# Patient Record
Sex: MALE | Race: WHITE | NOT HISPANIC OR LATINO | Employment: OTHER | URBAN - METROPOLITAN AREA
[De-identification: names, ages, dates, MRNs, and addresses within clinical notes are randomized per-mention and may not be internally consistent; named-entity substitution may affect disease eponyms.]

---

## 2017-02-13 ENCOUNTER — HOSPITAL ENCOUNTER (INPATIENT)
Facility: HOSPITAL | Age: 67
LOS: 3 days | Discharge: NURSING, REHAB OR LONG-TERM CARE | End: 2017-02-17
Attending: EMERGENCY MEDICINE | Admitting: HOSPITALIST

## 2017-02-13 DIAGNOSIS — T84.53XA INFECTION OF PROSTHETIC RIGHT KNEE JOINT (HCC): ICD-10-CM

## 2017-02-13 DIAGNOSIS — L03.116 CELLULITIS OF LEFT FOOT: Primary | ICD-10-CM

## 2017-02-13 DIAGNOSIS — M86.9 TOE OSTEOMYELITIS, LEFT (HCC): ICD-10-CM

## 2017-02-13 DIAGNOSIS — M25.461 KNEE EFFUSION, RIGHT: ICD-10-CM

## 2017-02-13 DIAGNOSIS — R26.2 DIFFICULTY WALKING: ICD-10-CM

## 2017-02-13 LAB
ALBUMIN SERPL-MCNC: 4.2 G/DL (ref 3.5–5.2)
ALBUMIN/GLOB SERPL: 1.1 G/DL
ALP SERPL-CCNC: 71 U/L (ref 39–117)
ALT SERPL W P-5'-P-CCNC: 19 U/L (ref 1–41)
ANION GAP SERPL CALCULATED.3IONS-SCNC: 16.6 MMOL/L
AST SERPL-CCNC: 18 U/L (ref 1–40)
BASOPHILS # BLD AUTO: 0.01 10*3/MM3 (ref 0–0.2)
BASOPHILS NFR BLD AUTO: 0.1 % (ref 0–1.5)
BILIRUB SERPL-MCNC: 0.6 MG/DL (ref 0.1–1.2)
BUN BLD-MCNC: 14 MG/DL (ref 8–23)
BUN/CREAT SERPL: 14.1 (ref 7–25)
CALCIUM SPEC-SCNC: 9.3 MG/DL (ref 8.6–10.5)
CHLORIDE SERPL-SCNC: 93 MMOL/L (ref 98–107)
CO2 SERPL-SCNC: 23.4 MMOL/L (ref 22–29)
CREAT BLD-MCNC: 0.99 MG/DL (ref 0.76–1.27)
D-LACTATE SERPL-SCNC: 1.7 MMOL/L (ref 0.5–2)
DEPRECATED RDW RBC AUTO: 44.2 FL (ref 37–54)
EOSINOPHIL # BLD AUTO: 0.05 10*3/MM3 (ref 0–0.7)
EOSINOPHIL NFR BLD AUTO: 0.5 % (ref 0.3–6.2)
ERYTHROCYTE [DISTWIDTH] IN BLOOD BY AUTOMATED COUNT: 13.3 % (ref 11.5–14.5)
FLUAV AG NPH QL: NEGATIVE
FLUBV AG NPH QL IA: NEGATIVE
GFR SERPL CREATININE-BSD FRML MDRD: 76 ML/MIN/1.73
GLOBULIN UR ELPH-MCNC: 3.9 GM/DL
GLUCOSE BLD-MCNC: 263 MG/DL (ref 65–99)
HCT VFR BLD AUTO: 35.6 % (ref 40.4–52.2)
HGB BLD-MCNC: 11.9 G/DL (ref 13.7–17.6)
IMM GRANULOCYTES # BLD: 0.02 10*3/MM3 (ref 0–0.03)
IMM GRANULOCYTES NFR BLD: 0.2 % (ref 0–0.5)
LYMPHOCYTES # BLD AUTO: 0.83 10*3/MM3 (ref 0.9–4.8)
LYMPHOCYTES NFR BLD AUTO: 7.5 % (ref 19.6–45.3)
MCH RBC QN AUTO: 30.7 PG (ref 27–32.7)
MCHC RBC AUTO-ENTMCNC: 33.4 G/DL (ref 32.6–36.4)
MCV RBC AUTO: 91.8 FL (ref 79.8–96.2)
MONOCYTES # BLD AUTO: 0.78 10*3/MM3 (ref 0.2–1.2)
MONOCYTES NFR BLD AUTO: 7.1 % (ref 5–12)
NEUTROPHILS # BLD AUTO: 9.31 10*3/MM3 (ref 1.9–8.1)
NEUTROPHILS NFR BLD AUTO: 84.6 % (ref 42.7–76)
PLATELET # BLD AUTO: 179 10*3/MM3 (ref 140–500)
PMV BLD AUTO: 10.8 FL (ref 6–12)
POTASSIUM BLD-SCNC: 4.3 MMOL/L (ref 3.5–5.2)
PROT SERPL-MCNC: 8.1 G/DL (ref 6–8.5)
RBC # BLD AUTO: 3.88 10*6/MM3 (ref 4.6–6)
SODIUM BLD-SCNC: 133 MMOL/L (ref 136–145)
WBC NRBC COR # BLD: 11 10*3/MM3 (ref 4.5–10.7)

## 2017-02-13 PROCEDURE — 80053 COMPREHEN METABOLIC PANEL: CPT | Performed by: EMERGENCY MEDICINE

## 2017-02-13 PROCEDURE — 87804 INFLUENZA ASSAY W/OPTIC: CPT | Performed by: EMERGENCY MEDICINE

## 2017-02-13 PROCEDURE — 83605 ASSAY OF LACTIC ACID: CPT | Performed by: EMERGENCY MEDICINE

## 2017-02-13 PROCEDURE — 87147 CULTURE TYPE IMMUNOLOGIC: CPT | Performed by: EMERGENCY MEDICINE

## 2017-02-13 PROCEDURE — 87040 BLOOD CULTURE FOR BACTERIA: CPT | Performed by: EMERGENCY MEDICINE

## 2017-02-13 PROCEDURE — 36415 COLL VENOUS BLD VENIPUNCTURE: CPT | Performed by: EMERGENCY MEDICINE

## 2017-02-13 PROCEDURE — 87150 DNA/RNA AMPLIFIED PROBE: CPT | Performed by: EMERGENCY MEDICINE

## 2017-02-13 PROCEDURE — 85025 COMPLETE CBC W/AUTO DIFF WBC: CPT | Performed by: EMERGENCY MEDICINE

## 2017-02-13 PROCEDURE — 87186 SC STD MICRODIL/AGAR DIL: CPT | Performed by: EMERGENCY MEDICINE

## 2017-02-13 PROCEDURE — 99284 EMERGENCY DEPT VISIT MOD MDM: CPT

## 2017-02-13 RX ORDER — GLYBURIDE 5 MG/1
5 TABLET ORAL 2 TIMES DAILY
COMMUNITY
End: 2017-02-17 | Stop reason: HOSPADM

## 2017-02-13 RX ORDER — LISINOPRIL 20 MG/1
20 TABLET ORAL EVERY EVENING
COMMUNITY
End: 2017-08-30 | Stop reason: SDUPTHER

## 2017-02-13 RX ORDER — HYDROCODONE BITARTRATE AND ACETAMINOPHEN 10; 300 MG/1; MG/1
1 TABLET ORAL AS NEEDED
COMMUNITY
End: 2017-02-17 | Stop reason: HOSPADM

## 2017-02-13 RX ORDER — MELOXICAM 15 MG/1
15 TABLET ORAL DAILY
COMMUNITY
End: 2017-02-17 | Stop reason: HOSPADM

## 2017-02-13 RX ORDER — PRAVASTATIN SODIUM 20 MG
20 TABLET ORAL NIGHTLY
COMMUNITY
End: 2017-06-16 | Stop reason: HOSPADM

## 2017-02-13 RX ORDER — OMEPRAZOLE 40 MG/1
40 CAPSULE, DELAYED RELEASE ORAL 2 TIMES DAILY
COMMUNITY

## 2017-02-13 RX ORDER — SERTRALINE HYDROCHLORIDE 100 MG/1
100 TABLET, FILM COATED ORAL DAILY
COMMUNITY

## 2017-02-13 RX ORDER — ACETAMINOPHEN 500 MG
1000 TABLET ORAL ONCE
Status: COMPLETED | OUTPATIENT
Start: 2017-02-13 | End: 2017-02-13

## 2017-02-13 RX ORDER — SODIUM CHLORIDE 0.9 % (FLUSH) 0.9 %
10 SYRINGE (ML) INJECTION AS NEEDED
Status: DISCONTINUED | OUTPATIENT
Start: 2017-02-13 | End: 2017-02-17 | Stop reason: HOSPADM

## 2017-02-13 RX ADMIN — ACETAMINOPHEN 1000 MG: 500 TABLET ORAL at 20:04

## 2017-02-14 ENCOUNTER — ANESTHESIA (OUTPATIENT)
Dept: PERIOP | Facility: HOSPITAL | Age: 67
End: 2017-02-14

## 2017-02-14 ENCOUNTER — ANESTHESIA EVENT (OUTPATIENT)
Dept: PERIOP | Facility: HOSPITAL | Age: 67
End: 2017-02-14

## 2017-02-14 ENCOUNTER — APPOINTMENT (OUTPATIENT)
Dept: GENERAL RADIOLOGY | Facility: HOSPITAL | Age: 67
End: 2017-02-14

## 2017-02-14 PROBLEM — E11.43 DIABETIC AUTONOMIC NEUROPATHY ASSOCIATED WITH TYPE 2 DIABETES MELLITUS (HCC): Chronic | Status: ACTIVE | Noted: 2017-02-14

## 2017-02-14 PROBLEM — L03.116 CELLULITIS OF LEFT FOOT: Status: ACTIVE | Noted: 2017-02-14

## 2017-02-14 PROBLEM — E11.9 DIABETES MELLITUS: Status: ACTIVE | Noted: 2017-02-14

## 2017-02-14 PROBLEM — M79.676 GREAT TOE PAIN: Status: ACTIVE | Noted: 2017-02-14

## 2017-02-14 PROBLEM — M25.569 KNEE PAIN: Status: ACTIVE | Noted: 2017-02-14

## 2017-02-14 PROBLEM — G89.29 CHRONIC PAIN: Status: ACTIVE | Noted: 2017-02-14

## 2017-02-14 PROBLEM — L97.524 DIABETIC ULCER OF TOE OF LEFT FOOT ASSOCIATED WITH TYPE 2 DIABETES MELLITUS, WITH NECROSIS OF BONE: Status: ACTIVE | Noted: 2017-02-14

## 2017-02-14 PROBLEM — E11.621 DIABETIC ULCER OF TOE OF LEFT FOOT ASSOCIATED WITH TYPE 2 DIABETES MELLITUS, WITH NECROSIS OF BONE: Status: ACTIVE | Noted: 2017-02-14

## 2017-02-14 PROBLEM — E11.51 DIABETES TYPE 2 WITH ATHEROSCLEROSIS OF ARTERIES OF EXTREMITIES: Chronic | Status: ACTIVE | Noted: 2017-02-14

## 2017-02-14 PROBLEM — I10 HYPERTENSION: Status: ACTIVE | Noted: 2017-02-14

## 2017-02-14 PROBLEM — E78.5 HYPERLIPIDEMIA: Status: ACTIVE | Noted: 2017-02-14

## 2017-02-14 PROBLEM — I70.209 DIABETES TYPE 2 WITH ATHEROSCLEROSIS OF ARTERIES OF EXTREMITIES: Chronic | Status: ACTIVE | Noted: 2017-02-14

## 2017-02-14 LAB
APPEARANCE FLD: ABNORMAL
COLOR FLD: ABNORMAL
CRP SERPL-MCNC: 23.7 MG/DL (ref 0–0.5)
CRYSTALS FLD MICRO: NORMAL
ERYTHROCYTE [SEDIMENTATION RATE] IN BLOOD: 98 MM/HR (ref 0–20)
GLUCOSE BLDC GLUCOMTR-MCNC: 242 MG/DL (ref 70–130)
GLUCOSE BLDC GLUCOMTR-MCNC: 254 MG/DL (ref 70–130)
GLUCOSE BLDC GLUCOMTR-MCNC: 285 MG/DL (ref 70–130)
GLUCOSE BLDC GLUCOMTR-MCNC: 306 MG/DL (ref 70–130)
HBA1C MFR BLD: 9.25 % (ref 4.8–5.6)
HOLD SPECIMEN: NORMAL
LYMPHOCYTES NFR FLD MANUAL: 20 %
METHOD: ABNORMAL
MONOCYTES NFR FLD: 1 %
MONOS+MACROS NFR FLD: 1 %
NEUTROPHILS NFR FLD MANUAL: 78 %
NUC CELL # FLD: ABNORMAL /MM3
RBC # FLD AUTO: 5500 /MM3
URATE SERPL-MCNC: 7.3 MG/DL (ref 3.4–7)
WHOLE BLOOD HOLD SPECIMEN: NORMAL

## 2017-02-14 PROCEDURE — 93005 ELECTROCARDIOGRAM TRACING: CPT | Performed by: HOSPITALIST

## 2017-02-14 PROCEDURE — 88305 TISSUE EXAM BY PATHOLOGIST: CPT | Performed by: SURGERY

## 2017-02-14 PROCEDURE — 63710000001 INSULIN ISOPHANE HUMAN PER 5 UNITS: Performed by: HOSPITALIST

## 2017-02-14 PROCEDURE — 25010000002 ONDANSETRON PER 1 MG: Performed by: EMERGENCY MEDICINE

## 2017-02-14 PROCEDURE — 25010000002 MIDAZOLAM PER 1 MG: Performed by: ANESTHESIOLOGY

## 2017-02-14 PROCEDURE — 25010000002 MEPERIDINE 25 MG/0.5ML SOLUTION: Performed by: NURSE ANESTHETIST, CERTIFIED REGISTERED

## 2017-02-14 PROCEDURE — 89051 BODY FLUID CELL COUNT: CPT | Performed by: ORTHOPAEDIC SURGERY

## 2017-02-14 PROCEDURE — 25010000002 SUCCINYLCHOLINE PER 20 MG: Performed by: NURSE ANESTHETIST, CERTIFIED REGISTERED

## 2017-02-14 PROCEDURE — 63710000001 INSULIN ASPART PER 5 UNITS: Performed by: HOSPITALIST

## 2017-02-14 PROCEDURE — 87205 SMEAR GRAM STAIN: CPT | Performed by: SURGERY

## 2017-02-14 PROCEDURE — 87205 SMEAR GRAM STAIN: CPT | Performed by: ORTHOPAEDIC SURGERY

## 2017-02-14 PROCEDURE — 82945 GLUCOSE OTHER FLUID: CPT | Performed by: INTERNAL MEDICINE

## 2017-02-14 PROCEDURE — 25010000003 CEFAZOLIN PER 500 MG: Performed by: SURGERY

## 2017-02-14 PROCEDURE — 87176 TISSUE HOMOGENIZATION CULTR: CPT | Performed by: SURGERY

## 2017-02-14 PROCEDURE — 25010000002 VANCOMYCIN: Performed by: HOSPITALIST

## 2017-02-14 PROCEDURE — 84550 ASSAY OF BLOOD/URIC ACID: CPT | Performed by: ORTHOPAEDIC SURGERY

## 2017-02-14 PROCEDURE — 94799 UNLISTED PULMONARY SVC/PX: CPT

## 2017-02-14 PROCEDURE — 25010000002 VANCOMYCIN PER 500 MG: Performed by: EMERGENCY MEDICINE

## 2017-02-14 PROCEDURE — 87070 CULTURE OTHR SPECIMN AEROBIC: CPT | Performed by: SURGERY

## 2017-02-14 PROCEDURE — 25010000002 FENTANYL CITRATE (PF) 100 MCG/2ML SOLUTION: Performed by: ANESTHESIOLOGY

## 2017-02-14 PROCEDURE — 87070 CULTURE OTHR SPECIMN AEROBIC: CPT | Performed by: ORTHOPAEDIC SURGERY

## 2017-02-14 PROCEDURE — 82962 GLUCOSE BLOOD TEST: CPT

## 2017-02-14 PROCEDURE — 89060 EXAM SYNOVIAL FLUID CRYSTALS: CPT | Performed by: ORTHOPAEDIC SURGERY

## 2017-02-14 PROCEDURE — 25010000002 HYDROMORPHONE PER 4 MG: Performed by: HOSPITALIST

## 2017-02-14 PROCEDURE — 25010000002 HYDROMORPHONE PER 4 MG: Performed by: NURSE ANESTHETIST, CERTIFIED REGISTERED

## 2017-02-14 PROCEDURE — 0Y6Q0Z1 DETACHMENT AT LEFT 1ST TOE, HIGH, OPEN APPROACH: ICD-10-PCS | Performed by: SURGERY

## 2017-02-14 PROCEDURE — 0SUV09Z SUPPLEMENT RIGHT KNEE JOINT, TIBIAL SURFACE WITH LINER, OPEN APPROACH: ICD-10-PCS | Performed by: ORTHOPAEDIC SURGERY

## 2017-02-14 PROCEDURE — 25010000002 HYDROMORPHONE PER 4 MG: Performed by: EMERGENCY MEDICINE

## 2017-02-14 PROCEDURE — 87075 CULTR BACTERIA EXCEPT BLOOD: CPT | Performed by: SURGERY

## 2017-02-14 PROCEDURE — 87147 CULTURE TYPE IMMUNOLOGIC: CPT | Performed by: ORTHOPAEDIC SURGERY

## 2017-02-14 PROCEDURE — 25010000002 FENTANYL CITRATE (PF) 100 MCG/2ML SOLUTION: Performed by: NURSE ANESTHETIST, CERTIFIED REGISTERED

## 2017-02-14 PROCEDURE — 25010000002 HYDROMORPHONE PER 4 MG: Performed by: ANESTHESIOLOGY

## 2017-02-14 PROCEDURE — C1776 JOINT DEVICE (IMPLANTABLE): HCPCS | Performed by: SURGERY

## 2017-02-14 PROCEDURE — 0S9C3ZX DRAINAGE OF RIGHT KNEE JOINT, PERCUTANEOUS APPROACH, DIAGNOSTIC: ICD-10-PCS | Performed by: ORTHOPAEDIC SURGERY

## 2017-02-14 PROCEDURE — 73560 X-RAY EXAM OF KNEE 1 OR 2: CPT

## 2017-02-14 PROCEDURE — 87015 SPECIMEN INFECT AGNT CONCNTJ: CPT | Performed by: ORTHOPAEDIC SURGERY

## 2017-02-14 PROCEDURE — 25010000002 HYDRALAZINE PER 20 MG: Performed by: NURSE ANESTHETIST, CERTIFIED REGISTERED

## 2017-02-14 PROCEDURE — 87186 SC STD MICRODIL/AGAR DIL: CPT | Performed by: ORTHOPAEDIC SURGERY

## 2017-02-14 PROCEDURE — 73630 X-RAY EXAM OF FOOT: CPT

## 2017-02-14 PROCEDURE — 84157 ASSAY OF PROTEIN OTHER: CPT

## 2017-02-14 PROCEDURE — 0SPC09Z REMOVAL OF LINER FROM RIGHT KNEE JOINT, OPEN APPROACH: ICD-10-PCS | Performed by: ORTHOPAEDIC SURGERY

## 2017-02-14 PROCEDURE — 93010 ELECTROCARDIOGRAM REPORT: CPT | Performed by: INTERNAL MEDICINE

## 2017-02-14 PROCEDURE — 0SBC0ZZ EXCISION OF RIGHT KNEE JOINT, OPEN APPROACH: ICD-10-PCS | Performed by: ORTHOPAEDIC SURGERY

## 2017-02-14 PROCEDURE — 25010000002 PIPERACILLIN SOD-TAZOBACTAM PER 1 G: Performed by: HOSPITALIST

## 2017-02-14 PROCEDURE — 83036 HEMOGLOBIN GLYCOSYLATED A1C: CPT | Performed by: HOSPITALIST

## 2017-02-14 PROCEDURE — 71010 HC CHEST PA OR AP: CPT

## 2017-02-14 PROCEDURE — L1830 KO IMMOB CANVAS LONG PRE OTS: HCPCS | Performed by: SURGERY

## 2017-02-14 PROCEDURE — 86140 C-REACTIVE PROTEIN: CPT | Performed by: ORTHOPAEDIC SURGERY

## 2017-02-14 PROCEDURE — 85652 RBC SED RATE AUTOMATED: CPT | Performed by: ORTHOPAEDIC SURGERY

## 2017-02-14 PROCEDURE — 25010000002 MIDAZOLAM PER 1 MG: Performed by: NURSE ANESTHETIST, CERTIFIED REGISTERED

## 2017-02-14 PROCEDURE — 88311 DECALCIFY TISSUE: CPT | Performed by: SURGERY

## 2017-02-14 PROCEDURE — 25010000002 PROPOFOL 10 MG/ML EMULSION: Performed by: NURSE ANESTHETIST, CERTIFIED REGISTERED

## 2017-02-14 PROCEDURE — 87147 CULTURE TYPE IMMUNOLOGIC: CPT | Performed by: SURGERY

## 2017-02-14 DEVICE — SIGMA TIBIAL INSERT ROTATING PLATFORM TC3 SIZE 4 22.5MM GVF
Type: IMPLANTABLE DEVICE | Site: KNEE | Status: FUNCTIONAL
Brand: SIGMA

## 2017-02-14 RX ORDER — HYDROMORPHONE HYDROCHLORIDE 1 MG/ML
0.5 INJECTION, SOLUTION INTRAMUSCULAR; INTRAVENOUS; SUBCUTANEOUS
Status: DISCONTINUED | OUTPATIENT
Start: 2017-02-14 | End: 2017-02-14 | Stop reason: HOSPADM

## 2017-02-14 RX ORDER — ACETAMINOPHEN 10 MG/ML
1000 INJECTION, SOLUTION INTRAVENOUS ONCE
Status: COMPLETED | OUTPATIENT
Start: 2017-02-14 | End: 2017-02-14

## 2017-02-14 RX ORDER — PROPOFOL 10 MG/ML
VIAL (ML) INTRAVENOUS AS NEEDED
Status: DISCONTINUED | OUTPATIENT
Start: 2017-02-14 | End: 2017-02-14 | Stop reason: SURG

## 2017-02-14 RX ORDER — MIDAZOLAM HYDROCHLORIDE 1 MG/ML
1 INJECTION INTRAMUSCULAR; INTRAVENOUS
Status: DISCONTINUED | OUTPATIENT
Start: 2017-02-14 | End: 2017-02-14 | Stop reason: HOSPADM

## 2017-02-14 RX ORDER — NICOTINE POLACRILEX 4 MG
15 LOZENGE BUCCAL
Status: DISCONTINUED | OUTPATIENT
Start: 2017-02-14 | End: 2017-02-17 | Stop reason: HOSPADM

## 2017-02-14 RX ORDER — SODIUM CHLORIDE 9 MG/ML
150 INJECTION, SOLUTION INTRAVENOUS CONTINUOUS
Status: DISCONTINUED | OUTPATIENT
Start: 2017-02-15 | End: 2017-02-17 | Stop reason: HOSPADM

## 2017-02-14 RX ORDER — FENTANYL CITRATE 50 UG/ML
50 INJECTION, SOLUTION INTRAMUSCULAR; INTRAVENOUS
Status: DISCONTINUED | OUTPATIENT
Start: 2017-02-14 | End: 2017-02-14 | Stop reason: HOSPADM

## 2017-02-14 RX ORDER — SODIUM CHLORIDE 9 MG/ML
150 INJECTION, SOLUTION INTRAVENOUS CONTINUOUS
Status: DISCONTINUED | OUTPATIENT
Start: 2017-02-15 | End: 2017-02-14

## 2017-02-14 RX ORDER — DOCUSATE SODIUM 100 MG/1
100 CAPSULE, LIQUID FILLED ORAL 2 TIMES DAILY
Status: COMPLETED | OUTPATIENT
Start: 2017-02-14 | End: 2017-02-17

## 2017-02-14 RX ORDER — LABETALOL HYDROCHLORIDE 5 MG/ML
5 INJECTION, SOLUTION INTRAVENOUS
Status: DISCONTINUED | OUTPATIENT
Start: 2017-02-14 | End: 2017-02-14 | Stop reason: HOSPADM

## 2017-02-14 RX ORDER — ACETAMINOPHEN 325 MG/1
650 TABLET ORAL EVERY 6 HOURS PRN
Status: DISCONTINUED | OUTPATIENT
Start: 2017-02-14 | End: 2017-02-17 | Stop reason: HOSPADM

## 2017-02-14 RX ORDER — VANCOMYCIN HYDROCHLORIDE 1 G/200ML
1000 INJECTION, SOLUTION INTRAVENOUS ONCE
Status: COMPLETED | OUTPATIENT
Start: 2017-02-14 | End: 2017-02-14

## 2017-02-14 RX ORDER — PROMETHAZINE HYDROCHLORIDE 25 MG/1
25 SUPPOSITORY RECTAL ONCE AS NEEDED
Status: DISCONTINUED | OUTPATIENT
Start: 2017-02-14 | End: 2017-02-14 | Stop reason: HOSPADM

## 2017-02-14 RX ORDER — PROMETHAZINE HYDROCHLORIDE 25 MG/ML
12.5 INJECTION, SOLUTION INTRAMUSCULAR; INTRAVENOUS ONCE AS NEEDED
Status: DISCONTINUED | OUTPATIENT
Start: 2017-02-14 | End: 2017-02-14 | Stop reason: HOSPADM

## 2017-02-14 RX ORDER — MIDAZOLAM HYDROCHLORIDE 1 MG/ML
2 INJECTION INTRAMUSCULAR; INTRAVENOUS
Status: DISCONTINUED | OUTPATIENT
Start: 2017-02-14 | End: 2017-02-14 | Stop reason: HOSPADM

## 2017-02-14 RX ORDER — SODIUM CHLORIDE 0.9 % (FLUSH) 0.9 %
1-10 SYRINGE (ML) INJECTION AS NEEDED
Status: DISCONTINUED | OUTPATIENT
Start: 2017-02-14 | End: 2017-02-14 | Stop reason: HOSPADM

## 2017-02-14 RX ORDER — LISINOPRIL 20 MG/1
20 TABLET ORAL DAILY
Status: DISCONTINUED | OUTPATIENT
Start: 2017-02-14 | End: 2017-02-17 | Stop reason: HOSPADM

## 2017-02-14 RX ORDER — PROMETHAZINE HYDROCHLORIDE 25 MG/1
12.5 TABLET ORAL ONCE AS NEEDED
Status: DISCONTINUED | OUTPATIENT
Start: 2017-02-14 | End: 2017-02-14 | Stop reason: HOSPADM

## 2017-02-14 RX ORDER — ONDANSETRON 2 MG/ML
4 INJECTION INTRAMUSCULAR; INTRAVENOUS ONCE
Status: COMPLETED | OUTPATIENT
Start: 2017-02-14 | End: 2017-02-14

## 2017-02-14 RX ORDER — DEXTROSE MONOHYDRATE 25 G/50ML
25 INJECTION, SOLUTION INTRAVENOUS
Status: DISCONTINUED | OUTPATIENT
Start: 2017-02-14 | End: 2017-02-17 | Stop reason: HOSPADM

## 2017-02-14 RX ORDER — HYDRALAZINE HYDROCHLORIDE 20 MG/ML
5 INJECTION INTRAMUSCULAR; INTRAVENOUS
Status: DISCONTINUED | OUTPATIENT
Start: 2017-02-14 | End: 2017-02-14 | Stop reason: HOSPADM

## 2017-02-14 RX ORDER — HYDROMORPHONE HYDROCHLORIDE 1 MG/ML
0.25 INJECTION, SOLUTION INTRAMUSCULAR; INTRAVENOUS; SUBCUTANEOUS
Status: DISCONTINUED | OUTPATIENT
Start: 2017-02-14 | End: 2017-02-14 | Stop reason: HOSPADM

## 2017-02-14 RX ORDER — DIPHENHYDRAMINE HYDROCHLORIDE 50 MG/ML
12.5 INJECTION INTRAMUSCULAR; INTRAVENOUS
Status: DISCONTINUED | OUTPATIENT
Start: 2017-02-14 | End: 2017-02-14 | Stop reason: HOSPADM

## 2017-02-14 RX ORDER — PANTOPRAZOLE SODIUM 40 MG/1
40 TABLET, DELAYED RELEASE ORAL
Status: DISCONTINUED | OUTPATIENT
Start: 2017-02-14 | End: 2017-02-17 | Stop reason: HOSPADM

## 2017-02-14 RX ORDER — HYDROMORPHONE HCL 110MG/55ML
PATIENT CONTROLLED ANALGESIA SYRINGE INTRAVENOUS AS NEEDED
Status: DISCONTINUED | OUTPATIENT
Start: 2017-02-14 | End: 2017-02-14 | Stop reason: SURG

## 2017-02-14 RX ORDER — DIPHENOXYLATE HYDROCHLORIDE AND ATROPINE SULFATE 2.5; .025 MG/1; MG/1
1 TABLET ORAL DAILY
Status: DISCONTINUED | OUTPATIENT
Start: 2017-02-14 | End: 2017-02-17 | Stop reason: HOSPADM

## 2017-02-14 RX ORDER — HYDROCODONE BITARTRATE AND ACETAMINOPHEN 7.5; 325 MG/1; MG/1
1 TABLET ORAL ONCE AS NEEDED
Status: DISCONTINUED | OUTPATIENT
Start: 2017-02-14 | End: 2017-02-14 | Stop reason: HOSPADM

## 2017-02-14 RX ORDER — BISACODYL 10 MG
10 SUPPOSITORY, RECTAL RECTAL DAILY PRN
Status: DISCONTINUED | OUTPATIENT
Start: 2017-02-14 | End: 2017-02-17 | Stop reason: HOSPADM

## 2017-02-14 RX ORDER — FLUMAZENIL 0.1 MG/ML
0.2 INJECTION INTRAVENOUS AS NEEDED
Status: DISCONTINUED | OUTPATIENT
Start: 2017-02-14 | End: 2017-02-14 | Stop reason: HOSPADM

## 2017-02-14 RX ORDER — MAGNESIUM HYDROXIDE 1200 MG/15ML
LIQUID ORAL AS NEEDED
Status: DISCONTINUED | OUTPATIENT
Start: 2017-02-14 | End: 2017-02-14 | Stop reason: HOSPADM

## 2017-02-14 RX ORDER — SUCCINYLCHOLINE CHLORIDE 20 MG/ML
INJECTION INTRAMUSCULAR; INTRAVENOUS AS NEEDED
Status: DISCONTINUED | OUTPATIENT
Start: 2017-02-14 | End: 2017-02-14 | Stop reason: SURG

## 2017-02-14 RX ORDER — ONDANSETRON 2 MG/ML
4 INJECTION INTRAMUSCULAR; INTRAVENOUS EVERY 6 HOURS PRN
Status: DISCONTINUED | OUTPATIENT
Start: 2017-02-14 | End: 2017-02-17 | Stop reason: HOSPADM

## 2017-02-14 RX ORDER — HYDROMORPHONE HYDROCHLORIDE 1 MG/ML
0.5 INJECTION, SOLUTION INTRAMUSCULAR; INTRAVENOUS; SUBCUTANEOUS
Status: DISCONTINUED | OUTPATIENT
Start: 2017-02-14 | End: 2017-02-15

## 2017-02-14 RX ORDER — PROMETHAZINE HYDROCHLORIDE 25 MG/1
25 TABLET ORAL ONCE AS NEEDED
Status: DISCONTINUED | OUTPATIENT
Start: 2017-02-14 | End: 2017-02-14 | Stop reason: HOSPADM

## 2017-02-14 RX ORDER — LIDOCAINE HYDROCHLORIDE 10 MG/ML
10 INJECTION, SOLUTION INFILTRATION; PERINEURAL ONCE
Status: COMPLETED | OUTPATIENT
Start: 2017-02-14 | End: 2017-02-14

## 2017-02-14 RX ORDER — SERTRALINE HYDROCHLORIDE 100 MG/1
100 TABLET, FILM COATED ORAL DAILY
Status: DISCONTINUED | OUTPATIENT
Start: 2017-02-14 | End: 2017-02-17 | Stop reason: HOSPADM

## 2017-02-14 RX ORDER — SODIUM CHLORIDE, SODIUM LACTATE, POTASSIUM CHLORIDE, CALCIUM CHLORIDE 600; 310; 30; 20 MG/100ML; MG/100ML; MG/100ML; MG/100ML
9 INJECTION, SOLUTION INTRAVENOUS CONTINUOUS
Status: DISCONTINUED | OUTPATIENT
Start: 2017-02-14 | End: 2017-02-17 | Stop reason: HOSPADM

## 2017-02-14 RX ORDER — NALOXONE HCL 0.4 MG/ML
0.2 VIAL (ML) INJECTION AS NEEDED
Status: DISCONTINUED | OUTPATIENT
Start: 2017-02-14 | End: 2017-02-14 | Stop reason: HOSPADM

## 2017-02-14 RX ORDER — OXYCODONE AND ACETAMINOPHEN 7.5; 325 MG/1; MG/1
1 TABLET ORAL ONCE AS NEEDED
Status: DISCONTINUED | OUTPATIENT
Start: 2017-02-14 | End: 2017-02-14 | Stop reason: HOSPADM

## 2017-02-14 RX ORDER — PRAVASTATIN SODIUM 20 MG
20 TABLET ORAL DAILY
Status: DISCONTINUED | OUTPATIENT
Start: 2017-02-14 | End: 2017-02-17 | Stop reason: HOSPADM

## 2017-02-14 RX ORDER — GLYBURIDE 5 MG/1
5 TABLET ORAL 2 TIMES DAILY
Status: DISCONTINUED | OUTPATIENT
Start: 2017-02-14 | End: 2017-02-15

## 2017-02-14 RX ORDER — ONDANSETRON 2 MG/ML
4 INJECTION INTRAMUSCULAR; INTRAVENOUS ONCE AS NEEDED
Status: DISCONTINUED | OUTPATIENT
Start: 2017-02-14 | End: 2017-02-14 | Stop reason: HOSPADM

## 2017-02-14 RX ORDER — HYDROCODONE BITARTRATE AND ACETAMINOPHEN 10; 325 MG/1; MG/1
1 TABLET ORAL EVERY 4 HOURS PRN
Status: DISCONTINUED | OUTPATIENT
Start: 2017-02-14 | End: 2017-02-15

## 2017-02-14 RX ORDER — FAMOTIDINE 10 MG/ML
20 INJECTION, SOLUTION INTRAVENOUS ONCE
Status: COMPLETED | OUTPATIENT
Start: 2017-02-14 | End: 2017-02-14

## 2017-02-14 RX ADMIN — LIDOCAINE HYDROCHLORIDE 10 ML: 10 INJECTION, SOLUTION INFILTRATION; PERINEURAL at 10:15

## 2017-02-14 RX ADMIN — FENTANYL CITRATE 100 MCG: 50 INJECTION INTRAMUSCULAR; INTRAVENOUS at 12:30

## 2017-02-14 RX ADMIN — MIDAZOLAM 1 MG: 1 INJECTION INTRAMUSCULAR; INTRAVENOUS at 17:25

## 2017-02-14 RX ADMIN — ONDANSETRON 4 MG: 2 INJECTION INTRAMUSCULAR; INTRAVENOUS at 00:53

## 2017-02-14 RX ADMIN — FENTANYL CITRATE 25 MCG: 50 INJECTION INTRAMUSCULAR; INTRAVENOUS at 11:33

## 2017-02-14 RX ADMIN — FENTANYL CITRATE 50 MCG: 50 INJECTION INTRAMUSCULAR; INTRAVENOUS at 13:37

## 2017-02-14 RX ADMIN — HYDROMORPHONE HYDROCHLORIDE 0.5 MG: 1 INJECTION, SOLUTION INTRAMUSCULAR; INTRAVENOUS; SUBCUTANEOUS at 14:38

## 2017-02-14 RX ADMIN — FENTANYL CITRATE 50 MCG: 50 INJECTION INTRAMUSCULAR; INTRAVENOUS at 15:44

## 2017-02-14 RX ADMIN — SODIUM CHLORIDE, POTASSIUM CHLORIDE, SODIUM LACTATE AND CALCIUM CHLORIDE 9 ML/HR: 600; 310; 30; 20 INJECTION, SOLUTION INTRAVENOUS at 11:49

## 2017-02-14 RX ADMIN — HYDROMORPHONE HYDROCHLORIDE 0.5 MG: 1 INJECTION, SOLUTION INTRAMUSCULAR; INTRAVENOUS; SUBCUTANEOUS at 22:35

## 2017-02-14 RX ADMIN — SUCCINYLCHOLINE CHLORIDE 60 MG: 20 INJECTION, SOLUTION INTRAMUSCULAR; INTRAVENOUS; PARENTERAL at 12:30

## 2017-02-14 RX ADMIN — HYDROMORPHONE HYDROCHLORIDE 1 MG: 1 INJECTION, SOLUTION INTRAMUSCULAR; INTRAVENOUS; SUBCUTANEOUS at 00:52

## 2017-02-14 RX ADMIN — ACETAMINOPHEN 650 MG: 325 TABLET ORAL at 05:06

## 2017-02-14 RX ADMIN — MIDAZOLAM 1 MG: 1 INJECTION INTRAMUSCULAR; INTRAVENOUS at 11:33

## 2017-02-14 RX ADMIN — HYDROMORPHONE HYDROCHLORIDE 0.5 MG: 1 INJECTION, SOLUTION INTRAMUSCULAR; INTRAVENOUS; SUBCUTANEOUS at 15:44

## 2017-02-14 RX ADMIN — HYDROMORPHONE HYDROCHLORIDE 0.5 MG: 1 INJECTION, SOLUTION INTRAMUSCULAR; INTRAVENOUS; SUBCUTANEOUS at 17:10

## 2017-02-14 RX ADMIN — EPHEDRINE SULFATE 15 MG: 50 INJECTION INTRAMUSCULAR; INTRAVENOUS; SUBCUTANEOUS at 13:14

## 2017-02-14 RX ADMIN — HYDROMORPHONE HYDROCHLORIDE 0.5 MG: 1 INJECTION, SOLUTION INTRAMUSCULAR; INTRAVENOUS; SUBCUTANEOUS at 08:54

## 2017-02-14 RX ADMIN — HYDROCODONE BITARTRATE AND ACETAMINOPHEN 1 TABLET: 10; 325 TABLET ORAL at 21:28

## 2017-02-14 RX ADMIN — HYDROMORPHONE HYDROCHLORIDE 1 MG: 2 INJECTION, SOLUTION INTRAMUSCULAR; INTRAVENOUS; SUBCUTANEOUS at 12:41

## 2017-02-14 RX ADMIN — EPHEDRINE SULFATE 15 MG: 50 INJECTION INTRAMUSCULAR; INTRAVENOUS; SUBCUTANEOUS at 12:50

## 2017-02-14 RX ADMIN — PROPOFOL 200 MG: 10 INJECTION, EMULSION INTRAVENOUS at 12:30

## 2017-02-14 RX ADMIN — FENTANYL CITRATE 25 MCG: 50 INJECTION INTRAMUSCULAR; INTRAVENOUS at 11:48

## 2017-02-14 RX ADMIN — HYDRALAZINE HYDROCHLORIDE 5 MG: 20 INJECTION INTRAMUSCULAR; INTRAVENOUS at 15:45

## 2017-02-14 RX ADMIN — FENTANYL CITRATE 50 MCG: 50 INJECTION INTRAMUSCULAR; INTRAVENOUS at 15:27

## 2017-02-14 RX ADMIN — HYDROMORPHONE HYDROCHLORIDE 0.5 MG: 1 INJECTION, SOLUTION INTRAMUSCULAR; INTRAVENOUS; SUBCUTANEOUS at 16:09

## 2017-02-14 RX ADMIN — SODIUM CHLORIDE, POTASSIUM CHLORIDE, SODIUM LACTATE AND CALCIUM CHLORIDE 9 ML/HR: 600; 310; 30; 20 INJECTION, SOLUTION INTRAVENOUS at 15:51

## 2017-02-14 RX ADMIN — ACETAMINOPHEN 1000 MG: 10 INJECTION, SOLUTION INTRAVENOUS at 16:12

## 2017-02-14 RX ADMIN — SODIUM CHLORIDE, POTASSIUM CHLORIDE, SODIUM LACTATE AND CALCIUM CHLORIDE: 600; 310; 30; 20 INJECTION, SOLUTION INTRAVENOUS at 12:27

## 2017-02-14 RX ADMIN — HYDROMORPHONE HYDROCHLORIDE 0.5 MG: 1 INJECTION, SOLUTION INTRAMUSCULAR; INTRAVENOUS; SUBCUTANEOUS at 15:12

## 2017-02-14 RX ADMIN — DOCUSATE SODIUM 100 MG: 100 CAPSULE, LIQUID FILLED ORAL at 20:10

## 2017-02-14 RX ADMIN — ONDANSETRON 4 MG: 2 INJECTION INTRAMUSCULAR; INTRAVENOUS at 14:07

## 2017-02-14 RX ADMIN — FENTANYL CITRATE 50 MCG: 50 INJECTION INTRAMUSCULAR; INTRAVENOUS at 14:39

## 2017-02-14 RX ADMIN — HYDROMORPHONE HYDROCHLORIDE 0.5 MG: 1 INJECTION, SOLUTION INTRAMUSCULAR; INTRAVENOUS; SUBCUTANEOUS at 15:28

## 2017-02-14 RX ADMIN — HYDROMORPHONE HYDROCHLORIDE 0.5 MG: 1 INJECTION, SOLUTION INTRAMUSCULAR; INTRAVENOUS; SUBCUTANEOUS at 16:55

## 2017-02-14 RX ADMIN — MEPERIDINE HYDROCHLORIDE 12.5 MG: 25 INJECTION, SOLUTION INTRAMUSCULAR; INTRAVENOUS; SUBCUTANEOUS at 15:13

## 2017-02-14 RX ADMIN — HYDROMORPHONE HYDROCHLORIDE 1 MG: 2 INJECTION, SOLUTION INTRAMUSCULAR; INTRAVENOUS; SUBCUTANEOUS at 12:36

## 2017-02-14 RX ADMIN — VANCOMYCIN HYDROCHLORIDE 1000 MG: 1 INJECTION, SOLUTION INTRAVENOUS at 00:55

## 2017-02-14 RX ADMIN — INSULIN ASPART 3 UNITS: 100 INJECTION, SOLUTION INTRAVENOUS; SUBCUTANEOUS at 08:54

## 2017-02-14 RX ADMIN — HYDROMORPHONE HYDROCHLORIDE 0.5 MG: 1 INJECTION, SOLUTION INTRAMUSCULAR; INTRAVENOUS; SUBCUTANEOUS at 18:52

## 2017-02-14 RX ADMIN — FAMOTIDINE 20 MG: 10 INJECTION, SOLUTION INTRAVENOUS at 11:32

## 2017-02-14 RX ADMIN — SODIUM CHLORIDE, POTASSIUM CHLORIDE, SODIUM LACTATE AND CALCIUM CHLORIDE: 600; 310; 30; 20 INJECTION, SOLUTION INTRAVENOUS at 12:50

## 2017-02-14 RX ADMIN — HYDROMORPHONE HYDROCHLORIDE 1 MG: 1 INJECTION, SOLUTION INTRAMUSCULAR; INTRAVENOUS; SUBCUTANEOUS at 02:30

## 2017-02-14 RX ADMIN — INSULIN ASPART 4 UNITS: 100 INJECTION, SOLUTION INTRAVENOUS; SUBCUTANEOUS at 20:10

## 2017-02-14 RX ADMIN — MIDAZOLAM 1 MG: 1 INJECTION INTRAMUSCULAR; INTRAVENOUS at 16:09

## 2017-02-14 RX ADMIN — HYDROMORPHONE HYDROCHLORIDE 0.5 MG: 1 INJECTION, SOLUTION INTRAMUSCULAR; INTRAVENOUS; SUBCUTANEOUS at 05:06

## 2017-02-14 RX ADMIN — FENTANYL CITRATE 50 MCG: 50 INJECTION INTRAMUSCULAR; INTRAVENOUS at 13:12

## 2017-02-14 RX ADMIN — FENTANYL CITRATE 25 MCG: 50 INJECTION INTRAMUSCULAR; INTRAVENOUS at 12:03

## 2017-02-14 RX ADMIN — FENTANYL CITRATE 50 MCG: 50 INJECTION INTRAMUSCULAR; INTRAVENOUS at 15:00

## 2017-02-14 RX ADMIN — VANCOMYCIN HYDROCHLORIDE 1750 MG: 1 INJECTION, POWDER, LYOPHILIZED, FOR SOLUTION INTRAVENOUS at 22:35

## 2017-02-14 RX ADMIN — HYDROMORPHONE HYDROCHLORIDE 0.5 MG: 1 INJECTION, SOLUTION INTRAMUSCULAR; INTRAVENOUS; SUBCUTANEOUS at 17:34

## 2017-02-14 RX ADMIN — MIDAZOLAM 1 MG: 1 INJECTION INTRAMUSCULAR; INTRAVENOUS at 16:39

## 2017-02-14 RX ADMIN — MIDAZOLAM 1 MG: 1 INJECTION INTRAMUSCULAR; INTRAVENOUS at 16:55

## 2017-02-14 RX ADMIN — MEPERIDINE HYDROCHLORIDE 12.5 MG: 25 INJECTION, SOLUTION INTRAMUSCULAR; INTRAVENOUS; SUBCUTANEOUS at 16:33

## 2017-02-14 RX ADMIN — HYDROMORPHONE HYDROCHLORIDE 0.5 MG: 1 INJECTION, SOLUTION INTRAMUSCULAR; INTRAVENOUS; SUBCUTANEOUS at 07:05

## 2017-02-14 RX ADMIN — TAZOBACTAM SODIUM AND PIPERACILLIN SODIUM 3.38 G: 375; 3 INJECTION, SOLUTION INTRAVENOUS at 21:28

## 2017-02-14 RX ADMIN — HUMAN INSULIN 40 UNITS: 100 INJECTION, SUSPENSION SUBCUTANEOUS at 08:55

## 2017-02-14 RX ADMIN — VANCOMYCIN HYDROCHLORIDE 1500 MG: 1 INJECTION, POWDER, LYOPHILIZED, FOR SOLUTION INTRAVENOUS at 08:30

## 2017-02-14 NOTE — H&P
HISTORY AND PHYSICAL   [facility]        Patient Identification:  Name: Hugh R McBurney  Age: 66 y.o.  Sex: male  :  1950  MRN: [id number]                     Primary Care Physician: Guille Nieves MD    Chief Complaint:  Pain in left foot with high fever    History of Present Illness:       The patient's 66-year-old white male with diabetes mellitus, hypertension, hyperlipidemia, kidney stones and chronic pain with bilateral knee replacements and history of back pain who is admitted with history of having a sore area on the great toe of the left foot which occurred when he was wearing shoes it did not fit properly.  He's been seeing a foot doctor about this and has had local wound care.  His pain in his left foot got much worse and he had high fevers up to 104 and came to the emergency room.  He appeared to have a pretty severe infection in the left foot/great toe and cultures were done he was started on broad-spectrum antibiotics and is being admitted for further treatment.  He also complained of having problems with pain in the right knee which is so severe he is not able to bear weight.  He had prior problem with prosthetic knee joint infection and had antibiotics for several weeks by surgery and then replacement of the total knee.  He did complain about a little bit of nausea but no vomiting.  He's not had any chest pain or shortness of air.    Past Medical History:  Past Medical History   Diagnosis Date   • Chronic pain      BILATERAL KNEES AND BACK   • Diabetes mellitus    • Hyperlipidemia    • Hypertension    • Kidney stone      Past Surgical History:  Past Surgical History   Procedure Laterality Date   • Appendectomy     • Colonoscopy     • Joint replacement       BILATERAL KNEES    • Back surgery        Home Meds:  Prescriptions Prior to Admission   Medication Sig Dispense Refill Last Dose   • glyBURIDE (DIAbeta) 5 MG tablet Take 5 mg by mouth 2 (Two) Times a Day.      •  Hydrocodone-Acetaminophen (VICODIN HP)  MG per tablet Take 1 tablet by mouth As Needed for moderate pain (4-6).      • insulin NPH (humuLIN N,novoLIN N) 100 UNIT/ML injection Inject  under the skin 2 (Two) Times a Day Before Meals. 70/30 40 UNITS IN AM AND PM      • lisinopril (PRINIVIL,ZESTRIL) 20 MG tablet Take 20 mg by mouth Daily.      • meloxicam (MOBIC) 15 MG tablet Take 15 mg by mouth Daily.      • metFORMIN (GLUCOPHAGE) 850 MG tablet Take 850 mg by mouth 2 (Two) Times a Day With Meals.      • Multiple Vitamin (MULTI VITAMIN DAILY PO) Take  by mouth.      • omeprazole (priLOSEC) 40 MG capsule Take 40 mg by mouth 2 (Two) Times a Day.      • pravastatin (PRAVACHOL) 20 MG tablet Take 20 mg by mouth Daily.      • sertraline (ZOLOFT) 100 MG tablet Take 100 mg by mouth Daily.          Allergies:  No Known Allergies  Immunizations:    There is no immunization history on file for this patient.  Social History:   Social History     Social History Narrative     Social History     Social History   • Marital status:      Spouse name: N/A   • Number of children: N/A   • Years of education: N/A     Occupational History   • Not on file.     Social History Main Topics   • Smoking status: Former Smoker     Packs/day: 3.00     Years: 20.00   • Smokeless tobacco: Not on file      Comment: quit 35 years ago    • Alcohol use No   • Drug use: Yes     Special: Other      Comment: vicodin-prescribed    • Sexual activity: Defer     Other Topics Concern   • Not on file     Social History Narrative       Family History:  Family History   Problem Relation Age of Onset   • Heart disease Mother    • Heart disease Father         Review of Systems  See history of present illness and past medical history.  Patient denies dizziness, syncope, falls, trauma, change in vision, change in hearing, change in taste, changes in weight, changes in appetite, focal weakness, numbness, or paresthesia.  Patient denies chest pain,  "palpitations, dyspnea, orthopnea, PND, cough, sinus pressure, rhinorrhea, epistaxis, hemoptysis,  vomiting,hematemesis, diarrhea, constipation or hematchezia.  Denies cold or heat intolerance, polydipsia, polyuria, polyphagia. Denies hematuria, pyuria, dysuria, hesitancy, frequency or urgency.      Remainder of ROS is negative.    Objective:  tMax 24 hrs: Temp (24hrs), Av.8 °F (39.3 °C), Min:101.5 °F (38.6 °C), Max:104.3 °F (40.2 °C)    Vitals Ranges:   Temp:  [101.5 °F (38.6 °C)-104.3 °F (40.2 °C)] 101.5 °F (38.6 °C)  Heart Rate:  [105-111] 107  Resp:  [16-18] 16  BP: (130-178)/(57-99) 130/57      Exam:  Visit Vitals   • /57 (BP Location: Left arm, Patient Position: Lying)   • Pulse 107   • Temp (!) 101.5 °F (38.6 °C) (Oral)   • Resp 16   • Ht 74\" (188 cm)   • Wt 270 lb (122 kg)   • SpO2 94%   • BMI 34.67 kg/m2       General Appearance:    Alert, cooperative, no distress, appears stated age   Head:    Normocephalic, without obvious abnormality, atraumatic   Eyes:    PERRL, conjunctiva/corneas clear, EOM's intact, both eyes   Ears:    Normal external ear canals, both ears   Nose:   Nares normal, septum midline, mucosa normal, no drainage    or sinus tenderness   Throat:   Lips, mucosa, and tongue normal   Neck:   Supple, symmetrical, trachea midline, no adenopathy;     thyroid:  no enlargement/tenderness/nodules; no carotid    bruit or JVD   Back:     Symmetric, no curvature, ROM normal, no CVA tenderness   Lungs:     Clear to auscultation bilaterally, respirations unlabored   Chest Wall:    No tenderness or deformity    Heart:    Regular rate and rhythm, S1 and S2 normal, no murmur, rub   or gallop   Abdomen:     Soft, non-tender, bowel sounds active all four quadrants,     no masses, no hepatomegaly, no splenomegaly   Extremities:   Extremities normal, cellulitis and left foot especially the great toe with a gangrenous area on the tip of the left great toe, no cyanosis or edema   Pulses:   2+ and " symmetric all extremities   Skin:   Skin color, texture, turgor normal, no rashes or lesions   Lymph nodes:   Cervical, supraclavicular, and axillary nodes normal   Neurologic:   CNII-XII intact, normal strength, sensation intact throughout      .    Data Review:  Lab Results (last 72 hours)     Procedure Component Value Units Date/Time    Influenza Antigen [[id number]]  (Normal) Collected:  02/13/17 2004    Specimen:  Swab from Nasopharynx Updated:  02/13/17 2042     Influenza A Ag, EIA Negative      Influenza B Ag, EIA Negative     Blood Culture [[id number]] Collected:  02/13/17 2042    Specimen:  Blood from Arm, Left Updated:  02/13/17 2057    CBC & Differential [[id number]] Collected:  02/13/17 2042    Specimen:  Blood Updated:  02/13/17 2107    Narrative:       The following orders were created for panel order CBC & Differential.  Procedure                               Abnormality         Status                     ---------                               -----------         ------                     CBC Auto Differential[[id number]]         Abnormal            Final result                 Please view results for these tests on the individual orders.    CBC Auto Differential [[id number]]  (Abnormal) Collected:  02/13/17 2042    Specimen:  Blood Updated:  02/13/17 2107     WBC 11.00 (H) 10*3/mm3      RBC 3.88 (L) 10*6/mm3      Hemoglobin 11.9 (L) g/dL      Hematocrit 35.6 (L) %      MCV 91.8 fL      MCH 30.7 pg      MCHC 33.4 g/dL      RDW 13.3 %      RDW-SD 44.2 fl      MPV 10.8 fL      Platelets 179 10*3/mm3      Neutrophil % 84.6 (H) %      Lymphocyte % 7.5 (L) %      Monocyte % 7.1 %      Eosinophil % 0.5 %      Basophil % 0.1 %      Immature Grans % 0.2 %      Neutrophils, Absolute 9.31 (H) 10*3/mm3      Lymphocytes, Absolute 0.83 (L) 10*3/mm3      Monocytes, Absolute 0.78 10*3/mm3      Eosinophils, Absolute 0.05 10*3/mm3      Basophils, Absolute 0.01 10*3/mm3      Immature Grans, Absolute 0.02 10*3/mm3     Blood  Culture [[id number]] Collected:  02/13/17 2113    Specimen:  Blood from Arm, Left Updated:  02/13/17 2117    Lactic Acid, Plasma [[id number]]  (Normal) Collected:  02/13/17 2042    Specimen:  Blood Updated:  02/13/17 2117     Lactate 1.7 mmol/L     Comprehensive Metabolic Panel [[id number]]  (Abnormal) Collected:  02/13/17 2042    Specimen:  Blood Updated:  02/13/17 2125     Glucose 263 (H) mg/dL      BUN 14 mg/dL      Creatinine 0.99 mg/dL      Sodium 133 (L) mmol/L      Potassium 4.3 mmol/L      Chloride 93 (L) mmol/L      CO2 23.4 mmol/L      Calcium 9.3 mg/dL      Total Protein 8.1 g/dL      Albumin 4.20 g/dL      ALT (SGPT) 19 U/L      AST (SGOT) 18 U/L      Alkaline Phosphatase 71 U/L      Total Bilirubin 0.6 mg/dL      eGFR Non African Amer 76 mL/min/1.73      Globulin 3.9 gm/dL      A/G Ratio 1.1 g/dL      BUN/Creatinine Ratio 14.1      Anion Gap 16.6 mmol/L     [place] Draw [[id number]] Collected:  02/13/17 2042    Specimen:  Blood Updated:  02/14/17 0101    Narrative:       The following orders were created for panel order [place] Draw.  Procedure                               Abnormality         Status                     ---------                               -----------         ------                     Light Blue Top[[id number]]                                    Final result               Green Top (Gel)[[id number]]                                                              Lavender Top[[id number]]                                                                 Gold Top - SST[[id number]]                                    Final result                 Please view results for these tests on the individual orders.    Light Blue Top [[id number]] Collected:  02/13/17 2042    Specimen:  Blood Updated:  02/14/17 0101     Extra Tube hold for add-on       Auto resulted       Gold Top - SST [[id number]] Collected:  02/13/17 2042    Specimen:  Blood Updated:  02/14/17 0101     Extra Tube Hold for add-ons.       Auto resulted.                       Imaging Results (all)     Procedure Component Value Units Date/Time    XR Knee 1 or 2 View Right [[id number]] Collected:  02/14/17 0059     Updated:  02/14/17 0059    Narrative:       RIGHT KNEE 2 VIEWS.     HISTORY: Knee pain.     COMPARISON: No prior studies for comparison.     FINDINGS:  There is no fracture or dislocation.  Patient is status post total  replacement. Hardware is intact.     No significant joint effusion. Severe atherosclerotic disease.       Impression:       No acute fracture or dislocation.           XR Foot 3+ View Left [[id number]] Collected:  02/14/17 0100     Updated:  02/14/17 0100    Narrative:       LEFT FOOT 3 VIEWS.     HISTORY: Cellulitis, rule out osteomyelitis, pain in the big toe.     COMPARISON: No prior studies for comparison.     FINDINGS:  There is no fracture or dislocation.  Bony abnormality of the third and  fourth proximal phalanges may be related to prior surgery. No erosive  bony changes are seen to suggest osteomyelitis. Bony ankylosis of the  second toe proximal interphalangeal joint is noted.     Soft tissue swelling of the foot.       Impression:       Soft tissue swelling, no definite changes suggestive of osteomyelitis.  If indicated MRI examination may be performed.               Patient Active Problem List   Diagnosis Code   • Cellulitis of left foot L03.116   • Diabetes mellitus E11.9   • Hypertension I10   • Hyperlipidemia E78.5   • Chronic pain G89.29   • Knee pain, hx of previous prosthetic joint infection M25.569   • Great toe pain, with cellulitis and gangrene M79.676       Assessment:  Principal Problem:    Cellulitis of left foot  Active Problems:    Diabetes mellitus    Hypertension    Hyperlipidemia    Chronic pain    Knee pain, hx of previous prosthetic joint infection    Great toe pain, with cellulitis and gangrene      Plan:  The patient's admitted to hospital started on broad-spectrum IV antibiotics.  Cultures have been obtained and  will ask vascular surgery and orthopedic surgery to evaluate.  I think he likely will end up needing amputation of the great toe.  Have added some sliding scale insulin for glycemic control and medication for pain and nausea.    Chapin Escamilla MD  2/14/2017  7:06 AM

## 2017-02-14 NOTE — PERIOPERATIVE NURSING NOTE
Discussed with dr. Marie about moving pt. To monitor bed due to increased heart rate and large amounts of pain and pain meds that have been given, orders received.

## 2017-02-14 NOTE — ANESTHESIA POSTPROCEDURE EVALUATION
Patient: Hugh R McBurney    Procedure Summary     Date Anesthesia Start Anesthesia Stop Room / Location    02/14/17 [id number]  [facility] / [facility]       Procedure Diagnosis Surgeon Provider    EXCISIONAL DEBRIDEMENT LT. FIRST TOE DIABETIC ULCER, DRAINAGE ABSCESS FIRST TOE, FIRST TOE AMPUTATION (Left Foot); INCISION AND DRAINAGE RIGHT KNEE, POLY CHANGE (Right Knee) Diabetic ulcer of left ankle associated with type 2 diabetes mellitus, with necrosis of muscle; Abscess of left great toe; Acute osteomyelitis of toe, left Osmin Brand MD; MD Hair Solis MD          Anesthesia Type: general  Last vitals  /84 (02/14/17 1700)    Temp      Pulse 116 (02/14/17 1700)   Resp 16 (02/14/17 1700)    SpO2 95 % (02/14/17 1700)      Post Anesthesia Care and Evaluation    Patient location during evaluation: PACU  Patient participation: complete - patient participated  Level of consciousness: awake and alert  Pain management: adequate  Airway patency: patent  Anesthetic complications: No anesthetic complications    Cardiovascular status: acceptable  Respiratory status: acceptable  Hydration status: acceptable

## 2017-02-14 NOTE — CONSULTS
Patient Name: Hugh R McBurney Account #: [id number]    MRN: [id number] Admission Date: 2/13/2017      Consulting Service: Vascular Surgery Date of Evaluation: February 14, 2017    Requesting Provider: Dr. Escamilla    CHIEF COMPLAINT: Fever with infected left first toe diabetic foot ulcer   HPI: Hugh R McBurney is a 66 y.o. male is being seen for a consultation and evaluation/management of infected left first toe diabetic ulcer.  He stated he had fever of 104° at home and his temperature here was over 101°.  X-rays with no definite osteomyelitis.  Examination however with purulence expressible from tip of left first toe diabetic ulcer and cellulitis and swelling of left first toe.  He has palpable femoral and Doppler pedal pulse.  He has evidence of diabetic peripheral vascular disease as well as diabetic peripheral neuropathy.    He also complains of right knee pain.  He has had bilateral total knee replacements in 2006 with history of replacement of right knee prosthesis due to infection.  No signs infection on exam.  X-rays right knee negative for any acute process.    He gives no cardiac or pulmonary symptoms.  He has no prior history of any significant cardiac disease.    I have discussed with Dr. Escamilla and he has started intravenous antibiotics and ordered chest x-ray and EKG.  We'll add Betadine and local care to his left first toe diabetic ulcer.  Long discussion with patient regarding findings and need for operative debridement today for control of infection of his left first toe infected diabetic ulcer.  We'll check arterial Doppler studies afterwards once control infection to ensure adequate perfusion to heal.  Discussed with him in detail that if bone is involved in infection and goes to the joint may need to have first toe amputation and leave wound open for secondary healing.  Discussed with take a few months to heal and would likely need to go to wound care center for eventual healing of this.  Also  discussed if perfusion not adequate to heal may need to look at that as well.  All questions answered.  Patient agreeing to proceed with surgery today    PAST MEDICAL HISTORY:   Past Medical History   Diagnosis Date   • Chronic pain      BILATERAL KNEES AND BACK   • Diabetes mellitus    • Hyperlipidemia    • Hypertension    • Kidney stone       PAST SURGICAL HISTORY:   Past Surgical History   Procedure Laterality Date   • Appendectomy     • Colonoscopy     • Joint replacement       BILATERAL KNEES    • Back surgery        FAMILY HISTORY:   Family History   Problem Relation Age of Onset   • Heart disease Mother    • Heart disease Father       SOCIAL HISTORY:   Social History   Substance Use Topics   • Smoking status: Former Smoker     Packs/day: 3.00     Years: 20.00   • Smokeless tobacco: None      Comment: quit 35 years ago    • Alcohol use No      MEDICATIONS:   No current facility-administered medications on file prior to encounter.      No current outpatient prescriptions on file prior to encounter.             ALLERGIES: Review of patient's allergies indicates no known allergies.   COMPLETE REVIEW OF SYSTEMS:     Review of Systems   Constitutional: Negative.    HENT: Negative.    Eyes: Negative.    Respiratory: Negative.    Cardiovascular: Negative.    Gastrointestinal: Negative.    Endocrine: Negative.    Genitourinary: Negative.    Musculoskeletal: Positive for arthralgias, back pain, joint swelling and myalgias.   Skin: Positive for wound (right first toe).   Allergic/Immunologic: Negative.    Neurological: Negative.    Hematological: Negative.    Psychiatric/Behavioral: Negative.          Vital Signs  Temp:  [101.5 °F (38.6 °C)-104.3 °F (40.2 °C)] 101.5 °F (38.6 °C)  Heart Rate:  [105-111] 107  Resp:  [16-18] 16  BP: (130-178)/(57-99) 130/57    Physical Exam   Constitutional: He is oriented to person, place, and time. He appears well-developed.   HENT:   Head: Normocephalic.   Eyes: EOM are normal. Pupils  are equal, round, and reactive to light.   Neck: Normal range of motion. Neck supple.   Cardiovascular: Normal rate, regular rhythm and normal heart sounds.    Pulmonary/Chest: Effort normal and breath sounds normal.   Abdominal: Soft. Bowel sounds are normal.   Musculoskeletal: He exhibits edema (right knee swollen and tender, no cellulitis or warmth).       Vascular Status -  His exam exhibits right foot vasculature abnormal (doppler pedal pulses). His exam exhibits left foot vasculature abnormal (doppler pedal pulses).     Neurological: He is alert and oriented to person, place, and time. He has normal reflexes.         LABS:      Results Review:       I reviewed the patient's new clinical results.    Results from last 7 days  Lab Units 02/13/17  2042   WBC 10*3/mm3 11.00*   HEMOGLOBIN g/dL 11.9*   PLATELETS 10*3/mm3 179     Results from last 7 days  Lab Units 02/13/17  2042   SODIUM mmol/L 133*   POTASSIUM mmol/L 4.3   CHLORIDE mmol/L 93*   TOTAL CO2 mmol/L 23.4   BUN mg/dL 14   CREATININE mg/dL 0.99   GLUCOSE mg/dL 263*   Estimated Creatinine Clearance: 101.8 mL/min (by C-G formula based on Cr of 0.99).  Results from last 7 days  Lab Units 02/13/17  2042   CALCIUM mg/dL 9.3   ALBUMIN g/dL 4.20           The following radiologic or non-invasive studies have been reviewed by me: Left foot x-ray and right knee x-ray, past records and labs    Active Hospital Problems (** Indicates Principal Problem)    Diagnosis Date Noted   • **Diabetic ulcer of 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, with necrosis of bone [E11.621, L97.524] 02/14/2017   • Cellulitis of left foot [L03.116] 02/14/2017   • Diabetes mellitus [E11.9] 02/14/2017   • Hypertension [I10] 02/14/2017   • Hyperlipidemia [E78.5] 02/14/2017   • Chronic pain [G89.29] 02/14/2017   • Knee pain, hx of previous prosthetic joint infection [M25.569] 02/14/2017   • Great toe pain, with cellulitis and gangrene [M79.676] 02/14/2017   • Diabetic  autonomic neuropathy associated with type 2 diabetes mellitus [E11.43] 02/14/2017   • 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         ASSESSMENT/PLAN: 66 y.o. male with fever and infected left first toe diabetic foot ulcer involving bone by examination.  Plan surgery today for debridement and possible first toe amputation.  See above discussion.      I discussed the plan with the patient is agreeable to the plan of care at this point. Thank you for this consult.     Osmin Brand MD   02/14/17

## 2017-02-14 NOTE — ANESTHESIA PREPROCEDURE EVALUATION
Anesthesia Evaluation     Patient summary reviewed and Nursing notes reviewed   no history of anesthetic complications:  NPO Status: > 8 hours   Airway   Mallampati: III  TM distance: >3 FB  Neck ROM: full  Dental    (+) poor dentation    Pulmonary - normal exam   Cardiovascular - normal exam    (+) hypertension, PVD,       Neuro/Psych  (+) numbness,    GI/Hepatic/Renal/Endo    (+) obesity,  diabetes mellitus type 2 poorly controlled,     Musculoskeletal     (+) joint swelling,   Abdominal    Substance History - negative use     OB/GYN          Other - negative ROS                                   Anesthesia Plan    ASA 3     general     Anesthetic plan and risks discussed with patient.

## 2017-02-14 NOTE — OP NOTE
AMPUTATION TRANS METATARSAL  Procedure Note    Hugh R McBurney  2/13/2017 - 2/14/2017    Pre-op Diagnosis:     * Diabetic ulcer of left ankle associated with type 2 diabetes mellitus, with necrosis of muscle [E11.622, L97.323]     * Abscess of left great toe [L02.612]     * Acute osteomyelitis of toe, left [M86.172]    Post-op Diagnosis:     Post-Op Diagnosis Codes:     * Diabetic ulcer of left ankle associated with type 2 diabetes mellitus, with necrosis of muscle [E11.622, L97.323]     * Abscess of left great toe [L02.612]     * Acute osteomyelitis of toe, left [M86.172]    Procedure(s):  EXCISIONAL DEBRIDEMENT LT. FIRST TOE DIABETIC ULCER, DRAINAGE ABSCESS FIRST TOE, FIRST TOE AMPUTATION  INCISION AND DRAINAGE RIGHT KNEE, POSSIBLE POLY CHANGE    Surgeon(s):  MD Kiko Hart MD    Anesthesia: General with Block    Staff:   Circulator: Hilda Mejia RN  Scrub Person: Ellen Velez    Indications:  66-year-old gentleman who presented with fever of 104 through the emergency room last night found to have acute severe infection of the left diabetic foot ulcer.  He has osteomyelitis by examination with abscess.  He has diabetic peripheral vascular disease and diabetic peripheral neuropathy.  Patient also complains a right knee pain and has seen orthopedic surgery who feels he has an infection there as well.  Patient is for excisional debridement drainage abscess and possible first toe amputation.  Long discussion with patient and family and he is agreeable to amputation of the first toe as well as the family if needs be at time of surgery.  Orthopedic surgeon will certainly proceed with treatment of the right knee after I completed left first toe at the same anesthetic.       Surgeon: Osmin Brand MD     Assistants: Sandra Alva    Anesthesia: General endotracheal anesthesia    ASA Class: 4      Procedure Details left foot prepped and draped in usual fashion.   Pickups and 15 blade utilized for excisional debridement of the lack eschar at the tip of the left first toe at the base of an nail bed.  We immediately encountered gross purulence coming from beneath the distal phalanx and nail bed.  The bone of the distal phalanx also was obviously involved in the purulence with osteomyelitis.  Cultures were taken of the pus.  Rongeur of the distal phalanx were taken and bone cultures were performed as well.  More gross pus was able to be expressed easily pack compression of the nail as well as the distal toe.  Because of this finding we had no option but to proceed with first toe amputation.  Elliptical incision made about the midportion of the proximal phalanx through skin and subcutaneous tissue circumferentially.  No pus was encountered at this level.  The incision was made down through the tendons dorsally and on the plantar surface and the bone was transected of the proximal phalanx.  The toe was removed.  The tendons were ligated high.  Rongeur was utilized to resect the proximal phalanx back out of the wound.  Digital artery bleeding controlled with Vicryl suture ligatures.  It should be noted bleeding was moderate.  All necrotic tissue was removed.  The wound was irrigated with antibiotic solution and packed open with Betadine 4 x 4 Kerlix and Ace wrap.  Patient tolerated this portion of procedure well without problems and orthopedic surgeon was separately to proceed with treatment of the right knee.  They will dictate that portion.    Radiographic interpretation: Not applicable    Findings: Gross abscess distal portion of the toe with obvious osteomyelitis and vomited tendons and bone.    Estimated Blood Loss: Less than 50 cc    Specimens:   Cultures of abscess and cultures of bone, first toe left      Drains:           Complications: None    Condition: stable    Disposition: Orthopedic surgery to proceed with treatment of right knee and then to recovery room.    Osmin  YVETTE Brand MD     Date: 2/14/2017  Time: 1:01 PM

## 2017-02-14 NOTE — ANESTHESIA PROCEDURE NOTES
Airway  Urgency: elective    Date/Time: 2/14/2017 12:32 PM  Airway not difficult    General Information and Staff    Patient location during procedure: OR  Anesthesiologist: JOSH RIVERS  CRNA: FLORY MI

## 2017-02-14 NOTE — BRIEF OP NOTE
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    Procedure/CPT® Codes:      Procedure(s):    INCISION AND DRAINAGE RIGHT KNEE, POLY CHANGE    Surgeon(s):    Kiko Marie MD    Anesthesia: General with Block    Staff:   Circulator: Hilda Mejia RN  Scrub Person: Ellen Velez  Assistant: Ekaterina Alva  Orientee: Kam Slater RN    Estimated Blood Loss: 50 cc    Specimens:                  ID Type Source Tests Collected by Time Destination   1 : SWAP OF LEFT FIRST TOE  Wound Toe, Left WOUND CULTURE Osmin Brand MD 2/14/2017 1249    2 : LEFT FIRST TOE BONE Tissue Toe, Left ANAEROBIC CULTURE, TISSUE CULTURE Osmin Brand MD 2/14/2017 1253    3 : SUSPECTED RIGHT KNEE INFECTION Wound Knee, Right WOUND CULTURE Osmin Brand MD 2/14/2017 1321    4 : KNEE TISSUE, SUSPECTED INFECTION Tissue Knee, Right ANAEROBIC CULTURE, TISSUE CULTURE Osmin Brand MD 2/14/2017 1341    B : LEFT FIRST TOE Tissue Toe, Left TISSUE EXAM Osmin Brand MD 2/14/2017 1250          Drains:   Drain/Device Site 02/14/17 1345 Right knee collapsible closed device (Active)           Findings: see dict     Complications: melissa Marie MD     Date: 2/14/2017  Time: 3:27 PM

## 2017-02-14 NOTE — ED PROVIDER NOTES
EMERGENCY DEPARTMENT ENCOUNTER    CHIEF COMPLAINT  Chief Complaint: left great toe infection and fever   History given by: patient   History limited by: nothing   Room Number: 24/24  PMD: Guille Nieves MD    HPI:  Pt is a 66 y.o. male with h/o diabetes who presents complaining of signs of infection on left great toe and fever onset today (104.3 in triage). Pt also complains of mild cough, right knee pain that makes it difficult to ambulate, and back pain. Pt had a right knee replacement in 2005.    Onset: fever onset today  Timing: constant   Location: left great toe   Radiation: does not radiate   Quality: new  Intensity/Severity: moderate   Progression: worsening   Associated Symptoms: redness and swelling of left great toe, right knee pain, back pain, and mild cough  Aggravating Factors: ambulating   Alleviating Factors: none specified   Previous Episodes: none specified   Treatment before arrival: none specified     PAST MEDICAL HISTORY  Active Ambulatory Problems     Diagnosis Date Noted   • No Active Ambulatory Problems     Resolved Ambulatory Problems     Diagnosis Date Noted   • No Resolved Ambulatory Problems     Past Medical History   Diagnosis Date   • Chronic pain    • Diabetes mellitus    • Hyperlipidemia    • Hypertension    • Kidney stone        PAST SURGICAL HISTORY  Past Surgical History   Procedure Laterality Date   • Appendectomy     • Colonoscopy     • Joint replacement       BILATERAL KNEES        FAMILY HISTORY  History reviewed. No pertinent family history.    SOCIAL HISTORY  Social History     Social History   • Marital status:      Spouse name: N/A   • Number of children: N/A   • Years of education: N/A     Occupational History   • Not on file.     Social History Main Topics   • Smoking status: Former Smoker     Years: 40.00   • Smokeless tobacco: Not on file   • Alcohol use No   • Drug use: No   • Sexual activity: Not on file     Other Topics Concern   • Not on file     Social  History Narrative   • No narrative on file       ALLERGIES  Review of patient's allergies indicates no known allergies.    REVIEW OF SYSTEMS  Review of Systems   Constitutional: Positive for chills and fever (104.3 in triage). Negative for activity change and appetite change.   HENT: Negative for congestion and sore throat.    Eyes: Negative.    Respiratory: Positive for cough (mild). Negative for shortness of breath.    Cardiovascular: Negative for chest pain and leg swelling.   Gastrointestinal: Negative for abdominal pain, diarrhea and vomiting.   Endocrine: Negative.    Genitourinary: Negative for decreased urine volume and dysuria.   Musculoskeletal: Negative for neck pain.        Right knee pain   Skin: Negative for rash and wound.   Allergic/Immunologic: Negative.    Neurological: Negative for weakness, numbness and headaches.   Hematological: Negative.    Psychiatric/Behavioral: Negative.    All other systems reviewed and are negative.      PHYSICAL EXAM  ED Triage Vitals   Temp Heart Rate Resp BP SpO2   02/13/17 1951 02/13/17 1951 02/13/17 1951 02/13/17 2006 02/13/17 1951   104.3 °F (40.2 °C) 111 18 162/78 94 %      Temp src Heart Rate Source Patient Position BP Location FiO2 (%)   02/13/17 1951 02/13/17 1951 02/13/17 2006 -- --   Tympanic Monitor Sitting         Physical Exam   Constitutional: He is oriented to person, place, and time and well-developed, well-nourished, and in no distress.   Eyes: EOM are normal.   Neck: Normal range of motion.   Cardiovascular: Regular rhythm.  Tachycardia present.    Pulmonary/Chest: Effort normal and breath sounds normal. No respiratory distress.   Abdominal: Soft. There is no tenderness.   Musculoskeletal:        Right knee: He exhibits no effusion and no erythema. Decreased range of motion: painful ROM. Tenderness (moderate) found.   Left great toe is erythematous and swollen. There is eschar at tip of toe. The swelling and erythema extend to distal portion of left  foot.    Neurological: He is alert and oriented to person, place, and time. He has normal sensation and normal strength.   Skin: Skin is warm and dry.   Psychiatric: Affect normal.   Nursing note and vitals reviewed.      LAB RESULTS  Lab Results (last 24 hours)     Procedure Component Value Units Date/Time    Influenza Antigen [[id number]]  (Normal) Collected:  02/13/17 2004    Specimen:  Swab from Nasopharynx Updated:  02/13/17 2042     Influenza A Ag, EIA Negative      Influenza B Ag, EIA Negative     CBC & Differential [[id number]] Collected:  02/13/17 2042    Specimen:  Blood Updated:  02/13/17 2107    Narrative:       The following orders were created for panel order CBC & Differential.  Procedure                               Abnormality         Status                     ---------                               -----------         ------                     CBC Auto Differential[[id number]]         Abnormal            Final result                 Please view results for these tests on the individual orders.    Comprehensive Metabolic Panel [[id number]]  (Abnormal) Collected:  02/13/17 2042    Specimen:  Blood Updated:  02/13/17 2125     Glucose 263 (H) mg/dL      BUN 14 mg/dL      Creatinine 0.99 mg/dL      Sodium 133 (L) mmol/L      Potassium 4.3 mmol/L      Chloride 93 (L) mmol/L      CO2 23.4 mmol/L      Calcium 9.3 mg/dL      Total Protein 8.1 g/dL      Albumin 4.20 g/dL      ALT (SGPT) 19 U/L      AST (SGOT) 18 U/L      Alkaline Phosphatase 71 U/L      Total Bilirubin 0.6 mg/dL      eGFR Non African Amer 76 mL/min/1.73      Globulin 3.9 gm/dL      A/G Ratio 1.1 g/dL      BUN/Creatinine Ratio 14.1      Anion Gap 16.6 mmol/L     Lactic Acid, Plasma [[id number]]  (Normal) Collected:  02/13/17 2042    Specimen:  Blood Updated:  02/13/17 2117     Lactate 1.7 mmol/L     Blood Culture [[id number]] Collected:  02/13/17 2042    Specimen:  Blood from Arm, Left Updated:  02/13/17 2057    CBC Auto Differential [[id number]]   (Abnormal) Collected:  02/13/17 2042    Specimen:  Blood Updated:  02/13/17 2107     WBC 11.00 (H) 10*3/mm3      RBC 3.88 (L) 10*6/mm3      Hemoglobin 11.9 (L) g/dL      Hematocrit 35.6 (L) %      MCV 91.8 fL      MCH 30.7 pg      MCHC 33.4 g/dL      RDW 13.3 %      RDW-SD 44.2 fl      MPV 10.8 fL      Platelets 179 10*3/mm3      Neutrophil % 84.6 (H) %      Lymphocyte % 7.5 (L) %      Monocyte % 7.1 %      Eosinophil % 0.5 %      Basophil % 0.1 %      Immature Grans % 0.2 %      Neutrophils, Absolute 9.31 (H) 10*3/mm3      Lymphocytes, Absolute 0.83 (L) 10*3/mm3      Monocytes, Absolute 0.78 10*3/mm3      Eosinophils, Absolute 0.05 10*3/mm3      Basophils, Absolute 0.01 10*3/mm3      Immature Grans, Absolute 0.02 10*3/mm3     Blood Culture [[id number]] Collected:  02/13/17 2113    Specimen:  Blood from Arm, Left Updated:  02/13/17 2117          I ordered the above labs and reviewed the results    RADIOLOGY  XR Foot 3+ View Left   Preliminary Result   Soft tissue swelling, no definite changes suggestive of osteomyelitis.   If indicated MRI examination may be performed.              XR Knee 1 or 2 View Right   Preliminary Result   No acute fracture or dislocation.                 I ordered the above noted radiological studies. Interpreted by radiologist. Reviewed by me in PACS.       PROCEDURES  Procedures      PROGRESS AND CONSULTS  ED Course     1953: Ordered labs for further evaluation and Tylenol for fever.     0024: Ordered XR left foot and XR right knee for further evaluation. Ordered Dilaudid and Zofran for pain and Vancocin to begin treatment for infection.   BP: 178/83 HR: 108 Temp: (!) 103.6 °F (39.8 °C) O2 sat: 95%    0206: Rechecked pt. Pt is resting comfortably. Discussed plan to admit for IV abx. Pt understands and agrees with plan and all questions were addressed.     0227: Discussed pt's case with Dr. Escamilla, who agrees to admit pt.     MEDICAL DECISION MAKING  Results were reviewed/discussed with the  patient and they were also made aware of online access. Pt also made aware that some labs, such as cultures, will not be resulted during ER visit and follow up with PMD is necessary.     MDM  Number of Diagnoses or Management Options  Cellulitis of left foot:      Amount and/or Complexity of Data Reviewed  Clinical lab tests: ordered and reviewed (WBC: 11.00)  Tests in the radiology section of CPT®: ordered and reviewed  Discuss the patient with other providers: yes    Patient Progress  Patient progress: stable         DIAGNOSIS  Final diagnoses:   Cellulitis of left foot       DISPOSITION  ADMISSION    Discussed treatment plan and reason for admission with pt/family and admitting physician.  Pt/family voiced understanding of the plan for admission for further testing/treatment as needed.       Latest Documented Vital Signs:  As of 2:01 AM  BP- 141/99 HR- 105 Temp- (!) 101.7 °F (38.7 °C) (Oral) O2 sat- 95%    --  Documentation assistance provided by samantha Jiang for Xavier Fairchild.  Information recorded by the scribe was done at my direction and has been verified and validated by me.             Magalie Jiang  02/14/17 0229       Xavier Fairchild MD  02/14/17 0415

## 2017-02-14 NOTE — PLAN OF CARE
Problem: Patient Care Overview (Adult)  Goal: Plan of Care Review  Outcome: Ongoing (interventions implemented as appropriate)    02/14/17 0453   Coping/Psychosocial Response Interventions   Plan Of Care Reviewed With patient   Patient Care Overview   Progress no change   Outcome Evaluation   Outcome Summary/Follow up Plan pt admitted from er with left great toe cellulitis. pt is a diabetic. complains of pain in toe, back, and right knee.        Goal: Adult Individualization and Mutuality  Outcome: Ongoing (interventions implemented as appropriate)  Goal: Discharge Needs Assessment  Outcome: Ongoing (interventions implemented as appropriate)    Problem: Infection, Risk/Actual (Adult)  Goal: Identify Related Risk Factors and Signs and Symptoms  Outcome: Outcome(s) achieved Date Met:  02/14/17  Goal: Infection Prevention/Resolution  Outcome: Ongoing (interventions implemented as appropriate)    Problem: Pain, Acute (Adult)  Goal: Identify Related Risk Factors and Signs and Symptoms  Outcome: Outcome(s) achieved Date Met:  02/14/17  Goal: Acceptable Pain Control/Comfort Level  Outcome: Ongoing (interventions implemented as appropriate)

## 2017-02-14 NOTE — PROGRESS NOTES
"Pharmacokinetic Consult - Vancomycin Dosing (Initial Note)    Hugh R McBurney is a 66 y.o. male who is on day 1 pharmacy to dose vancomycin for SSTI.  Pharmacy dosing vancomycin per Dr. Williamson's request.   Other antimicrobials: zosyn 3.375 q8h  Goal trough: 15-20 mg/L    HPI:cellulitis on left foot. Admitted with sudden onset of pain in R knee. Has had high fevers and chills. As a L great toe infection and has been on and off antibiotics. H/o DM. Also in history has had a R total knee arthroplasty in 2005 and underwent revision in 2011 for infection. X-Ray with no definite osteo in foot.     Relevant clinical data and objective history reviewed:  74\" (188 cm)  270 lb (122 kg)  Body mass index is 34.67 kg/(m^2).     He  has a past medical history of Chronic pain; Diabetes mellitus; Hyperlipidemia; Hypertension; and Kidney stone.    Allergies as of 02/13/2017   • (No Known Allergies)     Vital Signs (last 24 hours)       02/13 0700  -  02/14 0659 02/14 0700  -  02/14 1304   Most Recent    Temp (°F) (!)101.5 -  (!)104.3    99.7 -  100.3     99.7 (37.6)    Heart Rate 105 -  111    93 -  110     106    Resp 16 -  18    15 -  20     16    /57 -  178/83    138/79 -  146/84     138/79    SpO2 (%) 94 -  96    (!)9 -  97     96          Estimated Creatinine Clearance: 101.8 mL/min (by C-G formula based on Cr of 0.99).    Results from last 7 days  Lab Units 02/13/17  2042   CREATININE mg/dL 0.99     Lab Results   Component Value Date    WBC 11.00 (H) 02/13/2017       Baseline culture/source/susceptibility:   2/13 Influenza: Neg  2/13 Bcx: NG<24hrs  2/14: R knee asp: pending  2/14: wound cx    Labs:  Scr 0.99, BUN 14  CRP 23.7/ SED 98  WBC 11  Knee Asp: cloudy, >39K nucleated cells, 78 Neutrophils    Patient is currently in surgery today for possible great toe amputation. Patient also had knee aspirated today. cultures are pending, but per notes currently considering infection in knee vs. " Pseudogout.    Assessment/Plan  66 y.o.,male, 270 lb (122 kg), treating SSTI with vancomycin. Pt rec'd 1gm of vanco in ED @ 0055 this morning follow by a 1500mg dose @ ~0830. This was a total loading dose of 2500mg (~20mg/kg).  Based on renal function, age, and weight will plan to start patient with 1750mg(14mg/kg) q12h starting this evening. Patient in surgery currently for source control of infection. Goal trough 15-20mg/L and will continue to follow very closely.     1. Vancomycin 1750mg Q12h starting tonight @ 2000  2. Vancomycin trough 2/16 @ 0730 post 3 doses of 1750mg and 2500mg loading dose  3. Bmp in am for renal function      Pharmacy will continue to follow daily while on vancomycin and adjust as needed.     Laron Hairston, Pharm.D  2/14/2017 1:04 PM

## 2017-02-14 NOTE — ANESTHESIA PROCEDURE NOTES
Airway  Urgency: elective    Date/Time: 2/14/2017 12:32 PM  Airway not difficult    General Information and Staff    Patient location during procedure: OR  Anesthesiologist: JOSH RIVERS  CRNA: FLORY MI    Indications and Patient Condition  Indications for airway management: airway protection    Preoxygenated: yes  Mask difficulty assessment: 1 - vent by mask    Final Airway Details  Final airway type: endotracheal airway      Successful airway: ETT  Cuffed: yes   Successful intubation technique: direct laryngoscopy  Endotracheal tube insertion site: oral  Blade: Slater  Blade size: #2  ETT size: 8.0 mm  Cormack-Lehane Classification: grade I - full view of glottis  Placement verified by: chest auscultation   Inital cuff pressure (cm H2O): 0  Cuff volume (mL): 7  Measured from: lips  ETT to lips (cm): 21  Number of attempts at approach: 1

## 2017-02-14 NOTE — OP NOTE
ORTHOPAEDIC OPERATIVE NOTE    Patient: Hugh R McBurney  YOB: 1950    Medical Record Number: [id number]    Attending Physician: Tiburcio Bustillo MD    Primary Care Physician: Guille Nieves MD    DATE OF PROCEDURE: 2/14/2017    SURGEON: Kiko Marie MD        PREOPERATIVE DIAGNOSIS: Effusion, right knee.  Painful right total knee arthroplasty.  Suspected prosthetic joint infection.    POSTOPERATIVE DIAGNOSIS:   Effusion, right knee.  Painful right total knee arthroplasty.  Suspected prosthetic joint infection.    PROCEDURE PERFORMED: Irrigation debridement, arthrotomy right knee with liner exchange, utilizing Depuy Sigma tibial insert rotating platform TC 3 size 4, 22.5 mm thickness.    SURGEON: Kiko Marie MD     ASSISTANT: Reymundo Archuleta MD, Fellow  REYNOLD Allen    The services of a first assist were necessary for performing the procedure safely and expeditiously.  The first assist was present for the entire duration of the case and helped with positioning, retraction and closure of the incision.     ANESTHESIA:   General   Anesthesiologist: Hair Fontanez MD  CRNA: Mor Dale CRNA     Staff:   Circulator: Hilda Mejia RN  Scrub Person: Ellen Velez  Assistant: Ekaterina Alva  Orientee: Kam Slater RN    ESTIMATED BLOOD LOSS: 50 cc     SPECIMENS: Tissue right knee for culture sensitivity, aerobic and anaerobic  Order Name Source Comment Collection Info Order Time   WOUND CULTURE Toe, Left  Collected By: Osmin Brand MD 2/14/2017 12:50 PM   ANAEROBIC CULTURE Toe, Left  Collected By: Osmin Brand MD 2/14/2017 12:54 PM   TISSUE CULTURE Toe, Left  Collected By: Osmin Brand MD 2/14/2017 12:54 PM   WOUND CULTURE Knee, Right  Collected By: Osmin Brand MD 2/14/2017  1:23 PM   ANAEROBIC CULTURE Knee, Right  Collected By: Osmin Brand MD 2/14/2017  1:42 PM    TISSUE CULTURE Knee, Right  Collected By: Osmin Brand MD 2/14/2017  1:42 PM   TISSUE EXAM Toe, Left  Collected By: Osmin Brand MD 2/14/2017 12:50 PM   .     COMPLICATIONS:  Nil.     DRAINS: 15 French Hemovac drain   Drain/Device Site 02/14/17 1345 Right knee collapsible closed device (Active)   Insertion Site clean and dry;dressing intact 2/14/2017  2:25 PM   Drainage Characteristics/Odor serosanguineous 2/14/2017  2:25 PM       .     INDICATIONS: The patient is a 66 y.o. male  Who  presented to [facility] with acute onset of right knee pain associated with swelling and fevers and chills for past 24 hours.  He has a history of right total knee arthroplasty by Dr Danis Camacho in 2005 and subsequently in 2011, the patient underwent a two-stage revision knee for suspected infection.  He has been nursing a left great toe infected toe for the past 4 weeks.  Patient has been admitted to the hospitalist.  As I was on call for the emergency room I was notified regarding his right knee pain.  I have aspirated his right knee.  The fluid is suspicious for infection with turbidity, elevated cell count of 39,250.  He also had an elevated ESR 98 and his C-reactive protein 23.7.  He has positive crystals for calcium pyrophosphate.  Cultures are pending.  Clinically, it appears that the patient has a prosthetic joint infection..  Treatment options and alternatives were discussed in detail with the patient who is indicated for a irrigation debridement of the right knee with liner change.  Likely risks and benefits of the procedure, including but not limited to   infection, DVT, pulmonary embolism, possibility of injury to tendons, ligaments, nerves or vessels and risk for mortality and morbidity have been discussed in detail. Despite the risks involved, the patient elected to proceed and informed consent was obtained and the patient was scheduled for surgery. The patient was seen in the preoperative  holding area and the operative site was marked.     DESCRIPTION OF PROCEDURE:   The patient was transferred to [facility] operating room. Preoperative antibiotics in the form of vancomycin intravenously was infused prior to the incision and prior to the tourniquet placement according to the SCIP protocol. A surgical time out was done with the team and the correct patient, surgical side and site were identified.     After achieving adequate general anesthesia the operative extremity was prepped and draped in the usual sterile fashion.  A well-padded tourniquet was placed prior to the prep. The tourniquet was  elevated to a pressure of 250 mmHg.  A skin incision was made utilizing the previous surgical scar over the anterior aspect of the right knee.  Skin and subcutaneous tissue were incised and a medial parapatellar arthrotomy was made.  There was a large effusion.  This was dirty color.  Soft tissue releases were done.  Partial synovectomy was done.  Synovium was sent for culture sensitivity.  Knee joint was flexed and the tibia was subluxed.  The previous liner was removed.  This was a 22.5 mm thick TC3 liner.  The knee joint was thoroughly irrigated with saline with pulse lavage and bacitracin.  Followed by this, a new size 4, 22.5 mm thick liner was seated into position.  The knee was ranged range of motion was found to be satisfactory with good stability and the patella was tracking well.  15 Maori Hemovac drain was placed.    The incision was thoroughly irrigated with saline and closed in layers.  PDS suture was utilized for closure.  Sterile dressings were placed. The patient tolerated the procedure well.  There were no complications.  The patient had good distal pulses and adequate capillary refill.    Plan to start  in a.m. for DVT prophylaxis.  The patient will receive vancomycin intravenously  for 2 more doses until cultures are finalized.  I will request for an infectious disease consult  for further management of the antibiotics.    The patient underwent a debridement of the left great toe by Dr. Brand simultaneously.    Plan to mobilize in a.m. with physical therapy  Weightbearing as tolerated.     Kiko Marie M.D.    2/14/2017    CC: Guille Nieves MD; MD Tiburcio Yoon MD EMR Dragon/Transcription disclaimer:   Much of this encounter note is an electronic transcription/translation of spoken language to printed text. The electronic translation of spoken language may permit erroneous, or at times, nonsensical words or phrases to be inadvertently transcribed; Although I have reviewed the note for such errors, some may still exist.

## 2017-02-14 NOTE — CONSULTS
Orthopedic Consult      Patient: Hugh R McBurney  YOB: 1950     Date of Admission: 2/13/2017 11:30 PM    Medical Record Number: [id number]    Attending Physician: Tiburcio Bustillo MD    Consulting Physician: Kiko Marie MD    Reason for Consult: Right knee pain and swelling, fevers.    History of Present Illness: 66 y.o. male admitted to [facility] with Cellulitis of left foot [L03.116].     The patient has been admitted with sudden onset of pain in the right knee for the past 24 hours.  The patient states that the pain is severe.  Denies any history of injury.  He had a high fever with chills.  He has been taking a left great toe infection for the past 4 weeks.  He has been on an antibiotic on and off.  He has a history of diabetes mellitus.  The patient has a history of right total knee arthroplasty in 2005 and subsequently underwent a two-stage revision knee replacement by Dr. Camacho in 2011.  As I was on call for the emergency room and was consulted for further evaluation and treatment.     Patient denies any history of: DVT/PE, MRSA, COPD,  CHF, CAD, or Atrial fibrillation.   Patient has a history of : Diabetes mellitus  Patient is not taking anticoagulants.     Past medical history, Past surgical history, family history, Social history, current medications, home medications Have been reviewed by me.    Past Medical History   Diagnosis Date   • Chronic pain      BILATERAL KNEES AND BACK   • Diabetes mellitus    • Hyperlipidemia    • Hypertension    • Kidney stone      Past Surgical History   Procedure Laterality Date   • Appendectomy     • Colonoscopy     • Joint replacement       BILATERAL KNEES    • Back surgery       Social History     Occupational History   • Not on file.     Social History Main Topics   • Smoking status: Former Smoker     Packs/day: 3.00     Years: 20.00   • Smokeless tobacco: Not on file      Comment: quit 35 years ago    • Alcohol use No   •  Drug use: Yes     Special: Other      Comment: vicodin-prescribed    • Sexual activity: Defer    Social History     Social History Narrative     Family History   Problem Relation Age of Onset   • Heart disease Mother    • Heart disease Father         No Known Allergies     Home Medications:  Prescriptions Prior to Admission   Medication Sig Dispense Refill Last Dose   • glyBURIDE (DIAbeta) 5 MG tablet Take 5 mg by mouth 2 (Two) Times a Day.      • Hydrocodone-Acetaminophen (VICODIN HP)  MG per tablet Take 1 tablet by mouth As Needed for moderate pain (4-6).      • insulin NPH (humuLIN N,novoLIN N) 100 UNIT/ML injection Inject  under the skin 2 (Two) Times a Day Before Meals. 70/30 40 UNITS IN AM AND PM      • lisinopril (PRINIVIL,ZESTRIL) 20 MG tablet Take 20 mg by mouth Daily.      • meloxicam (MOBIC) 15 MG tablet Take 15 mg by mouth Daily.      • metFORMIN (GLUCOPHAGE) 850 MG tablet Take 850 mg by mouth 2 (Two) Times a Day With Meals.      • Multiple Vitamin (MULTI VITAMIN DAILY PO) Take  by mouth.      • omeprazole (priLOSEC) 40 MG capsule Take 40 mg by mouth 2 (Two) Times a Day.      • pravastatin (PRAVACHOL) 20 MG tablet Take 20 mg by mouth Daily.      • sertraline (ZOLOFT) 100 MG tablet Take 100 mg by mouth Daily.          Current Medications:  Scheduled Meds:  glyBURIDE 5 mg Oral BID   insulin aspart 0-7 Units Subcutaneous 4x Daily AC & at Bedtime   insulin NPH 40 Units Subcutaneous BID AC   lidocaine 10 mL Infiltration Once   lisinopril 20 mg Oral Daily   metFORMIN 850 mg Oral BID With Meals   multivitamin 1 tablet Oral Daily   pantoprazole 40 mg Oral Q AM   piperacillin-tazobactam 3.375 g Intravenous Q8H   pravastatin 20 mg Oral Daily   sertraline 100 mg Oral Daily   vancomycin 1,500 mg Intravenous Once     Continuous Infusions:  Pharmacy to dose vancomycin      PRN Meds:.•  acetaminophen  •  dextrose  •  dextrose  •  glucagon (human recombinant)  •  HYDROcodone-acetaminophen  •  HYDROmorphone  •   "ondansetron  •  Pharmacy to dose vancomycin  •  sodium chloride    Review of Systems:   A 12 point system review was reviewed with the patient and from the chart  and is negative except as mentioned in history of present illness.      Physical Exam: 66 y.o. male                    Vitals:    02/14/17 0234 02/14/17 0300 02/14/17 0408 02/14/17 0900   BP:  131/72 130/57    BP Location:   Left arm    Patient Position:   Lying    Pulse: 108 107 107 93   Resp:  16 16 16   Temp:  (!) 102 °F (38.9 °C) (!) 101.5 °F (38.6 °C) 100.3 °F (37.9 °C)   TempSrc:  Oral Oral Oral   SpO2: 96% 95% 94% 94%   Weight:   270 lb (122 kg)    Height:   74\" (188 cm)         Gait: Not evaluated.     Mental/HEENT/cardio/skin: The patient's general appearance was well-nourished, well-hydrated, no acute distress.  Orientation was alert and oriented ×3.  The patient's mood was normal.   Pulmonary exam shows normal late exchange, no labored breathing, or shortness of breath.    The  skin exam showed normal temperature and color in the area of examination.    Extremities: Right knee positive swelling, positive effusion, positive diffuse tenderness, restricted painful range of motion.  He is able to do gentle active range of motion of the toes.  Good capillary refill.  Left great toe has a dressing.    Pulses:  Pulses palpable and equal bilaterally    Diagnostic Tests:      Results from last 7 days  Lab Units 02/13/17 2042   WBC 10*3/mm3 11.00*   HEMOGLOBIN g/dL 11.9*   HEMATOCRIT % 35.6*   PLATELETS 10*3/mm3 179       Results from last 7 days  Lab Units 02/13/17 2042   SODIUM mmol/L 133*   POTASSIUM mmol/L 4.3   CHLORIDE mmol/L 93*   TOTAL CO2 mmol/L 23.4   BUN mg/dL 14   CREATININE mg/dL 0.99   GLUCOSE mg/dL 263*   CALCIUM mg/dL 9.3           Xr Knee 1 Or 2 View Right    Result Date: 2/14/2017  Narrative: RIGHT KNEE 2 VIEWS.  HISTORY: Knee pain.  COMPARISON: No prior studies for comparison.  FINDINGS: There is no fracture or dislocation.  Patient " is status post total replacement. Hardware is intact.  No significant joint effusion. Severe atherosclerotic disease.      Impression: No acute fracture or dislocation.         Xr Foot 3+ View Left    Result Date: 2/14/2017  Narrative: LEFT FOOT 3 VIEWS.  HISTORY: Cellulitis, rule out osteomyelitis, pain in the big toe.  COMPARISON: No prior studies for comparison.  FINDINGS: There is no fracture or dislocation.  Bony abnormality of the third and fourth proximal phalanges may be related to prior surgery. No erosive bony changes are seen to suggest osteomyelitis. Bony ankylosis of the second toe proximal interphalangeal joint is noted.  Soft tissue swelling of the foot.      Impression: Soft tissue swelling, no definite changes suggestive of osteomyelitis. If indicated MRI examination may be performed.         Xr Chest 1 View    Result Date: 2/14/2017  Narrative: AP PORTABLE CHEST X-RAY  CLINICAL HISTORY: Preop chest x-ray.  The lungs are well-expanded and appear free of active infiltrates. There are no pleural effusions. The cardiomediastinal silhouette is unremarkable. Small calcified hilar lymph nodes are noted bilaterally.  IMPRESSIONS: No evidence of active disease within the chest.  This report was finalized on 2/14/2017 8:29 AM by Dr. Mor Gordon MD.        Assessment: Right knee effusion.  History of revision right total knee arthroplasty in the past.    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 The patient is indicated for aspiration of the right knee and fluid analysis.   The clinical presentation is suspicious for a prosthetic joint infection.  He may need intervention surgically and an emergent visit to the operating room.  Based on the fluid analysis.  The procedure will be based on the findings of the aspiration.  It can range from a knee washout with liner change to a two-stage revision with removal of the implants as a first stage and antibiotic spacer.  Order CRP, sedimentation rate, uric acid stat.  The patient will be made nothing by mouth.  Implants noted for the patient to be taken for surgery with General surgery for his left toe.  I have called and informed Dr. Danis Camacho regarding the patient's admission.  Dr. Camacho is no longer coming to [facility] and wanted me to take over the care for his right knee.    Aspiration revealed cloudy fluid. Cell count - 39,250, ESR 98, CRP 23.70. Positive for calcium pyrophospate crystals.Blood cultures negative and knee fluid culture pending.  Likely that his knee is infected versus pseudo gout. Assuming that this was only one day onset we will give it the benefit of early washout with a poly liner exchange, the likely risks and benefits of the procedure including but not limited to infection, stiffness, injury to nerves and vessels have been discussed in detail. Despite the risks he and his son would like to proceed. Informed consent has been obtained and the patient is scheduled for an emergent irrigation and debridement of the right knee with liner change.    Date: 2/14/2017    Kiko Marie MD     CC: Guille Nieves MD; MD Tiburcio Yoon MD EMR Dragon/Transcription disclaimer:   Much of this encounter note is an electronic transcription/translation of spoken language to printed text. The electronic translation of spoken language may permit erroneous, or at times,  nonsensical words or phrases to be inadvertently transcribed; Although I have reviewed the note for such errors, some may still exist.

## 2017-02-15 ENCOUNTER — APPOINTMENT (OUTPATIENT)
Dept: CARDIOLOGY | Facility: HOSPITAL | Age: 67
End: 2017-02-15
Attending: INTERNAL MEDICINE

## 2017-02-15 ENCOUNTER — APPOINTMENT (OUTPATIENT)
Dept: CARDIOLOGY | Facility: HOSPITAL | Age: 67
End: 2017-02-15
Attending: SURGERY

## 2017-02-15 PROBLEM — A41.01 MSSA (METHICILLIN SUSCEPTIBLE STAPHYLOCOCCUS AUREUS) SEPTICEMIA (HCC): Status: ACTIVE | Noted: 2017-02-15

## 2017-02-15 LAB
ANION GAP SERPL CALCULATED.3IONS-SCNC: 15 MMOL/L
BACTERIA BLD CULT: ABNORMAL
BASOPHILS # BLD AUTO: 0.01 10*3/MM3 (ref 0–0.2)
BASOPHILS NFR BLD AUTO: 0.1 % (ref 0–1.5)
BH CV ECHO MEAS - AO MEAN PG (FULL): 6 MMHG
BH CV ECHO MEAS - AO MEAN PG: 9 MMHG
BH CV ECHO MEAS - AO V2 MAX: 188 CM/SEC
BH CV ECHO MEAS - AO V2 MEAN: 141 CM/SEC
BH CV ECHO MEAS - AO V2 VTI: 34.2 CM
BH CV ECHO MEAS - AVA(I,A): 2.2 CM^2
BH CV ECHO MEAS - AVA(I,D): 2.2 CM^2
BH CV ECHO MEAS - BSA(HAYCOCK): 2.6 M^2
BH CV ECHO MEAS - BSA: 2.5 M^2
BH CV ECHO MEAS - BZI_BMI: 34.7 KILOGRAMS/M^2
BH CV ECHO MEAS - BZI_METRIC_HEIGHT: 188 CM
BH CV ECHO MEAS - BZI_METRIC_WEIGHT: 122.5 KG
BH CV ECHO MEAS - CONTRAST EF (2CH): 77.7 ML/M^2
BH CV ECHO MEAS - CONTRAST EF 4CH: 80.4 ML/M^2
BH CV ECHO MEAS - EDV(MOD-SP2): 121 ML
BH CV ECHO MEAS - EDV(MOD-SP4): 179 ML
BH CV ECHO MEAS - EF(MOD-SP2): 77.7 %
BH CV ECHO MEAS - EF(MOD-SP4): 80.4 %
BH CV ECHO MEAS - ESV(MOD-SP2): 27 ML
BH CV ECHO MEAS - ESV(MOD-SP4): 35 ML
BH CV ECHO MEAS - LAT PEAK E' VEL: 8 CM/SEC
BH CV ECHO MEAS - LV DIASTOLIC VOL/BSA (35-75): 72.5 ML/M^2
BH CV ECHO MEAS - LV MEAN PG: 3 MMHG
BH CV ECHO MEAS - LV SYSTOLIC VOL/BSA (12-30): 14.2 ML/M^2
BH CV ECHO MEAS - LV V1 MAX: 122 CM/SEC
BH CV ECHO MEAS - LV V1 MEAN: 85.9 CM/SEC
BH CV ECHO MEAS - LV V1 VTI: 23.8 CM
BH CV ECHO MEAS - LVLD AP2: 9.3 CM
BH CV ECHO MEAS - LVLD AP4: 10.9 CM
BH CV ECHO MEAS - LVLS AP2: 7.9 CM
BH CV ECHO MEAS - LVLS AP4: 9.9 CM
BH CV ECHO MEAS - LVOT AREA (M): 3.1 CM^2
BH CV ECHO MEAS - LVOT AREA: 3.1 CM^2
BH CV ECHO MEAS - LVOT DIAM: 2 CM
BH CV ECHO MEAS - MED PEAK E' VEL: 8 CM/SEC
BH CV ECHO MEAS - MV A DUR: 109 SEC
BH CV ECHO MEAS - MV A MAX VEL: 112 CM/SEC
BH CV ECHO MEAS - MV DEC SLOPE: 1231 CM/SEC^2
BH CV ECHO MEAS - MV DEC TIME: 176 SEC
BH CV ECHO MEAS - MV E MAX VEL: 81.4 CM/SEC
BH CV ECHO MEAS - MV E/A: 0.73
BH CV ECHO MEAS - MV MEAN PG: 5 MMHG
BH CV ECHO MEAS - MV P1/2T MAX VEL: 136 CM/SEC
BH CV ECHO MEAS - MV P1/2T: 32.4 MSEC
BH CV ECHO MEAS - MV V2 MEAN: 108 CM/SEC
BH CV ECHO MEAS - MV V2 VTI: 31.1 CM
BH CV ECHO MEAS - MVA P1/2T LCG: 1.6 CM^2
BH CV ECHO MEAS - MVA(P1/2T): 6.8 CM^2
BH CV ECHO MEAS - MVA(VTI): 2.4 CM^2
BH CV ECHO MEAS - PULM A REVS DUR: 85 SEC
BH CV ECHO MEAS - PULM A REVS VEL: 47 CM/SEC
BH CV ECHO MEAS - PULM DIAS VEL: 35.9 CM/SEC
BH CV ECHO MEAS - PULM S/D: 1.8
BH CV ECHO MEAS - PULM SYS VEL: 65.2 CM/SEC
BH CV ECHO MEAS - SI(LVOT): 30.3 ML/M^2
BH CV ECHO MEAS - SI(MOD-SP2): 38.1 ML/M^2
BH CV ECHO MEAS - SI(MOD-SP4): 58.3 ML/M^2
BH CV ECHO MEAS - SV(LVOT): 74.8 ML
BH CV ECHO MEAS - SV(MOD-SP2): 94 ML
BH CV ECHO MEAS - SV(MOD-SP4): 144 ML
BH CV ECHO MEAS - TAPSE (>1.6): 3.2 CM2
BH CV LOWER ARTERIAL LEFT 2ND DIGIT SYS MAX: 92 MMHG
BH CV LOWER ARTERIAL RIGHT 2ND DIGIT SYS MAX: 112 MMHG
BH CV LOWER ARTERIAL RIGHT GREAT TOE SYS MAX: 117 MMHG
BH CV LOWER ARTERIAL RIGHT TBI RATIO: 0.79
BH CV XLRA - RV BASE: 3.3 CM
BH CV XLRA - TDI S': 19 CM/SEC
BUN BLD-MCNC: 12 MG/DL (ref 8–23)
BUN/CREAT SERPL: 12.1 (ref 7–25)
CALCIUM SPEC-SCNC: 7.9 MG/DL (ref 8.6–10.5)
CHLORIDE SERPL-SCNC: 96 MMOL/L (ref 98–107)
CO2 SERPL-SCNC: 21 MMOL/L (ref 22–29)
CREAT BLD-MCNC: 0.99 MG/DL (ref 0.76–1.27)
CYTO UR: NORMAL
DEPRECATED RDW RBC AUTO: 47.4 FL (ref 37–54)
E/E' RATIO: 10.5
EOSINOPHIL # BLD AUTO: 0.01 10*3/MM3 (ref 0–0.7)
EOSINOPHIL NFR BLD AUTO: 0.1 % (ref 0.3–6.2)
ERYTHROCYTE [DISTWIDTH] IN BLOOD BY AUTOMATED COUNT: 13.8 % (ref 11.5–14.5)
GFR SERPL CREATININE-BSD FRML MDRD: 76 ML/MIN/1.73
GLUCOSE BLD-MCNC: 255 MG/DL (ref 65–99)
GLUCOSE BLDC GLUCOMTR-MCNC: 240 MG/DL (ref 70–130)
GLUCOSE BLDC GLUCOMTR-MCNC: 255 MG/DL (ref 70–130)
GLUCOSE BLDC GLUCOMTR-MCNC: 275 MG/DL (ref 70–130)
GLUCOSE BLDC GLUCOMTR-MCNC: 288 MG/DL (ref 70–130)
HCT VFR BLD AUTO: 30.7 % (ref 40.4–52.2)
HGB BLD-MCNC: 10.2 G/DL (ref 13.7–17.6)
IMM GRANULOCYTES # BLD: 0.03 10*3/MM3 (ref 0–0.03)
IMM GRANULOCYTES NFR BLD: 0.2 % (ref 0–0.5)
LAB AP CASE REPORT: NORMAL
LEFT ATRIUM VOLUME INDEX: 33.2 ML/M2
LV EF 2D ECHO EST: 70 %
LYMPHOCYTES # BLD AUTO: 1.29 10*3/MM3 (ref 0.9–4.8)
LYMPHOCYTES NFR BLD AUTO: 9.5 % (ref 19.6–45.3)
Lab: NORMAL
MCH RBC QN AUTO: 31.2 PG (ref 27–32.7)
MCHC RBC AUTO-ENTMCNC: 33.2 G/DL (ref 32.6–36.4)
MCV RBC AUTO: 93.9 FL (ref 79.8–96.2)
MONOCYTES # BLD AUTO: 1.49 10*3/MM3 (ref 0.2–1.2)
MONOCYTES NFR BLD AUTO: 11 % (ref 5–12)
NEUTROPHILS # BLD AUTO: 10.73 10*3/MM3 (ref 1.9–8.1)
NEUTROPHILS NFR BLD AUTO: 79.1 % (ref 42.7–76)
PATH REPORT.FINAL DX SPEC: NORMAL
PATH REPORT.GROSS SPEC: NORMAL
PLATELET # BLD AUTO: 143 10*3/MM3 (ref 140–500)
PMV BLD AUTO: 10.7 FL (ref 6–12)
POTASSIUM BLD-SCNC: 4.2 MMOL/L (ref 3.5–5.2)
RBC # BLD AUTO: 3.27 10*6/MM3 (ref 4.6–6)
REF LAB TEST RESULTS: NORMAL
SODIUM BLD-SCNC: 132 MMOL/L (ref 136–145)
UPPER ARTERIAL LEFT ARM BRACHIAL SYS MAX: 149 MMHG
UPPER ARTERIAL RIGHT ARM BRACHIAL SYS MAX: 140 MMHG
WBC NRBC COR # BLD: 13.56 10*3/MM3 (ref 4.5–10.7)

## 2017-02-15 PROCEDURE — 25010000002 HYDROMORPHONE PER 4 MG: Performed by: HOSPITALIST

## 2017-02-15 PROCEDURE — 97161 PT EVAL LOW COMPLEX 20 MIN: CPT

## 2017-02-15 PROCEDURE — 82962 GLUCOSE BLOOD TEST: CPT

## 2017-02-15 PROCEDURE — 99223 1ST HOSP IP/OBS HIGH 75: CPT | Performed by: INTERNAL MEDICINE

## 2017-02-15 PROCEDURE — 63710000001 INSULIN DETEMER PER 5 UNITS: Performed by: INTERNAL MEDICINE

## 2017-02-15 PROCEDURE — 63710000001 INSULIN ASPART PER 5 UNITS: Performed by: INTERNAL MEDICINE

## 2017-02-15 PROCEDURE — 93923 UPR/LXTR ART STDY 3+ LVLS: CPT

## 2017-02-15 PROCEDURE — 63710000001 INSULIN ASPART PER 5 UNITS: Performed by: HOSPITALIST

## 2017-02-15 PROCEDURE — 80048 BASIC METABOLIC PNL TOTAL CA: CPT | Performed by: ORTHOPAEDIC SURGERY

## 2017-02-15 PROCEDURE — 63710000001 INSULIN ISOPHANE HUMAN PER 5 UNITS: Performed by: HOSPITALIST

## 2017-02-15 PROCEDURE — 94799 UNLISTED PULMONARY SVC/PX: CPT

## 2017-02-15 PROCEDURE — 25010000003 CEFAZOLIN IN DEXTROSE 2-4 GM/100ML-% SOLUTION: Performed by: HOSPITALIST

## 2017-02-15 PROCEDURE — C8929 TTE W OR WO FOL WCON,DOPPLER: HCPCS

## 2017-02-15 PROCEDURE — 97110 THERAPEUTIC EXERCISES: CPT

## 2017-02-15 PROCEDURE — 93306 TTE W/DOPPLER COMPLETE: CPT | Performed by: INTERNAL MEDICINE

## 2017-02-15 PROCEDURE — 87040 BLOOD CULTURE FOR BACTERIA: CPT | Performed by: INTERNAL MEDICINE

## 2017-02-15 PROCEDURE — 25010000002 ENOXAPARIN PER 10 MG: Performed by: ORTHOPAEDIC SURGERY

## 2017-02-15 PROCEDURE — 85025 COMPLETE CBC W/AUTO DIFF WBC: CPT | Performed by: ORTHOPAEDIC SURGERY

## 2017-02-15 PROCEDURE — 25010000002 PERFLUTREN (DEFINITY) 8.476 MG IN SODIUM CHLORIDE 10 ML INJECTION: Performed by: HOSPITALIST

## 2017-02-15 RX ORDER — HYDROCODONE BITARTRATE AND ACETAMINOPHEN 10; 325 MG/1; MG/1
2 TABLET ORAL EVERY 4 HOURS PRN
Status: DISCONTINUED | OUTPATIENT
Start: 2017-02-15 | End: 2017-02-17 | Stop reason: HOSPADM

## 2017-02-15 RX ORDER — CEFAZOLIN SODIUM 2 G/100ML
2 INJECTION, SOLUTION INTRAVENOUS EVERY 8 HOURS
Status: DISCONTINUED | OUTPATIENT
Start: 2017-02-15 | End: 2017-02-15

## 2017-02-15 RX ORDER — CEFAZOLIN SODIUM 2 G/100ML
2 INJECTION, SOLUTION INTRAVENOUS EVERY 8 HOURS
Status: DISCONTINUED | OUTPATIENT
Start: 2017-02-15 | End: 2017-02-17 | Stop reason: HOSPADM

## 2017-02-15 RX ORDER — HYDROMORPHONE HYDROCHLORIDE 1 MG/ML
0.5 INJECTION, SOLUTION INTRAMUSCULAR; INTRAVENOUS; SUBCUTANEOUS
Status: DISCONTINUED | OUTPATIENT
Start: 2017-02-15 | End: 2017-02-17 | Stop reason: HOSPADM

## 2017-02-15 RX ORDER — HYDROCODONE BITARTRATE AND ACETAMINOPHEN 10; 325 MG/1; MG/1
1 TABLET ORAL EVERY 4 HOURS PRN
Status: DISCONTINUED | OUTPATIENT
Start: 2017-02-15 | End: 2017-02-17 | Stop reason: HOSPADM

## 2017-02-15 RX ADMIN — CEFAZOLIN SODIUM 2 G: 2 INJECTION, SOLUTION INTRAVENOUS at 12:33

## 2017-02-15 RX ADMIN — HYDROMORPHONE HYDROCHLORIDE 0.5 MG: 1 INJECTION, SOLUTION INTRAMUSCULAR; INTRAVENOUS; SUBCUTANEOUS at 06:16

## 2017-02-15 RX ADMIN — INSULIN DETEMIR 45 UNITS: 100 INJECTION, SOLUTION SUBCUTANEOUS at 21:28

## 2017-02-15 RX ADMIN — PERFLUTREN 2 ML: 6.52 INJECTION, SUSPENSION INTRAVENOUS at 14:19

## 2017-02-15 RX ADMIN — INSULIN ASPART 4 UNITS: 100 INJECTION, SOLUTION INTRAVENOUS; SUBCUTANEOUS at 08:12

## 2017-02-15 RX ADMIN — HYDROCODONE BITARTRATE AND ACETAMINOPHEN 2 TABLET: 10; 325 TABLET ORAL at 22:44

## 2017-02-15 RX ADMIN — CEFAZOLIN SODIUM 2 G: 2 INJECTION, SOLUTION INTRAVENOUS at 21:13

## 2017-02-15 RX ADMIN — HYDROMORPHONE HYDROCHLORIDE 0.5 MG: 1 INJECTION, SOLUTION INTRAMUSCULAR; INTRAVENOUS; SUBCUTANEOUS at 08:17

## 2017-02-15 RX ADMIN — HYDROMORPHONE HYDROCHLORIDE 1 MG: 1 INJECTION, SOLUTION INTRAMUSCULAR; INTRAVENOUS; SUBCUTANEOUS at 12:29

## 2017-02-15 RX ADMIN — SERTRALINE 100 MG: 100 TABLET, FILM COATED ORAL at 08:12

## 2017-02-15 RX ADMIN — INSULIN ASPART 6 UNITS: 100 INJECTION, SOLUTION INTRAVENOUS; SUBCUTANEOUS at 17:14

## 2017-02-15 RX ADMIN — HYDROCODONE BITARTRATE AND ACETAMINOPHEN 2 TABLET: 10; 325 TABLET ORAL at 18:24

## 2017-02-15 RX ADMIN — INSULIN ASPART 4 UNITS: 100 INJECTION, SOLUTION INTRAVENOUS; SUBCUTANEOUS at 12:42

## 2017-02-15 RX ADMIN — HYDROCODONE BITARTRATE AND ACETAMINOPHEN 1 TABLET: 10; 325 TABLET ORAL at 05:36

## 2017-02-15 RX ADMIN — HYDROCODONE BITARTRATE AND ACETAMINOPHEN 1 TABLET: 10; 325 TABLET ORAL at 01:32

## 2017-02-15 RX ADMIN — INSULIN ASPART 4 UNITS: 100 INJECTION, SOLUTION INTRAVENOUS; SUBCUTANEOUS at 17:14

## 2017-02-15 RX ADMIN — SODIUM CHLORIDE 150 ML/HR: 9 INJECTION, SOLUTION INTRAVENOUS at 20:11

## 2017-02-15 RX ADMIN — ENOXAPARIN SODIUM 40 MG: 40 INJECTION SUBCUTANEOUS at 08:12

## 2017-02-15 RX ADMIN — LISINOPRIL 20 MG: 20 TABLET ORAL at 08:12

## 2017-02-15 RX ADMIN — HUMAN INSULIN 40 UNITS: 100 INJECTION, SUSPENSION SUBCUTANEOUS at 08:16

## 2017-02-15 RX ADMIN — Medication 1 TABLET: at 08:12

## 2017-02-15 RX ADMIN — PANTOPRAZOLE SODIUM 40 MG: 40 TABLET, DELAYED RELEASE ORAL at 05:36

## 2017-02-15 RX ADMIN — DOCUSATE SODIUM 100 MG: 100 CAPSULE, LIQUID FILLED ORAL at 08:12

## 2017-02-15 RX ADMIN — SODIUM CHLORIDE 150 ML/HR: 9 INJECTION, SOLUTION INTRAVENOUS at 05:35

## 2017-02-15 RX ADMIN — DOCUSATE SODIUM 100 MG: 100 CAPSULE, LIQUID FILLED ORAL at 17:14

## 2017-02-15 RX ADMIN — GLYBURIDE 5 MG: 5 TABLET ORAL at 08:12

## 2017-02-15 RX ADMIN — HYDROCODONE BITARTRATE AND ACETAMINOPHEN 2 TABLET: 10; 325 TABLET ORAL at 14:31

## 2017-02-15 RX ADMIN — METFORMIN HYDROCHLORIDE 850 MG: 850 TABLET ORAL at 08:12

## 2017-02-15 RX ADMIN — INSULIN ASPART 6 UNITS: 100 INJECTION, SOLUTION INTRAVENOUS; SUBCUTANEOUS at 21:13

## 2017-02-15 RX ADMIN — HYDROMORPHONE HYDROCHLORIDE 0.5 MG: 1 INJECTION, SOLUTION INTRAMUSCULAR; INTRAVENOUS; SUBCUTANEOUS at 21:13

## 2017-02-15 RX ADMIN — HYDROMORPHONE HYDROCHLORIDE 0.5 MG: 1 INJECTION, SOLUTION INTRAMUSCULAR; INTRAVENOUS; SUBCUTANEOUS at 00:29

## 2017-02-15 RX ADMIN — PRAVASTATIN SODIUM 20 MG: 20 TABLET ORAL at 08:12

## 2017-02-15 RX ADMIN — HYDROMORPHONE HYDROCHLORIDE 0.5 MG: 1 INJECTION, SOLUTION INTRAMUSCULAR; INTRAVENOUS; SUBCUTANEOUS at 03:28

## 2017-02-15 RX ADMIN — SODIUM CHLORIDE 150 ML/HR: 9 INJECTION, SOLUTION INTRAVENOUS at 12:35

## 2017-02-15 NOTE — PROGRESS NOTES
"Acute Care - Physical Therapy Initial Evaluation  [facility]     Patient Name: Hugh R McBurney  : 1950  MRN: [id number]  Today's Date: 2/15/2017   Onset of Illness/Injury or Date of Surgery Date: 17  Date of Referral to PT: 17  Referring Physician: JANI Tovar      Admit Date: 2017     Visit Dx:    ICD-10-CM ICD-9-CM   1. Cellulitis of left foot L03.116 682.7   2. Toe osteomyelitis, left M86.9 730.27   3. Knee effusion, right M25.461 719.06   4. 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   • MSSA (methicillin susceptible Staphylococcus aureus) septicemia     Past Medical History   Diagnosis Date   • Chronic pain      BILATERAL KNEES AND BACK   • Diabetes mellitus    • Hyperlipidemia    • Hypertension    • Kidney stone      Past Surgical History   Procedure Laterality Date   • Appendectomy     • Colonoscopy     • Joint replacement       BILATERAL KNEES    • Back surgery            PT ASSESSMENT (last 72 hours)      PT Evaluation       02/15/17 1427 02/15/17 1046    Rehab Evaluation    Document Type evaluation  -MS     Subjective Information agree to therapy;complains of;weakness;fatigue;pain  -MS     Evaluation Not Performed  patient unavailable for evaluation   off floor to vascular, check back this pm.  -EW    Patient Effort, Rehab Treatment adequate  -MS     Symptoms Noted Comment Pt. reports \"horrible\" pain in his Right knee.  No c/o pain in his Left foot.  -MS     General Information    Onset of " Illness/Injury or Date of Surgery Date 02/13/17  -MS     Referring Physician Dr. Marie M  -MS     General Observations Pt. supine in bed, Right L.E. knee immobilizer donned, Hemovac drain to Left knee.  -MS     Pertinent History Of Current Problem Pt. s/p Left First toe debridement/amputation as well as Right Knee I&D with Poly Change (2/14/16)  -MS     Precautions/Limitations fall precautions   Dec. skin integrity; WBAT Right L.E.; 2 Liters oxygen.  -MS     Prior Level of Function independent:  -MS     Equipment Currently Used at Home none  -MS     Plans/Goals Discussed With patient;agreed upon  -MS     Risks Reviewed patient:  -MS     Benefits Reviewed patient:  -MS     Barriers to Rehab none identified  -MS     Living Environment    Lives With significant other  -MS     Living Arrangements house  -MS     Home Accessibility stairs to enter home  -MS     Number of Stairs to Enter Home 1  -MS     Clinical Impression    Date of Referral to PT 02/14/17  -MS     Criteria for Skilled Therapeutic Interventions Met treatment indicated  -MS     Rehab Potential good, to achieve stated therapy goals  -MS     Pain Assessment    Pain Assessment 0-10  -MS     Pain Score 10  -MS     Pain Type Acute pain;Surgical pain  -MS     Pain Location Knee  -MS     Pain Orientation Right  -MS     Pain Intervention(s) Medication (See MAR);Repositioned;Elevated;Rest  -MS     Cognitive Assessment/Intervention    Current Cognitive/Communication Assessment functional  -MS     Orientation Status oriented x 4  -MS     Follows Commands/Answers Questions 100% of the time  -MS     Personal Safety WNL/WFL  -MS     Personal Safety Interventions fall prevention program maintained;gait belt;nonskid shoes/slippers when out of bed;supervised activity  -MS     ROM (Range of Motion)    General ROM --   BUE/LLE (WFL's)  -MS     MMT (Manual Muscle Testing)    General MMT Assessment --   BUE/LLE (4-/5)  -MS     Mobility Assessment/Training    Extremity  "Weight-Bearing Status right lower extremity  -MS     Right Lower Extremity Weight-Bearing weight-bearing as tolerated  -MS     Bed Mobility, Assessment/Treatment    Bed Mob, Supine to Sit, [place] moderate assist (50% patient effort);2 person assist required  -MS     Bed Mob, Sit to Supine, [place] moderate assist (50% patient effort);2 person assist required  -MS     Transfer Assessment/Treatment    Transfers, Sit-Stand [place] moderate assist (50% patient effort);2 person assist required  -MS     Transfers, Stand-Sit [place] moderate assist (50% patient effort);2 person assist required  -MS     Transfer, Comment Elevated bed surface.  -MS     Gait Assessment/Treatment    Gait, [place] Level minimum assist (75% patient effort);2 person assist required;1 person + 1 person to manage equipment  -MS     Gait, Assistive Device rolling walker  -MS     Gait, Distance (Feet) 2   Forward/backward  -MS     Gait, Gait Deviations right:;antalgic;leon decreased;decreased heel strike;limb motion velocity decreased;step length decreased  -MS     Gait, Comment Pt. unable to bear any weight on Right L.E. due to \"horrible pain\".  Pt. limited to 2 feet forward/backward at bedside with K.I. donned (Right L.E.) due to pain.  -MS     Balance Skills Training    Sitting-Level of Assistance Contact guard  -MS     Sitting-Balance Support Feet supported;Right upper extremity supported;Left upper extremity supported  -MS     Therapy Exercises    Right Lower Extremity AROM:;10 reps;supine   Pt. was able to do ankle pumps, although still limited.  -MS     Exercise Protocols --   Attempted but pt. in severe pain with All movement of L.E.  -MS     Positioning and Restraints    Pre-Treatment Position in bed  -MS     Post Treatment Position bed  -MS     In Bed notified nsg;supine;call light within reach;encouraged to call for assist;exit alarm on;R heel elevated;L heel elevated   All lines intact. V.S.S.  -MS       " 02/14/17 1043 02/14/17 0419    General Information    Equipment Currently Used at Home walker, standard;cane, straight;wheelchair;hospital bed  -SK     Living Environment    Lives With significant other   [facility] significant other  -    Living Arrangements house  -SK house  -    Home Accessibility no concerns  -SK stairs to enter home  -    Stair Railings at Home  outside, present at both sides  -    Type of Financial/Environmental Concern  none  -    Transportation Available  car  -      02/14/17 0413       General Information    Equipment Currently Used at Home cane, straight  -       User Key  (r) = Recorded By, (t) = Taken By, (c) = Cosigned By    Initials Name Provider Type    SK Myriam Rodríguez, KRISTIE Registered Nurse    MS Reymundo Munguia, PT Physical Therapist     Julianne Larry, RN Registered Nurse    YEHUDA Feliz, PT Physical Therapist          Physical Therapy Education     Title: PT OT SLP Therapies (Done)     Topic: Physical Therapy (Done)     Point: Mobility training (Done)    Learning Progress Summary    Learner Readiness Method Response Comment Documented by Status   Patient Acceptance GEORGE GRAFF NR  MS 02/15/17 1437 Done               Point: Home exercise program (Done)    Learning Progress Summary    Learner Readiness Method Response Comment Documented by Status   Patient Acceptance GEORGE GRAFFNR  MS 02/15/17 1437 Done               Point: Body mechanics (Done)    Learning Progress Summary    Learner Readiness Method Response Comment Documented by Status   Patient Acceptance GEORGE GRAFF NR  MS 02/15/17 1437 Done               Point: Precautions (Done)    Learning Progress Summary    Learner Readiness Method Response Comment Documented by Status   Patient Acceptance GEORGE GRAFF NR  MS 02/15/17 1437 Done                      User Key     Initials Effective Dates Name Provider Type Discipline    MS 12/01/15 -  Reymundo Munguia, PT Physical Therapist PT                PT Recommendation  and Plan  Anticipated Equipment Needs At Discharge: front wheeled walker  Anticipated Discharge Disposition: skilled nursing facility (Pending pt. progress)  Planned Therapy Interventions: balance training, bed mobility training, gait training, home exercise program, patient/family education, postural re-education, ROM (Range of Motion), strengthening, transfer training  PT Frequency: daily  Plan of Care Review  Plan Of Care Reviewed With: patient  Outcome Summary/Follow up Plan: Pt. will benefit from skilled inpt. P.T. to address his functional deficits and to assist pt. in regaining his independence with functional mobility.  Pt. currently very limited overall by pain in his Right knee.  Expect pt. to progress toward goals daily with P.T. but will likely need SNF placement once discharged from hospital.          IP PT Goals       02/15/17 1437          Bed Mobility PT LTG    Bed Mobility PT LTG, Date Established 02/15/17  -MS      Bed Mobility PT LTG, Time to Achieve 5 - 7 days  -MS      Bed Mobility PT LTG, Activity Type all bed mobility  -MS      Bed Mobility PT LTG, [place] Level contact guard assist  -MS      Transfer Training PT LTG    Transfer Training PT LTG, Date Established 02/15/17  -MS      Transfer Training PT LTG, Time to Achieve 5 - 7 days  -MS      Transfer Training PT LTG, Activity Type all transfers  -MS      Transfer Training PT LTG, [place] Level contact guard assist  -MS      Transfer Training PT LTG, Assist Device walker, rolling  -MS      Gait Training PT LTG    Gait Training Goal PT LTG, Date Established 02/15/17  -MS      Gait Training Goal PT LTG, Time to Achieve 5 - 7 days  -MS      Gait Training Goal PT LTG, [place] Level contact guard assist  -MS      Gait Training Goal PT LTG, Assist Device walker, rolling  -MS      Gait Training Goal PT LTG, Distance to Achieve 100 feet  -MS      Range of Motion PT LTG    Range of Motion Goal PT LTG, Date Established 02/15/17  -MS       Range of Motion Goal PT LTG, Time to Achieve 5 - 7 days  -MS      Range fo Motion Goal PT LTG, Joint R knee  -MS      Range of Motion Goal PT LTG, AROM Measure (-5, 80)  -MS        User Key  (r) = Recorded By, (t) = Taken By, (c) = Cosigned By    Initials Name Provider Type    MS Reymundo HOPKINS Nilda, PT Physical Therapist                Outcome Measures       02/15/17 1400          How much help from another person do you currently need...    Turning from your back to your side while in flat bed without using bedrails? 2  -MS      Moving from lying on back to sitting on the side of a flat bed without bedrails? 2  -MS      Moving to and from a bed to a chair (including a wheelchair)? 2  -MS      Standing up from a chair using your arms (e.g., wheelchair, bedside chair)? 2  -MS      Climbing 3-5 steps with a railing? 1  -MS      To walk in hospital room? 2  -MS      AM-PAC 6 Clicks Score 11  -MS      Functional Assessment    Outcome Measure Options AM-PAC 6 Clicks Basic Mobility (PT)  -MS        User Key  (r) = Recorded By, (t) = Taken By, (c) = Cosigned By    Initials Name Provider Type    MS Reymundo HOPKINS Nilda, PT Physical Therapist           Time Calculation:         PT Charges       02/15/17 1439 02/15/17 1046       Time Calculation    Start Time 1355  -MS      Stop Time 1415  -MS      Time Calculation (min) 20 min  -MS      PT Received On 02/15/17  -MS      PT - Next Appointment 02/16/17  -MS 02/15/17  -EW     PT Goal Re-Cert Due Date 02/22/17  -MS        User Key  (r) = Recorded By, (t) = Taken By, (c) = Cosigned By    Initials Name Provider Type    MS Reymundo SANDEEP Munguia, PT Physical Therapist    EW Toma Feliz, PT Physical Therapist          Therapy Charges for Today     Code Description Service Date Service Provider Modifiers Qty    47770935373 HC PT EVAL LOW COMPLEXITY 1 2/15/2017 Reymundo Munguia, PT GP 1    41119496164 HC PT THER PROC EA 15 MIN 2/15/2017 Reymundo Munguia PT GP 1    20554075276 HC PT THER  SUPP EA 15 MIN 2/15/2017 Reymundo Munguia, PT GP 1          PT G-Codes  Outcome Measure Options: AM-PAC 6 Clicks Basic Mobility (PT)      Reymundo Munguia, PT  2/15/2017

## 2017-02-15 NOTE — PLAN OF CARE
Problem: Patient Care Overview (Adult)  Goal: Plan of Care Review  Outcome: Ongoing (interventions implemented as appropriate)    02/14/17 0453 02/14/17 1815 02/15/17 0212   Coping/Psychosocial Response Interventions   Plan Of Care Reviewed With --  patient;family --    Patient Care Overview   Progress no change --  --    Outcome Evaluation   Outcome Summary/Follow up Plan --  --  Pt had left great toe amputation and debridment of right knee with hemovac placement. Pt blood culture came back positive for gram + cocci in clusters. Pt started on vanc, zosyn, and NS. Pt pain is hard to get under contol. Will continue to monitor.        Goal: Adult Individualization and Mutuality  Outcome: Ongoing (interventions implemented as appropriate)  Goal: Discharge Needs Assessment  Outcome: Ongoing (interventions implemented as appropriate)    Problem: Infection, Risk/Actual (Adult)  Goal: Infection Prevention/Resolution  Outcome: Ongoing (interventions implemented as appropriate)    Problem: Pain, Acute (Adult)  Goal: Acceptable Pain Control/Comfort Level  Outcome: Ongoing (interventions implemented as appropriate)    Problem: Perioperative Period (Adult)  Goal: Signs and Symptoms of Listed Potential Problems Will be Absent or Manageable (Perioperative Period)  Outcome: Ongoing (interventions implemented as appropriate)

## 2017-02-15 NOTE — PROGRESS NOTES
Orthopedic Progress Note      Patient: Hugh R McBurney  YOB: 1950     Date of Admission: 2/13/2017 11:30 PM Medical Record Number: [id number]     Attending Physician: Axel Zapata MD    Status Post:  DEBRIDEMENT LT. 1ST TOE DIABETIC ULCER POSS 1ST TOE AMP Post Operative Day Number: 1    Subjective : No new orthopaedic complaints     Pain Relief: some relief with present medication.     Systemic Complaints: No Complaints  Vitals:    02/14/17 2000 02/14/17 2300 02/14/17 2333 02/15/17 0742   BP: 161/93  154/88 151/86   BP Location: Right arm  Right arm Right arm   Patient Position: Lying  Lying Lying   Pulse: 117 120 120    Resp: 18  18 18   Temp: 99.1 °F (37.3 °C)  99.6 °F (37.6 °C) 98.9 °F (37.2 °C)   TempSrc: Oral  Oral Oral   SpO2: 98% 91% 90%    Weight:       Height:           Physical Exam: 66 y.o. male    General Appearance:       Alert, cooperative, in no acute distress                  Extremities:    Dressing Clean, Dry and Intact         Incision healthy without signs or symptoms of infections         No clinical sign of DVT        Able to do good movements of digits    Pulses:   Pulses palpable and equal bilaterally           Diagnostic Tests:      Results from last 7 days  Lab Units 02/15/17  0620 02/13/17  2042   WBC 10*3/mm3 13.56* 11.00*   HEMOGLOBIN g/dL 10.2* 11.9*   HEMATOCRIT % 30.7* 35.6*   PLATELETS 10*3/mm3 143 179       Results from last 7 days  Lab Units 02/15/17  0620 02/13/17  2042   SODIUM mmol/L 132* 133*   POTASSIUM mmol/L 4.2 4.3   CHLORIDE mmol/L 96* 93*   TOTAL CO2 mmol/L 21.0* 23.4   BUN mg/dL 12 14   CREATININE mg/dL 0.99 0.99   GLUCOSE mg/dL 255* 263*   CALCIUM mg/dL 7.9* 9.3           Xr Knee 1 Or 2 View Right    Result Date: 2/14/2017  Narrative: RIGHT KNEE 2 VIEWS.  HISTORY: Knee pain.  COMPARISON: No prior studies for comparison.  FINDINGS: There is no fracture or dislocation.  Patient is status post total replacement. Hardware is intact.  No significant joint  effusion. Severe atherosclerotic disease.      Impression: No acute fracture or dislocation.     This report was finalized on 2/14/2017 11:49 PM by Dr. Vidal Morris MD.      Xr Knee 1 Or 2 View Right    Result Date: 2/14/2017  Narrative: 2 VIEWS OF THE RIGHT KNEE  HISTORY: Postoperative knee pain  FINDINGS: 1. Satisfactory appearance following total restrained knee arthroplasty, no evidence of a complication.  This report was finalized on 2/14/2017 4:29 PM by Dr. Ricci Tucker MD.      Xr Foot 3+ View Left    Result Date: 2/14/2017  Narrative: LEFT FOOT 3 VIEWS.  HISTORY: Cellulitis, rule out osteomyelitis, pain in the big toe.  COMPARISON: No prior studies for comparison.  FINDINGS: There is no fracture or dislocation.  Bony abnormality of the third and fourth proximal phalanges may be related to prior surgery. No erosive bony changes are seen to suggest osteomyelitis. Bony ankylosis of the second toe proximal interphalangeal joint is noted.  Soft tissue swelling of the foot.      Impression: Soft tissue swelling, no definite changes suggestive of osteomyelitis. If indicated MRI examination may be performed.     This report was finalized on 2/14/2017 11:49 PM by Dr. Vidal Morris MD.      Xr Chest 1 View    Result Date: 2/14/2017  Narrative: AP PORTABLE CHEST X-RAY  CLINICAL HISTORY: Preop chest x-ray.  The lungs are well-expanded and appear free of active infiltrates. There are no pleural effusions. The cardiomediastinal silhouette is unremarkable. Small calcified hilar lymph nodes are noted bilaterally.  IMPRESSIONS: No evidence of active disease within the chest.  This report was finalized on 2/14/2017 8:29 AM by Dr. Mor Gordon MD.          Current Medications:  Scheduled Meds:  ceFAZolin 2 g Intravenous Q8H   docusate sodium 100 mg Oral BID   enoxaparin 40 mg Subcutaneous Daily   glyBURIDE 5 mg Oral BID   insulin aspart 0-7 Units Subcutaneous 4x Daily AC & at Bedtime   insulin NPH 40 Units Subcutaneous  BID AC   lisinopril 20 mg Oral Daily   metFORMIN 850 mg Oral BID With Meals   multivitamin 1 tablet Oral Daily   pantoprazole 40 mg Oral Q AM   pravastatin 20 mg Oral Daily   sertraline 100 mg Oral Daily     Continuous Infusions:  lactated ringers 9 mL/hr Last Rate: 9 mL/hr (02/14/17 1551)   sodium chloride 150 mL/hr Last Rate: 150 mL/hr (02/15/17 1235)     PRN Meds:.•  acetaminophen  •  bisacodyl  •  dextrose  •  dextrose  •  glucagon (human recombinant)  •  HYDROcodone-acetaminophen **OR** HYDROcodone-acetaminophen  •  HYDROmorphone **OR** HYDROmorphone  •  ondansetron  •  sodium chloride    Assessment: Status post  DEBRIDEMENT LT. 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    Kiko Marie MD    Date: 2/15/2017    Time: 1:01 PM    EMR Dragon/Transcription disclaimer:   Much of this encounter note is an electronic transcription/translation of spoken language to printed text. The electronic translation of spoken language may permit erroneous, or at times, nonsensical words or phrases to be inadvertently transcribed; Although I have reviewed the note for such errors, some may still exist.

## 2017-02-15 NOTE — SIGNIFICANT NOTE
02/15/17 1046   Rehab Treatment   Discipline physical therapist   Rehab Evaluation   Evaluation Not Performed patient unavailable for evaluation  (off floor to vascular, check back this pm.)   Recommendation   PT - Next Appointment 02/15/17

## 2017-02-15 NOTE — PROGRESS NOTES
Osmin Brand MD       LOS: 1 day   Patient Care Team:  Guille Nieves MD as PCP - General  Guille Nieves MD as PCP - Family Medicine    Subjective     66 y.o. male with left first toe amputation site granulating. Right knee dressing dry per orthopedic surgery.    Review of Systems  Review of Systems - Negative except hpi      Objective     Vital Signs  Temp:  [98.7 °F (37.1 °C)-100.3 °F (37.9 °C)] 99.6 °F (37.6 °C)  Heart Rate:  [] 120  Resp:  [14-20] 18  BP: (138-184)/() 154/88    Physical Exam  General: No acute distress. Alert and oriented x 4  HEENT: No jugular venous distension, trachea is midline  CV: RRR, S1S2  Resp: Clear unlabored breathing  Abd: Abdomen is soft, nontender, nondistended  Extremities: left toe amputation site granulating, cellulitis much improved    Results Review:       Recent Results (from the past 12 hour(s))   POC Glucose Fingerstick    Collection Time: 02/14/17  7:58 PM   Result Value Ref Range    Glucose 285 (H) 70 - 130 mg/dL   CBC Auto Differential    Collection Time: 02/15/17  6:20 AM   Result Value Ref Range    WBC 13.56 (H) 4.50 - 10.70 10*3/mm3    RBC 3.27 (L) 4.60 - 6.00 10*6/mm3    Hemoglobin 10.2 (L) 13.7 - 17.6 g/dL    Hematocrit 30.7 (L) 40.4 - 52.2 %    MCV 93.9 79.8 - 96.2 fL    MCH 31.2 27.0 - 32.7 pg    MCHC 33.2 32.6 - 36.4 g/dL    RDW 13.8 11.5 - 14.5 %    RDW-SD 47.4 37.0 - 54.0 fl    MPV 10.7 6.0 - 12.0 fL    Platelets 143 140 - 500 10*3/mm3    Neutrophil % 79.1 (H) 42.7 - 76.0 %    Lymphocyte % 9.5 (L) 19.6 - 45.3 %    Monocyte % 11.0 5.0 - 12.0 %    Eosinophil % 0.1 (L) 0.3 - 6.2 %    Basophil % 0.1 0.0 - 1.5 %    Immature Grans % 0.2 0.0 - 0.5 %    Neutrophils, Absolute 10.73 (H) 1.90 - 8.10 10*3/mm3    Lymphocytes, Absolute 1.29 0.90 - 4.80 10*3/mm3    Monocytes, Absolute 1.49 (H) 0.20 - 1.20 10*3/mm3    Eosinophils, Absolute 0.01 0.00 - 0.70 10*3/mm3    Basophils, Absolute 0.01 0.00 - 0.20 10*3/mm3    Immature Grans, Absolute 0.03 0.00  - 0.03 10*3/mm3   POC Glucose Fingerstick    Collection Time: 02/15/17  6:46 AM   Result Value Ref Range    Glucose 275 (H) 70 - 130 mg/dL   ]      Assessment/Plan           Principal Problem:    Diabetic ulcer of 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, with necrosis of bone  Active Problems:    Cellulitis of left foot    Diabetes mellitus    Hypertension    Hyperlipidemia    Chronic pain    Knee pain, hx of previous prosthetic joint infection    Great toe pain, with cellulitis and gangrene    Diabetic autonomic neuropathy associated with type 2 diabetes mellitus    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 male with left toe amputation wound healing. Await culture results of abscess and bone left first toe, with operative findings of osteomyelitis of toe and abscess. Begin normal saline dressing changes left foot diabetic ulcer at toe amputation site. Check arterial dopplers to ensure adequate perfusion to heal.      Osmin Brand MD  02/15/17  6:59 AM

## 2017-02-15 NOTE — PLAN OF CARE
Problem: Patient Care Overview (Adult)  Goal: Plan of Care Review  Outcome: Ongoing (interventions implemented as appropriate)    02/15/17 1722   Coping/Psychosocial Response Interventions   Plan Of Care Reviewed With patient   Patient Care Overview   Progress improving   Outcome Evaluation   Outcome Summary/Follow up Plan Vital signs stable. Pain controlled alternating IV Dilaudid and PO Lortab. Echo and doppler done today, pending. Dressing changed on L great toe, hemovac removed. BS remains elevated. Will continue to monitor patient.        Goal: Adult Individualization and Mutuality  Outcome: Ongoing (interventions implemented as appropriate)  Goal: Discharge Needs Assessment  Outcome: Ongoing (interventions implemented as appropriate)    Problem: Infection, Risk/Actual (Adult)  Goal: Infection Prevention/Resolution  Outcome: Ongoing (interventions implemented as appropriate)    Problem: Pain, Acute (Adult)  Goal: Acceptable Pain Control/Comfort Level  Outcome: Ongoing (interventions implemented as appropriate)    Problem: Perioperative Period (Adult)  Goal: Signs and Symptoms of Listed Potential Problems Will be Absent or Manageable (Perioperative Period)  Outcome: Ongoing (interventions implemented as appropriate)

## 2017-02-15 NOTE — PROGRESS NOTES
"                    [facility]               ASSOCIATES     LOS: 1 day     Name: Hugh R McBurney  Age: 66 y.o.  Sex: male  :  1950  MRN: [id number]         Primary Care Physician: Guille Nieves MD    Subjective   Complains of severe right knee pain. Dilaudid is helping some but for only a little bit. Denies chest pain, shortness of breath.    Objective   Temp:  [98.7 °F (37.1 °C)-99.6 °F (37.6 °C)] 98.9 °F (37.2 °C)  Heart Rate:  [112-121] 120  Resp:  [14-18] 18  BP: (149-184)/() 151/86  Body mass index is 34.67 kg/(m^2).  Diet Regular    Objective:  General Appearance:  In no acute distress and uncomfortable.    Vital signs: (most recent): Blood pressure 151/86, pulse 120, temperature 98.9 °F (37.2 °C), temperature source Oral, resp. rate 18, height 74\" (188 cm), weight 270 lb (122 kg), SpO2 90 %.    Lungs:  Normal respiratory rate and normal effort.  He is not in respiratory distress.  Breath sounds clear to auscultation.    Heart: Normal rate.  Regular rhythm.    Abdomen: Abdomen is soft.  There is no abdominal tenderness.  There is no rebound tenderness.  There is no guarding.     Extremities: There is no dependent edema.  (Right knee dressed, left foot dressed.)  Neurological: Patient is alert and oriented to person, place and time.    Skin:  Warm and dry.            Results Review:    Reviewed medications and new clinical results    ceFAZolin 2 g Intravenous Q8H   docusate sodium 100 mg Oral BID   enoxaparin 40 mg Subcutaneous Daily   glyBURIDE 5 mg Oral BID   insulin aspart 0-7 Units Subcutaneous 4x Daily AC & at Bedtime   insulin NPH 40 Units Subcutaneous BID AC   lisinopril 20 mg Oral Daily   metFORMIN 850 mg Oral BID With Meals   multivitamin 1 tablet Oral Daily   pantoprazole 40 mg Oral Q AM   pravastatin 20 mg Oral Daily   sertraline 100 mg Oral Daily       lactated ringers 9 mL/hr Last Rate: 9 mL/hr (17 1551)   sodium chloride 150 mL/hr Last Rate: 150 mL/hr " (02/15/17 0535)       Results from last 7 days  Lab Units 02/15/17  0620 02/13/17  2042   WBC 10*3/mm3 13.56* 11.00*   HEMOGLOBIN g/dL 10.2* 11.9*   PLATELETS 10*3/mm3 143 179       Results from last 7 days  Lab Units 02/15/17  0620 02/13/17  2042   SODIUM mmol/L 132* 133*   POTASSIUM mmol/L 4.2 4.3   CHLORIDE mmol/L 96* 93*   TOTAL CO2 mmol/L 21.0* 23.4   BUN mg/dL 12 14   CREATININE mg/dL 0.99 0.99   CALCIUM mg/dL 7.9* 9.3   GLUCOSE mg/dL 255* 263*     GLUCOSE   Date/Time Value Ref Range Status   02/15/2017 0646 275 (H) 70 - 130 mg/dL Final   02/14/2017 1958 285 (H) 70 - 130 mg/dL Final   02/14/2017 1435 306 (H) 70 - 130 mg/dL Final   02/14/2017 1133 254 (H) 70 - 130 mg/dL Final   02/14/2017 0737 242 (H) 70 - 130 mg/dL Final     HEMOGLOBIN A1C   Date Value Ref Range Status   02/14/2017 9.25 (H) 4.80 - 5.60 % Final     Estimated Creatinine Clearance: 101.8 mL/min (by C-G formula based on Cr of 0.99).    Assessment/Plan   Active Hospital Problems (** Indicates Principal Problem)    Diagnosis Date Noted   • **Diabetic ulcer of 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, with necrosis of bone [E11.621, L97.524] 02/14/2017   • MSSA (methicillin susceptible Staphylococcus aureus) septicemia [A41.01] 02/15/2017   • Cellulitis of left foot [L03.116] 02/14/2017   • Diabetes mellitus [E11.9] 02/14/2017   • Hypertension [I10] 02/14/2017   • Hyperlipidemia [E78.5] 02/14/2017   • Chronic pain [G89.29] 02/14/2017   • Knee pain, hx of previous prosthetic joint infection [M25.569] 02/14/2017   • Great toe pain, with cellulitis and gangrene [M79.676] 02/14/2017   • Diabetic autonomic neuropathy associated with type 2 diabetes mellitus [E11.43] 02/14/2017   • 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     · S/P irrigation/debridement right knee, liner exchange  2/14/17  · S/P amputation left first toe 2/14/17  · Antibiotics per ID. Echo.   · Further plans per ortho, vascular  · Adjust IV narcotic medications for pain and monitor (option of 1 mg dilaudid and two lortabs, keeping less than 4 G tylenol/day).   · Ask endocrinology to see help with DM. Currently on NPH BID consider switch to basal/bolus.    Axel Zapata MD   02/15/17  12:17 PM

## 2017-02-15 NOTE — PLAN OF CARE
Problem: Patient Care Overview (Adult)  Goal: Plan of Care Review    02/15/17 1437   Coping/Psychosocial Response Interventions   Plan Of Care Reviewed With patient   Outcome Evaluation   Outcome Summary/Follow up Plan Pt. will benefit from skilled inpt. P.T. to address his functional deficits and to assist pt. in regaining his independence with functional mobility. Pt. currently very limited overall by pain in his Right knee. Expect pt. to progress toward goals daily with P.T. but will likely need SNF placement once discharged from hospital.         Problem: Inpatient Physical Therapy  Goal: Bed Mobility Goal LTG- PT    02/15/17 1437   Bed Mobility PT LTG   Bed Mobility PT LTG, Date Established 02/15/17   Bed Mobility PT LTG, Time to Achieve 5 - 7 days   Bed Mobility PT LTG, Activity Type all bed mobility   Bed Mobility PT LTG, Anderson Level contact guard assist       Goal: Transfer Training Goal 1 LTG- PT    02/15/17 1437   Transfer Training PT LTG   Transfer Training PT LTG, Date Established 02/15/17   Transfer Training PT LTG, Time to Achieve 5 - 7 days   Transfer Training PT LTG, Activity Type all transfers   Transfer Training PT LTG, Anderson Level contact guard assist   Transfer Training PT LTG, Assist Device walker, rolling       Goal: Gait Training Goal LTG- PT    02/15/17 1437   Gait Training PT LTG   Gait Training Goal PT LTG, Date Established 02/15/17   Gait Training Goal PT LTG, Time to Achieve 5 - 7 days   Gait Training Goal PT LTG, Anderson Level contact guard assist   Gait Training Goal PT LTG, Assist Device walker, rolling   Gait Training Goal PT LTG, Distance to Achieve 100 feet       Goal: Range of Motion Goal LTG- PT    02/15/17 1437   Range of Motion PT LTG   Range of Motion Goal PT LTG, Date Established 02/15/17   Range of Motion Goal PT LTG, Time to Achieve 5 - 7 days   Range fo Motion Goal PT LTG, Joint R knee   Range of Motion Goal PT LTG, AROM Measure (-5, 80)

## 2017-02-16 PROBLEM — T84.53XA INFECTION OF PROSTHETIC RIGHT KNEE JOINT: Status: ACTIVE | Noted: 2017-02-16

## 2017-02-16 LAB
ANION GAP SERPL CALCULATED.3IONS-SCNC: 12.8 MMOL/L
BACTERIA FLD CULT: ABNORMAL
BACTERIA SPEC AEROBE CULT: ABNORMAL
BASOPHILS # BLD AUTO: 0.01 10*3/MM3 (ref 0–0.2)
BASOPHILS NFR BLD AUTO: 0.1 % (ref 0–1.5)
BUN BLD-MCNC: 13 MG/DL (ref 8–23)
BUN/CREAT SERPL: 14 (ref 7–25)
CALCIUM SPEC-SCNC: 7.8 MG/DL (ref 8.6–10.5)
CHLORIDE SERPL-SCNC: 98 MMOL/L (ref 98–107)
CO2 SERPL-SCNC: 24.2 MMOL/L (ref 22–29)
CREAT BLD-MCNC: 0.93 MG/DL (ref 0.76–1.27)
DEPRECATED RDW RBC AUTO: 46.7 FL (ref 37–54)
EOSINOPHIL # BLD AUTO: 0.15 10*3/MM3 (ref 0–0.7)
EOSINOPHIL NFR BLD AUTO: 1.4 % (ref 0.3–6.2)
ERYTHROCYTE [DISTWIDTH] IN BLOOD BY AUTOMATED COUNT: 13.7 % (ref 11.5–14.5)
GFR SERPL CREATININE-BSD FRML MDRD: 81 ML/MIN/1.73
GLUCOSE BLD-MCNC: 169 MG/DL (ref 65–99)
GLUCOSE BLDC GLUCOMTR-MCNC: 184 MG/DL (ref 70–130)
GLUCOSE BLDC GLUCOMTR-MCNC: 227 MG/DL (ref 70–130)
GLUCOSE BLDC GLUCOMTR-MCNC: 260 MG/DL (ref 70–130)
GLUCOSE BLDC GLUCOMTR-MCNC: 264 MG/DL (ref 70–130)
GRAM STN SPEC: ABNORMAL
HCT VFR BLD AUTO: 28.5 % (ref 40.4–52.2)
HGB BLD-MCNC: 9.4 G/DL (ref 13.7–17.6)
IMM GRANULOCYTES # BLD: 0.03 10*3/MM3 (ref 0–0.03)
IMM GRANULOCYTES NFR BLD: 0.3 % (ref 0–0.5)
ISOLATED FROM: ABNORMAL
LYMPHOCYTES # BLD AUTO: 1.08 10*3/MM3 (ref 0.9–4.8)
LYMPHOCYTES NFR BLD AUTO: 9.8 % (ref 19.6–45.3)
MCH RBC QN AUTO: 30.8 PG (ref 27–32.7)
MCHC RBC AUTO-ENTMCNC: 33 G/DL (ref 32.6–36.4)
MCV RBC AUTO: 93.4 FL (ref 79.8–96.2)
MONOCYTES # BLD AUTO: 0.88 10*3/MM3 (ref 0.2–1.2)
MONOCYTES NFR BLD AUTO: 8 % (ref 5–12)
NEUTROPHILS # BLD AUTO: 8.86 10*3/MM3 (ref 1.9–8.1)
NEUTROPHILS NFR BLD AUTO: 80.4 % (ref 42.7–76)
PLATELET # BLD AUTO: 150 10*3/MM3 (ref 140–500)
PMV BLD AUTO: 10.6 FL (ref 6–12)
POTASSIUM BLD-SCNC: 3.5 MMOL/L (ref 3.5–5.2)
RBC # BLD AUTO: 3.05 10*6/MM3 (ref 4.6–6)
SODIUM BLD-SCNC: 135 MMOL/L (ref 136–145)
WBC NRBC COR # BLD: 11.01 10*3/MM3 (ref 4.5–10.7)

## 2017-02-16 PROCEDURE — 94799 UNLISTED PULMONARY SVC/PX: CPT

## 2017-02-16 PROCEDURE — 25010000003 CEFAZOLIN IN DEXTROSE 2-4 GM/100ML-% SOLUTION: Performed by: HOSPITALIST

## 2017-02-16 PROCEDURE — 85025 COMPLETE CBC W/AUTO DIFF WBC: CPT | Performed by: ORTHOPAEDIC SURGERY

## 2017-02-16 PROCEDURE — 80048 BASIC METABOLIC PNL TOTAL CA: CPT | Performed by: ORTHOPAEDIC SURGERY

## 2017-02-16 PROCEDURE — 97110 THERAPEUTIC EXERCISES: CPT | Performed by: PHYSICAL THERAPIST

## 2017-02-16 PROCEDURE — 99223 1ST HOSP IP/OBS HIGH 75: CPT | Performed by: INTERNAL MEDICINE

## 2017-02-16 PROCEDURE — 63710000001 INSULIN ASPART PER 5 UNITS: Performed by: INTERNAL MEDICINE

## 2017-02-16 PROCEDURE — 63710000001 INSULIN DETEMER PER 5 UNITS: Performed by: INTERNAL MEDICINE

## 2017-02-16 PROCEDURE — 82962 GLUCOSE BLOOD TEST: CPT

## 2017-02-16 PROCEDURE — 97110 THERAPEUTIC EXERCISES: CPT

## 2017-02-16 PROCEDURE — 99232 SBSQ HOSP IP/OBS MODERATE 35: CPT | Performed by: INTERNAL MEDICINE

## 2017-02-16 PROCEDURE — 25010000002 ENOXAPARIN PER 10 MG: Performed by: ORTHOPAEDIC SURGERY

## 2017-02-16 RX ADMIN — INSULIN DETEMIR 45 UNITS: 100 INJECTION, SOLUTION SUBCUTANEOUS at 21:41

## 2017-02-16 RX ADMIN — DOCUSATE SODIUM 100 MG: 100 CAPSULE, LIQUID FILLED ORAL at 08:21

## 2017-02-16 RX ADMIN — INSULIN ASPART 6 UNITS: 100 INJECTION, SOLUTION INTRAVENOUS; SUBCUTANEOUS at 17:29

## 2017-02-16 RX ADMIN — HYDROCODONE BITARTRATE AND ACETAMINOPHEN 2 TABLET: 10; 325 TABLET ORAL at 21:42

## 2017-02-16 RX ADMIN — HYDROCODONE BITARTRATE AND ACETAMINOPHEN 2 TABLET: 10; 325 TABLET ORAL at 17:09

## 2017-02-16 RX ADMIN — DOCUSATE SODIUM 100 MG: 100 CAPSULE, LIQUID FILLED ORAL at 17:09

## 2017-02-16 RX ADMIN — PANTOPRAZOLE SODIUM 40 MG: 40 TABLET, DELAYED RELEASE ORAL at 06:36

## 2017-02-16 RX ADMIN — HYDROCODONE BITARTRATE AND ACETAMINOPHEN 2 TABLET: 10; 325 TABLET ORAL at 03:24

## 2017-02-16 RX ADMIN — INSULIN ASPART 6 UNITS: 100 INJECTION, SOLUTION INTRAVENOUS; SUBCUTANEOUS at 12:01

## 2017-02-16 RX ADMIN — INSULIN ASPART 2 UNITS: 100 INJECTION, SOLUTION INTRAVENOUS; SUBCUTANEOUS at 08:22

## 2017-02-16 RX ADMIN — CEFAZOLIN SODIUM 2 G: 2 INJECTION, SOLUTION INTRAVENOUS at 12:33

## 2017-02-16 RX ADMIN — INSULIN ASPART 8 UNITS: 100 INJECTION, SOLUTION INTRAVENOUS; SUBCUTANEOUS at 17:28

## 2017-02-16 RX ADMIN — LISINOPRIL 20 MG: 20 TABLET ORAL at 08:21

## 2017-02-16 RX ADMIN — HYDROCODONE BITARTRATE AND ACETAMINOPHEN 2 TABLET: 10; 325 TABLET ORAL at 08:27

## 2017-02-16 RX ADMIN — PRAVASTATIN SODIUM 20 MG: 20 TABLET ORAL at 08:21

## 2017-02-16 RX ADMIN — INSULIN ASPART 6 UNITS: 100 INJECTION, SOLUTION INTRAVENOUS; SUBCUTANEOUS at 08:22

## 2017-02-16 RX ADMIN — ENOXAPARIN SODIUM 40 MG: 40 INJECTION SUBCUTANEOUS at 08:21

## 2017-02-16 RX ADMIN — Medication 1 TABLET: at 08:21

## 2017-02-16 RX ADMIN — SODIUM CHLORIDE 150 ML/HR: 9 INJECTION, SOLUTION INTRAVENOUS at 10:31

## 2017-02-16 RX ADMIN — SODIUM CHLORIDE 150 ML/HR: 9 INJECTION, SOLUTION INTRAVENOUS at 03:26

## 2017-02-16 RX ADMIN — SODIUM CHLORIDE 150 ML/HR: 9 INJECTION, SOLUTION INTRAVENOUS at 17:19

## 2017-02-16 RX ADMIN — INSULIN ASPART 4 UNITS: 100 INJECTION, SOLUTION INTRAVENOUS; SUBCUTANEOUS at 21:42

## 2017-02-16 RX ADMIN — HYDROCODONE BITARTRATE AND ACETAMINOPHEN 2 TABLET: 10; 325 TABLET ORAL at 12:33

## 2017-02-16 RX ADMIN — SERTRALINE 100 MG: 100 TABLET, FILM COATED ORAL at 08:21

## 2017-02-16 RX ADMIN — CEFAZOLIN SODIUM 2 G: 2 INJECTION, SOLUTION INTRAVENOUS at 04:44

## 2017-02-16 RX ADMIN — INSULIN ASPART 6 UNITS: 100 INJECTION, SOLUTION INTRAVENOUS; SUBCUTANEOUS at 12:02

## 2017-02-16 RX ADMIN — CEFAZOLIN SODIUM 2 G: 2 INJECTION, SOLUTION INTRAVENOUS at 21:42

## 2017-02-16 NOTE — PROGRESS NOTES
Orthopedic Progress Note      Patient: Hugh R McBurney  YOB: 1950     Date of Admission: 2/13/2017 11:30 PM Medical Record Number: [id number]     Attending Physician: Axel Zapata MD    Status Post:  Right knee I & D and liner change Post Operative Day Number: 2    Subjective : No new orthopaedic complaints     Pain Relief: some relief with present medication.     Systemic Complaints: No Complaints  Vitals:    02/16/17 0812 02/16/17 1031 02/16/17 1235 02/16/17 1431   BP:   147/79    BP Location:   Left arm    Patient Position:   Lying    Pulse: 105  108 101   Resp:   18 16   Temp:   98.8 °F (37.1 °C)    TempSrc:   Oral    SpO2: 99% 95% 96% 94%   Weight:       Height:           Physical Exam: 66 y.o. male    General Appearance:       Alert, cooperative, in no acute distress                  Extremities:    Dressing Clean, Dry and Intact         Incision healthy without signs or symptoms of infections         No clinical sign of DVT        Able to do good movements of digits    Pulses:   Pulses palpable and equal bilaterally           Diagnostic Tests:      Results from last 7 days  Lab Units 02/16/17  0446 02/15/17  0620 02/13/17  2042   WBC 10*3/mm3 11.01* 13.56* 11.00*   HEMOGLOBIN g/dL 9.4* 10.2* 11.9*   HEMATOCRIT % 28.5* 30.7* 35.6*   PLATELETS 10*3/mm3 150 143 179       Results from last 7 days  Lab Units 02/16/17  0446 02/15/17  0620 02/13/17  2042   SODIUM mmol/L 135* 132* 133*   POTASSIUM mmol/L 3.5 4.2 4.3   CHLORIDE mmol/L 98 96* 93*   TOTAL CO2 mmol/L 24.2 21.0* 23.4   BUN mg/dL 13 12 14   CREATININE mg/dL 0.93 0.99 0.99   GLUCOSE mg/dL 169* 255* 263*   CALCIUM mg/dL 7.8* 7.9* 9.3           Xr Knee 1 Or 2 View Right    Result Date: 2/14/2017  Narrative: RIGHT KNEE 2 VIEWS.  HISTORY: Knee pain.  COMPARISON: No prior studies for comparison.  FINDINGS: There is no fracture or dislocation.  Patient is status post total replacement. Hardware is intact.  No significant joint effusion. Severe  atherosclerotic disease.      Impression: No acute fracture or dislocation.     This report was finalized on 2/14/2017 11:49 PM by Dr. Vidal Morris MD.      Xr Knee 1 Or 2 View Right    Result Date: 2/14/2017  Narrative: 2 VIEWS OF THE RIGHT KNEE  HISTORY: Postoperative knee pain  FINDINGS: 1. Satisfactory appearance following total restrained knee arthroplasty, no evidence of a complication.  This report was finalized on 2/14/2017 4:29 PM by Dr. Ricci Tucker MD.      Xr Foot 3+ View Left    Result Date: 2/14/2017  Narrative: LEFT FOOT 3 VIEWS.  HISTORY: Cellulitis, rule out osteomyelitis, pain in the big toe.  COMPARISON: No prior studies for comparison.  FINDINGS: There is no fracture or dislocation.  Bony abnormality of the third and fourth proximal phalanges may be related to prior surgery. No erosive bony changes are seen to suggest osteomyelitis. Bony ankylosis of the second toe proximal interphalangeal joint is noted.  Soft tissue swelling of the foot.      Impression: Soft tissue swelling, no definite changes suggestive of osteomyelitis. If indicated MRI examination may be performed.     This report was finalized on 2/14/2017 11:49 PM by Dr. Vidal Morris MD.      Xr Chest 1 View    Result Date: 2/14/2017  Narrative: AP PORTABLE CHEST X-RAY  CLINICAL HISTORY: Preop chest x-ray.  The lungs are well-expanded and appear free of active infiltrates. There are no pleural effusions. The cardiomediastinal silhouette is unremarkable. Small calcified hilar lymph nodes are noted bilaterally.  IMPRESSIONS: No evidence of active disease within the chest.  This report was finalized on 2/14/2017 8:29 AM by Dr. Mor Gordon MD.          Current Medications:  Scheduled Meds:  ceFAZolin 2 g Intravenous Q8H   docusate sodium 100 mg Oral BID   enoxaparin 40 mg Subcutaneous Daily   insulin aspart 0-10 Units Subcutaneous 4x Daily AC & at Bedtime   insulin aspart 8 Units Subcutaneous TID With Meals   insulin detemir 45 Units  Subcutaneous Nightly   lisinopril 20 mg Oral Daily   multivitamin 1 tablet Oral Daily   pantoprazole 40 mg Oral Q AM   pravastatin 20 mg Oral Daily   sertraline 100 mg Oral Daily     Continuous Infusions:  lactated ringers 9 mL/hr Last Rate: 9 mL/hr (02/14/17 1551)   sodium chloride 150 mL/hr Last Rate: 150 mL/hr (02/16/17 1031)     PRN Meds:.•  acetaminophen  •  bisacodyl  •  dextrose  •  dextrose  •  glucagon (human recombinant)  •  HYDROcodone-acetaminophen **OR** HYDROcodone-acetaminophen  •  HYDROmorphone **OR** HYDROmorphone  •  ondansetron  •  sodium chloride    Assessment: Status post  I & D Right knee with liner change    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 If this fails plan for a repeat two stage revision. Discussed this with the patient , he expressed understanding.   Weight Bearing: WBAT  Discharge Plan: OK to plan for discharge in  tomorrow to SNF  from orthopadic perspective.    Kiko Marie MD    Date: 2/16/2017    Time: 4:37 PM    EMR Dragon/Transcription disclaimer:   Much of this encounter note is an electronic transcription/translation of spoken language to printed text. The electronic translation of spoken language may permit erroneous, or at times, nonsensical words or phrases to  be inadvertently transcribed; Although I have reviewed the note for such errors, some may still exist.

## 2017-02-16 NOTE — PROGRESS NOTES
"                    [facility]               ASSOCIATES     LOS: 2 days     Name: Hugh R McBurney  Age: 66 y.o.  Sex: male  :  1950  MRN: [id number]         Primary Care Physician: Guille Nieves MD    Subjective   Pain is much improved today.    Objective   Temp:  [98.8 °F (37.1 °C)-99.7 °F (37.6 °C)] 98.8 °F (37.1 °C)  Heart Rate:  [101-108] 101  Resp:  [16-20] 16  BP: (135-147)/(75-83) 147/79  Body mass index is 34.67 kg/(m^2).  Diet Regular; Consistent Carbohydrate    Objective:  General Appearance:  In no acute distress and comfortable.    Vital signs: (most recent): Blood pressure 147/79, pulse 101, temperature 98.8 °F (37.1 °C), temperature source Oral, resp. rate 16, height 74\" (188 cm), weight 270 lb (122 kg), SpO2 94 %.    Lungs:  Normal respiratory rate and normal effort.  He is not in respiratory distress.  Breath sounds clear to auscultation.    Heart: Normal rate.  Regular rhythm.    Abdomen: Abdomen is soft.  There is no abdominal tenderness.  There is no rebound tenderness.  There is no guarding.     Extremities: There is no dependent edema.  (Right knee dressed, left foot dressed.)  Neurological: Patient is alert and oriented to person, place and time.    Skin:  Warm and dry.            Results Review:    Reviewed medications and new clinical results    ceFAZolin 2 g Intravenous Q8H   docusate sodium 100 mg Oral BID   enoxaparin 40 mg Subcutaneous Daily   insulin aspart 0-10 Units Subcutaneous 4x Daily AC & at Bedtime   insulin aspart 8 Units Subcutaneous TID With Meals   insulin detemir 45 Units Subcutaneous Nightly   lisinopril 20 mg Oral Daily   multivitamin 1 tablet Oral Daily   pantoprazole 40 mg Oral Q AM   pravastatin 20 mg Oral Daily   sertraline 100 mg Oral Daily       lactated ringers 9 mL/hr Last Rate: 9 mL/hr (17 1551)   sodium chloride 150 mL/hr Last Rate: 150 mL/hr (17 1031)       Results from last 7 days  Lab Units 17  0446 02/15/17  0620 " 02/13/17  2042   WBC 10*3/mm3 11.01* 13.56* 11.00*   HEMOGLOBIN g/dL 9.4* 10.2* 11.9*   PLATELETS 10*3/mm3 150 143 179       Results from last 7 days  Lab Units 02/16/17  0446 02/15/17  0620 02/13/17 2042   SODIUM mmol/L 135* 132* 133*   POTASSIUM mmol/L 3.5 4.2 4.3   CHLORIDE mmol/L 98 96* 93*   TOTAL CO2 mmol/L 24.2 21.0* 23.4   BUN mg/dL 13 12 14   CREATININE mg/dL 0.93 0.99 0.99   CALCIUM mg/dL 7.8* 7.9* 9.3   GLUCOSE mg/dL 169* 255* 263*     GLUCOSE   Date/Time Value Ref Range Status   02/16/2017 1054 264 (H) 70 - 130 mg/dL Final   02/16/2017 0543 184 (H) 70 - 130 mg/dL Final   02/15/2017 2047 255 (H) 70 - 130 mg/dL Final   02/15/2017 1624 240 (H) 70 - 130 mg/dL Final   02/15/2017 1230 288 (H) 70 - 130 mg/dL Final   02/15/2017 0646 275 (H) 70 - 130 mg/dL Final   02/14/2017 1958 285 (H) 70 - 130 mg/dL Final   02/14/2017 1435 306 (H) 70 - 130 mg/dL Final     HEMOGLOBIN A1C   Date Value Ref Range Status   02/14/2017 9.25 (H) 4.80 - 5.60 % Final     Estimated Creatinine Clearance: 108.4 mL/min (by C-G formula based on Cr of 0.93).    Echo:  · All left ventricular wall segments contract normally.  · Left ventricular function is normal. Estimated EF = 70%.  · Left atrial cavity size is borderline dilated.    Assessment/Plan   Active Hospital Problems (** Indicates Principal Problem)    Diagnosis Date Noted   • **Diabetic ulcer of 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, with necrosis of bone [E11.621, L97.524] 02/14/2017   • MSSA (methicillin susceptible Staphylococcus aureus) septicemia [A41.01] 02/15/2017   • Cellulitis of left foot [L03.116] 02/14/2017   • Diabetes mellitus [E11.9] 02/14/2017   • Hypertension [I10] 02/14/2017   • Hyperlipidemia [E78.5] 02/14/2017   • Chronic pain [G89.29] 02/14/2017   • Knee pain, hx of previous prosthetic joint infection [M25.569] 02/14/2017   • Great toe pain, with cellulitis and gangrene [M79.676] 02/14/2017   • Diabetic autonomic neuropathy associated with  type 2 diabetes mellitus [E11.43] 02/14/2017   • 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     · S/P irrigation/debridement right knee, liner exchange 2/14/17  · S/P amputation left first toe 2/14/17  · Antibiotics per ID. Echo noted  · Further plans per ortho, vascular  · Pain improved on PO meds today  · DM per endocrine    Axel Logan Zapata MD   02/16/17  3:18 PM

## 2017-02-16 NOTE — PROGRESS NOTES
Acute Care - Physical Therapy Treatment Note  [facility]     Patient Name: Hugh R McBurney  : 1950  MRN: [id number]  Today's Date: 2017  Onset of Illness/Injury or Date of Surgery Date: 17  Date of Referral to PT: 17  Referring Physician: JANI Tovar    Admit Date: 2017    Visit Dx:    ICD-10-CM ICD-9-CM   1. Cellulitis of left foot L03.116 682.7   2. Toe osteomyelitis, left M86.9 730.27   3. Knee effusion, right M25.461 719.06   4. 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   • MSSA (methicillin susceptible Staphylococcus aureus) septicemia               Adult Rehabilitation Note       17 1600 17 1150       Rehab Assessment/Intervention    Discipline physical therapist  - physical therapist  -AR     Document Type therapy note (daily note)  - therapy note (daily note)  -AR     Subjective Information agree to therapy  - agree to therapy  -AR     Patient Effort, Rehab Treatment good  -KH good  -AR     Symptoms Noted During/After Treatment increased pain  -KH      Precautions/Limitations fall precautions   KI on RLE, post-op shoe on L foot  - fall precautions   KI on R LE when OOB until pt can do straight leg raise  -AR     Recorded by [KH] Shira Boothe, PT [AR] Lolis Akers, PT     Pain Assessment    Pain Assessment 0-10  -KH 0-10  -AR     Pain Score 6  -KH 5  -AR     Pain Type Surgical pain  -KH Surgical pain  -AR     Pain Location Knee  - Knee  -AR     Pain  Orientation Right  -KH Right  -AR     Pain Intervention(s) Repositioned  -KH Medication (See MAR)  -AR     Recorded by [BETSY] Shira Boothe, PT [AR] Lolis Akers PT     Cognitive Assessment/Intervention    Current Cognitive/Communication Assessment functional  -KH functional  -AR     Orientation Status oriented x 4  -KH oriented x 4  -AR     Follows Commands/Answers Questions 100% of the time  -% of the time  -AR     Personal Safety WNL/WFL  -      Personal Safety Interventions fall prevention program maintained;gait belt  -KH      Recorded by [KH] Shira Boothe, PT [AR] Lolis Akers PT     Bed Mobility, Assessment/Treatment    Bed Mobility, Assistive Device  bed rails;head of bed elevated  -AR     Bed Mob, Supine to Sit, [place] moderate assist (50% patient effort);2 person assist required  - minimum assist (75% patient effort)  -AR     Bed Mob, Sit to Supine, [place]  minimum assist (75% patient effort)  -AR     Recorded by [KH] Shira Boothe, PT [AR] Lolis Akers PT     Transfer Assessment/Treatment    Transfers, Bed-Chair [place] minimum assist (75% patient effort);2 person assist required  -      Transfers, Sit-Stand [place] minimum assist (75% patient effort);2 person assist required  - minimum assist (75% patient effort)  -AR     Transfers, Stand-Sit [place]  minimum assist (75% patient effort)  -AR     Transfers, Sit-Stand-Sit, Assist Device rolling walker  -KH rolling walker  -AR     Transfer, Comment transfer to [facility]  - x3 w/ bed at lowest height final time  -AR     Recorded by [BETSY] Shira Boothe, PT [AR] Lolis Akers, PT     Gait Assessment/Treatment    Gait, [place] Level not tested  - contact guard assist  -AR     Gait, Assistive Device  rolling walker  -AR     Gait, Distance (Feet)  2  -AR     Gait, Gait Deviations  leon decreased;decreased heel strike;forward flexed posture;step length decreased  -AR      Gait, Comment pt had to use the bathroom and was pivoted to [facility] KI on, able to take pivot steps  -AR     Recorded by [BETSY] Shira Boothe, PT [AR] Lolis Akers PT     Therapy Exercises    Exercise Protocols total knee  - total knee  -AR     Total Knee Exercises right:;10 reps;with assist;SAQ;heel slides   limited by pt's need to use the bathroom  - right:;10 reps;completed protocol   assist w/ heel slides and SAQ  -AR     Recorded by [BETSY] Shira Boothe, PT [AR] Lolis Akers PT     Positioning and Restraints    Pre-Treatment Position in bed  - in bed  -AR     Post Treatment Position [facility] bed  -AR     In Bed  notified nsg;supine;call light within reach;encouraged to call for assist;exit alarm on;with family/caregiver  -AR     On BS commode sitting;call light within reach;encouraged to call for assist;notified nsg   RN present for transfer and will follow up with pt  -      Recorded by [KH] Shira Boothe, PT [AR] Lolis Akers PT       User Key  (r) = Recorded By, (t) = Taken By, (c) = Cosigned By    Initials Name Effective Dates    BETSY Boothe, PT 05/18/15 -     AR Lolis Akers, PT 06/27/16 -                 IP PT Goals       02/15/17 1437          Bed Mobility PT LTG    Bed Mobility PT LTG, Date Established 02/15/17  -MS      Bed Mobility PT LTG, Time to Achieve 5 - 7 days  -MS      Bed Mobility PT LTG, Activity Type all bed mobility  -MS      Bed Mobility PT LTG, [place] Level contact guard assist  -MS      Transfer Training PT LTG    Transfer Training PT LTG, Date Established 02/15/17  -MS      Transfer Training PT LTG, Time to Achieve 5 - 7 days  -MS      Transfer Training PT LTG, Activity Type all transfers  -MS      Transfer Training PT LTG, [place] Level contact guard assist  -MS      Transfer Training PT LTG, Assist Device walker, rolling  -MS      Gait Training PT LTG    Gait Training Goal PT LTG, Date Established 02/15/17  -MS       Gait Training Goal PT LTG, Time to Achieve 5 - 7 days  -MS      Gait Training Goal PT LTG, [place] Level contact guard assist  -MS      Gait Training Goal PT LTG, Assist Device walker, rolling  -MS      Gait Training Goal PT LTG, Distance to Achieve 100 feet  -MS      Range of Motion PT LTG    Range of Motion Goal PT LTG, Date Established 02/15/17  -MS      Range of Motion Goal PT LTG, Time to Achieve 5 - 7 days  -MS      Range fo Motion Goal PT LTG, Joint R knee  -MS      Range of Motion Goal PT LTG, AROM Measure (-5, 80)  -MS        User Key  (r) = Recorded By, (t) = Taken By, (c) = Cosigned By    Initials Name Provider Type    MS Reymundo Munguia, PT Physical Therapist          Physical Therapy Education     Title: PT OT SLP Therapies (Done)     Topic: Physical Therapy (Done)     Point: Mobility training (Done)    Learning Progress Summary    Learner Readiness Method Response Comment Documented by Status   Patient Acceptance E VU   02/16/17 1612 Done    Acceptance E VU  AR 02/16/17 1155 Done    Acceptance D,E VU,NR  MS 02/15/17 1437 Done               Point: Home exercise program (Done)    Learning Progress Summary    Learner Readiness Method Response Comment Documented by Status   Patient Acceptance E VU   02/16/17 1612 Done    Acceptance E VU  AR 02/16/17 1155 Done    Acceptance D,E VU,NR  MS 02/15/17 1437 Done               Point: Body mechanics (Done)    Learning Progress Summary    Learner Readiness Method Response Comment Documented by Status   Patient Acceptance E VU  AR 02/16/17 1155 Done    Acceptance D,E VU,NR  MS 02/15/17 1437 Done               Point: Precautions (Done)    Learning Progress Summary    Learner Readiness Method Response Comment Documented by Status   Patient Acceptance E VU  AR 02/16/17 1155 Done    Acceptance D,E VU,NR  MS 02/15/17 1437 Done                      User Key     Initials Effective Dates Name Provider Type Russ     05/18/15 -  Shira Boothe, PT  Physical Therapist PT    MS 12/01/15 -  Reymundo Munguia, PT Physical Therapist PT    AR 06/27/16 -  Lolis Akers, PT Physical Therapist PT                    PT Recommendation and Plan  Anticipated Equipment Needs At Discharge: front wheeled walker  Anticipated Discharge Disposition: skilled nursing facility  Planned Therapy Interventions: balance training, bed mobility training, gait training, home exercise program, patient/family education, postural re-education, ROM (Range of Motion), strengthening, transfer training  PT Frequency: 2 times/day  Plan of Care Review  Outcome Summary/Follow up Plan: ROM and mobility remain limited by R knee pain. Pt tolerated exercises and was able to transfer to [facility].          Outcome Measures       02/16/17 1614 02/16/17 1100 02/15/17 1400    How much help from another person do you currently need...    Turning from your back to your side while in flat bed without using bedrails? 3  -KH 3  -AR 2  -MS    Moving from lying on back to sitting on the side of a flat bed without bedrails? 3  -KH 3  -AR 2  -MS    Moving to and from a bed to a chair (including a wheelchair)? 3  -KH 3  -AR 2  -MS    Standing up from a chair using your arms (e.g., wheelchair, bedside chair)? 3  -KH 2  -AR 2  -MS    Climbing 3-5 steps with a railing? 2  -KH 2  -AR 1  -MS    To walk in hospital room? 3  -KH 3  -AR 2  -MS    AM-PAC 6 Clicks Score 17  -KH 16  -AR 11  -MS    Functional Assessment    Outcome Measure Options AM-PAC 6 Clicks Basic Mobility (PT)  -KH  AM-PAC 6 Clicks Basic Mobility (PT)  -MS      User Key  (r) = Recorded By, (t) = Taken By, (c) = Cosigned By    Initials Name Provider Type    BETSY Boothe, PT Physical Therapist    MS Reymundo Munguia, PT Physical Therapist    AR Lolis Akers, PT Physical Therapist           Time Calculation:         PT Charges       02/16/17 1614 02/16/17 1156       Time Calculation    Start Time 1559  -KH 1110  -AR     Stop Time 1612  -KH 1156   -AR     Time Calculation (min) 13 min  - 46 min  -AR     PT Received On 02/16/17  - 02/16/17  -AR     PT - Next Appointment 02/17/17  - 02/16/17  -AR       User Key  (r) = Recorded By, (t) = Taken By, (c) = Cosigned By    Initials Name Provider Type     Shira Boothe, PT Physical Therapist    AR Lolis Akers, PT Physical Therapist          Therapy Charges for Today     Code Description Service Date Service Provider Modifiers Qty    51248982438 HC PT THER PROC EA 15 MIN 2/16/2017 Shira Boothe, PT GP 1    30409780527 HC PT THER SUPP EA 15 MIN 2/16/2017 Shira Boothe, PT GP 1          PT G-Codes  Outcome Measure Options: AM-PAC 6 Clicks Basic Mobility (PT)    Shira Boothe, PT  2/16/2017

## 2017-02-16 NOTE — SIGNIFICANT NOTE
02/16/17 1251   Rehab Treatment   Discipline occupational therapist   Rehab Evaluation   Evaluation Not Performed other (see comments)  (OT ordered per Ortho for TKA. Noted d/c plans are for snu. Will defer OT consult until on snu)

## 2017-02-16 NOTE — PROGRESS NOTES
Osmin Brand MD       LOS: 2 days   Patient Care Team:  Guille Nieves MD as PCP - General  Guille Nieves MD as PCP - Family Medicine    Subjective     66 y.o. male as well as left first toe amputation for methicillin sensitive Staphylococcus aureus infection as well as right knee surgery per orthopedics for infected prosthesis.  Antibiotics changed per infectious disease.  Tolerating wound care left first toe amputation site.  Lower extremity arterial Doppler studies with incompressible ABIs but normal digital pressures and waveforms with adequate perfusion to heal.    Review of Systems  Review of Systems - Negative except history of present illness      Objective     Vital Signs  Temp:  [98.9 °F (37.2 °C)-99.9 °F (37.7 °C)] 99.1 °F (37.3 °C)  Heart Rate:  [108-120] 108  Resp:  [16-18] 16  BP: (133-151)/(75-86) 136/75    Physical Exam  General: No acute distress. Alert and oriented x 4  HEENT: No jugular venous distension, trachea is midline  CV: RRR, S1S2  Resp: Clear unlabored breathing  Abd: Abdomen is soft, nontender, nondistended  Extremities: Left first toe amputation site healing    Results Review:       Recent Results (from the past 12 hour(s))   POC Glucose Fingerstick    Collection Time: 02/15/17  8:47 PM   Result Value Ref Range    Glucose 255 (H) 70 - 130 mg/dL   Basic Metabolic Panel    Collection Time: 02/16/17  4:46 AM   Result Value Ref Range    Glucose 169 (H) 65 - 99 mg/dL    BUN 13 8 - 23 mg/dL    Creatinine 0.93 0.76 - 1.27 mg/dL    Sodium 135 (L) 136 - 145 mmol/L    Potassium 3.5 3.5 - 5.2 mmol/L    Chloride 98 98 - 107 mmol/L    CO2 24.2 22.0 - 29.0 mmol/L    Calcium 7.8 (L) 8.6 - 10.5 mg/dL    eGFR Non African Amer 81 >60 mL/min/1.73    BUN/Creatinine Ratio 14.0 7.0 - 25.0    Anion Gap 12.8 mmol/L   CBC Auto Differential    Collection Time: 02/16/17  4:46 AM   Result Value Ref Range    WBC 11.01 (H) 4.50 - 10.70 10*3/mm3    RBC 3.05 (L) 4.60 - 6.00 10*6/mm3    Hemoglobin 9.4  (L) 13.7 - 17.6 g/dL    Hematocrit 28.5 (L) 40.4 - 52.2 %    MCV 93.4 79.8 - 96.2 fL    MCH 30.8 27.0 - 32.7 pg    MCHC 33.0 32.6 - 36.4 g/dL    RDW 13.7 11.5 - 14.5 %    RDW-SD 46.7 37.0 - 54.0 fl    MPV 10.6 6.0 - 12.0 fL    Platelets 150 140 - 500 10*3/mm3    Neutrophil % 80.4 (H) 42.7 - 76.0 %    Lymphocyte % 9.8 (L) 19.6 - 45.3 %    Monocyte % 8.0 5.0 - 12.0 %    Eosinophil % 1.4 0.3 - 6.2 %    Basophil % 0.1 0.0 - 1.5 %    Immature Grans % 0.3 0.0 - 0.5 %    Neutrophils, Absolute 8.86 (H) 1.90 - 8.10 10*3/mm3    Lymphocytes, Absolute 1.08 0.90 - 4.80 10*3/mm3    Monocytes, Absolute 0.88 0.20 - 1.20 10*3/mm3    Eosinophils, Absolute 0.15 0.00 - 0.70 10*3/mm3    Basophils, Absolute 0.01 0.00 - 0.20 10*3/mm3    Immature Grans, Absolute 0.03 0.00 - 0.03 10*3/mm3   POC Glucose Fingerstick    Collection Time: 02/16/17  5:43 AM   Result Value Ref Range    Glucose 184 (H) 70 - 130 mg/dL   ]      Assessment/Plan           Principal Problem:    Diabetic ulcer of 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, with necrosis of bone  Active Problems:    Cellulitis of left foot    Diabetes mellitus    Hypertension    Hyperlipidemia    Chronic pain    Knee pain, hx of previous prosthetic joint infection    Great toe pain, with cellulitis and gangrene    Diabetic autonomic neuropathy associated with type 2 diabetes mellitus    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 male doing well after left first toe amputation and right knee surgery from orthopedics.  Infectious disease for antibiotic management.  Continue dressing changes left first toe and we'll plan to have him follow-up with [facility] wound care center as outpatient upon discharge.      Osmin Brand MD  02/16/17  6:33 AM

## 2017-02-16 NOTE — PLAN OF CARE
Problem: Patient Care Overview (Adult)  Goal: Plan of Care Review    02/16/17 1153   Coping/Psychosocial Response Interventions   Plan Of Care Reviewed With patient   Outcome Evaluation   Outcome Summary/Follow up Plan      02/16/17 1153   Coping/Psychosocial Response Interventions   Plan Of Care Reviewed With patient   Outcome Evaluation   Outcome Summary/Follow up Plan Pt demonstrates improved pain control still with very limited R knee flexion. Pt reports he has been wearing knee immobilizer on at al times since sx, educated on HEP, postioning/icing. Performed exercises with assist, and able to take few steps w/ RW.

## 2017-02-16 NOTE — PLAN OF CARE
Problem: Patient Care Overview (Adult)  Goal: Plan of Care Review  Outcome: Ongoing (interventions implemented as appropriate)    02/16/17 6413   Coping/Psychosocial Response Interventions   Plan Of Care Reviewed With patient   Patient Care Overview   Progress improving   Outcome Evaluation   Outcome Summary/Follow up Plan VSS. Stated pain is better on increased pain med but mobility is still limited. Continue to encourage movement and IS.        Goal: Adult Individualization and Mutuality  Outcome: Ongoing (interventions implemented as appropriate)    Problem: Infection, Risk/Actual (Adult)  Goal: Infection Prevention/Resolution  Outcome: Ongoing (interventions implemented as appropriate)    Problem: Pain, Acute (Adult)  Goal: Acceptable Pain Control/Comfort Level  Outcome: Ongoing (interventions implemented as appropriate)    Problem: Perioperative Period (Adult)  Goal: Signs and Symptoms of Listed Potential Problems Will be Absent or Manageable (Perioperative Period)  Outcome: Ongoing (interventions implemented as appropriate)

## 2017-02-16 NOTE — PROGRESS NOTES
Acute Care - Physical Therapy Treatment Note  [facility]     Patient Name: Hugh R McBurney  : 1950  MRN: [id number]  Today's Date: 2017  Onset of Illness/Injury or Date of Surgery Date: 17  Date of Referral to PT: 17  Referring Physician: JANI Tovar    Admit Date: 2017    Visit Dx:    ICD-10-CM ICD-9-CM   1. Cellulitis of left foot L03.116 682.7   2. Toe osteomyelitis, left M86.9 730.27   3. Knee effusion, right M25.461 719.06   4. 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   • MSSA (methicillin susceptible Staphylococcus aureus) septicemia               Adult Rehabilitation Note       17 1150          Rehab Assessment/Intervention    Discipline physical therapist  -AR      Document Type therapy note (daily note)  -AR      Subjective Information agree to therapy  -AR      Patient Effort, Rehab Treatment good  -AR      Precautions/Limitations fall precautions   KI on R LE when OOB until pt can do straight leg raise  -AR      Recorded by [AR] Lolis Akers, PT      Pain Assessment    Pain Assessment 0-10  -AR      Pain Score 5  -AR      Pain Type Surgical pain  -AR      Pain Location Knee  -AR      Pain Orientation Right  -AR      Pain Intervention(s) Medication (See MAR)  -AR      Recorded by [AR] Lolis Akers, PT      Cognitive Assessment/Intervention    Current Cognitive/Communication Assessment functional  -AR      Orientation Status oriented x 4  -AR      Follows Commands/Answers  Questions 100% of the time  -AR      Recorded by [AR] Lolis Akers PT      Bed Mobility, Assessment/Treatment    Bed Mobility, Assistive Device bed rails;head of bed elevated  -AR      Bed Mob, Supine to Sit, [place] minimum assist (75% patient effort)  -AR      Bed Mob, Sit to Supine, [place] minimum assist (75% patient effort)  -AR      Recorded by [AR] Lolis Akers PT      Transfer Assessment/Treatment    Transfers, Sit-Stand [place] minimum assist (75% patient effort)  -AR      Transfers, Stand-Sit [place] minimum assist (75% patient effort)  -AR      Transfers, Sit-Stand-Sit, Assist Device rolling walker  -AR      Transfer, Comment x3 w/ bed at lowest height final time  -AR      Recorded by [AR] Lolis Akers PT      Gait Assessment/Treatment    Gait, [place] Level contact guard assist  -AR      Gait, Assistive Device rolling walker  -AR      Gait, Distance (Feet) 2  -AR      Gait, Gait Deviations leon decreased;decreased heel strike;forward flexed posture;step length decreased  -AR      Gait, Comment KI on, able to take pivot steps  -AR      Recorded by [AR] Lolis Akers PT      Therapy Exercises    Exercise Protocols total knee  -AR      Total Knee Exercises right:;10 reps;completed protocol   assist w/ heel slides and SAQ  -AR      Recorded by [AR] Lolis Akers PT      Positioning and Restraints    Pre-Treatment Position in bed  -AR      Post Treatment Position bed  -AR      In Bed notified nsg;supine;call light within reach;encouraged to call for assist;exit alarm on;with family/caregiver  -AR      Recorded by [AR] Lolis Akers PT        User Key  (r) = Recorded By, (t) = Taken By, (c) = Cosigned By    Initials Name Effective Dates    AR Lolis Akers PT 06/27/16 -                 IP PT Goals       02/15/17 1437          Bed Mobility PT LTG    Bed Mobility PT LTG, Date Established 02/15/17  -MS      Bed Mobility PT LTG, Time to Achieve 5 - 7 days  -MS       Bed Mobility PT LTG, Activity Type all bed mobility  -MS      Bed Mobility PT LTG, [place] Level contact guard assist  -MS      Transfer Training PT LTG    Transfer Training PT LTG, Date Established 02/15/17  -MS      Transfer Training PT LTG, Time to Achieve 5 - 7 days  -MS      Transfer Training PT LTG, Activity Type all transfers  -MS      Transfer Training PT LTG, [place] Level contact guard assist  -MS      Transfer Training PT LTG, Assist Device walker, rolling  -MS      Gait Training PT LTG    Gait Training Goal PT LTG, Date Established 02/15/17  -MS      Gait Training Goal PT LTG, Time to Achieve 5 - 7 days  -MS      Gait Training Goal PT LTG, [place] Level contact guard assist  -MS      Gait Training Goal PT LTG, Assist Device walker, rolling  -MS      Gait Training Goal PT LTG, Distance to Achieve 100 feet  -MS      Range of Motion PT LTG    Range of Motion Goal PT LTG, Date Established 02/15/17  -MS      Range of Motion Goal PT LTG, Time to Achieve 5 - 7 days  -MS      Range fo Motion Goal PT LTG, Joint R knee  -MS      Range of Motion Goal PT LTG, AROM Measure (-5, 80)  -MS        User Key  (r) = Recorded By, (t) = Taken By, (c) = Cosigned By    Initials Name Provider Type    MS Reymundo Munguia, PT Physical Therapist          Physical Therapy Education     Title: PT OT SLP Therapies (Done)     Topic: Physical Therapy (Done)     Point: Mobility training (Done)    Learning Progress Summary    Learner Readiness Method Response Comment Documented by Status   Patient Acceptance E VU  AR 02/16/17 1155 Done    Acceptance D,E MARQUEZNR  MS 02/15/17 1437 Done               Point: Home exercise program (Done)    Learning Progress Summary    Learner Readiness Method Response Comment Documented by Status   Patient Acceptance E VU  AR 02/16/17 1155 Done    Acceptance D,E VU,NR  MS 02/15/17 1437 Done               Point: Body mechanics (Done)    Learning Progress Summary    Learner Readiness Method  Response Comment Documented by Status   Patient Acceptance E VU  AR 02/16/17 1155 Done    Acceptance D,E VU,NR  MS 02/15/17 1437 Done               Point: Precautions (Done)    Learning Progress Summary    Learner Readiness Method Response Comment Documented by Status   Patient Acceptance E VU  AR 02/16/17 1155 Done    Acceptance D,E VU,NR  MS 02/15/17 1437 Done                      User Key     Initials Effective Dates Name Provider Type Discipline    MS 12/01/15 -  Reymundo Munguia, PT Physical Therapist PT    AR 06/27/16 -  Lolis Akres, PT Physical Therapist PT                    PT Recommendation and Plan  Anticipated Equipment Needs At Discharge: front wheeled walker  Anticipated Discharge Disposition: skilled nursing facility  Planned Therapy Interventions: balance training, bed mobility training, gait training, home exercise program, patient/family education, postural re-education, ROM (Range of Motion), strengthening, transfer training  PT Frequency: 2 times/day  Plan of Care Review  Plan Of Care Reviewed With: patient  Outcome Summary/Follow up Plan: Pt demonstrates improved pain control still with very limited R knee flexion.  Pt reports he has been wearing knee immobilizer on at al times since sx, educated on HEP, postioning/icing.   Performed exercises with assist, and able to take few steps w/ RW.            Outcome Measures       02/16/17 1100 02/15/17 1400       How much help from another person do you currently need...    Turning from your back to your side while in flat bed without using bedrails? 3  -AR 2  -MS     Moving from lying on back to sitting on the side of a flat bed without bedrails? 3  -AR 2  -MS     Moving to and from a bed to a chair (including a wheelchair)? 3  -AR 2  -MS     Standing up from a chair using your arms (e.g., wheelchair, bedside chair)? 2  -AR 2  -MS     Climbing 3-5 steps with a railing? 2  -AR 1  -MS     To walk in hospital room? 3  -AR 2  -MS     AM-PAC 6 Clicks  Score 16  -AR 11  -MS     Functional Assessment    Outcome Measure Options  AM-PAC 6 Clicks Basic Mobility (PT)  -MS       User Key  (r) = Recorded By, (t) = Taken By, (c) = Cosigned By    Initials Name Provider Type    MS Reymundo HOPKINS Nilda, PT Physical Therapist    AR Lolis Akers PT Physical Therapist           Time Calculation:         PT Charges       02/16/17 1156          Time Calculation    Start Time 1110  -AR      Stop Time 1156  -AR      Time Calculation (min) 46 min  -AR      PT Received On 02/16/17  -AR      PT - Next Appointment 02/16/17  -AR        User Key  (r) = Recorded By, (t) = Taken By, (c) = Cosigned By    Initials Name Provider Type    AR Lolis Akers PT Physical Therapist          Therapy Charges for Today     Code Description Service Date Service Provider Modifiers Qty    55225351514 HC PT THER PROC EA 15 MIN 2/16/2017 Lolis Akers, PT GP 3          PT G-Codes  Outcome Measure Options: AM-PAC 6 Clicks Basic Mobility (PT)    Lolis Akers PT  2/16/2017

## 2017-02-16 NOTE — CONSULTS
"66 y.o.  Patient Care Team:  Guille Nieves MD as PCP - General  Guille Nieves MD as PCP - Family Medicine      Chief Complaint   Patient presents with   • Chills   • Wound Infection     PT REPORTS, \"I HAVE AN INFECTION ON MY LEFT GREAT TOE FOR THREE WEEKS, MAYBE LONGER\". PT C/O CHILLS, FATIGUE AND BODY ACHES    reason for consultation - type 2 diabetes mellitus    HPI 66-year-old white male with past medical history of type 2 diabetes mellitus, hyperlipidemia, hypertension admitted to the hospital with sepsis.  Patient was noted to have left first toe osteomyelitis.  Per patient he developed left first toe ulcer and blister for which he is being seen by podiatrist.  He was undergoing local wound care and antibiotic therapy but on February 12, 2017 he developed fever, inability to be overweight, right knee pain and swelling and she presented to the emergency department.  During the hospitalization patient underwent debridement and amputation of his left first toe.  He also underwent incision, debridement and polyliner exchange of the right TKA.  Endocrine has been consulted for the management of his type 2 diabetes mellitus.    Type 2 diabetes mellitus diagnosed more than 10-15 years ago.  Patient was initially started on oral hypoglycemic agents but currently has been on insulin for more than 5 years.  Currently patient is on Novolin 70/30 40 units twice daily, glyburide 5 mg twice daily and metformin 850 mg twice daily.  Patient does admit that he doesn't check his blood sugars regularly and checks them based on his symptoms.  He last checked his blood sugar 3 weeks ago and it was noted to be 260 mg/dL.  Due for his eye exam, last eye exam was 3 years ago.  No prior history of diabetic retinopathy.  Significant history of diabetic neuropathy associated with tingling numbness and burning sensation, status post left first toe amputation during this admission.  No history of CAD, CK D, CVA  Patient is " currently on lisinopril.  HbA1c - 9.25 %    History of pancreatitis in the past.    Past Medical History   Diagnosis Date   • Chronic pain      BILATERAL KNEES AND BACK   • Diabetes mellitus    • Hyperlipidemia    • Hypertension    • Kidney stone        Family History   Problem Relation Age of Onset   • Heart disease Mother    • Heart disease Father        Social History     Social History   • Marital status:      Spouse name: N/A   • Number of children: N/A   • Years of education: N/A     Occupational History   • Not on file.     Social History Main Topics   • Smoking status: Former Smoker     Packs/day: 3.00     Years: 20.00   • Smokeless tobacco: Not on file      Comment: quit 35 years ago    • Alcohol use No   • Drug use: Yes     Special: Other      Comment: vicodin-prescribed    • Sexual activity: Defer     Other Topics Concern   • Not on file     Social History Narrative       No Known Allergies      Current Facility-Administered Medications:   •  acetaminophen (TYLENOL) tablet 650 mg, 650 mg, Oral, Q6H PRN, Chapin Escamilla MD, 650 mg at 02/14/17 0506  •  bisacodyl (DULCOLAX) suppository 10 mg, 10 mg, Rectal, Daily PRN, Kiko Marie MD  •  ceFAZolin in dextrose (ANCEF) IVPB solution 2 g, 2 g, Intravenous, Q8H, Axel Logan Zapata MD, 2 g at 02/16/17 1233  •  dextrose (D50W) solution 25 g, 25 g, Intravenous, Q15 Min PRN, Chapin Escamilla MD  •  dextrose (GLUTOSE) oral gel 15 g, 15 g, Oral, Q15 Min PRN, Chapin Escamilla MD  •  docusate sodium (COLACE) capsule 100 mg, 100 mg, Oral, BID, Kiko Marie MD, 100 mg at 02/16/17 0821  •  enoxaparin (LOVENOX) syringe 40 mg, 40 mg, Subcutaneous, Daily, Kiko Marie MD, 40 mg at 02/16/17 0821  •  glucagon (human recombinant) (GLUCAGEN DIAGNOSTIC) injection 1 mg, 1 mg, Subcutaneous, Q15 Min PRN, Chapin Escamilla MD  •  HYDROcodone-acetaminophen (NORCO)  MG per tablet 1 tablet, 1 tablet, Oral, Q4H PRN **OR** HYDROcodone-acetaminophen (NORCO)   MG per tablet 2 tablet, 2 tablet, Oral, Q4H PRN, Axel Zapata MD, 2 tablet at 02/16/17 1233  •  HYDROmorphone (DILAUDID) injection 0.5 mg, 0.5 mg, Intravenous, Q2H PRN, 0.5 mg at 02/15/17 2113 **OR** HYDROmorphone (DILAUDID) injection 1 mg, 1 mg, Intravenous, Q2H PRN, Axel Zapata MD, 1 mg at 02/15/17 1229  •  insulin aspart (novoLOG) injection 0-10 Units, 0-10 Units, Subcutaneous, 4x Daily AC & at Bedtime, Adarsh Love MD, 6 Units at 02/16/17 1202  •  insulin aspart (novoLOG) injection 6 Units, 6 Units, Subcutaneous, TID With Meals, Adarsh Love MD, 6 Units at 02/16/17 1201  •  insulin detemir (LEVEMIR) injection 45 Units, 45 Units, Subcutaneous, Nightly, Adarsh Love MD, 45 Units at 02/15/17 2128  •  lactated ringers infusion, 9 mL/hr, Intravenous, Continuous, Anderson Villasenor MD, Last Rate: 9 mL/hr at 02/14/17 1551, 9 mL/hr at 02/14/17 1551  •  lisinopril (PRINIVIL,ZESTRIL) tablet 20 mg, 20 mg, Oral, Daily, Chapin Escamilla MD, 20 mg at 02/16/17 0821  •  multivitamin (THERAGRAN) tablet 1 tablet, 1 tablet, Oral, Daily, Chapin Escamilla MD, 1 tablet at 02/16/17 0821  •  ondansetron (ZOFRAN) injection 4 mg, 4 mg, Intravenous, Q6H PRN, Chapin Escamilla MD  •  pantoprazole (PROTONIX) EC tablet 40 mg, 40 mg, Oral, Q AM, Chapin Escamilla MD, 40 mg at 02/16/17 0636  •  pravastatin (PRAVACHOL) tablet 20 mg, 20 mg, Oral, Daily, Chapin Escamilla MD, 20 mg at 02/16/17 0821  •  sertraline (ZOLOFT) tablet 100 mg, 100 mg, Oral, Daily, Chapin Escamilla MD, 100 mg at 02/16/17 0821  •  sodium chloride 0.9 % flush 10 mL, 10 mL, Intravenous, PRN, Xavier Fairchild MD  •  sodium chloride 0.9 % infusion, 150 mL/hr, Intravenous, Continuous, Jawed MD Sharif, Last Rate: 150 mL/hr at 02/16/17 1031, 150 mL/hr at 02/16/17 1031         Review of Systems   Constitutional: Positive for appetite change and fatigue. Negative for chills and diaphoresis.   HENT: Negative for hearing loss, nosebleeds, postnasal drip, trouble swallowing and  voice change.    Eyes: Negative for photophobia, discharge and visual disturbance.   Respiratory: Negative for cough, shortness of breath and wheezing.    Cardiovascular: Negative for chest pain, palpitations and leg swelling.   Gastrointestinal: Negative for abdominal distention, abdominal pain, constipation, diarrhea, nausea and vomiting.   Endocrine: Negative for cold intolerance, heat intolerance, polydipsia and polyuria.   Genitourinary: Negative for dysuria, frequency and hematuria.   Musculoskeletal: Positive for arthralgias and myalgias. Negative for back pain.   Skin: Negative for pallor, rash and wound.   Neurological: Positive for weakness and numbness. Negative for dizziness, tremors, seizures, syncope and headaches.   Hematological: Negative for adenopathy.   Psychiatric/Behavioral: Negative for agitation, confusion and sleep disturbance. The patient is not nervous/anxious.      Objective     Vital Signs  Temp:  [98.8 °F (37.1 °C)-99.7 °F (37.6 °C)] 98.8 °F (37.1 °C)  Heart Rate:  [101-108] 101  Resp:  [16-20] 16  BP: (135-147)/(75-83) 147/79    Physical Exam  Physical Exam   Constitutional: He is oriented to person, place, and time. He appears well-developed and well-nourished.   HENT:   Head: Normocephalic and atraumatic.   Mouth/Throat: Oropharynx is clear and moist.   Eyes: Conjunctivae and EOM are normal. Pupils are equal, round, and reactive to light.   Neck: Normal range of motion. Neck supple. Carotid bruit is not present. No tracheal deviation present. No thyromegaly present.   Acanthosis nigricans   Cardiovascular: Normal rate, regular rhythm and normal heart sounds.    Pulmonary/Chest: Effort normal and breath sounds normal. No stridor. He has no wheezes.   Abdominal: Soft. Bowel sounds are normal. He exhibits no distension. There is no tenderness. No hernia.   Central obesity   Musculoskeletal: Normal range of motion. He exhibits no edema.   Left foot dressed, right knee joint erythema    Lymphadenopathy:     He has no cervical adenopathy.   Neurological: He is alert and oriented to person, place, and time. He has normal reflexes.   Skin: Skin is warm and dry.   Psychiatric: He has a normal mood and affect. His behavior is normal. Judgment normal.   Vitals reviewed.      Results Review:    I reviewed the patient's new clinical results.  GLUCOSE   Date/Time Value Ref Range Status   02/16/2017 1054 264 (H) 70 - 130 mg/dL Final   02/16/2017 0543 184 (H) 70 - 130 mg/dL Final   02/15/2017 2047 255 (H) 70 - 130 mg/dL Final   02/15/2017 1624 240 (H) 70 - 130 mg/dL Final   02/15/2017 1230 288 (H) 70 - 130 mg/dL Final   02/15/2017 0646 275 (H) 70 - 130 mg/dL Final   02/14/2017 1958 285 (H) 70 - 130 mg/dL Final   02/14/2017 1435 306 (H) 70 - 130 mg/dL Final   02/14/2017 1133 254 (H) 70 - 130 mg/dL Final   02/14/2017 0737 242 (H) 70 - 130 mg/dL Final     Lab Results (last 72 hours)     Procedure Component Value Units Date/Time    Influenza Antigen [[id number]]  (Normal) Collected:  02/13/17 2004    Specimen:  Swab from Nasopharynx Updated:  02/13/17 2042     Influenza A Ag, EIA Negative      Influenza B Ag, EIA Negative     CBC & Differential [[id number]] Collected:  02/13/17 2042    Specimen:  Blood Updated:  02/13/17 2107    Narrative:       The following orders were created for panel order CBC & Differential.  Procedure                               Abnormality         Status                     ---------                               -----------         ------                     CBC Auto Differential[[id number]]         Abnormal            Final result                 Please view results for these tests on the individual orders.    CBC Auto Differential [[id number]]  (Abnormal) Collected:  02/13/17 2042    Specimen:  Blood Updated:  02/13/17 2107     WBC 11.00 (H) 10*3/mm3      RBC 3.88 (L) 10*6/mm3      Hemoglobin 11.9 (L) g/dL      Hematocrit 35.6 (L) %      MCV 91.8 fL      MCH 30.7 pg      MCHC 33.4 g/dL       RDW 13.3 %      RDW-SD 44.2 fl      MPV 10.8 fL      Platelets 179 10*3/mm3      Neutrophil % 84.6 (H) %      Lymphocyte % 7.5 (L) %      Monocyte % 7.1 %      Eosinophil % 0.5 %      Basophil % 0.1 %      Immature Grans % 0.2 %      Neutrophils, Absolute 9.31 (H) 10*3/mm3      Lymphocytes, Absolute 0.83 (L) 10*3/mm3      Monocytes, Absolute 0.78 10*3/mm3      Eosinophils, Absolute 0.05 10*3/mm3      Basophils, Absolute 0.01 10*3/mm3      Immature Grans, Absolute 0.02 10*3/mm3     Lactic Acid, Plasma [[id number]]  (Normal) Collected:  02/13/17 2042    Specimen:  Blood Updated:  02/13/17 2117     Lactate 1.7 mmol/L     Comprehensive Metabolic Panel [[id number]]  (Abnormal) Collected:  02/13/17 2042    Specimen:  Blood Updated:  02/13/17 2125     Glucose 263 (H) mg/dL      BUN 14 mg/dL      Creatinine 0.99 mg/dL      Sodium 133 (L) mmol/L      Potassium 4.3 mmol/L      Chloride 93 (L) mmol/L      CO2 23.4 mmol/L      Calcium 9.3 mg/dL      Total Protein 8.1 g/dL      Albumin 4.20 g/dL      ALT (SGPT) 19 U/L      AST (SGOT) 18 U/L      Alkaline Phosphatase 71 U/L      Total Bilirubin 0.6 mg/dL      eGFR Non African Amer 76 mL/min/1.73      Globulin 3.9 gm/dL      A/G Ratio 1.1 g/dL      BUN/Creatinine Ratio 14.1      Anion Gap 16.6 mmol/L     [place] Draw [[id number]] Collected:  02/13/17 2042    Specimen:  Blood Updated:  02/14/17 0101    Narrative:       The following orders were created for panel order [place] Draw.  Procedure                               Abnormality         Status                     ---------                               -----------         ------                     Light Blue Top[[id number]]                                    Final result               Green Top (Gel)[[id number]]                                                              Lavender Top[[id number]]                                                                 Gold Top - SST[[id number]]                                    Final result                  Please view results for these tests on the individual orders.    Light Blue Top [[id number]] Collected:  02/13/17 2042    Specimen:  Blood Updated:  02/14/17 0101     Extra Tube hold for add-on       Auto resulted       Gold Top - SST [[id number]] Collected:  02/13/17 2042    Specimen:  Blood Updated:  02/14/17 0101     Extra Tube Hold for add-ons.       Auto resulted.       POC Glucose Fingerstick [[id number]]  (Abnormal) Collected:  02/14/17 0737    Specimen:  Blood Updated:  02/14/17 0743     Glucose 242 (H) mg/dL     Narrative:       Meter: [id number] : [id number] Nils Suazo    Hemoglobin A1c [[id number]]  (Abnormal) Collected:  02/14/17 0805    Specimen:  Blood Updated:  02/14/17 0848     Hemoglobin A1C 9.25 (H) %     Narrative:       Hemoglobin A1C Ranges:    Increased Risk for Diabetes  5.7% to 6.4%  Diabetes                     >= 6.5%  Diabetic Goal                < 7.0%    C-reactive Protein [[id number]]  (Abnormal) Collected:  02/14/17 1032    Specimen:  Blood Updated:  02/14/17 1110     C-Reactive Protein 23.70 (H) mg/dL     Uric Acid [[id number]]  (Abnormal) Collected:  02/14/17 1032    Specimen:  Blood Updated:  02/14/17 1110     Uric Acid 7.3 (H) mg/dL     Body fluid cell count [[id number]]  (Abnormal) Collected:  02/14/17 1031    Specimen:  Body Fluid from Knee, Right Updated:  02/14/17 1131     Color, Fluid Other       Carissa        Appearance, Fluid Cloudy (A)      RBC, Fluid 5500 /mm3      Nucleated Cells, Fluid 20287 /mm3      Method: Hemacytometer Method     POC Glucose Fingerstick [[id number]]  (Abnormal) Collected:  02/14/17 1133    Specimen:  Blood Updated:  02/14/17 1136     Glucose 254 (H) mg/dL     Narrative:       Meter: [id number] : [id number] Naldo CAMPBELL    Body Fluid Cell Count With Differential [[id number]] Collected:  02/14/17 1031    Specimen:  Body Fluid from Knee, Right Updated:  02/14/17 1139    Narrative:       The following orders were created for panel order Body  Fluid Cell Count With Differential.  Procedure                               Abnormality         Status                     ---------                               -----------         ------                     Body fluid cell count[[id number]]         Abnormal            Final result               Body fluid differential[[id number]]                           Final result                 Please view results for these tests on the individual orders.    Body fluid differential [[id number]] Collected:  02/14/17 1031    Specimen:  Body Fluid from Knee, Right Updated:  02/14/17 1139     Neutrophils, Fluid 78 %      Lymphocytes, Fluid 20 %      Monocytes, Fluid 1 %      Mononuclear, Fluid 1 %     Crystal Exam, Fluid [[id number]] Collected:  02/14/17 1031    Specimen:  Synovial Fluid Updated:  02/14/17 1151     Crystals, Fluid        Intra and Extracellular crystals observed exhibiting polarization characteristics of Calcium Pyrophosphate    Sedimentation Rate [[id number]]  (Abnormal) Collected:  02/14/17 1032    Specimen:  Blood Updated:  02/14/17 1154     Sed Rate 98 (H) mm/hr     Anaerobic Culture [[id number]] Collected:  02/14/17 1253    Specimen:  Tissue from Toe, Left Updated:  02/14/17 1311    Anaerobic Culture [[id number]] Collected:  02/14/17 1341    Specimen:  Tissue from Knee, Right Updated:  02/14/17 1351    POC Glucose Fingerstick [[id number]]  (Abnormal) Collected:  02/14/17 1435    Specimen:  Blood Updated:  02/14/17 1437     Glucose 306 (H) mg/dL     Narrative:       Meter: [id number] : [id number] Jayce ENGLAND    POC Glucose Fingerstick [[id number]]  (Abnormal) Collected:  02/14/17 1958    Specimen:  Blood Updated:  02/14/17 2000     Glucose 285 (H) mg/dL     Narrative:       Meter: [id number] : [id number] Ivan Jeffries    Blood Culture ID, PCR [[id number]]  (Abnormal) Collected:  02/13/17 2113    Specimen:  Blood from Arm, Left Updated:  02/15/17 0205     BCID, PCR        Staphylococcus aureus, not MRSA.  Identification by BCID PCR. (A)    POC Glucose Fingerstick [[id number]]  (Abnormal) Collected:  02/15/17 0646    Specimen:  Blood Updated:  02/15/17 0648     Glucose 275 (H) mg/dL     Narrative:       Meter: [id number] : [id number] Juancho Mock    CBC & Differential [[id number]] Collected:  02/15/17 0620    Specimen:  Blood Updated:  02/15/17 0656    Narrative:       The following orders were created for panel order CBC & Differential.  Procedure                               Abnormality         Status                     ---------                               -----------         ------                     CBC Auto Differential[[id number]]         Abnormal            Final result                 Please view results for these tests on the individual orders.    CBC Auto Differential [[id number]]  (Abnormal) Collected:  02/15/17 0620    Specimen:  Blood Updated:  02/15/17 0656     WBC 13.56 (H) 10*3/mm3      RBC 3.27 (L) 10*6/mm3      Hemoglobin 10.2 (L) g/dL      Hematocrit 30.7 (L) %      MCV 93.9 fL      MCH 31.2 pg      MCHC 33.2 g/dL      RDW 13.8 %      RDW-SD 47.4 fl      MPV 10.7 fL      Platelets 143 10*3/mm3      Neutrophil % 79.1 (H) %      Lymphocyte % 9.5 (L) %      Monocyte % 11.0 %      Eosinophil % 0.1 (L) %      Basophil % 0.1 %      Immature Grans % 0.2 %      Neutrophils, Absolute 10.73 (H) 10*3/mm3      Lymphocytes, Absolute 1.29 10*3/mm3      Monocytes, Absolute 1.49 (H) 10*3/mm3      Eosinophils, Absolute 0.01 10*3/mm3      Basophils, Absolute 0.01 10*3/mm3      Immature Grans, Absolute 0.03 10*3/mm3     Basic Metabolic Panel [[id number]]  (Abnormal) Collected:  02/15/17 0620    Specimen:  Blood Updated:  02/15/17 0709     Glucose 255 (H) mg/dL      BUN 12 mg/dL      Creatinine 0.99 mg/dL      Sodium 132 (L) mmol/L      Potassium 4.2 mmol/L      Chloride 96 (L) mmol/L      CO2 21.0 (L) mmol/L      Calcium 7.9 (L) mg/dL      eGFR Non African Amer 76 mL/min/1.73      BUN/Creatinine Ratio 12.1       "Anion Gap 15.0 mmol/L     Narrative:       GFR Normal >60  Chronic Kidney Disease <60  Kidney Failure <15    fluid glucose and total protein on synovial fluid - Miscellaneous Test [[id number]] Collected:  02/14/17 1130    Specimen:  Blood Updated:  02/15/17 0718     Miscellaneous Lab Test Result See attached report     Narrative:       See the scanned copy of this report in the patient's medical record.  This test was performed by [facility].    Tissue Exam [[id number]] Collected:  02/14/17 1250    Specimen:  Tissue from Toe, Left Updated:  02/15/17 1212     Case Report --      Surgical Pathology Report                         Case: [id number]                                  Authorizing Provider:  Osmin Brand MD     Collected:           02/14/2017 12:50 PM          Ordering Location:     [facility]  Received:            02/14/2017 02:34 PM                                 MAIN OR                                                                      Pathologist:           Xavier Aviles MD                                                        Specimen:    Toe, Left, LEFT FIRST TOE                                                                   Final Diagnosis --      1. LEFT FIRST TOE, AMPUTATION SPECIMEN:             ULCERATION WITH ACUTE AND CHRONIC INFLAMMATION AND PARTIAL NECROSIS.              ACUTE OSTEOMYELITIS.     MONICA/holly     CPT CODES:  1. 97425, 46704       Gross Description --      Received in formalin labeled \"left first toe\" is a 4.7 x 3.5 x 2.9 cm digit with tan skin and an up to 1.5 cm pink tan ulcer at the distal tip 3.2 cm from the closest skin and soft tissue margin and grossly appears to involve the underlying bone.  Separate in the container is a 3 x 2 x 0.7 cm aggregate of tan fibrous tissue and bone without lesions.  Representative sections are submitted as follows:  1A - cross section of digit showing closest black inked skin and soft tissue margin  1B - " section of the lesion and underlying bone    Block 1B is submitted for decalcification.    CC/USO/MONICA/edward        Microscopic Description --      Performed, incorporated in diagnosis.         Embedded Images --     POC Glucose Fingerstick [[id number]]  (Abnormal) Collected:  02/15/17 1230    Specimen:  Blood Updated:  02/15/17 1240     Glucose 288 (H) mg/dL     Narrative:       Meter: [id number] : [id number] Bishop Holly    POC Glucose Fingerstick [[id number]]  (Abnormal) Collected:  02/15/17 1624    Specimen:  Blood Updated:  02/15/17 1638     Glucose 240 (H) mg/dL     Narrative:       Meter: [id number] : [id number] Marni Pereira    POC Glucose Fingerstick [[id number]]  (Abnormal) Collected:  02/15/17 2047    Specimen:  Blood Updated:  02/15/17 2049     Glucose 255 (H) mg/dL     Narrative:       Meter: [id number] : [id number] Mauricio Bartholomew    Blood Culture [[id number]]  (Normal) Collected:  02/15/17 1446    Specimen:  Blood from Arm, Left Updated:  02/16/17 0401     Blood Culture No growth at less than 24 hours     CBC & Differential [[id number]] Collected:  02/16/17 0446    Specimen:  Blood Updated:  02/16/17 0518    Narrative:       The following orders were created for panel order CBC & Differential.  Procedure                               Abnormality         Status                     ---------                               -----------         ------                     CBC Auto Differential[[id number]]         Abnormal            Final result                 Please view results for these tests on the individual orders.    CBC Auto Differential [[id number]]  (Abnormal) Collected:  02/16/17 0446    Specimen:  Blood Updated:  02/16/17 0518     WBC 11.01 (H) 10*3/mm3      RBC 3.05 (L) 10*6/mm3      Hemoglobin 9.4 (L) g/dL      Hematocrit 28.5 (L) %      MCV 93.4 fL      MCH 30.8 pg      MCHC 33.0 g/dL      RDW 13.7 %      RDW-SD 46.7 fl      MPV 10.6 fL      Platelets 150 10*3/mm3      Neutrophil % 80.4 (H) %      " Lymphocyte % 9.8 (L) %      Monocyte % 8.0 %      Eosinophil % 1.4 %      Basophil % 0.1 %      Immature Grans % 0.3 %      Neutrophils, Absolute 8.86 (H) 10*3/mm3      Lymphocytes, Absolute 1.08 10*3/mm3      Monocytes, Absolute 0.88 10*3/mm3      Eosinophils, Absolute 0.15 10*3/mm3      Basophils, Absolute 0.01 10*3/mm3      Immature Grans, Absolute 0.03 10*3/mm3     Basic Metabolic Panel [[id number]]  (Abnormal) Collected:  02/16/17 0446    Specimen:  Blood Updated:  02/16/17 0542     Glucose 169 (H) mg/dL      BUN 13 mg/dL      Creatinine 0.93 mg/dL      Sodium 135 (L) mmol/L      Potassium 3.5 mmol/L      Chloride 98 mmol/L      CO2 24.2 mmol/L      Calcium 7.8 (L) mg/dL      eGFR Non African Amer 81 mL/min/1.73      BUN/Creatinine Ratio 14.0      Anion Gap 12.8 mmol/L     Narrative:       GFR Normal >60  Chronic Kidney Disease <60  Kidney Failure <15    POC Glucose Fingerstick [[id number]]  (Abnormal) Collected:  02/16/17 0543    Specimen:  Blood Updated:  02/16/17 0544     Glucose 184 (H) mg/dL     Narrative:       Meter: [id number] : [id number] [facility]    Blood Culture [[id number]]  (Normal) Collected:  02/15/17 1535    Specimen:  Blood from Arm, Left Updated:  02/16/17 0601     Blood Culture No growth at less than 24 hours     Body Fluid Culture [[id number]]  (Abnormal)  (Susceptibility) Collected:  02/14/17 1031    Specimen:  Body Fluid from Knee, Right Updated:  02/16/17 0645     BF Culture Heavy growth (4+) Staphylococcus aureus (A)       D test is positive. Isolate exhibits \"inducible\" resistance to Clindamycin.          Gram Stain Result Many (4+) WBCs seen       No organisms seen     Susceptibility      Staphylococcus aureus     DINO     Clindamycin Resistant     Erythromycin >=8 ug/ml Resistant     Oxacillin 0.5 ug/ml Susceptible     Penicillin G >=0.5 ug/ml Resistant     Rifampin <=0.5 ug/ml Susceptible     Tetracycline <=1 ug/ml Susceptible     Trimethoprim + Sulfamethoxazole <=10 ug/ml " "Susceptible     Vancomycin 1 ug/ml Susceptible                    Wound Culture [[id number]]  (Abnormal) Collected:  02/14/17 1249    Specimen:  Wound from Toe, Left Updated:  02/16/17 0649     Wound Culture        Moderate growth (3+) Staphylococcus aureus (A)      Refer to previous body fluid culture collected on 2/14/17 for DINO.            Gram Stain Result Many (4+) WBCs seen       Many (4+) Gram positive cocci in clusters     Wound Culture [[id number]]  (Abnormal) Collected:  02/14/17 1321    Specimen:  Wound from Knee, Right Updated:  02/16/17 0650     Wound Culture Light growth (2+) Staphylococcus aureus (A)       Refer to previous body fluid culture collected on 2/14/17 for DINO.          Gram Stain Result Moderate (3+) WBCs seen       Few (2+) Gram positive cocci in clusters     Tissue Culture [[id number]]  (Abnormal) Collected:  02/14/17 1253    Specimen:  Tissue from Toe, Left Updated:  02/16/17 0651     Culture        Moderate growth (3+) Staphylococcus aureus (A)      Refer to previous body fluid culture collected on 2/14/17 for DINO.        Gram Stain Result Rare (1+) Gram positive cocci in clusters     Tissue Culture [[id number]]  (Abnormal) Collected:  02/14/17 1341    Specimen:  Tissue from Knee, Right Updated:  02/16/17 0716     Culture Light growth (2+) Staphylococcus aureus (A)       Refer to previous body fluid culture collected on 2/14/17 for DINO.          Gram Stain Result Rare (1+) WBCs seen       Rare (1+) Gram positive cocci in pairs     Blood Culture [[id number]]  (Abnormal)  (Susceptibility) Collected:  02/13/17 2113    Specimen:  Blood from Arm, Left Updated:  02/16/17 0728     Blood Culture Abnormal Stain (A)       Staphylococcus aureus (A)         Methicillin resistant Staphylococcus aureus, Patient may be an isolation risk.  Consider infectious disease consult to rule out distant focus of infection.  D test is positive. Isolate exhibits \"inducible\" resistance to Clindamycin.          Isolated " from Aerobic Bottle      Gram Stain Result        Aerobic Bottle Gram positive cocci in clusters    Susceptibility      Staphylococcus aureus     DINO     Clindamycin Resistant     Erythromycin >=8 ug/ml Resistant     Oxacillin 0.5 ug/ml Susceptible     Penicillin G >=0.5 ug/ml Resistant     Rifampin <=0.5 ug/ml Susceptible     Tetracycline <=1 ug/ml Susceptible     Trimethoprim + Sulfamethoxazole <=10 ug/ml Susceptible     Vancomycin 1 ug/ml Susceptible                    POC Glucose Fingerstick [[id number]]  (Abnormal) Collected:  02/16/17 1054    Specimen:  Blood Updated:  02/16/17 1056     Glucose 264 (H) mg/dL     Narrative:       Meter: [id number] : [id number] Marcos Padron    Blood Culture [[id number]]  (Abnormal) Collected:  02/13/17 2042    Specimen:  Blood from Arm, Left Updated:  02/16/17 1336     Blood Culture Staphylococcus aureus (A)         Consider infectious disease consult to rule out distant focus of infection.        Isolated from Anaerobic Bottle      Gram Stain Result Anaerobic Bottle Gram positive cocci       Refer to previous Blood Culture from 2/13/17       Narrative:       Refer to previous blood culture collected on 2/13/17             Imaging Results (last 72 hours)     Procedure Component Value Units Date/Time    XR Chest 1 View [[id number]] Collected:  02/14/17 0829     Updated:  02/14/17 0832    Narrative:       AP PORTABLE CHEST X-RAY     CLINICAL HISTORY: Preop chest x-ray.     The lungs are well-expanded and appear free of active infiltrates. There  are no pleural effusions. The cardiomediastinal silhouette is  unremarkable. Small calcified hilar lymph nodes are noted bilaterally.     IMPRESSIONS: No evidence of active disease within the chest.     This report was finalized on 2/14/2017 8:29 AM by Dr. Mor Gordon MD.       XR Knee 1 or 2 View Right [[id number]] Collected:  02/14/17 1629     Updated:  02/14/17 1632    Narrative:       2 VIEWS OF THE RIGHT KNEE     HISTORY: Postoperative  knee pain     FINDINGS:  1. Satisfactory appearance following total restrained knee arthroplasty,  no evidence of a complication.     This report was finalized on 2/14/2017 4:29 PM by Dr. Ricci Tucker MD.       XR Foot 3+ View Left [[id number]] Collected:  02/14/17 0100     Updated:  02/14/17 2352    Narrative:       LEFT FOOT 3 VIEWS.     HISTORY: Cellulitis, rule out osteomyelitis, pain in the big toe.     COMPARISON: No prior studies for comparison.     FINDINGS:  There is no fracture or dislocation.  Bony abnormality of the third and  fourth proximal phalanges may be related to prior surgery. No erosive  bony changes are seen to suggest osteomyelitis. Bony ankylosis of the  second toe proximal interphalangeal joint is noted.     Soft tissue swelling of the foot.       Impression:       Soft tissue swelling, no definite changes suggestive of osteomyelitis.  If indicated MRI examination may be performed.         This report was finalized on 2/14/2017 11:49 PM by Dr. Vidal Morris MD.       XR Knee 1 or 2 View Right [[id number]] Collected:  02/14/17 0059     Updated:  02/14/17 2352    Narrative:       RIGHT KNEE 2 VIEWS.     HISTORY: Knee pain.     COMPARISON: No prior studies for comparison.     FINDINGS:  There is no fracture or dislocation.  Patient is status post total  replacement. Hardware is intact.     No significant joint effusion. Severe atherosclerotic disease.       Impression:       No acute fracture or dislocation.         This report was finalized on 2/14/2017 11:49 PM by Dr. Vidal Morris MD.             Assessment/Plan     Active Hospital Problems (** Indicates Principal Problem)    Diagnosis Date Noted   • **Diabetic ulcer of 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, with necrosis of bone [E11.621, L97.524] 02/14/2017   • MSSA (methicillin susceptible Staphylococcus aureus) septicemia [A41.01] 02/15/2017   • Cellulitis of left foot [L03.116] 02/14/2017   • Diabetes mellitus  [E11.9] 02/14/2017   • Hypertension [I10] 02/14/2017   • Hyperlipidemia [E78.5] 02/14/2017   • Chronic pain [G89.29] 02/14/2017   • Knee pain, hx of previous prosthetic joint infection [M25.569] 02/14/2017   • Great toe pain, with cellulitis and gangrene [M79.676] 02/14/2017   • Diabetic autonomic neuropathy associated with type 2 diabetes mellitus [E11.43] 02/14/2017   • 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     Type 2 diabetes mellitus with diabetic neuropathy now with left toe amputation  MhG9k-2.25%  Will stop glyburide and metformin for now, would consider restarting metformin at the time of discharge.  Will continue levemir 45 units at bedtime  Will increase NovoLog to 8 units with each meal  Will cover patient with NovoLog moderate dose sliding scale 3 times a day before meals and at bedtime-2 units for 50 about 1 50 mg/dL  Will consult diabetic educator  Counseled patient on the importance of checking his blood sugars and decent glycemic control for better healing of his wound    Hypertriglyceridemia  Could be secondary to elevated blood sugars  LDL was not calculated-continue Pravachol 20 mg oral daily    Left first toe amputation-followed by orthopedics and vascular surgery    MSSA septicemia - left first toe Osteo.   Followed by ID, currently on IV antibiotics.    The total time spent more than 110 min for old record and lab review and face- to- face, of which greater than 50% of time was spent in counseling the patient on treatment options, instructions for management/treatment and follow up and importance of compliance with chosen management or treatment options      Adarsh Love MD.  02/16/17  2:51 PM        EMR Dragon / transcription disclaimer:    Much of this encounter note is an electronic transcription/ translation of spoken language to  printed text.  Electronic translation of spoken language may permit erroneous, or at times, nonsensical words or phrases to be inadvertently transcribed; although I have reviewed the note for such errors, some may still exist.

## 2017-02-16 NOTE — PROGRESS NOTES
INFECTIOUS DISEASES PROGRESS NOTE    CC: f/u sepsis    S:   Knee and toe still hurting but better  Tolerating abx  No f/c/ns    O:  Physical Exam:  Temp:  [99.1 °F (37.3 °C)-99.9 °F (37.7 °C)] 99.7 °F (37.6 °C)  Heart Rate:  [108-120] 108  Resp:  [16-20] 20  BP: (133-151)/(75-86) 141/77  Physical Exam   Constitutional: He appears well-developed. No distress.   Cardiovascular: Regular rhythm.    Pulmonary/Chest: Effort normal and breath sounds normal.   Abdominal: Soft. He exhibits no distension. There is no tenderness.   Neurological: He is alert.   Skin: Skin is warm and dry.        Diagnostics:    WBC 11 (p80, L 10, M8, E1)  H/H 9.4/29    Cr 0.93    2/13 Bcx 2/2 MSSA (R-clinda, erytheo, PCN)  2/14 R knee fluid cx (WBC 69488 P78, L20, M1; RBC 5550  2/14 L toe two cultures  MSSA  2/14 R knee cultures: MSSA  2/15 BCx NGTD     ANASTASIA: MV thickening but no obvious vegetation      Assessment/Plan   MSSA septicemia  Left 1 toe osteo  R TKA PJI  Uncontrolled DM II, complicating above    Stable.  Cont on cefazolin. Will need 6 weeks of IV abx.  Plan to add rifampin tomorrow if bcx remain negative.  TTE results noted and will hold on ANASTASIA since already getting prolonged course of IV abx.  Appreciate endocrine help regarding hyperglycemia.    Amor Mayberry MD  7:51 AM  02/16/17

## 2017-02-16 NOTE — PLAN OF CARE
Problem: Patient Care Overview (Adult)  Goal: Plan of Care Review  Outcome: Ongoing (interventions implemented as appropriate)    02/15/17 1722 02/16/17 0256   Coping/Psychosocial Response Interventions   Plan Of Care Reviewed With patient --    Patient Care Overview   Progress improving --    Outcome Evaluation   Outcome Summary/Follow up Plan --  Pain better contolled today with lortab and dilaudid IV. Echo an doppler done today. Started on levimir tonight.        Goal: Adult Individualization and Mutuality  Outcome: Ongoing (interventions implemented as appropriate)  Goal: Discharge Needs Assessment  Outcome: Ongoing (interventions implemented as appropriate)    Problem: Infection, Risk/Actual (Adult)  Goal: Infection Prevention/Resolution  Outcome: Ongoing (interventions implemented as appropriate)    Problem: Pain, Acute (Adult)  Goal: Acceptable Pain Control/Comfort Level  Outcome: Ongoing (interventions implemented as appropriate)    Problem: Perioperative Period (Adult)  Goal: Signs and Symptoms of Listed Potential Problems Will be Absent or Manageable (Perioperative Period)  Outcome: Ongoing (interventions implemented as appropriate)

## 2017-02-17 VITALS
BODY MASS INDEX: 34.65 KG/M2 | DIASTOLIC BLOOD PRESSURE: 71 MMHG | OXYGEN SATURATION: 97 % | TEMPERATURE: 98.4 F | SYSTOLIC BLOOD PRESSURE: 128 MMHG | HEIGHT: 74 IN | RESPIRATION RATE: 18 BRPM | HEART RATE: 94 BPM | WEIGHT: 270 LBS

## 2017-02-17 LAB
BACTERIA SPEC AEROBE CULT: ABNORMAL
BACTERIA SPEC AEROBE CULT: ABNORMAL
GLUCOSE BLDC GLUCOMTR-MCNC: 184 MG/DL (ref 70–130)
GLUCOSE BLDC GLUCOMTR-MCNC: 243 MG/DL (ref 70–130)
GLUCOSE BLDC GLUCOMTR-MCNC: 267 MG/DL (ref 70–130)
GRAM STN SPEC: ABNORMAL
ISOLATED FROM: ABNORMAL

## 2017-02-17 PROCEDURE — 63710000001 INSULIN ASPART PER 5 UNITS: Performed by: INTERNAL MEDICINE

## 2017-02-17 PROCEDURE — 02HV33Z INSERTION OF INFUSION DEVICE INTO SUPERIOR VENA CAVA, PERCUTANEOUS APPROACH: ICD-10-PCS | Performed by: INTERNAL MEDICINE

## 2017-02-17 PROCEDURE — C1751 CATH, INF, PER/CENT/MIDLINE: HCPCS

## 2017-02-17 PROCEDURE — 25010000002 ENOXAPARIN PER 10 MG: Performed by: ORTHOPAEDIC SURGERY

## 2017-02-17 PROCEDURE — 82962 GLUCOSE BLOOD TEST: CPT

## 2017-02-17 PROCEDURE — 99233 SBSQ HOSP IP/OBS HIGH 50: CPT | Performed by: INTERNAL MEDICINE

## 2017-02-17 PROCEDURE — 25010000003 CEFAZOLIN IN DEXTROSE 2-4 GM/100ML-% SOLUTION: Performed by: HOSPITALIST

## 2017-02-17 PROCEDURE — 99232 SBSQ HOSP IP/OBS MODERATE 35: CPT | Performed by: INTERNAL MEDICINE

## 2017-02-17 PROCEDURE — 97110 THERAPEUTIC EXERCISES: CPT

## 2017-02-17 RX ORDER — HYDROCODONE BITARTRATE AND ACETAMINOPHEN 10; 325 MG/1; MG/1
TABLET ORAL
Qty: 36 TABLET | Refills: 0 | Status: ON HOLD | OUTPATIENT
Start: 2017-02-17 | End: 2017-03-04

## 2017-02-17 RX ORDER — RIFAMPIN 300 MG/1
300 CAPSULE ORAL EVERY 12 HOURS SCHEDULED
Refills: 0
Start: 2017-02-17 | End: 2017-03-27 | Stop reason: SDUPTHER

## 2017-02-17 RX ORDER — SODIUM CHLORIDE 0.9 % (FLUSH) 0.9 %
10 SYRINGE (ML) INJECTION AS NEEDED
Status: DISCONTINUED | OUTPATIENT
Start: 2017-02-17 | End: 2017-02-17 | Stop reason: HOSPADM

## 2017-02-17 RX ORDER — SODIUM CHLORIDE 0.9 % (FLUSH) 0.9 %
10 SYRINGE (ML) INJECTION EVERY 12 HOURS SCHEDULED
Status: DISCONTINUED | OUTPATIENT
Start: 2017-02-17 | End: 2017-02-17 | Stop reason: HOSPADM

## 2017-02-17 RX ORDER — RIFAMPIN 300 MG/1
300 CAPSULE ORAL EVERY 12 HOURS SCHEDULED
Status: DISCONTINUED | OUTPATIENT
Start: 2017-02-17 | End: 2017-02-17 | Stop reason: HOSPADM

## 2017-02-17 RX ORDER — BISACODYL 10 MG
10 SUPPOSITORY, RECTAL RECTAL DAILY PRN
Refills: 0
Start: 2017-02-17 | End: 2017-10-03

## 2017-02-17 RX ORDER — CEFAZOLIN SODIUM 2 G/100ML
2 INJECTION, SOLUTION INTRAVENOUS EVERY 8 HOURS
Qty: 100 ML
Start: 2017-02-17 | End: 2017-03-27

## 2017-02-17 RX ADMIN — PANTOPRAZOLE SODIUM 40 MG: 40 TABLET, DELAYED RELEASE ORAL at 05:29

## 2017-02-17 RX ADMIN — SILVER SULFADIAZINE: 10 CREAM TOPICAL at 08:53

## 2017-02-17 RX ADMIN — INSULIN ASPART 6 UNITS: 100 INJECTION, SOLUTION INTRAVENOUS; SUBCUTANEOUS at 11:56

## 2017-02-17 RX ADMIN — SODIUM CHLORIDE 150 ML/HR: 9 INJECTION, SOLUTION INTRAVENOUS at 07:11

## 2017-02-17 RX ADMIN — INSULIN ASPART 4 UNITS: 100 INJECTION, SOLUTION INTRAVENOUS; SUBCUTANEOUS at 17:26

## 2017-02-17 RX ADMIN — SERTRALINE 100 MG: 100 TABLET, FILM COATED ORAL at 08:54

## 2017-02-17 RX ADMIN — HYDROCODONE BITARTRATE AND ACETAMINOPHEN 2 TABLET: 10; 325 TABLET ORAL at 12:00

## 2017-02-17 RX ADMIN — INSULIN ASPART 8 UNITS: 100 INJECTION, SOLUTION INTRAVENOUS; SUBCUTANEOUS at 11:56

## 2017-02-17 RX ADMIN — Medication 10 ML: at 14:32

## 2017-02-17 RX ADMIN — CEFAZOLIN SODIUM 2 G: 2 INJECTION, SOLUTION INTRAVENOUS at 05:30

## 2017-02-17 RX ADMIN — HYDROCODONE BITARTRATE AND ACETAMINOPHEN 2 TABLET: 10; 325 TABLET ORAL at 06:15

## 2017-02-17 RX ADMIN — INSULIN ASPART 2 UNITS: 100 INJECTION, SOLUTION INTRAVENOUS; SUBCUTANEOUS at 08:54

## 2017-02-17 RX ADMIN — LISINOPRIL 20 MG: 20 TABLET ORAL at 08:54

## 2017-02-17 RX ADMIN — HYDROCODONE BITARTRATE AND ACETAMINOPHEN 2 TABLET: 10; 325 TABLET ORAL at 18:23

## 2017-02-17 RX ADMIN — SODIUM CHLORIDE 150 ML/HR: 9 INJECTION, SOLUTION INTRAVENOUS at 00:17

## 2017-02-17 RX ADMIN — HYDROCODONE BITARTRATE AND ACETAMINOPHEN 2 TABLET: 10; 325 TABLET ORAL at 02:14

## 2017-02-17 RX ADMIN — ENOXAPARIN SODIUM 40 MG: 40 INJECTION SUBCUTANEOUS at 08:53

## 2017-02-17 RX ADMIN — PRAVASTATIN SODIUM 20 MG: 20 TABLET ORAL at 08:54

## 2017-02-17 RX ADMIN — RIFAMPIN 300 MG: 300 CAPSULE ORAL at 11:56

## 2017-02-17 RX ADMIN — INSULIN ASPART 8 UNITS: 100 INJECTION, SOLUTION INTRAVENOUS; SUBCUTANEOUS at 17:26

## 2017-02-17 RX ADMIN — Medication 1 TABLET: at 08:54

## 2017-02-17 RX ADMIN — DOCUSATE SODIUM 100 MG: 100 CAPSULE, LIQUID FILLED ORAL at 08:53

## 2017-02-17 RX ADMIN — INSULIN ASPART 8 UNITS: 100 INJECTION, SOLUTION INTRAVENOUS; SUBCUTANEOUS at 08:53

## 2017-02-17 RX ADMIN — CEFAZOLIN SODIUM 2 G: 2 INJECTION, SOLUTION INTRAVENOUS at 14:32

## 2017-02-17 NOTE — PROGRESS NOTES
Osmin Brand MD       LOS: 3 days   Patient Care Team:  Guille Nieves MD as PCP - General  Guille Nieves MD as PCP - Family Medicine    Subjective     66 y.o. male with granulating left first toe wound.    Review of Systems  Review of Systems - Negative except history of present illness      Objective     Vital Signs  Temp:  [98.8 °F (37.1 °C)-99.7 °F (37.6 °C)] 99.6 °F (37.6 °C)  Heart Rate:  [100-108] 100  Resp:  [16-20] 16  BP: (127-147)/(77-87) 137/87    Physical Exam  General: No acute distress. Alert and oriented x 4  HEENT: No jugular venous distension, trachea is midline  CV: RRR, S1S2  Resp: Clear unlabored breathing  Abd: Abdomen is soft, nontender, nondistended  Extremities: left first toe wound granulating    Results Review:       Recent Results (from the past 12 hour(s))   POC Glucose Fingerstick    Collection Time: 02/16/17  8:44 PM   Result Value Ref Range    Glucose 227 (H) 70 - 130 mg/dL   POC Glucose Fingerstick    Collection Time: 02/17/17  5:50 AM   Result Value Ref Range    Glucose 184 (H) 70 - 130 mg/dL   ]      Assessment/Plan           Principal Problem:    Diabetic ulcer of 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, with necrosis of bone  Active Problems:    Cellulitis of left foot    Diabetes mellitus    Hypertension    Hyperlipidemia    Chronic pain    Knee pain, hx of previous prosthetic joint infection    Great toe pain, with cellulitis and gangrene    Diabetic autonomic neuropathy associated with type 2 diabetes mellitus    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    MSSA (methicillin susceptible Staphylococcus aureus) septicemia    Infection of prosthetic right knee joint      Assessment & Plan  66 y.o. male with granulating left first toe wound after amputation. For MSSA infected diabetic foot ulcer with neuropathy. Normal arterial perfusion adequate to heal. Right knee per orthopedics and antibiotics per infectious disease.  Change to silvadene cream to left first toe wound. Ok for discharge when all ok and follow up with [facility] wound care [place] for diabetic left toe ulcer and osteomyelitis and wound care. Discussed with patient importance of foot care and recommended podiatrist for long term appropriate shoes.      Osmin Brand MD  02/17/17  7:29 AM

## 2017-02-17 NOTE — PROGRESS NOTES
Acute Care - Physical Therapy Treatment Note  [facility]     Patient Name: Hugh R McBurney  : 1950  MRN: [id number]  Today's Date: 2017  Onset of Illness/Injury or Date of Surgery Date: 17  Date of Referral to PT: 17  Referring Physician: JANI Tovar    Admit Date: 2017    Visit Dx:    ICD-10-CM ICD-9-CM   1. Cellulitis of left foot L03.116 682.7   2. Toe osteomyelitis, left M86.9 730.27   3. Knee effusion, right M25.461 719.06   4. Difficulty walking R26.2 719.7   5. 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   • MSSA (methicillin susceptible Staphylococcus aureus) septicemia   • Infection of prosthetic right knee joint               Adult Rehabilitation Note       17 1100 17 1600 17 1150    Rehab Assessment/Intervention    Discipline physical therapy assistant  -CW physical therapist  -KH physical therapist  -AR    Document Type therapy note (daily note)  -CW therapy note (daily note)  -KH therapy note (daily note)  -AR    Subjective Information agree to therapy;complains of;fatigue;pain  -CW agree to therapy  -KH agree to therapy  -AR    Patient Effort, Rehab Treatment adequate  -CW good  -KH good  -AR    Symptoms Noted During/After Treatment  increased pain  -KH     Precautions/Limitations brace on when up;fall precautions  -CW fall precautions   KI on RLE, post-op shoe on L foot  -KH fall precautions   KI on R LE when OOB until pt can do  straight leg raise  -AR    Recorded by [CW] Singh Christianson [KH] Shira Boothe, PT [AR] Lolis Akers PT    Pain Assessment    Pain Assessment 0-10  -CW 0-10  -KH 0-10  -AR    Pain Score 6  -CW 6  -KH 5  -AR    Pain Type Surgical pain  -CW Surgical pain  -KH Surgical pain  -AR    Pain Location Knee  -CW Knee  -KH Knee  -AR    Pain Orientation Right  -CW Right  -KH Right  -AR    Pain Intervention(s) Repositioned  -CW Repositioned  -KH Medication (See MAR)  -AR    Recorded by [CW] Singh Christianson [KH] Shira Boothe, PT [AR] Lolis Akers PT    Cognitive Assessment/Intervention    Current Cognitive/Communication Assessment functional  -CW functional  -KH functional  -AR    Orientation Status oriented x 4  -CW oriented x 4  -KH oriented x 4  -AR    Follows Commands/Answers Questions 100% of the time  -% of the time  -% of the time  -AR    Personal Safety WNL/WFL  -CW WNL/WFL  -KH     Personal Safety Interventions fall prevention program maintained;gait belt;nonskid shoes/slippers when out of bed  -CW fall prevention program maintained;gait belt  -KH     Recorded by [CW] Singh Christianson [KH] Shira Boothe, PT [AR] Lolis Akers PT    Bed Mobility, Assessment/Treatment    Bed Mobility, Assistive Device   bed rails;head of bed elevated  -AR    Bed Mob, Supine to Sit, [place] moderate assist (50% patient effort);2 person assist required  -CW moderate assist (50% patient effort);2 person assist required  -KH minimum assist (75% patient effort)  -AR    Bed Mob, Sit to Supine, [place] moderate assist (50% patient effort);2 person assist required  -CW  minimum assist (75% patient effort)  -AR    Recorded by [CW] Singh Christianson [KH] Shira Boothe, PT [AR] Lolis Akers PT    Transfer Assessment/Treatment    Transfers, Bed-Chair [place]  minimum assist (75% patient effort);2 person assist required  -KH     Transfers, Sit-Stand [place]  minimum assist (75% patient effort);2 person assist required  - minimum assist (75% patient effort);2 person assist required  - minimum assist (75% patient effort)  -AR    Transfers, Stand-Sit [place] minimum assist (75% patient effort);2 person assist required  -CW  minimum assist (75% patient effort)  -AR    Transfers, Sit-Stand-Sit, Assist Device rolling walker  -CW rolling walker  -KH rolling walker  -AR    Transfer, Comment  transfer to [facility] x3 w/ bed at lowest height final time  -AR    Recorded by [CW] Singh Christianson [KH] Shira Boothe, PT [AR] Lolis Akers, PT    Gait Assessment/Treatment    Gait, [place] Level minimum assist (75% patient effort);2 person assist required  -CW not tested  - contact guard assist  -AR    Gait, Assistive Device rolling walker  -CW  rolling walker  -AR    Gait, Distance (Feet) 3  -CW  2  -AR    Gait, Gait Deviations   leon decreased;decreased heel strike;forward flexed posture;step length decreased  -AR    Gait, Comment step to head of bed  -CW pt had to use the bathroom and was pivoted to [facility] KI on, able to take pivot steps  -AR    Recorded by [CW] Singh Christianson [KH] Shira Boothe, PT [AR] Lolis Akers PT    Therapy Exercises    Exercise Protocols total knee  -CW total knee  - total knee  -AR    Total Knee Exercises right:;10 reps;with assist;SAQ;heel slides   limited by pt's need to use the bathroom  - right:;10 reps;with assist;SAQ;heel slides   limited by pt's need to use the bathroom  - right:;10 reps;completed protocol   assist w/ heel slides and SAQ  -AR    Recorded by [CW] Singh Christianson [KH] Shira Boothe, PT [AR] Lolis Akers PT    Positioning and Restraints    Pre-Treatment Position in bed  -CW in bed  - in bed  -AR    Post Treatment Position bed  -CW [facility] bed  -AR    In Bed supine;call light within reach;encouraged to call for assist;exit alarm on  -CW  notified nsg;supine;call  light within reach;encouraged to call for assist;exit alarm on;with family/caregiver  -AR    On BS commode  sitting;call light within reach;encouraged to call for assist;notified nsg   RN present for transfer and will follow up with pt  -KH     Recorded by [CW] Singh Christianson [KH] Shira Boothe, PT [AR] Lolis Akers, PT      User Key  (r) = Recorded By, (t) = Taken By, (c) = Cosigned By    Initials Name Effective Dates    BETSY Boothe, PT 05/18/15 -     AR Lolis Akers, PT 06/27/16 -     CW Singh Christianson 12/13/16 -                 IP PT Goals       02/15/17 1437          Bed Mobility PT LTG    Bed Mobility PT LTG, Date Established 02/15/17  -MS      Bed Mobility PT LTG, Time to Achieve 5 - 7 days  -MS      Bed Mobility PT LTG, Activity Type all bed mobility  -MS      Bed Mobility PT LTG, [place] Level contact guard assist  -MS      Transfer Training PT LTG    Transfer Training PT LTG, Date Established 02/15/17  -MS      Transfer Training PT LTG, Time to Achieve 5 - 7 days  -MS      Transfer Training PT LTG, Activity Type all transfers  -MS      Transfer Training PT LTG, [place] Level contact guard assist  -MS      Transfer Training PT LTG, Assist Device walker, rolling  -MS      Gait Training PT LTG    Gait Training Goal PT LTG, Date Established 02/15/17  -MS      Gait Training Goal PT LTG, Time to Achieve 5 - 7 days  -MS      Gait Training Goal PT LTG, [place] Level contact guard assist  -MS      Gait Training Goal PT LTG, Assist Device walker, rolling  -MS      Gait Training Goal PT LTG, Distance to Achieve 100 feet  -MS      Range of Motion PT LTG    Range of Motion Goal PT LTG, Date Established 02/15/17  -MS      Range of Motion Goal PT LTG, Time to Achieve 5 - 7 days  -MS      Range fo Motion Goal PT LTG, Joint R knee  -MS      Range of Motion Goal PT LTG, AROM Measure (-5, 80)  -MS        User Key  (r) = Recorded By, (t) = Taken By, (c) = Cosigned By     Initials Name Provider Type    MS Reymundo HOPKINS Nilda, PT Physical Therapist          Physical Therapy Education     Title: PT OT SLP Therapies (Done)     Topic: Physical Therapy (Done)     Point: Mobility training (Done)    Learning Progress Summary    Learner Readiness Method Response Comment Documented by Status   Patient Acceptance E,TB DU,VU   02/17/17 1140 Done    Acceptance E VU   02/16/17 1612 Done    Acceptance E VU  AR 02/16/17 1155 Done    Acceptance D,E VU,NR  MS 02/15/17 1437 Done               Point: Home exercise program (Done)    Learning Progress Summary    Learner Readiness Method Response Comment Documented by Status   Patient Acceptance E,TB DU,VU   02/17/17 1140 Done    Acceptance E VU   02/16/17 1612 Done    Acceptance E VU  AR 02/16/17 1155 Done    Acceptance D,E VU,NR  MS 02/15/17 1437 Done               Point: Body mechanics (Done)    Learning Progress Summary    Learner Readiness Method Response Comment Documented by Status   Patient Acceptance E,TB DU,VU   02/17/17 1140 Done    Acceptance E VU  AR 02/16/17 1155 Done    Acceptance D,E VU,NR  MS 02/15/17 1437 Done               Point: Precautions (Done)    Learning Progress Summary    Learner Readiness Method Response Comment Documented by Status   Patient Acceptance E,TB DU,VU   02/17/17 1140 Done    Acceptance E VU  AR 02/16/17 1155 Done    Acceptance D,E VU,NR  MS 02/15/17 1437 Done                      User Key     Initials Effective Dates Name Provider Type Discipline     05/18/15 -  Shira Boothe, PT Physical Therapist PT    MS 12/01/15 -  Reymundo Munguia, PT Physical Therapist PT    AR 06/27/16 -  Lolis Akers, PT Physical Therapist PT     12/13/16 -  Singh Christianson Physical Therapy Assistant PT                    PT Recommendation and Plan  Anticipated Equipment Needs At Discharge: front wheeled walker  Anticipated Discharge Disposition: skilled nursing facility  Planned Therapy Interventions: balance  training, bed mobility training, gait training, home exercise program, patient/family education, postural re-education, ROM (Range of Motion), strengthening, transfer training  PT Frequency: 2 times/day  Plan of Care Review  Plan Of Care Reviewed With: patient  Progress: improving  Outcome Summary/Follow up Plan: Pt increasign with activity tolerance when standing with RWX and when doing bed mobility          Outcome Measures       02/17/17 1100 02/16/17 1614 02/16/17 1100    How much help from another person do you currently need...    Turning from your back to your side while in flat bed without using bedrails? 3  -CW 3  -KH 3  -AR    Moving from lying on back to sitting on the side of a flat bed without bedrails? 3  -CW 3  -KH 3  -AR    Moving to and from a bed to a chair (including a wheelchair)? 3  -CW 3  -KH 3  -AR    Standing up from a chair using your arms (e.g., wheelchair, bedside chair)? 3  -CW 3  -KH 2  -AR    Climbing 3-5 steps with a railing? 2  -CW 2  -KH 2  -AR    To walk in hospital room? 3  -CW 3  -KH 3  -AR    AM-PAC 6 Clicks Score 17  -CW 17  -KH 16  -AR    Functional Assessment    Outcome Measure Options AM-PAC 6 Clicks Basic Mobility (PT)  -CW AM-PAC 6 Clicks Basic Mobility (PT)  -KH       02/15/17 1400          How much help from another person do you currently need...    Turning from your back to your side while in flat bed without using bedrails? 2  -MS      Moving from lying on back to sitting on the side of a flat bed without bedrails? 2  -MS      Moving to and from a bed to a chair (including a wheelchair)? 2  -MS      Standing up from a chair using your arms (e.g., wheelchair, bedside chair)? 2  -MS      Climbing 3-5 steps with a railing? 1  -MS      To walk in hospital room? 2  -MS      AM-PAC 6 Clicks Score 11  -MS      Functional Assessment    Outcome Measure Options AM-PAC 6 Clicks Basic Mobility (PT)  -MS        User Key  (r) = Recorded By, (t) = Taken By, (c) = Cosigned By     Initials Name Provider Type     Shira Boothe, PT Physical Therapist    MS Reymundo HOPKINS Nilda, PT Physical Therapist    AR Lolis Akers, PT Physical Therapist    CW Singh Christianson Physical Therapy Assistant           Time Calculation:         PT Charges       02/17/17 1141          Time Calculation    Start Time 1125  -CW      Stop Time 1142  -CW      Time Calculation (min) 17 min  -CW      PT Received On 02/17/17  -CW      PT - Next Appointment 02/18/17  -CW        User Key  (r) = Recorded By, (t) = Taken By, (c) = Cosigned By    Initials Name Provider Type    CW Singh Christianson Physical Therapy Assistant          Therapy Charges for Today     Code Description Service Date Service Provider Modifiers Qty    17347333163 HC PT THER SUPP EA 15 MIN 2/17/2017 Singh Christianson GP 1    22897956379 HC PT THER PROC EA 15 MIN 2/17/2017 Singh Christianson GP 1          PT G-Codes  Outcome Measure Options: AM-PAC 6 Clicks Basic Mobility (PT)    Singh Christianson  2/17/2017

## 2017-02-17 NOTE — PROGRESS NOTES
Continued Stay Note  [facility]     Patient Name: Hugh R McBurney  MRN: [id number]  Today's Date: 2/17/2017    Admit Date: 2/13/2017          Discharge Plan       02/17/17 1435    Final Note    Final Note KAMILA Zapata pt is being dishcarged today. Bed is available at [facility]. Notified pt who is agreeable. IMM given and reviewed pt verbalized understanding and ready for DC. Call to [facility] to notify of DC. Call to [facility], first available 8:30 call to  [facility] and available at 6:15. Notified pt and that [facility] can not guarentee payment of services. DC summary faxed to [facility], Rx copied and placed in chart. Pt to be discharged to [facility] skilled level of care.......[facility]              Discharge Codes     None        Expected Discharge Date and Time     Expected Discharge Date Expected Discharge Time    Feb 17, 2017             Selin Killian RN

## 2017-02-17 NOTE — PLAN OF CARE
Problem: Patient Care Overview (Adult)  Goal: Plan of Care Review  Outcome: Ongoing (interventions implemented as appropriate)    02/16/17 1623 02/17/17 0333   Coping/Psychosocial Response Interventions   Plan Of Care Reviewed With patient --    Patient Care Overview   Progress improving --    Outcome Evaluation   Outcome Summary/Follow up Plan --  VSS. Pain is better controlled. Will continue to improve movement. Will continue to monitor.       Goal: Adult Individualization and Mutuality  Outcome: Ongoing (interventions implemented as appropriate)  Goal: Discharge Needs Assessment  Outcome: Ongoing (interventions implemented as appropriate)    Problem: Infection, Risk/Actual (Adult)  Goal: Infection Prevention/Resolution  Outcome: Ongoing (interventions implemented as appropriate)    Problem: Pain, Acute (Adult)  Goal: Acceptable Pain Control/Comfort Level  Outcome: Ongoing (interventions implemented as appropriate)    Problem: Perioperative Period (Adult)  Goal: Signs and Symptoms of Listed Potential Problems Will be Absent or Manageable (Perioperative Period)  Outcome: Ongoing (interventions implemented as appropriate)

## 2017-02-17 NOTE — PROGRESS NOTES
Continued Stay Note  [facility]     Patient Name: Hugh R McBurney  MRN: [id number]  Today's Date: 2/17/2017    Admit Date: 2/13/2017          Discharge Plan       02/17/17 1055    Case Management/Social Work Plan    Plan [facility] skilled rehab    Patient/Family In Agreement With Plan yes    Additional Comments Call from Abi with [facility] they can accept pt at [place]. Notified pt who is agreeable. Packet placed in rack. Plan at [place] is skilled bed at [facility].........[facility]              Discharge Codes     None            Selin Killian RN

## 2017-02-17 NOTE — CONSULTS
Diabetes Education  Assessment/Teaching    Patient Name:  Hugh R McBurney  YOB: 1950  MRN: [id number]  Admit Date:  2/13/2017      Assessment Date:  2/17/2017       Most Recent Value    General Information      Referral From:  MD order [Dr. Love]    Diabetes History     What type of diabetes do you have?  Type 2    Length of Diabetes Diagnosis  10 + years [pt reports hx DM since 1996.]    Do you have any diabetes complications?  neuropathy, amputations    Assessment Topics     Taking Medication - Assessment  Competent [pt reports adhering to scheduled bid insulin injections as an outpt ]    Healthy Coping - Assessment  Competent    Monitoring - Assessment  -- [pt apparently not checking BG as outpt but plans to resume.]    DM Goals Pt verbalizes a plan to resume BG checks as an outpt             Most Recent Value    DM Education Needs     Meter  Has own    Frequency of Testing  -- [D/w pt resuming BG checks as outpt (frequency per MD)]    Medication  Insulin [Advise pt he is receiving long acting (Levemir) plus meal insulin here]    Problem Solving  Hyperglycemia, Symptoms [pt understands what sxs of hyperglycemia are. ]    Healthy Coping  Appropriate    Motivation  Engaged    Teaching Method  Discussion    Patient Response  Verbalized understanding, Needs reinforcement          Electronically signed by:  Charito Spivey, RN, BSN, CDE   02/17/17 12:16 PM

## 2017-02-17 NOTE — DISCHARGE SUMMARY
[facility]    Date of Discharge:  2/17/2017    PCP: Guille Nieves MD    Discharge Diagnosis:   Active Hospital Problems (** Indicates Principal Problem)    Diagnosis Date Noted   • **Diabetic ulcer of 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, with necrosis of bone [E11.621, L97.524] 02/14/2017   • 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] 02/16/2017   • MSSA (methicillin susceptible Staphylococcus aureus) septicemia [A41.01] 02/15/2017   • Cellulitis of left foot [L03.116] 02/14/2017   • Diabetes mellitus [E11.9] 02/14/2017   • Hypertension [I10] 02/14/2017   • Hyperlipidemia [E78.5] 02/14/2017   • Chronic pain [G89.29] 02/14/2017   • Knee pain, hx of previous prosthetic joint infection [M25.569] 02/14/2017   • Great toe pain, with cellulitis and gangrene [M79.676] 02/14/2017   • Diabetic autonomic neuropathy associated with type 2 diabetes mellitus [E11.43] 02/14/2017   • 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     Procedures Performed  Procedure(s):  EXCISIONAL DEBRIDEMENT LT. FIRST TOE DIABETIC ULCER, DRAINAGE ABSCESS FIRST TOE, FIRST TOE AMPUTATION  INCISION AND DRAINAGE RIGHT KNEE, POLY CHANGE     Consults      Hospital Course  Please see history and physical for details. Patient is a 66 y.o. male with a history of diabetes, hypertension, chronic pain presents with a sore in his left great toe as well as right knee pain. He has a prior history of prosthetic knee joint infection. Cultures were positive for MSSA.    Orthopedic surgery and vascular surgery evaluated the patient and on 2/14/17 the patient underwent excisional debridement of his left first toe diabetic ulcer, drainage of the abscess there, and first toe amputation and incision and drainage of the right  "knee.    He was evaluated and felt to have normal arterial perfusion adequate to heal. Vascular surgery recommends Silvadene cream to left first toe wound.    ID saw the patient and suspected he had MSSA septicemia from left toe osteomyelitis leading to hematogenous seeding of the right knee and prosthetic joint infection.    The plan is for IV antibiotics for 6 weeks and see if he can retain his right knee implants. If this fails a plan for repeat 2 stage revision. Ortho recommends weightbearing as tolerated.    He was seen also by endocrinology to help manage his diabetes that was uncontrolled.    The patient should receive the following antibiotics:     1. Cefazolin 2g IV q8   2. Rifampin 300mg po q12   Stop date 3/27/17    The next set of labs should be drawn on: 2/20/17. It is recommended that the patient have the following laboratories while he/she is receiving antibiotic therapy as an inpatient and outpatient: Weekly CMP, CBC/diff, ESR and CRP. Please fax the results of all labs to [facility] clinic at [phone]    Condition on Discharge: Improved    Vital Signs  Temp:  [98.4 °F (36.9 °C)-99.7 °F (37.6 °C)] 98.4 °F (36.9 °C)  Heart Rate:  [] 106  Resp:  [14-18] 16  BP: (127-137)/(71-87) 128/71    Objective:  General Appearance:  Comfortable and in no acute distress.    Vital signs: (most recent): Blood pressure 128/71, pulse 106, temperature 98.4 °F (36.9 °C), temperature source Oral, resp. rate 16, height 74\" (188 cm), weight 270 lb (122 kg), SpO2 97 %.    Lungs:  Normal respiratory rate and normal effort.  He is not in respiratory distress.  Breath sounds clear to auscultation.    Heart: Normal rate.  Regular rhythm.    Abdomen: Abdomen is soft.  There is no abdominal tenderness.     Extremities: (Right knee, left foot dressed)  Neurological: Patient is alert and oriented to person, place and time.    Skin:  Warm and dry.            Estimated Creatinine Clearance: 108.4 mL/min (by C-G " formula based on Cr of 0.93).    Discharge Medications   McBurney, Hugh R   Home Medication Instructions GABINO:[id number]    Printed on:02/17/17 0708   Medication Information                      bisacodyl (DULCOLAX) 10 MG suppository  Insert 1 suppository into the rectum Daily As Needed for constipation.             ceFAZolin in dextrose (ANCEF) 2-4 GM/100ML-% solution IVPB  Infuse 100 mL into a venous catheter Every 8 (Eight) Hours for 38 days. Indications: Bone and Joint Infection             enoxaparin (LOVENOX) 40 MG/0.4ML solution syringe  Inject 0.4 mL under the skin Daily. Discontinue once risk of DVT is gone.             glucagon, human recombinant, (GLUCAGEN DIAGNOSTIC) 1 MG injection  Inject 1 mg under the skin Every 15 (Fifteen) Minutes As Needed (bs< 70 -  - Unresponsive, NPO or Unable To Safely Swallow).             HYDROcodone-acetaminophen (NORCO)  MG per tablet  Take 1 or 2 tablets every 4 hours as needed for moderate to severe pain             insulin aspart (novoLOG) 100 UNIT/ML injection  Inject 0-10 Units under the skin 4 (Four) Times a Day Before Meals & at Bedtime.             insulin aspart (novoLOG) 100 UNIT/ML injection  Inject 8 Units under the skin 3 (Three) Times a Day With Meals.             insulin detemir (LEVEMIR) 100 UNIT/ML injection  Inject 47 Units under the skin Every Night.             lisinopril (PRINIVIL,ZESTRIL) 20 MG tablet  Take 20 mg by mouth Daily.             Multiple Vitamin (MULTI VITAMIN DAILY PO)  Take  by mouth.             omeprazole (priLOSEC) 40 MG capsule  Take 40 mg by mouth 2 (Two) Times a Day.             pravastatin (PRAVACHOL) 20 MG tablet  Take 20 mg by mouth Daily.             rifAMPin (RIFADIN) 300 MG capsule  Take 1 capsule by mouth Every 12 (Twelve) Hours for 38 days. Indications: Bacteria in the Blood, Bone and Joint Infection             sertraline (ZOLOFT) 100 MG tablet  Take 100 mg by mouth Daily.             silver sulfadiazine (SILVADENE,  SSD) 1 % cream  Apply  topically Every 12 (Twelve) Hours.               For correctional insulin dosing:  BG less than 150 - zero  151 - 200 - 2 unit  201 - 250 - 4 units  251 - 300 - 6 units  301 - 350 - 8 units  Above 350 mg/dl - 10 units.    Discharge Diet: Diet Regular; Consistent Carbohydrate    Activity at Discharge: As tolerated per orthopedic surgery recommendations    Follow-up Appointments  · Guille Nieves MD 1-2 weeks  · [facility] wound care center  · Podiatrist for long-term appropriate shoes  · Orthopedic surgery, vascular, endocrinology as they recommend  · ID clinic to 3/27/17    Test Results Pending at Discharge   Order Current Status    Anaerobic Culture Preliminary result    Anaerobic Culture Preliminary result    Blood Culture Preliminary result    Blood Culture Preliminary result            Axel Zapata MD  02/17/17  2:17 PM    Discharge time spent greater than 30 minutes.

## 2017-02-17 NOTE — PLAN OF CARE
Problem: Patient Care Overview (Adult)  Goal: Plan of Care Review  Outcome: Outcome(s) achieved Date Met:  02/17/17 02/17/17 6473   Coping/Psychosocial Response Interventions   Plan Of Care Reviewed With patient   Patient Care Overview   Progress improving   Outcome Evaluation   Outcome Summary/Follow up Plan VSS. PICC placed for IV abx. Going to rehab tonight.       Goal: Adult Individualization and Mutuality  Outcome: Outcome(s) achieved Date Met:  02/17/17  Goal: Discharge Needs Assessment  Outcome: Outcome(s) achieved Date Met:  02/17/17    Problem: Infection, Risk/Actual (Adult)  Goal: Infection Prevention/Resolution  Outcome: Outcome(s) achieved Date Met:  02/17/17    Problem: Pain, Acute (Adult)  Goal: Acceptable Pain Control/Comfort Level  Outcome: Outcome(s) achieved Date Met:  02/17/17    Problem: Perioperative Period (Adult)  Goal: Signs and Symptoms of Listed Potential Problems Will be Absent or Manageable (Perioperative Period)  Outcome: Outcome(s) achieved Date Met:  02/17/17

## 2017-02-17 NOTE — PROGRESS NOTES
"66 y.o.   LOS: 3 days   Patient Care Team:  Guille Nieves MD as PCP - General  Guille Nieves MD as PCP - Family Medicine    Chief Complaint:  Type 2 diabetes mellitus with diabetic neuropathy    Chief Complaint   Patient presents with   • Chills   • Wound Infection     PT REPORTS, \"I HAVE AN INFECTION ON MY LEFT GREAT TOE FOR THREE WEEKS, MAYBE LONGER\". PT C/O CHILLS, FATIGUE AND BODY ACHES       Subjective  no major overnight events.  Patient tolerating by mouth well.  Blood sugars significantly improved on the current insulin regimen.  Fasting blood sugar-1 80 mg/dL, pre-meal blood sugars in low 200s.      Interval History:    Review of Systems:   Review of Systems   Constitutional: Positive for fatigue. Negative for appetite change, chills and diaphoresis.   HENT: Negative for hearing loss, nosebleeds, postnasal drip, trouble swallowing and voice change.    Eyes: Negative for photophobia, discharge and visual disturbance.   Respiratory: Negative for cough, shortness of breath and wheezing.    Cardiovascular: Negative for chest pain, palpitations and leg swelling.   Gastrointestinal: Negative for abdominal distention, abdominal pain, constipation, diarrhea, nausea and vomiting.   Endocrine: Negative for cold intolerance, heat intolerance, polydipsia and polyuria.   Genitourinary: Negative for dysuria, frequency and hematuria.   Musculoskeletal: Positive for arthralgias and myalgias. Negative for back pain.   Skin: Negative for pallor, rash and wound.   Neurological: Positive for weakness and numbness. Negative for dizziness, tremors, seizures, syncope and headaches.   Hematological: Negative for adenopathy.   Psychiatric/Behavioral: Negative for agitation, confusion and sleep disturbance. The patient is not nervous/anxious.      Objective     Vital Signs   Temp:  [98.5 °F (36.9 °C)-99.7 °F (37.6 °C)] 98.5 °F (36.9 °C)  Heart Rate:  [] 99  Resp:  [14-18] 14  BP: (127-147)/(79-87) 133/80    Physical " Exam:  Physical Exam   Constitutional: He is oriented to person, place, and time. He appears well-developed and well-nourished.   HENT:   Head: Normocephalic and atraumatic.   Mouth/Throat: Oropharynx is clear and moist.   Eyes: Conjunctivae and EOM are normal. Pupils are equal, round, and reactive to light.   Neck: Normal range of motion. Neck supple. Carotid bruit is not present. No tracheal deviation present. No thyromegaly present.   Cardiovascular: Normal rate, regular rhythm and normal heart sounds.    Pulmonary/Chest: Effort normal and breath sounds normal. No stridor. He has no wheezes.   Abdominal: Soft. Bowel sounds are normal. He exhibits no distension. There is no tenderness. No hernia.   Central obesity   Musculoskeletal: Normal range of motion. He exhibits no edema.   The left foot dressed, right knee erythema   Lymphadenopathy:     He has no cervical adenopathy.   Neurological: He is alert and oriented to person, place, and time. He has normal reflexes.   Decreased pin prick and proprioception   Skin: Skin is warm and dry.   Psychiatric: He has a normal mood and affect. His behavior is normal. Judgment normal.   Vitals reviewed.  Results Review:     I reviewed the patient's new clinical results.      GLUCOSE   Date/Time Value Ref Range Status   02/16/2017 0446 169 (H) 65 - 99 mg/dL Final   02/15/2017 0620 255 (H) 65 - 99 mg/dL Final     Lab Results (last 72 hours)     Procedure Component Value Units Date/Time    C-reactive Protein [[id number]]  (Abnormal) Collected:  02/14/17 1032    Specimen:  Blood Updated:  02/14/17 1110     C-Reactive Protein 23.70 (H) mg/dL     Uric Acid [[id number]]  (Abnormal) Collected:  02/14/17 1032    Specimen:  Blood Updated:  02/14/17 1110     Uric Acid 7.3 (H) mg/dL     Body fluid cell count [[id number]]  (Abnormal) Collected:  02/14/17 1031    Specimen:  Body Fluid from Knee, Right Updated:  02/14/17 1131     Color, Fluid Other       Carissa        Appearance, Fluid Cloudy (A)       RBC, Fluid 5500 /mm3      Nucleated Cells, Fluid 41318 /mm3      Method: Hemacytometer Method     POC Glucose Fingerstick [[id number]]  (Abnormal) Collected:  02/14/17 1133    Specimen:  Blood Updated:  02/14/17 1136     Glucose 254 (H) mg/dL     Narrative:       Meter: [id number] : [id number] Naldo CAMPBELL    Body Fluid Cell Count With Differential [[id number]] Collected:  02/14/17 1031    Specimen:  Body Fluid from Knee, Right Updated:  02/14/17 1139    Narrative:       The following orders were created for panel order Body Fluid Cell Count With Differential.  Procedure                               Abnormality         Status                     ---------                               -----------         ------                     Body fluid cell count[[id number]]         Abnormal            Final result               Body fluid differential[[id number]]                           Final result                 Please view results for these tests on the individual orders.    Body fluid differential [[id number]] Collected:  02/14/17 1031    Specimen:  Body Fluid from Knee, Right Updated:  02/14/17 1139     Neutrophils, Fluid 78 %      Lymphocytes, Fluid 20 %      Monocytes, Fluid 1 %      Mononuclear, Fluid 1 %     Crystal Exam, Fluid [[id number]] Collected:  02/14/17 1031    Specimen:  Synovial Fluid Updated:  02/14/17 1151     Crystals, Fluid        Intra and Extracellular crystals observed exhibiting polarization characteristics of Calcium Pyrophosphate    Sedimentation Rate [[id number]]  (Abnormal) Collected:  02/14/17 1032    Specimen:  Blood Updated:  02/14/17 1154     Sed Rate 98 (H) mm/hr     Anaerobic Culture [[id number]] Collected:  02/14/17 1253    Specimen:  Tissue from Toe, Left Updated:  02/14/17 1311    Anaerobic Culture [[id number]] Collected:  02/14/17 1341    Specimen:  Tissue from Knee, Right Updated:  02/14/17 1351    POC Glucose Fingerstick [[id number]]  (Abnormal) Collected:  02/14/17 1435    Specimen:   Blood Updated:  02/14/17 1437     Glucose 306 (H) mg/dL     Narrative:       Meter: [id number] : [id number] Jayce ENGLAND    POC Glucose Fingerstick [[id number]]  (Abnormal) Collected:  02/14/17 1958    Specimen:  Blood Updated:  02/14/17 2000     Glucose 285 (H) mg/dL     Narrative:       Meter: [id number] : [id number] Ivan Jeffries    Blood Culture ID, PCR [[id number]]  (Abnormal) Collected:  02/13/17 2113    Specimen:  Blood from Arm, Left Updated:  02/15/17 0205     BCID, PCR        Staphylococcus aureus, not MRSA. Identification by BCID PCR. (A)    POC Glucose Fingerstick [[id number]]  (Abnormal) Collected:  02/15/17 0646    Specimen:  Blood Updated:  02/15/17 0648     Glucose 275 (H) mg/dL     Narrative:       Meter: [id number] : [id number] Juancho Mock    CBC & Differential [[id number]] Collected:  02/15/17 0620    Specimen:  Blood Updated:  02/15/17 0656    Narrative:       The following orders were created for panel order CBC & Differential.  Procedure                               Abnormality         Status                     ---------                               -----------         ------                     CBC Auto Differential[[id number]]         Abnormal            Final result                 Please view results for these tests on the individual orders.    CBC Auto Differential [[id number]]  (Abnormal) Collected:  02/15/17 0620    Specimen:  Blood Updated:  02/15/17 0656     WBC 13.56 (H) 10*3/mm3      RBC 3.27 (L) 10*6/mm3      Hemoglobin 10.2 (L) g/dL      Hematocrit 30.7 (L) %      MCV 93.9 fL      MCH 31.2 pg      MCHC 33.2 g/dL      RDW 13.8 %      RDW-SD 47.4 fl      MPV 10.7 fL      Platelets 143 10*3/mm3      Neutrophil % 79.1 (H) %      Lymphocyte % 9.5 (L) %      Monocyte % 11.0 %      Eosinophil % 0.1 (L) %      Basophil % 0.1 %      Immature Grans % 0.2 %      Neutrophils, Absolute 10.73 (H) 10*3/mm3      Lymphocytes, Absolute 1.29 10*3/mm3      Monocytes, Absolute 1.49 (H)  10*3/mm3      Eosinophils, Absolute 0.01 10*3/mm3      Basophils, Absolute 0.01 10*3/mm3      Immature Grans, Absolute 0.03 10*3/mm3     Basic Metabolic Panel [[id number]]  (Abnormal) Collected:  02/15/17 0620    Specimen:  Blood Updated:  02/15/17 0709     Glucose 255 (H) mg/dL      BUN 12 mg/dL      Creatinine 0.99 mg/dL      Sodium 132 (L) mmol/L      Potassium 4.2 mmol/L      Chloride 96 (L) mmol/L      CO2 21.0 (L) mmol/L      Calcium 7.9 (L) mg/dL      eGFR Non African Amer 76 mL/min/1.73      BUN/Creatinine Ratio 12.1      Anion Gap 15.0 mmol/L     Narrative:       GFR Normal >60  Chronic Kidney Disease <60  Kidney Failure <15    fluid glucose and total protein on synovial fluid - Miscellaneous Test [[id number]] Collected:  02/14/17 1130    Specimen:  Blood Updated:  02/15/17 0718     Miscellaneous Lab Test Result See attached report     Narrative:       See the scanned copy of this report in the patient's medical record.  This test was performed by [facility].    Tissue Exam [[id number]] Collected:  02/14/17 1250    Specimen:  Tissue from Toe, Left Updated:  02/15/17 1212     Case Report --      Surgical Pathology Report                         Case: [id number]                                  Authorizing Provider:  Osmin Brand MD     Collected:           02/14/2017 12:50 PM          Ordering Location:     [facility]  Received:            02/14/2017 02:34 PM                                 MAIN OR                                                                      Pathologist:           Xavier Aviles MD                                                        Specimen:    Toe, Left, LEFT FIRST TOE                                                                   Final Diagnosis --      1. LEFT FIRST TOE, AMPUTATION SPECIMEN:             ULCERATION WITH ACUTE AND CHRONIC INFLAMMATION AND PARTIAL NECROSIS.              ACUTE OSTEOMYELITIS.     MONICA/holly     CPT CODES:  1. 93489,  "48718       Gross Description --      Received in formalin labeled \"left first toe\" is a 4.7 x 3.5 x 2.9 cm digit with tan skin and an up to 1.5 cm pink tan ulcer at the distal tip 3.2 cm from the closest skin and soft tissue margin and grossly appears to involve the underlying bone.  Separate in the container is a 3 x 2 x 0.7 cm aggregate of tan fibrous tissue and bone without lesions.  Representative sections are submitted as follows:  1A - cross section of digit showing closest black inked skin and soft tissue margin  1B - section of the lesion and underlying bone    Block 1B is submitted for decalcification.    CC/USO/MONICA/edward        Microscopic Description --      Performed, incorporated in diagnosis.         Embedded Images --     POC Glucose Fingerstick [[id number]]  (Abnormal) Collected:  02/15/17 1230    Specimen:  Blood Updated:  02/15/17 1240     Glucose 288 (H) mg/dL     Narrative:       Meter: [id number] : [id number] Bishop Holly    POC Glucose Fingerstick [[id number]]  (Abnormal) Collected:  02/15/17 1624    Specimen:  Blood Updated:  02/15/17 1638     Glucose 240 (H) mg/dL     Narrative:       Meter: [id number] : [id number] Marni Pereira    POC Glucose Fingerstick [[id number]]  (Abnormal) Collected:  02/15/17 2047    Specimen:  Blood Updated:  02/15/17 2049     Glucose 255 (H) mg/dL     Narrative:       Meter: [id number] : [id number] Mauricio Bartholomew    CBC & Differential [[id number]] Collected:  02/16/17 0446    Specimen:  Blood Updated:  02/16/17 0518    Narrative:       The following orders were created for panel order CBC & Differential.  Procedure                               Abnormality         Status                     ---------                               -----------         ------                     CBC Auto Differential[[id number]]         Abnormal            Final result                 Please view results for these tests on the individual orders.    CBC Auto Differential [[id number]]  " "(Abnormal) Collected:  02/16/17 0446    Specimen:  Blood Updated:  02/16/17 0518     WBC 11.01 (H) 10*3/mm3      RBC 3.05 (L) 10*6/mm3      Hemoglobin 9.4 (L) g/dL      Hematocrit 28.5 (L) %      MCV 93.4 fL      MCH 30.8 pg      MCHC 33.0 g/dL      RDW 13.7 %      RDW-SD 46.7 fl      MPV 10.6 fL      Platelets 150 10*3/mm3      Neutrophil % 80.4 (H) %      Lymphocyte % 9.8 (L) %      Monocyte % 8.0 %      Eosinophil % 1.4 %      Basophil % 0.1 %      Immature Grans % 0.3 %      Neutrophils, Absolute 8.86 (H) 10*3/mm3      Lymphocytes, Absolute 1.08 10*3/mm3      Monocytes, Absolute 0.88 10*3/mm3      Eosinophils, Absolute 0.15 10*3/mm3      Basophils, Absolute 0.01 10*3/mm3      Immature Grans, Absolute 0.03 10*3/mm3     Basic Metabolic Panel [[id number]]  (Abnormal) Collected:  02/16/17 0446    Specimen:  Blood Updated:  02/16/17 0542     Glucose 169 (H) mg/dL      BUN 13 mg/dL      Creatinine 0.93 mg/dL      Sodium 135 (L) mmol/L      Potassium 3.5 mmol/L      Chloride 98 mmol/L      CO2 24.2 mmol/L      Calcium 7.8 (L) mg/dL      eGFR Non African Amer 81 mL/min/1.73      BUN/Creatinine Ratio 14.0      Anion Gap 12.8 mmol/L     Narrative:       GFR Normal >60  Chronic Kidney Disease <60  Kidney Failure <15    POC Glucose Fingerstick [[id number]]  (Abnormal) Collected:  02/16/17 0543    Specimen:  Blood Updated:  02/16/17 0544     Glucose 184 (H) mg/dL     Narrative:       Meter: [id number] : [id number] [facility]    Body Fluid Culture [[id number]]  (Abnormal)  (Susceptibility) Collected:  02/14/17 1031    Specimen:  Body Fluid from Knee, Right Updated:  02/16/17 0645     BF Culture Heavy growth (4+) Staphylococcus aureus (A)       D test is positive. Isolate exhibits \"inducible\" resistance to Clindamycin.          Gram Stain Result Many (4+) WBCs seen       No organisms seen     Susceptibility      Staphylococcus aureus     DINO     Clindamycin Resistant     Erythromycin >=8 ug/ml Resistant     Oxacillin 0.5 ug/ml " Susceptible     Penicillin G >=0.5 ug/ml Resistant     Rifampin <=0.5 ug/ml Susceptible     Tetracycline <=1 ug/ml Susceptible     Trimethoprim + Sulfamethoxazole <=10 ug/ml Susceptible     Vancomycin 1 ug/ml Susceptible                    Wound Culture [[id number]]  (Abnormal) Collected:  02/14/17 1249    Specimen:  Wound from Toe, Left Updated:  02/16/17 0649     Wound Culture        Moderate growth (3+) Staphylococcus aureus (A)      Refer to previous body fluid culture collected on 2/14/17 for DINO.            Gram Stain Result Many (4+) WBCs seen       Many (4+) Gram positive cocci in clusters     Wound Culture [[id number]]  (Abnormal) Collected:  02/14/17 1321    Specimen:  Wound from Knee, Right Updated:  02/16/17 0650     Wound Culture Light growth (2+) Staphylococcus aureus (A)       Refer to previous body fluid culture collected on 2/14/17 for DINO.          Gram Stain Result Moderate (3+) WBCs seen       Few (2+) Gram positive cocci in clusters     Tissue Culture [[id number]]  (Abnormal) Collected:  02/14/17 1253    Specimen:  Tissue from Toe, Left Updated:  02/16/17 0651     Culture        Moderate growth (3+) Staphylococcus aureus (A)      Refer to previous body fluid culture collected on 2/14/17 for DINO.        Gram Stain Result Rare (1+) Gram positive cocci in clusters     Tissue Culture [[id number]]  (Abnormal) Collected:  02/14/17 1341    Specimen:  Tissue from Knee, Right Updated:  02/16/17 0716     Culture Light growth (2+) Staphylococcus aureus (A)       Refer to previous body fluid culture collected on 2/14/17 for DINO.          Gram Stain Result Rare (1+) WBCs seen       Rare (1+) Gram positive cocci in pairs     Blood Culture [[id number]]  (Abnormal)  (Susceptibility) Collected:  02/13/17 2113    Specimen:  Blood from Arm, Left Updated:  02/16/17 0728     Blood Culture Abnormal Stain (A)       Staphylococcus aureus (A)         Methicillin resistant Staphylococcus aureus, Patient may be an isolation  "risk.  Consider infectious disease consult to rule out distant focus of infection.  D test is positive. Isolate exhibits \"inducible\" resistance to Clindamycin.          Isolated from Aerobic Bottle      Gram Stain Result        Aerobic Bottle Gram positive cocci in clusters    Susceptibility      Staphylococcus aureus     DINO     Clindamycin Resistant     Erythromycin >=8 ug/ml Resistant     Oxacillin 0.5 ug/ml Susceptible     Penicillin G >=0.5 ug/ml Resistant     Rifampin <=0.5 ug/ml Susceptible     Tetracycline <=1 ug/ml Susceptible     Trimethoprim + Sulfamethoxazole <=10 ug/ml Susceptible     Vancomycin 1 ug/ml Susceptible                    POC Glucose Fingerstick [[id number]]  (Abnormal) Collected:  02/16/17 1054    Specimen:  Blood Updated:  02/16/17 1056     Glucose 264 (H) mg/dL     Narrative:       Meter: [id number] : [id number] Marcos Vito    Blood Culture [[id number]]  (Abnormal) Collected:  02/13/17 2042    Specimen:  Blood from Arm, Left Updated:  02/16/17 1336     Blood Culture Staphylococcus aureus (A)         Consider infectious disease consult to rule out distant focus of infection.        Isolated from Anaerobic Bottle      Gram Stain Result Anaerobic Bottle Gram positive cocci       Refer to previous Blood Culture from 2/13/17       Narrative:       Refer to previous blood culture collected on 2/13/17       Blood Culture [[id number]]  (Normal) Collected:  02/15/17 1446    Specimen:  Blood from Arm, Left Updated:  02/16/17 1601     Blood Culture No growth at 24 hours     POC Glucose Fingerstick [[id number]]  (Abnormal) Collected:  02/16/17 1710    Specimen:  Blood Updated:  02/16/17 1712     Glucose 260 (H) mg/dL     Narrative:       Meter: [id number] : [id number] Marcos Vito    Blood Culture [[id number]]  (Normal) Collected:  02/15/17 1535    Specimen:  Blood from Arm, Left Updated:  02/16/17 1801     Blood Culture No growth at 24 hours     POC Glucose Fingerstick [[id number]]  (Abnormal) Collected: "  02/16/17 2044    Specimen:  Blood Updated:  02/16/17 2059     Glucose 227 (H) mg/dL     Narrative:       Meter: [id number] : [id number] Sanz    POC Glucose Fingerstick [[id number]]  (Abnormal) Collected:  02/17/17 0550    Specimen:  Blood Updated:  02/17/17 0552     Glucose 184 (H) mg/dL     Narrative:       Meter: [id number] : [id number] Sanz        Imaging Results (last 72 hours)     Procedure Component Value Units Date/Time    XR Knee 1 or 2 View Right [[id number]] Collected:  02/14/17 1629     Updated:  02/14/17 1632    Narrative:       2 VIEWS OF THE RIGHT KNEE     HISTORY: Postoperative knee pain     FINDINGS:  1. Satisfactory appearance following total restrained knee arthroplasty,  no evidence of a complication.     This report was finalized on 2/14/2017 4:29 PM by Dr. Ricci Tucker MD.       XR Foot 3+ View Left [[id number]] Collected:  02/14/17 0100     Updated:  02/14/17 2352    Narrative:       LEFT FOOT 3 VIEWS.     HISTORY: Cellulitis, rule out osteomyelitis, pain in the big toe.     COMPARISON: No prior studies for comparison.     FINDINGS:  There is no fracture or dislocation.  Bony abnormality of the third and  fourth proximal phalanges may be related to prior surgery. No erosive  bony changes are seen to suggest osteomyelitis. Bony ankylosis of the  second toe proximal interphalangeal joint is noted.     Soft tissue swelling of the foot.       Impression:       Soft tissue swelling, no definite changes suggestive of osteomyelitis.  If indicated MRI examination may be performed.         This report was finalized on 2/14/2017 11:49 PM by Dr. Vidal Morris MD.       XR Knee 1 or 2 View Right [[id number]] Collected:  02/14/17 0059     Updated:  02/14/17 2352    Narrative:       RIGHT KNEE 2 VIEWS.     HISTORY: Knee pain.     COMPARISON: No prior studies for comparison.     FINDINGS:  There is no fracture or dislocation.  Patient is status post total  replacement. Hardware is intact.      No significant joint effusion. Severe atherosclerotic disease.       Impression:       No acute fracture or dislocation.         This report was finalized on 2/14/2017 11:49 PM by Dr. Vidal Morris MD.             Medication Review: Done      Current Facility-Administered Medications:   •  acetaminophen (TYLENOL) tablet 650 mg, 650 mg, Oral, Q6H PRN, Chapin Escamilla MD, 650 mg at 02/14/17 0506  •  bisacodyl (DULCOLAX) suppository 10 mg, 10 mg, Rectal, Daily PRN, Kiko Marie MD  •  ceFAZolin in dextrose (ANCEF) IVPB solution 2 g, 2 g, Intravenous, Q8H, Axel Zapata MD, 2 g at 02/17/17 0530  •  dextrose (D50W) solution 25 g, 25 g, Intravenous, Q15 Min PRN, Chapin Escamilla MD  •  dextrose (GLUTOSE) oral gel 15 g, 15 g, Oral, Q15 Min PRN, Chapin Escamilla MD  •  enoxaparin (LOVENOX) syringe 40 mg, 40 mg, Subcutaneous, Daily, Kiko Marie MD, 40 mg at 02/17/17 0853  •  glucagon (human recombinant) (GLUCAGEN DIAGNOSTIC) injection 1 mg, 1 mg, Subcutaneous, Q15 Min PRN, Chapin Escamilla MD  •  HYDROcodone-acetaminophen (NORCO)  MG per tablet 1 tablet, 1 tablet, Oral, Q4H PRN **OR** HYDROcodone-acetaminophen (NORCO)  MG per tablet 2 tablet, 2 tablet, Oral, Q4H PRN, Axel Zapata MD, 2 tablet at 02/17/17 0615  •  HYDROmorphone (DILAUDID) injection 0.5 mg, 0.5 mg, Intravenous, Q2H PRN, 0.5 mg at 02/15/17 2113 **OR** HYDROmorphone (DILAUDID) injection 1 mg, 1 mg, Intravenous, Q2H PRN, Axel Zapata MD, 1 mg at 02/15/17 1229  •  insulin aspart (novoLOG) injection 0-10 Units, 0-10 Units, Subcutaneous, 4x Daily AC & at Bedtime, Adarsh Love MD, 2 Units at 02/17/17 0854  •  insulin aspart (novoLOG) injection 8 Units, 8 Units, Subcutaneous, TID With Meals, Adarsh Love MD, 8 Units at 02/17/17 0853  •  insulin detemir (LEVEMIR) injection 47 Units, 47 Units, Subcutaneous, Nightly, Adarsh Love MD  •  lactated ringers infusion, 9 mL/hr, Intravenous, Continuous, Anderson Villasenor MD,  Last Rate: 9 mL/hr at 02/14/17 1551, 9 mL/hr at 02/14/17 1551  •  lisinopril (PRINIVIL,ZESTRIL) tablet 20 mg, 20 mg, Oral, Daily, Chapin Escamilla MD, 20 mg at 02/17/17 0854  •  multivitamin (THERAGRAN) tablet 1 tablet, 1 tablet, Oral, Daily, Chapin Escamilla MD, 1 tablet at 02/17/17 0854  •  ondansetron (ZOFRAN) injection 4 mg, 4 mg, Intravenous, Q6H PRN, Chapin Escamilla MD  •  pantoprazole (PROTONIX) EC tablet 40 mg, 40 mg, Oral, Q AM, Chapin Escamilla MD, 40 mg at 02/17/17 0529  •  pravastatin (PRAVACHOL) tablet 20 mg, 20 mg, Oral, Daily, Chapin Escamilla MD, 20 mg at 02/17/17 0854  •  sertraline (ZOLOFT) tablet 100 mg, 100 mg, Oral, Daily, Chapin Escamilla MD, 100 mg at 02/17/17 0854  •  silver sulfadiazine (SILVADENE, SSD) 1 % cream, , Topical, Q12H, Osmin Brand MD  •  sodium chloride 0.9 % flush 10 mL, 10 mL, Intravenous, PRN, Xavier Fairchild MD  •  sodium chloride 0.9 % infusion, 150 mL/hr, Intravenous, Continuous, Jawed MD Sharif, Last Rate: 150 mL/hr at 02/17/17 0711, 150 mL/hr at 02/17/17 0711    Assessment/Plan     Active Hospital Problems (** Indicates Principal Problem)    Diagnosis Date Noted   • **Diabetic ulcer of 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, with necrosis of bone [E11.621, L97.524] 02/14/2017   • 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] 02/16/2017   • MSSA (methicillin susceptible Staphylococcus aureus) septicemia [A41.01] 02/15/2017   • Cellulitis of left foot [L03.116] 02/14/2017   • Diabetes mellitus [E11.9] 02/14/2017   • Hypertension [I10] 02/14/2017   • Hyperlipidemia [E78.5] 02/14/2017   • Chronic pain [G89.29] 02/14/2017   • Knee pain, hx of previous prosthetic joint infection [M25.569] 02/14/2017   • Great toe pain, with cellulitis and gangrene [M79.676] 02/14/2017   • Diabetic autonomic neuropathy associated with type 2 diabetes mellitus [E11.43] 02/14/2017   • 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     Type 2 diabetes mellitus with diabetic neuropathy now with left toe amputation  LgP1c-6.25%  Will stop glyburide and metformin for now, would consider restarting metformin at the time of discharge.  Will increase Levemir to 47 units at bedtime  Will continue NovoLog 8 units with each meal  Will cover patient with NovoLog moderate dose sliding scale 3 times a day before meals and at bedtime-2 units for 50 about 1 50 mg/dL  Consulted diabetic educator  Counseled patient on the importance of checking his blood sugars and decent glycemic control for better healing of his wound  Patient has history of pancreatitis-would not be a candidate for G LP 1 analogues, DP P4 inhibitors.    Dc recs -   DC on the above insulin regimen.   Ok to resume metformin   Follow up with me in clinic in a period of 4 weeks.      Hypertriglyceridemia  Could be secondary to elevated blood sugars  continue Pravachol 20 mg oral daily     Left first toe amputation-followed by orthopedics and vascular surgery     MSSA septicemia - left first toe Osteo.   Followed by ID, currently on IV antibiotics.    The total time spent more than 35 min for old record and lab review and face- to- face, of which greater than 50% of time was spent in counseling the patient on treatment options, instructions for management/treatment and follow up and importance of compliance with chosen management or treatment options      Adarsh Love MD.  02/17/17  8:59 AM      EMR Dragon / transcription disclaimer:    Much of this encounter note is an electronic transcription/ translation of spoken language to printed text.  Electronic translation of spoken language may permit erroneous, or at times, nonsensical words or phrases to be inadvertently transcribed; although I have reviewed the note for such errors, some may still exist.

## 2017-02-17 NOTE — SIGNIFICANT NOTE
02/17/17 1324   Rehab Treatment   Discipline physical therapy assistant   Treatment Not Performed patient unavailable for treatment  (Pt getting a pick line PT to check on pt tomorrow)   Recommendation   PT - Next Appointment 02/18/17

## 2017-02-17 NOTE — PROGRESS NOTES
INFECTIOUS DISEASES PROGRESS NOTE    CC: f/u MSSA    S:   Knee pain much better   No f/c/ns    O:  Physical Exam:  Temp:  [98.5 °F (36.9 °C)-99.7 °F (37.6 °C)] 98.5 °F (36.9 °C)  Heart Rate:  [] 99  Resp:  [14-18] 14  BP: (127-147)/(79-87) 133/80  Physical Exam   Constitutional: He appears well-developed. No distress.   Pulmonary/Chest: Effort normal.   Abdominal: Soft.   Neurological: He is alert.   Skin: Skin is warm and dry.   Psychiatric: He has a normal mood and affect. His behavior is normal.   R knee swelling better     Diagnostics:     WBC 11 (P80, L 10, M 8)  H/H 9.4/29      glc 184-264    Micro  2/13 Bcx 2/2 MSSA (R-clinda, erytheo, PCN)  2/14 R knee fluid cx (WBC 89502 P78, L20, M1; RBC 5550  2/14 L toe two cultures MSSA  2/14 R knee cultures: MSSA  2/15 BCx NGTD      Estimated Creatinine Clearance: 108.4 mL/min (by C-G formula based on Cr of 0.93).    Assessment/Plan   MSSA septicemia  Left 1 toe osteo  R TKA PJI  Uncontrolled DM II, complicating above    Doing well.  We'll strive for device retention.  Plan 6 weeks of IV Ancef with by mouth rifampin.  Educated on proper dosing administration and side effects of medicines.  PICC today.  Discussed with CCP.  Otherwise no objection to discharge with antibiotic plan as below.      Final Infectious Diseases Consult Note    Brief summary of hospitalization:    Very nice 66-year-old gentleman with history of osteoarthritis complicated by right TKA PJI culture negative in 2011 treated with two-stage replacement and prolonged antibiotic therapy, admitted on with 3-4 weeks of left first toe swelling, fever and right artificial knee pain.  Was found to have MSSA in his blood and underwent I&D with amputation of the first left toe by Dr. Brand on 2/14.  Dr. Marie also performed liner exchange with I&D of right TKA on 2/14.  His left toe and right TKA cultures all grew MSSA.  Transthoracic echocardiogram without obvious vegetation.  In  discussions with orthopedics and infectious diseases, patient elected to pursue device retention strategy.  We will therefore recommend Ancef 2 g IV every 8 hours and rifampin 300 mg by mouth every 12 hours for 6 weeks with an anticipated stop date of 3/27/17.  We then plan 6 months of rifampin plus PO abx (likely quinolone).    Final treatment recommendations:  The patient should receive the following antibiotics:  1. Cefazolin 2g IV q8  2. Rifampin 300mg po q12    Stop date 3/27/17    Laboratory monitoring:  The next set of labs should be drawn on: 2/20/17    It is recommended that the patient have the following laboratories while he/she is receiving antibiotic therapy as an inpatient and outpatient: Weekly CMP, CBC/diff, ESR and CRP    Please fax the results of all labs to [facility] Diseases clinic at [phone]    Follow up:  The patient will follow-up in the Infectious Disease clinic on 3/27/17     Thank you for allowing us to participate in the care of this patient. [facility] Infectious Disease consult service will sign off. Please call or re-consult if we can be of further assistance.         Amor Mayberry MD  9:57 AM  02/17/17

## 2017-02-17 NOTE — PLAN OF CARE
Problem: Patient Care Overview (Adult)  Goal: Plan of Care Review  Outcome: Ongoing (interventions implemented as appropriate)    02/17/17 1140   Coping/Psychosocial Response Interventions   Plan Of Care Reviewed With patient   Patient Care Overview   Progress improving   Outcome Evaluation   Outcome Summary/Follow up Plan Pt increasign with activity tolerance when standing with RWX and when doing bed mobility

## 2017-02-17 NOTE — PROGRESS NOTES
[facility]    Physicians Statement of Medical Necessity for Ambulance Transportation    It is medically necessary for:    Patient Name: Hugh R McBurney    Insurance Information:  Medicare    To be transported by ambulance:    From (if nursing facility, specify level of care: skilled, shelter, etc): [facility]    To (specify level of care if nursing facility): [facility]  Date of Service: cellulitis L Foot    For dialysis patients state date dialysis began:     Diagnosis:     Past Medical/Surgical History:  Past Medical History   Diagnosis Date   • Chronic pain      BILATERAL KNEES AND BACK   • Diabetes mellitus    • Hyperlipidemia    • Hypertension    • Kidney stone       Past Surgical History   Procedure Laterality Date   • Appendectomy     • Colonoscopy     • Joint replacement       BILATERAL KNEES    • Back surgery          Current Objective Medical Evidence(including physical exam finding to support reason for limitations):    Immobilization syndrome  Bedridden  More than 3 steps to navigate to enter residence    Other:     Physician Signature:           (RN,NP,PA,CAN, Discharge Planner) Date/Time: 2/17/17     Printed Name:    __________Selin Killian________________________    [facility]   Phone: [phone] Phone: [phone] Phone: [phone]   Fax: [phone] Fax: [phone] Fax: [phone]

## 2017-02-19 LAB
BACTERIA SPEC ANAEROBE CULT: NORMAL
BACTERIA SPEC ANAEROBE CULT: NORMAL

## 2017-02-20 LAB
BACTERIA SPEC AEROBE CULT: NORMAL
BACTERIA SPEC AEROBE CULT: NORMAL

## 2017-02-21 ENCOUNTER — TELEPHONE (OUTPATIENT)
Dept: INFECTIOUS DISEASES | Facility: CLINIC | Age: 67
End: 2017-02-21

## 2017-02-21 NOTE — TELEPHONE ENCOUNTER
FYI: Just received labs from yesterday and CRP = 202.2. I sent all the results to you in EPIC. Thanks, Tianna

## 2017-02-25 ENCOUNTER — APPOINTMENT (OUTPATIENT)
Dept: GENERAL RADIOLOGY | Facility: HOSPITAL | Age: 67
End: 2017-02-25

## 2017-02-25 ENCOUNTER — HOSPITAL ENCOUNTER (INPATIENT)
Facility: HOSPITAL | Age: 67
LOS: 7 days | Discharge: NURSING, REHAB OR LONG-TERM CARE | End: 2017-03-04
Attending: EMERGENCY MEDICINE | Admitting: INTERNAL MEDICINE

## 2017-02-25 DIAGNOSIS — Z74.09 DECREASED MOBILITY AND ENDURANCE: ICD-10-CM

## 2017-02-25 DIAGNOSIS — N28.9 RENAL INSUFFICIENCY: ICD-10-CM

## 2017-02-25 DIAGNOSIS — I10 ESSENTIAL HYPERTENSION: ICD-10-CM

## 2017-02-25 DIAGNOSIS — R73.9 HYPERGLYCEMIA: Primary | ICD-10-CM

## 2017-02-25 DIAGNOSIS — L08.9 RIGHT KNEE SKIN INFECTION: ICD-10-CM

## 2017-02-25 LAB
ACETONE BLD QL: NEGATIVE
ALBUMIN SERPL-MCNC: 2.8 G/DL (ref 3.5–5.2)
ALBUMIN/GLOB SERPL: 0.6 G/DL
ALP SERPL-CCNC: 136 U/L (ref 39–117)
ALT SERPL W P-5'-P-CCNC: 10 U/L (ref 1–41)
ANION GAP SERPL CALCULATED.3IONS-SCNC: 20 MMOL/L
AST SERPL-CCNC: 14 U/L (ref 1–40)
BACTERIA UR QL AUTO: ABNORMAL /HPF
BASOPHILS # BLD AUTO: 0.02 10*3/MM3 (ref 0–0.2)
BASOPHILS NFR BLD AUTO: 0.1 % (ref 0–1.5)
BILIRUB SERPL-MCNC: 0.5 MG/DL (ref 0.1–1.2)
BILIRUB UR QL STRIP: NEGATIVE
BUN BLD-MCNC: 30 MG/DL (ref 8–23)
BUN/CREAT SERPL: 21 (ref 7–25)
CALCIUM SPEC-SCNC: 9 MG/DL (ref 8.6–10.5)
CHLORIDE SERPL-SCNC: 87 MMOL/L (ref 98–107)
CLARITY UR: CLEAR
CO2 SERPL-SCNC: 19 MMOL/L (ref 22–29)
COLOR UR: YELLOW
CREAT BLD-MCNC: 1.43 MG/DL (ref 0.76–1.27)
DEPRECATED RDW RBC AUTO: 45.5 FL (ref 37–54)
EOSINOPHIL # BLD AUTO: 0.06 10*3/MM3 (ref 0–0.7)
EOSINOPHIL NFR BLD AUTO: 0.4 % (ref 0.3–6.2)
ERYTHROCYTE [DISTWIDTH] IN BLOOD BY AUTOMATED COUNT: 13.7 % (ref 11.5–14.5)
GFR SERPL CREATININE-BSD FRML MDRD: 49 ML/MIN/1.73
GLOBULIN UR ELPH-MCNC: 4.8 GM/DL
GLUCOSE BLD-MCNC: 604 MG/DL (ref 65–99)
GLUCOSE BLDC GLUCOMTR-MCNC: 464 MG/DL (ref 70–130)
GLUCOSE BLDC GLUCOMTR-MCNC: 519 MG/DL (ref 70–130)
GLUCOSE BLDC GLUCOMTR-MCNC: 546 MG/DL (ref 70–130)
GLUCOSE BLDC GLUCOMTR-MCNC: 556 MG/DL (ref 70–130)
GLUCOSE BLDC GLUCOMTR-MCNC: 581 MG/DL (ref 70–130)
GLUCOSE UR STRIP-MCNC: ABNORMAL MG/DL
HCT VFR BLD AUTO: 30.4 % (ref 40.4–52.2)
HGB BLD-MCNC: 10.2 G/DL (ref 13.7–17.6)
HGB UR QL STRIP.AUTO: ABNORMAL
HOLD SPECIMEN: NORMAL
HYALINE CASTS UR QL AUTO: ABNORMAL /LPF
IMM GRANULOCYTES # BLD: 0.04 10*3/MM3 (ref 0–0.03)
IMM GRANULOCYTES NFR BLD: 0.3 % (ref 0–0.5)
KETONES UR QL STRIP: NEGATIVE
LEUKOCYTE ESTERASE UR QL STRIP.AUTO: NEGATIVE
LYMPHOCYTES # BLD AUTO: 1.42 10*3/MM3 (ref 0.9–4.8)
LYMPHOCYTES NFR BLD AUTO: 9.8 % (ref 19.6–45.3)
MCH RBC QN AUTO: 30.8 PG (ref 27–32.7)
MCHC RBC AUTO-ENTMCNC: 33.6 G/DL (ref 32.6–36.4)
MCV RBC AUTO: 91.8 FL (ref 79.8–96.2)
MONOCYTES # BLD AUTO: 0.83 10*3/MM3 (ref 0.2–1.2)
MONOCYTES NFR BLD AUTO: 5.7 % (ref 5–12)
NEUTROPHILS # BLD AUTO: 12.08 10*3/MM3 (ref 1.9–8.1)
NEUTROPHILS NFR BLD AUTO: 83.7 % (ref 42.7–76)
NITRITE UR QL STRIP: NEGATIVE
PH UR STRIP.AUTO: <=5 [PH] (ref 5–8)
PLATELET # BLD AUTO: 346 10*3/MM3 (ref 140–500)
PMV BLD AUTO: 10.1 FL (ref 6–12)
POTASSIUM BLD-SCNC: 4.8 MMOL/L (ref 3.5–5.2)
PROCALCITONIN SERPL-MCNC: 1.29 NG/ML (ref 0.1–0.25)
PROT SERPL-MCNC: 7.6 G/DL (ref 6–8.5)
PROT UR QL STRIP: ABNORMAL
RBC # BLD AUTO: 3.31 10*6/MM3 (ref 4.6–6)
RBC # UR: ABNORMAL /HPF
REF LAB TEST METHOD: ABNORMAL
SODIUM BLD-SCNC: 126 MMOL/L (ref 136–145)
SP GR UR STRIP: >=1.03 (ref 1–1.03)
SQUAMOUS #/AREA URNS HPF: ABNORMAL /HPF
UROBILINOGEN UR QL STRIP: ABNORMAL
WBC NRBC COR # BLD: 14.45 10*3/MM3 (ref 4.5–10.7)
WBC UR QL AUTO: ABNORMAL /HPF
WHOLE BLOOD HOLD SPECIMEN: NORMAL
WHOLE BLOOD HOLD SPECIMEN: NORMAL

## 2017-02-25 PROCEDURE — 99285 EMERGENCY DEPT VISIT HI MDM: CPT

## 2017-02-25 PROCEDURE — 84145 PROCALCITONIN (PCT): CPT | Performed by: EMERGENCY MEDICINE

## 2017-02-25 PROCEDURE — 71020 HC CHEST PA AND LATERAL: CPT

## 2017-02-25 PROCEDURE — 82962 GLUCOSE BLOOD TEST: CPT

## 2017-02-25 PROCEDURE — 82009 KETONE BODYS QUAL: CPT | Performed by: EMERGENCY MEDICINE

## 2017-02-25 PROCEDURE — 87040 BLOOD CULTURE FOR BACTERIA: CPT | Performed by: EMERGENCY MEDICINE

## 2017-02-25 PROCEDURE — 63710000001 INSULIN REGULAR HUMAN PER 5 UNITS: Performed by: EMERGENCY MEDICINE

## 2017-02-25 PROCEDURE — 93010 ELECTROCARDIOGRAM REPORT: CPT | Performed by: INTERNAL MEDICINE

## 2017-02-25 PROCEDURE — 85025 COMPLETE CBC W/AUTO DIFF WBC: CPT | Performed by: EMERGENCY MEDICINE

## 2017-02-25 PROCEDURE — 93005 ELECTROCARDIOGRAM TRACING: CPT

## 2017-02-25 PROCEDURE — 25010000002 HYDROMORPHONE PER 4 MG: Performed by: EMERGENCY MEDICINE

## 2017-02-25 PROCEDURE — 81001 URINALYSIS AUTO W/SCOPE: CPT | Performed by: EMERGENCY MEDICINE

## 2017-02-25 PROCEDURE — 80053 COMPREHEN METABOLIC PANEL: CPT | Performed by: EMERGENCY MEDICINE

## 2017-02-25 RX ORDER — NALOXONE HCL 0.4 MG/ML
0.4 VIAL (ML) INJECTION
Status: DISCONTINUED | OUTPATIENT
Start: 2017-02-25 | End: 2017-03-04 | Stop reason: HOSPADM

## 2017-02-25 RX ORDER — PANTOPRAZOLE SODIUM 40 MG/1
40 TABLET, DELAYED RELEASE ORAL
Status: DISCONTINUED | OUTPATIENT
Start: 2017-02-26 | End: 2017-03-04 | Stop reason: HOSPADM

## 2017-02-25 RX ORDER — ONDANSETRON 4 MG/1
4 TABLET, FILM COATED ORAL EVERY 6 HOURS PRN
Status: DISCONTINUED | OUTPATIENT
Start: 2017-02-25 | End: 2017-03-04 | Stop reason: HOSPADM

## 2017-02-25 RX ORDER — LISINOPRIL 20 MG/1
20 TABLET ORAL DAILY
Status: DISCONTINUED | OUTPATIENT
Start: 2017-02-26 | End: 2017-03-04 | Stop reason: HOSPADM

## 2017-02-25 RX ORDER — MORPHINE SULFATE 2 MG/ML
2 INJECTION, SOLUTION INTRAMUSCULAR; INTRAVENOUS
Status: DISCONTINUED | OUTPATIENT
Start: 2017-02-25 | End: 2017-03-04 | Stop reason: HOSPADM

## 2017-02-25 RX ORDER — BISACODYL 10 MG
10 SUPPOSITORY, RECTAL RECTAL DAILY PRN
Status: DISCONTINUED | OUTPATIENT
Start: 2017-02-25 | End: 2017-03-04 | Stop reason: HOSPADM

## 2017-02-25 RX ORDER — ONDANSETRON 2 MG/ML
4 INJECTION INTRAMUSCULAR; INTRAVENOUS EVERY 6 HOURS PRN
Status: DISCONTINUED | OUTPATIENT
Start: 2017-02-25 | End: 2017-03-04 | Stop reason: HOSPADM

## 2017-02-25 RX ORDER — DEXTROSE MONOHYDRATE 25 G/50ML
25 INJECTION, SOLUTION INTRAVENOUS
Status: DISCONTINUED | OUTPATIENT
Start: 2017-02-25 | End: 2017-03-04 | Stop reason: HOSPADM

## 2017-02-25 RX ORDER — PRAVASTATIN SODIUM 20 MG
20 TABLET ORAL NIGHTLY
Status: DISCONTINUED | OUTPATIENT
Start: 2017-02-25 | End: 2017-03-04 | Stop reason: HOSPADM

## 2017-02-25 RX ORDER — CEFAZOLIN SODIUM 2 G/100ML
2 INJECTION, SOLUTION INTRAVENOUS EVERY 8 HOURS
Status: DISCONTINUED | OUTPATIENT
Start: 2017-02-25 | End: 2017-02-27 | Stop reason: SDUPTHER

## 2017-02-25 RX ORDER — NICOTINE POLACRILEX 4 MG
15 LOZENGE BUCCAL
Status: DISCONTINUED | OUTPATIENT
Start: 2017-02-25 | End: 2017-03-04 | Stop reason: HOSPADM

## 2017-02-25 RX ORDER — RIFAMPIN 300 MG/1
300 CAPSULE ORAL EVERY 12 HOURS SCHEDULED
Status: DISCONTINUED | OUTPATIENT
Start: 2017-02-25 | End: 2017-03-04 | Stop reason: HOSPADM

## 2017-02-25 RX ORDER — SODIUM CHLORIDE 0.9 % (FLUSH) 0.9 %
10 SYRINGE (ML) INJECTION AS NEEDED
Status: DISCONTINUED | OUTPATIENT
Start: 2017-02-25 | End: 2017-03-04 | Stop reason: HOSPADM

## 2017-02-25 RX ORDER — SODIUM CHLORIDE 0.9 % (FLUSH) 0.9 %
1-10 SYRINGE (ML) INJECTION AS NEEDED
Status: DISCONTINUED | OUTPATIENT
Start: 2017-02-25 | End: 2017-03-04 | Stop reason: HOSPADM

## 2017-02-25 RX ORDER — SODIUM CHLORIDE 9 MG/ML
100 INJECTION, SOLUTION INTRAVENOUS CONTINUOUS
Status: DISCONTINUED | OUTPATIENT
Start: 2017-02-25 | End: 2017-02-28

## 2017-02-25 RX ORDER — HYDROCODONE BITARTRATE AND ACETAMINOPHEN 10; 325 MG/1; MG/1
1 TABLET ORAL EVERY 6 HOURS PRN
Status: DISCONTINUED | OUTPATIENT
Start: 2017-02-25 | End: 2017-03-04 | Stop reason: HOSPADM

## 2017-02-25 RX ORDER — SERTRALINE HYDROCHLORIDE 100 MG/1
100 TABLET, FILM COATED ORAL DAILY
Status: DISCONTINUED | OUTPATIENT
Start: 2017-02-26 | End: 2017-03-04 | Stop reason: HOSPADM

## 2017-02-25 RX ORDER — HYDROMORPHONE HYDROCHLORIDE 1 MG/ML
0.5 INJECTION, SOLUTION INTRAMUSCULAR; INTRAVENOUS; SUBCUTANEOUS ONCE
Status: COMPLETED | OUTPATIENT
Start: 2017-02-25 | End: 2017-02-25

## 2017-02-25 RX ORDER — ONDANSETRON 4 MG/1
4 TABLET, ORALLY DISINTEGRATING ORAL EVERY 6 HOURS PRN
Status: DISCONTINUED | OUTPATIENT
Start: 2017-02-25 | End: 2017-03-04 | Stop reason: HOSPADM

## 2017-02-25 RX ADMIN — HYDROMORPHONE HYDROCHLORIDE 0.5 MG: 1 INJECTION, SOLUTION INTRAMUSCULAR; INTRAVENOUS; SUBCUTANEOUS at 21:06

## 2017-02-25 RX ADMIN — Medication 10 ML: at 21:06

## 2017-02-25 RX ADMIN — HUMAN INSULIN 5 UNITS: 100 INJECTION, SOLUTION SUBCUTANEOUS at 22:05

## 2017-02-25 RX ADMIN — SODIUM CHLORIDE 1000 ML: 9 INJECTION, SOLUTION INTRAVENOUS at 18:21

## 2017-02-26 ENCOUNTER — APPOINTMENT (OUTPATIENT)
Dept: GENERAL RADIOLOGY | Facility: HOSPITAL | Age: 67
End: 2017-02-26

## 2017-02-26 PROBLEM — E87.1 HYPONATREMIA: Status: ACTIVE | Noted: 2017-02-26

## 2017-02-26 PROBLEM — E11.65 TYPE 2 DIABETES MELLITUS WITH HYPERGLYCEMIA: Status: ACTIVE | Noted: 2017-02-26

## 2017-02-26 LAB
ANION GAP SERPL CALCULATED.3IONS-SCNC: 14.6 MMOL/L
BASOPHILS # BLD AUTO: 0.01 10*3/MM3 (ref 0–0.2)
BASOPHILS NFR BLD AUTO: 0.1 % (ref 0–1.5)
BUN BLD-MCNC: 32 MG/DL (ref 8–23)
BUN/CREAT SERPL: 23.4 (ref 7–25)
CALCIUM SPEC-SCNC: 8.8 MG/DL (ref 8.6–10.5)
CHLORIDE SERPL-SCNC: 92 MMOL/L (ref 98–107)
CO2 SERPL-SCNC: 19.4 MMOL/L (ref 22–29)
CREAT BLD-MCNC: 1.37 MG/DL (ref 0.76–1.27)
CRP SERPL-MCNC: 30.73 MG/DL (ref 0–0.5)
DEPRECATED RDW RBC AUTO: 45.5 FL (ref 37–54)
EOSINOPHIL # BLD AUTO: 0.14 10*3/MM3 (ref 0–0.7)
EOSINOPHIL NFR BLD AUTO: 1 % (ref 0.3–6.2)
ERYTHROCYTE [DISTWIDTH] IN BLOOD BY AUTOMATED COUNT: 13.6 % (ref 11.5–14.5)
ERYTHROCYTE [SEDIMENTATION RATE] IN BLOOD: >140 MM/HR (ref 0–20)
GFR SERPL CREATININE-BSD FRML MDRD: 52 ML/MIN/1.73
GLUCOSE BLD-MCNC: 417 MG/DL (ref 65–99)
GLUCOSE BLDC GLUCOMTR-MCNC: 287 MG/DL (ref 70–130)
GLUCOSE BLDC GLUCOMTR-MCNC: 302 MG/DL (ref 70–130)
GLUCOSE BLDC GLUCOMTR-MCNC: 355 MG/DL (ref 70–130)
GLUCOSE BLDC GLUCOMTR-MCNC: 402 MG/DL (ref 70–130)
GLUCOSE BLDC GLUCOMTR-MCNC: 408 MG/DL (ref 70–130)
GLUCOSE BLDC GLUCOMTR-MCNC: 417 MG/DL (ref 70–130)
GLUCOSE BLDC GLUCOMTR-MCNC: 437 MG/DL (ref 70–130)
HCT VFR BLD AUTO: 29.9 % (ref 40.4–52.2)
HGB BLD-MCNC: 9.9 G/DL (ref 13.7–17.6)
IMM GRANULOCYTES # BLD: 0.06 10*3/MM3 (ref 0–0.03)
IMM GRANULOCYTES NFR BLD: 0.4 % (ref 0–0.5)
LYMPHOCYTES # BLD AUTO: 1.91 10*3/MM3 (ref 0.9–4.8)
LYMPHOCYTES NFR BLD AUTO: 13.3 % (ref 19.6–45.3)
MCH RBC QN AUTO: 30.4 PG (ref 27–32.7)
MCHC RBC AUTO-ENTMCNC: 33.1 G/DL (ref 32.6–36.4)
MCV RBC AUTO: 91.7 FL (ref 79.8–96.2)
MONOCYTES # BLD AUTO: 0.9 10*3/MM3 (ref 0.2–1.2)
MONOCYTES NFR BLD AUTO: 6.3 % (ref 5–12)
NEUTROPHILS # BLD AUTO: 11.36 10*3/MM3 (ref 1.9–8.1)
NEUTROPHILS NFR BLD AUTO: 78.9 % (ref 42.7–76)
PLATELET # BLD AUTO: 323 10*3/MM3 (ref 140–500)
PMV BLD AUTO: 10.2 FL (ref 6–12)
POTASSIUM BLD-SCNC: 4.4 MMOL/L (ref 3.5–5.2)
RBC # BLD AUTO: 3.26 10*6/MM3 (ref 4.6–6)
SODIUM BLD-SCNC: 126 MMOL/L (ref 136–145)
WBC NRBC COR # BLD: 14.38 10*3/MM3 (ref 4.5–10.7)

## 2017-02-26 PROCEDURE — 25010000002 ENOXAPARIN PER 10 MG: Performed by: INTERNAL MEDICINE

## 2017-02-26 PROCEDURE — 99223 1ST HOSP IP/OBS HIGH 75: CPT | Performed by: INTERNAL MEDICINE

## 2017-02-26 PROCEDURE — 86140 C-REACTIVE PROTEIN: CPT | Performed by: INTERNAL MEDICINE

## 2017-02-26 PROCEDURE — 85025 COMPLETE CBC W/AUTO DIFF WBC: CPT | Performed by: INTERNAL MEDICINE

## 2017-02-26 PROCEDURE — 25010000002 MORPHINE PER 10 MG: Performed by: INTERNAL MEDICINE

## 2017-02-26 PROCEDURE — 63710000001 INSULIN DETEMER PER 5 UNITS: Performed by: INTERNAL MEDICINE

## 2017-02-26 PROCEDURE — 97162 PT EVAL MOD COMPLEX 30 MIN: CPT

## 2017-02-26 PROCEDURE — 85652 RBC SED RATE AUTOMATED: CPT | Performed by: INTERNAL MEDICINE

## 2017-02-26 PROCEDURE — 63710000001 INSULIN ASPART PER 5 UNITS: Performed by: INTERNAL MEDICINE

## 2017-02-26 PROCEDURE — 82962 GLUCOSE BLOOD TEST: CPT

## 2017-02-26 PROCEDURE — 71010 HC CHEST PA OR AP: CPT

## 2017-02-26 PROCEDURE — 80048 BASIC METABOLIC PNL TOTAL CA: CPT | Performed by: INTERNAL MEDICINE

## 2017-02-26 PROCEDURE — 87040 BLOOD CULTURE FOR BACTERIA: CPT | Performed by: INTERNAL MEDICINE

## 2017-02-26 PROCEDURE — 25010000003 CEFAZOLIN IN DEXTROSE 2-4 GM/100ML-% SOLUTION: Performed by: INTERNAL MEDICINE

## 2017-02-26 RX ORDER — LIDOCAINE HYDROCHLORIDE 10 MG/ML
10 INJECTION, SOLUTION INFILTRATION; PERINEURAL ONCE
Status: DISCONTINUED | OUTPATIENT
Start: 2017-02-26 | End: 2017-03-04 | Stop reason: HOSPADM

## 2017-02-26 RX ORDER — ACETAMINOPHEN 325 MG/1
650 TABLET ORAL EVERY 4 HOURS PRN
Status: DISCONTINUED | OUTPATIENT
Start: 2017-02-26 | End: 2017-03-04 | Stop reason: HOSPADM

## 2017-02-26 RX ADMIN — SILVER SULFADIAZINE: 10 CREAM TOPICAL at 08:19

## 2017-02-26 RX ADMIN — CEFAZOLIN SODIUM 2 G: 2 INJECTION, SOLUTION INTRAVENOUS at 06:54

## 2017-02-26 RX ADMIN — SERTRALINE 100 MG: 100 TABLET, FILM COATED ORAL at 08:18

## 2017-02-26 RX ADMIN — RIFAMPIN 300 MG: 300 CAPSULE ORAL at 01:53

## 2017-02-26 RX ADMIN — PRAVASTATIN SODIUM 20 MG: 20 TABLET ORAL at 01:53

## 2017-02-26 RX ADMIN — INSULIN ASPART 14 UNITS: 100 INJECTION, SOLUTION INTRAVENOUS; SUBCUTANEOUS at 01:51

## 2017-02-26 RX ADMIN — HYDROCODONE BITARTRATE AND ACETAMINOPHEN 1 TABLET: 10; 325 TABLET ORAL at 01:17

## 2017-02-26 RX ADMIN — LISINOPRIL 20 MG: 20 TABLET ORAL at 08:18

## 2017-02-26 RX ADMIN — SODIUM CHLORIDE 100 ML/HR: 9 INJECTION, SOLUTION INTRAVENOUS at 12:49

## 2017-02-26 RX ADMIN — INSULIN DETEMIR 50 UNITS: 100 INJECTION, SOLUTION SUBCUTANEOUS at 21:10

## 2017-02-26 RX ADMIN — ENOXAPARIN SODIUM 40 MG: 40 INJECTION SUBCUTANEOUS at 11:32

## 2017-02-26 RX ADMIN — SODIUM CHLORIDE 100 ML/HR: 9 INJECTION, SOLUTION INTRAVENOUS at 01:18

## 2017-02-26 RX ADMIN — SILVER SULFADIAZINE: 10 CREAM TOPICAL at 20:17

## 2017-02-26 RX ADMIN — PRAVASTATIN SODIUM 20 MG: 20 TABLET ORAL at 20:17

## 2017-02-26 RX ADMIN — MORPHINE SULFATE 2 MG: 2 INJECTION, SOLUTION INTRAMUSCULAR; INTRAVENOUS at 04:04

## 2017-02-26 RX ADMIN — RIFAMPIN 300 MG: 300 CAPSULE ORAL at 08:19

## 2017-02-26 RX ADMIN — HYDROCODONE BITARTRATE AND ACETAMINOPHEN 1 TABLET: 10; 325 TABLET ORAL at 20:22

## 2017-02-26 RX ADMIN — MORPHINE SULFATE 2 MG: 2 INJECTION, SOLUTION INTRAMUSCULAR; INTRAVENOUS at 17:03

## 2017-02-26 RX ADMIN — INSULIN ASPART 12 UNITS: 100 INJECTION, SOLUTION INTRAVENOUS; SUBCUTANEOUS at 17:03

## 2017-02-26 RX ADMIN — MORPHINE SULFATE 2 MG: 2 INJECTION, SOLUTION INTRAMUSCULAR; INTRAVENOUS at 02:09

## 2017-02-26 RX ADMIN — MORPHINE SULFATE 2 MG: 2 INJECTION, SOLUTION INTRAMUSCULAR; INTRAVENOUS at 11:32

## 2017-02-26 RX ADMIN — ACETAMINOPHEN 325 MG: 325 TABLET ORAL at 23:19

## 2017-02-26 RX ADMIN — ACETAMINOPHEN 325 MG: 325 TABLET ORAL at 22:23

## 2017-02-26 RX ADMIN — PANTOPRAZOLE SODIUM 40 MG: 40 TABLET, DELAYED RELEASE ORAL at 06:53

## 2017-02-26 RX ADMIN — HYDROCODONE BITARTRATE AND ACETAMINOPHEN 1 TABLET: 10; 325 TABLET ORAL at 14:09

## 2017-02-26 RX ADMIN — INSULIN ASPART 15 UNITS: 100 INJECTION, SOLUTION INTRAVENOUS; SUBCUTANEOUS at 11:33

## 2017-02-26 RX ADMIN — RIFAMPIN 300 MG: 300 CAPSULE ORAL at 20:17

## 2017-02-26 RX ADMIN — INSULIN ASPART 9 UNITS: 100 INJECTION, SOLUTION INTRAVENOUS; SUBCUTANEOUS at 21:10

## 2017-02-26 RX ADMIN — INSULIN DETEMIR 47 UNITS: 100 INJECTION, SOLUTION SUBCUTANEOUS at 02:09

## 2017-02-26 RX ADMIN — CEFAZOLIN SODIUM 2 G: 2 INJECTION, SOLUTION INTRAVENOUS at 16:13

## 2017-02-26 RX ADMIN — INSULIN ASPART 8 UNITS: 100 INJECTION, SOLUTION INTRAVENOUS; SUBCUTANEOUS at 11:33

## 2017-02-26 RX ADMIN — CEFAZOLIN SODIUM 2 G: 2 INJECTION, SOLUTION INTRAVENOUS at 01:53

## 2017-02-26 RX ADMIN — INSULIN ASPART 8 UNITS: 100 INJECTION, SOLUTION INTRAVENOUS; SUBCUTANEOUS at 17:03

## 2017-02-26 RX ADMIN — MORPHINE SULFATE 2 MG: 2 INJECTION, SOLUTION INTRAMUSCULAR; INTRAVENOUS at 23:19

## 2017-02-26 RX ADMIN — CEFAZOLIN SODIUM 2 G: 2 INJECTION, SOLUTION INTRAVENOUS at 22:25

## 2017-02-26 RX ADMIN — HYDROCODONE BITARTRATE AND ACETAMINOPHEN 1 TABLET: 10; 325 TABLET ORAL at 07:00

## 2017-02-26 RX ADMIN — INSULIN ASPART 14 UNITS: 100 INJECTION, SOLUTION INTRAVENOUS; SUBCUTANEOUS at 08:18

## 2017-02-27 LAB
ANION GAP SERPL CALCULATED.3IONS-SCNC: 16.2 MMOL/L
BUN BLD-MCNC: 37 MG/DL (ref 8–23)
BUN/CREAT SERPL: 26.6 (ref 7–25)
CALCIUM SPEC-SCNC: 8.5 MG/DL (ref 8.6–10.5)
CHLORIDE SERPL-SCNC: 99 MMOL/L (ref 98–107)
CO2 SERPL-SCNC: 18.8 MMOL/L (ref 22–29)
CREAT BLD-MCNC: 1.39 MG/DL (ref 0.76–1.27)
DEPRECATED RDW RBC AUTO: 45.5 FL (ref 37–54)
ERYTHROCYTE [DISTWIDTH] IN BLOOD BY AUTOMATED COUNT: 13.5 % (ref 11.5–14.5)
GFR SERPL CREATININE-BSD FRML MDRD: 51 ML/MIN/1.73
GLUCOSE BLD-MCNC: 253 MG/DL (ref 65–99)
GLUCOSE BLDC GLUCOMTR-MCNC: 271 MG/DL (ref 70–130)
GLUCOSE BLDC GLUCOMTR-MCNC: 283 MG/DL (ref 70–130)
GLUCOSE BLDC GLUCOMTR-MCNC: 294 MG/DL (ref 70–130)
GLUCOSE BLDC GLUCOMTR-MCNC: 401 MG/DL (ref 70–130)
HCT VFR BLD AUTO: 28.4 % (ref 40.4–52.2)
HGB BLD-MCNC: 9.4 G/DL (ref 13.7–17.6)
MCH RBC QN AUTO: 30.6 PG (ref 27–32.7)
MCHC RBC AUTO-ENTMCNC: 33.1 G/DL (ref 32.6–36.4)
MCV RBC AUTO: 92.5 FL (ref 79.8–96.2)
PLATELET # BLD AUTO: 315 10*3/MM3 (ref 140–500)
PMV BLD AUTO: 9.9 FL (ref 6–12)
POTASSIUM BLD-SCNC: 4.2 MMOL/L (ref 3.5–5.2)
PROCALCITONIN SERPL-MCNC: 1.56 NG/ML (ref 0.1–0.25)
RBC # BLD AUTO: 3.07 10*6/MM3 (ref 4.6–6)
SODIUM BLD-SCNC: 134 MMOL/L (ref 136–145)
WBC NRBC COR # BLD: 14.32 10*3/MM3 (ref 4.5–10.7)

## 2017-02-27 PROCEDURE — 25010000002 ENOXAPARIN PER 10 MG: Performed by: INTERNAL MEDICINE

## 2017-02-27 PROCEDURE — 25010000002 MORPHINE PER 10 MG: Performed by: INTERNAL MEDICINE

## 2017-02-27 PROCEDURE — 84145 PROCALCITONIN (PCT): CPT | Performed by: INTERNAL MEDICINE

## 2017-02-27 PROCEDURE — 63710000001 INSULIN ASPART PER 5 UNITS: Performed by: INTERNAL MEDICINE

## 2017-02-27 PROCEDURE — 80048 BASIC METABOLIC PNL TOTAL CA: CPT | Performed by: HOSPITALIST

## 2017-02-27 PROCEDURE — 97110 THERAPEUTIC EXERCISES: CPT

## 2017-02-27 PROCEDURE — 99233 SBSQ HOSP IP/OBS HIGH 50: CPT | Performed by: INTERNAL MEDICINE

## 2017-02-27 PROCEDURE — 99232 SBSQ HOSP IP/OBS MODERATE 35: CPT | Performed by: INTERNAL MEDICINE

## 2017-02-27 PROCEDURE — 25010000003 CEFAZOLIN IN DEXTROSE 2-4 GM/100ML-% SOLUTION: Performed by: INTERNAL MEDICINE

## 2017-02-27 PROCEDURE — 85027 COMPLETE CBC AUTOMATED: CPT | Performed by: HOSPITALIST

## 2017-02-27 PROCEDURE — 82962 GLUCOSE BLOOD TEST: CPT

## 2017-02-27 RX ORDER — BUPIVACAINE HCL/0.9 % NACL/PF 0.1 %
2 PLASTIC BAG, INJECTION (ML) EPIDURAL EVERY 8 HOURS SCHEDULED
Status: DISCONTINUED | OUTPATIENT
Start: 2017-02-27 | End: 2017-03-04 | Stop reason: HOSPADM

## 2017-02-27 RX ADMIN — SERTRALINE 100 MG: 100 TABLET, FILM COATED ORAL at 08:39

## 2017-02-27 RX ADMIN — ACETAMINOPHEN 650 MG: 325 TABLET ORAL at 06:38

## 2017-02-27 RX ADMIN — SILVER SULFADIAZINE: 10 CREAM TOPICAL at 08:40

## 2017-02-27 RX ADMIN — MORPHINE SULFATE 2 MG: 2 INJECTION, SOLUTION INTRAMUSCULAR; INTRAVENOUS at 12:25

## 2017-02-27 RX ADMIN — PRAVASTATIN SODIUM 20 MG: 20 TABLET ORAL at 21:04

## 2017-02-27 RX ADMIN — ENOXAPARIN SODIUM 40 MG: 40 INJECTION SUBCUTANEOUS at 12:19

## 2017-02-27 RX ADMIN — CEFAZOLIN 2 G: 1 INJECTION, POWDER, FOR SOLUTION INTRAMUSCULAR; INTRAVENOUS; PARENTERAL at 16:03

## 2017-02-27 RX ADMIN — MORPHINE SULFATE 2 MG: 2 INJECTION, SOLUTION INTRAMUSCULAR; INTRAVENOUS at 19:25

## 2017-02-27 RX ADMIN — SILVER SULFADIAZINE: 10 CREAM TOPICAL at 21:04

## 2017-02-27 RX ADMIN — HYDROCODONE BITARTRATE AND ACETAMINOPHEN 1 TABLET: 10; 325 TABLET ORAL at 02:15

## 2017-02-27 RX ADMIN — PANTOPRAZOLE SODIUM 40 MG: 40 TABLET, DELAYED RELEASE ORAL at 07:28

## 2017-02-27 RX ADMIN — INSULIN ASPART 10 UNITS: 100 INJECTION, SOLUTION INTRAVENOUS; SUBCUTANEOUS at 12:19

## 2017-02-27 RX ADMIN — RIFAMPIN 300 MG: 300 CAPSULE ORAL at 08:39

## 2017-02-27 RX ADMIN — CEFAZOLIN SODIUM 2 G: 2 INJECTION, SOLUTION INTRAVENOUS at 06:38

## 2017-02-27 RX ADMIN — SODIUM CHLORIDE 100 ML/HR: 9 INJECTION, SOLUTION INTRAVENOUS at 00:29

## 2017-02-27 RX ADMIN — INSULIN ASPART 15 UNITS: 100 INJECTION, SOLUTION INTRAVENOUS; SUBCUTANEOUS at 12:19

## 2017-02-27 RX ADMIN — INSULIN ASPART 9 UNITS: 100 INJECTION, SOLUTION INTRAVENOUS; SUBCUTANEOUS at 18:15

## 2017-02-27 RX ADMIN — HYDROCODONE BITARTRATE AND ACETAMINOPHEN 1 TABLET: 10; 325 TABLET ORAL at 14:34

## 2017-02-27 RX ADMIN — HYDROCODONE BITARTRATE AND ACETAMINOPHEN 1 TABLET: 10; 325 TABLET ORAL at 08:39

## 2017-02-27 RX ADMIN — SODIUM CHLORIDE 100 ML/HR: 9 INJECTION, SOLUTION INTRAVENOUS at 12:20

## 2017-02-27 RX ADMIN — INSULIN ASPART 9 UNITS: 100 INJECTION, SOLUTION INTRAVENOUS; SUBCUTANEOUS at 08:40

## 2017-02-27 RX ADMIN — RIFAMPIN 300 MG: 300 CAPSULE ORAL at 21:04

## 2017-02-27 RX ADMIN — LISINOPRIL 20 MG: 20 TABLET ORAL at 08:39

## 2017-02-27 RX ADMIN — CEFAZOLIN 2 G: 1 INJECTION, POWDER, FOR SOLUTION INTRAMUSCULAR; INTRAVENOUS; PARENTERAL at 21:05

## 2017-02-27 RX ADMIN — MORPHINE SULFATE 2 MG: 2 INJECTION, SOLUTION INTRAMUSCULAR; INTRAVENOUS at 03:55

## 2017-02-27 RX ADMIN — HYDROCODONE BITARTRATE AND ACETAMINOPHEN 1 TABLET: 10; 325 TABLET ORAL at 21:04

## 2017-02-27 RX ADMIN — INSULIN ASPART 8 UNITS: 100 INJECTION, SOLUTION INTRAVENOUS; SUBCUTANEOUS at 08:40

## 2017-02-27 RX ADMIN — INSULIN ASPART 9 UNITS: 100 INJECTION, SOLUTION INTRAVENOUS; SUBCUTANEOUS at 23:27

## 2017-02-28 ENCOUNTER — APPOINTMENT (OUTPATIENT)
Dept: MRI IMAGING | Facility: HOSPITAL | Age: 67
End: 2017-02-28
Attending: INTERNAL MEDICINE

## 2017-02-28 ENCOUNTER — APPOINTMENT (OUTPATIENT)
Dept: CARDIOLOGY | Facility: HOSPITAL | Age: 67
End: 2017-02-28

## 2017-02-28 LAB
GLUCOSE BLDC GLUCOMTR-MCNC: 263 MG/DL (ref 70–130)
GLUCOSE BLDC GLUCOMTR-MCNC: 350 MG/DL (ref 70–130)
GLUCOSE BLDC GLUCOMTR-MCNC: 377 MG/DL (ref 70–130)
GLUCOSE BLDC GLUCOMTR-MCNC: 412 MG/DL (ref 70–130)

## 2017-02-28 PROCEDURE — 25010000002 MIDAZOLAM PER 1 MG: Performed by: INTERNAL MEDICINE

## 2017-02-28 PROCEDURE — 72158 MRI LUMBAR SPINE W/O & W/DYE: CPT

## 2017-02-28 PROCEDURE — 63710000001 INSULIN DETEMER PER 5 UNITS: Performed by: INTERNAL MEDICINE

## 2017-02-28 PROCEDURE — 93320 DOPPLER ECHO COMPLETE: CPT

## 2017-02-28 PROCEDURE — 93010 ELECTROCARDIOGRAM REPORT: CPT | Performed by: INTERNAL MEDICINE

## 2017-02-28 PROCEDURE — 99232 SBSQ HOSP IP/OBS MODERATE 35: CPT | Performed by: INTERNAL MEDICINE

## 2017-02-28 PROCEDURE — 99233 SBSQ HOSP IP/OBS HIGH 50: CPT | Performed by: INTERNAL MEDICINE

## 2017-02-28 PROCEDURE — 93325 DOPPLER ECHO COLOR FLOW MAPG: CPT | Performed by: INTERNAL MEDICINE

## 2017-02-28 PROCEDURE — 0 GADOBENATE DIMEGLUMINE 529 MG/ML SOLUTION: Performed by: HOSPITALIST

## 2017-02-28 PROCEDURE — 93312 ECHO TRANSESOPHAGEAL: CPT

## 2017-02-28 PROCEDURE — 25010000002 MORPHINE PER 10 MG: Performed by: INTERNAL MEDICINE

## 2017-02-28 PROCEDURE — 93325 DOPPLER ECHO COLOR FLOW MAPG: CPT

## 2017-02-28 PROCEDURE — 63710000001 INSULIN ASPART PER 5 UNITS: Performed by: INTERNAL MEDICINE

## 2017-02-28 PROCEDURE — 90791 PSYCH DIAGNOSTIC EVALUATION: CPT | Performed by: SOCIAL WORKER

## 2017-02-28 PROCEDURE — 93320 DOPPLER ECHO COMPLETE: CPT | Performed by: INTERNAL MEDICINE

## 2017-02-28 PROCEDURE — A9577 INJ MULTIHANCE: HCPCS | Performed by: HOSPITALIST

## 2017-02-28 PROCEDURE — 93312 ECHO TRANSESOPHAGEAL: CPT | Performed by: INTERNAL MEDICINE

## 2017-02-28 PROCEDURE — 25010000002 FENTANYL CITRATE (PF) 100 MCG/2ML SOLUTION: Performed by: INTERNAL MEDICINE

## 2017-02-28 PROCEDURE — 82962 GLUCOSE BLOOD TEST: CPT

## 2017-02-28 PROCEDURE — 93005 ELECTROCARDIOGRAM TRACING: CPT | Performed by: NURSE PRACTITIONER

## 2017-02-28 PROCEDURE — 97110 THERAPEUTIC EXERCISES: CPT

## 2017-02-28 PROCEDURE — B246ZZ4 ULTRASONOGRAPHY OF RIGHT AND LEFT HEART, TRANSESOPHAGEAL: ICD-10-PCS | Performed by: HOSPITALIST

## 2017-02-28 PROCEDURE — 99222 1ST HOSP IP/OBS MODERATE 55: CPT | Performed by: INTERNAL MEDICINE

## 2017-02-28 RX ORDER — MIDAZOLAM HYDROCHLORIDE 1 MG/ML
INJECTION INTRAMUSCULAR; INTRAVENOUS
Status: COMPLETED | OUTPATIENT
Start: 2017-02-28 | End: 2017-02-28

## 2017-02-28 RX ORDER — SODIUM CHLORIDE 9 MG/ML
INJECTION, SOLUTION INTRAVENOUS
Status: COMPLETED | OUTPATIENT
Start: 2017-02-28 | End: 2017-02-28

## 2017-02-28 RX ORDER — FENTANYL CITRATE 50 UG/ML
INJECTION, SOLUTION INTRAMUSCULAR; INTRAVENOUS
Status: COMPLETED | OUTPATIENT
Start: 2017-02-28 | End: 2017-02-28

## 2017-02-28 RX ADMIN — MIDAZOLAM HYDROCHLORIDE 2 MG: 1 INJECTION, SOLUTION INTRAMUSCULAR; INTRAVENOUS at 12:51

## 2017-02-28 RX ADMIN — INSULIN ASPART 15 UNITS: 100 INJECTION, SOLUTION INTRAVENOUS; SUBCUTANEOUS at 12:07

## 2017-02-28 RX ADMIN — CEFAZOLIN 2 G: 1 INJECTION, POWDER, FOR SOLUTION INTRAMUSCULAR; INTRAVENOUS; PARENTERAL at 21:33

## 2017-02-28 RX ADMIN — SODIUM CHLORIDE 15 ML/HR: 9 INJECTION, SOLUTION INTRAVENOUS at 12:33

## 2017-02-28 RX ADMIN — INSULIN ASPART 10 UNITS: 100 INJECTION, SOLUTION INTRAVENOUS; SUBCUTANEOUS at 08:46

## 2017-02-28 RX ADMIN — GADOBENATE DIMEGLUMINE 20 ML: 529 INJECTION, SOLUTION INTRAVENOUS at 15:10

## 2017-02-28 RX ADMIN — HYDROCODONE BITARTRATE AND ACETAMINOPHEN 1 TABLET: 10; 325 TABLET ORAL at 03:28

## 2017-02-28 RX ADMIN — MORPHINE SULFATE 2 MG: 2 INJECTION, SOLUTION INTRAMUSCULAR; INTRAVENOUS at 05:51

## 2017-02-28 RX ADMIN — MORPHINE SULFATE 2 MG: 2 INJECTION, SOLUTION INTRAMUSCULAR; INTRAVENOUS at 15:38

## 2017-02-28 RX ADMIN — LIDOCAINE HYDROCHLORIDE 10 ML: 20 SOLUTION ORAL; TOPICAL at 12:33

## 2017-02-28 RX ADMIN — CEFAZOLIN 2 G: 1 INJECTION, POWDER, FOR SOLUTION INTRAMUSCULAR; INTRAVENOUS; PARENTERAL at 15:39

## 2017-02-28 RX ADMIN — HYDROCODONE BITARTRATE AND ACETAMINOPHEN 1 TABLET: 10; 325 TABLET ORAL at 16:20

## 2017-02-28 RX ADMIN — PRAVASTATIN SODIUM 20 MG: 20 TABLET ORAL at 21:30

## 2017-02-28 RX ADMIN — MORPHINE SULFATE 2 MG: 2 INJECTION, SOLUTION INTRAMUSCULAR; INTRAVENOUS at 18:09

## 2017-02-28 RX ADMIN — PANTOPRAZOLE SODIUM 40 MG: 40 TABLET, DELAYED RELEASE ORAL at 05:49

## 2017-02-28 RX ADMIN — INSULIN DETEMIR 55 UNITS: 100 INJECTION, SOLUTION SUBCUTANEOUS at 23:48

## 2017-02-28 RX ADMIN — HYDROCODONE BITARTRATE AND ACETAMINOPHEN 1 TABLET: 10; 325 TABLET ORAL at 23:46

## 2017-02-28 RX ADMIN — BISACODYL 10 MG: 10 SUPPOSITORY RECTAL at 03:28

## 2017-02-28 RX ADMIN — MORPHINE SULFATE 2 MG: 2 INJECTION, SOLUTION INTRAMUSCULAR; INTRAVENOUS at 00:13

## 2017-02-28 RX ADMIN — SERTRALINE 100 MG: 100 TABLET, FILM COATED ORAL at 08:45

## 2017-02-28 RX ADMIN — BENZOCAINE, BUTAMBEN, AND TETRACAINE HYDROCHLORIDE 1 SPRAY: .028; .004; .004 AEROSOL, SPRAY TOPICAL at 12:49

## 2017-02-28 RX ADMIN — MORPHINE SULFATE 2 MG: 2 INJECTION, SOLUTION INTRAMUSCULAR; INTRAVENOUS at 08:45

## 2017-02-28 RX ADMIN — RIFAMPIN 300 MG: 300 CAPSULE ORAL at 08:45

## 2017-02-28 RX ADMIN — FENTANYL CITRATE 50 MCG: 50 INJECTION INTRAMUSCULAR; INTRAVENOUS at 12:51

## 2017-02-28 RX ADMIN — INSULIN ASPART 12 UNITS: 100 INJECTION, SOLUTION INTRAVENOUS; SUBCUTANEOUS at 17:14

## 2017-02-28 RX ADMIN — CEFAZOLIN 2 G: 1 INJECTION, POWDER, FOR SOLUTION INTRAMUSCULAR; INTRAVENOUS; PARENTERAL at 05:50

## 2017-02-28 RX ADMIN — RIFAMPIN 300 MG: 300 CAPSULE ORAL at 21:30

## 2017-02-28 RX ADMIN — INSULIN ASPART 9 UNITS: 100 INJECTION, SOLUTION INTRAVENOUS; SUBCUTANEOUS at 08:46

## 2017-02-28 RX ADMIN — INSULIN ASPART 10 UNITS: 100 INJECTION, SOLUTION INTRAVENOUS; SUBCUTANEOUS at 17:14

## 2017-02-28 RX ADMIN — SILVER SULFADIAZINE: 10 CREAM TOPICAL at 08:47

## 2017-02-28 RX ADMIN — INSULIN ASPART 15 UNITS: 100 INJECTION, SOLUTION INTRAVENOUS; SUBCUTANEOUS at 21:31

## 2017-02-28 RX ADMIN — MIDAZOLAM HYDROCHLORIDE 2 MG: 1 INJECTION, SOLUTION INTRAMUSCULAR; INTRAVENOUS at 12:52

## 2017-02-28 RX ADMIN — MORPHINE SULFATE 2 MG: 2 INJECTION, SOLUTION INTRAMUSCULAR; INTRAVENOUS at 10:41

## 2017-02-28 RX ADMIN — LISINOPRIL 20 MG: 20 TABLET ORAL at 08:45

## 2017-02-28 RX ADMIN — MORPHINE SULFATE 2 MG: 2 INJECTION, SOLUTION INTRAMUSCULAR; INTRAVENOUS at 21:30

## 2017-03-01 LAB
ANION GAP SERPL CALCULATED.3IONS-SCNC: 13.7 MMOL/L
BH CV ECHO MEAS - BSA(HAYCOCK): 2.5 M^2
BH CV ECHO MEAS - BSA: 2.4 M^2
BH CV ECHO MEAS - BZI_BMI: 33.1 KILOGRAMS/M^2
BH CV ECHO MEAS - BZI_METRIC_HEIGHT: 188 CM
BH CV ECHO MEAS - BZI_METRIC_WEIGHT: 117 KG
BUN BLD-MCNC: 41 MG/DL (ref 8–23)
BUN/CREAT SERPL: 33.9 (ref 7–25)
CALCIUM SPEC-SCNC: 8.4 MG/DL (ref 8.6–10.5)
CHLORIDE SERPL-SCNC: 98 MMOL/L (ref 98–107)
CHOLEST SERPL-MCNC: 138 MG/DL (ref 0–200)
CO2 SERPL-SCNC: 18.3 MMOL/L (ref 22–29)
CREAT BLD-MCNC: 1.21 MG/DL (ref 0.76–1.27)
DEPRECATED RDW RBC AUTO: 44.8 FL (ref 37–54)
ERYTHROCYTE [DISTWIDTH] IN BLOOD BY AUTOMATED COUNT: 13.6 % (ref 11.5–14.5)
GFR SERPL CREATININE-BSD FRML MDRD: 60 ML/MIN/1.73
GLUCOSE BLD-MCNC: 290 MG/DL (ref 65–99)
GLUCOSE BLDC GLUCOMTR-MCNC: 292 MG/DL (ref 70–130)
GLUCOSE BLDC GLUCOMTR-MCNC: 306 MG/DL (ref 70–130)
GLUCOSE BLDC GLUCOMTR-MCNC: 334 MG/DL (ref 70–130)
GLUCOSE BLDC GLUCOMTR-MCNC: 360 MG/DL (ref 70–130)
HCT VFR BLD AUTO: 25.9 % (ref 40.4–52.2)
HDLC SERPL-MCNC: 26 MG/DL (ref 40–60)
HGB BLD-MCNC: 8.6 G/DL (ref 13.7–17.6)
LDLC SERPL CALC-MCNC: 78 MG/DL (ref 0–100)
LDLC/HDLC SERPL: 3.01 {RATIO}
MCH RBC QN AUTO: 29.8 PG (ref 27–32.7)
MCHC RBC AUTO-ENTMCNC: 33.2 G/DL (ref 32.6–36.4)
MCV RBC AUTO: 89.6 FL (ref 79.8–96.2)
PLATELET # BLD AUTO: 330 10*3/MM3 (ref 140–500)
PMV BLD AUTO: 9.8 FL (ref 6–12)
POTASSIUM BLD-SCNC: 4.4 MMOL/L (ref 3.5–5.2)
RBC # BLD AUTO: 2.89 10*6/MM3 (ref 4.6–6)
SODIUM BLD-SCNC: 130 MMOL/L (ref 136–145)
TRIGL SERPL-MCNC: 169 MG/DL (ref 0–150)
VLDLC SERPL-MCNC: 33.8 MG/DL (ref 5–40)
WBC NRBC COR # BLD: 10.58 10*3/MM3 (ref 4.5–10.7)

## 2017-03-01 PROCEDURE — 82962 GLUCOSE BLOOD TEST: CPT

## 2017-03-01 PROCEDURE — 63710000001 INSULIN DETEMER PER 5 UNITS: Performed by: INTERNAL MEDICINE

## 2017-03-01 PROCEDURE — 80061 LIPID PANEL: CPT | Performed by: NURSE PRACTITIONER

## 2017-03-01 PROCEDURE — 99232 SBSQ HOSP IP/OBS MODERATE 35: CPT | Performed by: INTERNAL MEDICINE

## 2017-03-01 PROCEDURE — 25010000002 MORPHINE PER 10 MG: Performed by: INTERNAL MEDICINE

## 2017-03-01 PROCEDURE — 99233 SBSQ HOSP IP/OBS HIGH 50: CPT | Performed by: INTERNAL MEDICINE

## 2017-03-01 PROCEDURE — 97110 THERAPEUTIC EXERCISES: CPT

## 2017-03-01 PROCEDURE — 85027 COMPLETE CBC AUTOMATED: CPT | Performed by: HOSPITALIST

## 2017-03-01 PROCEDURE — 80048 BASIC METABOLIC PNL TOTAL CA: CPT | Performed by: HOSPITALIST

## 2017-03-01 PROCEDURE — 25010000002 ENOXAPARIN PER 10 MG: Performed by: INTERNAL MEDICINE

## 2017-03-01 PROCEDURE — 63710000001 INSULIN ASPART PER 5 UNITS: Performed by: INTERNAL MEDICINE

## 2017-03-01 RX ORDER — AMLODIPINE BESYLATE 5 MG/1
5 TABLET ORAL
Status: DISCONTINUED | OUTPATIENT
Start: 2017-03-01 | End: 2017-03-01

## 2017-03-01 RX ORDER — CHLORTHALIDONE 25 MG/1
25 TABLET ORAL DAILY
Status: DISCONTINUED | OUTPATIENT
Start: 2017-03-01 | End: 2017-03-04 | Stop reason: HOSPADM

## 2017-03-01 RX ORDER — AMLODIPINE BESYLATE 10 MG/1
10 TABLET ORAL
Status: DISCONTINUED | OUTPATIENT
Start: 2017-03-02 | End: 2017-03-02

## 2017-03-01 RX ADMIN — PRAVASTATIN SODIUM 20 MG: 20 TABLET ORAL at 20:27

## 2017-03-01 RX ADMIN — MORPHINE SULFATE 2 MG: 2 INJECTION, SOLUTION INTRAMUSCULAR; INTRAVENOUS at 04:23

## 2017-03-01 RX ADMIN — HYDROCODONE BITARTRATE AND ACETAMINOPHEN 1 TABLET: 10; 325 TABLET ORAL at 12:48

## 2017-03-01 RX ADMIN — INSULIN ASPART 12 UNITS: 100 INJECTION, SOLUTION INTRAVENOUS; SUBCUTANEOUS at 12:43

## 2017-03-01 RX ADMIN — RIFAMPIN 300 MG: 300 CAPSULE ORAL at 20:27

## 2017-03-01 RX ADMIN — AMLODIPINE BESYLATE 5 MG: 5 TABLET ORAL at 18:00

## 2017-03-01 RX ADMIN — MORPHINE SULFATE 2 MG: 2 INJECTION, SOLUTION INTRAMUSCULAR; INTRAVENOUS at 18:05

## 2017-03-01 RX ADMIN — INSULIN ASPART 9 UNITS: 100 INJECTION, SOLUTION INTRAVENOUS; SUBCUTANEOUS at 09:03

## 2017-03-01 RX ADMIN — PANTOPRAZOLE SODIUM 40 MG: 40 TABLET, DELAYED RELEASE ORAL at 06:31

## 2017-03-01 RX ADMIN — MORPHINE SULFATE 2 MG: 2 INJECTION, SOLUTION INTRAMUSCULAR; INTRAVENOUS at 01:10

## 2017-03-01 RX ADMIN — CEFAZOLIN 2 G: 1 INJECTION, POWDER, FOR SOLUTION INTRAMUSCULAR; INTRAVENOUS; PARENTERAL at 14:40

## 2017-03-01 RX ADMIN — HYDROCODONE BITARTRATE AND ACETAMINOPHEN 1 TABLET: 10; 325 TABLET ORAL at 20:28

## 2017-03-01 RX ADMIN — ENOXAPARIN SODIUM 40 MG: 40 INJECTION SUBCUTANEOUS at 10:30

## 2017-03-01 RX ADMIN — HYDROCODONE BITARTRATE AND ACETAMINOPHEN 1 TABLET: 10; 325 TABLET ORAL at 07:20

## 2017-03-01 RX ADMIN — INSULIN ASPART 12 UNITS: 100 INJECTION, SOLUTION INTRAVENOUS; SUBCUTANEOUS at 18:00

## 2017-03-01 RX ADMIN — CEFAZOLIN 2 G: 1 INJECTION, POWDER, FOR SOLUTION INTRAMUSCULAR; INTRAVENOUS; PARENTERAL at 06:31

## 2017-03-01 RX ADMIN — SERTRALINE 100 MG: 100 TABLET, FILM COATED ORAL at 09:03

## 2017-03-01 RX ADMIN — CEFAZOLIN 2 G: 1 INJECTION, POWDER, FOR SOLUTION INTRAMUSCULAR; INTRAVENOUS; PARENTERAL at 20:35

## 2017-03-01 RX ADMIN — LISINOPRIL 20 MG: 20 TABLET ORAL at 09:03

## 2017-03-01 RX ADMIN — INSULIN ASPART 15 UNITS: 100 INJECTION, SOLUTION INTRAVENOUS; SUBCUTANEOUS at 20:33

## 2017-03-01 RX ADMIN — CHLORTHALIDONE 25 MG: 25 TABLET ORAL at 18:00

## 2017-03-01 RX ADMIN — INSULIN DETEMIR 55 UNITS: 100 INJECTION, SOLUTION SUBCUTANEOUS at 20:28

## 2017-03-01 RX ADMIN — INSULIN ASPART 12 UNITS: 100 INJECTION, SOLUTION INTRAVENOUS; SUBCUTANEOUS at 12:44

## 2017-03-01 RX ADMIN — INSULIN DETEMIR 20 UNITS: 100 INJECTION, SOLUTION SUBCUTANEOUS at 10:30

## 2017-03-01 RX ADMIN — INSULIN ASPART 10 UNITS: 100 INJECTION, SOLUTION INTRAVENOUS; SUBCUTANEOUS at 09:03

## 2017-03-01 RX ADMIN — RIFAMPIN 300 MG: 300 CAPSULE ORAL at 09:03

## 2017-03-01 RX ADMIN — MORPHINE SULFATE 2 MG: 2 INJECTION, SOLUTION INTRAMUSCULAR; INTRAVENOUS at 14:40

## 2017-03-01 NOTE — PLAN OF CARE
Problem: Infection, Risk/Actual (Adult)  Goal: Identify Related Risk Factors and Signs and Symptoms  Outcome: Outcome(s) achieved Date Met:  03/01/17 03/01/17 0505   Infection, Risk/Actual   Infection, Risk/Actual: Related Risk Factors surgery/procedure;tissue perfusion altered   Signs and Symptoms (Infection, Risk/Actual) pain;blood glucose changes       Goal: Infection Prevention/Resolution  Outcome: Ongoing (interventions implemented as appropriate)    03/01/17 0505   Infection, Risk/Actual (Adult)   Infection Prevention/Resolution making progress toward outcome         Problem: Pain, Acute (Adult)  Goal: Acceptable Pain Control/Comfort Level  Outcome: Ongoing (interventions implemented as appropriate)    03/01/17 0505   Pain, Acute (Adult)   Acceptable Pain Control/Comfort Level making progress toward outcome         Problem: Patient Care Overview (Adult)  Goal: Plan of Care Review  Outcome: Ongoing (interventions implemented as appropriate)    03/01/17 0505   Coping/Psychosocial Response Interventions   Plan Of Care Reviewed With patient   Patient Care Overview   Progress improving   Outcome Evaluation   Outcome Summary/Follow up Plan pt VSS. pain controlled with morphine and lortab. wet to dry dressing placed on left toe. wound vac to be placed tomorrow. continue to monitor       Goal: Adult Individualization and Mutuality  Outcome: Ongoing (interventions implemented as appropriate)  Goal: Discharge Needs Assessment  Outcome: Ongoing (interventions implemented as appropriate)    03/01/17 0505   Discharge Needs Assessment   Concerns To Be Addressed basic needs concerns   Discharge Disposition still a patient

## 2017-03-01 NOTE — CONSULTS
"Diabetes Education  Assessment/Teaching    Patient Name:  Hugh R McBurney  YOB: 1950  MRN: [id number]  Admit Date:  2/25/2017      Assessment Date:  3/1/2017       Most Recent Value    General Information      Referral From:  Other (comment) [RN consult]    Height  6' 2\" (1.88 m)    Height Method  Stated    Weight  258 lb (117 kg)    Weight Method  Stated    Pregnancy Assessment     Diabetes History     What type of diabetes do you have?  Type 2    Length of Diabetes Diagnosis  10 + years [Since 1996]    Current DM knowledge  fair    Education Preferences     Nutrition Information     Assessment Topics     Problem Solving - Assessment  Needs education    Reducing Risk - Assessment  Needs education    DM Goals     Problem Solving - Goal  0-30 days from discharge    Reducing Risk - Goal  30-90 days from discharge    Contact Plan  Follow-up medical care               Most Recent Value    DM Education Needs     Blood Glucose Target Range  To keep glucose below 150 mg/dL.  Encouraged pt to discuss keeping glucose below target with care takers at [facility] to promote healing.      Reducing Risks  A1C testing, Other (comment) [Healing post op]    Healthy Coping  Appropriate    Discharge Plan  Facility [Pt came from [facility].]    Motivation  Moderate    Teaching Method  Discussion    Patient Response  Verbalized understanding            Other Comments:         Electronically signed by:  Karol Brown RN  03/01/17 1:43 PM  "

## 2017-03-01 NOTE — PROGRESS NOTES
"      Name: Hugh R McBurney ADMIT: 2017   : 1950  PCP: Guille Nieves MD    MRN: [id number] LOS: 4 days   AGE/SEX: 66 y.o. male  ROOM: [facility]     Subjective   No new complaints.  Pain improved.     Objective   Vital Signs  Temp:  [98.2 °F (36.8 °C)-99.4 °F (37.4 °C)] 99.2 °F (37.3 °C)  Heart Rate:  [] 97  Resp:  [18-22] 20  BP: (147-188)/() 169/87  SpO2:  [92 %-96 %] 93 %  on   ;   O2 Device: room air  Body mass index is 33.13 kg/(m^2).    Objective:  General Appearance:  Comfortable and in no acute distress.    Vital signs: (most recent): Blood pressure 169/87, pulse 97, temperature 99.2 °F (37.3 °C), temperature source Oral, resp. rate 20, height 74\" (188 cm), weight 258 lb (117 kg), SpO2 93 %.    Lungs:  Normal effort.  Breath sounds clear to auscultation.    Heart: Normal rate.  Regular rhythm.    Abdomen: Abdomen is soft and non-distended.  Bowel sounds are normal.   There is no abdominal tenderness.     Extremities: There is no dependent edema.  (Right knee bandaged.  Left toe bandaged cannot be examined.)  Neurological: Patient is alert and oriented to person, place and time.    Skin:  Warm and dry.          Results Review:       I reviewed the patient's new clinical results.    Results from last 7 days  Lab Units 17  0617  1723   WBC 10*3/mm3 10.58 14.32* 14.38* 14.45*   HEMOGLOBIN g/dL 8.6* 9.4* 9.9* 10.2*   PLATELETS 10*3/mm3 330 315 323 346       Results from last 7 days  Lab Units 17  0631 17  03417  1723   SODIUM mmol/L 130* 134* 126* 126*   POTASSIUM mmol/L 4.4 4.2 4.4 4.8   CHLORIDE mmol/L 98 99 92* 87*   TOTAL CO2 mmol/L 18.3* 18.8* 19.4* 19.0*   BUN mg/dL 41* 37* 32* 30*   CREATININE mg/dL 1.21 1.39* 1.37* 1.43*   GLUCOSE mg/dL 290* 253* 417* 604*   Estimated Creatinine Clearance: 81.6 mL/min (by C-G formula based on Cr of 1.21).    Results from last 7 days  Lab Units 17  0631 " 02/27/17  0349 02/26/17  0514 02/25/17  1723   CALCIUM mg/dL 8.4* 8.5* 8.8 9.0   ALBUMIN g/dL  --   --   --  2.80*     GLUCOSE   Date/Time Value Ref Range Status   03/01/2017 0805 292 (H) 70 - 130 mg/dL Final   02/28/2017 2115 412 (H) 70 - 130 mg/dL Final   02/28/2017 1623 350 (H) 70 - 130 mg/dL Final   02/28/2017 1147 377 (H) 70 - 130 mg/dL Final   02/28/2017 0704 263 (H) 70 - 130 mg/dL Final   02/27/2017 2038 283 (H) 70 - 130 mg/dL Final   02/27/2017 1643 294 (H) 70 - 130 mg/dL Final   02/27/2017 1139 401 (H) 70 - 130 mg/dL Final       ceFAZolin in 0.9% normal saline 2 g Intravenous Q8H   enoxaparin 40 mg Subcutaneous Daily   insulin aspart 0-15 Units Subcutaneous 4x Daily AC & at Bedtime   insulin aspart 12 Units Subcutaneous TID With Meals   insulin detemir 20 Units Subcutaneous QAM   insulin detemir 55 Units Subcutaneous Nightly   lidocaine 10 mL Infiltration Once   lisinopril 20 mg Oral Daily   pantoprazole 40 mg Oral Q AM   pravastatin 20 mg Oral Nightly   rifAMPin 300 mg Oral Q12H   sertraline 100 mg Oral Daily       Pharmacy Consult    Diet Regular; Consistent Carbohydrate      Assessment/Plan   Assessment:     Active Hospital Problems (** Indicates Principal Problem)    Diagnosis Date Noted   • **Type 2 diabetes mellitus with hyperglycemia [E11.65] 02/26/2017   • Hyponatremia [E87.1] 02/26/2017   • 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] 02/16/2017   • Recent MSSA (methicillin susceptible Staphylococcus aureus) septicemia [A41.01] 02/15/2017   • Diabetic ulcer of 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, with necrosis of bone [E11.621, L97.524] 02/14/2017   • Diabetic autonomic neuropathy associated with type 2 diabetes mellitus [E11.43] 02/14/2017   • 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       Plan:   - Appreciate consultants help.  Continue current antibiotic regimen.  Follow-up cultures.  Note MRI and ANASTASIA negative.   - Endocrinology following for severely uncontrolled diabetes.  Still rather uncontrolled with blood sugar over 400 last night.  Defer further titration of insulin to endocrine.  - Patient is to depressed.  Already on Zoloft.   No further intervention indicated at this time per [facility].  - Likely back to [facility] when okay with all and blood sugars improved.  - Hemoglobin and sodium trending down.  Recheck in a.m.      Bryan Chopra MD  [facility]  03/01/17  11:18 AM

## 2017-03-01 NOTE — PROGRESS NOTES
66 y.o.   LOS: 4 days   Patient Care Team:  Guille Nieves MD as PCP - General  Guille Nieves MD as PCP - Family Medicine    Chief Complaint:  Hyperglycemia    Chief Complaint   Patient presents with   • Hyperglycemia     per EMS blood glucose at nursing home above 600.    • Shortness of Breath   • Flank Pain     right side       Subjective  Pt reports improved appetite. Ate more than 80% of his lunch.   Bg still elevated in 300 and 400 range. ANASTASIA and MRI of the spine - negative for new infection.   No further fevers.       Interval History:    Review of Systems:   Review of Systems   Constitutional: Positive for appetite change and fatigue. Negative for chills and diaphoresis.   HENT: Negative for hearing loss, nosebleeds, postnasal drip, trouble swallowing and voice change.    Eyes: Negative for photophobia, discharge and visual disturbance.   Respiratory: Negative for cough, shortness of breath and wheezing.    Cardiovascular: Negative for chest pain, palpitations and leg swelling.   Gastrointestinal: Negative for abdominal distention, abdominal pain, constipation, diarrhea, nausea and vomiting.   Endocrine: Negative for cold intolerance, heat intolerance, polydipsia and polyuria.   Genitourinary: Negative for dysuria, frequency and hematuria.   Musculoskeletal: Positive for arthralgias, back pain, joint swelling and myalgias.   Skin: Positive for wound. Negative for pallor and rash.   Neurological: Positive for weakness and numbness. Negative for dizziness, tremors, seizures, syncope and headaches.   Hematological: Negative for adenopathy.   Psychiatric/Behavioral: Negative for agitation, confusion and sleep disturbance. The patient is not nervous/anxious.      Objective     Vital Signs   Temp:  [98.2 °F (36.8 °C)-99.4 °F (37.4 °C)] 99.2 °F (37.3 °C)  Heart Rate:  [] 97  Resp:  [18-22] 20  BP: (147-188)/() 169/87    Physical Exam:  Physical Exam   Constitutional: He is oriented to person,  place, and time. He appears well-developed and well-nourished.   HENT:   Head: Normocephalic and atraumatic.   Mouth/Throat: Oropharynx is clear and moist.   Eyes: EOM are normal. Pupils are equal, round, and reactive to light.   Neck: Normal range of motion. Neck supple. No tracheal deviation present. No thyromegaly present.   Skin tags   Cardiovascular: Normal rate, regular rhythm and normal heart sounds.    HR - 72   Pulmonary/Chest: Effort normal and breath sounds normal. No stridor. He has no wheezes.   Abdominal: Soft. Bowel sounds are normal. He exhibits no distension. There is no tenderness. No hernia.   Central obesity   Musculoskeletal: Normal range of motion. He exhibits no edema.   Left foot dressed and right knee erythema with staples - improving   Lymphadenopathy:     He has no cervical adenopathy.   Neurological: He is alert and oriented to person, place, and time. He has normal reflexes.   Decreased pin prick and proprioception   Skin: Skin is warm and dry.   Psychiatric: He has a normal mood and affect. Judgment normal.   Vitals reviewed.  Results Review:     I reviewed the patient's new clinical results.      GLUCOSE   Date/Time Value Ref Range Status   03/01/2017 0631 290 (H) 65 - 99 mg/dL Final   02/27/2017 0349 253 (H) 65 - 99 mg/dL Final     Lab Results (last 72 hours)     Procedure Component Value Units Date/Time    POC Glucose Fingerstick [[id number]]  (Abnormal) Collected:  02/25/17 1717    Specimen:  Blood Updated:  02/25/17 1727     Glucose 546 (C) mg/dL     Narrative:       Physician Notified Meter: [id number] : [id number] Anjelica Giles    CBC & Differential [[id number]] Collected:  02/25/17 1723    Specimen:  Blood Updated:  02/25/17 1738    Narrative:       The following orders were created for panel order CBC & Differential.  Procedure                               Abnormality         Status                     ---------                               -----------         ------                      CBC Auto Differential[[id number]]         Abnormal            Final result                 Please view results for these tests on the individual orders.    CBC Auto Differential [[id number]]  (Abnormal) Collected:  02/25/17 1723    Specimen:  Blood Updated:  02/25/17 1738     WBC 14.45 (H) 10*3/mm3      RBC 3.31 (L) 10*6/mm3      Hemoglobin 10.2 (L) g/dL      Hematocrit 30.4 (L) %      MCV 91.8 fL      MCH 30.8 pg      MCHC 33.6 g/dL      RDW 13.7 %      RDW-SD 45.5 fl      MPV 10.1 fL      Platelets 346 10*3/mm3      Neutrophil % 83.7 (H) %      Lymphocyte % 9.8 (L) %      Monocyte % 5.7 %      Eosinophil % 0.4 %      Basophil % 0.1 %      Immature Grans % 0.3 %      Neutrophils, Absolute 12.08 (H) 10*3/mm3      Lymphocytes, Absolute 1.42 10*3/mm3      Monocytes, Absolute 0.83 10*3/mm3      Eosinophils, Absolute 0.06 10*3/mm3      Basophils, Absolute 0.02 10*3/mm3      Immature Grans, Absolute 0.04 (H) 10*3/mm3     POC Glucose Fingerstick [[id number]]  (Abnormal) Collected:  02/25/17 1759    Specimen:  Blood Updated:  02/25/17 1801     Glucose 581 (C) mg/dL     Narrative:       Physician Notified Meter: [id number] : [id number] [facility]    Comprehensive Metabolic Panel [[id number]]  (Abnormal) Collected:  02/25/17 1723    Specimen:  Blood Updated:  02/25/17 1805     Glucose 604 (C) mg/dL      BUN 30 (H) mg/dL      Creatinine 1.43 (H) mg/dL      Sodium 126 (L) mmol/L      Potassium 4.8 mmol/L      Chloride 87 (L) mmol/L      CO2 19.0 (L) mmol/L      Calcium 9.0 mg/dL      Total Protein 7.6 g/dL      Albumin 2.80 (L) g/dL      ALT (SGPT) 10 U/L      AST (SGOT) 14 U/L      Alkaline Phosphatase 136 (H) U/L      Total Bilirubin 0.5 mg/dL      eGFR Non African Amer 49 (L) mL/min/1.73      Globulin 4.8 gm/dL      A/G Ratio 0.6 g/dL      BUN/Creatinine Ratio 21.0      Anion Gap 20.0 mmol/L     Ketone Bodies, Serum (will not run at [place]) [[id number]] Collected:  02/25/17 1037    Specimen:  Blood Updated:   02/25/17 1815    Narrative:       The following orders were created for panel order Ketone Bodies, Serum (will not run at [place]).  Procedure                               Abnormality         Status                     ---------                               -----------         ------                     Acetone[[id number]]                       Normal              Final result                 Please view results for these tests on the individual orders.    Acetone [[id number]]  (Normal) Collected:  02/25/17 1723    Specimen:  Blood Updated:  02/25/17 1815     Acetone Negative     Urinalysis With / Culture If Indicated [[id number]]  (Abnormal) Collected:  02/25/17 1841    Specimen:  Urine from Urine, Clean Catch Updated:  02/25/17 1852     Color, UA Yellow      Appearance, UA Clear      pH, UA <=5.0      Specific Gravity, UA >=1.030      Glucose, UA >=1000 mg/dL (3+) (A)      Ketones, UA Negative      Bilirubin, UA Negative      Blood, UA Moderate (2+) (A)      Protein,  mg/dL (2+) (A)      Leuk Esterase, UA Negative      Nitrite, UA Negative      Urobilinogen, UA 0.2 E.U./dL     Urinalysis, Microscopic Only [[id number]]  (Abnormal) Collected:  02/25/17 1841    Specimen:  Urine from Urine, Clean Catch Updated:  02/25/17 1852     RBC, UA 6-12 (A) /HPF      WBC, UA 3-5 (A) /HPF      Bacteria, UA None Seen /HPF      Squamous Epithelial Cells, UA 0-2 /HPF      Hyaline Casts, UA 3-6 /LPF      Methodology Automated Microscopy     Procalcitonin [[id number]]  (Abnormal) Collected:  02/25/17 1723    Specimen:  Blood Updated:  02/25/17 1903     Procalcitonin 1.29 (C) ng/mL     Narrative:       As a Marker for Sepsis (Non-Neonates):   1. <0.5 ng/mL represents a low risk of severe sepsis and/or septic shock.  1. >2 ng/mL represents a high risk of severe sepsis and/or septic shock.    As a Marker for Lower Respiratory Tract Infections that require antibiotic therapy:  PCT on Admission     Antibiotic Therapy             6-12 Hrs  "later  > 0.5                Strongly Recommended            >0.25 - <0.5         Recommended  0.1 - 0.25           Discouraged                   Remeasure/reassess PCT  <0.1                 Strongly Discouraged          Remeasure/reassess PCT      As 28 day mortality risk marker: \"Change in Procalcitonin Result\" (> 80 % or <=80 %) if Day 0 (or Day 1) and Day 4 values are available. Refer to http://www.[facility]-pct-calculator.com/   Change in PCT <=80 %   A decrease of PCT levels below or equal to 80 % defines a positive change in PCT test result representing a higher risk for 28-day all-cause mortality of patients diagnosed with severe sepsis or septic shock.  Change in PCT > 80 %   A decrease of PCT levels of more than 80 % defines a negative change in PCT result representing a lower risk for 28-day all-cause mortality of patients diagnosed with severe sepsis or septic shock.                POC Glucose Fingerstick [[id number]]  (Abnormal) Collected:  02/25/17 1910    Specimen:  Blood Updated:  02/25/17 1920     Glucose 556 (C) mg/dL     Narrative:       Meter: [id number] : [id number] Adán Madrigal    POC Glucose Fingerstick [[id number]]  (Abnormal) Collected:  02/25/17 2040    Specimen:  Blood Updated:  02/25/17 2042     Glucose 519 (C) mg/dL     Narrative:       Physician Notified Meter: [id number] : [id number] Alblakia Lyubov    Light Blue Top [[id number]] Collected:  02/25/17 1723    Specimen:  Blood Updated:  02/25/17 2201     Extra Tube hold for add-on       Auto resulted       Green Top (Gel) [[id number]] Collected:  02/25/17 1723    Specimen:  Blood Updated:  02/25/17 2201     Extra Tube Hold for add-ons.       Auto resulted.       Lavender Top [[id number]] Collected:  02/25/17 1723    Specimen:  Blood Updated:  02/25/17 2201     Extra Tube hold for add-on       Auto resulted       POC Glucose Fingerstick [[id number]]  (Abnormal) Collected:  02/25/17 2317    Specimen:  Blood Updated:  02/25/17 2318     Glucose " 464 (C) mg/dL     Narrative:       Meter: [id number] : [id number] Kg Zacarias    POC Glucose Fingerstick [[id number]]  (Abnormal) Collected:  02/26/17 0117    Specimen:  Blood Updated:  02/26/17 0118     Glucose 437 (H) mg/dL     Narrative:       Meter: [id number] : [id number] Ben Coley    POC Glucose Fingerstick [[id number]]  (Abnormal) Collected:  02/26/17 0433    Specimen:  Blood Updated:  02/26/17 0435     Glucose 417 (H) mg/dL     Narrative:       Meter: [id number] : [id number] Ben Coley    CBC & Differential [[id number]] Collected:  02/26/17 0514    Specimen:  Blood Updated:  02/26/17 0624    Narrative:       The following orders were created for panel order CBC & Differential.  Procedure                               Abnormality         Status                     ---------                               -----------         ------                     CBC Auto Differential[[id number]]         Abnormal            Final result                 Please view results for these tests on the individual orders.    CBC Auto Differential [[id number]]  (Abnormal) Collected:  02/26/17 0514    Specimen:  Blood Updated:  02/26/17 0624     WBC 14.38 (H) 10*3/mm3      RBC 3.26 (L) 10*6/mm3      Hemoglobin 9.9 (L) g/dL      Hematocrit 29.9 (L) %      MCV 91.7 fL      MCH 30.4 pg      MCHC 33.1 g/dL      RDW 13.6 %      RDW-SD 45.5 fl      MPV 10.2 fL      Platelets 323 10*3/mm3      Neutrophil % 78.9 (H) %      Lymphocyte % 13.3 (L) %      Monocyte % 6.3 %      Eosinophil % 1.0 %      Basophil % 0.1 %      Immature Grans % 0.4 %      Neutrophils, Absolute 11.36 (H) 10*3/mm3      Lymphocytes, Absolute 1.91 10*3/mm3      Monocytes, Absolute 0.90 10*3/mm3      Eosinophils, Absolute 0.14 10*3/mm3      Basophils, Absolute 0.01 10*3/mm3      Immature Grans, Absolute 0.06 (H) 10*3/mm3     POC Glucose Fingerstick [[id number]]  (Abnormal) Collected:  02/26/17 0637    Specimen:  Blood Updated:  02/26/17 0639     Glucose 402  (H) mg/dL     Narrative:       Meter: [id number] : [id number] Ben Vianca    Basic Metabolic Panel [[id number]]  (Abnormal) Collected:  02/26/17 0514    Specimen:  Blood Updated:  02/26/17 0712     Glucose 417 (C) mg/dL      BUN 32 (H) mg/dL      Creatinine 1.37 (H) mg/dL      Sodium 126 (L) mmol/L      Potassium 4.4 mmol/L      Chloride 92 (L) mmol/L      CO2 19.4 (L) mmol/L      Calcium 8.8 mg/dL      eGFR Non African Amer 52 (L) mL/min/1.73      BUN/Creatinine Ratio 23.4      Anion Gap 14.6 mmol/L     Narrative:       GFR Normal >60  Chronic Kidney Disease <60  Kidney Failure <15    POC Glucose Fingerstick [[id number]]  (Abnormal) Collected:  02/26/17 0744    Specimen:  Blood Updated:  02/26/17 0745     Glucose 408 (H) mg/dL     Narrative:       Meter: [id number] : [id number] Finesse GALLARDO    C-reactive Protein [[id number]]  (Abnormal) Collected:  02/26/17 0514    Specimen:  Blood Updated:  02/26/17 0944     C-Reactive Protein 30.73 (H) mg/dL     Sedimentation Rate [[id number]]  (Abnormal) Collected:  02/26/17 0514    Specimen:  Blood Updated:  02/26/17 0958     Sed Rate >140 (H) mm/hr     POC Glucose Fingerstick [[id number]]  (Abnormal) Collected:  02/26/17 1122    Specimen:  Blood Updated:  02/26/17 1123     Glucose 355 (H) mg/dL     Narrative:       Meter: [id number] : [id number] Finesse GALLARDO    POC Glucose Fingerstick [[id number]]  (Abnormal) Collected:  02/26/17 1606    Specimen:  Blood Updated:  02/26/17 1607     Glucose 302 (H) mg/dL     Narrative:       Meter: [id number] : [id number] Duran OTTO    POC Glucose Fingerstick [[id number]]  (Abnormal) Collected:  02/26/17 2046    Specimen:  Blood Updated:  02/26/17 2047     Glucose 287 (H) mg/dL     Narrative:       Meter: [id number] : [id number] Duran OTTO    [place] Draw [[id number]] Collected:  02/25/17 1723    Specimen:  Blood Updated:  02/26/17 4601    Narrative:       The following orders were created for panel order [place]  Draw.  Procedure                               Abnormality         Status                     ---------                               -----------         ------                     Light Blue Top[[id number]]                                    Final result               Green Top (Gel)[[id number]]                                   Final result               Lavender Top[[id number]]                                      Final result               Gold Top - SST[[id number]]                                                                 Please view results for these tests on the individual orders.    CBC (No Diff) [[id number]]  (Abnormal) Collected:  02/27/17 0349    Specimen:  Blood Updated:  02/27/17 0407     WBC 14.32 (H) 10*3/mm3      RBC 3.07 (L) 10*6/mm3      Hemoglobin 9.4 (L) g/dL      Hematocrit 28.4 (L) %      MCV 92.5 fL      MCH 30.6 pg      MCHC 33.1 g/dL      RDW 13.5 %      RDW-SD 45.5 fl      MPV 9.9 fL      Platelets 315 10*3/mm3     Basic Metabolic Panel [[id number]]  (Abnormal) Collected:  02/27/17 0349    Specimen:  Blood Updated:  02/27/17 0432     Glucose 253 (H) mg/dL      BUN 37 (H) mg/dL      Creatinine 1.39 (H) mg/dL      Sodium 134 (L) mmol/L      Potassium 4.2 mmol/L      Chloride 99 mmol/L      CO2 18.8 (L) mmol/L      Calcium 8.5 (L) mg/dL      eGFR Non African Amer 51 (L) mL/min/1.73      BUN/Creatinine Ratio 26.6 (H)      Anion Gap 16.2 mmol/L     Narrative:       GFR Normal >60  Chronic Kidney Disease <60  Kidney Failure <15    Procalcitonin [[id number]]  (Abnormal) Collected:  02/27/17 0349    Specimen:  Blood Updated:  02/27/17 0453     Procalcitonin 1.56 (C) ng/mL     Narrative:       As a Marker for Sepsis (Non-Neonates):   1. <0.5 ng/mL represents a low risk of severe sepsis and/or septic shock.  1. >2 ng/mL represents a high risk of severe sepsis and/or septic shock.    As a Marker for Lower Respiratory Tract Infections that require antibiotic therapy:  PCT on Admission     Antibiotic Therapy     "         6-12 Hrs later  > 0.5                Strongly Recommended            >0.25 - <0.5         Recommended  0.1 - 0.25           Discouraged                   Remeasure/reassess PCT  <0.1                 Strongly Discouraged          Remeasure/reassess PCT      As 28 day mortality risk marker: \"Change in Procalcitonin Result\" (> 80 % or <=80 %) if Day 0 (or Day 1) and Day 4 values are available. Refer to http://www.[facility] [place]-pct-calculator.com/   Change in PCT <=80 %   A decrease of PCT levels below or equal to 80 % defines a positive change in PCT test result representing a higher risk for 28-day all-cause mortality of patients diagnosed with severe sepsis or septic shock.  Change in PCT > 80 %   A decrease of PCT levels of more than 80 % defines a negative change in PCT result representing a lower risk for 28-day all-cause mortality of patients diagnosed with severe sepsis or septic shock.                Blood Culture [[id number]]  (Normal) Collected:  02/25/17 2200    Specimen:  Blood from Blood, Central Line Updated:  02/27/17 0701     Blood Culture No growth at 24 hours     Blood Culture [[id number]]  (Normal) Collected:  02/25/17 2200    Specimen:  Blood from Arm, Left Updated:  02/27/17 0701     Blood Culture No growth at 24 hours     POC Glucose Fingerstick [[id number]]  (Abnormal) Collected:  02/27/17 0713    Specimen:  Blood Updated:  02/27/17 0716     Glucose 271 (H) mg/dL     Narrative:       Meter: [id number] : [id number] Mayuri Briceno    Blood Culture [[id number]]  (Normal) Collected:  02/26/17 2117    Specimen:  Blood from Blood, Venous Line Updated:  02/27/17 1001     Blood Culture No growth at less than 24 hours     POC Glucose Fingerstick [[id number]]  (Abnormal) Collected:  02/27/17 1139    Specimen:  Blood Updated:  02/27/17 1146     Glucose 401 (H) mg/dL     Narrative:       Meter: [id number] : [id number] Mayuri Hollinsia    Blood Culture [[id number]]  (Normal) Collected:  02/26/17 2242    " Specimen:  Blood from Blood, Venous Line Updated:  02/27/17 1201     Blood Culture No growth at less than 24 hours         Imaging Results (last 72 hours)     Procedure Component Value Units Date/Time    XR Chest 2 View [[id number]] Collected:  02/25/17 1931     Updated:  02/26/17 1508    Narrative:       PA AND LATERAL CHEST 02/25/2017     HISTORY: Shortness of breath.     FINDINGS: The heart size is within normal limits. Lungs are slightly  underinflated, though there is mild vascular congestion. No focal  infiltrates or pneumothorax is seen.     This report was finalized on 2/26/2017 3:05 PM by Dr. Ruben Michelle MD.       XR Chest 1 View [[id number]] Collected:  02/26/17 1408     Updated:  02/26/17 1516    Narrative:       AP CHEST 02/26/2017     HISTORY: PICC line position.     FINDINGS: Right upper extremity PICC line is seen with its tip overlying  the SVC. Heart size is within normal limits. There is some minimal  patchy infiltrate or chronic parenchymal change in the left infrahilar  region similar to the 02/25/2017 study.     This report was finalized on 2/26/2017 3:13 PM by Dr. Ruben Michelle MD.             Medication Review: Done      Current Facility-Administered Medications:   •  acetaminophen (TYLENOL) tablet 650 mg, 650 mg, Oral, Q4H PRN, Elmira Rivera MD, 650 mg at 02/27/17 0638  •  bisacodyl (DULCOLAX) suppository 10 mg, 10 mg, Rectal, Daily PRN, Oscar Lucas MD, 10 mg at 02/28/17 0328  •  ceFAZolin in 0.9% normal saline (ANCEF) IVPB solution 2 g, 2 g, Intravenous, Q8H, Adarsh Love MD, Last Rate: 200 mL/hr at 03/01/17 0631, 2 g at 03/01/17 0631  •  dextrose (D50W) solution 25 g, 25 g, Intravenous, Q15 Min PRN, Oscar Lucas MD  •  dextrose (GLUTOSE) oral gel 15 g, 15 g, Oral, Q15 Min PRN, Oscar Lucas MD  •  enoxaparin (LOVENOX) syringe 40 mg, 40 mg, Subcutaneous, Daily, Oscar Lucas MD, 40 mg at 02/27/17 1219  •  glucagon (human  recombinant) (GLUCAGEN DIAGNOSTIC) injection 1 mg, 1 mg, Subcutaneous, Q15 Min PRN, Oscar Lucas MD  •  HYDROcodone-acetaminophen (NORCO)  MG per tablet 1 tablet, 1 tablet, Oral, Q6H PRN, Oscar Lucas MD, 1 tablet at 03/01/17 0720  •  insulin aspart (novoLOG) injection 0-15 Units, 0-15 Units, Subcutaneous, 4x Daily AC & at Bedtime, Adarsh Love MD, 9 Units at 03/01/17 0903  •  insulin aspart (novoLOG) injection 12 Units, 12 Units, Subcutaneous, TID With Meals, Adarsh Love MD  •  insulin detemir (LEVEMIR) injection 20 Units, 20 Units, Subcutaneous, QAM, Adarhs Love MD  •  insulin detemir (LEVEMIR) injection 55 Units, 55 Units, Subcutaneous, Nightly, Adarsh Love MD, 55 Units at 02/28/17 2348  •  lidocaine (XYLOCAINE) 1 % injection 10 mL, 10 mL, Infiltration, Once, Kiko Marie MD, 10 mL at 02/26/17 1024  •  lisinopril (PRINIVIL,ZESTRIL) tablet 20 mg, 20 mg, Oral, Daily, Oscar Lucas MD, 20 mg at 03/01/17 0903  •  morphine injection 2 mg, 2 mg, Intravenous, Q2H PRN, 2 mg at 03/01/17 0423 **AND** naloxone (NARCAN) injection 0.4 mg, 0.4 mg, Intravenous, Q5 Min PRN, Oscar Lucas MD  •  ondansetron (ZOFRAN) tablet 4 mg, 4 mg, Oral, Q6H PRN **OR** ondansetron ODT (ZOFRAN-ODT) disintegrating tablet 4 mg, 4 mg, Oral, Q6H PRN **OR** ondansetron (ZOFRAN) injection 4 mg, 4 mg, Intravenous, Q6H PRN, Oscar Lucas MD  •  pantoprazole (PROTONIX) EC tablet 40 mg, 40 mg, Oral, Q AM, Oscar Lucas MD, 40 mg at 03/01/17 0631  •  Pharmacy Consult, , Does not apply, Continuous PRN, Adarsh Love MD  •  pravastatin (PRAVACHOL) tablet 20 mg, 20 mg, Oral, Nightly, Oscar Lucas MD, 20 mg at 02/28/17 2130  •  rifAMPin (RIFADIN) capsule 300 mg, 300 mg, Oral, Q12H, Oscar Lucas MD, 300 mg at 03/01/17 0903  •  sertraline (ZOLOFT) tablet 100 mg, 100 mg, Oral, Daily, Oscar Lucas MD, 100 mg at  03/01/17 0903  •  sodium chloride 0.9 % flush 1-10 mL, 1-10 mL, Intravenous, PRN, Oscar Lucas MD  •  sodium chloride 0.9 % flush 10 mL, 10 mL, Intravenous, PRN, Trae Franklin MD, 10 mL at 02/25/17 2106    Assessment/Plan     Active Hospital Problems (** Indicates Principal Problem)    Diagnosis Date Noted   • **Type 2 diabetes mellitus with hyperglycemia [E11.65] 02/26/2017   • Hyponatremia [E87.1] 02/26/2017   • 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       Type 2 diabetes mellitus with diabetic neuropathy s/p left toe amputation   [id number]-3.25%  BG could be elevated due to infection and pain.   Will continue levemir 55 units Q pm and 20 units Q am.   Will increase novolog to 12 units tid ac.   Will cover patient with NovoLog moderate dose sliding scale 3 times a day before meals and at bedtime- 3 units for 50 about 1 50 mg/dL  Patient's antibiotic coverage has been changed to normal insulin from dextrose.     Hyperlipidemia  Continue Pravachol 20 mg oral daily      Left first toe amputation,with Right knee joint seeding -followed by orthopedics and vascular surgery      MSSA septicemia -Followed by ID, currently on IV antibiotics.  MRI and ANASTASIA negative.     Discussed with RN about the plan. Pt voiced understanding on the above insulin regimen changes.     The total time spent more than 35 min for old record and lab review and  face- to- face, of which greater than 50% of time was spent in counseling the patient on treatment options, instructions for management/treatment and follow up and importance of compliance with chosen management or treatment options        Adarsh Love MD.  03/01/17  1:27 PM      EMR Dragon / transcription disclaimer:    Much of this encounter note is an electronic transcription/ translation of spoken language to printed text.  Electronic translation of spoken language may permit erroneous, or at times, nonsensical words or phrases to be inadvertently transcribed; although I have reviewed the note for such errors, some may still exist.

## 2017-03-01 NOTE — PLAN OF CARE
Problem: Infection, Risk/Actual (Adult)  Goal: Infection Prevention/Resolution  Outcome: Ongoing (interventions implemented as appropriate)    03/01/17 1629   Infection, Risk/Actual (Adult)   Infection Prevention/Resolution making progress toward outcome         Problem: Pain, Acute (Adult)  Goal: Acceptable Pain Control/Comfort Level  Outcome: Ongoing (interventions implemented as appropriate)    03/01/17 1629   Pain, Acute (Adult)   Acceptable Pain Control/Comfort Level making progress toward outcome         Problem: Patient Care Overview (Adult)  Goal: Plan of Care Review  Outcome: Ongoing (interventions implemented as appropriate)    03/01/17 1629   Coping/Psychosocial Response Interventions   Plan Of Care Reviewed With patient   Patient Care Overview   Progress improving   Outcome Evaluation   Outcome Summary/Follow up Plan Pt still waiting for wound vac placement, phone call made to wound to see about placement time, no response as of yet, Vss slightly elevated BP medications added, see orders, pain controlled with morphine and lortab, blood sugar continues to run high, diabetes educator rounded, insulin management adjusted/        Goal: Adult Individualization and Mutuality  Outcome: Ongoing (interventions implemented as appropriate)

## 2017-03-01 NOTE — PROGRESS NOTES
Continued Stay Note  [facility]     Patient Name: Hugh R McBurney  MRN: [id number]  Today's Date: 3/1/2017    Admit Date: 2/25/2017          Discharge Plan       03/01/17 1101    Case Management/Social Work Plan    Plan [facility] skilled    Patient/Family In Agreement With Plan yes    Additional Comments Dr. Chopra informs possible DC tomorrow. Called to [facility] to be sure bed is available. Left [facility].   3/1/17 1340 - [facility] here and they have a bed and can accept tomorrow. Patient will need a 30 day.               Discharge Codes     None        Expected Discharge Date and Time     Expected Discharge Date Expected Discharge Time    Mar 2, 2017             Artemio Rice RN

## 2017-03-01 NOTE — PROGRESS NOTES
INFECTIOUS DISEASES PROGRESS NOTE    CC: f/u fever    S:   Feels better  Back pain better  No f/c/ns    O:  Physical Exam:  Temp:  [98.2 °F (36.8 °C)-99.4 °F (37.4 °C)] 99.2 °F (37.3 °C)  Heart Rate:  [] 97  Resp:  [18-22] 20  BP: (147-188)/() 169/87  Physical Exam   Constitutional: He appears well-developed. No distress.   Pulmonary/Chest: Effort normal.   Abdominal: Soft. He exhibits no distension. There is no tenderness.   Neurological: He is alert.   Skin: Skin is warm and dry.   Psychiatric: He has a normal mood and affect. His behavior is normal.        Diagnostics:     glc 290-412  WBC 10.6   H/H 8.6/29      ANASTASIA was negative for any vegetations       Estimated Creatinine Clearance: 81.6 mL/min (by C-G formula based on Cr of 1.21).    Assessment/Plan   MSSA septicemia  Left 1 toe osteo 2/2 MSSA  R TKA PJI 2/2 MSSA  Uncontrolled DM II, complicating above    ANASTASIA was negative and MRI showed expected postoperative changes.  We have found any new infections and his fevers are slowly improving. If fevers return would pull picc and CT c/a/p.  Otherwise no more infectious work up planned unless new sx arise.        Amor Mayberry MD  7:53 AM  03/01/17

## 2017-03-01 NOTE — PROGRESS NOTES
Osmin Brand MD       LOS: 4 days   Patient Care Team:  Guille Nieves MD as PCP - General  Guille Nieves MD as PCP - Family Medicine    Subjective     66 y.o. male with healing left first toe amputation site for osteomyelitis as well as right knee infected prosthesis surgery.  Patient on antibiotics.    Review of Systems  Review of Systems - Negative except history of present illness      Objective     Vital Signs  Temp:  [98.2 °F (36.8 °C)-99.5 °F (37.5 °C)] 99.4 °F (37.4 °C)  Heart Rate:  [] 97  Resp:  [18-22] 18  BP: (147-188)/() 188/100    Physical Exam  General: No acute distress. Alert and oriented x 4  HEENT: No jugular venous distension, trachea is midline  CV: RRR, S1S2  Resp: Clear unlabored breathing  Abd: Abdomen is soft, nontender, nondistended  Extremities: Left first toe site granulating    Results Review:       Recent Results (from the past 12 hour(s))   POC Glucose Fingerstick    Collection Time: 02/28/17  9:15 PM   Result Value Ref Range    Glucose 412 (H) 70 - 130 mg/dL   ]      Assessment/Plan           Principal Problem:    Type 2 diabetes mellitus with hyperglycemia  Active Problems:    Hypertension    Diabetic ulcer of 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, with necrosis of bone    Diabetic autonomic neuropathy associated with type 2 diabetes mellitus    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 male begin wound VAC to left first toe wound today after amputation.  Continue antibiotics per infectious disease.  Patient to follow-up with [facility] wound care center upon discharge.  Patient with calcified vessels but normal digital pressure bilaterally and adequate perfusion to heal.      Osmin Brand MD  03/01/17  6:51 AM

## 2017-03-01 NOTE — PROGRESS NOTES
[facility]  Cardiology Progress Note    Patient Identification:  Name:Hugh R McBurney  Age:66 y.o.  Sex: male  :  1950  MRN: [id number]             Length of Stay: 4    Follow up for: ANASTASIA    Interval History :      Overall has no new complaints.  He denies chest pain or chest discomfort.  Denies sore throat.  No hemoptysis.  Denies shortness of breath, dizziness, palpitations.  No reported LOC.  Telemetry monitor reports sinus. BP elevated .  Takes pain meds    ANASTASIA no evidence of endocarditis and normal EF    HPI  This is a 6-year-old white male, with no prior cardiac history, with history of diabetes, hypertension, hyperlipidemia, chronic pain knees and back, presenting for hyperglycemia, worsening right knee redness and pain. Cardiology consultation asked to evaluate undergo ANASTASIA or transesophageal echocardiogram per Dr. Mayberry.    Past Medical History   Diagnosis Date   • Chronic pain      BILATERAL KNEES AND BACK   • Depression    • Diabetes mellitus    • Hyperlipidemia    • Hypertension    • Kidney stone        Scheduled Meds:    Current Facility-Administered Medications:   •  acetaminophen (TYLENOL) tablet 650 mg, 650 mg, Oral, Q4H PRN, Elmira Rivera MD, 650 mg at 17 0638  •  bisacodyl (DULCOLAX) suppository 10 mg, 10 mg, Rectal, Daily PRN, Oscar Lucas MD, 10 mg at 17 0328  •  ceFAZolin in 0.9% normal saline (ANCEF) IVPB solution 2 g, 2 g, Intravenous, Q8H, Adarsh Love MD, Last Rate: 200 mL/hr at 17 0631, 2 g at 17 0631  •  dextrose (D50W) solution 25 g, 25 g, Intravenous, Q15 Min PRN, Oscar Lucas MD  •  dextrose (GLUTOSE) oral gel 15 g, 15 g, Oral, Q15 Min PRN, Oscar Lucas MD  •  enoxaparin (LOVENOX) syringe 40 mg, 40 mg, Subcutaneous, Daily, Oscar Lucas MD, 40 mg at 17 1219  •  glucagon (human recombinant) (GLUCAGEN DIAGNOSTIC) injection 1 mg, 1 mg, Subcutaneous, Q15  Min PRN, Oscar Lucas MD  •  HYDROcodone-acetaminophen (NORCO)  MG per tablet 1 tablet, 1 tablet, Oral, Q6H PRN, Oscar Lucas MD, 1 tablet at 03/01/17 0720  •  insulin aspart (novoLOG) injection 0-15 Units, 0-15 Units, Subcutaneous, 4x Daily AC & at Bedtime, Adarsh Love MD, 9 Units at 03/01/17 0903  •  insulin aspart (novoLOG) injection 10 Units, 10 Units, Subcutaneous, TID With Meals, Adarsh Love MD, 10 Units at 03/01/17 0903  •  insulin detemir (LEVEMIR) injection 20 Units, 20 Units, Subcutaneous, QAM, Adarsh Love MD  •  insulin detemir (LEVEMIR) injection 55 Units, 55 Units, Subcutaneous, Nightly, Adarsh Love MD, 55 Units at 02/28/17 2348  •  lidocaine (XYLOCAINE) 1 % injection 10 mL, 10 mL, Infiltration, Once, Kiko Marie MD, 10 mL at 02/26/17 1024  •  lisinopril (PRINIVIL,ZESTRIL) tablet 20 mg, 20 mg, Oral, Daily, Oscar Lucas MD, 20 mg at 03/01/17 0903  •  morphine injection 2 mg, 2 mg, Intravenous, Q2H PRN, 2 mg at 03/01/17 0423 **AND** naloxone (NARCAN) injection 0.4 mg, 0.4 mg, Intravenous, Q5 Min PRN, Oscar Lucas MD  •  ondansetron (ZOFRAN) tablet 4 mg, 4 mg, Oral, Q6H PRN **OR** ondansetron ODT (ZOFRAN-ODT) disintegrating tablet 4 mg, 4 mg, Oral, Q6H PRN **OR** ondansetron (ZOFRAN) injection 4 mg, 4 mg, Intravenous, Q6H PRN, Oscar Lucas MD  •  pantoprazole (PROTONIX) EC tablet 40 mg, 40 mg, Oral, Q AM, Oscar Lucas MD, 40 mg at 03/01/17 0631  •  Pharmacy Consult, , Does not apply, Continuous PRN, Adarsh Love MD  •  pravastatin (PRAVACHOL) tablet 20 mg, 20 mg, Oral, Nightly, Oscar Lucas MD, 20 mg at 02/28/17 2130  •  rifAMPin (RIFADIN) capsule 300 mg, 300 mg, Oral, Q12H, Oscar Lucas MD, 300 mg at 03/01/17 0903  •  sertraline (ZOLOFT) tablet 100 mg, 100 mg, Oral, Daily, Oscar Lucas MD, 100 mg at 03/01/17 0903  •  sodium chloride 0.9 % flush 1-10 mL,  "1-10 mL, Intravenous, PRN, Oscar Lucas MD  •  sodium chloride 0.9 % flush 10 mL, 10 mL, Intravenous, PRN, Trae Franklin MD, 10 mL at 02/25/17 2106    All systems were reviewed and negative except for:  Musculoskeletal: positive for  joint pain    Visit Vitals   • /87 (BP Location: Left arm, Patient Position: Lying)   • Pulse 97   • Temp 99.2 °F (37.3 °C) (Oral)   • Resp 20   • Ht 74\" (188 cm)   • Wt 258 lb (117 kg)   • SpO2 93%   • BMI 33.13 kg/m2          Intake/Output Summary (Last 24 hours) at 03/01/17 0921  Last data filed at 03/01/17 0914   Gross per 24 hour   Intake 2823.33 ml   Output    900 ml   Net 1923.33 ml          Wt Readings from Last 1 Encounters:   02/25/17 1705 258 lb (117 kg)       Physical Examination: By         Physical Exam    General: No acute distress.    Skin: Warm and dry, no diaphoresis noted   HEENT: No ptosis;  oral mucosa moist   Neck: Supple; no carotid bruits; no JVD, Trachea mid line   Heart: S1S2  regular rate and rhythm;  no murmurs; no gallop or rub appreciated, No thrills palpable   Chest: Respirations unlabored at rest, bilateral breath sounds have good air entry; no  wheezes auscultated.     Abdomen: Soft, non-tender, non-distended, positive bowel sounds  ,No aneurysms palpable   Extremities: Bilateral lower extremities have no pre-tibial pitting edema; infected foot is in bandage, Radials are palpable   Neurological: Alert and oriented ; no new motor deficits,       Lab Review:  Personally reviewed the labs, radiology imaging and other cardiac procedures.       Results from last 7 days  Lab Units 03/01/17  0631  02/25/17  1723   SODIUM mmol/L 130*  < > 126*   POTASSIUM mmol/L 4.4  < > 4.8   CHLORIDE mmol/L 98  < > 87*   TOTAL CO2 mmol/L 18.3*  < > 19.0*   BUN mg/dL 41*  < > 30*   CREATININE mg/dL 1.21  < > 1.43*   CALCIUM mg/dL 8.4*  < > 9.0   BILIRUBIN mg/dL  --   --  0.5   ALK PHOS U/L  --   --  136*   ALT (SGPT) U/L  --   --  10   AST " (SGOT) U/L  --   --  14   GLUCOSE mg/dL 290*  < > 604*   < > = values in this interval not displayed.      @[id number]@    Results from last 7 days  Lab Units 03/01/17  0631 02/27/17  0349 02/26/17  0514   WBC 10*3/mm3 10.58 14.32* 14.38*   HEMOGLOBIN g/dL 8.6* 9.4* 9.9*   HEMATOCRIT % 25.9* 28.4* 29.9*   PLATELETS 10*3/mm3 330 315 323           Estimated Creatinine Clearance: 81.6 mL/min (by C-G formula based on Cr of 1.21).    I personally viewed and interpreted the patient's EKG/Telemetry data    ECG:     ANASTASIA performed on 2/28 showed normal right and left ventricular function.  Normally functioning mitral, tricuspid, aortic and pulmonary valves with no evidence of any endocarditis or abscess. No clots in the LV. Aortic root is normal in size    Echocardiogram:   On 2/15  Interpretation Summary      · All left ventricular wall segments contract normally.  · Left ventricular function is normal. Estimated EF = 70%.  · Left atrial cavity size is borderline dilated.       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   • Recent MSSA (methicillin susceptible Staphylococcus aureus) septicemia   • Infection of prosthetic right knee joint   • Hyperglycemia   • Hyponatremia   • Type 2 diabetes mellitus with hyperglycemia     left first toe amputation for osteomyelitis      Assessment/ Plan :  - Asked for ANASTASIA transesophageal echocardiogram by ID: 2/28 no reported PFO, clot, vegetation, or mass  - Hypertension- takes lisinopril  - Hyperlipidemia-takes statin  - LV EF 70% by echo  2/15/217      S/p ANASTASIA 2/28:  showed normal right and left ventricular function.  Normally functioning mitral, tricuspid, aortic and pulmonary valves with no evidence of any endocarditis or abscess. No clots in the LV. Aortic root is normal in size.  He had a normal Cardiolite stress test 10/7/2015, and EF 50%.  Lipid profile: LDL is 78    Patient denies chest pain or chest discomfort. EKG shows sinus, no acute ST-T abnormality.    Hypertension, uncontrolled, will add amlodipine 5 mg by mouth daily and chlorthalidone 25 mg by mouth daily.  May consideration to increase lisinopril 20 mg by mouth daily and switch to twice daily; however, has had renal insufficiency which has improved today.  Creat is 1.2, was higher    Ongoing management by Dr. PEGGY Levin-Tracy Arellano APRN    Will give Noravasc 10 mg instead of 5 mg.    Junior Levin MD, [facility]  3/1/05955:21 AM      Patient is personally examined by me. All labs, radiology reports and cardiac procedures are reviewed and or obtained by me. Asessment and plan is by me.-----Junior Levin MD

## 2017-03-01 NOTE — PROGRESS NOTES
ANASTASIA performed today showed normal right and left ventricular function.  Normally functioning mitral, tricuspid, aortic and pulmonary valves with no evidence of any endocarditis or abscess. No clots in the LV. Aortic root is normal in size    Patient is seen and examined by me. All labs, radiology reports and cardiac procedures are reviewed by me. Asessment and plan is supervised and advised by me.  Junior Levin MD,FACC

## 2017-03-01 NOTE — CONSULTS
65 y/o cauc  father of 2 adult children admitted for SNU after his blood sugars were 600.  Patient had recently had a big toe amputation and knee surgery.  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  Patient denies any SI of HI.  He worries a lot.  He struggles w/ his diet.  He states  the diabetic dietician was very helpful today.      Patient reports after he had hurt his back he went to multiple practitioners to help it.  Eventually he went to a psychiatrist in [facility] who really helped him.  He was placed on Zoloft.  Patient has been on Zoloft 100mg for years.  He has seen a counselor in the past.  Patient has no IP psych admission and has no ETOH or illicit drug history.    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  He lives alone.  He has positive relationship with his 2 children.    Patient is alert and O X 4.  No SI or HI.  No a/v hallucinations.  Speech is appropriate.  Insight and judgment are appropriate.  Discussed treatment options.  He denies any interest in seeing a psychiatrist or a counselor at this time.  He believes that he has to get his blood sugars under control and then the wounds will heal.      No further need for [facility] involvement at this time.

## 2017-03-02 LAB
ANION GAP SERPL CALCULATED.3IONS-SCNC: 16.6 MMOL/L
BUN BLD-MCNC: 41 MG/DL (ref 8–23)
BUN/CREAT SERPL: 34.2 (ref 7–25)
CALCIUM SPEC-SCNC: 8.4 MG/DL (ref 8.6–10.5)
CHLORIDE SERPL-SCNC: 99 MMOL/L (ref 98–107)
CO2 SERPL-SCNC: 18.4 MMOL/L (ref 22–29)
CREAT BLD-MCNC: 1.2 MG/DL (ref 0.76–1.27)
DEPRECATED RDW RBC AUTO: 44.6 FL (ref 37–54)
ERYTHROCYTE [DISTWIDTH] IN BLOOD BY AUTOMATED COUNT: 13.4 % (ref 11.5–14.5)
GFR SERPL CREATININE-BSD FRML MDRD: 61 ML/MIN/1.73
GLUCOSE BLD-MCNC: 275 MG/DL (ref 65–99)
GLUCOSE BLDC GLUCOMTR-MCNC: 271 MG/DL (ref 70–130)
GLUCOSE BLDC GLUCOMTR-MCNC: 277 MG/DL (ref 70–130)
GLUCOSE BLDC GLUCOMTR-MCNC: 281 MG/DL (ref 70–130)
GLUCOSE BLDC GLUCOMTR-MCNC: 388 MG/DL (ref 70–130)
HCT VFR BLD AUTO: 26.6 % (ref 40.4–52.2)
HGB BLD-MCNC: 8.6 G/DL (ref 13.7–17.6)
MAGNESIUM SERPL-MCNC: 1.8 MG/DL (ref 1.6–2.4)
MCH RBC QN AUTO: 29.6 PG (ref 27–32.7)
MCHC RBC AUTO-ENTMCNC: 32.3 G/DL (ref 32.6–36.4)
MCV RBC AUTO: 91.4 FL (ref 79.8–96.2)
PLATELET # BLD AUTO: 325 10*3/MM3 (ref 140–500)
PMV BLD AUTO: 9.7 FL (ref 6–12)
POTASSIUM BLD-SCNC: 4.6 MMOL/L (ref 3.5–5.2)
POTASSIUM BLD-SCNC: 4.7 MMOL/L (ref 3.5–5.2)
RBC # BLD AUTO: 2.91 10*6/MM3 (ref 4.6–6)
SODIUM BLD-SCNC: 134 MMOL/L (ref 136–145)
WBC NRBC COR # BLD: 10.6 10*3/MM3 (ref 4.5–10.7)

## 2017-03-02 PROCEDURE — 93005 ELECTROCARDIOGRAM TRACING: CPT | Performed by: NURSE PRACTITIONER

## 2017-03-02 PROCEDURE — 82962 GLUCOSE BLOOD TEST: CPT

## 2017-03-02 PROCEDURE — 84132 ASSAY OF SERUM POTASSIUM: CPT | Performed by: NURSE PRACTITIONER

## 2017-03-02 PROCEDURE — 93010 ELECTROCARDIOGRAM REPORT: CPT | Performed by: INTERNAL MEDICINE

## 2017-03-02 PROCEDURE — 99232 SBSQ HOSP IP/OBS MODERATE 35: CPT | Performed by: INTERNAL MEDICINE

## 2017-03-02 PROCEDURE — 25010000002 MORPHINE PER 10 MG: Performed by: INTERNAL MEDICINE

## 2017-03-02 PROCEDURE — 99233 SBSQ HOSP IP/OBS HIGH 50: CPT | Performed by: INTERNAL MEDICINE

## 2017-03-02 PROCEDURE — 63710000001 INSULIN ASPART PER 5 UNITS: Performed by: INTERNAL MEDICINE

## 2017-03-02 PROCEDURE — 80048 BASIC METABOLIC PNL TOTAL CA: CPT | Performed by: HOSPITALIST

## 2017-03-02 PROCEDURE — 83735 ASSAY OF MAGNESIUM: CPT | Performed by: NURSE PRACTITIONER

## 2017-03-02 PROCEDURE — 25010000002 ONDANSETRON PER 1 MG: Performed by: INTERNAL MEDICINE

## 2017-03-02 PROCEDURE — 25010000002 ENOXAPARIN PER 10 MG: Performed by: INTERNAL MEDICINE

## 2017-03-02 PROCEDURE — 63710000001 INSULIN DETEMER PER 5 UNITS: Performed by: INTERNAL MEDICINE

## 2017-03-02 PROCEDURE — 85027 COMPLETE CBC AUTOMATED: CPT | Performed by: HOSPITALIST

## 2017-03-02 RX ORDER — NIFEDIPINE 30 MG/1
30 TABLET, EXTENDED RELEASE ORAL
Status: DISCONTINUED | OUTPATIENT
Start: 2017-03-02 | End: 2017-03-03

## 2017-03-02 RX ADMIN — INSULIN ASPART 12 UNITS: 100 INJECTION, SOLUTION INTRAVENOUS; SUBCUTANEOUS at 08:48

## 2017-03-02 RX ADMIN — ONDANSETRON 4 MG: 2 INJECTION INTRAMUSCULAR; INTRAVENOUS at 14:26

## 2017-03-02 RX ADMIN — HYDROCODONE BITARTRATE AND ACETAMINOPHEN 1 TABLET: 10; 325 TABLET ORAL at 18:53

## 2017-03-02 RX ADMIN — METFORMIN HYDROCHLORIDE 1000 MG: 1000 TABLET ORAL at 11:20

## 2017-03-02 RX ADMIN — CEFAZOLIN 2 G: 1 INJECTION, POWDER, FOR SOLUTION INTRAMUSCULAR; INTRAVENOUS; PARENTERAL at 21:40

## 2017-03-02 RX ADMIN — INSULIN ASPART 9 UNITS: 100 INJECTION, SOLUTION INTRAVENOUS; SUBCUTANEOUS at 18:54

## 2017-03-02 RX ADMIN — HYDROCODONE BITARTRATE AND ACETAMINOPHEN 1 TABLET: 10; 325 TABLET ORAL at 11:20

## 2017-03-02 RX ADMIN — PRAVASTATIN SODIUM 20 MG: 20 TABLET ORAL at 21:39

## 2017-03-02 RX ADMIN — INSULIN ASPART 15 UNITS: 100 INJECTION, SOLUTION INTRAVENOUS; SUBCUTANEOUS at 18:53

## 2017-03-02 RX ADMIN — CEFAZOLIN 2 G: 1 INJECTION, POWDER, FOR SOLUTION INTRAMUSCULAR; INTRAVENOUS; PARENTERAL at 05:23

## 2017-03-02 RX ADMIN — METFORMIN HYDROCHLORIDE 1000 MG: 1000 TABLET ORAL at 18:57

## 2017-03-02 RX ADMIN — LISINOPRIL 20 MG: 20 TABLET ORAL at 08:47

## 2017-03-02 RX ADMIN — INSULIN DETEMIR 55 UNITS: 100 INJECTION, SOLUTION SUBCUTANEOUS at 21:38

## 2017-03-02 RX ADMIN — INSULIN DETEMIR 20 UNITS: 100 INJECTION, SOLUTION SUBCUTANEOUS at 08:45

## 2017-03-02 RX ADMIN — CEFAZOLIN 2 G: 1 INJECTION, POWDER, FOR SOLUTION INTRAMUSCULAR; INTRAVENOUS; PARENTERAL at 14:25

## 2017-03-02 RX ADMIN — RIFAMPIN 300 MG: 300 CAPSULE ORAL at 21:39

## 2017-03-02 RX ADMIN — PANTOPRAZOLE SODIUM 40 MG: 40 TABLET, DELAYED RELEASE ORAL at 05:22

## 2017-03-02 RX ADMIN — HYDROCODONE BITARTRATE AND ACETAMINOPHEN 1 TABLET: 10; 325 TABLET ORAL at 03:08

## 2017-03-02 RX ADMIN — NIFEDIPINE 30 MG: 30 TABLET, FILM COATED, EXTENDED RELEASE ORAL at 18:57

## 2017-03-02 RX ADMIN — INSULIN ASPART 15 UNITS: 100 INJECTION, SOLUTION INTRAVENOUS; SUBCUTANEOUS at 11:21

## 2017-03-02 RX ADMIN — MORPHINE SULFATE 2 MG: 2 INJECTION, SOLUTION INTRAMUSCULAR; INTRAVENOUS at 14:45

## 2017-03-02 RX ADMIN — INSULIN DETEMIR 15 UNITS: 100 INJECTION, SOLUTION SUBCUTANEOUS at 12:57

## 2017-03-02 RX ADMIN — INSULIN ASPART 9 UNITS: 100 INJECTION, SOLUTION INTRAVENOUS; SUBCUTANEOUS at 21:39

## 2017-03-02 RX ADMIN — INSULIN ASPART 12 UNITS: 100 INJECTION, SOLUTION INTRAVENOUS; SUBCUTANEOUS at 11:21

## 2017-03-02 RX ADMIN — CHLORTHALIDONE 25 MG: 25 TABLET ORAL at 08:47

## 2017-03-02 RX ADMIN — MORPHINE SULFATE 2 MG: 2 INJECTION, SOLUTION INTRAMUSCULAR; INTRAVENOUS at 21:39

## 2017-03-02 RX ADMIN — RIFAMPIN 300 MG: 300 CAPSULE ORAL at 08:47

## 2017-03-02 RX ADMIN — SERTRALINE 100 MG: 100 TABLET, FILM COATED ORAL at 08:47

## 2017-03-02 RX ADMIN — MORPHINE SULFATE 2 MG: 2 INJECTION, SOLUTION INTRAMUSCULAR; INTRAVENOUS at 08:48

## 2017-03-02 RX ADMIN — INSULIN ASPART 9 UNITS: 100 INJECTION, SOLUTION INTRAVENOUS; SUBCUTANEOUS at 08:48

## 2017-03-02 RX ADMIN — ENOXAPARIN SODIUM 40 MG: 40 INJECTION SUBCUTANEOUS at 11:20

## 2017-03-02 RX ADMIN — MORPHINE SULFATE 2 MG: 2 INJECTION, SOLUTION INTRAMUSCULAR; INTRAVENOUS at 00:45

## 2017-03-02 RX ADMIN — MORPHINE SULFATE 2 MG: 2 INJECTION, SOLUTION INTRAMUSCULAR; INTRAVENOUS at 05:21

## 2017-03-02 RX ADMIN — AMLODIPINE BESYLATE 10 MG: 10 TABLET ORAL at 08:47

## 2017-03-02 NOTE — PROGRESS NOTES
Osmin Brand MD       LOS: 5 days   Patient Care Team:  Guille Nieves MD as PCP - General  Guille Nieves MD as PCP - Family Medicine    Subjective     66 y.o. male with healing left first toe site after amputation for osteomyelitis.  Continue antibiotics per infectious disease.  Plans for treatment right knee prosthesis per orthopedics.  Patient with normal total circulation adequate perfusion to heal.    Review of Systems  Review of Systems - Negative except diabetes mellitus per medicine and endocrinology treatment.  A symptomatically cardiac status.      Objective     Vital Signs  Temp:  [98.7 °F (37.1 °C)-99.5 °F (37.5 °C)] 98.8 °F (37.1 °C)  Heart Rate:  [92-99] 92  Resp:  [20] 20  BP: (155-179)/(78-93) 160/81    Physical Exam  General: No acute distress. Alert and oriented x 4  HEENT: No jugular venous distension, trachea is midline  CV: RRR, S1S2  Resp: Clear unlabored breathing  Abd: Abdomen is soft, nontender, nondistended  Extremities: Left first toe wound granulating.  No leg swelling.  Right knee incision intact.  Right arm PICC line site clean.    Results Review:       Recent Results (from the past 12 hour(s))   POC Glucose Fingerstick    Collection Time: 03/01/17  8:26 PM   Result Value Ref Range    Glucose 360 (H) 70 - 130 mg/dL   Basic Metabolic Panel    Collection Time: 03/02/17  5:23 AM   Result Value Ref Range    Glucose 275 (H) 65 - 99 mg/dL    BUN 41 (H) 8 - 23 mg/dL    Creatinine 1.20 0.76 - 1.27 mg/dL    Sodium 134 (L) 136 - 145 mmol/L    Potassium 4.6 3.5 - 5.2 mmol/L    Chloride 99 98 - 107 mmol/L    CO2 18.4 (L) 22.0 - 29.0 mmol/L    Calcium 8.4 (L) 8.6 - 10.5 mg/dL    eGFR Non African Amer 61 >60 mL/min/1.73    BUN/Creatinine Ratio 34.2 (H) 7.0 - 25.0    Anion Gap 16.6 mmol/L   CBC (No Diff)    Collection Time: 03/02/17  5:23 AM   Result Value Ref Range    WBC 10.60 4.50 - 10.70 10*3/mm3    RBC 2.91 (L) 4.60 - 6.00 10*6/mm3    Hemoglobin 8.6 (L) 13.7 - 17.6 g/dL     Hematocrit 26.6 (L) 40.4 - 52.2 %    MCV 91.4 79.8 - 96.2 fL    MCH 29.6 27.0 - 32.7 pg    MCHC 32.3 (L) 32.6 - 36.4 g/dL    RDW 13.4 11.5 - 14.5 %    RDW-SD 44.6 37.0 - 54.0 fl    MPV 9.7 6.0 - 12.0 fL    Platelets 325 140 - 500 10*3/mm3   ]      Assessment/Plan           Principal Problem:    Type 2 diabetes mellitus with hyperglycemia  Active Problems:    Hypertension    Diabetic ulcer of 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, with necrosis of bone    Diabetic autonomic neuropathy associated with type 2 diabetes mellitus    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 male with local care and VAC left first toe.  Okay for transfer to skilled unit today if agreeable with all.  Patient to follow-up with [facility] [place] wound care center as outpatient for treatment left first toe wound.  I will see as needed.      Osmin Brand MD  03/02/17  6:30 AM

## 2017-03-02 NOTE — PROGRESS NOTES
66 y.o.   LOS: 5 days   Patient Care Team:  Guille Nieves MD as PCP - General  Guille Nieves MD as PCP - Family Medicine    Chief Complaint:  Hyperglycemia    Chief Complaint   Patient presents with   • Hyperglycemia     per EMS blood glucose at nursing home above 600.    • Shortness of Breath   • Flank Pain     right side       Subjective  patient is eating well and I have been increasing his insulin regimen.  However his blood sugars still continues to remain high.  Suspect if patient has been in glucose toxicity state.  We will increase his insulin regimen as below and give extra Levemir 15 units ×1 now.    Interval History:    Review of Systems:   Review of Systems   Constitutional: Negative for appetite change, chills, diaphoresis and fatigue.   HENT: Negative for hearing loss, nosebleeds, postnasal drip, trouble swallowing and voice change.    Eyes: Negative for photophobia, discharge and visual disturbance.   Respiratory: Negative for cough, shortness of breath and wheezing.    Cardiovascular: Negative for chest pain, palpitations and leg swelling.   Gastrointestinal: Negative for abdominal distention, abdominal pain, constipation, diarrhea, nausea and vomiting.   Endocrine: Negative for cold intolerance, heat intolerance, polydipsia and polyuria.   Genitourinary: Negative for dysuria, frequency and hematuria.   Musculoskeletal: Positive for arthralgias, back pain, joint swelling and myalgias.   Skin: Positive for wound. Negative for pallor and rash.   Neurological: Positive for weakness and numbness. Negative for dizziness, tremors, seizures, syncope and headaches.   Hematological: Negative for adenopathy.   Psychiatric/Behavioral: Negative for agitation, confusion and sleep disturbance. The patient is not nervous/anxious.      Objective     Vital Signs   Temp:  [98.7 °F (37.1 °C)-99.5 °F (37.5 °C)] 98.9 °F (37.2 °C)  Heart Rate:  [] 110  Resp:  [18-20] 18  BP: (155-182)/()  182/100    Physical Exam:  Physical Exam   Constitutional: He is oriented to person, place, and time. He appears well-developed and well-nourished.   Upset    HENT:   Head: Normocephalic and atraumatic.   Mouth/Throat: Oropharynx is clear and moist.   Eyes: EOM are normal. Pupils are equal, round, and reactive to light.   Neck: Normal range of motion. Neck supple. No tracheal deviation present. No thyromegaly present.   Skin tags   Cardiovascular: Normal rate, regular rhythm and normal heart sounds.    HR - 70   Pulmonary/Chest: Effort normal and breath sounds normal. No stridor. He has no wheezes.   Abdominal: Soft. Bowel sounds are normal. He exhibits no distension. There is no tenderness. No hernia.   Central obesity   Musculoskeletal: Normal range of motion. He exhibits no edema.   Left foot dressed and right knee erythema with staples - improving   Lymphadenopathy:     He has no cervical adenopathy.   Neurological: He is alert and oriented to person, place, and time. He has normal reflexes.   Decreased pin prick and proprioception   Skin: Skin is warm and dry.   Psychiatric: Judgment normal.   Vitals reviewed.  Results Review:     I reviewed the patient's new clinical results.      GLUCOSE   Date/Time Value Ref Range Status   03/02/2017 0523 275 (H) 65 - 99 mg/dL Final   03/01/2017 0631 290 (H) 65 - 99 mg/dL Final     Lab Results (last 72 hours)     Procedure Component Value Units Date/Time    POC Glucose Fingerstick [[id number]]  (Abnormal) Collected:  02/25/17 1717    Specimen:  Blood Updated:  02/25/17 1727     Glucose 546 (C) mg/dL     Narrative:       Physician Notified Meter: [id number] : [id number] Anjelica Giles    CBC & Differential [[id number]] Collected:  02/25/17 1723    Specimen:  Blood Updated:  02/25/17 1738    Narrative:       The following orders were created for panel order CBC & Differential.  Procedure                               Abnormality         Status                     ---------                                -----------         ------                     CBC Auto Differential[[id number]]         Abnormal            Final result                 Please view results for these tests on the individual orders.    CBC Auto Differential [[id number]]  (Abnormal) Collected:  02/25/17 1723    Specimen:  Blood Updated:  02/25/17 1738     WBC 14.45 (H) 10*3/mm3      RBC 3.31 (L) 10*6/mm3      Hemoglobin 10.2 (L) g/dL      Hematocrit 30.4 (L) %      MCV 91.8 fL      MCH 30.8 pg      MCHC 33.6 g/dL      RDW 13.7 %      RDW-SD 45.5 fl      MPV 10.1 fL      Platelets 346 10*3/mm3      Neutrophil % 83.7 (H) %      Lymphocyte % 9.8 (L) %      Monocyte % 5.7 %      Eosinophil % 0.4 %      Basophil % 0.1 %      Immature Grans % 0.3 %      Neutrophils, Absolute 12.08 (H) 10*3/mm3      Lymphocytes, Absolute 1.42 10*3/mm3      Monocytes, Absolute 0.83 10*3/mm3      Eosinophils, Absolute 0.06 10*3/mm3      Basophils, Absolute 0.02 10*3/mm3      Immature Grans, Absolute 0.04 (H) 10*3/mm3     POC Glucose Fingerstick [[id number]]  (Abnormal) Collected:  02/25/17 1759    Specimen:  Blood Updated:  02/25/17 1801     Glucose 581 (C) mg/dL     Narrative:       Physician Notified Meter: [id number] : [id number] [facility]    Comprehensive Metabolic Panel [[id number]]  (Abnormal) Collected:  02/25/17 1723    Specimen:  Blood Updated:  02/25/17 1805     Glucose 604 (C) mg/dL      BUN 30 (H) mg/dL      Creatinine 1.43 (H) mg/dL      Sodium 126 (L) mmol/L      Potassium 4.8 mmol/L      Chloride 87 (L) mmol/L      CO2 19.0 (L) mmol/L      Calcium 9.0 mg/dL      Total Protein 7.6 g/dL      Albumin 2.80 (L) g/dL      ALT (SGPT) 10 U/L      AST (SGOT) 14 U/L      Alkaline Phosphatase 136 (H) U/L      Total Bilirubin 0.5 mg/dL      eGFR Non African Amer 49 (L) mL/min/1.73      Globulin 4.8 gm/dL      A/G Ratio 0.6 g/dL      BUN/Creatinine Ratio 21.0      Anion Gap 20.0 mmol/L     Ketone Bodies, Serum (will not run at [place]) [[id number]]  Collected:  02/25/17 1723    Specimen:  Blood Updated:  02/25/17 1815    Narrative:       The following orders were created for panel order Ketone Bodies, Serum (will not run at [place]).  Procedure                               Abnormality         Status                     ---------                               -----------         ------                     Acetone[[id number]]                       Normal              Final result                 Please view results for these tests on the individual orders.    Acetone [[id number]]  (Normal) Collected:  02/25/17 1723    Specimen:  Blood Updated:  02/25/17 1815     Acetone Negative     Urinalysis With / Culture If Indicated [[id number]]  (Abnormal) Collected:  02/25/17 1841    Specimen:  Urine from Urine, Clean Catch Updated:  02/25/17 1852     Color, UA Yellow      Appearance, UA Clear      pH, UA <=5.0      Specific Gravity, UA >=1.030      Glucose, UA >=1000 mg/dL (3+) (A)      Ketones, UA Negative      Bilirubin, UA Negative      Blood, UA Moderate (2+) (A)      Protein,  mg/dL (2+) (A)      Leuk Esterase, UA Negative      Nitrite, UA Negative      Urobilinogen, UA 0.2 E.U./dL     Urinalysis, Microscopic Only [[id number]]  (Abnormal) Collected:  02/25/17 1841    Specimen:  Urine from Urine, Clean Catch Updated:  02/25/17 1852     RBC, UA 6-12 (A) /HPF      WBC, UA 3-5 (A) /HPF      Bacteria, UA None Seen /HPF      Squamous Epithelial Cells, UA 0-2 /HPF      Hyaline Casts, UA 3-6 /LPF      Methodology Automated Microscopy     Procalcitonin [[id number]]  (Abnormal) Collected:  02/25/17 1723    Specimen:  Blood Updated:  02/25/17 1903     Procalcitonin 1.29 (C) ng/mL     Narrative:       As a Marker for Sepsis (Non-Neonates):   1. <0.5 ng/mL represents a low risk of severe sepsis and/or septic shock.  1. >2 ng/mL represents a high risk of severe sepsis and/or septic shock.    As a Marker for Lower Respiratory Tract Infections that require antibiotic therapy:  PCT on  "Admission     Antibiotic Therapy             6-12 Hrs later  > 0.5                Strongly Recommended            >0.25 - <0.5         Recommended  0.1 - 0.25           Discouraged                   Remeasure/reassess PCT  <0.1                 Strongly Discouraged          Remeasure/reassess PCT      As 28 day mortality risk marker: \"Change in Procalcitonin Result\" (> 80 % or <=80 %) if Day 0 (or Day 1) and Day 4 values are available. Refer to [email or web address]/   Change in PCT <=80 %   A decrease of PCT levels below or equal to 80 % defines a positive change in PCT test result representing a higher risk for 28-day all-cause mortality of patients diagnosed with severe sepsis or septic shock.  Change in PCT > 80 %   A decrease of PCT levels of more than 80 % defines a negative change in PCT result representing a lower risk for 28-day all-cause mortality of patients diagnosed with severe sepsis or septic shock.                POC Glucose Fingerstick [[id number]]  (Abnormal) Collected:  02/25/17 1910    Specimen:  Blood Updated:  02/25/17 1920     Glucose 556 (C) mg/dL     Narrative:       Meter: [id number] : [id number] Adánabdi Madrigal    POC Glucose Fingerstick [[id number]]  (Abnormal) Collected:  02/25/17 2040    Specimen:  Blood Updated:  02/25/17 2042     Glucose 519 (C) mg/dL     Narrative:       Physician Notified Meter: [id number] : [id number] Tj Mcarthur    Light Blue Top [[id number]] Collected:  02/25/17 1723    Specimen:  Blood Updated:  02/25/17 2201     Extra Tube hold for add-on       Auto resulted       Green Top (Gel) [[id number]] Collected:  02/25/17 1723    Specimen:  Blood Updated:  02/25/17 2201     Extra Tube Hold for add-ons.       Auto resulted.       Lavender Top [[id number]] Collected:  02/25/17 1723    Specimen:  Blood Updated:  02/25/17 2201     Extra Tube hold for add-on       Auto resulted       POC Glucose Fingerstick [[id number]]  (Abnormal) Collected:  02/25/17 2317    " Specimen:  Blood Updated:  02/25/17 2318     Glucose 464 (C) mg/dL     Narrative:       Meter: [id number] : [id number] Kg Zacarias    POC Glucose Fingerstick [[id number]]  (Abnormal) Collected:  02/26/17 0117    Specimen:  Blood Updated:  02/26/17 0118     Glucose 437 (H) mg/dL     Narrative:       Meter: [id number] : [id number] Ben Coley    POC Glucose Fingerstick [[id number]]  (Abnormal) Collected:  02/26/17 0433    Specimen:  Blood Updated:  02/26/17 0435     Glucose 417 (H) mg/dL     Narrative:       Meter: [id number] : [id number] Ben Coley    CBC & Differential [[id number]] Collected:  02/26/17 0514    Specimen:  Blood Updated:  02/26/17 0624    Narrative:       The following orders were created for panel order CBC & Differential.  Procedure                               Abnormality         Status                     ---------                               -----------         ------                     CBC Auto Differential[[id number]]         Abnormal            Final result                 Please view results for these tests on the individual orders.    CBC Auto Differential [[id number]]  (Abnormal) Collected:  02/26/17 0514    Specimen:  Blood Updated:  02/26/17 0624     WBC 14.38 (H) 10*3/mm3      RBC 3.26 (L) 10*6/mm3      Hemoglobin 9.9 (L) g/dL      Hematocrit 29.9 (L) %      MCV 91.7 fL      MCH 30.4 pg      MCHC 33.1 g/dL      RDW 13.6 %      RDW-SD 45.5 fl      MPV 10.2 fL      Platelets 323 10*3/mm3      Neutrophil % 78.9 (H) %      Lymphocyte % 13.3 (L) %      Monocyte % 6.3 %      Eosinophil % 1.0 %      Basophil % 0.1 %      Immature Grans % 0.4 %      Neutrophils, Absolute 11.36 (H) 10*3/mm3      Lymphocytes, Absolute 1.91 10*3/mm3      Monocytes, Absolute 0.90 10*3/mm3      Eosinophils, Absolute 0.14 10*3/mm3      Basophils, Absolute 0.01 10*3/mm3      Immature Grans, Absolute 0.06 (H) 10*3/mm3     POC Glucose Fingerstick [[id number]]  (Abnormal) Collected:  02/26/17 0637     Specimen:  Blood Updated:  02/26/17 0639     Glucose 402 (H) mg/dL     Narrative:       Meter: [id number] : [id number] Ben Coley    Basic Metabolic Panel [[id number]]  (Abnormal) Collected:  02/26/17 0514    Specimen:  Blood Updated:  02/26/17 0712     Glucose 417 (C) mg/dL      BUN 32 (H) mg/dL      Creatinine 1.37 (H) mg/dL      Sodium 126 (L) mmol/L      Potassium 4.4 mmol/L      Chloride 92 (L) mmol/L      CO2 19.4 (L) mmol/L      Calcium 8.8 mg/dL      eGFR Non African Amer 52 (L) mL/min/1.73      BUN/Creatinine Ratio 23.4      Anion Gap 14.6 mmol/L     Narrative:       GFR Normal >60  Chronic Kidney Disease <60  Kidney Failure <15    POC Glucose Fingerstick [[id number]]  (Abnormal) Collected:  02/26/17 0744    Specimen:  Blood Updated:  02/26/17 0745     Glucose 408 (H) mg/dL     Narrative:       Meter: [id number] : [id number] Finesse GALLARDO    C-reactive Protein [[id number]]  (Abnormal) Collected:  02/26/17 0514    Specimen:  Blood Updated:  02/26/17 0944     C-Reactive Protein 30.73 (H) mg/dL     Sedimentation Rate [[id number]]  (Abnormal) Collected:  02/26/17 0514    Specimen:  Blood Updated:  02/26/17 0958     Sed Rate >140 (H) mm/hr     POC Glucose Fingerstick [[id number]]  (Abnormal) Collected:  02/26/17 1122    Specimen:  Blood Updated:  02/26/17 1123     Glucose 355 (H) mg/dL     Narrative:       Meter: [id number] : [id number] Finesse GALLARDO    POC Glucose Fingerstick [[id number]]  (Abnormal) Collected:  02/26/17 1606    Specimen:  Blood Updated:  02/26/17 1607     Glucose 302 (H) mg/dL     Narrative:       Meter: [id number] : [id number] Duran OTTO    POC Glucose Fingerstick [[id number]]  (Abnormal) Collected:  02/26/17 2046    Specimen:  Blood Updated:  02/26/17 2047     Glucose 287 (H) mg/dL     Narrative:       Meter: [id number] : [id number] Duran OTTO    [place] Draw [[id number]] Collected:  02/25/17 1723    Specimen:  Blood Updated:  02/26/17 2241    Narrative:       The  following orders were created for panel order [place] [facility].  Procedure                               Abnormality         Status                     ---------                               -----------         ------                     Light Blue Top[[id number]]                                    Final result               Green Top (Gel)[[id number]]                                   Final result               Lavender Top[[id number]]                                      Final result               Gold Top - SST[[id number]]                                                                 Please view results for these tests on the individual orders.    CBC (No Diff) [[id number]]  (Abnormal) Collected:  02/27/17 0349    Specimen:  Blood Updated:  02/27/17 0407     WBC 14.32 (H) 10*3/mm3      RBC 3.07 (L) 10*6/mm3      Hemoglobin 9.4 (L) g/dL      Hematocrit 28.4 (L) %      MCV 92.5 fL      MCH 30.6 pg      MCHC 33.1 g/dL      RDW 13.5 %      RDW-SD 45.5 fl      MPV 9.9 fL      Platelets 315 10*3/mm3     Basic Metabolic Panel [[id number]]  (Abnormal) Collected:  02/27/17 0349    Specimen:  Blood Updated:  02/27/17 0432     Glucose 253 (H) mg/dL      BUN 37 (H) mg/dL      Creatinine 1.39 (H) mg/dL      Sodium 134 (L) mmol/L      Potassium 4.2 mmol/L      Chloride 99 mmol/L      CO2 18.8 (L) mmol/L      Calcium 8.5 (L) mg/dL      eGFR Non African Amer 51 (L) mL/min/1.73      BUN/Creatinine Ratio 26.6 (H)      Anion Gap 16.2 mmol/L     Narrative:       GFR Normal >60  Chronic Kidney Disease <60  Kidney Failure <15    Procalcitonin [[id number]]  (Abnormal) Collected:  02/27/17 0349    Specimen:  Blood Updated:  02/27/17 0453     Procalcitonin 1.56 (C) ng/mL     Narrative:       As a Marker for Sepsis (Non-Neonates):   1. <0.5 ng/mL represents a low risk of severe sepsis and/or septic shock.  1. >2 ng/mL represents a high risk of severe sepsis and/or septic shock.    As a Marker for Lower Respiratory Tract Infections that require  "antibiotic therapy:  PCT on Admission     Antibiotic Therapy             6-12 Hrs later  > 0.5                Strongly Recommended            >0.25 - <0.5         Recommended  0.1 - 0.25           Discouraged                   Remeasure/reassess PCT  <0.1                 Strongly Discouraged          Remeasure/reassess PCT      As 28 day mortality risk marker: \"Change in Procalcitonin Result\" (> 80 % or <=80 %) if Day 0 (or Day 1) and Day 4 values are available. Refer to [email or web address]-pct-[email or web address]/   Change in PCT <=80 %   A decrease of PCT levels below or equal to 80 % defines a positive change in PCT test result representing a higher risk for 28-day all-cause mortality of patients diagnosed with severe sepsis or septic shock.  Change in PCT > 80 %   A decrease of PCT levels of more than 80 % defines a negative change in PCT result representing a lower risk for 28-day all-cause mortality of patients diagnosed with severe sepsis or septic shock.                Blood Culture [[id number]]  (Normal) Collected:  02/25/17 2200    Specimen:  Blood from Blood, Central Line Updated:  02/27/17 0701     Blood Culture No growth at 24 hours     Blood Culture [[id number]]  (Normal) Collected:  02/25/17 2200    Specimen:  Blood from Arm, Left Updated:  02/27/17 0701     Blood Culture No growth at 24 hours     POC Glucose Fingerstick [[id number]]  (Abnormal) Collected:  02/27/17 0713    Specimen:  Blood Updated:  02/27/17 0716     Glucose 271 (H) mg/dL     Narrative:       Meter: [id number] : [id number] Mayuri Briceno    Blood Culture [[id number]]  (Normal) Collected:  02/26/17 2117    Specimen:  Blood from Blood, Venous Line Updated:  02/27/17 1001     Blood Culture No growth at less than 24 hours     POC Glucose Fingerstick [[id number]]  (Abnormal) Collected:  02/27/17 1139    Specimen:  Blood Updated:  02/27/17 1146     Glucose 401 (H) mg/dL     Narrative:       Meter: [id number] : [id number] Mayuri Briceno    Blood " Culture [[id number]]  (Normal) Collected:  02/26/17 2242    Specimen:  Blood from Blood, Venous Line Updated:  02/27/17 1201     Blood Culture No growth at less than 24 hours         Imaging Results (last 72 hours)     Procedure Component Value Units Date/Time    XR Chest 2 View [[id number]] Collected:  02/25/17 1931     Updated:  02/26/17 1508    Narrative:       PA AND LATERAL CHEST 02/25/2017     HISTORY: Shortness of breath.     FINDINGS: The heart size is within normal limits. Lungs are slightly  underinflated, though there is mild vascular congestion. No focal  infiltrates or pneumothorax is seen.     This report was finalized on 2/26/2017 3:05 PM by Dr. Ruben Michelle MD.       XR Chest 1 View [[id number]] Collected:  02/26/17 1408     Updated:  02/26/17 1516    Narrative:       AP CHEST 02/26/2017     HISTORY: PICC line position.     FINDINGS: Right upper extremity PICC line is seen with its tip overlying  the SVC. Heart size is within normal limits. There is some minimal  patchy infiltrate or chronic parenchymal change in the left infrahilar  region similar to the 02/25/2017 study.     This report was finalized on 2/26/2017 3:13 PM by Dr. Ruben Michelle MD.             Medication Review: Done      Current Facility-Administered Medications:   •  acetaminophen (TYLENOL) tablet 650 mg, 650 mg, Oral, Q4H PRN, Elmira Rivera MD, 650 mg at 02/27/17 0638  •  amLODIPine (NORVASC) tablet 10 mg, 10 mg, Oral, Q24H, Rene Levin MD, 10 mg at 03/02/17 0847  •  bisacodyl (DULCOLAX) suppository 10 mg, 10 mg, Rectal, Daily PRN, Oscar Lucas MD, 10 mg at 02/28/17 0328  •  ceFAZolin in 0.9% normal saline (ANCEF) IVPB solution 2 g, 2 g, Intravenous, Q8H, Adarsh Love MD, Last Rate: 200 mL/hr at 03/02/17 0523, 2 g at 03/02/17 0523  •  chlorthalidone (HYGROTON) tablet 25 mg, 25 mg, Oral, Daily, ABBY Staples, 25 mg at 03/02/17 0847  •  dextrose (D50W) solution 25 g, 25 g, Intravenous, Q15  Min PRN, Oscar Lucas MD  •  dextrose (GLUTOSE) oral gel 15 g, 15 g, Oral, Q15 Min PRN, Oscar Lucas MD  •  enoxaparin (LOVENOX) syringe 40 mg, 40 mg, Subcutaneous, Daily, Oscar Lucas MD, 40 mg at 03/02/17 1120  •  glucagon (human recombinant) (GLUCAGEN DIAGNOSTIC) injection 1 mg, 1 mg, Subcutaneous, Q15 Min PRN, Oscar Lucas MD  •  HYDROcodone-acetaminophen (NORCO)  MG per tablet 1 tablet, 1 tablet, Oral, Q6H PRN, Oscar Lucas MD, 1 tablet at 03/02/17 1120  •  insulin aspart (novoLOG) injection 0-15 Units, 0-15 Units, Subcutaneous, 4x Daily AC & at Bedtime, Adarsh Love MD, 15 Units at 03/02/17 1121  •  insulin aspart (novoLOG) injection 15 Units, 15 Units, Subcutaneous, TID With Meals, Adarsh Love MD  •  insulin detemir (LEVEMIR) injection 15 Units, 15 Units, Subcutaneous, Once, Adarsh Love MD, Stopped at 03/02/17 1121  •  [START ON 3/3/2017] insulin detemir (LEVEMIR) injection 45 Units, 45 Units, Subcutaneous, QAM, Adarsh Love MD  •  insulin detemir (LEVEMIR) injection 55 Units, 55 Units, Subcutaneous, Nightly, Adarsh Love MD, 55 Units at 03/01/17 2028  •  lidocaine (XYLOCAINE) 1 % injection 10 mL, 10 mL, Infiltration, Once, Kiko Marie MD, 10 mL at 02/26/17 1024  •  lisinopril (PRINIVIL,ZESTRIL) tablet 20 mg, 20 mg, Oral, Daily, Oscar Lucas MD, 20 mg at 03/02/17 0847  •  metFORMIN (GLUCOPHAGE) tablet 1,000 mg, 1,000 mg, Oral, BID With Meals, Adarsh Love MD, 1,000 mg at 03/02/17 1120  •  morphine injection 2 mg, 2 mg, Intravenous, Q2H PRN, 2 mg at 03/02/17 0848 **AND** naloxone (NARCAN) injection 0.4 mg, 0.4 mg, Intravenous, Q5 Min PRN, Oscar Lucas MD  •  ondansetron (ZOFRAN) tablet 4 mg, 4 mg, Oral, Q6H PRN **OR** ondansetron ODT (ZOFRAN-ODT) disintegrating tablet 4 mg, 4 mg, Oral, Q6H PRN **OR** ondansetron (ZOFRAN) injection 4 mg, 4 mg, Intravenous, Q6H PRN, Oscar Akers  MD Shayy  •  pantoprazole (PROTONIX) EC tablet 40 mg, 40 mg, Oral, Q AM, Oscar Lucas MD, 40 mg at 03/02/17 0522  •  Pharmacy Consult, , Does not apply, Continuous PRN, Adarsh Love MD  •  pravastatin (PRAVACHOL) tablet 20 mg, 20 mg, Oral, Nightly, Oscar Lucas MD, 20 mg at 03/01/17 2027  •  rifAMPin (RIFADIN) capsule 300 mg, 300 mg, Oral, Q12H, Oscar Lucas MD, 300 mg at 03/02/17 0847  •  sertraline (ZOLOFT) tablet 100 mg, 100 mg, Oral, Daily, Oscar Lucas MD, 100 mg at 03/02/17 0847  •  sodium chloride 0.9 % flush 1-10 mL, 1-10 mL, Intravenous, PRN, Oscar Lucas MD  •  sodium chloride 0.9 % flush 10 mL, 10 mL, Intravenous, PRN, Trae Franklin MD, 10 mL at 02/25/17 2106    Assessment/Plan     Active Hospital Problems (** Indicates Principal Problem)    Diagnosis Date Noted   • **Type 2 diabetes mellitus with hyperglycemia [E11.65] 02/26/2017   • Hyponatremia [E87.1] 02/26/2017   • 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       Type 2 diabetes mellitus with diabetic neuropathy s/p left toe amputation   [id number]-3.25%  BG still remains high.  Will increase Levemir to 45 units in the morning  Will continue levemir 55 units at bedtime  Will increase NovoLog to 15 units with each meal  Will cover with high dose sliding scale 3 times a day before meals and at bedtime 3 units for  50 about 150  Will add metformin thousand milligrams twice a day      Hyperlipidemia  Continue Pravachol 20 mg oral daily      Left first toe amputation,with Right knee joint seeding -followed by orthopedics and vascular surgery      MSSA septicemia -Followed by ID, currently on IV antibiotics.  MRI and ANASTASIA negative.     Discussed with the patient about adjusting his insulin regimen.  He wants his blood sugars to be decently controlled before he goes to rehabilitation.  Might need a day for due to his blood sugars under control.  Would keep the patient for one more day for controlling his blood glucose levels.     The total time spent more than 35 min for old record and lab review and face- to- face, of which greater than 50% of time was spent in counseling the patient on treatment options, instructions for management/treatment and follow up and importance of compliance with chosen management or treatment options          Adarsh Love MD.  03/02/17  1:27 PM      EMR Dragon / transcription disclaimer:    Much of this encounter note is an electronic transcription/ translation of spoken language to printed text.  Electronic translation of spoken language may permit erroneous, or at times, nonsensical words or phrases to be inadvertently transcribed; although I have reviewed the note for such errors, some may still exist.

## 2017-03-02 NOTE — PROGRESS NOTES
[facility]  Cardiology Progress Note    Patient Identification:  Name:Hugh R McBurney  Age:66 y.o.  Sex: male  :  1950  MRN: [id number]             Length of Stay: 5    Follow up for: ANASTASIA    Interval History :      Overall has no new complaints.  He denies chest pain or chest discomfort.  Denies sore throat.  No hemoptysis.  Denies shortness of breath, dizziness, palpitations.  No reported LOC.  Telemetry monitor reports sinus, PVCs. BP elevated. Denies much pain.  Takes pain meds    BP still running high even on added Norvasc, chlorthalidone, lisinopril.   ANASTASIA no evidence of endocarditis and normal EF    Vascular asking:if ok transfer to skilled unit today if agreeable with all.     Hyperglycemia   This is a chronic problem.   Shortness of Breath     Flank Pain       This is a 66-year-old white male, with no prior cardiac history, with history of diabetes, hypertension, hyperlipidemia, chronic pain knees and back, presenting for hyperglycemia, worsening right knee redness and pain. Cardiology consultation asked to evaluate undergo ANASTASIA or transesophageal echocardiogram per Dr. Mayberry.    Past Medical History   Diagnosis Date   • Chronic pain      BILATERAL KNEES AND BACK   • Depression    • Diabetes mellitus    • Hyperlipidemia    • Hypertension    • Kidney stone        Scheduled Meds:    Current Facility-Administered Medications:   •  acetaminophen (TYLENOL) tablet 650 mg, 650 mg, Oral, Q4H PRN, Elmira Rivera MD, 650 mg at 17 0638  •  amLODIPine (NORVASC) tablet 10 mg, 10 mg, Oral, Q24H, Rene Levin MD, 10 mg at 17 0847  •  bisacodyl (DULCOLAX) suppository 10 mg, 10 mg, Rectal, Daily PRN, Oscar Lucas MD, 10 mg at 17 0328  •  ceFAZolin in 0.9% normal saline (ANCEF) IVPB solution 2 g, 2 g, Intravenous, Q8H, Adarsh Love MD, Last Rate: 200 mL/hr at 17 0523, 2 g at 17 0523  •  chlorthalidone (HYGROTON) tablet  25 mg, 25 mg, Oral, Daily, Tracy Arellano, APRN, 25 mg at 03/02/17 0847  •  dextrose (D50W) solution 25 g, 25 g, Intravenous, Q15 Min PRN, Oscar Lcuas MD  •  dextrose (GLUTOSE) oral gel 15 g, 15 g, Oral, Q15 Min PRN, Oscar Lucas MD  •  enoxaparin (LOVENOX) syringe 40 mg, 40 mg, Subcutaneous, Daily, Oscar Lucas MD, 40 mg at 03/02/17 1120  •  glucagon (human recombinant) (GLUCAGEN DIAGNOSTIC) injection 1 mg, 1 mg, Subcutaneous, Q15 Min PRN, Oscar Lucas MD  •  HYDROcodone-acetaminophen (NORCO)  MG per tablet 1 tablet, 1 tablet, Oral, Q6H PRN, Oscar Lucas MD, 1 tablet at 03/02/17 1120  •  insulin aspart (novoLOG) injection 0-15 Units, 0-15 Units, Subcutaneous, 4x Daily AC & at Bedtime, Adarsh Love MD, 15 Units at 03/02/17 1121  •  insulin aspart (novoLOG) injection 15 Units, 15 Units, Subcutaneous, TID With Meals, Adarsh Love MD  •  [START ON 3/3/2017] insulin detemir (LEVEMIR) injection 45 Units, 45 Units, Subcutaneous, QAM, Adarsh Love MD  •  insulin detemir (LEVEMIR) injection 55 Units, 55 Units, Subcutaneous, Nightly, Adarsh Love MD, 55 Units at 03/01/17 2028  •  lidocaine (XYLOCAINE) 1 % injection 10 mL, 10 mL, Infiltration, Once, Kiko Marie MD, 10 mL at 02/26/17 1024  •  lisinopril (PRINIVIL,ZESTRIL) tablet 20 mg, 20 mg, Oral, Daily, Oscar Lucas MD, 20 mg at 03/02/17 0847  •  metFORMIN (GLUCOPHAGE) tablet 1,000 mg, 1,000 mg, Oral, BID With Meals, Adarsh Love MD, 1,000 mg at 03/02/17 1120  •  morphine injection 2 mg, 2 mg, Intravenous, Q2H PRN, 2 mg at 03/02/17 0848 **AND** naloxone (NARCAN) injection 0.4 mg, 0.4 mg, Intravenous, Q5 Min PRN, Oscar Lucas MD  •  ondansetron (ZOFRAN) tablet 4 mg, 4 mg, Oral, Q6H PRN **OR** ondansetron ODT (ZOFRAN-ODT) disintegrating tablet 4 mg, 4 mg, Oral, Q6H PRN **OR** ondansetron (ZOFRAN) injection 4 mg, 4 mg, Intravenous, Q6H PRN, Oscar  "Oswald Lucas MD  •  pantoprazole (PROTONIX) EC tablet 40 mg, 40 mg, Oral, Q AM, Oscar Lucas MD, 40 mg at 03/02/17 0522  •  Pharmacy Consult, , Does not apply, Continuous PRN, Adarsh Love MD  •  pravastatin (PRAVACHOL) tablet 20 mg, 20 mg, Oral, Nightly, Oscar Lucas MD, 20 mg at 03/01/17 2027  •  rifAMPin (RIFADIN) capsule 300 mg, 300 mg, Oral, Q12H, Oscar Lucas MD, 300 mg at 03/02/17 0847  •  sertraline (ZOLOFT) tablet 100 mg, 100 mg, Oral, Daily, Oscar Lucas MD, 100 mg at 03/02/17 0847  •  sodium chloride 0.9 % flush 1-10 mL, 1-10 mL, Intravenous, PRN, Oscar Lucas MD  •  sodium chloride 0.9 % flush 10 mL, 10 mL, Intravenous, PRN, Trae Franklin MD, 10 mL at 02/25/17 2106    All systems were reviewed and negative except for:  Musculoskeletal: positive for  joint pain  Cardiac: no chest pain or chest discomfort, dizziness, palpitations.  RESP: no SOB   MS: body aches      Visit Vitals   • BP (!) 182/100 (BP Location: Left arm, Patient Position: Lying)   • Pulse 110   • Temp 98.9 °F (37.2 °C) (Oral)   • Resp 18   • Ht 74\" (188 cm)   • Wt 258 lb (117 kg)   • SpO2 95%   • BMI 33.13 kg/m2          Intake/Output Summary (Last 24 hours) at 03/02/17 1333  Last data filed at 03/02/17 1300   Gross per 24 hour   Intake    600 ml   Output   1175 ml   Net   -575 ml          Wt Readings from Last 1 Encounters:   02/25/17 1705 258 lb (117 kg)       Physical Examination: By         Physical Exam    General: No acute distress.    Skin: Warm and dry, no diaphoresis noted   HEENT: No ptosis;  oral mucosa moist   Neck: Supple; no carotid bruits; no JVD, Trachea mid line   Heart: S1S2  regular rate and rhythm;  no murmurs; no gallop or rub appreciated, No thrills palpable   Chest: Respirations unlabored at rest, bilateral breath sounds have good air entry; no  wheezes auscultated.     Abdomen: Soft, non-tender, non-distended, positive bowel " sounds  ,No aneurysms palpable   Extremities: Bilateral lower extremities have no pre-tibial pitting edema; infected foot is in bandage, Radials are palpable   Neurological: Alert and oriented ; no new motor deficits,       Lab Review:  Personally reviewed the labs, radiology imaging and other cardiac procedures.       Results from last 7 days  Lab Units 03/02/17  0523  02/25/17  1723   SODIUM mmol/L 134*  < > 126*   POTASSIUM mmol/L 4.6  < > 4.8   CHLORIDE mmol/L 99  < > 87*   TOTAL CO2 mmol/L 18.4*  < > 19.0*   BUN mg/dL 41*  < > 30*   CREATININE mg/dL 1.20  < > 1.43*   CALCIUM mg/dL 8.4*  < > 9.0   BILIRUBIN mg/dL  --   --  0.5   ALK PHOS U/L  --   --  136*   ALT (SGPT) U/L  --   --  10   AST (SGOT) U/L  --   --  14   GLUCOSE mg/dL 275*  < > 604*   < > = values in this interval not displayed.      @LABRCNTbnp@    Results from last 7 days  Lab Units 03/02/17  0523 03/01/17  0631 02/27/17  0349   WBC 10*3/mm3 10.60 10.58 14.32*   HEMOGLOBIN g/dL 8.6* 8.6* 9.4*   HEMATOCRIT % 26.6* 25.9* 28.4*   PLATELETS 10*3/mm3 325 330 315           Estimated Creatinine Clearance: 82.3 mL/min (by C-G formula based on Cr of 1.2).    I personally viewed and interpreted the patient's EKG/Telemetry data    ECG: SR with PVCs    ANASTASIA performed on 2/28 showed normal right and left ventricular function.  Normally functioning mitral, tricuspid, aortic and pulmonary valves with no evidence of any endocarditis or abscess. No clots in the LV. Aortic root is normal in size    Echocardiogram:   On 2/15  Interpretation Summary      · All left ventricular wall segments contract normally.  · Left ventricular function is normal. Estimated EF = 70%.  · Left atrial cavity size is borderline dilated.     CXR 2/26;    FINDINGS: Right upper extremity PICC line is seen with its tip overlying  the SVC. Heart size is within normal limits. There is some minimal  patchy infiltrate or chronic parenchymal change in the left infrahilar  region similar to the  02/25/2017 study.          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   • Recent MSSA (methicillin susceptible Staphylococcus aureus) septicemia   • Infection of prosthetic right knee joint   • Hyperglycemia   • Hyponatremia   • Type 2 diabetes mellitus with hyperglycemia     left first toe amputation for osteomyelitis      Assessment/ Plan :  - Asked for ANASTASIA transesophageal echocardiogram by ID: 2/28 no reported PFO, clot, vegetation, or mass  - Hypertension- takes lisinopril, Norvasc, chlorthalidone  - Hyperlipidemia-takes statin  - LV EF 70% by echo 2/15/217   - PVC s by telemetry     S/p ANASTASIA 2/28 neg: He had a normal Cardiolite stress test 10/7/2015, and EF 50%.  Lipid profile: LDL is 78    Patient denies chest pain or chest discomfort. EKG shows sinus, no acute ST-T abnormality. Telem reports PVCs. Will ck EKG, mag. +K ). Patient states general body aches.No angina    Hypertension, uncontrolled, even on PO lisinopril 20 mg by mouth daily, amlodipine 10 mg by mouth daily, chlorthalidone 25 mg by mouth daily.  Has renal insufficiency, stable.  Creat is 1.2, still, stable    SBP still high.  /90 left arm. I asked nurse recheck BP. Will change CCB, norvasc 10 mg PO daily and switch to procardia 30 mg PO daily    Ongoing management by Dr. PEGGY Levin-Tracy Arellano APRN    We will increase procardia to 60 mg    Junior Levin MD, FACC  3/2/78041:21 AM      Patient is personally examined by me. All labs, radiology reports and cardiac procedures are reviewed  and or obtained by me. Asessment and plan is by me.-----Junior Levin MD

## 2017-03-02 NOTE — PROGRESS NOTES
Acute Care - Physical Therapy Treatment Note  [facility]     Patient Name: Hugh R McBurney  : 1950  MRN: [id number]  Today's Date: 3/1/2017  Onset of Illness/Injury or Date of Surgery Date: 17  Date of Referral to PT: 17  Referring Physician: DR. BAILON     Admit Date: 2017    Visit Dx:    ICD-10-CM ICD-9-CM   1. Hyperglycemia R73.9 790.29   2. Renal insufficiency N28.9 593.9   3. Right knee skin infection L08.9 686.9   4. 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   • Recent MSSA (methicillin susceptible Staphylococcus aureus) septicemia   • Infection of prosthetic right knee joint   • Hyperglycemia   • Hyponatremia   • Type 2 diabetes mellitus with hyperglycemia               Adult Rehabilitation Note       17 1217 17 1013 17 1025    Rehab Assessment/Intervention    Discipline physical therapy assistant  -JM physical therapy assistant  -PARRISH physical therapy assistant  -PARRISH    Document Type therapy note (daily note)  -JM therapy note (daily note)  -PARRISH therapy note (daily note)  -PARRISH    Subjective Information agree to therapy;complains of;fatigue;pain  -BRENDEN agree to therapy  -PARRISH agree to therapy  -PARRISH    Patient Effort, Rehab Treatment  good  -PARRISH good  -PARRISH    Precautions/Limitations fall precautions   [place] shoe on left  -JM fall precautions   [place] shoe on (L) foot  -PARRISH fall precautions   [place] shoe on (L) foot  -PARRISH    Recorded by [JM] Kinza Slater PTA [PARRISH] Danis Schwarz PTA  [PARRISH] Danis Schwarz PTA    Pain Assessment    Pain Assessment 0-10  -JM 0-10  -PARRISH 0-10  -PARRISH    Pain Score 8  -JM 8  -PARRISH 5  -PARRISH    Post Pain Score --   7/10 rt knee  -JM 10  -PARRISH     Pain Type   Acute pain;Surgical pain  -PARRISH    Pain Location Back  -JM Back  -PARRISH Knee  -PARRISH    Pain Orientation   Right  -PARRISH    Pain Intervention(s) Repositioned;Ambulation/increased activity  -JM Ambulation/increased activity;Repositioned;Rest  -PARRISH Ambulation/increased activity;Repositioned;Rest  -PARRISH    Response to Interventions  Pn worsened at end of session.   -PARRISH tolerated  -PARRISH    Recorded by [JM] Kinza Slater PTA [PARRISH] Danis Schwarz PTA [PARRISH] Danis Schwarz PTA    Cognitive Assessment/Intervention    Current Cognitive/Communication Assessment functional  -JM functional  -PARRISH functional  -PARRISH    Orientation Status oriented x 4  -JM oriented x 4  -PARRISH oriented x 4  -PARRISH    Follows Commands/Answers Questions  100% of the time  -PARRISH 100% of the time  -PARRISH    Personal Safety  WNL/WFL  -PARRISH WNL/WFL  -PARRISH    Personal Safety Interventions  fall prevention program maintained;gait belt;nonskid shoes/slippers when out of bed  -PARRISH fall prevention program maintained;gait belt;nonskid shoes/slippers when out of bed  -PARRISH    Recorded by [JM] Kinza Slater PTA [PARRISH] Danis Schwarz PTA [PARRISH] Danis Schwarz PTA    Bed Mobility, Assessment/Treatment    Bed Mobility, Assistive Device  bed rails;head of bed elevated  -PARRISH bed rails;head of bed elevated  -PARRISH    Bed Mob, Supine to Sit, Bend not tested  -JM conditional independence  -PARRISH conditional independence  -PARRISH    Bed Mob, Sit to Supine, Bend  conditional independence  -PARRISH conditional independence  -PARRISH    Bed Mobility, Comment in chair  -JM      Recorded by [JM] Kinza Slater PTA [PARRISH] Danis Schwarz PTA [PARRISH] Danis Schwarz PTA    Transfer Assessment/Treatment    Transfers, Sit-Stand [place] supervision required  -JM supervision required  -PARRISH supervision required  -PARRISH    Transfers, Stand-Sit  [place] supervision required  -JM supervision required  -PARRISH supervision required  -PARRISH    Transfers, Sit-Stand-Sit, Assist Device rolling walker  -JM rolling walker  -PARRISH rolling walker  -PARRISH    Transfer, Impairments strength decreased;impaired balance;pain  -JM strength decreased;impaired balance;pain  -PARRISH strength decreased;impaired balance;pain  -PARRISH    Recorded by [JM] Kinza Slater PTA [PARRISH] Danis Schwarz PTA [PARRISH] Danis Schwarz PTA    Gait Assessment/Treatment    Gait, [place] Level contact guard assist  -JM contact guard assist  -PARRISH contact guard assist  -PARRISH    Gait, Assistive Device rolling walker  -JM rolling walker  -PARRISH rolling walker  -PARRISH    Gait, Distance (Feet) 120  -  -PARRISH 65  -PARRISH    Gait, Gait Deviations leon decreased;forward flexed posture;step length decreased  -JM leon decreased;decreased heel strike;antalgic;step length decreased  -PARRISH leon decreased;decreased heel strike;antalgic;step length decreased  -PARRISH    Gait, Impairments strength decreased;impaired balance;pain  -JM strength decreased;impaired balance;pain  -PARRISH strength decreased;impaired balance;pain  -PARRISH    Recorded by [BRENDEN] Kinza Slater PTA [PARRISH] Danis Schwarz PTA [PARRISH] Danis Schwarz PTA    Therapy Exercises    Exercise Protocols total knee  -JM total knee  -PARRISH total knee  -PARRISH    Total Knee Exercises right:;15 reps   did not perf SLR, but LAQ seated  -JM right:;10 reps;completed protocol  -PARRISH right:;10 reps;completed protocol  -PARRISH    Recorded by [BRENDEN] Kinza Slater PTA [PARRISH] Danis Schwarz PTA [PARRISH] Danis Schwarz PTA    Positioning and Restraints    Pre-Treatment Position sitting in chair/recliner  -JM in bed  -PARRISH in bed  -PARRISH    Post Treatment Position  chair  -PARRISH bathroom  -PARRISH    In Chair sitting   nsg to recline after pt eats  -JM reclined;call light within reach;encouraged to call for assist  -PARRISH     Bathroom   sitting;call light within reach;encouraged to call for assist;notified nsg  -PARRISH    Recorded by [BRENDEN] Kinza  Bryon, PTA [PARRISH] Danis Schwarz, PTA [PARRISH] Danis Schwarz, PTA      User Key  (r) = Recorded By, (t) = Taken By, (c) = Cosigned By    Initials Name Effective Dates     Kinza Slater, PTA 02/18/16 -     PARRISH Danis Schwarz, PTA 04/24/15 -                 IP PT Goals       02/26/17 1454          Bed Mobility PT LTG    Bed Mobility PT LTG, Date Established 02/26/17  -JR      Bed Mobility PT LTG, Time to Achieve 1 wk  -JR      Bed Mobility PT LTG, Activity Type all bed mobility  -JR      Bed Mobility PT LTG, [place] Level contact guard assist  -JR      Transfer Training PT LTG    Transfer Training PT LTG, Date Established 02/26/17  -JR      Transfer Training PT LTG, Time to Achieve 1 wk  -JR      Transfer Training PT LTG, Activity Type all transfers  -JR      Transfer Training PT LTG, [place] Level contact guard assist  -JR      Transfer Training PT LTG, Assist Device walker, rolling  -JR      Gait Training PT LTG    Gait Training Goal PT LTG, Date Established 02/26/17  -JR      Gait Training Goal PT LTG, Time to Achieve 1 wk  -JR      Gait Training Goal PT LTG, [place] Level supervision required  -JR      Gait Training Goal PT LTG, Assist Device walker, rolling  -JR      Gait Training Goal PT LTG, Distance to Achieve 150  -JR        User Key  (r) = Recorded By, (t) = Taken By, (c) = Cosigned By    Initials Name Provider Type    JR Geremias Berrios, PT Physical Therapist          Physical Therapy Education     Title: PT OT SLP Therapies (Done)     Topic: Physical Therapy (Done)     Point: Mobility training (Done)    Learning Progress Summary    Learner Readiness Method Response Comment Documented by Status   Patient Eager LULA VAZQUEZ,DAJUAN WILCOX 03/01/17 1220 Done    Acceptance E MARQUEZ SENIOR 02/28/17 1028 Done    Acceptance E MARQUEZ SENIOR 02/27/17 1051 Done    Acceptance LULA VAZQUEZ 02/27/17 0131 Done    Acceptance E ADONIS ZAYAS JR 02/26/17 1453 Done               Point: Home exercise program (Done)    Learning Progress Summary    Learner  Readiness Method Response Comment Documented by Status   Patient Eager E,TB,D MARQUEZ   03/01/17 1220 Done    Acceptance E VU  PARRISH 02/28/17 1028 Done    Acceptance E VU  PARRISH 02/27/17 1051 Done    Acceptance E,TB VU  SK 02/27/17 0131 Done    Acceptance E VU,NR   02/26/17 1453 Done               Point: Body mechanics (Done)    Learning Progress Summary    Learner Readiness Method Response Comment Documented by Status   Patient Eager E,TB,D VU   03/01/17 1220 Done    Acceptance E VU  PARRISH 02/28/17 1028 Done    Acceptance E VU  PARRISH 02/27/17 1051 Done    Acceptance E,TB [facility] 02/27/17 0131 Done    Acceptance E VU,NR   02/26/17 1453 Done               Point: Precautions (Done)    Learning Progress Summary    Learner Readiness Method Response Comment Documented by Status   Patient Eager E,TB,D VU   03/01/17 1220 Done    Acceptance E VU  PARRISH 02/28/17 1028 Done    Acceptance E VU  PARRISH 02/27/17 1051 Done    Acceptance E,TB [facility] 02/27/17 0131 Done    Acceptance E VU,NR   02/26/17 1453 Done                      User Key     Initials Effective Dates Name Provider Type Discipline    SK 06/16/16 -  Kae Noland, RN Registered Nurse Nurse     02/18/16 -  Kinza Slater, PTA Physical Therapy Assistant PT     01/07/16 -  Geremias Berrios, PT Physical Therapist PT     04/24/15 -  Danis Schwarz PTA Physical Therapy Assistant PT                    PT Recommendation and Plan  Planned Therapy Interventions: balance training, bed mobility training, gait training, ROM (Range of Motion), strengthening, transfer training  PT Frequency: daily  Plan of Care Review  Plan Of Care Reviewed With: patient  Progress: improving  Outcome Summary/Follow up Plan: incr amb dist despite back and rt knee pain          Outcome Measures       03/01/17 1200 02/28/17 1000 02/27/17 1000    How much help from another person do you currently need...    Turning from your back to your side while in flat bed without using bedrails? 4  -BRENDEN 4  -PARRISH 4  -PARRISH     Moving from lying on back to sitting on the side of a flat bed without bedrails? 4  -JM 4  -PARRISH 3  -PARRISH    Moving to and from a bed to a chair (including a wheelchair)? 3  -JM 3  -PARRISH 3  -PARRISH    Standing up from a chair using your arms (e.g., wheelchair, bedside chair)? 4  -JM 3  -PARRISH 3  -PARRISH    Climbing 3-5 steps with a railing? 3  -JM 2  -PARRISH 2  -PARRISH    To walk in hospital room? 3  -JM 3  -PARRISH 3  -PARRISH    AM-PAC 6 Clicks Score 21  -JM 19  -PARRISH 18  -PARRISH    Functional Assessment    Outcome Measure Options  AM-PAC 6 Clicks Basic Mobility (PT)  -PARRISH AM-PAC 6 Clicks Basic Mobility (PT)  -PARRISH      User Key  (r) = Recorded By, (t) = Taken By, (c) = Cosigned By    Initials Name Provider Type    BRENDEN Slater PTA Physical Therapy Assistant    PARRISH Schwarz PTA Physical Therapy Assistant           Time Calculation:         PT Charges       03/01/17 1557          Time Calculation    Start Time 1155  -BRENDEN      Stop Time 1215  -BRENDEN      Time Calculation (min) 20 min  -BRENDEN      PT Received On 03/01/17  -BRENDEN      PT - Next Appointment 03/02/17  -BRENDEN        User Key  (r) = Recorded By, (t) = Taken By, (c) = Cosigned By    Initials Name Provider Type    BRENDEN Slater PTA Physical Therapy Assistant          Therapy Charges for Today     Code Description Service Date Service Provider Modifiers Qty    17349893132 HC PT THER PROC EA 15 MIN 3/1/2017 Kinza Slater PTA GP 1          PT G-Codes  Outcome Measure Options: AM-PAC 6 Clicks Basic Mobility (PT)    Kinza Slater PTA  3/1/2017

## 2017-03-02 NOTE — PLAN OF CARE
Problem: Infection, Risk/Actual (Adult)  Goal: Infection Prevention/Resolution  Outcome: Ongoing (interventions implemented as appropriate)    03/02/17 0216   Infection, Risk/Actual (Adult)   Infection Prevention/Resolution making progress toward outcome         Problem: Pain, Acute (Adult)  Goal: Acceptable Pain Control/Comfort Level  Outcome: Ongoing (interventions implemented as appropriate)    03/02/17 0216   Pain, Acute (Adult)   Acceptable Pain Control/Comfort Level making progress toward outcome         Problem: Patient Care Overview (Adult)  Goal: Plan of Care Review  Outcome: Ongoing (interventions implemented as appropriate)    03/02/17 0216   Coping/Psychosocial Response Interventions   Plan Of Care Reviewed With patient   Patient Care Overview   Progress improving   Outcome Evaluation   Outcome Summary/Follow up Plan VSS, pain control, blood glucose management       Goal: Adult Individualization and Mutuality  Outcome: Ongoing (interventions implemented as appropriate)    03/01/17 0505 03/02/17 0216   Individualization   Patient Specific Preferences --  No specific preferences at this time   Patient Specific Goals increase activity, pain management --    Patient Specific Interventions pain meds as ordered, dressing changed on left toe, continue abx, up with assist --        Goal: Discharge Needs Assessment  Outcome: Ongoing (interventions implemented as appropriate)    03/02/17 0216   Discharge Needs Assessment   Concerns To Be Addressed basic needs concerns   Discharge Disposition still a patient

## 2017-03-02 NOTE — PLAN OF CARE
Problem: Patient Care Overview (Adult)  Goal: Plan of Care Review  Outcome: Ongoing (interventions implemented as appropriate)    03/01/17 1221   Coping/Psychosocial Response Interventions   Plan Of Care Reviewed With patient   Patient Care Overview   Progress improving   Outcome Evaluation   Outcome Summary/Follow up Plan incr amb dist despite back and rt knee pain

## 2017-03-02 NOTE — SIGNIFICANT NOTE
03/02/17 1526   Rehab Treatment   Discipline physical therapist   Treatment Not Performed patient/family declined treatment  (Just had wound vac placed and would perfer to wait to walk later tonight.)   Recommendation   PT - Next Appointment 03/03/17

## 2017-03-02 NOTE — PROGRESS NOTES
"DAILY PROGRESS NOTE  [facility]    Patient Identification:  Name: Hugh R McBurney  Age: 66 y.o.  Sex: male  :  1950  MRN: [id number]         Primary Care Physician: Guille Nieves MD      Subjective  No new c/o.     Objective:  General Appearance:  Comfortable, well-appearing, in no acute distress and not in pain.    Vital signs: (most recent): Blood pressure 145/65, pulse 101, temperature 98.9 °F (37.2 °C), temperature source Oral, resp. rate 18, height 74\" (188 cm), weight 258 lb (117 kg), SpO2 94 %.    Lungs:  Normal respiratory rate and normal effort.  Breath sounds clear to auscultation.    Heart: Normal rate.  Regular rhythm.    Extremities: There is no dependent edema.    Neurological: Patient is alert and oriented to person, place and time.    Skin:  Warm and dry.                Vital signs in last 24 hours:  Temp:  [98.7 °F (37.1 °C)-99.5 °F (37.5 °C)] 98.9 °F (37.2 °C)  Heart Rate:  [] 101  Resp:  [18-20] 18  BP: (145-182)/() 145/65    Intake/Output:    Intake/Output Summary (Last 24 hours) at 17 1614  Last data filed at 17 1425   Gross per 24 hour   Intake    700 ml   Output   1025 ml   Net   -325 ml         Exam:    Physical Exam   Cardiovascular: Normal rate and regular rhythm.    Pulmonary/Chest: Effort normal.   Neurological: He is alert.   Skin: Skin is warm and dry.         Results from last 7 days  Lab Units 17  0517  0631 17  03417  0514 17  1723   WBC 10*3/mm3 10.60 10.58 14.32* 14.38* 14.45*   HEMOGLOBIN g/dL 8.6* 8.6* 9.4* 9.9* 10.2*   PLATELETS 10*3/mm3 325 330 315 323 346     Results from last 7 days  Lab Units 17  0523 17  0631 17  0349 17  0514 17  1723   SODIUM mmol/L 134* 130* 134* 126* 126*   POTASSIUM mmol/L 4.6 4.4 4.2 4.4 4.8   CHLORIDE mmol/L 99 98 99 92* 87*   TOTAL CO2 mmol/L 18.4* 18.3* 18.8* 19.4* 19.0*   BUN mg/dL 41* 41* 37* 32* 30*   CREATININE mg/dL 1.20 1.21 " 1.39* 1.37* 1.43*   GLUCOSE mg/dL 275* 290* 253* 417* 604*   Estimated Creatinine Clearance: 82.3 mL/min (by C-G formula based on Cr of 1.2).  Results from last 7 days  Lab Units 03/02/17  0523 03/01/17  0631 02/27/17  0349 02/26/17  0514 02/25/17  1723   CALCIUM mg/dL 8.4* 8.4* 8.5* 8.8 9.0   ALBUMIN g/dL  --   --   --   --  2.80*     Results from last 7 days  Lab Units 02/25/17  1723   ALBUMIN g/dL 2.80*   BILIRUBIN mg/dL 0.5   ALK PHOS U/L 136*   AST (SGOT) U/L 14   ALT (SGPT) U/L 10       Assessment:  DM II - uncontrolled - improving:   BONITA:  Hyponatremia:  Resent MSSA Septicemia:  Recent lt great toe amputation - DFU - MSSA:  Recent seeding of rt knee prosthesis MSSA;      Plan:  Subspecialty input appreciated.  D/C planning.     Sarbjit Carballo MD  3/2/2017  4:14 PM

## 2017-03-02 NOTE — PROGRESS NOTES
Continued Stay Note  [facility]     Patient Name: Hugh R McBurney  MRN: [id number]  Today's Date: 3/2/2017    Admit Date: 2/25/2017          Discharge Plan       03/02/17 1406    Case Management/Social Work Plan    Plan [facility] skilled    Patient/Family In Agreement With Plan yes    Additional Comments RN informs patient will stay one more day. Called to [facility] to inform and they will have a bed and can accept tomorrow. Packet is in office with numbers for report and fax.               Discharge Codes     None        Expected Discharge Date and Time     Expected Discharge Date Expected Discharge Time    Mar 2, 2017             Artemio Rice RN

## 2017-03-02 NOTE — NURSING NOTE
03/02/17 1515   Wound 02/26/17 0800 Left first toe amputation stump   Date first assessed/Time first assessed: 02/26/17 0800   Side: Left  Location: first toe  Type: amputation stump   Wound WDL ex   Dressing Appearance dry;intact   Base yellow;pink;clean;moist   Periwound Area pink;intact;dry   Edges irregular   Length (cm) 2   Width (cm) 2.7   Depth (cm) 0.5   Drainage Characteristics/Odor clear;tan   Drainage Amount scant   Wound Cleaning cleansed with;sterile normal saline   Wound Interventions other (see comments)  (wound vac applied)   Dressing Dressing changed;foam;transparent film   Periwound Care barrier film applied   Pressure Setting (Negative Pressure Wound Therapy) 125 mmHg   Dressing (Negative Pressure Wound Therapy) foam, black   Sponges Inserted (Negative Pressure Wound Therapy) 1   Sponges Removed (Negative Pressure Wound Therapy) other (see comments)  (INTIAL PLACEMENT)

## 2017-03-03 LAB
ANION GAP SERPL CALCULATED.3IONS-SCNC: 12.9 MMOL/L
BACTERIA SPEC AEROBE CULT: NORMAL
BUN BLD-MCNC: 40 MG/DL (ref 8–23)
BUN/CREAT SERPL: 34.5 (ref 7–25)
CALCIUM SPEC-SCNC: 8.8 MG/DL (ref 8.6–10.5)
CHLORIDE SERPL-SCNC: 102 MMOL/L (ref 98–107)
CO2 SERPL-SCNC: 20.1 MMOL/L (ref 22–29)
CREAT BLD-MCNC: 1.16 MG/DL (ref 0.76–1.27)
CRP SERPL-MCNC: 13.09 MG/DL (ref 0–0.5)
DEPRECATED RDW RBC AUTO: 45.8 FL (ref 37–54)
ERYTHROCYTE [DISTWIDTH] IN BLOOD BY AUTOMATED COUNT: 13.6 % (ref 11.5–14.5)
ERYTHROCYTE [SEDIMENTATION RATE] IN BLOOD: >140 MM/HR (ref 0–20)
GFR SERPL CREATININE-BSD FRML MDRD: 63 ML/MIN/1.73
GLUCOSE BLD-MCNC: 210 MG/DL (ref 65–99)
GLUCOSE BLDC GLUCOMTR-MCNC: 133 MG/DL (ref 70–130)
GLUCOSE BLDC GLUCOMTR-MCNC: 175 MG/DL (ref 70–130)
GLUCOSE BLDC GLUCOMTR-MCNC: 239 MG/DL (ref 70–130)
GLUCOSE BLDC GLUCOMTR-MCNC: 357 MG/DL (ref 70–130)
GLUCOSE BLDC GLUCOMTR-MCNC: 434 MG/DL (ref 70–130)
GLUCOSE BLDC GLUCOMTR-MCNC: 98 MG/DL (ref 70–130)
HCT VFR BLD AUTO: 27.8 % (ref 40.4–52.2)
HGB BLD-MCNC: 9 G/DL (ref 13.7–17.6)
MCH RBC QN AUTO: 29.9 PG (ref 27–32.7)
MCHC RBC AUTO-ENTMCNC: 32.4 G/DL (ref 32.6–36.4)
MCV RBC AUTO: 92.4 FL (ref 79.8–96.2)
PLATELET # BLD AUTO: 329 10*3/MM3 (ref 140–500)
PMV BLD AUTO: 9.6 FL (ref 6–12)
POTASSIUM BLD-SCNC: 4.8 MMOL/L (ref 3.5–5.2)
RBC # BLD AUTO: 3.01 10*6/MM3 (ref 4.6–6)
SODIUM BLD-SCNC: 135 MMOL/L (ref 136–145)
WBC NRBC COR # BLD: 10.92 10*3/MM3 (ref 4.5–10.7)

## 2017-03-03 PROCEDURE — 82962 GLUCOSE BLOOD TEST: CPT

## 2017-03-03 PROCEDURE — 25010000002 ENOXAPARIN PER 10 MG: Performed by: INTERNAL MEDICINE

## 2017-03-03 PROCEDURE — 80048 BASIC METABOLIC PNL TOTAL CA: CPT | Performed by: HOSPITALIST

## 2017-03-03 PROCEDURE — 86140 C-REACTIVE PROTEIN: CPT | Performed by: INTERNAL MEDICINE

## 2017-03-03 PROCEDURE — 85652 RBC SED RATE AUTOMATED: CPT | Performed by: INTERNAL MEDICINE

## 2017-03-03 PROCEDURE — 25010000002 MORPHINE PER 10 MG: Performed by: INTERNAL MEDICINE

## 2017-03-03 PROCEDURE — 99232 SBSQ HOSP IP/OBS MODERATE 35: CPT | Performed by: INTERNAL MEDICINE

## 2017-03-03 PROCEDURE — 63710000001 INSULIN DETEMER PER 5 UNITS: Performed by: INTERNAL MEDICINE

## 2017-03-03 PROCEDURE — 25010000002 ONDANSETRON PER 1 MG: Performed by: INTERNAL MEDICINE

## 2017-03-03 PROCEDURE — 97110 THERAPEUTIC EXERCISES: CPT

## 2017-03-03 PROCEDURE — 99233 SBSQ HOSP IP/OBS HIGH 50: CPT | Performed by: INTERNAL MEDICINE

## 2017-03-03 PROCEDURE — 85027 COMPLETE CBC AUTOMATED: CPT | Performed by: HOSPITALIST

## 2017-03-03 PROCEDURE — 63710000001 INSULIN ASPART PER 5 UNITS: Performed by: INTERNAL MEDICINE

## 2017-03-03 RX ORDER — NIFEDIPINE 60 MG/1
60 TABLET, EXTENDED RELEASE ORAL
Status: DISCONTINUED | OUTPATIENT
Start: 2017-03-03 | End: 2017-03-04 | Stop reason: HOSPADM

## 2017-03-03 RX ORDER — NIFEDIPINE 30 MG/1
30 TABLET, EXTENDED RELEASE ORAL ONCE
Status: COMPLETED | OUTPATIENT
Start: 2017-03-03 | End: 2017-03-03

## 2017-03-03 RX ADMIN — MORPHINE SULFATE 2 MG: 2 INJECTION, SOLUTION INTRAMUSCULAR; INTRAVENOUS at 13:37

## 2017-03-03 RX ADMIN — INSULIN ASPART 3 UNITS: 100 INJECTION, SOLUTION INTRAVENOUS; SUBCUTANEOUS at 18:30

## 2017-03-03 RX ADMIN — NIFEDIPINE 60 MG: 60 TABLET, FILM COATED, EXTENDED RELEASE ORAL at 10:00

## 2017-03-03 RX ADMIN — PANTOPRAZOLE SODIUM 40 MG: 40 TABLET, DELAYED RELEASE ORAL at 05:56

## 2017-03-03 RX ADMIN — INSULIN ASPART 15 UNITS: 100 INJECTION, SOLUTION INTRAVENOUS; SUBCUTANEOUS at 12:28

## 2017-03-03 RX ADMIN — INSULIN DETEMIR 45 UNITS: 100 INJECTION, SOLUTION SUBCUTANEOUS at 08:00

## 2017-03-03 RX ADMIN — INSULIN ASPART 15 UNITS: 100 INJECTION, SOLUTION INTRAVENOUS; SUBCUTANEOUS at 08:00

## 2017-03-03 RX ADMIN — ONDANSETRON 4 MG: 2 INJECTION INTRAMUSCULAR; INTRAVENOUS at 13:37

## 2017-03-03 RX ADMIN — RIFAMPIN 300 MG: 300 CAPSULE ORAL at 10:00

## 2017-03-03 RX ADMIN — LISINOPRIL 20 MG: 20 TABLET ORAL at 10:00

## 2017-03-03 RX ADMIN — SERTRALINE 100 MG: 100 TABLET, FILM COATED ORAL at 10:00

## 2017-03-03 RX ADMIN — ENOXAPARIN SODIUM 40 MG: 40 INJECTION SUBCUTANEOUS at 10:09

## 2017-03-03 RX ADMIN — NIFEDIPINE 30 MG: 30 TABLET, FILM COATED, EXTENDED RELEASE ORAL at 18:31

## 2017-03-03 RX ADMIN — MORPHINE SULFATE 2 MG: 2 INJECTION, SOLUTION INTRAMUSCULAR; INTRAVENOUS at 10:18

## 2017-03-03 RX ADMIN — INSULIN ASPART 6 UNITS: 100 INJECTION, SOLUTION INTRAVENOUS; SUBCUTANEOUS at 08:00

## 2017-03-03 RX ADMIN — INSULIN ASPART 15 UNITS: 100 INJECTION, SOLUTION INTRAVENOUS; SUBCUTANEOUS at 12:29

## 2017-03-03 RX ADMIN — METFORMIN HYDROCHLORIDE 1000 MG: 1000 TABLET ORAL at 10:00

## 2017-03-03 RX ADMIN — CHLORTHALIDONE 25 MG: 25 TABLET ORAL at 10:00

## 2017-03-03 RX ADMIN — CEFAZOLIN 2 G: 1 INJECTION, POWDER, FOR SOLUTION INTRAMUSCULAR; INTRAVENOUS; PARENTERAL at 13:37

## 2017-03-03 RX ADMIN — HYDROCODONE BITARTRATE AND ACETAMINOPHEN 1 TABLET: 10; 325 TABLET ORAL at 18:30

## 2017-03-03 RX ADMIN — METFORMIN HYDROCHLORIDE 1000 MG: 1000 TABLET ORAL at 18:30

## 2017-03-03 RX ADMIN — HYDROCODONE BITARTRATE AND ACETAMINOPHEN 1 TABLET: 10; 325 TABLET ORAL at 00:54

## 2017-03-03 RX ADMIN — CEFAZOLIN 2 G: 1 INJECTION, POWDER, FOR SOLUTION INTRAMUSCULAR; INTRAVENOUS; PARENTERAL at 05:56

## 2017-03-03 RX ADMIN — MORPHINE SULFATE 2 MG: 2 INJECTION, SOLUTION INTRAMUSCULAR; INTRAVENOUS at 04:56

## 2017-03-03 RX ADMIN — HYDROCODONE BITARTRATE AND ACETAMINOPHEN 1 TABLET: 10; 325 TABLET ORAL at 06:55

## 2017-03-03 RX ADMIN — INSULIN ASPART 20 UNITS: 100 INJECTION, SOLUTION INTRAVENOUS; SUBCUTANEOUS at 18:31

## 2017-03-03 NOTE — PLAN OF CARE
Problem: Infection, Risk/Actual (Adult)  Goal: Infection Prevention/Resolution  Outcome: Ongoing (interventions implemented as appropriate)    Problem: Pain, Acute (Adult)  Goal: Acceptable Pain Control/Comfort Level  Outcome: Ongoing (interventions implemented as appropriate)    Problem: Patient Care Overview (Adult)  Goal: Plan of Care Review  Outcome: Ongoing (interventions implemented as appropriate)    03/03/17 0535   Coping/Psychosocial Response Interventions   Plan Of Care Reviewed With patient   Patient Care Overview   Progress improving   Outcome Evaluation   Outcome Summary/Follow up Plan poss dc back to [facility] today       Goal: Adult Individualization and Mutuality  Outcome: Ongoing (interventions implemented as appropriate)  Goal: Discharge Needs Assessment  Outcome: Ongoing (interventions implemented as appropriate)

## 2017-03-03 NOTE — PROGRESS NOTES
Continued Stay Note  [facility]     Patient Name: Hugh R McBurney  MRN: [id number]  Today's Date: 3/3/2017    Admit Date: 2/25/2017          Discharge Plan       03/03/17 1131    Case Management/Social Work Plan    Plan [facility] skillled     Patient/Family In Agreement With Plan yes    Additional Comments Followed up with patient who plans to [facility] at discharge. Called to [facility] and they will have a bed today and all weekend for him. They are ordering his vac. Packet is in office with numbers for report and fax.   3/3/17 4482 Faxed 30 day to [facility] per Megan's request.               Discharge Codes     None        Expected Discharge Date and Time     Expected Discharge Date Expected Discharge Time    Mar 3, 2017             Artemio Rice RN

## 2017-03-03 NOTE — PROGRESS NOTES
Acute Care - Physical Therapy Treatment Note  [facility]     Patient Name: Hugh R McBurney  : 1950  MRN: [id number]  Today's Date: 3/3/2017  Onset of Illness/Injury or Date of Surgery Date: 17  Date of Referral to PT: 17  Referring Physician: DR. BAILON     Admit Date: 2017    Visit Dx:    ICD-10-CM ICD-9-CM   1. Hyperglycemia R73.9 790.29   2. Renal insufficiency N28.9 593.9   3. Right knee skin infection L08.9 686.9   4. 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   • Recent MSSA (methicillin susceptible Staphylococcus aureus) septicemia   • Infection of prosthetic right knee joint   • Hyperglycemia   • Hyponatremia   • Type 2 diabetes mellitus with hyperglycemia               Adult Rehabilitation Note       17 1500 17 1217       Rehab Assessment/Intervention    Discipline physical therapy assistant  -RW physical therapy assistant  -     Document Type therapy note (daily note)  -RW therapy note (daily note)  -     Subjective Information agree to therapy;complains of;nausea/vomiting  -RW agree to therapy;complains of;fatigue;pain  -     Patient Effort, Rehab Treatment fair  -RW      Precautions/Limitations fall precautions;other (see comments)   darco shoe on LLE, wound vac  -RW fall precautions   Darco shoe on left  -     Recorded by [RW] Sara Cristina PTA [JM] Kinza Slater PTA     Pain Assessment    Pain Assessment No/denies pain  -RW 0-10  -     Pain Score  8   -JM     Post Pain Score  --   7/10 rt knee  -JM     Pain Location  Back  -JM     Pain Intervention(s)  Repositioned;Ambulation/increased activity  -JM     Recorded by [RW] Sara Cristina PTA [JM] Kinza Slater PTA     Cognitive Assessment/Intervention    Current Cognitive/Communication Assessment functional  -RW functional  -JM     Orientation Status oriented x 4  -RW oriented x 4  -JM     Follows Commands/Answers Questions 100% of the time  -RW      Personal Safety WNL/WFL  -RW      Personal Safety Interventions fall prevention program maintained;nonskid shoes/slippers when out of bed  -RW      Recorded by [RW] Sara Cristina PTA [JM] Kinza Slater PTA     Bed Mobility, Assessment/Treatment    Bed Mob, Supine to Sit, Helen not tested  -RW not tested  -JM     Bed Mob, Sit to Supine, Helen not tested  -RW      Bed Mobility, Comment Pt up in chair  -RW in chair  -JM     Recorded by [RW] Sara Cristina PTA [JM] Kinza Slater PTA     Transfer Assessment/Treatment    Transfers, Sit-Stand Helen not tested  -RW supervision required  -     Transfers, Stand-Sit Helen  supervision required  -     Transfers, Sit-Stand-Sit, Assist Device  rolling walker  -     Transfer, Impairments  strength decreased;impaired balance;pain  -JM     Transfer, Comment Pt declined any mobility. Only agreeable to TKR exercises  -RW      Recorded by [RW] Sara Cristina PTA [JM] Kinza Slater PTA     Gait Assessment/Treatment    Gait, Helen Level  contact guard assist  -     Gait, Assistive Device  rolling walker  -     Gait, Distance (Feet)  120  -JM     Gait, Gait Deviations  leon decreased;forward flexed posture;step length decreased  -     Gait, Impairments  strength decreased;impaired balance;pain  -JM     Recorded by  [JM] Kinza Slater PTA     Therapy Exercises    Exercise Protocols total knee  -RW total knee  -     Total Knee Exercises right:;20 reps;completed protocol   -RW right:;15 reps   did not perf SLR, but LAQ seated  -JM     Recorded by [RW] Sara Cristina PTA [JM] Kinza Slater PTA     Positioning and Restraints    Pre-Treatment Position sitting in chair/recliner  -RW sitting in chair/recliner  -JM     Post Treatment Position chair  -RW      In Chair reclined;call light within reach;encouraged to call for assist  -RW sitting   nsg to recline after pt eats  -JM     Recorded by [RW] Sara Cristina, RHONDA [JM] Kinza Slater PTA       User Key  (r) = Recorded By, (t) = Taken By, (c) = Cosigned By    Initials Name Effective Dates    BRENDEN Slater, RHONDA 02/18/16 -     RW Sara Cristina PTA 04/06/16 -                 IP PT Goals       02/26/17 1454          Bed Mobility PT LTG    Bed Mobility PT LTG, Date Established 02/26/17  -JR      Bed Mobility PT LTG, Time to Achieve 1 wk  -JR      Bed Mobility PT LTG, Activity Type all bed mobility  -JR      Bed Mobility PT LTG, Parker Level contact guard assist  -JR      Transfer Training PT LTG    Transfer Training PT LTG, Date Established 02/26/17  -JR      Transfer Training PT LTG, Time to Achieve 1 wk  -JR      Transfer Training PT LTG, Activity Type all transfers  -JR      Transfer Training PT LTG, Parker Level contact guard assist  -JR      Transfer Training PT LTG, Assist Device walker, rolling  -JR      Gait Training PT LTG    Gait Training Goal PT LTG, Date Established 02/26/17  -JR      Gait Training Goal PT LTG, Time to Achieve 1 wk  -JR      Gait Training Goal PT LTG, Parker Level supervision required  -JR      Gait Training Goal PT LTG, Assist Device walker, rolling  -JR      Gait Training Goal PT LTG, Distance to Achieve 150  -JR        User Key  (r) = Recorded By, (t) = Taken By, (c) = Cosigned By    Initials Name Provider Type    JR Geremias Berrios PT Physical Therapist          Physical Therapy Education     Title: PT OT SLP Therapies (Done)     Topic: Physical Therapy (Done)     Point:  Mobility training (Done)    Learning Progress Summary    Learner Readiness Method Response Comment Documented by Status   Patient Eager E,TB,D VU   03/01/17 1220 Done    Acceptance E VU  PARRISH 02/28/17 1028 Done    Acceptance E VU  PARRISH 02/27/17 1051 Done    Acceptance E,TB VU  SK 02/27/17 0131 Done    Acceptance E VU,NR   02/26/17 1453 Done               Point: Home exercise program (Done)    Learning Progress Summary    Learner Readiness Method Response Comment Documented by Status   Patient Acceptance E,D VU,DU   03/03/17 1523 Done    Eager E,TB,D VU   03/01/17 1220 Done    Acceptance E VU  PARRISH 02/28/17 1028 Done    Acceptance E VU  PARRISH 02/27/17 1051 Done    Acceptance E,TB [facility] 02/27/17 0131 Done    Acceptance E VU,NR   02/26/17 1453 Done               Point: Body mechanics (Done)    Learning Progress Summary    Learner Readiness Method Response Comment Documented by Status   Patient Eager E,TB,D VU   03/01/17 1220 Done    Acceptance E VU  PARRISH 02/28/17 1028 Done    Acceptance E VU  PARRISH 02/27/17 1051 Done    Acceptance E,TB [facility] 02/27/17 0131 Done    Acceptance E VU,NR   02/26/17 1453 Done               Point: Precautions (Done)    Learning Progress Summary    Learner Readiness Method Response Comment Documented by Status   Patient Eager E,TB,D VU   03/01/17 1220 Done    Acceptance E VU  PARRISH 02/28/17 1028 Done    Acceptance E VU  PARRISH 02/27/17 1051 Done    Acceptance E,TB [facility] 02/27/17 0131 Done    Acceptance E VU,NR   02/26/17 1453 Done                      User Key     Initials Effective Dates Name Provider Type Discipline    SK 06/16/16 -  Kae Noland, RN Registered Nurse Nurse     02/18/16 -  Kizna Slater, PTA Physical Therapy Assistant PT     01/07/16 -  Geremias Berrios, PT Physical Therapist PT     04/06/16 -  Sara Cristina, PTA Physical Therapy Assistant PT     04/24/15 -  Danis Schwarz PTA Physical Therapy Assistant PT                    PT Recommendation and Plan  Planned  Therapy Interventions: balance training, bed mobility training, gait training, ROM (Range of Motion), strengthening, transfer training  PT Frequency: daily  Plan of Care Review  Plan Of Care Reviewed With: patient  Outcome Summary/Follow up Plan: pt w/ c/o nausea limiting mobility. Agreeable to ther ex and tolerated well. Encouraged mobility w/ nsg staff.           Outcome Measures       03/03/17 1500 03/01/17 1200       How much help from another person do you currently need...    Turning from your back to your side while in flat bed without using bedrails? 4  -RW 4  -JM     Moving from lying on back to sitting on the side of a flat bed without bedrails? 4  -RW 4  -JM     Moving to and from a bed to a chair (including a wheelchair)? 3  -RW 3  -JM     Standing up from a chair using your arms (e.g., wheelchair, bedside chair)? 3  -RW 4  -JM     Climbing 3-5 steps with a railing? 3  -RW 3  -JM     To walk in hospital room? 3  -RW 3  -JM     AM-PAC 6 Clicks Score 20  -RW 21  -JM     Functional Assessment    Outcome Measure Options AM-PAC 6 Clicks Basic Mobility (PT)  -RW        User Key  (r) = Recorded By, (t) = Taken By, (c) = Cosigned By    Initials Name Provider Type    BRENDEN Slater PTA Physical Therapy Assistant    DORIS Cristina PTA Physical Therapy Assistant           Time Calculation:         PT Charges       03/03/17 1521          Time Calculation    Start Time 1506  -RW      Stop Time 1518  -RW      Time Calculation (min) 12 min  -RW      PT Received On 03/03/17  -RW      PT - Next Appointment 03/04/17  -RW        User Key  (r) = Recorded By, (t) = Taken By, (c) = Cosigned By    Initials Name Provider Type     Sara Cristina PTA Physical Therapy Assistant          Therapy Charges for Today     Code Description Service Date Service Provider Modifiers Qty    21608054069 HC PT THER PROC EA 15 MIN 3/3/2017 Sara Cristina PTA GP 1          PT G-Codes  Outcome Measure Options: AM-PAC 6 Clicks Basic  Mobility (PT)    Sara Cristina, PTA  3/3/2017

## 2017-03-03 NOTE — PROGRESS NOTES
66 y.o.   LOS: 6 days   Patient Care Team:  Guille Nieves MD as PCP - General  Guille Nieves MD as PCP - Family Medicine    Chief Complaint:  Hyperglycemia    Chief Complaint   Patient presents with   • Hyperglycemia     per EMS blood glucose at nursing home above 600.    • Shortness of Breath   • Flank Pain     right side       Subjective  blood glucose levels mildly improved to 200s range with the increase in his insulin regimen.  No major overnight events.  Patient tolerating by mouth well.  Complains of pain in his foot.  When I went to see the patient in the afternoon his blood sugar was noted to be in 400s.  Discussed with the nurse about this and she reported to me that she could not give his morning levemir up until an hour ago as she was busy with another sick patient.  Patient was also noted to be getting ensure with each of his meals and given that his by mouth intake is good I discussed with the nurse if we can stop getting the patient  ensure.    Interval History:    Review of Systems:   Review of Systems   Constitutional: Positive for appetite change. Negative for chills, diaphoresis and fatigue.   HENT: Negative for hearing loss, nosebleeds, postnasal drip, trouble swallowing and voice change.    Eyes: Negative for photophobia, discharge and visual disturbance.   Respiratory: Negative for cough, shortness of breath and wheezing.    Cardiovascular: Negative for chest pain, palpitations and leg swelling.   Gastrointestinal: Negative for abdominal distention, abdominal pain, constipation, diarrhea, nausea and vomiting.   Endocrine: Negative for cold intolerance, heat intolerance, polydipsia and polyuria.   Genitourinary: Negative for dysuria, frequency and hematuria.   Musculoskeletal: Positive for arthralgias, back pain, joint swelling and myalgias.   Skin: Positive for wound. Negative for pallor and rash.   Neurological: Positive for weakness and numbness. Negative for dizziness, tremors,  seizures, syncope and headaches.   Hematological: Negative for adenopathy.   Psychiatric/Behavioral: Negative for agitation, confusion and sleep disturbance. The patient is not nervous/anxious.      Objective     Vital Signs   Temp:  [98.6 °F (37 °C)-99.3 °F (37.4 °C)] 98.6 °F (37 °C)  Heart Rate:  [] 98  Resp:  [18-20] 20  BP: (145-176)/(65-99) 176/99    Physical Exam:  Physical Exam   Constitutional: He is oriented to person, place, and time. He appears well-developed and well-nourished.   HENT:   Head: Normocephalic and atraumatic.   Mouth/Throat: Oropharynx is clear and moist.   Eyes: EOM are normal. Pupils are equal, round, and reactive to light.   Neck: Normal range of motion. Neck supple. No tracheal deviation present. No thyromegaly present.   Skin tags   Cardiovascular: Normal rate, regular rhythm and normal heart sounds.    HR - 90   Pulmonary/Chest: Effort normal and breath sounds normal. No stridor. He has no wheezes.   Abdominal: Soft. Bowel sounds are normal. He exhibits no distension. There is no tenderness. No hernia.   Central obesity   Musculoskeletal: Normal range of motion. He exhibits no edema.   Left foot dressed and right knee erythema with staples - improving   Lymphadenopathy:     He has no cervical adenopathy.   Neurological: He is alert and oriented to person, place, and time. He has normal reflexes.   Decreased pin prick and proprioception   Skin: Skin is warm and dry.   Psychiatric: Judgment normal.   Vitals reviewed.  Results Review:     I reviewed the patient's new clinical results.      GLUCOSE   Date/Time Value Ref Range Status   03/03/2017 0545 210 (H) 65 - 99 mg/dL Final   03/02/2017 0523 275 (H) 65 - 99 mg/dL Final   03/01/2017 0631 290 (H) 65 - 99 mg/dL Final     Lab Results (last 72 hours)     Procedure Component Value Units Date/Time    POC Glucose Fingerstick [[id number]]  (Abnormal) Collected:  02/25/17 1717    Specimen:  Blood Updated:  02/25/17 1727     Glucose 546 (C)  mg/dL     Narrative:       Physician Notified Meter: [id number] : [id number] Anjelica Giles    CBC & Differential [[id number]] Collected:  02/25/17 1723    Specimen:  Blood Updated:  02/25/17 1738    Narrative:       The following orders were created for panel order CBC & Differential.  Procedure                               Abnormality         Status                     ---------                               -----------         ------                     CBC Auto Differential[[id number]]         Abnormal            Final result                 Please view results for these tests on the individual orders.    CBC Auto Differential [[id number]]  (Abnormal) Collected:  02/25/17 1723    Specimen:  Blood Updated:  02/25/17 1738     WBC 14.45 (H) 10*3/mm3      RBC 3.31 (L) 10*6/mm3      Hemoglobin 10.2 (L) g/dL      Hematocrit 30.4 (L) %      MCV 91.8 fL      MCH 30.8 pg      MCHC 33.6 g/dL      RDW 13.7 %      RDW-SD 45.5 fl      MPV 10.1 fL      Platelets 346 10*3/mm3      Neutrophil % 83.7 (H) %      Lymphocyte % 9.8 (L) %      Monocyte % 5.7 %      Eosinophil % 0.4 %      Basophil % 0.1 %      Immature Grans % 0.3 %      Neutrophils, Absolute 12.08 (H) 10*3/mm3      Lymphocytes, Absolute 1.42 10*3/mm3      Monocytes, Absolute 0.83 10*3/mm3      Eosinophils, Absolute 0.06 10*3/mm3      Basophils, Absolute 0.02 10*3/mm3      Immature Grans, Absolute 0.04 (H) 10*3/mm3     POC Glucose Fingerstick [[id number]]  (Abnormal) Collected:  02/25/17 1759    Specimen:  Blood Updated:  02/25/17 1801     Glucose 581 (C) mg/dL     Narrative:       Physician Notified Meter: [id number] : [id number] Anjelica Giles    Comprehensive Metabolic Panel [[id number]]  (Abnormal) Collected:  02/25/17 1723    Specimen:  Blood Updated:  02/25/17 1805     Glucose 604 (C) mg/dL      BUN 30 (H) mg/dL      Creatinine 1.43 (H) mg/dL      Sodium 126 (L) mmol/L      Potassium 4.8 mmol/L      Chloride 87 (L) mmol/L      CO2 19.0 (L) mmol/L      Calcium 9.0  mg/dL      Total Protein 7.6 g/dL      Albumin 2.80 (L) g/dL      ALT (SGPT) 10 U/L      AST (SGOT) 14 U/L      Alkaline Phosphatase 136 (H) U/L      Total Bilirubin 0.5 mg/dL      eGFR Non African Amer 49 (L) mL/min/1.73      Globulin 4.8 gm/dL      A/G Ratio 0.6 g/dL      BUN/Creatinine Ratio 21.0      Anion Gap 20.0 mmol/L     Ketone Bodies, Serum (will not run at [place]) [[id number]] Collected:  02/25/17 1723    Specimen:  Blood Updated:  02/25/17 1815    Narrative:       The following orders were created for panel order Ketone Bodies, Serum (will not run at [place]).  Procedure                               Abnormality         Status                     ---------                               -----------         ------                     Acetone[[id number]]                       Normal              Final result                 Please view results for these tests on the individual orders.    Acetone [[id number]]  (Normal) Collected:  02/25/17 1723    Specimen:  Blood Updated:  02/25/17 1815     Acetone Negative     Urinalysis With / Culture If Indicated [[id number]]  (Abnormal) Collected:  02/25/17 1841    Specimen:  Urine from Urine, Clean Catch Updated:  02/25/17 1852     Color, UA Yellow      Appearance, UA Clear      pH, UA <=5.0      Specific Gravity, UA >=1.030      Glucose, UA >=1000 mg/dL (3+) (A)      Ketones, UA Negative      Bilirubin, UA Negative      Blood, UA Moderate (2+) (A)      Protein,  mg/dL (2+) (A)      Leuk Esterase, UA Negative      Nitrite, UA Negative      Urobilinogen, UA 0.2 E.U./dL     Urinalysis, Microscopic Only [[id number]]  (Abnormal) Collected:  02/25/17 1841    Specimen:  Urine from Urine, Clean Catch Updated:  02/25/17 1852     RBC, UA 6-12 (A) /HPF      WBC, UA 3-5 (A) /HPF      Bacteria, UA None Seen /HPF      Squamous Epithelial Cells, UA 0-2 /HPF      Hyaline Casts, UA 3-6 /LPF      Methodology Automated Microscopy     Procalcitonin [[id number]]  (Abnormal) Collected:   "02/25/17 1723    Specimen:  Blood Updated:  02/25/17 1903     Procalcitonin 1.29 (C) ng/mL     Narrative:       As a Marker for Sepsis (Non-Neonates):   1. <0.5 ng/mL represents a low risk of severe sepsis and/or septic shock.  1. >2 ng/mL represents a high risk of severe sepsis and/or septic shock.    As a Marker for Lower Respiratory Tract Infections that require antibiotic therapy:  PCT on Admission     Antibiotic Therapy             6-12 Hrs later  > 0.5                Strongly Recommended            >0.25 - <0.5         Recommended  0.1 - 0.25           Discouraged                   Remeasure/reassess PCT  <0.1                 Strongly Discouraged          Remeasure/reassess PCT      As 28 day mortality risk marker: \"Change in Procalcitonin Result\" (> 80 % or <=80 %) if Day 0 (or Day 1) and Day 4 values are available. Refer to http://www.[employer] [facility] [employer]ct-calculator.com/   Change in PCT <=80 %   A decrease of PCT levels below or equal to 80 % defines a positive change in PCT test result representing a higher risk for 28-day all-cause mortality of patients diagnosed with severe sepsis or septic shock.  Change in PCT > 80 %   A decrease of PCT levels of more than 80 % defines a negative change in PCT result representing a lower risk for 28-day all-cause mortality of patients diagnosed with severe sepsis or septic shock.                POC Glucose Fingerstick [[id number]]  (Abnormal) Collected:  02/25/17 1910    Specimen:  Blood Updated:  02/25/17 1920     Glucose 556 (C) mg/dL     Narrative:       Meter: [id number] : [id number] Adán Madrigal    POC Glucose Fingerstick [[id number]]  (Abnormal) Collected:  02/25/17 2040    Specimen:  Blood Updated:  02/25/17 2042     Glucose 519 (C) mg/dL     Narrative:       Physician Notified Meter: [id number] : [id number] Tj Mcarthur    Light Blue Top [[id number]] Collected:  02/25/17 1723    Specimen:  Blood Updated:  02/25/17 2201     Extra Tube hold for add-on       Auto " resulted       Green Top (Gel) [[id number]] Collected:  02/25/17 1723    Specimen:  Blood Updated:  02/25/17 2201     Extra Tube Hold for add-ons.       Auto resulted.       Lavender Top [[id number]] Collected:  02/25/17 1723    Specimen:  Blood Updated:  02/25/17 2201     Extra Tube hold for add-on       Auto resulted       POC Glucose Fingerstick [[id number]]  (Abnormal) Collected:  02/25/17 2317    Specimen:  Blood Updated:  02/25/17 2318     Glucose 464 (C) mg/dL     Narrative:       Meter: [id number] : [id number] Kg Zacarias    POC Glucose Fingerstick [[id number]]  (Abnormal) Collected:  02/26/17 0117    Specimen:  Blood Updated:  02/26/17 0118     Glucose 437 (H) mg/dL     Narrative:       Meter: [id number] : [id number] Ben Coley    POC Glucose Fingerstick [[id number]]  (Abnormal) Collected:  02/26/17 0433    Specimen:  Blood Updated:  02/26/17 0435     Glucose 417 (H) mg/dL     Narrative:       Meter: [id number] : [id number] Ben Coley    CBC & Differential [[id number]] Collected:  02/26/17 0514    Specimen:  Blood Updated:  02/26/17 0624    Narrative:       The following orders were created for panel order CBC & Differential.  Procedure                               Abnormality         Status                     ---------                               -----------         ------                     CBC Auto Differential[[id number]]         Abnormal            Final result                 Please view results for these tests on the individual orders.    CBC Auto Differential [[id number]]  (Abnormal) Collected:  02/26/17 0514    Specimen:  Blood Updated:  02/26/17 0624     WBC 14.38 (H) 10*3/mm3      RBC 3.26 (L) 10*6/mm3      Hemoglobin 9.9 (L) g/dL      Hematocrit 29.9 (L) %      MCV 91.7 fL      MCH 30.4 pg      MCHC 33.1 g/dL      RDW 13.6 %      RDW-SD 45.5 fl      MPV 10.2 fL      Platelets 323 10*3/mm3      Neutrophil % 78.9 (H) %      Lymphocyte % 13.3 (L) %      Monocyte % 6.3 %       Eosinophil % 1.0 %      Basophil % 0.1 %      Immature Grans % 0.4 %      Neutrophils, Absolute 11.36 (H) 10*3/mm3      Lymphocytes, Absolute 1.91 10*3/mm3      Monocytes, Absolute 0.90 10*3/mm3      Eosinophils, Absolute 0.14 10*3/mm3      Basophils, Absolute 0.01 10*3/mm3      Immature Grans, Absolute 0.06 (H) 10*3/mm3     POC Glucose Fingerstick [[id number]]  (Abnormal) Collected:  02/26/17 0637    Specimen:  Blood Updated:  02/26/17 0639     Glucose 402 (H) mg/dL     Narrative:       Meter: [id number] : [id number] [facility]    Basic Metabolic Panel [[id number]]  (Abnormal) Collected:  02/26/17 0514    Specimen:  Blood Updated:  02/26/17 0712     Glucose 417 (C) mg/dL      BUN 32 (H) mg/dL      Creatinine 1.37 (H) mg/dL      Sodium 126 (L) mmol/L      Potassium 4.4 mmol/L      Chloride 92 (L) mmol/L      CO2 19.4 (L) mmol/L      Calcium 8.8 mg/dL      eGFR Non African Amer 52 (L) mL/min/1.73      BUN/Creatinine Ratio 23.4      Anion Gap 14.6 mmol/L     Narrative:       GFR Normal >60  Chronic Kidney Disease <60  Kidney Failure <15    POC Glucose Fingerstick [[id number]]  (Abnormal) Collected:  02/26/17 0744    Specimen:  Blood Updated:  02/26/17 0745     Glucose 408 (H) mg/dL     Narrative:       Meter: [id number] : [id number] Finesse GALLARDO    C-reactive Protein [[id number]]  (Abnormal) Collected:  02/26/17 0514    Specimen:  Blood Updated:  02/26/17 0944     C-Reactive Protein 30.73 (H) mg/dL     Sedimentation Rate [[id number]]  (Abnormal) Collected:  02/26/17 0514    Specimen:  Blood Updated:  02/26/17 0958     Sed Rate >140 (H) mm/hr     POC Glucose Fingerstick [[id number]]  (Abnormal) Collected:  02/26/17 1122    Specimen:  Blood Updated:  02/26/17 1123     Glucose 355 (H) mg/dL     Narrative:       Meter: [id number] : [id number] Finesse GALLARDO    POC Glucose Fingerstick [[id number]]  (Abnormal) Collected:  02/26/17 1606    Specimen:  Blood Updated:  02/26/17 1607     Glucose 302 (H) mg/dL      Narrative:       Meter: [id number] : [id number] Duran OTTO    POC Glucose Fingerstick [[id number]]  (Abnormal) Collected:  02/26/17 2046    Specimen:  Blood Updated:  02/26/17 2047     Glucose 287 (H) mg/dL     Narrative:       Meter: [id number] : [id number] Duran OTTO    [place] Draw [[id number]] Collected:  02/25/17 1723    Specimen:  Blood Updated:  02/26/17 2241    Narrative:       The following orders were created for panel order [place] Draw.  Procedure                               Abnormality         Status                     ---------                               -----------         ------                     Light Blue Top[[id number]]                                    Final result               Green Top (Gel)[[id number]]                                   Final result               Lavender Top[[id number]]                                      Final result               Gold Top - SST[[id number]]                                                                 Please view results for these tests on the individual orders.    CBC (No Diff) [[id number]]  (Abnormal) Collected:  02/27/17 0349    Specimen:  Blood Updated:  02/27/17 0407     WBC 14.32 (H) 10*3/mm3      RBC 3.07 (L) 10*6/mm3      Hemoglobin 9.4 (L) g/dL      Hematocrit 28.4 (L) %      MCV 92.5 fL      MCH 30.6 pg      MCHC 33.1 g/dL      RDW 13.5 %      RDW-SD 45.5 fl      MPV 9.9 fL      Platelets 315 10*3/mm3     Basic Metabolic Panel [[id number]]  (Abnormal) Collected:  02/27/17 0349    Specimen:  Blood Updated:  02/27/17 0432     Glucose 253 (H) mg/dL      BUN 37 (H) mg/dL      Creatinine 1.39 (H) mg/dL      Sodium 134 (L) mmol/L      Potassium 4.2 mmol/L      Chloride 99 mmol/L      CO2 18.8 (L) mmol/L      Calcium 8.5 (L) mg/dL      eGFR Non African Amer 51 (L) mL/min/1.73      BUN/Creatinine Ratio 26.6 (H)      Anion Gap 16.2 mmol/L     Narrative:       GFR Normal >60  Chronic Kidney Disease <60  Kidney Failure <15    Procalcitonin  "[[id number]]  (Abnormal) Collected:  02/27/17 0349    Specimen:  Blood Updated:  02/27/17 0453     Procalcitonin 1.56 (C) ng/mL     Narrative:       As a Marker for Sepsis (Non-Neonates):   1. <0.5 ng/mL represents a low risk of severe sepsis and/or septic shock.  1. >2 ng/mL represents a high risk of severe sepsis and/or septic shock.    As a Marker for Lower Respiratory Tract Infections that require antibiotic therapy:  PCT on Admission     Antibiotic Therapy             6-12 Hrs later  > 0.5                Strongly Recommended            >0.25 - <0.5         Recommended  0.1 - 0.25           Discouraged                   Remeasure/reassess PCT  <0.1                 Strongly Discouraged          Remeasure/reassess PCT      As 28 day mortality risk marker: \"Change in Procalcitonin Result\" (> 80 % or <=80 %) if Day 0 (or Day 1) and Day 4 values are available. Refer to http://www.[employer]-calculator.com/   Change in PCT <=80 %   A decrease of PCT levels below or equal to 80 % defines a positive change in PCT test result representing a higher risk for 28-day all-cause mortality of patients diagnosed with severe sepsis or septic shock.  Change in PCT > 80 %   A decrease of PCT levels of more than 80 % defines a negative change in PCT result representing a lower risk for 28-day all-cause mortality of patients diagnosed with severe sepsis or septic shock.                Blood Culture [[id number]]  (Normal) Collected:  02/25/17 2200    Specimen:  Blood from Blood, Central Line Updated:  02/27/17 0701     Blood Culture No growth at 24 hours     Blood Culture [[id number]]  (Normal) Collected:  02/25/17 2200    Specimen:  Blood from Arm, Left Updated:  02/27/17 0701     Blood Culture No growth at 24 hours     POC Glucose Fingerstick [[id number]]  (Abnormal) Collected:  02/27/17 0713    Specimen:  Blood Updated:  02/27/17 0716     Glucose 271 (H) mg/dL     Narrative:       Meter: [id number] : [id number] Mayuri Child " Culture [[id number]]  (Normal) Collected:  02/26/17 2117    Specimen:  Blood from Blood, Venous Line Updated:  02/27/17 1001     Blood Culture No growth at less than 24 hours     POC Glucose Fingerstick [[id number]]  (Abnormal) Collected:  02/27/17 1139    Specimen:  Blood Updated:  02/27/17 1146     Glucose 401 (H) mg/dL     Narrative:       Meter: [id number] : [id number] Mayuri Briceno    Blood Culture [[id number]]  (Normal) Collected:  02/26/17 2242    Specimen:  Blood from Blood, Venous Line Updated:  02/27/17 1201     Blood Culture No growth at less than 24 hours         Imaging Results (last 72 hours)     Procedure Component Value Units Date/Time    XR Chest 2 View [[id number]] Collected:  02/25/17 1931     Updated:  02/26/17 1508    Narrative:       PA AND LATERAL CHEST 02/25/2017     HISTORY: Shortness of breath.     FINDINGS: The heart size is within normal limits. Lungs are slightly  underinflated, though there is mild vascular congestion. No focal  infiltrates or pneumothorax is seen.     This report was finalized on 2/26/2017 3:05 PM by Dr. Ruben Michelle MD.       XR Chest 1 View [[id number]] Collected:  02/26/17 1408     Updated:  02/26/17 1516    Narrative:       AP CHEST 02/26/2017     HISTORY: PICC line position.     FINDINGS: Right upper extremity PICC line is seen with its tip overlying  the SVC. Heart size is within normal limits. There is some minimal  patchy infiltrate or chronic parenchymal change in the left infrahilar  region similar to the 02/25/2017 study.     This report was finalized on 2/26/2017 3:13 PM by Dr. Ruben Michelle MD.             Medication Review: Done      Current Facility-Administered Medications:   •  acetaminophen (TYLENOL) tablet 650 mg, 650 mg, Oral, Q4H PRN, Elmira Rivera MD, 650 mg at 02/27/17 0668  •  bisacodyl (DULCOLAX) suppository 10 mg, 10 mg, Rectal, Daily PRN, Oscar Lucas MD, 10 mg at 02/28/17 3345  •  ceFAZolin in 0.9% normal saline (ANCEF)  IVPB solution 2 g, 2 g, Intravenous, Q8H, Adarsh Love MD, Last Rate: 200 mL/hr at 03/03/17 0556, 2 g at 03/03/17 0556  •  chlorthalidone (HYGROTON) tablet 25 mg, 25 mg, Oral, Daily, ABBY Staples, 25 mg at 03/03/17 1000  •  dextrose (D50W) solution 25 g, 25 g, Intravenous, Q15 Min PRN, Oscar Lucas MD  •  dextrose (GLUTOSE) oral gel 15 g, 15 g, Oral, Q15 Min PRN, Oscar Lucas MD  •  enoxaparin (LOVENOX) syringe 40 mg, 40 mg, Subcutaneous, Daily, Oscar Lucas MD, 40 mg at 03/03/17 1009  •  glucagon (human recombinant) (GLUCAGEN DIAGNOSTIC) injection 1 mg, 1 mg, Subcutaneous, Q15 Min PRN, Oscar Lucas MD  •  HYDROcodone-acetaminophen (NORCO)  MG per tablet 1 tablet, 1 tablet, Oral, Q6H PRN, Oscar Lucas MD, 1 tablet at 03/03/17 0655  •  insulin aspart (novoLOG) injection 0-15 Units, 0-15 Units, Subcutaneous, 4x Daily AC & at Bedtime, Adarsh Love MD, 6 Units at 03/03/17 0800  •  insulin aspart (novoLOG) injection 15 Units, 15 Units, Subcutaneous, TID With Meals, Adarsh Love MD, 15 Units at 03/03/17 0800  •  insulin detemir (LEVEMIR) injection 45 Units, 45 Units, Subcutaneous, QAM, Adarsh Love MD, 45 Units at 03/03/17 0800  •  insulin detemir (LEVEMIR) injection 60 Units, 60 Units, Subcutaneous, Nightly, Adarsh Love MD  •  lidocaine (XYLOCAINE) 1 % injection 10 mL, 10 mL, Infiltration, Once, Kiko Marie MD, 10 mL at 02/26/17 1024  •  lisinopril (PRINIVIL,ZESTRIL) tablet 20 mg, 20 mg, Oral, Daily, Oscar Lucas MD, 20 mg at 03/03/17 1000  •  metFORMIN (GLUCOPHAGE) tablet 1,000 mg, 1,000 mg, Oral, BID With Meals, Adarsh Love MD, 1,000 mg at 03/03/17 1000  •  morphine injection 2 mg, 2 mg, Intravenous, Q2H PRN, 2 mg at 03/03/17 1018 **AND** naloxone (NARCAN) injection 0.4 mg, 0.4 mg, Intravenous, Q5 Min PRN, Oscar Lucas MD  •  NIFEdipine XL (PROCARDIA XL) 24 hr tablet 60 mg, 60 mg,  Oral, Q24H, Rene Levin MD, 60 mg at 03/03/17 1000  •  ondansetron (ZOFRAN) tablet 4 mg, 4 mg, Oral, Q6H PRN **OR** ondansetron ODT (ZOFRAN-ODT) disintegrating tablet 4 mg, 4 mg, Oral, Q6H PRN **OR** ondansetron (ZOFRAN) injection 4 mg, 4 mg, Intravenous, Q6H PRN, Oscar Lucas MD, 4 mg at 03/02/17 1426  •  pantoprazole (PROTONIX) EC tablet 40 mg, 40 mg, Oral, Q AM, Oscar Lucas MD, 40 mg at 03/03/17 0556  •  Pharmacy Consult, , Does not apply, Continuous PRN, Adarsh Love MD  •  pravastatin (PRAVACHOL) tablet 20 mg, 20 mg, Oral, Nightly, Oscar Lucas MD, 20 mg at 03/02/17 2139  •  rifAMPin (RIFADIN) capsule 300 mg, 300 mg, Oral, Q12H, Oscar Lucas MD, 300 mg at 03/03/17 1000  •  sertraline (ZOLOFT) tablet 100 mg, 100 mg, Oral, Daily, Oscar Lucas MD, 100 mg at 03/03/17 1000  •  sodium chloride 0.9 % flush 1-10 mL, 1-10 mL, Intravenous, PRN, Oscar Lucas MD  •  sodium chloride 0.9 % flush 10 mL, 10 mL, Intravenous, PRN, Trae Franklin MD, 10 mL at 02/25/17 2106    Assessment/Plan     Active Hospital Problems (** Indicates Principal Problem)    Diagnosis Date Noted   • **Type 2 diabetes mellitus with hyperglycemia [E11.65] 02/26/2017   • Hyponatremia [E87.1] 02/26/2017   • 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       Type 2 diabetes mellitus with  diabetic neuropathy s/p left toe amputation   KoJ8e-9.25%  BG still remains high.  Will increase levemir to 50 units in the morning  Will increase levemir to 60 units at bedtime  Will increase NovoLog to 20 units with each meal  Will continue NovoLog sliding scale-3 units for 50 about 150 3 times a day before meals and at bedtime  Will continue metformin thousand milligrams oral twice daily.  Patient has been educated by the diabetic educator.  Will stop ensure.   Prior history of pancreatitis few of the newer oral agents are limited.    DC instructions  If patient is discharged over the weekend Will discharge him on the about insulin regimen  Will follow up as an outpatient in a period of 3-4 weeks.    Hyperlipidemia  Continue Pravachol 20 mg oral daily      Left first toe amputation,with Right knee joint seeding -followed by orthopedics and vascular surgery      MSSA septicemia -Followed by ID, currently on IV antibiotics.  MRI and ANASTASIA negative.      The total time spent more than 35 min for old record and lab review and face- to- face, of which greater than 50% of time was spent in counseling the patient on treatment options, instructions for management/treatment and follow up and importance of compliance with chosen management or treatment options        Adarsh Love MD.  03/03/17  1:27 PM      EMR Dragon / transcription disclaimer:    Much of this encounter note is an electronic transcription/ translation of spoken language to printed text.  Electronic translation of spoken language may permit erroneous, or at times, nonsensical words or phrases to be inadvertently transcribed; although I have reviewed the note for such errors, some may still exist.

## 2017-03-03 NOTE — PROGRESS NOTES
"DAILY PROGRESS NOTE  [facility]    Patient Identification:  Name: Hugh R McBurney  Age: 66 y.o.  Sex: male  :  1950  MRN: [id number]         Primary Care Physician: Guille Nieves MD      Subjective  No new c/o.  Pt refuses d/c.       Objective:  General Appearance:  Comfortable, well-appearing, in no acute distress and not in pain.    Vital signs: (most recent): Blood pressure 171/99, pulse 98, temperature 97.7 °F (36.5 °C), temperature source Oral, resp. rate 18, height 74\" (188 cm), weight 258 lb (117 kg), SpO2 94 %.    Lungs:  Normal respiratory rate and normal effort.    Heart: Normal rate.  Regular rhythm.    Extremities: There is no dependent edema.    Neurological: Patient is alert and oriented to person, place and time.    Skin:  Warm and dry.                Vital signs in last 24 hours:  Temp:  [97.7 °F (36.5 °C)-99.3 °F (37.4 °C)] 97.7 °F (36.5 °C)  Heart Rate:  [] 98  Resp:  [18-20] 18  BP: (158-176)/(81-99) 171/99    Intake/Output:    Intake/Output Summary (Last 24 hours) at 17 1525  Last data filed at 17 1327   Gross per 24 hour   Intake    800 ml   Output   1050 ml   Net   -250 ml         Exam:    Physical Exam   Cardiovascular: Normal rate and regular rhythm.    Pulmonary/Chest: Effort normal.   Neurological: He is alert.   Skin: Skin is warm and dry.         Results from last 7 days  Lab Units 17  0545 17  0523 17  0631 17  0349 17  0514 17  1723   WBC 10*3/mm3 10.92* 10.60 10.58 14.32* 14.38* 14.45*   HEMOGLOBIN g/dL 9.0* 8.6* 8.6* 9.4* 9.9* 10.2*   PLATELETS 10*3/mm3 329 325 330 315 323 346     Results from last 7 days  Lab Units 17  0545 17  1940 17  0523 17  0631 17  0349 17  0514 17  1723   SODIUM mmol/L 135*  --  134* 130* 134* 126* 126*   POTASSIUM mmol/L 4.8 4.7 4.6 4.4 4.2 4.4 4.8   CHLORIDE mmol/L 102  --  99 98 99 92* 87*   TOTAL CO2 mmol/L 20.1*  --  18.4* 18.3* " 18.8* 19.4* 19.0*   BUN mg/dL 40*  --  41* 41* 37* 32* 30*   CREATININE mg/dL 1.16  --  1.20 1.21 1.39* 1.37* 1.43*   GLUCOSE mg/dL 210*  --  275* 290* 253* 417* 604*   Estimated Creatinine Clearance: 85.1 mL/min (by C-G formula based on Cr of 1.16).  Results from last 7 days  Lab Units 03/03/17  0545 03/02/17  1940 03/02/17  0523 03/01/17  0631 02/27/17  0349 02/26/17  0514 02/25/17  1723   CALCIUM mg/dL 8.8  --  8.4* 8.4* 8.5* 8.8 9.0   ALBUMIN g/dL  --   --   --   --   --   --  2.80*   MAGNESIUM mg/dL  --  1.8  --   --   --   --   --      Results from last 7 days  Lab Units 02/25/17  1723   ALBUMIN g/dL 2.80*   BILIRUBIN mg/dL 0.5   ALK PHOS U/L 136*   AST (SGOT) U/L 14   ALT (SGPT) U/L 10       Assessment:  DM II - uncontrolled - improving:   BONITA:  Hyponatremia:  Resent MSSA Septicemia:  Recent lt great toe amputation - DFU - MSSA:  Recent seeding of rt knee prosthesis MSSA;      Plan:  D/C planning.     Sarbjit Carballo MD  3/3/2017  3:25 PM

## 2017-03-03 NOTE — PROGRESS NOTES
INFECTIOUS DISEASES PROGRESS NOTE    CC: f/u fever    S:   He notes some right dorsal foot pain after wearing ill fitting sock.  No fevers or chills or night sweats    O:  Physical Exam:  Temp:  [98.6 °F (37 °C)-99.3 °F (37.4 °C)] 98.6 °F (37 °C)  Heart Rate:  [] 98  Resp:  [18-20] 20  BP: (145-176)/(65-99) 176/99  Physical Exam   Constitutional: He appears well-developed. No distress.   Pulmonary/Chest: Effort normal.   Abdominal: Soft. He exhibits no distension. There is no tenderness.   Neurological: He is alert.   Skin: Skin is dry.   Psychiatric: He has a normal mood and affect. His behavior is normal.        Diagnostics:    WBC 10.9  H/H 9/28    Cr 1.16  glc 210-388      All cx ngtd     Estimated Creatinine Clearance: 85.1 mL/min (by C-G formula based on Cr of 1.16).    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  All cultures remain negative.  No source of new infection yet uncovered and he is improving  Cont on rifampin and cefazolin through 3/27 as detailed in 2/17/17 note.  We will repeat ESR and CRP.   I will plan to re-evaluate next on 3/6 but please call [phone] with questions in interim.    Amor Mayberry MD  8:08 AM  03/03/17

## 2017-03-03 NOTE — PLAN OF CARE
Problem: Infection, Risk/Actual (Adult)  Goal: Infection Prevention/Resolution  Outcome: Ongoing (interventions implemented as appropriate)    03/02/17 1800   Infection, Risk/Actual (Adult)   Infection Prevention/Resolution making progress toward outcome         Problem: Pain, Acute (Adult)  Goal: Acceptable Pain Control/Comfort Level  Outcome: Ongoing (interventions implemented as appropriate)    03/02/17 1800   Pain, Acute (Adult)   Acceptable Pain Control/Comfort Level making progress toward outcome         03/02/17 1800   Pain, Acute (Adult)   Acceptable Pain Control/Comfort Level making progress toward outcome         Problem: Patient Care Overview (Adult)  Goal: Plan of Care Review  Outcome: Ongoing (interventions implemented as appropriate)    03/02/17 1800   Coping/Psychosocial Response Interventions   Plan Of Care Reviewed With patient   Patient Care Overview   Progress improving   Outcome Evaluation   Outcome Summary/Follow up Plan VSS, PAIN CONTROLLED, GLUCOSE LEVELS STABILIZED, MOBILITY INCREASED, BLOOD PRESSURE STABILIZED        Goal: Adult Individualization and Mutuality  Outcome: Ongoing (interventions implemented as appropriate)    03/02/17 1800   Individualization   Patient Specific Preferences NO PREFERENCES MADE BY PATIENT AT THIS TIME    Patient Specific Goals INCREASE ACTIVITY, PAIN CONTROL, TOLERATE WOUND VAC LLE, TOLERATE IV ABX    Patient Specific Interventions PAIN MEDS PRN, AMBULATE WITH ASSIST, WOUND VAC, MONITOR BLOOD GLUCOSE LEVELS, MONITOR BLOOD PRESSURE    Mutuality/Individual Preferences   What Anxieties, Fears or Concerns Do You Have About Your Health or Care? LENGTH OF HOSPITAL STAY, ELEVATED BLOOD GLUCOSE, ELEVATED BLOOD PRESSURE , WOUND CARE    What Questions Do You Have About Your Health or Care? WHEN WILL THE PAIN GET BETTER? WHEN WILL MY BLOOD PRESSURE GET BETTER?    What Information Would Help Us Give You More Personalized Care? KEEP PATIENT UPDATED ON PLAN OF CARE, POSSIBLE  D/C DATE, NEW TX ORDERS        Goal: Discharge Needs Assessment  Outcome: Ongoing (interventions implemented as appropriate)    03/02/17 1800   Discharge Needs Assessment   Concerns To Be Addressed basic needs concerns   Equipment Needed After Discharge none   Discharge Disposition still a patient   Discharge Planning Comments PLANS ON GOING BACK TO REHAB AFTER DISCHARGE    Current Health   Outpatient/Agency/Support Group Needs homecare agency (specify level of care)   Anticipated Changes Related to Illness inability to care for self   Self-Care   Equipment Currently Used at Home walker, rolling   Living Environment   Transportation Available car;family or friend will provide

## 2017-03-03 NOTE — PLAN OF CARE
Problem: Patient Care Overview (Adult)  Goal: Plan of Care Review  Outcome: Ongoing (interventions implemented as appropriate)    03/03/17 1524   Coping/Psychosocial Response Interventions   Plan Of Care Reviewed With patient   Outcome Evaluation   Outcome Summary/Follow up Plan pt w/ c/o nausea limiting mobility. Agreeable to ther ex and tolerated well. Encouraged mobility w/ nsg staff.

## 2017-03-04 VITALS
BODY MASS INDEX: 33.11 KG/M2 | SYSTOLIC BLOOD PRESSURE: 160 MMHG | RESPIRATION RATE: 18 BRPM | DIASTOLIC BLOOD PRESSURE: 90 MMHG | HEART RATE: 102 BPM | TEMPERATURE: 98.1 F | WEIGHT: 258 LBS | OXYGEN SATURATION: 94 % | HEIGHT: 74 IN

## 2017-03-04 LAB
ANION GAP SERPL CALCULATED.3IONS-SCNC: 15.5 MMOL/L
BACTERIA SPEC AEROBE CULT: NORMAL
BUN BLD-MCNC: 38 MG/DL (ref 8–23)
BUN/CREAT SERPL: 29.7 (ref 7–25)
CALCIUM SPEC-SCNC: 8.9 MG/DL (ref 8.6–10.5)
CHLORIDE SERPL-SCNC: 98 MMOL/L (ref 98–107)
CO2 SERPL-SCNC: 19.5 MMOL/L (ref 22–29)
CREAT BLD-MCNC: 1.28 MG/DL (ref 0.76–1.27)
DEPRECATED RDW RBC AUTO: 44.5 FL (ref 37–54)
ERYTHROCYTE [DISTWIDTH] IN BLOOD BY AUTOMATED COUNT: 13.4 % (ref 11.5–14.5)
GFR SERPL CREATININE-BSD FRML MDRD: 56 ML/MIN/1.73
GLUCOSE BLD-MCNC: 164 MG/DL (ref 65–99)
GLUCOSE BLDC GLUCOMTR-MCNC: 104 MG/DL (ref 70–130)
GLUCOSE BLDC GLUCOMTR-MCNC: 180 MG/DL (ref 70–130)
GLUCOSE BLDC GLUCOMTR-MCNC: 219 MG/DL (ref 70–130)
GLUCOSE BLDC GLUCOMTR-MCNC: 261 MG/DL (ref 70–130)
GLUCOSE BLDC GLUCOMTR-MCNC: 310 MG/DL (ref 70–130)
HCT VFR BLD AUTO: 27.3 % (ref 40.4–52.2)
HGB BLD-MCNC: 8.9 G/DL (ref 13.7–17.6)
MCH RBC QN AUTO: 29.9 PG (ref 27–32.7)
MCHC RBC AUTO-ENTMCNC: 32.6 G/DL (ref 32.6–36.4)
MCV RBC AUTO: 91.6 FL (ref 79.8–96.2)
PLATELET # BLD AUTO: 301 10*3/MM3 (ref 140–500)
PMV BLD AUTO: 9.7 FL (ref 6–12)
POTASSIUM BLD-SCNC: 4.4 MMOL/L (ref 3.5–5.2)
RBC # BLD AUTO: 2.98 10*6/MM3 (ref 4.6–6)
SODIUM BLD-SCNC: 133 MMOL/L (ref 136–145)
T4 FREE SERPL-MCNC: 1.22 NG/DL (ref 0.93–1.7)
TSH SERPL DL<=0.05 MIU/L-ACNC: 1.9 MIU/ML (ref 0.27–4.2)
WBC NRBC COR # BLD: 11.28 10*3/MM3 (ref 4.5–10.7)

## 2017-03-04 PROCEDURE — 25010000002 ENOXAPARIN PER 10 MG: Performed by: INTERNAL MEDICINE

## 2017-03-04 PROCEDURE — 99232 SBSQ HOSP IP/OBS MODERATE 35: CPT | Performed by: INTERNAL MEDICINE

## 2017-03-04 PROCEDURE — 84443 ASSAY THYROID STIM HORMONE: CPT | Performed by: INTERNAL MEDICINE

## 2017-03-04 PROCEDURE — 85027 COMPLETE CBC AUTOMATED: CPT | Performed by: HOSPITALIST

## 2017-03-04 PROCEDURE — 25010000002 ONDANSETRON PER 1 MG: Performed by: INTERNAL MEDICINE

## 2017-03-04 PROCEDURE — 82962 GLUCOSE BLOOD TEST: CPT

## 2017-03-04 PROCEDURE — 80048 BASIC METABOLIC PNL TOTAL CA: CPT | Performed by: HOSPITALIST

## 2017-03-04 PROCEDURE — 63710000001 INSULIN ASPART PER 5 UNITS: Performed by: INTERNAL MEDICINE

## 2017-03-04 PROCEDURE — 84439 ASSAY OF FREE THYROXINE: CPT | Performed by: INTERNAL MEDICINE

## 2017-03-04 RX ORDER — HYDROCODONE BITARTRATE AND ACETAMINOPHEN 10; 325 MG/1; MG/1
TABLET ORAL
Qty: 12 TABLET | Refills: 0 | Status: ON HOLD | OUTPATIENT
Start: 2017-03-04 | End: 2017-07-07 | Stop reason: CLARIF

## 2017-03-04 RX ORDER — AMLODIPINE BESYLATE 5 MG/1
5 TABLET ORAL DAILY
Start: 2017-03-04 | End: 2017-08-30

## 2017-03-04 RX ORDER — METOCLOPRAMIDE 5 MG/1
5 TABLET ORAL
Status: DISCONTINUED | OUTPATIENT
Start: 2017-03-04 | End: 2017-03-04 | Stop reason: HOSPADM

## 2017-03-04 RX ORDER — METOCLOPRAMIDE 5 MG/1
5 TABLET ORAL
Start: 2017-03-04 | End: 2017-06-07 | Stop reason: ALTCHOICE

## 2017-03-04 RX ADMIN — CHLORTHALIDONE 25 MG: 25 TABLET ORAL at 10:08

## 2017-03-04 RX ADMIN — INSULIN ASPART 6 UNITS: 100 INJECTION, SOLUTION INTRAVENOUS; SUBCUTANEOUS at 10:09

## 2017-03-04 RX ADMIN — METFORMIN HYDROCHLORIDE 1000 MG: 1000 TABLET ORAL at 10:09

## 2017-03-04 RX ADMIN — CEFAZOLIN 2 G: 1 INJECTION, POWDER, FOR SOLUTION INTRAMUSCULAR; INTRAVENOUS; PARENTERAL at 00:46

## 2017-03-04 RX ADMIN — SERTRALINE 100 MG: 100 TABLET, FILM COATED ORAL at 10:08

## 2017-03-04 RX ADMIN — INSULIN ASPART 12 UNITS: 100 INJECTION, SOLUTION INTRAVENOUS; SUBCUTANEOUS at 12:17

## 2017-03-04 RX ADMIN — PANTOPRAZOLE SODIUM 40 MG: 40 TABLET, DELAYED RELEASE ORAL at 06:43

## 2017-03-04 RX ADMIN — ONDANSETRON 4 MG: 2 INJECTION INTRAMUSCULAR; INTRAVENOUS at 13:35

## 2017-03-04 RX ADMIN — HYDROCODONE BITARTRATE AND ACETAMINOPHEN 1 TABLET: 10; 325 TABLET ORAL at 17:35

## 2017-03-04 RX ADMIN — CEFAZOLIN 2 G: 1 INJECTION, POWDER, FOR SOLUTION INTRAMUSCULAR; INTRAVENOUS; PARENTERAL at 06:44

## 2017-03-04 RX ADMIN — INSULIN ASPART 20 UNITS: 100 INJECTION, SOLUTION INTRAVENOUS; SUBCUTANEOUS at 10:09

## 2017-03-04 RX ADMIN — ENOXAPARIN SODIUM 40 MG: 40 INJECTION SUBCUTANEOUS at 10:15

## 2017-03-04 RX ADMIN — LISINOPRIL 20 MG: 20 TABLET ORAL at 10:08

## 2017-03-04 RX ADMIN — NIFEDIPINE 60 MG: 60 TABLET, FILM COATED, EXTENDED RELEASE ORAL at 10:08

## 2017-03-04 RX ADMIN — CEFAZOLIN 2 G: 1 INJECTION, POWDER, FOR SOLUTION INTRAMUSCULAR; INTRAVENOUS; PARENTERAL at 13:20

## 2017-03-04 RX ADMIN — INSULIN ASPART 15 UNITS: 100 INJECTION, SOLUTION INTRAVENOUS; SUBCUTANEOUS at 17:35

## 2017-03-04 RX ADMIN — METOCLOPRAMIDE 5 MG: 5 TABLET ORAL at 17:35

## 2017-03-04 RX ADMIN — RIFAMPIN 300 MG: 300 CAPSULE ORAL at 00:42

## 2017-03-04 RX ADMIN — METFORMIN HYDROCHLORIDE 1000 MG: 1000 TABLET ORAL at 17:35

## 2017-03-04 RX ADMIN — INSULIN ASPART 20 UNITS: 100 INJECTION, SOLUTION INTRAVENOUS; SUBCUTANEOUS at 12:17

## 2017-03-04 RX ADMIN — RIFAMPIN 300 MG: 300 CAPSULE ORAL at 10:09

## 2017-03-04 RX ADMIN — HYDROCODONE BITARTRATE AND ACETAMINOPHEN 1 TABLET: 10; 325 TABLET ORAL at 10:08

## 2017-03-04 RX ADMIN — HYDROCODONE BITARTRATE AND ACETAMINOPHEN 1 TABLET: 10; 325 TABLET ORAL at 00:42

## 2017-03-04 NOTE — DISCHARGE SUMMARY
PHYSICIAN DISCHARGE SUMMARY                                                                        [facility]    Patient Identification:  Name: Hugh R McBurney  Age: 66 y.o.  Sex: male  :  1950  MRN: [id number]  Primary Care Physician: Guille Nieves MD    Admit date: 2017  Discharge date and time: 3/4/2017     Discharged Condition: good    Discharge Diagnoses:   DM II - uncontrolled - improving:   BONITA: Resolved.   Hyponatremia:  Resent MSSA Septicemia:  Recent lt great toe amputation - DFU - MSSA:  Recent seeding of rt knee prosthesis MSSA;    Hospital Course:  Pleasant 66-year-old gentleman transferred to this facility from rehabilitation.  He recently been discharged after being treated for osteomyelitis associated diabetic foot ulcer associated with MSSA septicemia and probable right prosthetic joint seeding.  He was transferred back secondary to increasing blood sugars which reportedly reached in excess of 600 and some redness around his knee incision.  Please see H&P for full details.  His orthopedist as well as infectious disease physician and vascular surgeon were all called in on the case.  After evaluation the orthopedist was not of the opinion that there is any active infection occurring in the knee.  He was continued on his usual antibiotics and did clinically improve with this.  His wound from his toe amputation appeared to be in good shape.  Blood sugars were markedly elevated.  Was in an gap at 20 and presentation.  I do not see an acetone level available.  Endocrinology consultation was obtained.  It did require very aggressive insulin treatment back down to reasonable range.  Over the last day and a half or so however his insulin requirements do appear to be decreased again, probably a good indication that the infection is coming under better control.  I take the liberty to go ahead and start weaning  and insulin dose back today.  I have a call out for the endocrinologist to discuss this further.  Also had an elevated BUN/creatinine on presentation above his baseline.  This did respond nicely to hydration.  It is noted that the last day or 2 the creatinine started to creep back up again and also on noted that he has been started on [place] by another provider.  I have discontinued this and hopefully will see that BUN/creatinine drift back down.  It is also noted that the patient periodically vomits after lunch.  He seemed to do well with the other meals.  This is not been associated with significant amount of nausea.  He is very high risk for gastroparesis suspect that what we are dealing with at this point in addition to his hiatal hernia.  I have started him on Reglan 10 mg before lunch alone at this point.  Formal outpatient gastric empty study may be of benefit.  He'll need to have his blood sugars followed very closely at the skilled nurse facility with before meals and at bedtime Accu-Cheks.  His insulin regime will need to be adjusted frequently.    Addendum:  Discussed with Endocrinology since above.  OK for D/C but, again it is imperative that receiving facility be willing to follow the BG closely and make insulin dose changes as need.        Consults:     Consults     Date and Time Order Name Status Description    2/28/2017 0835 Inpatient Consult to Cardiology Completed     2/25/2017 2316 Inpatient Consult to Orthopedic Surgery Completed     2/25/2017 2316 Inpatient Consult to Endocrinology Completed     2/25/2017 2316 Inpatient Consult to Infectious Diseases Completed     2/25/2017 2148 Ortho (on-call MD unless specified) Completed     2/25/2017 2148 [facility] (on-call MD unless specified) Completed     2/15/2017 1216 Inpatient Consult to Endocrinology Completed     2/14/2017 1814 Inpatient consult to Infectious Diseases Completed     2/14/2017 0641 Inpatient Consult to Orthopedic Surgery Completed      2/14/2017 0632 Inpatient Consult to Vascular Surgery Completed     2/14/2017 0201 [facility] (on-call MD unless specified) Completed             Discharge Exam:  Physical Exam   Afebrile vital signs stable.  Well-developed well-nourished male no apparent distress.  Lungs clear to auscultation good air movement.  Heart regular rate and rhythm.  Abdomen benign.  Extremities no clumsiness edema.  Alert oriented conversant cooperative and pleasant.     Disposition:  Skilled nursing facility    Patient Instructions:    BrigittemallorieGui   Home Medication Instructions GABINO:[id number]    Printed on:03/04/17 2298   Medication Information                      amLODIPine (NORVASC) 5 MG tablet  Take 1 tablet by mouth Daily.             bisacodyl (DULCOLAX) 10 MG suppository  Insert 1 suppository into the rectum Daily As Needed for constipation.             ceFAZolin in dextrose (ANCEF) 2-4 GM/100ML-% solution IVPB  Infuse 100 mL into a venous catheter Every 8 (Eight) Hours for 38 days. Indications: Bone and Joint Infection             enoxaparin (LOVENOX) 40 MG/0.4ML solution syringe  Inject 0.4 mL under the skin Daily. Discontinue once risk of DVT is gone.             glucagon, human recombinant, (GLUCAGEN DIAGNOSTIC) 1 MG injection  Inject 1 mg under the skin Every 15 (Fifteen) Minutes As Needed (bs< 70 -  - Unresponsive, NPO or Unable To Safely Swallow).             HYDROcodone-acetaminophen (NORCO)  MG per tablet  Take 1 or 2 tablets every 4 hours as needed for moderate to severe pain             insulin aspart (novoLOG) 100 UNIT/ML injection  Inject 0-15 Units under the skin 4 (Four) Times a Day Before Meals & at Bedtime.             insulin aspart (novoLOG) 100 UNIT/ML injection  Inject 15 Units under the skin 3 (Three) Times a Day With Meals.             insulin detemir (LEVEMIR) 100 UNIT/ML injection  Inject 50 Units under the skin Every Night.             insulin detemir (LEVEMIR) 100 UNIT/ML injection  Inject 50 Units  under the skin Every Morning.             lisinopril (PRINIVIL,ZESTRIL) 20 MG tablet  Take 20 mg by mouth Daily.             metFORMIN (GLUCOPHAGE) 1000 MG tablet  Take 1 tablet by mouth 2 (Two) Times a Day With Meals.             metoclopramide (REGLAN) 5 MG tablet  Take 1 tablet by mouth Daily With Lunch.             Multiple Vitamin (MULTI VITAMIN DAILY PO)  Take  by mouth.             omeprazole (priLOSEC) 40 MG capsule  Take 40 mg by mouth 2 (Two) Times a Day.             pravastatin (PRAVACHOL) 20 MG tablet  Take 20 mg by mouth Daily.             rifAMPin (RIFADIN) 300 MG capsule  Take 1 capsule by mouth Every 12 (Twelve) Hours for 38 days. Indications: Bacteria in the Blood, Bone and Joint Infection             sertraline (ZOLOFT) 100 MG tablet  Take 100 mg by mouth Daily.             silver sulfadiazine (SILVADENE, SSD) 1 % cream  Apply  topically Every 12 (Twelve) Hours.               Future Appointments  Date Time Provider Department Center   3/27/2017 1:20 PM Amor Mayberry MD [facility] ID [facility] None     Additional Instructions for the Follow-ups that You Need to Schedule     Discharge Follow-up with PCP    As directed    Follow Up Details:  1 week       Discharge Follow-up with Specialty    As directed    Specialty:  Vasculoar Surgery, Infectious Dz, Endocrinology.   Follow Up Details:  As directed.       Basic Metabolic Panel    Mar 07, 2017 (Approximate)    Results to PCP.             Follow-up Information     Follow up with Guille Nieves MD .    Specialty:  Family Medicine    Why:  1 week    Contact information:    [address] [place] [address]  [phone]          Discharge Order     Start     Ordered    03/04/17 1430  Discharge patient  Once     Expected Discharge Date:  03/04/17    Discharge Disposition:  Skilled Nursing Facility (DC - External)        03/04/17 1433            Total time spent discharging patient including evaluation,post hospitalization follow up,  medication  and post hospitalization instructions and education total time exceeds 30 minutes.    Signed:  Sarbjit Carballo MD  3/4/2017  3:47 PM    EMR Dragon/Transcription disclaimer:   Much of this encounter note is an electronic transcription/translation of spoken language to printed text. The electronic translation of spoken language may permit erroneous, or at times, nonsensical words or phrases to be inadvertently transcribed; Although I have reviewed the note for such errors, some may still exist.

## 2017-03-04 NOTE — PROGRESS NOTES
"DAILY PROGRESS NOTE  [facility]    Patient Identification:  Name: Hugh R McBurney  Age: 66 y.o.  Sex: male  :  1950  MRN: [id number]         Primary Care Physician: Guille Nieves MD      Subjective  No new c/o but notes there has been a pattern of vomiting after lunch.  Does OK w other meal.     Objective:  General Appearance:  Comfortable, well-appearing, in no acute distress and not in pain.    Vital signs: (most recent): Blood pressure 167/96, pulse 100, temperature 98.5 °F (36.9 °C), temperature source Oral, resp. rate 18, height 74\" (188 cm), weight 258 lb (117 kg), SpO2 94 %.    Lungs:  Normal respiratory rate and normal effort.  Breath sounds clear to auscultation.    Heart: Normal rate.  Regular rhythm.    Extremities: There is no dependent edema.    Neurological: Patient is alert and oriented to person, place and time.    Skin:  Warm and dry.                Vital signs in last 24 hours:  Temp:  [98.5 °F (36.9 °C)-99.1 °F (37.3 °C)] 98.5 °F (36.9 °C)  Heart Rate:  [] 100  Resp:  [18] 18  BP: (156-178)/(85-97) 167/96    Intake/Output:    Intake/Output Summary (Last 24 hours) at 17 1413  Last data filed at 17 1353   Gross per 24 hour   Intake    800 ml   Output   1830 ml   Net  -1030 ml         Exam:    Physical Exam   Cardiovascular: Normal rate and regular rhythm.    Pulmonary/Chest: Effort normal.   Neurological: He is alert.   Skin: Skin is warm and dry.         Results from last 7 days  Lab Units 17  0339 17  0545 17  0523 17  0631 17  0349 17  0514 17  1723   WBC 10*3/mm3 11.28* 10.92* 10.60 10.58 14.32* 14.38* 14.45*   HEMOGLOBIN g/dL 8.9* 9.0* 8.6* 8.6* 9.4* 9.9* 10.2*   PLATELETS 10*3/mm3 301 329 325 330 315 323 346     Results from last 7 days  Lab Units 17  0338 17  0545 17  1940 17  0523 17  0631 17  0349 17  0514 17  1723   SODIUM mmol/L 133* 135*  --  134* " 130* 134* 126* 126*   POTASSIUM mmol/L 4.4 4.8 4.7 4.6 4.4 4.2 4.4 4.8   CHLORIDE mmol/L 98 102  --  99 98 99 92* 87*   TOTAL CO2 mmol/L 19.5* 20.1*  --  18.4* 18.3* 18.8* 19.4* 19.0*   BUN mg/dL 38* 40*  --  41* 41* 37* 32* 30*   CREATININE mg/dL 1.28* 1.16  --  1.20 1.21 1.39* 1.37* 1.43*   GLUCOSE mg/dL 164* 210*  --  275* 290* 253* 417* 604*   Estimated Creatinine Clearance: 77.2 mL/min (by C-G formula based on Cr of 1.28).  Results from last 7 days  Lab Units 03/04/17  0338 03/03/17  0545 03/02/17  1940 03/02/17  0523 03/01/17  0631 02/27/17  0349 02/26/17  0514 02/25/17  1723   CALCIUM mg/dL 8.9 8.8  --  8.4* 8.4* 8.5* 8.8 9.0   ALBUMIN g/dL  --   --   --   --   --   --   --  2.80*   MAGNESIUM mg/dL  --   --  1.8  --   --   --   --   --      Results from last 7 days  Lab Units 02/25/17  1723   ALBUMIN g/dL 2.80*   BILIRUBIN mg/dL 0.5   ALK PHOS U/L 136*   AST (SGOT) U/L 14   ALT (SGPT) U/L 10       Assessment:  DM II - uncontrolled - improving: Prob 2nd to increased insulin resistance 2nd to infection. Insulin requirements appear to be declining again w antibiotic tx.  Will reduce insulin and discuss with Endo.  Probable DM Gastroparesis:  Will start Reglan before breakfast.    BONITA: Resolved.   Hyponatremia:  Resent MSSA Septicemia:  Recent lt great toe amputation - DFU - MSSA:  Recent seeding of rt knee prosthesis MSSA;      Plan:  Please see above.  Hopefully d/c this PM.     Sarbjit Carballo MD  3/4/2017  2:13 PM

## 2017-03-04 NOTE — PLAN OF CARE
Problem: Infection, Risk/Actual (Adult)  Goal: Infection Prevention/Resolution  Outcome: Ongoing (interventions implemented as appropriate)    03/03/17 1800   Infection, Risk/Actual (Adult)   Infection Prevention/Resolution making progress toward outcome         Problem: Pain, Acute (Adult)  Goal: Acceptable Pain Control/Comfort Level  Outcome: Ongoing (interventions implemented as appropriate)    03/03/17 1800   Pain, Acute (Adult)   Acceptable Pain Control/Comfort Level making progress toward outcome         Problem: Patient Care Overview (Adult)  Goal: Plan of Care Review  Outcome: Ongoing (interventions implemented as appropriate)    03/03/17 1800   Coping/Psychosocial Response Interventions   Plan Of Care Reviewed With patient   Patient Care Overview   Progress improving   Outcome Evaluation   Outcome Summary/Follow up Plan pain controlled, B/P and blood glucose monitored, medications adjusted per MD, encourage ambulation        Goal: Adult Individualization and Mutuality  Outcome: Ongoing (interventions implemented as appropriate)    03/03/17 1800   Individualization   Patient Specific Preferences prefers for door to be closed, lights dimmed in room    Patient Specific Goals increase activity, pain control, tolerate wound vac to left foot, tolerate IV abx , B/P stabilized, blood glucose stabilized    Patient Specific Interventions pain meds prn, nausea meds prn , ambulate with assistance, blood glucose monitoring, monitor vital signs, wound care    Mutuality/Individual Preferences   What Anxieties, Fears or Concerns Do You Have About Your Health or Care? length of hospital stay, pain control, blood glucose elevation, B/P elevation, wound care    What Questions Do You Have About Your Health or Care? When will my blood sugar and B/P be controlled better? When will I be going back to rehab?    What Information Would Help Us Give You More Personalized Care? keep patient updated on plan of care, new meds ordered,  new tx orders, possible d/c date        Goal: Discharge Needs Assessment  Outcome: Ongoing (interventions implemented as appropriate)    03/03/17 1800   Discharge Needs Assessment   Concerns To Be Addressed basic needs concerns   Community Agency Name(S) PATIENT HAS BEED AT [facility] FOR [facility] PRIOR TO THIS ADMISSION    Equipment Needed After Discharge none   Current Discharge Risk chronically ill   Discharge Disposition still a patient   Discharge Planning Comments PLANS ON RETURNING TO [facility] FOR [facility]HAB    Current Health   Outpatient/Agency/Support Group Needs homecare agency (specify level of care)   Anticipated Changes Related to Illness inability to care for self   Self-Care   Equipment Currently Used at Home walker, rolling;glucometer   Living Environment   Transportation Available car;family or friend will provide

## 2017-03-04 NOTE — PROGRESS NOTES
Osmin Brand MD       LOS: 7 days   Patient Care Team:  Guille Nieves MD as PCP - General  Guille Nieves MD as PCP - Family Medicine    Subjective     66 y.o. 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  Antibiotics per infectious disease.  Right knee prosthesis infection per orthopedic surgery.  Patient with normal atrial circulation and adequate perfusion to heal.  Diabetes and blood pressure management per medicine.    Review of Systems  Review of Systems - Negative except history of present illness      Objective     Vital Signs  Temp:  [97.7 °F (36.5 °C)-99.1 °F (37.3 °C)] 98.6 °F (37 °C)  Heart Rate:  [] 105  Resp:  [18] 18  BP: (156-178)/(85-99) 163/92    Physical Exam  General: No acute distress. Alert and oriented x 4  HEENT: No jugular venous distension, trachea is midline  CV: RRR, S1S2  Resp: Clear unlabored breathing  Abd: Abdomen is soft, nontender, nondistended  Extremities: Wound VAC left first toe intact.    Results Review:       Recent Results (from the past 12 hour(s))   POC Glucose Fingerstick    Collection Time: 03/03/17 10:41 PM   Result Value Ref Range    Glucose 98 70 - 130 mg/dL   POC Glucose Fingerstick    Collection Time: 03/04/17 12:31 AM   Result Value Ref Range    Glucose 180 (H) 70 - 130 mg/dL   Basic Metabolic Panel    Collection Time: 03/04/17  3:38 AM   Result Value Ref Range    Glucose 164 (H) 65 - 99 mg/dL    BUN 38 (H) 8 - 23 mg/dL    Creatinine 1.28 (H) 0.76 - 1.27 mg/dL    Sodium 133 (L) 136 - 145 mmol/L    Potassium 4.4 3.5 - 5.2 mmol/L    Chloride 98 98 - 107 mmol/L    CO2 19.5 (L) 22.0 - 29.0 mmol/L    Calcium 8.9 8.6 - 10.5 mg/dL    eGFR Non African Amer 56 (L) >60 mL/min/1.73    BUN/Creatinine Ratio 29.7 (H) 7.0 - 25.0    Anion Gap 15.5 mmol/L   CBC (No Diff)    Collection Time: 03/04/17  3:39 AM   Result Value Ref Range    WBC 11.28 (H) 4.50 - 10.70 10*3/mm3    RBC 2.98 (L) 4.60 - 6.00 10*6/mm3    Hemoglobin  8.9 (L) 13.7 - 17.6 g/dL    Hematocrit 27.3 (L) 40.4 - 52.2 %    MCV 91.6 79.8 - 96.2 fL    MCH 29.9 27.0 - 32.7 pg    MCHC 32.6 32.6 - 36.4 g/dL    RDW 13.4 11.5 - 14.5 %    RDW-SD 44.5 37.0 - 54.0 fl    MPV 9.7 6.0 - 12.0 fL    Platelets 301 140 - 500 10*3/mm3   POC Glucose Fingerstick    Collection Time: 03/04/17  8:04 AM   Result Value Ref Range    Glucose 219 (H) 70 - 130 mg/dL   ]      Assessment/Plan           Principal Problem:    Type 2 diabetes mellitus with hyperglycemia  Active Problems:    Hypertension    Diabetic ulcer of 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, with necrosis of bone    Diabetic autonomic neuropathy associated with type 2 diabetes mellitus    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 male doing well after left first toe amputation.  Patient to follow-up with [facility] [place] wound care center.  Patient to continue wound VAC.  I will see as needed.  Okay for home when medically ready per medicine for blood pressure and diabetes control.  Orthopedic surgery to follow for right knee prosthesis infection      Osmin Brand MD  03/04/17  9:31 AM

## 2017-03-04 NOTE — NURSING NOTE
Pt has D/C orders, just waiting for family to transport.  Woundvac was removed and wet to dry was placed per MD Carballo prior to D/C.

## 2017-03-04 NOTE — PROGRESS NOTES
"  ENDOCRINE    Subjective   Hugh R McBurney is a 66 y.o. male.     Had hypoglycemia after supper last night.  He has been vomiting after lunch over the past several days.  Agree with plans of adding Reglan.    Fasting glucose 219.  Random glucose .  Agree with reducing Levemir to 50 units twice a day.  Reduce NovoLog 15 units with each meal.  Continue metformin 1000 mg twice a day and NovoLog prn    Tolerating pravastatin 20 mg once a day.  LDL 78.  HDL 26.  Triglycerides 169.  Continue pravastatin and low-fat diet.    -167 systolic.  On chlorthalidone, lisinopril, and Procardia.  Will defer blood pressure control to cardiologist and primary physician.    Objective   Visit Vitals   • /96 (BP Location: Left arm, Patient Position: Sitting)   • Pulse 100   • Temp 98.5 °F (36.9 °C) (Oral)   • Resp 18   • Ht 74\" (188 cm)   • Wt 258 lb (117 kg)   • SpO2 94%   • BMI 33.13 kg/m2     Physical Exam    Awake, alert, not dyspneic, not orthopneic, not in acute distress.  Neck veins are not visible at 30°.  No rales or wheezes.  Regular heart rate and rhythm.  No pedal edema.  Mild erythema over right knee.  No discharge.  No cyanosis or clubbing    Lab Results (last 24 hours)     Procedure Component Value Units Date/Time    POC Glucose Fingerstick [[id number]]  (Abnormal) Collected:  03/03/17 1619    Specimen:  Blood Updated:  03/03/17 1625     Glucose 175 (H) mg/dL     Narrative:       Meter: [id number] : [id number] Tino Zhu    POC Glucose Fingerstick [[id number]]  (Abnormal) Collected:  03/03/17 2035    Specimen:  Blood Updated:  03/03/17 2037     Glucose 133 (H) mg/dL     Narrative:       [facility] [id number] : [id number] [facility]    Blood Culture [[id number]]  (Normal) Collected:  02/26/17 2117    Specimen:  Blood from Blood, Venous Line Updated:  03/03/17 2201     Blood Culture No growth at 5 days     POC Glucose Fingerstick [[id number]]  (Normal) Collected:  03/03/17 2241    Specimen:  Blood " Updated:  03/03/17 2243     Glucose 98 mg/dL     Narrative:       Meter: [id number] : [id number] [place]    Blood Culture [[id number]]  (Normal) Collected:  02/26/17 2242    Specimen:  Blood from Blood, Venous Line Updated:  03/04/17 0002     Blood Culture No growth at 5 days     POC Glucose Fingerstick [[id number]]  (Abnormal) Collected:  03/04/17 0031    Specimen:  Blood Updated:  03/04/17 0032     Glucose 180 (H) mg/dL     Narrative:       Meter: [id number] : [id number] [place]    CBC (No Diff) [[id number]]  (Abnormal) Collected:  03/04/17 0339    Specimen:  Blood Updated:  03/04/17 0422     WBC 11.28 (H) 10*3/mm3      RBC 2.98 (L) 10*6/mm3      Hemoglobin 8.9 (L) g/dL      Hematocrit 27.3 (L) %      MCV 91.6 fL      MCH 29.9 pg      MCHC 32.6 g/dL      RDW 13.4 %      RDW-SD 44.5 fl      MPV 9.7 fL      Platelets 301 10*3/mm3     Basic Metabolic Panel [[id number]]  (Abnormal) Collected:  03/04/17 0338    Specimen:  Blood Updated:  03/04/17 0445     Glucose 164 (H) mg/dL      BUN 38 (H) mg/dL      Creatinine 1.28 (H) mg/dL      Sodium 133 (L) mmol/L      Potassium 4.4 mmol/L      Chloride 98 mmol/L      CO2 19.5 (L) mmol/L      Calcium 8.9 mg/dL      eGFR Non African Amer 56 (L) mL/min/1.73      BUN/Creatinine Ratio 29.7 (H)      Anion Gap 15.5 mmol/L     Narrative:       GFR Normal >60  Chronic Kidney Disease <60  Kidney Failure <15    POC Glucose Fingerstick [[id number]]  (Abnormal) Collected:  03/04/17 0804    Specimen:  Blood Updated:  03/04/17 0805     Glucose 219 (H) mg/dL     Narrative:       Meter: [id number] : [id number] [facility]    POC Glucose Fingerstick [[id number]]  (Abnormal) Collected:  03/04/17 1151    Specimen:  Blood Updated:  03/04/17 1152     Glucose 310 (H) mg/dL     Narrative:       Meter: [id number] : [id number] [facility]    POC Glucose Fingerstick [[id number]]  (Abnormal) Collected:  03/04/17 1336    Specimen:  Blood Updated:  03/04/17 1337     Glucose  261 (H) mg/dL     Narrative:       Meter: [id number] : [id number] [facility]ero            Principal Problem:    Type 2 diabetes mellitus with hyperglycemia  Active Problems:    Hypertension    Diabetic ulcer of 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, with necrosis of bone    Diabetic autonomic neuropathy associated with type 2 diabetes mellitus    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  Continue metformin.  If nausea does not improve with the Reglan, consider decreasing metformin dose.  Will need to have blood sugars monitored closely while at the skilled nursing unit facility.  If discharged, follow-up in 2-3 weeks with Dr. Love.  Continue pravastatin.  Continue Reglan per Dr. Carballo.  Will defer blood pressure control to Dr. Carballo and cardiologist.

## 2017-03-04 NOTE — PLAN OF CARE
Problem: Patient Care Overview (Adult)  Goal: Plan of Care Review  Outcome: Ongoing (interventions implemented as appropriate)    03/04/17 1412   Coping/Psychosocial Response Interventions   Plan Of Care Reviewed With patient   Patient Care Overview   Progress improving   Outcome Evaluation   Outcome Summary/Follow up Plan Pain is controlled with oral medication. Blood sugar is down now after insulin administration. Pt will likely D/C today

## 2017-03-05 NOTE — THERAPY DISCHARGE NOTE
Acute Care - Physical Therapy Discharge Summary  [facility]       Patient Name: Hugh R McBurney  : 1950  MRN: [id number]    Today's Date: 3/5/2017  Onset of Illness/Injury or Date of Surgery Date: 17    Date of Referral to PT: 17  Referring Physician: DR. BAILON       Admit Date: 2017      PT Recommendation and Plan    Visit Dx:    ICD-10-CM ICD-9-CM   1. Hyperglycemia R73.9 790.29   2. Renal insufficiency N28.9 593.9   3. Right knee skin infection L08.9 686.9   4. Decreased mobility and endurance Z74.09 780.99   5. Essential hypertension I10 401.9             Outcome Measures       17 1500          How much help from another person do you currently need...    Turning from your back to your side while in flat bed without using bedrails? 4  -RW      Moving from lying on back to sitting on the side of a flat bed without bedrails? 4  -RW      Moving to and from a bed to a chair (including a wheelchair)? 3  -RW      Standing up from a chair using your arms (e.g., wheelchair, bedside chair)? 3  -RW      Climbing 3-5 steps with a railing? 3  -RW      To walk in hospital room? 3  -RW      AM-PAC 6 Clicks Score 20  -RW      Functional Assessment    Outcome Measure Options AM-PAC 6 Clicks Basic Mobility (PT)  -RW        User Key  (r) = Recorded By, (t) = Taken By, (c) = Cosigned By    Initials Name Provider Type    DORIS Cristina PTA Physical Therapy Assistant                      IP PT Goals       17 1549 17 1454       Bed Mobility PT LTG    Bed Mobility PT LTG, Date Established  17  -JR     Bed Mobility PT LTG, Time to Achieve  1 wk  -JR     Bed Mobility PT LTG, Activity Type  all bed mobility  -JR     Bed Mobility PT LTG, [place] Level  contact guard assist  -JR     Bed Mobility PT LTG, Date Goal Reviewed 17  -DM      Bed Mobility PT LTG, Outcome goal not met  -DM      Bed Mobility PT LTG, Reason Goal Not Met discharged from facility  -DM      Transfer  Training PT LTG    Transfer Training PT LTG, Date Established  02/26/17  -JR     Transfer Training PT LTG, Time to Achieve  1 wk  -JR     Transfer Training PT LTG, Activity Type  all transfers  -JR     Transfer Training PT LTG, [place] Level  contact guard assist  -JR     Transfer Training PT LTG, Assist Device  walker, rolling  -JR     Transfer Training PT  LTG, Date Goal Reviewed 03/05/17  -DM      Transfer Training PT LTG, Outcome goal met  -DM      Gait Training PT LTG    Gait Training Goal PT LTG, Date Established  02/26/17  -JR     Gait Training Goal PT LTG, Time to Achieve  1 wk  -JR     Gait Training Goal PT LTG, [place]der Level  supervision required  -JR     Gait Training Goal PT LTG, Assist Device  walker, rolling  -JR     Gait Training Goal PT LTG, Distance to Achieve  150  -JR     Gait Training Goal PT LTG, Date Goal Reviewed 03/05/17  -DM      Gait Training Goal PT LTG, Outcome goal not met  -DM      Gait Training Goal PT LTG, Reason Goal Not Met discharged from facility  -DM        User Key  (r) = Recorded By, (t) = Taken By, (c) = Cosigned By    Initials Name Provider Type    RUSTY Hatfield, PT Physical Therapist    JR Geremias Berrios, PT Physical Therapist              PT Discharge Summary  Anticipated Discharge Disposition: skilled nursing facility  Reason for Discharge: Discharge from facility  Outcomes Achieved: Refer to plan of care for updates on goals achieved  Discharge Destination: SNF      Irma Hatfield, PT   3/5/2017

## 2017-03-05 NOTE — PLAN OF CARE
Problem: Inpatient Physical Therapy  Goal: Bed Mobility Goal LTG- PT  Outcome: Unable to achieve outcome(s) by discharge Date Met:  03/05/17 03/05/17 1549   Bed Mobility PT LTG   Bed Mobility PT LTG, Date Goal Reviewed 03/05/17   Bed Mobility PT LTG, Outcome goal not met   Bed Mobility PT LTG, Reason Goal Not Met discharged from facility       Goal: Transfer Training Goal 1 LTG- PT  Outcome: Outcome(s) achieved Date Met:  03/05/17 03/05/17 1549   Transfer Training PT LTG   Transfer Training PT LTG, Date Goal Reviewed 03/05/17   Transfer Training PT LTG, Outcome goal met       Goal: Gait Training Goal LTG- PT  Outcome: Unable to achieve outcome(s) by discharge Date Met:  03/05/17 03/05/17 1549   Gait Training PT LTG   Gait Training Goal PT LTG, Date Goal Reviewed 03/05/17   Gait Training Goal PT LTG, Outcome goal not met   Gait Training Goal PT LTG, Reason Goal Not Met discharged from facility

## 2017-03-06 NOTE — PROGRESS NOTES
Continued Stay Note  [facility]     Patient Name: Hugh R McBurney  MRN: [id number]  Today's Date: 3/6/2017    Admit Date: 2/25/2017          Discharge Plan       03/06/17 1110    Case Management/Social Work Plan    Additional Comments Confirmed with Patricia/Tamika, patient discharged to there skilled rehab    Final Note    Final Note Tamika skilled              Discharge Codes     None        Expected Discharge Date and Time     Expected Discharge Date Expected Discharge Time    Mar 4, 2017             Artemio Rice RN

## 2017-03-10 ENCOUNTER — OFFICE VISIT (OUTPATIENT)
Dept: WOUND CARE | Facility: HOSPITAL | Age: 67
End: 2017-03-10
Attending: SURGERY

## 2017-03-10 PROCEDURE — G0463 HOSPITAL OUTPT CLINIC VISIT: HCPCS

## 2017-03-10 PROCEDURE — 97605 NEG PRS WND THER DME<=50SQCM: CPT

## 2017-03-11 NOTE — WOUND CARE CLINIC NOTE
DATE OF VISIT: 03/10/2017    CHIEF COMPLAINT: Diabetic foot wound of left great toe requiring amputation and most likely involving bone (E11.621, L97.524).     HISTORY OF PRESENT ILLNESS: The patient is a 66-year-old male who is a fairly severe diabetic. He has had left toe swelling and redness for the last several weeks and this has given him some discomfort although he has neuropathy. It has bothered him, but it is also important to note that he in the past has had right knee replacement that has been replaced on multiple occasions. Approximately 3 weeks ago also he began having swelling, tenderness, redness over the right knee prosthesis. He got sick approximately 3 weeks ago and went to the hospital where the diagnosis of infected right knee prosthesis was made, as well as necrotic infected right great toe. It was felt that the etiology of wound infection of the right knee came from infection of the right toe, seating the bacteria into the knee prosthesis. He underwent amputation of the left 1st toe and then this was followed immediately by opening up the right knee prosthesis and removing some of the hardware but it is not clear what is actually removed. He was placed on IV antibiotics and at the present time has been on rifampin and Ancef. He seemed to get better. The toe wound was left open. The redness around the knee got better but is still present, and he has been followed by Orthopedics for the knee. He has also noticed a lot of swelling of his right lower extremity since this all happened. His sugars have been relatively improved and at the present time he is being treated for his toe wound with a wound V.A.C. He comes to the [facility] for further evaluation and treatment.    PAST MEDICAL HISTORY:  1.  Diabetes.  2.  Multiple replacements of right knee.  3.  History of MRSA.  4.  Hypertension.  5.  Hyperlipidemia.  6.  Significant neuropathy.     ALLERGIES: No known drug  allergies.    MEDICATIONS:  1.  Dulcolax.  2.  Ancef.  3.  Lovenox.  4.  Glucagon.  5.  _____.   6.  Insulin.   7.  Levemir.  8.  Lisinopril.  9.  Omeprazole.  10.  Pravastatin.  11.  Rifampin.  12.  Zoloft.     SOCIAL HISTORY: He is . Denies smoking, alcohol or recreational drugs. He also denies caffeine use.     FAMILY HISTORY: History of cancer, diabetes, and heart disease.     PAST SURGICAL HISTORY:  1.  Multiple knee replacements of his right knee in 2005, 2006, and 2011, and then recently in 2017.   2.  Appendectomy in 2007.  3.  Back surgery in 1994.  4.  Shoulder surgery in 1996.     REVIEW OF SYSTEMS: As stated in the past medical history.     PHYSICAL EXAMINATION:  GENERAL: The patient is a reasonably well-nourished and developed male. He is 6 feet 2 inches, weighs 254 pounds.   VITAL SIGNS: Temperature is 98.3, pulse 94, respirations 16, blood pressure 178/92.   HEENT: EOMs intact. Pupils equal, round, reactive light. Nose and throat are clear.   CHEST: Clear to percussion and auscultation.   HEART: Reveals no murmurs, regular rate.  ABDOMEN: Soft.   EXTREMITIES: Examination of his lower extremity reveals he has significant edema of his right lower leg. He has minimum edema of his left side. He has had multiple knee replacements on the right knee and there is significant redness and some tenderness around the lower portion of the incision. He does have a PICC line in place in his upper arm. Examination of the left foot reveals amputation of the large great toe, it is open. There is really no redness, no real drainage. There is some appearance of reasonable granulation tissue. There is some slough and exudate on the wound. The wound measures 2 x 1.8 x 0.4 cm, it needs some debridement. It should be mentioned that he does not really have good palpable pulses in either foot and his ABIs are not noncompressible. He has been seen by Dr. Brand who told the family your blood flow is not great but it  is good enough to heal. They did perform vascular studies, but we do not have those available at this time.     DESCRIPTION OF PROCEDURE: The patient was given informed consent for the necessity for performing an excisional subcutaneous debridement of the wound of the left great toe amputation site. Lidocaine gel was placed for topical anesthesia. A 4 mm curette was used for the debridement performing an excisional subcutaneous debridement removing subcutaneous necrotic fat, slough and exudate. The postdebridement measurements were 2 x 1.8 x 0.5 cm for an approximate surface area of debridement of 3.6 cm sq. The patient tolerated this well. Bleeding was minimal.     The patient had significant infection diabetic foot wound of the left toe. We do not know how extensive it was in the bone. We have no x-rays or MRI. I think we need to get an MRI of the left foot to see if there is further extension of any osteomyelitis that was not resected. We may talk to him about possible hyperbaric based on that. He is receiving IV antibiotics at this time and there are a lot of questions about what is going to happen to the right knee prosthesis. We need to get the actual vascular studies that were apparently performed and have them in the chart. We also need to see if studies including A1c sedimentation rate and C-reactive protein have been performed and we do need to also order an MRI. We will follow up on that and we will see him back in a week; I do agree with the wound V.A.C. at this time.         Osmin Navarro MD  TOR:co  D:   03/10/2017 18:46:53  T:   03/11/2017 00:34:33  Job ID:   [id number]  Document ID:   [id number]  Rev:  0  cc:

## 2017-03-13 ENCOUNTER — TRANSCRIBE ORDERS (OUTPATIENT)
Dept: ADMINISTRATIVE | Facility: HOSPITAL | Age: 67
End: 2017-03-13

## 2017-03-13 DIAGNOSIS — M86.9 DIABETIC FOOT ULCER WITH OSTEOMYELITIS (HCC): Primary | ICD-10-CM

## 2017-03-13 DIAGNOSIS — L97.509 DIABETIC FOOT ULCER WITH OSTEOMYELITIS (HCC): Primary | ICD-10-CM

## 2017-03-13 DIAGNOSIS — E11.621 DIABETIC FOOT ULCER WITH OSTEOMYELITIS (HCC): Primary | ICD-10-CM

## 2017-03-13 DIAGNOSIS — E11.69 DIABETIC FOOT ULCER WITH OSTEOMYELITIS (HCC): Primary | ICD-10-CM

## 2017-03-17 ENCOUNTER — OFFICE VISIT (OUTPATIENT)
Dept: WOUND CARE | Facility: HOSPITAL | Age: 67
End: 2017-03-17
Attending: SURGERY

## 2017-03-17 ENCOUNTER — HOSPITAL ENCOUNTER (OUTPATIENT)
Dept: MRI IMAGING | Facility: HOSPITAL | Age: 67
Discharge: HOME | End: 2017-03-17
Attending: SURGERY | Admitting: SURGERY

## 2017-03-17 DIAGNOSIS — M86.9 DIABETIC FOOT ULCER WITH OSTEOMYELITIS (HCC): ICD-10-CM

## 2017-03-17 DIAGNOSIS — E11.69 DIABETIC FOOT ULCER WITH OSTEOMYELITIS (HCC): ICD-10-CM

## 2017-03-17 DIAGNOSIS — E11.621 DIABETIC FOOT ULCER WITH OSTEOMYELITIS (HCC): ICD-10-CM

## 2017-03-17 DIAGNOSIS — L97.509 DIABETIC FOOT ULCER WITH OSTEOMYELITIS (HCC): ICD-10-CM

## 2017-03-17 PROCEDURE — 97605 NEG PRS WND THER DME<=50SQCM: CPT

## 2017-03-17 PROCEDURE — 0 GADOBENATE DIMEGLUMINE 529 MG/ML SOLUTION: Performed by: SURGERY

## 2017-03-17 PROCEDURE — 73720 MRI LWR EXTREMITY W/O&W/DYE: CPT

## 2017-03-17 PROCEDURE — A9577 INJ MULTIHANCE: HCPCS | Performed by: SURGERY

## 2017-03-17 RX ADMIN — GADOBENATE DIMEGLUMINE 20 ML: 529 INJECTION, SOLUTION INTRAVENOUS at 12:08

## 2017-03-18 NOTE — WOUND CARE CLINIC NOTE
DATE OF VISIT: 03/17/2017    CHIEF COMPLAINT: Diabetic foot wound of left great toe requiring amputation and most likely involving bone (E11.621, L97.524).     HISTORY OF PRESENT ILLNESS: The patient is a 66-year-old male who is a fairly severe diabetic. He has had left toe swelling and redness for the last several weeks and this has given him some discomfort, although he has neuropathy. _____ but it is important to note also that in the past he has had right knee replacement and this has been replaced on multiple occasions. Approximately 3 weeks ago he began having swelling, tenderness and redness of the right knee prosthesis. He got sick several weeks ago and he went to the hospital where the diagnosis of infected right knee prosthesis was made as well as necrotic infected right toe. It is felt that the etiology of the wound infection of the right knee came from infection of the left great toe. It is felt that the etiology of the wound infection of the right knee came from the infection of the left toe, seeding the bacteria into the knee prosthesis. He underwent amputation of the left great toe and then this was followed immediately by opening up the right knee prosthesis and removing some of the hardware. He was placed on IV antibiotics and at the present time he is on rifampin and Ancef. It seems to be getting better. The toe wound was left open. The redness around the knee got better, but it is still present. He has been followed by Orthopedics for the knee problem. He is supposed to see Orthopedic Surgery this coming Monday. He has also noticed a lot of swelling of his right lower extremity since this all happened. His sugars have been relatively improved at the present time and he has been treated for the wound of his toe with a wound V.A.C. He [facility] the [facility] for further evaluation. He had an MRI today, but we do not have the results. His A1c has most recently been 9.25. His C-reactive  protein has been coming down from a high of 23 on 02/14/2017 to 13 on 03/03/2017. We do not have a sedimentation rate.     PHYSICAL EXAMINATION:  VITAL SIGNS: Temperature is 98.2, pulse 106, respirations 16, blood pressure 163/85. EXTREMITIES: On examination of his right knee area, it remains red; it is warm, it does not seem to be particularly tender, though, and does not seem to be any different than it was last week we saw him. The right lower extremity is somewhat swollen. Examination of his left great toe reveals that the wound actually looks improved; it is a little smaller. It measures 1.4 x 1.2 x 0.3 cm. There is no real debris or necrotic tissue that needs to be removed. Overall, it looks pretty good considering how difficult it is to do a wound V.A.C. on a small area like this.     ASSESSMENT AND PLAN: As mentioned, the patient will be seeing his orthopedic surgeon on Monday and we do not know what their plans are for that. We do not have the MRI back from today on his toe, but when we saw him several days ago there was bone that was palpable in the wound. I am afraid that there may be some possible osteomyelitis in the toe; we will have to evaluate that though once we get the MRI and put into clinical context.         Osmin Navarro MD  TOR:co  D:   03/17/2017 20:07:09  T:   03/18/2017 03:30:01  Job ID:   [id number]  Document ID:   [id number]  Rev:  0  cc:

## 2017-03-24 ENCOUNTER — OFFICE VISIT (OUTPATIENT)
Dept: WOUND CARE | Facility: HOSPITAL | Age: 67
End: 2017-03-24
Attending: SURGERY

## 2017-03-24 PROCEDURE — G0463 HOSPITAL OUTPT CLINIC VISIT: HCPCS

## 2017-03-25 NOTE — WOUND CARE CLINIC NOTE
DATE OF VISIT:  03/24/2017    CHIEF COMPLAINT: Diabetic foot wound of left great toe requiring amputation and most likely involving bone (E11.621, L97.524).     HISTORY OF PRESENT ILLNESS: The patient is a 66-year-old male who is a fairly severe diabetic. He had some left toe swelling and redness for the last several weeks before he came to the clinic and this gave him some discomfort. He does have neuropathy. He has also had multiple right knee replacements in the past and his knee began swelling and becoming red and tender. He got sick and he went to the hospital and they felt that the etiology of the wound infection of the knee came from the infection of the left great toe. It was felt that the left great toe with seeding the knee prosthesis. He had amputation of the left great toe and drainage and removal of some hardware from his right prosthesis. This was all done at the same time. He was admitted on IV antibiotics and at the present time he is on rifampin and Ancef. He is at a facility receiving this. His wound on his left toe is being treated with a wound V.A.C. and he has been doing relatively well. His most recent A1c was 9.25. His C-reactive protein has come down to 13 and at the present time, we do not have a sedimentation rate. He had an MRI last week and it demonstrated no evidence of osteomyelitis. He comes in today for further examination and followup. He has no real problems and seems to be doing well with his wound V.A.C. He saw his orthopedic surgeon last week and the orthopedic surgeon felt that he was also doing well and was not too concerned about his right knee.     PHYSICAL EXAMINATION:  VITAL SIGNS: Temperature is 97.6, pulse is 108, respirations 16, blood pressure 139/80.   EXTREMITIES: Examination of his right lower extremity reveals there is much less redness around the knee prosthesis area than it was last week and there is still swelling, but less. Examination of the wound of the  left great toe amputation site reveals that it is about the same size or maybe a little smaller. It is 1.4 x 0.8 x 0.3 cm. It is really clean. There is no exudate or debris. The periwound may be a little bit moister than we would like to see but overall it is okay.     ASSESSMENT AND PLAN: The patients left great toe wound amputation seems to be improving. I think we can stop the wound V.A.C. at this point. We are going to start using Puracol and Aquacel Ag. I think this is probably going to treat it very well. He is still on IV antibiotics but he may get out of the facility next week and if he goes home then they can secure home health for him. Probably just change this about every 3rd day. I had considered the use of Hydrofera Blue, but I think that this will be a little simpler if we just use the Aquacel Ag.       Osmin Navarro MD  TOR:evert  D:   03/24/2017 14:56:11  T:   03/25/2017 09:33:09  Job ID:   [id number]  Document ID:   [id number]  Rev:  0  cc:

## 2017-03-27 ENCOUNTER — OFFICE VISIT (OUTPATIENT)
Dept: INFECTIOUS DISEASES | Facility: CLINIC | Age: 67
End: 2017-03-27

## 2017-03-27 VITALS
TEMPERATURE: 97.9 F | BODY MASS INDEX: 32.74 KG/M2 | SYSTOLIC BLOOD PRESSURE: 165 MMHG | DIASTOLIC BLOOD PRESSURE: 79 MMHG | HEART RATE: 114 BPM | WEIGHT: 255 LBS

## 2017-03-27 DIAGNOSIS — T84.53XA INFECTION OF PROSTHETIC RIGHT KNEE JOINT (HCC): ICD-10-CM

## 2017-03-27 DIAGNOSIS — E11.621 DIABETIC ULCER OF TOE OF LEFT FOOT ASSOCIATED WITH TYPE 2 DIABETES MELLITUS, WITH NECROSIS OF BONE (HCC): ICD-10-CM

## 2017-03-27 DIAGNOSIS — L97.524 DIABETIC ULCER OF TOE OF LEFT FOOT ASSOCIATED WITH TYPE 2 DIABETES MELLITUS, WITH NECROSIS OF BONE (HCC): ICD-10-CM

## 2017-03-27 DIAGNOSIS — Z79.2 LONG TERM CURRENT USE OF ANTIBIOTICS: ICD-10-CM

## 2017-03-27 DIAGNOSIS — A41.01 MSSA (METHICILLIN SUSCEPTIBLE STAPHYLOCOCCUS AUREUS) SEPTICEMIA (HCC): Primary | ICD-10-CM

## 2017-03-27 PROCEDURE — 99214 OFFICE O/P EST MOD 30 MIN: CPT | Performed by: INTERNAL MEDICINE

## 2017-03-27 RX ORDER — FERROUS SULFATE 325(65) MG
325 TABLET ORAL
COMMUNITY
End: 2019-08-07

## 2017-03-27 RX ORDER — RIFAMPIN 300 MG/1
300 CAPSULE ORAL EVERY 12 HOURS SCHEDULED
Qty: 60 CAPSULE | Refills: 3
Start: 2017-03-27 | End: 2017-05-04

## 2017-03-27 RX ORDER — CIPROFLOXACIN 750 MG/1
750 TABLET, FILM COATED ORAL 2 TIMES DAILY
Qty: 60 TABLET | Refills: 3 | Status: SHIPPED | OUTPATIENT
Start: 2017-03-27 | End: 2017-06-07

## 2017-03-27 NOTE — PROGRESS NOTES
cc: f/u MSSA infection    Per prior notes: Very nice 66-year-old gentleman with history of osteoarthritis complicated by right TKA PJI culture negative in 2011 treated with two-stage replacement and prolonged antibiotic therapy, admitted on with 3-4 weeks of left first toe swelling, fever and right artificial knee pain. Was found to have MSSA in his blood and underwent I&D with amputation of the first left toe by Dr. Brand on 2/14. Dr. Marie also performed liner exchange with I&D of right TKA on 2/14. His left toe and right TKA cultures all grew MSSA. Transthoracic echocardiogram without obvious vegetation. In discussions with orthopedics and infectious diseases, patient elected to pursue device retention strategy. We will therefore recommend Ancef 2 g IV every 8 hours and rifampin 300 mg by mouth every 12 hours for 6 weeks with an anticipated stop date of 3/27/17. We then plan 6 months of rifampin plus PO abx (likely quinolone).    He was readmitted for fever and hyperglycemia from 2/25 to 3/4/17 but all cultures were negative and transesophageal echocardiogram was negative    In regards to his diabetic foot infection with left great toe osteomyelitis, this is doing much better.  He continues with wound care.  His sugars are much improved compared to recent admission    In regards to his long-term use antibiotics, he has been taking his Ancef at the nursing facility without side effects or missed doses.  He is due for his last dose tonight.    In regards to his prosthetic joint infection, he still has residual pain and swelling within the knee.  He denies any systemic signs of infection such as fever, chills, night sweats    PAST MEDICAL HISTORY:  1. Uncontrolled diabetes mellitus type 2.  2. Hypertension.  3. Hyperlipidemia.  4. GERD.  5. History of nephrolithiasis.  6. Chronic pain.  7. Osteoarthritis status post bilateral joint replacement with right PJI in 2011 treated with 2-stage replacement and  antibiotic therapy.  8. Back hardware placement.  9. Placement of an orthopedic screw on the right shoulder.  10. Appendectomy.  11. History of pancreatitis.    Review of Systems: Back pain.  All other reviewed and negative except as per HPI    Blood pressure 165/79, pulse 114, temperature 97.9 °F (36.6 °C), weight 255 lb (116 kg).  GENERAL: Awake and alert, in no acute distress.   Right knee is swollen but not very tender to palpation.  No cellulitic features appreciated.  SKIN: Warm and dry without cutaneous eruptions   PSYCHIATRIC: Appropriate mood, affect, insight, and judgment.       DIAGNOSTICS:  Antibiotic monitoring labs reviewed.  Labs from 3/20/17 show that creatinine of 0.9.  Liver function test okay.  White count 10.7, normal differential.  CRP 44, ESR 69.    Assessment and Plan  1. MSSA (methicillin susceptible Staphylococcus aureus) septicemia  2. 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  Inflammatory markers still elevated  3. Diabetic ulcer of 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, with necrosis of bone; this is much improved.  Continue glycemic control efforts to prevent infectious complications  4. Long term current use of antibiotics    Doing okay.  We will discontinue PICC line and IV antibiotics after his infusion tonight.  We'll transition to ciprofloxacin 750 mg by mouth every 12 hours and rifampin 300 mg by mouth every 12 hours for 6 more months.   I discussed with him proper dosing administration and side effects of medicines.  His inflammatory markers remain quite elevated and I'm concerned that he may ultimately fail medical therapy.  We will just have to see.  I did discuss with him signs and symptoms of recrudescent infection when to seek medical attention. I will see him back in 6 weeks or sooner if needed.

## 2017-03-30 ENCOUNTER — TELEPHONE (OUTPATIENT)
Dept: INFECTIOUS DISEASES | Facility: CLINIC | Age: 67
End: 2017-03-30

## 2017-03-30 RX ORDER — PROMETHAZINE HYDROCHLORIDE 25 MG/1
25 TABLET ORAL EVERY 8 HOURS PRN
Qty: 30 TABLET | Refills: 1 | Status: SHIPPED | OUTPATIENT
Start: 2017-03-30 | End: 2017-05-10 | Stop reason: SDUPTHER

## 2017-03-30 NOTE — TELEPHONE ENCOUNTER
"----- Message from Laura Murillo sent at 3/30/2017  2:22 PM EDT -----  Regarding: message update  Patient called back in and is wondering if the cipro can also make you nervous feeling. He said he took a nerve pill but he feel like he could \"crawl out of my skin\"    Thanks  Deloris"

## 2017-03-30 NOTE — TELEPHONE ENCOUNTER
----- Message from Meghann Mancuso MA sent at 3/30/2017  3:21 PM EDT -----  Contact: [phone]      ----- Message -----     From: Amor Mayberry MD     Sent: 3/30/2017   2:42 PM       To: Meghann Mancuso MA    Promethazine sent in.  Should get better after few days on cipro but if it doesn't let me know.  ----- Message -----     From: Meghann Mancuso MA     Sent: 3/30/2017   9:34 AM       To: Amor Mayberry MD        ----- Message -----     From: Laura Murillo     Sent: 3/30/2017   9:22 AM       To: [facility]    Is taking Cipro and has been experiencing nausea. Just started on it Tuesday morning. Is wondering if something can be called in for the nausea. [facility] in [place].     This is a Dr Mayberry patient    Deloris

## 2017-03-30 NOTE — TELEPHONE ENCOUNTER
Patient notified that the Promethazine has been called in for his nausea. Follow up by phone if no help---LIBBY,RN

## 2017-03-30 NOTE — TELEPHONE ENCOUNTER
"Patient notified that a script for Promethazine has been called in for his nausea and may also help with his \"jitters\". I asked him to call us back if symptoms continue--LIBBY,RN  "

## 2017-03-31 ENCOUNTER — OFFICE VISIT (OUTPATIENT)
Dept: WOUND CARE | Facility: HOSPITAL | Age: 67
End: 2017-03-31
Attending: SURGERY

## 2017-04-01 NOTE — WOUND CARE CLINIC NOTE
DATE OF VISIT: 03/31/2017    CHIEF COMPLAINT: Diabetic foot wound of left great toe requiring amputation and most likely involving bone (E11.621, L97.524).     HISTORY OF PRESENT ILLNESS: The patient is a 66-year-old male who is a fairly severe diabetic. He has some left toe swelling and redness for the last several weeks before he came to the clinic, and this gave him some discomfort. He has neuropathy. He has also had multiple right knee replacements in the past and his knee began swelling and becoming redder and hotter. He got sick and went to the hospital and it was felt that he had cellulitis and infection of his left great toe which was seeding bacteria to infect his right knee. At time of his hospitalization he had an amputation of the distal portion of his left great toe and he had the right knee opened up and had some of his hardware removed and the wound irrigated and cleaned up. He has been on IV antibiotics since then. He was on a wound V.A.C. but last week we stopped his wound V.A.C. and we changed him over to Aquacel silver with Puracol. His most recent A1c was 9.25, his C-reactive protein had dome down to 13, but we do not have a sedimentation rate. MRI has demonstrated no evidence of osteomyelitis. He denies having any real problems, pain, fever, excessive drainage.     PHYSICAL EXAMINATION:  VITAL SIGNS: Temperature is 98, pulse is 111, respirations 18, blood pressure 138/67.   EXTREMITIES: Examination of the left great toe amputation site reveals that it looks clean and it seems to be granulating. The measurements of the wound prior to debridement is 1.4 x 0.7 x 0.1 cm, which is smaller than it was before but it does have some exudate and debris that needs to be debrided.     DESCRIPTION OF PROCEDURE: The patient was given informed consent for the need for a subcutaneous excisional debridement of the wound of the left great toe amputation site. He was given topical anesthesia, but he also has  neuropathy. A 4 mm curette was used to debride the wound, removing necrotic subcutaneous fat, slough, exudate and debris. The postdebridement measurements were 1.3 x 0.7 x 0.3 cm. The wound bed was improved. The quality of the tissue looks improved from last week. The approximate surface area of debridement was approximately 0.91 cm sq. The patient tolerated this well. Bleeding was minimal.     ASSESSMENT AND PLAN: The patient seems to be improving, now off of his wound V.A.C. and on collagen and Aquacel. He is showing signs of cleaning up the wound. he claims his diabetes is under decent control. He is going to be on IV antibiotics now for apparently 6 months because of his knee problem. He is on Cipro and rifampin; these are oral, though. We discussed the importance of controlling his sugars and also he may want to take some probiotics because of the long-term antibiotic use. He also stated that the antibiotics have given him some trouble with his stomach. As far as the wound goes, though, it is looking good and I am pleased. We will continue the present treatment.         Osmin Navarro MD  TOR:co  D:   03/31/2017 14:58:03  T:   04/01/2017 04:41:02  Job ID:   [id number]  Document ID:   [id number]  Rev:  0  cc:

## 2017-04-07 ENCOUNTER — OFFICE VISIT (OUTPATIENT)
Dept: WOUND CARE | Facility: HOSPITAL | Age: 67
End: 2017-04-07
Attending: SURGERY

## 2017-04-07 ENCOUNTER — OFFICE VISIT (OUTPATIENT)
Dept: ENDOCRINOLOGY | Age: 67
End: 2017-04-07

## 2017-04-07 VITALS
BODY MASS INDEX: 32.21 KG/M2 | HEART RATE: 102 BPM | DIASTOLIC BLOOD PRESSURE: 76 MMHG | HEIGHT: 74 IN | SYSTOLIC BLOOD PRESSURE: 128 MMHG | OXYGEN SATURATION: 98 % | WEIGHT: 251 LBS

## 2017-04-07 DIAGNOSIS — E55.9 VITAMIN D DEFICIENCY: ICD-10-CM

## 2017-04-07 DIAGNOSIS — IMO0002 UNCONTROLLED TYPE 2 DIABETES MELLITUS WITH COMPLICATION, WITH LONG-TERM CURRENT USE OF INSULIN: Primary | ICD-10-CM

## 2017-04-07 DIAGNOSIS — L97.529 DIABETIC ULCER OF LEFT FOOT ASSOCIATED WITH TYPE 2 DIABETES MELLITUS (HCC): ICD-10-CM

## 2017-04-07 DIAGNOSIS — E11.621 DIABETIC ULCER OF LEFT FOOT ASSOCIATED WITH TYPE 2 DIABETES MELLITUS (HCC): ICD-10-CM

## 2017-04-07 PROCEDURE — 99214 OFFICE O/P EST MOD 30 MIN: CPT | Performed by: INTERNAL MEDICINE

## 2017-04-07 NOTE — PROGRESS NOTES
CC : Type 2 dm    HPI Hugh R McBurney,66 y.o. WM is here as a follow up pt for the management of type 2 diabetes mellitus.  Patient was seen by me during his hospitalization with the left great toe infection.      Type 2 dm - Diagnosed about 10 -15 years ago. Was started on oral agents initially. Insulin was started about 5 years ago. Currently on levemir 50 units bid, Novolog ssi tid ac and hs - 3 units for 50 above 150 mg/dl.   Checks BG 3 - 5 times a day.   FBG -  110 - 150 mg/dl and Pre - meals - 150 -200  Mg/dl  No increased urination or increased thirst. No BG less than 60 mg/dl in the last one month, does have hypoglycemic awareness. Highest BG in the last 1 month was -  210 mg/dl.  Pt got his log book for me. C/o nausea and no vomiting says it is due to ciprofloxacin.   Due for eye exam about few years ago and no dm retinopathy.   C/o tingling and numbness in his b/l feet, Great toe amputation of his left foot.   No CAD, CKD,CVA per pt.   Pt is physically not active due to knee pain. Weight has been stable.   Pt tries to follow DM diet for most part, but is requesting for a formal diabetic education.  On Ace inb.  Prior history of pancreatitis.    Diabetic neuropathy resulting in left great toe osteomyelitis status post amputation complicated with right knee joint seeding underwent incision, debridement and polyliner exchange of the right TKA, being followed by orthopedics and ID.  Finished the course of IV antibiotics for about 6 weeks and is currently on oral antibiotics.      I have reviewed the patient's allergies, medicines, past medical hx, family hx and social hx in detail.    Past Medical History:   Diagnosis Date   • Chronic pain     BILATERAL KNEES AND BACK   • Depression    • Diabetes mellitus    • Hyperlipidemia    • Hypertension    • Kidney stone        Family History   Problem Relation Age of Onset   • Heart disease Mother    • Heart disease Father        Social History     Social History   •  Marital status:      Spouse name: N/A   • Number of children: N/A   • Years of education: N/A     Occupational History   • Not on file.     Social History Main Topics   • Smoking status: Former Smoker     Packs/day: 3.00     Years: 20.00   • Smokeless tobacco: Not on file      Comment: quit 35 years ago    • Alcohol use No   • Drug use: Yes     Special: Other      Comment: vicodin-prescribed    • Sexual activity: Defer     Other Topics Concern   • Not on file     Social History Narrative       No Known Allergies      Current Outpatient Prescriptions:   •  amLODIPine (NORVASC) 5 MG tablet, Take 1 tablet by mouth Daily., Disp: , Rfl:   •  bisacodyl (DULCOLAX) 10 MG suppository, Insert 1 suppository into the rectum Daily As Needed for constipation., Disp: , Rfl: 0  •  ciprofloxacin (CIPRO) 750 MG tablet, Take 1 tablet by mouth 2 (Two) Times a Day., Disp: 60 tablet, Rfl: 3  •  enoxaparin (LOVENOX) 40 MG/0.4ML solution syringe, Inject 0.4 mL under the skin Daily. Discontinue once risk of DVT is gone., Disp: 11.2 mL, Rfl: 0  •  ferrous sulfate 325 (65 FE) MG tablet, Take 325 mg by mouth Daily With Breakfast., Disp: , Rfl:   •  glucagon, human recombinant, (GLUCAGEN DIAGNOSTIC) 1 MG injection, Inject 1 mg under the skin Every 15 (Fifteen) Minutes As Needed (bs< 70 -  - Unresponsive, NPO or Unable To Safely Swallow)., Disp: , Rfl:   •  HYDROcodone-acetaminophen (NORCO)  MG per tablet, Take 1 or 2 tablets every 4 hours as needed for moderate to severe pain, Disp: 12 tablet, Rfl: 0  •  Insulin Glargine (LANTUS SOLOSTAR) 100 UNIT/ML injection pen, Inject 40 Units under the skin 2 (Two) Times a Day., Disp: 4 pen, Rfl: 2  •  lisinopril (PRINIVIL,ZESTRIL) 20 MG tablet, Take 20 mg by mouth Daily., Disp: , Rfl:   •  metoclopramide (REGLAN) 5 MG tablet, Take 1 tablet by mouth Daily With Lunch., Disp: , Rfl:   •  Multiple Vitamin (MULTI VITAMIN DAILY PO), Take  by mouth., Disp: , Rfl:   •  omeprazole (priLOSEC) 40 MG  "capsule, Take 40 mg by mouth 2 (Two) Times a Day., Disp: , Rfl:   •  pravastatin (PRAVACHOL) 20 MG tablet, Take 20 mg by mouth Daily., Disp: , Rfl:   •  promethazine (PHENERGAN) 25 MG tablet, Take 1 tablet by mouth Every 8 (Eight) Hours As Needed for Nausea or Vomiting., Disp: 30 tablet, Rfl: 1  •  rifAMPin (RIFADIN) 300 MG capsule, Take 1 capsule by mouth Every 12 (Twelve) Hours for 38 days. Indications: Bacteria in the Blood, Bone and Joint Infection, Disp: 60 capsule, Rfl: 3  •  sertraline (ZOLOFT) 100 MG tablet, Take 100 mg by mouth Daily., Disp: , Rfl:   •  silver sulfadiazine (SILVADENE, SSD) 1 % cream, Apply  topically Every 12 (Twelve) Hours., Disp: , Rfl: 0  •  insulin aspart (NOVOLOG FLEXPEN) 100 UNIT/ML solution pen-injector sc pen, 6 units with each meal, with a maxmium of 48 units per day., Disp: 20 pen, Rfl: 1  •  Insulin Pen Needle (BD PEN NEEDLE JUAN DAVID U/F) 32G X 4 MM misc, TO INJECT 4 TIMES DAILY, Disp: 100 each, Rfl: 3     Review of Systems   Constitutional: Negative for fever.   HENT: Negative for facial swelling, nosebleeds, trouble swallowing and voice change.    Eyes: Negative for pain and redness.   Respiratory: Negative for shortness of breath and wheezing.    Cardiovascular: Positive for leg swelling (right leg). Negative for palpitations.   Gastrointestinal: Negative for abdominal pain, diarrhea and vomiting.   Endocrine: Negative for polydipsia and polyuria.   Genitourinary: Negative for decreased urine volume and frequency.   Musculoskeletal: Negative for joint swelling and neck pain.   Skin: Negative for rash.   Allergic/Immunologic: Negative for immunocompromised state.   Neurological: Negative for seizures and facial asymmetry.   Hematological: Bruises/bleeds easily.   Psychiatric/Behavioral: Negative for agitation and confusion.       Objective:    /76  Pulse 102  Ht 74\" (188 cm)  Wt 251 lb (114 kg)  SpO2 98%  BMI 32.23 kg/m2    Physical Exam   Constitutional: He is oriented " to person, place, and time. He appears well-nourished.   HENT:   Head: Normocephalic and atraumatic.   Eyes: Conjunctivae and EOM are normal.   Neck: Normal range of motion. Neck supple. Carotid bruit is not present. No thyromegaly present.   Acanthosis nigricans   Cardiovascular: Normal rate and normal heart sounds.    Pulmonary/Chest: Effort normal and breath sounds normal. No stridor. No respiratory distress. He has no wheezes.   Abdominal: Soft. Bowel sounds are normal. He exhibits no distension. There is no tenderness.   Central obesity   Musculoskeletal: He exhibits no edema or tenderness.   Lymphadenopathy:     He has no cervical adenopathy.   Neurological: He is alert and oriented to person, place, and time.   Using a walker, left foot dressed   Skin: Skin is warm and dry.   Psychiatric: He has a normal mood and affect. His behavior is normal.   Vitals reviewed.      Results Review:    I reviewed the patient's new clinical results.    Lab Requisition on 03/13/2017   Component Date Value Ref Range Status   • Hemoglobin 03/13/2017 9.0* 13.7 - 17.6 g/dL Final   • Hematocrit 03/13/2017 28.6* 40.4 - 52.2 % Final       Diagnoses and all orders for this visit:    Uncontrolled type 2 diabetes mellitus with complication, with long-term current use of insulin  -     Comprehensive Metabolic Panel; Future  -     Hemoglobin A1c; Future  -     Vitamin D 25 Hydroxy; Future  -     Vitamin B12 & Folate; Future  -     TSH; Future  -     Microalbumin / Creatinine Urine Ratio; Future  -     Lipid Panel; Future    Diabetic ulcer of left foot associated with type 2 diabetes mellitus  -     Comprehensive Metabolic Panel; Future  -     Hemoglobin A1c; Future  -     Vitamin D 25 Hydroxy; Future  -     Vitamin B12 & Folate; Future  -     TSH; Future  -     Microalbumin / Creatinine Urine Ratio; Future  -     Lipid Panel; Future    Vitamin D deficiency   -     Vitamin D 25 Hydroxy; Future    Other orders  -     Insulin Glargine  (LANTUS SOLOSTAR) 100 UNIT/ML injection pen; Inject 40 Units under the skin 2 (Two) Times a Day.  -     insulin aspart (NOVOLOG FLEXPEN) 100 UNIT/ML solution pen-injector sc pen; 6 units with each meal, with a maxmium of 48 units per day.  -     Insulin Pen Needle (BD PEN NEEDLE JUAN DAVID U/F) 32G X 4 MM misc; TO INJECT 4 TIMES DAILY    Type 2 diabetes mellitus  Will change her insulin regimen as below.  Will start patient on Lantus 40 units twice daily  Will start patient on NovoLog 6 units with each meal.  Will start patient on NovoLog sliding scale 2 units for 50 about 1 50 mg/dL.  Patient has been given the above insulin's as samples.  Stopped metformin due to slightly elevated creatinine levels.  Will refer the patient for diabetic education  Will refer the patient to ophthalmology for routine diabetic retinopathy screening.    Hyperlipidemia  On pravastatin 20 mg oral daily  Will check lipid panel.     The total time spent more than 25 min for old record and lab review and face- to- face, of which greater than 50% of time was spent in counseling the patient on treatment options, instructions for management/treatment and follow up and importance of compliance with chosen management or treatment options        Adarsh Love MD  04/07/17

## 2017-04-08 NOTE — WOUND CARE CLINIC NOTE
DATE OF SERVICE: 04/07/2017     CHIEF COMPLAINT: Diabetic foot wound of left great toe requiring amputation and most likely involving bone (E11.621, L97.524).     HISTORY OF PRESENT ILLNESS: The patient is a 66-year-old male who is a fairly severe diabetic. He had some left toe swelling and redness for the last several weeks before he came to the clinic. This gave him some discomfort, but he has neuropathy. He also had multiple right knee replacements in the past and his knee began swelling and became hotter and redder. He got sick and went to the hospital. It was felt he had cellulitis and infection of the left great toe and this was seeding bacteria to infect his right knee. At the time of hospitalization he had amputation of the distal portion of his left great toe at the same time he had his right knee opened back up and some of the hardware removed and the wound irrigated and cleaned up. He has been on IV antibiotics and then he was placed on oral antibiotics. He is still on oral antibiotics and will likely be for another 5-6 months because of his knee prosthesis. He was on a wound VAC, but then we stopped that about a week or so ago and then put him on Puracol and Aquacel. His A1c recently was 9.26. C-reactive protein come down to 13 and has not had a sedimentation rate. His MRI demonstrated no osteomyelitis. He denies having any problems with fever, drainage, pain and redness.     PHYSICAL EXAMINATION:  VITAL SIGNS: Temperature is 98.2, pulse 106, respirations 20, blood pressure 112/69.   EXTREMITIES: Examination of the left great toe reveals that there is no swelling. No redness or drainage. There is some necrotic tissue in the center of the toe, but overall it really looks much better. It is also smaller. The predebridement measurement is 1.1 x 0.7 x 0.1 cm, but it does need debridement.     DESCRIPTION OF PROCEDURE: The patient was given informed consent for the need for debridement of the wound of the  left great toe. He had 4% lidocaine solution placed for topical anesthesia. A 4 mm curette was used to debride the left great toe wound performing an excisional subcutaneous debridement removing subcutaneous necrotic fat from the subcutaneous level, slough, and exudate. The postdebridement measurement was 1.1 x 0.5 x 0.2 cm. This was approximately a surface area of debridement of 0.55 sq cm. The patient tolerated this well. The wound bed was improved and the bleeding was minimal.     ASSESSMENT AND PLAN: I think this man has continued to improve. He has made significant progress. We are going to continue the Puracol, Aquacel, Hydrofiber, and he is doing this 3 times a week. We will see him back next week. His diabetes is under reasonable control now that he has gotten his sugars straightened out. He just saw his diabetic doctor today and we will see him again next week.       Osmin Navarro MD  TOR:judy  D:   04/07/2017 15:36:37  T:   04/07/2017 22:43:07  Job ID:   [id number]  Document ID:   [id number]  Rev:  0  cc:

## 2017-04-13 ENCOUNTER — TELEPHONE (OUTPATIENT)
Dept: ENDOCRINOLOGY | Age: 67
End: 2017-04-13

## 2017-04-13 RX ORDER — LANCETS
EACH MISCELLANEOUS
Qty: 500 EACH | Refills: 3 | Status: SHIPPED | OUTPATIENT
Start: 2017-04-13 | End: 2023-03-28

## 2017-04-13 NOTE — TELEPHONE ENCOUNTER
Spoke with patient about high blood sugar readings     Pre Dr Love   Patient is to take Lantus 45 units twice daily   And Novolog  8 units with meals plus sliding scale   Do this for 2 weeks if blood sugar are still high. Try metformin 500mg oral BID not full dose of 1000 mg due to kidney function

## 2017-04-14 ENCOUNTER — OFFICE VISIT (OUTPATIENT)
Dept: WOUND CARE | Facility: HOSPITAL | Age: 67
End: 2017-04-14
Attending: SURGERY

## 2017-04-14 NOTE — WOUND CARE CLINIC NOTE
DATE OF SERVICE: 04/14/2017    CHIEF COMPLAINT: Diabetic foot wound of left great toe requiring amputation (E11.621, L97.524).     HISTORY OF PRESENT ILLNESS: The patient is a 66-year-old male who is a fairly severe diabetic. He had some left toe swelling and redness for the last several weeks before he came to the clinic, which gave him some discomfort but he has neuropathy. He also had multiple replacements of his right knee in the past and his knee began to swell and became hotter and redder. He got sick and went to the hospital. It was felt that he had cellulitis and infection of the left great toe and this was seeding bacteria to infect his right knee prosthesis. At the time of his hospitalization he had amputation of the distal portion of his left great toe and at the same time he had his right knee opened up and some of the hardware removed and the wound irrigated and cleaned. He has been on IV antibiotics and then he was placed on oral antibiotics. He is still on oral antibiotics and will likely be for another 5-6 months because of his right knee prosthesis. He was on a wound VAC, but then we stopped that and now we are using Puracol and Aquacel. His A1c recently was 9.26. C-reactive protein was down to 13, but he had not had a sedimentation rate. His MRI demonstrated no osteomyelitis. He denies having problems with fever, chills, drainage, pain or redness.     PHYSICAL EXAMINATION:  VITAL SIGNS: Temperature is 98, pulse 99, respirations 18, blood pressure 133/82.   SKIN: Examination of his left great toe reveals that it looks a lot smaller and better. There is no redness or tenderness. There is a little bit of maceration on the periwound, but not bad. His wound is 0.8 x 0.3 x 0.1 cm, but it does need debridement. There is some purulent material sitting on top of the wound.     DESCRIPTION OF PROCEDURE: The patient was given informed consent for the need for an excisional subcutaneous debridement. Topical  anesthesia was placed and a 4 mm curette was used to remove necrotic subcutaneous fat, slough, exudate and debris from the subcutaneous level. The postdebridement measurements were 0.9 x 0.3 x 0.2 cm. The approximate surface area of the debridement was approximately 0.27 sq cm. The patient tolerated this well. The wound bed was markedly improved and there was minimum bleeding.     ASSESSMENT AND PLAN: This has made significant improvement since last week. We will continue exactly what we are doing now with Puracol and Aquacel, and we will see him back next week. He is managing his sugars better and improving.      Osmin Navarro MD  TOR:judy  D:   04/14/2017 14:17:58  T:   04/14/2017 18:56:40  Job ID:   [id number]  Document ID:   [id number]  Rev:  0  cc:

## 2017-04-21 ENCOUNTER — OFFICE VISIT (OUTPATIENT)
Dept: WOUND CARE | Facility: HOSPITAL | Age: 67
End: 2017-04-21
Attending: SURGERY

## 2017-04-21 PROCEDURE — G0463 HOSPITAL OUTPT CLINIC VISIT: HCPCS

## 2017-04-22 NOTE — WOUND CARE CLINIC NOTE
DATE OF SERVICE: 04/21/2017    CHIEF COMPLAINT: Diabetic foot wound of left great toe requiring amputation (E11.621, L97.524).    HISTORY OF PRESENT ILLNESS: The patient is a 66-year-old male who is a fairly severe diabetic. He had some left toe swelling and redness for the last several weeks before he came to the clinic. He then noticed he had problems with his right knee prosthesis and he was admitted to the hospital. It was felt that he had infection of his left great toe which was seeding bacteria into his right knee prostheses and he had amputation of his left great toe and debridement and irrigation of his right knee prosthesis. He has been on IV antibiotics and then he was placed on oral antibiotics. He is still on oral antibiotics and will probably be for another 5-6 months because of his right knee prosthesis. He was on a wound VAC, but now he is on Puracol and Aquacel. His A1c recently was 9.26. C-reactive protein was down to 13. I do not have a sedimentation rate. His MRI was demonstrating no osteomyelitis. He has had no fever, pain, chills or redness.     PHYSICAL EXAMINATION:  VITAL SIGNS: Temperature 97.8, pulse 96, respirations 18, blood pressure 121/77.   EXTREMITIES: Examination of his toe reveals that the wound measures 0.4 x 0.1 x 0.1 cm. It is significantly better than it was last week. After we removed some scab it looks clean and he has no issue. There is no redness. There is no bone.     ASSESSMENT AND PLAN: We are going to continue just what we have been doing. We are going to continue the Puracol and Aquacel. We will see him next week. He should continue to improve. He asked about a shower. He can take a shower and get it wet as long as he dries it off well before the home health nurses come and change his dressing.       Osmin Navarro MD  TOR:dg  D:   04/21/2017 19:59:47  T:   04/21/2017 23:07:36  Job ID:   [id number]  Document ID:   [id number]  Rev:  0

## 2017-05-03 ENCOUNTER — TELEPHONE (OUTPATIENT)
Dept: INFECTIOUS DISEASES | Facility: CLINIC | Age: 67
End: 2017-05-03

## 2017-05-03 DIAGNOSIS — T84.59XD INFECTED PROSTHETIC KNEE JOINT, SUBSEQUENT ENCOUNTER: Primary | ICD-10-CM

## 2017-05-03 DIAGNOSIS — Z96.659 INFECTED PROSTHETIC KNEE JOINT, SUBSEQUENT ENCOUNTER: Primary | ICD-10-CM

## 2017-05-04 ENCOUNTER — TELEPHONE (OUTPATIENT)
Dept: ENDOCRINOLOGY | Age: 67
End: 2017-05-04

## 2017-05-05 ENCOUNTER — APPOINTMENT (OUTPATIENT)
Dept: WOUND CARE | Facility: HOSPITAL | Age: 67
End: 2017-05-05
Attending: SURGERY

## 2017-05-08 ENCOUNTER — HOSPITAL ENCOUNTER (OUTPATIENT)
Dept: DIABETES SERVICES | Facility: HOSPITAL | Age: 67
Discharge: HOME | End: 2017-05-08
Attending: INTERNAL MEDICINE | Admitting: INTERNAL MEDICINE

## 2017-05-08 ENCOUNTER — LAB (OUTPATIENT)
Dept: LAB | Facility: HOSPITAL | Age: 67
End: 2017-05-08

## 2017-05-08 DIAGNOSIS — T84.59XD INFECTED PROSTHETIC KNEE JOINT, SUBSEQUENT ENCOUNTER: ICD-10-CM

## 2017-05-08 DIAGNOSIS — Z96.659 INFECTED PROSTHETIC KNEE JOINT, SUBSEQUENT ENCOUNTER: ICD-10-CM

## 2017-05-08 LAB
ALBUMIN SERPL-MCNC: 3.8 G/DL (ref 3.5–5.2)
ALBUMIN/GLOB SERPL: 0.9 G/DL
ALP SERPL-CCNC: 96 U/L (ref 39–117)
ALT SERPL W P-5'-P-CCNC: 7 U/L (ref 1–41)
ANION GAP SERPL CALCULATED.3IONS-SCNC: 14.6 MMOL/L
AST SERPL-CCNC: 10 U/L (ref 1–40)
BASOPHILS # BLD AUTO: 0.04 10*3/MM3 (ref 0–0.2)
BASOPHILS NFR BLD AUTO: 0.4 % (ref 0–1.5)
BILIRUB SERPL-MCNC: 0.2 MG/DL (ref 0.1–1.2)
BUN BLD-MCNC: 21 MG/DL (ref 8–23)
BUN/CREAT SERPL: 20 (ref 7–25)
CALCIUM SPEC-SCNC: 9.7 MG/DL (ref 8.6–10.5)
CHLORIDE SERPL-SCNC: 98 MMOL/L (ref 98–107)
CO2 SERPL-SCNC: 23.4 MMOL/L (ref 22–29)
CREAT BLD-MCNC: 1.05 MG/DL (ref 0.76–1.27)
CRP SERPL-MCNC: 1.92 MG/DL (ref 0–0.5)
DEPRECATED RDW RBC AUTO: 48.2 FL (ref 37–54)
EOSINOPHIL # BLD AUTO: 0.33 10*3/MM3 (ref 0–0.7)
EOSINOPHIL NFR BLD AUTO: 3.5 % (ref 0.3–6.2)
ERYTHROCYTE [DISTWIDTH] IN BLOOD BY AUTOMATED COUNT: 14.2 % (ref 11.5–14.5)
ERYTHROCYTE [SEDIMENTATION RATE] IN BLOOD: 65 MM/HR (ref 0–20)
GFR SERPL CREATININE-BSD FRML MDRD: 71 ML/MIN/1.73
GLOBULIN UR ELPH-MCNC: 4.1 GM/DL
GLUCOSE BLD-MCNC: 212 MG/DL (ref 65–99)
HCT VFR BLD AUTO: 37.4 % (ref 40.4–52.2)
HGB BLD-MCNC: 11.9 G/DL (ref 13.7–17.6)
IMM GRANULOCYTES # BLD: 0 10*3/MM3 (ref 0–0.03)
IMM GRANULOCYTES NFR BLD: 0 % (ref 0–0.5)
LYMPHOCYTES # BLD AUTO: 2.15 10*3/MM3 (ref 0.9–4.8)
LYMPHOCYTES NFR BLD AUTO: 23.1 % (ref 19.6–45.3)
MCH RBC QN AUTO: 29.5 PG (ref 27–32.7)
MCHC RBC AUTO-ENTMCNC: 31.8 G/DL (ref 32.6–36.4)
MCV RBC AUTO: 92.8 FL (ref 79.8–96.2)
MONOCYTES # BLD AUTO: 0.67 10*3/MM3 (ref 0.2–1.2)
MONOCYTES NFR BLD AUTO: 7.2 % (ref 5–12)
NEUTROPHILS # BLD AUTO: 6.12 10*3/MM3 (ref 1.9–8.1)
NEUTROPHILS NFR BLD AUTO: 65.8 % (ref 42.7–76)
PLATELET # BLD AUTO: 264 10*3/MM3 (ref 140–500)
PMV BLD AUTO: 11.3 FL (ref 6–12)
POTASSIUM BLD-SCNC: 4.3 MMOL/L (ref 3.5–5.2)
PROT SERPL-MCNC: 7.9 G/DL (ref 6–8.5)
RBC # BLD AUTO: 4.03 10*6/MM3 (ref 4.6–6)
SODIUM BLD-SCNC: 136 MMOL/L (ref 136–145)
WBC NRBC COR # BLD: 9.31 10*3/MM3 (ref 4.5–10.7)

## 2017-05-08 PROCEDURE — 36415 COLL VENOUS BLD VENIPUNCTURE: CPT

## 2017-05-08 PROCEDURE — G0109 DIAB MANAGE TRN IND/GROUP: HCPCS

## 2017-05-08 PROCEDURE — 86140 C-REACTIVE PROTEIN: CPT

## 2017-05-08 PROCEDURE — 85025 COMPLETE CBC W/AUTO DIFF WBC: CPT

## 2017-05-08 PROCEDURE — 85652 RBC SED RATE AUTOMATED: CPT

## 2017-05-08 PROCEDURE — 80053 COMPREHEN METABOLIC PANEL: CPT

## 2017-05-10 ENCOUNTER — OFFICE VISIT (OUTPATIENT)
Dept: INFECTIOUS DISEASES | Facility: CLINIC | Age: 67
End: 2017-05-10

## 2017-05-10 VITALS
SYSTOLIC BLOOD PRESSURE: 119 MMHG | HEART RATE: 85 BPM | TEMPERATURE: 97.9 F | HEIGHT: 74 IN | WEIGHT: 249 LBS | BODY MASS INDEX: 31.95 KG/M2 | DIASTOLIC BLOOD PRESSURE: 72 MMHG

## 2017-05-10 DIAGNOSIS — E11.621 DIABETIC ULCER OF TOE OF LEFT FOOT ASSOCIATED WITH TYPE 2 DIABETES MELLITUS, WITH NECROSIS OF BONE (HCC): ICD-10-CM

## 2017-05-10 DIAGNOSIS — Z79.2 LONG TERM CURRENT USE OF ANTIBIOTICS: ICD-10-CM

## 2017-05-10 DIAGNOSIS — Z96.659 INFECTED PROSTHETIC KNEE JOINT, SUBSEQUENT ENCOUNTER: Primary | ICD-10-CM

## 2017-05-10 DIAGNOSIS — L97.524 DIABETIC ULCER OF TOE OF LEFT FOOT ASSOCIATED WITH TYPE 2 DIABETES MELLITUS, WITH NECROSIS OF BONE (HCC): ICD-10-CM

## 2017-05-10 DIAGNOSIS — T84.59XD INFECTED PROSTHETIC KNEE JOINT, SUBSEQUENT ENCOUNTER: Primary | ICD-10-CM

## 2017-05-10 DIAGNOSIS — A41.01 MSSA (METHICILLIN SUSCEPTIBLE STAPHYLOCOCCUS AUREUS) SEPTICEMIA (HCC): ICD-10-CM

## 2017-05-10 PROCEDURE — 99214 OFFICE O/P EST MOD 30 MIN: CPT | Performed by: INTERNAL MEDICINE

## 2017-05-10 RX ORDER — PROMETHAZINE HYDROCHLORIDE 25 MG/1
25 TABLET ORAL EVERY 8 HOURS PRN
Qty: 30 TABLET | Refills: 2 | Status: SHIPPED | OUTPATIENT
Start: 2017-05-10 | End: 2017-10-03

## 2017-05-12 ENCOUNTER — APPOINTMENT (OUTPATIENT)
Dept: WOUND CARE | Facility: HOSPITAL | Age: 67
End: 2017-05-12
Attending: SURGERY

## 2017-05-12 PROCEDURE — G0463 HOSPITAL OUTPT CLINIC VISIT: HCPCS

## 2017-05-19 ENCOUNTER — TRANSCRIBE ORDERS (OUTPATIENT)
Dept: ADMINISTRATIVE | Facility: HOSPITAL | Age: 67
End: 2017-05-19

## 2017-05-19 DIAGNOSIS — T84.53XA INFECTION OF TOTAL RIGHT KNEE REPLACEMENT, INITIAL ENCOUNTER (HCC): Primary | ICD-10-CM

## 2017-05-23 ENCOUNTER — LAB (OUTPATIENT)
Dept: LAB | Facility: HOSPITAL | Age: 67
End: 2017-05-23

## 2017-05-23 ENCOUNTER — HOSPITAL ENCOUNTER (OUTPATIENT)
Dept: NUCLEAR MEDICINE | Facility: HOSPITAL | Age: 67
Discharge: HOME | End: 2017-05-23
Attending: ORTHOPAEDIC SURGERY

## 2017-05-23 DIAGNOSIS — L97.529 DIABETIC ULCER OF LEFT FOOT ASSOCIATED WITH TYPE 2 DIABETES MELLITUS (HCC): ICD-10-CM

## 2017-05-23 DIAGNOSIS — T84.53XA INFECTION OF TOTAL RIGHT KNEE REPLACEMENT, INITIAL ENCOUNTER (HCC): ICD-10-CM

## 2017-05-23 DIAGNOSIS — E11.621 DIABETIC ULCER OF LEFT FOOT ASSOCIATED WITH TYPE 2 DIABETES MELLITUS (HCC): ICD-10-CM

## 2017-05-23 DIAGNOSIS — E55.9 VITAMIN D DEFICIENCY: ICD-10-CM

## 2017-05-23 DIAGNOSIS — IMO0002 UNCONTROLLED TYPE 2 DIABETES MELLITUS WITH COMPLICATION, WITH LONG-TERM CURRENT USE OF INSULIN: ICD-10-CM

## 2017-05-23 LAB
25(OH)D3 SERPL-MCNC: 18.7 NG/ML (ref 30–100)
ALBUMIN SERPL-MCNC: 3.2 G/DL (ref 3.5–5.2)
ALBUMIN UR-MCNC: 118.4 MG/L
ALBUMIN/GLOB SERPL: 0.8 G/DL
ALP SERPL-CCNC: 84 U/L (ref 39–117)
ALT SERPL W P-5'-P-CCNC: 10 U/L (ref 1–41)
ANION GAP SERPL CALCULATED.3IONS-SCNC: 12 MMOL/L
AST SERPL-CCNC: 20 U/L (ref 1–40)
BILIRUB SERPL-MCNC: 0.2 MG/DL (ref 0.1–1.2)
BUN BLD-MCNC: 29 MG/DL (ref 8–23)
BUN/CREAT SERPL: 28.7 (ref 7–25)
CALCIUM SPEC-SCNC: 9.1 MG/DL (ref 8.6–10.5)
CHLORIDE SERPL-SCNC: 98 MMOL/L (ref 98–107)
CHOLEST SERPL-MCNC: 179 MG/DL (ref 0–200)
CO2 SERPL-SCNC: 25 MMOL/L (ref 22–29)
CREAT BLD-MCNC: 1.01 MG/DL (ref 0.76–1.27)
CREAT UR-MCNC: 87.9 MG/DL
FOLATE SERPL-MCNC: 15.44 NG/ML (ref 4.78–24.2)
GFR SERPL CREATININE-BSD FRML MDRD: 74 ML/MIN/1.73
GLOBULIN UR ELPH-MCNC: 4 GM/DL
GLUCOSE BLD-MCNC: 255 MG/DL (ref 65–99)
HBA1C MFR BLD: 8.11 % (ref 4.8–5.6)
HDLC SERPL-MCNC: 50 MG/DL (ref 40–60)
LDLC SERPL CALC-MCNC: 98 MG/DL (ref 0–100)
LDLC/HDLC SERPL: 1.96 {RATIO}
MICROALBUMIN/CREAT UR: 1347 MG/G
POTASSIUM BLD-SCNC: 4.1 MMOL/L (ref 3.5–5.2)
PROT SERPL-MCNC: 7.2 G/DL (ref 6–8.5)
SODIUM BLD-SCNC: 135 MMOL/L (ref 136–145)
TRIGL SERPL-MCNC: 155 MG/DL (ref 0–150)
TSH SERPL DL<=0.05 MIU/L-ACNC: 2.55 MIU/ML (ref 0.27–4.2)
VIT B12 BLD-MCNC: 409 PG/ML (ref 211–946)
VLDLC SERPL-MCNC: 31 MG/DL (ref 5–40)

## 2017-05-23 PROCEDURE — 82607 VITAMIN B-12: CPT

## 2017-05-23 PROCEDURE — 80053 COMPREHEN METABOLIC PANEL: CPT

## 2017-05-23 PROCEDURE — A9547 IN111 OXYQUINOLINE: HCPCS | Performed by: ORTHOPAEDIC SURGERY

## 2017-05-23 PROCEDURE — 36415 COLL VENOUS BLD VENIPUNCTURE: CPT

## 2017-05-23 PROCEDURE — 80061 LIPID PANEL: CPT

## 2017-05-23 PROCEDURE — 82746 ASSAY OF FOLIC ACID SERUM: CPT

## 2017-05-23 PROCEDURE — 82306 VITAMIN D 25 HYDROXY: CPT

## 2017-05-23 PROCEDURE — 82043 UR ALBUMIN QUANTITATIVE: CPT

## 2017-05-23 PROCEDURE — 83036 HEMOGLOBIN GLYCOSYLATED A1C: CPT

## 2017-05-23 PROCEDURE — 0 INDIUM-111 OXYQUINOLINE 1 MCI/ML SOLUTION: Performed by: ORTHOPAEDIC SURGERY

## 2017-05-23 PROCEDURE — 82570 ASSAY OF URINE CREATININE: CPT

## 2017-05-23 PROCEDURE — 84443 ASSAY THYROID STIM HORMONE: CPT

## 2017-05-23 RX ORDER — INDIUM IN-111 OXYQUINOLINE 1 UG/ML
SOLUTION INTRAVENOUS
Status: COMPLETED | OUTPATIENT
Start: 2017-05-23 | End: 2017-05-23

## 2017-05-23 RX ADMIN — INDIUM IN-111 OXYQUINOLINE: 1 SOLUTION INTRAVENOUS at 14:51

## 2017-05-24 ENCOUNTER — HOSPITAL ENCOUNTER (OUTPATIENT)
Dept: NUCLEAR MEDICINE | Facility: HOSPITAL | Age: 67
Discharge: HOME | End: 2017-05-24
Attending: ORTHOPAEDIC SURGERY

## 2017-05-24 PROCEDURE — 78806 HC NM ABSCESS LOCALIZATION WHOLE BODY: CPT

## 2017-06-05 ENCOUNTER — TRANSCRIBE ORDERS (OUTPATIENT)
Dept: ENDOCRINOLOGY | Age: 67
End: 2017-06-05

## 2017-06-05 ENCOUNTER — LAB (OUTPATIENT)
Dept: LAB | Facility: HOSPITAL | Age: 67
End: 2017-06-05
Attending: INTERNAL MEDICINE

## 2017-06-05 DIAGNOSIS — E11.69 DIABETIC FOOT ULCER WITH OSTEOMYELITIS (HCC): ICD-10-CM

## 2017-06-05 DIAGNOSIS — L97.509 DIABETIC FOOT ULCER WITH OSTEOMYELITIS (HCC): ICD-10-CM

## 2017-06-05 DIAGNOSIS — E55.9 UNSPECIFIED VITAMIN D DEFICIENCY: ICD-10-CM

## 2017-06-05 DIAGNOSIS — Z79.4 ENCOUNTER FOR LONG-TERM (CURRENT) USE OF INSULIN (HCC): ICD-10-CM

## 2017-06-05 DIAGNOSIS — L97.519 BILATERAL GREAT TOES ULCERS (HCC): ICD-10-CM

## 2017-06-05 DIAGNOSIS — L97.529 BILATERAL GREAT TOES ULCERS (HCC): ICD-10-CM

## 2017-06-05 DIAGNOSIS — M86.9 DIABETIC FOOT ULCER WITH OSTEOMYELITIS (HCC): ICD-10-CM

## 2017-06-05 DIAGNOSIS — E11.621 DIABETIC FOOT ULCER WITH OSTEOMYELITIS (HCC): ICD-10-CM

## 2017-06-05 DIAGNOSIS — IMO0002 SEVERE UNCONTROLLED DIABETES MELLITUS: ICD-10-CM

## 2017-06-05 DIAGNOSIS — E11.8 DIABETIC COMPLICATION (HCC): Primary | ICD-10-CM

## 2017-06-05 DIAGNOSIS — E11.8 DIABETIC COMPLICATION (HCC): ICD-10-CM

## 2017-06-05 LAB
25(OH)D3 SERPL-MCNC: 25.7 NG/ML (ref 30–100)
ALBUMIN SERPL-MCNC: 3.7 G/DL (ref 3.5–5.2)
ALBUMIN UR-MCNC: 294.5 MG/L
ALBUMIN/GLOB SERPL: 0.8 G/DL
ALP SERPL-CCNC: 89 U/L (ref 39–117)
ALT SERPL W P-5'-P-CCNC: 8 U/L (ref 1–41)
ANION GAP SERPL CALCULATED.3IONS-SCNC: 11.4 MMOL/L
AST SERPL-CCNC: 15 U/L (ref 1–40)
BILIRUB SERPL-MCNC: 0.3 MG/DL (ref 0.1–1.2)
BUN BLD-MCNC: 13 MG/DL (ref 8–23)
BUN/CREAT SERPL: 14.1 (ref 7–25)
CALCIUM SPEC-SCNC: 9.9 MG/DL (ref 8.6–10.5)
CHLORIDE SERPL-SCNC: 97 MMOL/L (ref 98–107)
CHOLEST SERPL-MCNC: 159 MG/DL (ref 0–200)
CO2 SERPL-SCNC: 27.6 MMOL/L (ref 22–29)
CREAT BLD-MCNC: 0.92 MG/DL (ref 0.76–1.27)
CREAT UR-MCNC: 106.5 MG/DL
FOLATE SERPL-MCNC: 17.93 NG/ML (ref 4.78–24.2)
GFR SERPL CREATININE-BSD FRML MDRD: 82 ML/MIN/1.73
GLOBULIN UR ELPH-MCNC: 4.6 GM/DL
GLUCOSE BLD-MCNC: 140 MG/DL (ref 65–99)
HBA1C MFR BLD: 8.4 % (ref 4.8–5.6)
HDLC SERPL-MCNC: 52 MG/DL (ref 40–60)
LDLC SERPL CALC-MCNC: 93 MG/DL (ref 0–100)
LDLC/HDLC SERPL: 1.8 {RATIO}
MICROALBUMIN/CREAT UR: 2765.3 MG/G
POTASSIUM BLD-SCNC: 4.6 MMOL/L (ref 3.5–5.2)
PROT SERPL-MCNC: 8.3 G/DL (ref 6–8.5)
SODIUM BLD-SCNC: 136 MMOL/L (ref 136–145)
TRIGL SERPL-MCNC: 68 MG/DL (ref 0–150)
TSH SERPL DL<=0.05 MIU/L-ACNC: 2.6 MIU/ML (ref 0.27–4.2)
VIT B12 BLD-MCNC: 426 PG/ML (ref 211–946)
VLDLC SERPL-MCNC: 13.6 MG/DL (ref 5–40)

## 2017-06-05 PROCEDURE — 83036 HEMOGLOBIN GLYCOSYLATED A1C: CPT

## 2017-06-05 PROCEDURE — 82607 VITAMIN B-12: CPT

## 2017-06-05 PROCEDURE — 82043 UR ALBUMIN QUANTITATIVE: CPT

## 2017-06-05 PROCEDURE — 82570 ASSAY OF URINE CREATININE: CPT

## 2017-06-05 PROCEDURE — 80061 LIPID PANEL: CPT

## 2017-06-05 PROCEDURE — 80053 COMPREHEN METABOLIC PANEL: CPT

## 2017-06-05 PROCEDURE — 36415 COLL VENOUS BLD VENIPUNCTURE: CPT

## 2017-06-05 PROCEDURE — 82306 VITAMIN D 25 HYDROXY: CPT

## 2017-06-05 PROCEDURE — 84443 ASSAY THYROID STIM HORMONE: CPT

## 2017-06-05 PROCEDURE — 82746 ASSAY OF FOLIC ACID SERUM: CPT

## 2017-06-07 ENCOUNTER — HOSPITAL ENCOUNTER (OUTPATIENT)
Dept: GENERAL RADIOLOGY | Facility: HOSPITAL | Age: 67
Discharge: HOME | End: 2017-06-07
Admitting: ORTHOPAEDIC SURGERY

## 2017-06-07 ENCOUNTER — APPOINTMENT (OUTPATIENT)
Dept: PREADMISSION TESTING | Facility: HOSPITAL | Age: 67
End: 2017-06-07

## 2017-06-07 VITALS
RESPIRATION RATE: 16 BRPM | DIASTOLIC BLOOD PRESSURE: 58 MMHG | SYSTOLIC BLOOD PRESSURE: 91 MMHG | TEMPERATURE: 99.3 F | HEART RATE: 77 BPM | HEIGHT: 74 IN | OXYGEN SATURATION: 98 % | WEIGHT: 245 LBS | BODY MASS INDEX: 31.44 KG/M2

## 2017-06-07 LAB
BASOPHILS # BLD AUTO: 0.03 10*3/MM3 (ref 0–0.2)
BASOPHILS NFR BLD AUTO: 0.3 % (ref 0–1.5)
CRP SERPL-MCNC: 5.05 MG/DL (ref 0–0.5)
DEPRECATED RDW RBC AUTO: 45.1 FL (ref 37–54)
EOSINOPHIL # BLD AUTO: 0.22 10*3/MM3 (ref 0–0.7)
EOSINOPHIL NFR BLD AUTO: 2.4 % (ref 0.3–6.2)
ERYTHROCYTE [DISTWIDTH] IN BLOOD BY AUTOMATED COUNT: 13.8 % (ref 11.5–14.5)
ERYTHROCYTE [SEDIMENTATION RATE] IN BLOOD: 44 MM/HR (ref 0–20)
HCT VFR BLD AUTO: 33.7 % (ref 40.4–52.2)
HGB BLD-MCNC: 10.9 G/DL (ref 13.7–17.6)
IMM GRANULOCYTES # BLD: 0 10*3/MM3 (ref 0–0.03)
IMM GRANULOCYTES NFR BLD: 0 % (ref 0–0.5)
LYMPHOCYTES # BLD AUTO: 2.41 10*3/MM3 (ref 0.9–4.8)
LYMPHOCYTES NFR BLD AUTO: 26.8 % (ref 19.6–45.3)
MCH RBC QN AUTO: 29.1 PG (ref 27–32.7)
MCHC RBC AUTO-ENTMCNC: 32.3 G/DL (ref 32.6–36.4)
MCV RBC AUTO: 89.9 FL (ref 79.8–96.2)
MONOCYTES # BLD AUTO: 0.55 10*3/MM3 (ref 0.2–1.2)
MONOCYTES NFR BLD AUTO: 6.1 % (ref 5–12)
NEUTROPHILS # BLD AUTO: 5.77 10*3/MM3 (ref 1.9–8.1)
NEUTROPHILS NFR BLD AUTO: 64.4 % (ref 42.7–76)
PLATELET # BLD AUTO: 290 10*3/MM3 (ref 140–500)
PMV BLD AUTO: 10.6 FL (ref 6–12)
RBC # BLD AUTO: 3.75 10*6/MM3 (ref 4.6–6)
WBC NRBC COR # BLD: 8.98 10*3/MM3 (ref 4.5–10.7)

## 2017-06-07 PROCEDURE — 85652 RBC SED RATE AUTOMATED: CPT | Performed by: ORTHOPAEDIC SURGERY

## 2017-06-07 PROCEDURE — 36415 COLL VENOUS BLD VENIPUNCTURE: CPT

## 2017-06-07 PROCEDURE — 85025 COMPLETE CBC W/AUTO DIFF WBC: CPT | Performed by: ORTHOPAEDIC SURGERY

## 2017-06-07 PROCEDURE — 86140 C-REACTIVE PROTEIN: CPT | Performed by: ORTHOPAEDIC SURGERY

## 2017-06-07 PROCEDURE — 71020 HC CHEST PA AND LATERAL: CPT

## 2017-06-07 NOTE — DISCHARGE INSTRUCTIONS
Take the following medications the morning of surgery with a small sip of water:    AMLODIPINE  OMEPRAZOLE  HYDROCODONE    General Instructions:  • Do not eat solid food after midnight the night before surgery.  • You may drink clear liquids day of surgery but must stop at least one hour before your hospital arrival time @1000 AM STOP DRINKING!!  • It is beneficial for you to have a clear drink that contains carbohydrates the day of surgery.  We suggest a 20 ounce bottle of Gatorade or Powerade for non-diabetic patients or a 20 ounce bottle of G2 or Powerade Zero for diabetic patients.    Clear liquids are liquids you can see through.  Nothing red in color.     Plain water                               Sports drinks  Sodas                                   Gelatin (Jell-O)  Fruit juices without pulp such as white grape juice and apple juice  Popsicles that contain no fruit or yogurt  Tea or coffee (no cream or milk added)  Gatorade / Powerade  G2 / Powerade Zero  • Patients who avoid smoking, chewing tobacco and alcohol for 4 weeks prior to surgery have a reduced risk of post-operative complications.  Quit smoking as many days before surgery as you can.  • Do not smoke, use chewing tobacco or drink alcohol the day of surgery.  • Bring any papers given to you in the doctor’s office.  • Wear clean comfortable clothes and socks.  • Do not wear contact lenses or make-up.  Bring a case for your glasses.   • Bring crutches or walker if applicable.  • Leave all other valuables and jewelry at home.  • The Pre-Admission Testing nurse will instruct you to bring medications if unable to obtain an accurate list in Pre-Admission Testing.        Preventing a Surgical Site Infection:  • For 2 to 3 days before surgery, avoid shaving with a razor because the razor can irritate skin and make it easier to develop an infection.  • The night prior to surgery sleep in a clean bed with clean clothing.  Do not allow pets to sleep with  you.  • Shower on the morning of surgery using a fresh bar of anti-bacterial soap (such as Dial) and clean washcloth.  Dry with a clean towel and dress in clean clothing.  • Ask your surgeon if you will be receiving antibiotics prior to surgery.  • Make sure you, your family, and all healthcare providers clean their hands with soap and water or an alcohol based hand  before caring for you or your wound.    Day of surgery: 06- ARRIVE @ 1100 AM REPORT TO THE MAIN OR  Upon arrival, a Pre-op nurse and Anesthesiologist will review your health history, obtain vital signs, and answer questions you may have.  The only belongings needed at this time will be your home medications.  If you are staying overnight your family can leave the rest of your belongings in the car and bring them to your room later.  A Pre-op nurse will start an IV and you may receive medication in preparation for surgery, including something to help you relax.  Your family will be able to see you in the Pre-op area.  While you are in surgery your family should notify the waiting room  if they leave the waiting room area and provide a contact phone number.    Please be aware that surgery does come with discomfort.  We want to make every effort to control your discomfort so please discuss any uncontrolled symptoms with your nurse.   Your doctor will most likely have prescribed pain medications.      If you are going home after surgery you will receive individualized written care instructions before being discharged.  A responsible adult must drive you to and from the hospital on the day of your surgery and stay with you for 24 hours.    If you are staying overnight following surgery, you will be transported to your hospital room following the recovery period.  [facility] has all private rooms.    If you have any questions please call Pre-Admission Testing at [phone].  Deductibles and co-payments are collected on  the day of service. Please be prepared to pay the required co-pay, deductible or deposit on the day of service as defined by your plan.

## 2017-06-09 ENCOUNTER — OFFICE VISIT (OUTPATIENT)
Dept: ENDOCRINOLOGY | Age: 67
End: 2017-06-09

## 2017-06-09 VITALS
SYSTOLIC BLOOD PRESSURE: 114 MMHG | WEIGHT: 249 LBS | OXYGEN SATURATION: 98 % | BODY MASS INDEX: 31.95 KG/M2 | HEIGHT: 74 IN | HEART RATE: 83 BPM | DIASTOLIC BLOOD PRESSURE: 60 MMHG

## 2017-06-09 DIAGNOSIS — E11.621 DIABETIC ULCER OF LEFT FOOT ASSOCIATED WITH TYPE 2 DIABETES MELLITUS (HCC): ICD-10-CM

## 2017-06-09 DIAGNOSIS — E11.43 DIABETIC AUTONOMIC NEUROPATHY ASSOCIATED WITH TYPE 2 DIABETES MELLITUS (HCC): ICD-10-CM

## 2017-06-09 DIAGNOSIS — I70.209 DIABETES TYPE 2 WITH ATHEROSCLEROSIS OF ARTERIES OF EXTREMITIES (HCC): Primary | ICD-10-CM

## 2017-06-09 DIAGNOSIS — E55.9 VITAMIN D DEFICIENCY: ICD-10-CM

## 2017-06-09 DIAGNOSIS — E11.51 DIABETES TYPE 2 WITH ATHEROSCLEROSIS OF ARTERIES OF EXTREMITIES (HCC): Primary | ICD-10-CM

## 2017-06-09 DIAGNOSIS — E78.49 OTHER HYPERLIPIDEMIA: ICD-10-CM

## 2017-06-09 DIAGNOSIS — L97.529 DIABETIC ULCER OF LEFT FOOT ASSOCIATED WITH TYPE 2 DIABETES MELLITUS (HCC): ICD-10-CM

## 2017-06-09 PROCEDURE — 99214 OFFICE O/P EST MOD 30 MIN: CPT | Performed by: INTERNAL MEDICINE

## 2017-06-09 NOTE — PATIENT INSTRUCTIONS
Levemir 45 units twice daily.   Novolog 14 units with each meal.   Novolog ssi tid ac and hs.     BG less than 150 - zero  151 - 200 - 2 unit  201 - 250 - 4 units  251 - 300 - 6 units  301 - 350 - 8 units  Above 350 mg/dl - 10 units.

## 2017-06-09 NOTE — PROGRESS NOTES
66 y.o.    Patient Care Team:  Guille Nieves MD as PCP - General  Guille Nieves MD as PCP - Family Medicine    Chief Complaint:    2 Month Follow up Appointment/ Type 2 Diabetes Mellitus   Subjective     HPI  Hugh R McBurney,66 y.o. WM is here as a follow up pt for the management of type 2 diabetes mellitus.       Type 2 dm - Diagnosed about 10 -15 years ago. Was started on oral agents initially. Insulin was started about 5 years ago. Currently on levemir 45 units bid, Novolog 8 units with each meal plus ssi tid ac and hs - 3 units for 50 above 150 mg/dl.  Avg novolog 12 - 16 units with each meal.   Checks BG 3 - 5 times a day.   FBG - 88 - 150 mg/dl and Pre - meals - 220 - 250 Mg/dl  No increased urination or increased thirst. No BG less than 60 mg/dl in the last one month, does have hypoglycemic awareness. Highest BG in the last 1 month was - 275mg/dl.  Pt didn't get his log book for me. No c/o nausea and vomiting.   Up to date with eye exam in the last 1 month and no dm retinopathy.   C/o tingling and numbness in his b/l feet, Great toe amputation of his left foot it healed well, has been released by wound care. Right knee replaced joint is infected not on abx at this time but is scheduled for surgery on Tuesday.   No CAD, CKD,CVA per pt.   Pt is physically not active due to knee pain. Weight has been stable.   Following diabetic diet for most.   On Ace inb.  Prior history of pancreatitis.     Diabetic neuropathy resulting in left great toe osteomyelitis status post amputation complicated with right knee joint seeding underwent incision, debridement and polyliner exchange of the right TKA, being followed by orthopedics and ID.  Patient will be admitted on this Tuesday for removal of the right knee prosthetic joint as the infection is not clearing.       Interval History      The following portions of the patient's history were reviewed and updated as appropriate: allergies, current medications, past  family history, past medical history, past social history, past surgical history and problem list.    Past Medical History:   Diagnosis Date   • Chronic pain     BILATERAL KNEES AND BACK   • Depression    • Diabetes mellitus    • Hyperlipidemia    • Hypertension    • Kidney stone    • MSSA (methicillin susceptible Staphylococcus aureus) infection     RIGHT KNEE     Family History   Problem Relation Age of Onset   • Heart disease Mother    • Heart disease Father    • Malig Hyperthermia Neg Hx      Social History     Social History   • Marital status:      Spouse name: N/A   • Number of children: N/A   • Years of education: N/A     Occupational History   • Not on file.     Social History Main Topics   • Smoking status: Former Smoker     Packs/day: 3.00     Years: 20.00   • Smokeless tobacco: Not on file      Comment: quit 35 years ago    • Alcohol use No   • Drug use: Yes     Special: Other      Comment: vicodin-prescribed    • Sexual activity: Defer     Other Topics Concern   • Not on file     Social History Narrative     No Known Allergies    Current Outpatient Prescriptions:   •  amLODIPine (NORVASC) 5 MG tablet, Take 1 tablet by mouth Daily., Disp: , Rfl:   •  bisacodyl (DULCOLAX) 10 MG suppository, Insert 1 suppository into the rectum Daily As Needed for constipation., Disp: , Rfl: 0  •  ferrous sulfate 325 (65 FE) MG tablet, Take 325 mg by mouth Daily With Breakfast., Disp: , Rfl:   •  glucose blood (ACCU-CHEK COMPACT PLUS) test strip, Use to test blood sugar 5 times daily, Disp: 500 each, Rfl: 3  •  glucose blood (ACCU-CHEK COMPACT STRIPS) test strip, Use to test blood sugar 4 times daily  ICD 10 code E11.9, Disp: 400 each, Rfl: 3  •  HYDROcodone-acetaminophen (NORCO)  MG per tablet, Take 1 or 2 tablets every 4 hours as needed for moderate to severe pain, Disp: 12 tablet, Rfl: 0  •  insulin aspart (NOVOLOG FLEXPEN) 100 UNIT/ML solution pen-injector sc pen, 6 units with each meal, with a maxmium of  48 units per day. (Patient taking differently: 8 Units. 6 units with each meal, with a maxmium of 48 units per day.), Disp: 20 pen, Rfl: 1  •  Insulin Glargine (LANTUS SOLOSTAR) 100 UNIT/ML injection pen, Inject 40 Units under the skin 2 (Two) Times a Day. (Patient taking differently: Inject 45 Units under the skin 2 (Two) Times a Day.), Disp: 4 pen, Rfl: 2  •  Insulin Pen Needle (BD PEN NEEDLE JUAN DAVID U/F) 32G X 4 MM misc, TO INJECT 4 TIMES DAILY, Disp: 100 each, Rfl: 3  •  Lancets (ACCU-CHEK MULTICLIX) lancets, Use to test blood sugar 5 times daily, Disp: 500 each, Rfl: 3  •  lisinopril (PRINIVIL,ZESTRIL) 20 MG tablet, Take 20 mg by mouth Every Evening., Disp: , Rfl:   •  Multiple Vitamin (MULTI VITAMIN DAILY PO), Take  by mouth. HOLD PRIOR TO SURGERY, Disp: , Rfl:   •  omeprazole (priLOSEC) 40 MG capsule, Take 40 mg by mouth 2 (Two) Times a Day., Disp: , Rfl:   •  pravastatin (PRAVACHOL) 20 MG tablet, Take 20 mg by mouth Every Night., Disp: , Rfl:   •  sertraline (ZOLOFT) 100 MG tablet, Take 100 mg by mouth Daily., Disp: , Rfl:   •  promethazine (PHENERGAN) 25 MG tablet, Take 1 tablet by mouth Every 8 (Eight) Hours As Needed for Nausea or Vomiting., Disp: 30 tablet, Rfl: 2        Review of Systems   Constitutional: Negative for fever.   HENT: Negative for facial swelling, nosebleeds, trouble swallowing and voice change.    Eyes: Negative for pain and redness.   Respiratory: Negative for shortness of breath and wheezing.    Cardiovascular: Positive for leg swelling. Negative for palpitations.   Gastrointestinal: Negative for abdominal pain, diarrhea and vomiting.   Endocrine: Negative for polydipsia and polyuria.   Genitourinary: Negative for decreased urine volume and frequency.   Musculoskeletal: Positive for joint swelling. Negative for neck pain.   Skin: Negative for rash.   Allergic/Immunologic: Negative for immunocompromised state.   Neurological: Negative for seizures and facial asymmetry.   Hematological:  "Does not bruise/bleed easily.   Psychiatric/Behavioral: Negative for agitation and confusion.       Objective       Vitals:    06/09/17 1048   BP: 114/60   Pulse: 83   SpO2: 98%   Weight: 249 lb (113 kg)   Height: 74\" (188 cm)     Body mass index is 31.97 kg/(m^2).      Physical Exam   Constitutional: He is oriented to person, place, and time. He appears well-nourished.   HENT:   Head: Normocephalic and atraumatic.   Eyes: Conjunctivae and EOM are normal.   Neck: Normal range of motion. Neck supple. Carotid bruit is not present. No thyromegaly present.   Skin tags   Cardiovascular: Normal rate and normal heart sounds.    Heart rate-83/m   Pulmonary/Chest: Effort normal and breath sounds normal. No stridor. No respiratory distress. He has no wheezes.   Abdominal: Soft. Bowel sounds are normal. He exhibits no distension. There is no tenderness.   Central obesity   Musculoskeletal: He exhibits edema and tenderness.   Lymphadenopathy:     He has no cervical adenopathy.   Neurological: He is alert and oriented to person, place, and time.   Decreased pin prick and intact proprioception, left toe amputation.   Skin: Skin is warm and dry.   Psychiatric: He has a normal mood and affect. His behavior is normal.   Vitals reviewed.    Results Review:     I reviewed the patient's new clinical results.    Medical records reviewed  Summary: done      Appointment on 06/07/2017   Component Date Value Ref Range Status   • C-Reactive Protein 06/07/2017 5.05* 0.00 - 0.50 mg/dL Final   • Sed Rate 06/07/2017 44* 0 - 20 mm/hr Final   • WBC 06/07/2017 8.98  4.50 - 10.70 10*3/mm3 Final   • RBC 06/07/2017 3.75* 4.60 - 6.00 10*6/mm3 Final   • Hemoglobin 06/07/2017 10.9* 13.7 - 17.6 g/dL Final   • Hematocrit 06/07/2017 33.7* 40.4 - 52.2 % Final   • MCV 06/07/2017 89.9  79.8 - 96.2 fL Final   • MCH 06/07/2017 29.1  27.0 - 32.7 pg Final   • MCHC 06/07/2017 32.3* 32.6 - 36.4 g/dL Final   • RDW 06/07/2017 13.8  11.5 - 14.5 % Final   • RDW-SD " 06/07/2017 45.1  37.0 - 54.0 fl Final   • MPV 06/07/2017 10.6  6.0 - 12.0 fL Final   • Platelets 06/07/2017 290  140 - 500 10*3/mm3 Final   • Neutrophil % 06/07/2017 64.4  42.7 - 76.0 % Final   • Lymphocyte % 06/07/2017 26.8  19.6 - 45.3 % Final   • Monocyte % 06/07/2017 6.1  5.0 - 12.0 % Final   • Eosinophil % 06/07/2017 2.4  0.3 - 6.2 % Final   • Basophil % 06/07/2017 0.3  0.0 - 1.5 % Final   • Immature Grans % 06/07/2017 0.0  0.0 - 0.5 % Final   • Neutrophils, Absolute 06/07/2017 5.77  1.90 - 8.10 10*3/mm3 Final   • Lymphocytes, Absolute 06/07/2017 2.41  0.90 - 4.80 10*3/mm3 Final   • Monocytes, Absolute 06/07/2017 0.55  0.20 - 1.20 10*3/mm3 Final   • Eosinophils, Absolute 06/07/2017 0.22  0.00 - 0.70 10*3/mm3 Final   • Basophils, Absolute 06/07/2017 0.03  0.00 - 0.20 10*3/mm3 Final   • Immature Grans, Absolute 06/07/2017 0.00  0.00 - 0.03 10*3/mm3 Final     Lab Results   Component Value Date    HGBA1C 8.40 (H) 06/05/2017    HGBA1C 8.11 (H) 05/23/2017    HGBA1C 9.25 (H) 02/14/2017     Lab Results   Component Value Date    MICROALBUR 294.5 06/05/2017    LDLCALC 93 06/05/2017    CREATININE 0.92 06/05/2017     Imaging Results (most recent)     None          Assessment and Plan:    Gui was seen today for diabetes.    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  -     Comprehensive Metabolic Panel; Future  -     Hemoglobin A1c; Future  -     Lipid Panel; Future  -     Microalbumin / Creatinine Urine Ratio; Future  -     TSH; Future  -     Vitamin B12 & Folate; Future  -     Vitamin D 25 Hydroxy; Future    Diabetic ulcer of left foot associated with type 2 diabetes mellitus  -     Comprehensive Metabolic Panel; Future  -     Hemoglobin A1c; Future  -     Lipid Panel; Future  -     Microalbumin / Creatinine Urine Ratio; Future  -     TSH; Future  -     Vitamin B12 & Folate; Future  -     Vitamin D 25 Hydroxy; Future    Diabetic autonomic neuropathy  associated with type 2 diabetes mellitus  -     Comprehensive Metabolic Panel; Future  -     Hemoglobin A1c; Future  -     Lipid Panel; Future  -     Microalbumin / Creatinine Urine Ratio; Future  -     TSH; Future  -     Vitamin B12 & Folate; Future  -     Vitamin D 25 Hydroxy; Future    Other hyperlipidemia  -     Comprehensive Metabolic Panel; Future  -     Hemoglobin A1c; Future  -     Lipid Panel; Future  -     Microalbumin / Creatinine Urine Ratio; Future  -     TSH; Future  -     Vitamin B12 & Folate; Future  -     Vitamin D 25 Hydroxy; Future    Vitamin D deficiency   -     Comprehensive Metabolic Panel; Future  -     Hemoglobin A1c; Future  -     Lipid Panel; Future  -     Microalbumin / Creatinine Urine Ratio; Future  -     TSH; Future  -     Vitamin B12 & Folate; Future  -     Vitamin D 25 Hydroxy; Future    Type 2 diabetes mellitus-uncontrolled complicated with knee joint infection.  Continue levemir 45 units twice daily  Increase NovoLog to 14 units with each meal.  Continue NovoLog sliding scale 3 times a day before meals and at bedtime.  Will consider metformin after patient's hospitalization.  Will consider placing continues glucose monitoring of the patient for further adjusting his insulin regimen after his hospitalization.    Hyperlipidemia  Continue pravastatin 20 mg oral daily.    Discussed with the patient options of changing his levemir and NovoLog to NPH/regular insulin after this hospitalization due to his cost issues.    Right knee joint prosthetic infection  Will be admitted to the hospital on Tuesday for removal.    13 minutes out of 25 minutes face to face spent in counseling the patient extensively on insulin regimen changes after hospitalization and current changes to the insulin regimen.

## 2017-06-10 NOTE — H&P
CEDRICK WAYNE had a right knee arthrotomy, irrigation, debridement and liner change for MSSA infection.  prosthetic joint on 02/14/2017.  He is now 3 months status post operation.     He is weightbearing as tolerated with the help of a walker.  The patient states that he was doing very well and was improving. The patient has been experiencing increasing pain and swelling in the knee.  He also has a amputation of the left great toe and  it has  healed.    Denies any history of MRSA infection.  Denies any history of DVT.  He has a prior history of revision knee with the antibiotic spacers several years back. This was done by Dr. Camacho in 2011.  Review of Systems:  Positive for: Depression and Joint Pain.    Patient denies: Abdominal Pain, Bleeding, Chest Pain, Convulsions/Seizure, Decreased Motion, Difficulty Swallowing, Easy Bruisability, Emotional Disturbances, Eyes or Vision Problems, Fecal Incontinence, Fever/Chills, Headaches, Increased Thirst, Increased Hunger, Insomnia, Nausea/Vomiting, Night Sweats, Poor Balance, Persistent Cough, Rash, Shortness of Breath, Shortness of Breath While Lying down, Skin Problems, Urinary Retention and Weakness.  Allergies:  * no known allergies  Medications:  * insulin   pravachol 20 mg oral tabs (pravastatin sodium) qd  lisinopril 20 mg oral tabs (lisinopril) qd  zoloft 100 mg oral tabs (sertraline hcl) qd  metformin hcl 850 mg oral tabs (metformin hcl) twice daily  vicodin hp  mg oral tabs (hydrocodone-acetaminophen) prn  Patient History of:  HIGH CHOLESTEROL  HYPERTENSION  DEPRESSION  BLOOD CLOTS/EMBOLISM - NEGATIVE  DIABETES - TYPE 2  Surgical History:  Tooth Extraction-[CPT-87308]   Appendectomy-[CPT-89799]   left FOOT GREAT TOE AMPUTATED-   right Shoulder Arthroscopy-[CPT-27574]   Lumbar Fusion-[CPT-87590]   left Total Knee Arthroplasty-[CPT-39554]   right KNEE IRRAGATION AND DEBRIDEMENT-   Known Family History of:  heart disease-father  heart  disease-mother  cancer-father  Past medical, social, family histories and ROS reviewed today with the patient and changes documented in the chart (05/17/2017).  PCP Dr. CIRILO TUCKER, [place]    Physical Exam  Height:  74 in.    Weight:  255 lbs.     BMI:  32.86      Gait: normal               Ambulation: with a walker      Mental/HEENT/Cardio/Skin  The patient's general appearance is well developed, well nourished, no acute distress.  Orientation is alert and oriented x 3.  The patient's mood is normal.  A head exam reveals normocephalic/atraumatic.  An eye exam reveals pupils equal.  Pulmonary exam shows normal air exchange, no labored breathing, or shortness of breath.  A skin exam shows normal temperature and color in the area of examination.      Right Knee  Midline incision: Clean, dry, and intact.  No signs of infection.  Neutral alignment.  There is quad atrophy.  Trace effusion.  No warmth.  No erythema.  Patient shows no signs of DVT.  Normal sensation.  Range of motion of the knee is 10 to 85 degrees of flexion.  There is no tenderness in the knee.  Patella tracking is normal.  Patellar crepitation is not present.  Crepitus is not present.  Lachman grade 0.  Valgus grade 0.  Varus grade 0.        Imaging/Diagnostic Studies  X-rays of the Right Knee [standing AP;Lateral;Merchants] were ordered and reviewed today.    No spurring.  No lateral patellar tilt.  The total knee arthroplasty is well fixed and in satisfactory alignment, there is no evidence of loosening, the total knee arthroplasty shows no osteolysis and the total knee arthroplasty shows no wear. 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  I have advised him to hold antibiotics.  If his swelling and pain increased.  He will report to me immediately for possible aspiration versus admission to the  [facility].  Advised.  WBC scan for localization of infection indicates infection  Options and alternatives were discussed in detail with the patient.  The patient has reached a point of disability and has failed  Antibiotic suppression.  His great toe amputation has healed. The patient is indicated for a Removal of the well fixed right total knee arthroplasty And placement of antibiotic spacer. This will be the first stage. He has a very thick polyethylene and is likely that his collateral ligaments cannot be saved.  He will likely need a hinge replacement in future.  Likely,  Risks and benefits of the procedure including but not limited to infection, DVT, pulmonary embolism, future loosening of the implants, possibility of injury to nerves vessels and tendons, periprosthetic fractures have been discussed in detail.  Despite the risks involved,  The patient would like to proceed.  The patient   Will be scheduled for a removal of the right total knee arthroplasty  ImplantsAnd placement of antibiotic spacer at [facility] [place], operated by [facility]. This is pending the results of WBC scan.  He understands that he will likely need a PICC line with IV antibiotics for 6 weeks after the surgery.  Follow-up postoperatively    We discussed the benefits of surgical intervention, as well as alternative treatments.  Potential surgical risks and complications include but are not limited to DVT, infection, neurovascular injury, fracture, implant wear, failure, possible need for revision surgery, loss of motion, dislocation, limb length changes.  Sufficient opportunity was given to discuss the condition and treatment plan with the doctor, and all questions were answered for the patient.  Nonsurgical measures such as injections, medications, or physical therapy may not offer significant relief to this patient.  The discussion lasted 30 minutes.

## 2017-06-13 ENCOUNTER — ANESTHESIA (OUTPATIENT)
Dept: PERIOP | Facility: HOSPITAL | Age: 67
End: 2017-06-13

## 2017-06-13 ENCOUNTER — ANESTHESIA EVENT (OUTPATIENT)
Dept: PERIOP | Facility: HOSPITAL | Age: 67
End: 2017-06-13

## 2017-06-13 ENCOUNTER — HOSPITAL ENCOUNTER (INPATIENT)
Facility: HOSPITAL | Age: 67
LOS: 2 days | Discharge: HOME | End: 2017-06-16
Attending: ORTHOPAEDIC SURGERY | Admitting: INTERNAL MEDICINE

## 2017-06-13 ENCOUNTER — HOSPITAL ENCOUNTER (INPATIENT)
Facility: HOSPITAL | Age: 67
End: 2017-06-13
Attending: INTERNAL MEDICINE | Admitting: INTERNAL MEDICINE

## 2017-06-13 PROBLEM — M17.9 OSTEOARTHRITIS, KNEE: Status: ACTIVE | Noted: 2017-06-13

## 2017-06-13 PROBLEM — R07.9 CHEST PAIN: Status: ACTIVE | Noted: 2017-06-13

## 2017-06-13 LAB
ALBUMIN SERPL-MCNC: 3.3 G/DL (ref 3.5–5.2)
ALBUMIN/GLOB SERPL: 0.8 G/DL
ALP SERPL-CCNC: 82 U/L (ref 39–117)
ALT SERPL W P-5'-P-CCNC: 8 U/L (ref 1–41)
ANION GAP SERPL CALCULATED.3IONS-SCNC: 15.3 MMOL/L
AST SERPL-CCNC: 12 U/L (ref 1–40)
BASOPHILS # BLD AUTO: 0.01 10*3/MM3 (ref 0–0.2)
BASOPHILS NFR BLD AUTO: 0.1 % (ref 0–1.5)
BILIRUB SERPL-MCNC: 0.4 MG/DL (ref 0.1–1.2)
BUN BLD-MCNC: 20 MG/DL (ref 8–23)
BUN/CREAT SERPL: 17.4 (ref 7–25)
CALCIUM SPEC-SCNC: 9 MG/DL (ref 8.6–10.5)
CHLORIDE SERPL-SCNC: 98 MMOL/L (ref 98–107)
CK MB SERPL-CCNC: 2.08 NG/ML
CK MB SERPL-CCNC: 2.22 NG/ML
CK SERPL-CCNC: 50 U/L (ref 20–200)
CK SERPL-CCNC: 71 U/L (ref 20–200)
CO2 SERPL-SCNC: 20.7 MMOL/L (ref 22–29)
CREAT BLD-MCNC: 1.15 MG/DL (ref 0.76–1.27)
D DIMER PPP FEU-MCNC: 12.15 MCGFEU/ML (ref 0–0.49)
DEPRECATED RDW RBC AUTO: 44.8 FL (ref 37–54)
EOSINOPHIL # BLD AUTO: 0.12 10*3/MM3 (ref 0–0.7)
EOSINOPHIL NFR BLD AUTO: 0.8 % (ref 0.3–6.2)
ERYTHROCYTE [DISTWIDTH] IN BLOOD BY AUTOMATED COUNT: 13.7 % (ref 11.5–14.5)
GFR SERPL CREATININE-BSD FRML MDRD: 64 ML/MIN/1.73
GLOBULIN UR ELPH-MCNC: 4.2 GM/DL
GLUCOSE BLD-MCNC: 223 MG/DL (ref 65–99)
GLUCOSE BLDC GLUCOMTR-MCNC: 173 MG/DL (ref 70–130)
GLUCOSE BLDC GLUCOMTR-MCNC: 195 MG/DL (ref 70–130)
GLUCOSE BLDC GLUCOMTR-MCNC: 199 MG/DL (ref 70–130)
GLUCOSE BLDC GLUCOMTR-MCNC: 265 MG/DL (ref 70–130)
HCT VFR BLD AUTO: 39.1 % (ref 40.4–52.2)
HGB BLD-MCNC: 12.7 G/DL (ref 13.7–17.6)
IMM GRANULOCYTES # BLD: 0.04 10*3/MM3 (ref 0–0.03)
IMM GRANULOCYTES NFR BLD: 0.3 % (ref 0–0.5)
LYMPHOCYTES # BLD AUTO: 1.03 10*3/MM3 (ref 0.9–4.8)
LYMPHOCYTES NFR BLD AUTO: 6.9 % (ref 19.6–45.3)
MCH RBC QN AUTO: 29 PG (ref 27–32.7)
MCHC RBC AUTO-ENTMCNC: 32.5 G/DL (ref 32.6–36.4)
MCV RBC AUTO: 89.3 FL (ref 79.8–96.2)
MONOCYTES # BLD AUTO: 0.24 10*3/MM3 (ref 0.2–1.2)
MONOCYTES NFR BLD AUTO: 1.6 % (ref 5–12)
NEUTROPHILS # BLD AUTO: 13.57 10*3/MM3 (ref 1.9–8.1)
NEUTROPHILS NFR BLD AUTO: 90.3 % (ref 42.7–76)
PLATELET # BLD AUTO: 312 10*3/MM3 (ref 140–500)
PMV BLD AUTO: 10.5 FL (ref 6–12)
POTASSIUM BLD-SCNC: 4.2 MMOL/L (ref 3.5–5.2)
PROT SERPL-MCNC: 7.5 G/DL (ref 6–8.5)
RBC # BLD AUTO: 4.38 10*6/MM3 (ref 4.6–6)
SODIUM BLD-SCNC: 134 MMOL/L (ref 136–145)
TROPONIN T SERPL-MCNC: <0.01 NG/ML (ref 0–0.03)
TROPONIN T SERPL-MCNC: <0.01 NG/ML (ref 0–0.03)
WBC NRBC COR # BLD: 15.01 10*3/MM3 (ref 4.5–10.7)

## 2017-06-13 PROCEDURE — 93010 ELECTROCARDIOGRAM REPORT: CPT | Performed by: INTERNAL MEDICINE

## 2017-06-13 PROCEDURE — 99214 OFFICE O/P EST MOD 30 MIN: CPT | Performed by: INTERNAL MEDICINE

## 2017-06-13 PROCEDURE — 80053 COMPREHEN METABOLIC PANEL: CPT | Performed by: INTERNAL MEDICINE

## 2017-06-13 PROCEDURE — G0378 HOSPITAL OBSERVATION PER HR: HCPCS

## 2017-06-13 PROCEDURE — 25010000002 ENOXAPARIN PER 10 MG

## 2017-06-13 PROCEDURE — 82553 CREATINE MB FRACTION: CPT | Performed by: INTERNAL MEDICINE

## 2017-06-13 PROCEDURE — 82553 CREATINE MB FRACTION: CPT | Performed by: ORTHOPAEDIC SURGERY

## 2017-06-13 PROCEDURE — 85025 COMPLETE CBC W/AUTO DIFF WBC: CPT | Performed by: INTERNAL MEDICINE

## 2017-06-13 PROCEDURE — 85379 FIBRIN DEGRADATION QUANT: CPT | Performed by: INTERNAL MEDICINE

## 2017-06-13 PROCEDURE — 25010000002 DIPHENHYDRAMINE PER 50 MG

## 2017-06-13 PROCEDURE — 84484 ASSAY OF TROPONIN QUANT: CPT | Performed by: ORTHOPAEDIC SURGERY

## 2017-06-13 PROCEDURE — 25010000002 MORPHINE PER 10 MG: Performed by: INTERNAL MEDICINE

## 2017-06-13 PROCEDURE — 25010000002 MORPHINE PER 10 MG

## 2017-06-13 PROCEDURE — 93005 ELECTROCARDIOGRAM TRACING: CPT | Performed by: ORTHOPAEDIC SURGERY

## 2017-06-13 PROCEDURE — 82550 ASSAY OF CK (CPK): CPT | Performed by: INTERNAL MEDICINE

## 2017-06-13 PROCEDURE — 84484 ASSAY OF TROPONIN QUANT: CPT | Performed by: INTERNAL MEDICINE

## 2017-06-13 PROCEDURE — 82962 GLUCOSE BLOOD TEST: CPT

## 2017-06-13 PROCEDURE — 25010000002 ONDANSETRON PER 1 MG: Performed by: ANESTHESIOLOGY

## 2017-06-13 PROCEDURE — 25010000002 ENOXAPARIN PER 10 MG: Performed by: INTERNAL MEDICINE

## 2017-06-13 PROCEDURE — 93005 ELECTROCARDIOGRAM TRACING: CPT | Performed by: ANESTHESIOLOGY

## 2017-06-13 PROCEDURE — 25010000002 VANCOMYCIN: Performed by: ORTHOPAEDIC SURGERY

## 2017-06-13 PROCEDURE — 82550 ASSAY OF CK (CPK): CPT | Performed by: ORTHOPAEDIC SURGERY

## 2017-06-13 PROCEDURE — 25010000002 DIPHENHYDRAMINE PER 50 MG: Performed by: ANESTHESIOLOGY

## 2017-06-13 RX ORDER — BISACODYL 10 MG
10 SUPPOSITORY, RECTAL RECTAL DAILY PRN
Status: DISCONTINUED | OUTPATIENT
Start: 2017-06-13 | End: 2017-06-16 | Stop reason: HOSPADM

## 2017-06-13 RX ORDER — OXYCODONE HCL 10 MG/1
10 TABLET, FILM COATED, EXTENDED RELEASE ORAL ONCE
Status: COMPLETED | OUTPATIENT
Start: 2017-06-13 | End: 2017-06-13

## 2017-06-13 RX ORDER — MIDAZOLAM HYDROCHLORIDE 1 MG/ML
2 INJECTION INTRAMUSCULAR; INTRAVENOUS
Status: DISCONTINUED | OUTPATIENT
Start: 2017-06-13 | End: 2017-06-13 | Stop reason: HOSPADM

## 2017-06-13 RX ORDER — PANTOPRAZOLE SODIUM 40 MG/1
40 TABLET, DELAYED RELEASE ORAL EVERY MORNING
Status: DISCONTINUED | OUTPATIENT
Start: 2017-06-14 | End: 2017-06-16 | Stop reason: HOSPADM

## 2017-06-13 RX ORDER — AMLODIPINE BESYLATE 5 MG/1
5 TABLET ORAL DAILY
Status: DISCONTINUED | OUTPATIENT
Start: 2017-06-13 | End: 2017-06-16 | Stop reason: HOSPADM

## 2017-06-13 RX ORDER — LISINOPRIL 20 MG/1
20 TABLET ORAL EVERY EVENING
Status: DISCONTINUED | OUTPATIENT
Start: 2017-06-13 | End: 2017-06-16 | Stop reason: HOSPADM

## 2017-06-13 RX ORDER — MIDAZOLAM HYDROCHLORIDE 1 MG/ML
INJECTION INTRAMUSCULAR; INTRAVENOUS
Status: DISPENSED
Start: 2017-06-13 | End: 2017-06-14

## 2017-06-13 RX ORDER — SERTRALINE HYDROCHLORIDE 100 MG/1
100 TABLET, FILM COATED ORAL DAILY
Status: DISCONTINUED | OUTPATIENT
Start: 2017-06-13 | End: 2017-06-16 | Stop reason: HOSPADM

## 2017-06-13 RX ORDER — SODIUM CHLORIDE, SODIUM LACTATE, POTASSIUM CHLORIDE, CALCIUM CHLORIDE 600; 310; 30; 20 MG/100ML; MG/100ML; MG/100ML; MG/100ML
9 INJECTION, SOLUTION INTRAVENOUS CONTINUOUS
Status: DISCONTINUED | OUTPATIENT
Start: 2017-06-13 | End: 2017-06-16 | Stop reason: HOSPADM

## 2017-06-13 RX ORDER — ONDANSETRON 2 MG/ML
INJECTION INTRAMUSCULAR; INTRAVENOUS
Status: DISPENSED
Start: 2017-06-13 | End: 2017-06-14

## 2017-06-13 RX ORDER — OXYCODONE AND ACETAMINOPHEN 7.5; 325 MG/1; MG/1
1 TABLET ORAL EVERY 4 HOURS PRN
Status: DISCONTINUED | OUTPATIENT
Start: 2017-06-13 | End: 2017-06-13

## 2017-06-13 RX ORDER — MIDAZOLAM HYDROCHLORIDE 1 MG/ML
1 INJECTION INTRAMUSCULAR; INTRAVENOUS
Status: DISCONTINUED | OUTPATIENT
Start: 2017-06-13 | End: 2017-06-13 | Stop reason: HOSPADM

## 2017-06-13 RX ORDER — ONDANSETRON 2 MG/ML
4 INJECTION INTRAMUSCULAR; INTRAVENOUS ONCE
Status: COMPLETED | OUTPATIENT
Start: 2017-06-13 | End: 2017-06-13

## 2017-06-13 RX ORDER — SODIUM CHLORIDE 0.9 % (FLUSH) 0.9 %
1-10 SYRINGE (ML) INJECTION AS NEEDED
Status: DISCONTINUED | OUTPATIENT
Start: 2017-06-13 | End: 2017-06-13 | Stop reason: HOSPADM

## 2017-06-13 RX ORDER — PROMETHAZINE HYDROCHLORIDE 25 MG/1
25 TABLET ORAL EVERY 8 HOURS PRN
Status: DISCONTINUED | OUTPATIENT
Start: 2017-06-13 | End: 2017-06-16 | Stop reason: HOSPADM

## 2017-06-13 RX ORDER — DIPHENHYDRAMINE HYDROCHLORIDE 50 MG/ML
INJECTION INTRAMUSCULAR; INTRAVENOUS
Status: COMPLETED
Start: 2017-06-13 | End: 2017-06-13

## 2017-06-13 RX ORDER — ATORVASTATIN CALCIUM 40 MG/1
40 TABLET, FILM COATED ORAL NIGHTLY
Status: DISCONTINUED | OUTPATIENT
Start: 2017-06-13 | End: 2017-06-16 | Stop reason: HOSPADM

## 2017-06-13 RX ORDER — FAMOTIDINE 10 MG/ML
20 INJECTION, SOLUTION INTRAVENOUS ONCE
Status: DISCONTINUED | OUTPATIENT
Start: 2017-06-13 | End: 2017-06-13 | Stop reason: HOSPADM

## 2017-06-13 RX ORDER — ASPIRIN 325 MG
325 TABLET, DELAYED RELEASE (ENTERIC COATED) ORAL DAILY
Status: DISCONTINUED | OUTPATIENT
Start: 2017-06-13 | End: 2017-06-16 | Stop reason: HOSPADM

## 2017-06-13 RX ORDER — FAMOTIDINE 10 MG/ML
INJECTION, SOLUTION INTRAVENOUS
Status: DISPENSED
Start: 2017-06-13 | End: 2017-06-14

## 2017-06-13 RX ORDER — PANTOPRAZOLE SODIUM 40 MG/10ML
40 INJECTION, POWDER, LYOPHILIZED, FOR SOLUTION INTRAVENOUS
Status: DISCONTINUED | OUTPATIENT
Start: 2017-06-14 | End: 2017-06-15 | Stop reason: CLARIF

## 2017-06-13 RX ORDER — CEFAZOLIN SODIUM 2 G/100ML
2 INJECTION, SOLUTION INTRAVENOUS ONCE
Status: DISCONTINUED | OUTPATIENT
Start: 2017-06-13 | End: 2017-06-13 | Stop reason: HOSPADM

## 2017-06-13 RX ORDER — SODIUM CHLORIDE 0.9 % (FLUSH) 0.9 %
1-10 SYRINGE (ML) INJECTION AS NEEDED
Status: DISCONTINUED | OUTPATIENT
Start: 2017-06-13 | End: 2017-06-16 | Stop reason: HOSPADM

## 2017-06-13 RX ORDER — OXYCODONE AND ACETAMINOPHEN 10; 325 MG/1; MG/1
1 TABLET ORAL EVERY 4 HOURS PRN
Status: DISCONTINUED | OUTPATIENT
Start: 2017-06-13 | End: 2017-06-16 | Stop reason: HOSPADM

## 2017-06-13 RX ORDER — PANTOPRAZOLE SODIUM 40 MG/10ML
INJECTION, POWDER, LYOPHILIZED, FOR SOLUTION INTRAVENOUS
Status: COMPLETED
Start: 2017-06-13 | End: 2017-06-13

## 2017-06-13 RX ORDER — OXYCODONE HYDROCHLORIDE AND ACETAMINOPHEN 5; 325 MG/1; MG/1
1 TABLET ORAL EVERY 4 HOURS PRN
Status: DISCONTINUED | OUTPATIENT
Start: 2017-06-13 | End: 2017-06-13

## 2017-06-13 RX ORDER — FERROUS SULFATE 325(65) MG
325 TABLET ORAL
Status: DISCONTINUED | OUTPATIENT
Start: 2017-06-14 | End: 2017-06-16 | Stop reason: HOSPADM

## 2017-06-13 RX ADMIN — SODIUM CHLORIDE, POTASSIUM CHLORIDE, SODIUM LACTATE AND CALCIUM CHLORIDE 9 ML/HR: 600; 310; 30; 20 INJECTION, SOLUTION INTRAVENOUS at 12:30

## 2017-06-13 RX ADMIN — MORPHINE SULFATE 2 MG: 4 INJECTION, SOLUTION INTRAMUSCULAR; INTRAVENOUS at 17:58

## 2017-06-13 RX ADMIN — NITROGLYCERIN 0.5 INCH: 20 OINTMENT TOPICAL at 14:24

## 2017-06-13 RX ADMIN — AMLODIPINE BESYLATE 5 MG: 5 TABLET ORAL at 17:58

## 2017-06-13 RX ADMIN — PANTOPRAZOLE SODIUM 40 MG: 40 INJECTION, POWDER, LYOPHILIZED, FOR SOLUTION INTRAVENOUS at 14:23

## 2017-06-13 RX ADMIN — VANCOMYCIN HYDROCHLORIDE 1750 MG: 1 INJECTION, POWDER, LYOPHILIZED, FOR SOLUTION INTRAVENOUS at 12:37

## 2017-06-13 RX ADMIN — PANTOPRAZOLE SODIUM 40 MG: 40 INJECTION, POWDER, FOR SOLUTION INTRAVENOUS at 14:23

## 2017-06-13 RX ADMIN — SERTRALINE 100 MG: 100 TABLET, FILM COATED ORAL at 17:58

## 2017-06-13 RX ADMIN — ATORVASTATIN CALCIUM 40 MG: 40 TABLET, FILM COATED ORAL at 21:56

## 2017-06-13 RX ADMIN — ONDANSETRON 4 MG: 2 INJECTION INTRAMUSCULAR; INTRAVENOUS at 12:56

## 2017-06-13 RX ADMIN — MORPHINE SULFATE 2 MG: 4 INJECTION, SOLUTION INTRAMUSCULAR; INTRAVENOUS at 14:33

## 2017-06-13 RX ADMIN — LISINOPRIL 20 MG: 20 TABLET ORAL at 17:58

## 2017-06-13 RX ADMIN — NITROGLYCERIN 1 INCH: 20 OINTMENT TOPICAL at 23:59

## 2017-06-13 RX ADMIN — ENOXAPARIN SODIUM 40 MG: 40 INJECTION SUBCUTANEOUS at 21:56

## 2017-06-13 RX ADMIN — DIPHENHYDRAMINE HYDROCHLORIDE 25 MG: 50 INJECTION, SOLUTION INTRAMUSCULAR; INTRAVENOUS at 12:50

## 2017-06-13 RX ADMIN — NITROGLYCERIN 1 INCH: 20 OINTMENT TOPICAL at 15:45

## 2017-06-13 RX ADMIN — ENOXAPARIN SODIUM 40 MG: 40 INJECTION SUBCUTANEOUS at 14:32

## 2017-06-13 RX ADMIN — METOPROLOL TARTRATE 25 MG: 25 TABLET ORAL at 21:56

## 2017-06-13 RX ADMIN — OXYCODONE HYDROCHLORIDE 10 MG: 10 TABLET, FILM COATED, EXTENDED RELEASE ORAL at 12:12

## 2017-06-13 RX ADMIN — OXYCODONE HYDROCHLORIDE AND ACETAMINOPHEN 1 TABLET: 5; 325 TABLET ORAL at 15:23

## 2017-06-13 RX ADMIN — DIPHENHYDRAMINE HYDROCHLORIDE 25 MG: 50 INJECTION INTRAMUSCULAR; INTRAVENOUS at 12:50

## 2017-06-13 RX ADMIN — ASPIRIN 325 MG: 325 TABLET, DELAYED RELEASE ORAL at 17:58

## 2017-06-13 RX ADMIN — OXYCODONE HYDROCHLORIDE AND ACETAMINOPHEN 1 TABLET: 10; 325 TABLET ORAL at 21:56

## 2017-06-13 NOTE — PERIOPERATIVE NURSING NOTE
"THIS NURSE HAD JUST STARTED VANCOMYCIN AT 1245. PT C/O SOB, \"SO SICK\", FELLING\"HOT INSIDE AND FACE BURNING\". PT VOMITING. VANCOMYCIN STOPPED ,. DR. JOSHUA CALLED. ORDERED BENADRYL 25 MG IV, JOSE A 4 MG IV AND 12 LEAD EKG. PT DENIES SOB.   "

## 2017-06-13 NOTE — PERIOPERATIVE NURSING NOTE
MORPHINE 2 MG IV GIVEN AS ORDERED FOR MIDSTERNUM CHEST PAIN (NUMBER 7). STILL C/O PAIN. CBC, CMP, TROPONIN (REDRAWN- HEMOLYSED) AND SENT TO LAB. CHEST PAIN REMAINS 7 AT THIS TIME. . REPORT GIVEN TO ANTHONY FLOWERS . GOING TO ROOM 431. ALL HX AND MEDICAL INFORMATION GIVEN. VERBALZIED UNDERSTANDING

## 2017-06-13 NOTE — CONSULTS
Patient Name: Hugh R McBurney  Age/Sex: 66 y.o. male  : 1950  MRN: [id number]    Date of Admission: 2017  Date of Encounter Visit: 17  Encounter Provider: Tiburcio Moreno MD  Place of Service: [facility] CARDIOLOGY      Referring Provider: Kiko Marie,*  Patient Care Team:  Guille Nieves MD as PCP - General  Guille Nieves MD as PCP - Family Medicine    Subjective:     Admitted for: chest  Pain     Chief Complaint: chest pain     History of Present Illness:  Hugh R McBurney is a 66 y.o. male patient with a documented history of hypertesnion, hyperlipidemia, diabetes, GERD, PRISCILLA (non-compliant with CPAP) and pancreatitis. He was seen in  for chest pain and had a nuclear stress test that was negative for ischemia with an EF of 50%. In 2017 he was admitted twice with right knee pain and redness, fevers and chills. This was just after just being discharged for diabetic foot ulcer with resulting MSSA septicemia that seeded his right knee prosthesis. He had an I & D of his left knee and left great toe surgery. Echocardiogram at that time showed an EF of 70% without significant valvular heart disease. He ultimately had a ANASTASIA on  that was negative for vegetation or thrombus.     He is here today for elective right TKA and placement of an ABX spacer. Upon giving him his preop Vancomycin patient became red and started to have chest pain. He also reportedly had nausea, vomiting, and diaphoresis.  His blood pressure dropped to the 90's systolic and his chest pain persisted.  Pain is reportedly moderate in intensity, central without radiation.  No increase with deep inspiration or positional alteration.  Patient denies any previous symptoms of chest pain prior to antibiotic therapy.  He denies any shortness of breath or radiation. His surgery will be cancelled for today and patient will be admitted for further cardiac evaluation of  his chest pain.       Previous testing:     Nuclear stress test in 2015:         Past Medical History:  Past Medical History:   Diagnosis Date   • Chronic pain     BILATERAL KNEES AND BACK   • Depression    • Diabetes mellitus    • Hyperlipidemia    • Hypertension    • Kidney stone    • MSSA (methicillin susceptible Staphylococcus aureus) infection     RIGHT KNEE       Past Surgical History:   Procedure Laterality Date   • AMPUTATION FOOT / TOE Left    • APPENDECTOMY     • BACK SURGERY     • COLONOSCOPY     • JOINT REPLACEMENT      BILATERAL KNEES    • TOTAL KNEE  PROSTHESIS REMOVAL W/ SPACER INSERTION Right 2/14/2017    Procedure: INCISION AND DRAINAGE RIGHT KNEE, POLY CHANGE;  Surgeon: Kiko Marie MD;  Location: [facility];  Service:    • TRANS METATARSAL AMPUTATION Left 2/14/2017    Procedure: EXCISIONAL DEBRIDEMENT LT. FIRST TOE DIABETIC ULCER, DRAINAGE ABSCESS FIRST TOE, FIRST TOE AMPUTATION;  Surgeon: Osmin Brand MD;  Location: [facility];  Service:        Home Medications:   Prescriptions Prior to Admission   Medication Sig Dispense Refill Last Dose   • amLODIPine (NORVASC) 5 MG tablet Take 1 tablet by mouth Daily.   6/13/2017 at 0830   • ferrous sulfate 325 (65 FE) MG tablet Take 325 mg by mouth Daily With Breakfast.   6/12/2017 at 0830   • HYDROcodone-acetaminophen (NORCO)  MG per tablet Take 1 or 2 tablets every 4 hours as needed for moderate to severe pain 12 tablet 0 6/13/2017   • insulin aspart (NOVOLOG FLEXPEN) 100 UNIT/ML solution pen-injector sc pen 6 units with each meal, with a maxmium of 48 units per day. (Patient taking differently: 6 Units 3 (Three) Times a Day With Meals. 6 units with each meal, with a maxmium of 48 units per day.) 20 pen 1 6/13/2017   • Insulin Glargine (LANTUS SOLOSTAR) 100 UNIT/ML injection pen Inject 40 Units under the skin 2 (Two) Times a Day. (Patient taking differently: Inject 45 Units under the skin 2 (Two) Times a Day.) 4 pen 2  6/13/2017   • lisinopril (PRINIVIL,ZESTRIL) 20 MG tablet Take 20 mg by mouth Every Evening.   6/13/2017   • Multiple Vitamin (MULTI VITAMIN DAILY PO) Take  by mouth. HOLD PRIOR TO SURGERY   6/8/2017   • omeprazole (priLOSEC) 40 MG capsule Take 40 mg by mouth 2 (Two) Times a Day.   6/13/2017 at 0830   • pravastatin (PRAVACHOL) 20 MG tablet Take 20 mg by mouth Every Night.   6/12/2017 at 2030   • sertraline (ZOLOFT) 100 MG tablet Take 100 mg by mouth Daily.   6/13/2017 at 0830   • bisacodyl (DULCOLAX) 10 MG suppository Insert 1 suppository into the rectum Daily As Needed for constipation.  0 More than a month at Unknown time   • glucose blood (ACCU-CHEK COMPACT PLUS) test strip Use to test blood sugar 5 times daily 500 each 3 Taking   • glucose blood (ACCU-CHEK COMPACT STRIPS) test strip Use to test blood sugar 4 times daily   ICD 10 code E11.9 400 each 3 Taking   • Insulin Pen Needle (BD PEN NEEDLE JUAN DAVID U/F) 32G X 4 MM misc TO INJECT 4 TIMES DAILY 100 each 3 Taking   • Lancets (ACCU-CHEK MULTICLIX) lancets Use to test blood sugar 5 times daily 500 each 3 Taking   • promethazine (PHENERGAN) 25 MG tablet Take 1 tablet by mouth Every 8 (Eight) Hours As Needed for Nausea or Vomiting. 30 tablet 2 More than a month at Unknown time       Allergies:  Allergies   Allergen Reactions   • Vancomycin Shortness Of Breath and Nausea And Vomiting       Past Social History:  Social History     Social History   • Marital status:      Spouse name: N/A   • Number of children: N/A   • Years of education: N/A     Occupational History   • Not on file.     Social History Main Topics   • Smoking status: Former Smoker     Packs/day: 3.00     Years: 20.00   • Smokeless tobacco: Not on file      Comment: quit 35 years ago    • Alcohol use No   • Drug use: Yes     Special: Other      Comment: vicodin-prescribed    • Sexual activity: Defer     Other Topics Concern   • Not on file     Social History Narrative        Past Family  History:  Family History   Problem Relation Age of Onset   • Heart disease Mother    • Heart disease Father    • Malig Hyperthermia Neg Hx        Review of Systems: All systems reviewed. Pertinent positives identified in HPI. All other systems are negative.         Objective:     Objective:  Temp:  [97.9 °F (36.6 °C)-98.7 °F (37.1 °C)] 98.7 °F (37.1 °C)  Heart Rate:  [] 105  Resp:  [16-18] 16  BP: ()/(58-93) 148/79  No intake or output data in the 24 hours ending 06/13/17 1638  Body mass index is 31.67 kg/(m^2).  Last 3 weights    06/13/17  1200 06/13/17  1521   Weight: 247 lb 3 oz (112 kg) 246 lb 11.2 oz (112 kg)           Physical Exam:   Physical Exam   Constitutional: He is oriented to person, place, and time. He appears well-developed and well-nourished.   HENT:   Head: Normocephalic and atraumatic.   Eyes: Conjunctivae and EOM are normal. No scleral icterus.   Neck: Normal range of motion. Neck supple. Normal carotid pulses, no hepatojugular reflux and no JVD present. Carotid bruit is not present. No tracheal deviation present. No thyromegaly present.   Cardiovascular: Normal rate and regular rhythm.  Exam reveals no gallop and no friction rub.    No murmur heard.  Pulses:       Carotid pulses are 2+ on the right side, and 2+ on the left side.       Radial pulses are 2+ on the right side, and 2+ on the left side.        Femoral pulses are 2+ on the right side, and 2+ on the left side.       Dorsalis pedis pulses are 2+ on the right side, and 2+ on the left side.        Posterior tibial pulses are 2+ on the right side, and 2+ on the left side.   Pulmonary/Chest: Breath sounds normal. No respiratory distress. He has no decreased breath sounds. He has no wheezes. He has no rhonchi. He has no rales. He exhibits no tenderness.   Abdominal: Soft. Bowel sounds are normal. He exhibits no distension. There is no tenderness. There is no rebound.   Neurological: He is alert and oriented to person, place, and  time. He has normal strength. No sensory deficit.   Skin: No rash noted. No erythema.   Psychiatric: He has a normal mood and affect. His behavior is normal.         Lab Review:       Results from last 7 days  Lab Units 06/13/17  1443   SODIUM mmol/L 134*   POTASSIUM mmol/L 4.2   CHLORIDE mmol/L 98   TOTAL CO2 mmol/L 20.7*   BUN mg/dL 20   CREATININE mg/dL 1.15   GLUCOSE mg/dL 223*   CALCIUM mg/dL 9.0   AST (SGOT) U/L 12   ALT (SGPT) U/L 8       Results from last 7 days  Lab Units 06/13/17  1515 06/13/17  1443 06/13/17  1353   CK TOTAL U/L 50  --  71   TROPONIN T ng/mL <0.010 <0.010  --        Results from last 7 days  Lab Units 06/13/17  1443   WBC 10*3/mm3 15.01*   HEMOGLOBIN g/dL 12.7*   HEMATOCRIT % 39.1*   PLATELETS 10*3/mm3 312                                         EKG:         Baseline:     I personally viewed and interpreted the patient's EKG/Telemetry data.    Assessment:   Assessment/Plan   Active Problems:    Osteoarthritis, knee    Chest pain  Diabetes mellitus   Essential hypertension  Hyperlipidemia  Previous tobacco abuse    -Patient with unusual presentation.  He had presentation of symptoms after initiation of vancomycin IV antibiotic therapy including flushing, diaphoresis, nausea or vomiting and chest discomfort.  The nausea and vomiting have improved in the flushing is no longer present, however he still complains of chest discomfort.    -EKG has mild tachycardia and nonspecific ST-T wave abnormalities but no evidence of injury or infarction.  -Obtain cardiac biomarkers  -IV protonix along with nitroglycerin paste.  Pain management as necessary.  -D-dimer and chest x-ray has been ordered.  -Depending on cardiac biomarkers and how he progresses in regards to his chest pain he will need an ischemic evaluation tomorrow.  Likely perfusion stress test.  -Aspirin, statin, add metoprolol tartrate      Thank you for allowing me to participate in the care of Gui SHANNAN Briggsmallorie. Feel free to contact me  directly with any further questions or concerns.    Tiburcio Moreno MD  [facility]  06/13/17  4:38 PM

## 2017-06-13 NOTE — ANESTHESIA PREPROCEDURE EVALUATION
Anesthesia Evaluation     Patient summary reviewed and Nursing notes reviewed   NPO Solid Status: > 8 hours  NPO Liquid Status: > 8 hours     Airway   Mallampati: II  TM distance: >3 FB  Neck ROM: full  no difficulty expected  Dental - normal exam     Pulmonary - normal exam   (+) a smoker Former,   Cardiovascular - normal exam    (+) hypertension, PVD, hyperlipidemia      Neuro/Psych  (+) numbness, psychiatric history Depression,    GI/Hepatic/Renal/Endo    (+) obesity,  diabetes mellitus type 2 poorly controlled using insulin,     Musculoskeletal     Abdominal  - normal exam   Substance History      OB/GYN          Other                                      Anesthesia Plan    ASA 3     general     intravenous induction   Anesthetic plan and risks discussed with patient.

## 2017-06-13 NOTE — PLAN OF CARE
Problem: Patient Care Overview (Adult)  Goal: Plan of Care Review  Outcome: Ongoing (interventions implemented as appropriate)    06/13/17 1210   Coping/Psychosocial Response Interventions   Plan Of Care Reviewed With patient;family   Patient Care Overview   Progress progress toward functional goals as expected       Goal: Adult Individualization and Mutuality  Outcome: Ongoing (interventions implemented as appropriate)  Goal: Discharge Needs Assessment  Outcome: Ongoing (interventions implemented as appropriate)    Problem: Perioperative Period (Adult)  Goal: Signs and Symptoms of Listed Potential Problems Will be Absent or Manageable (Perioperative Period)  Outcome: Ongoing (interventions implemented as appropriate)    06/13/17 1210   Perioperative Period   Problems Assessed (Perioperative Period)      06/13/17 1210   Perioperative Period   Problems Assessed (Perioperative Period) all   all

## 2017-06-13 NOTE — PERIOPERATIVE NURSING NOTE
"MEDICATIONS GIVEN. /71. . 02 AT 2 L PER NC. SATS 91. NO MORE NAUSEA, VOMITING, SOB. DENIES ANY CHEST PAIN. FELLING\"MUCH BETTER\".   "

## 2017-06-13 NOTE — PLAN OF CARE
Problem: Patient Care Overview (Adult)  Goal: Plan of Care Review  Outcome: Ongoing (interventions implemented as appropriate)    06/13/17 6542   Coping/Psychosocial Response Interventions   Plan Of Care Reviewed With patient;family   Patient Care Overview   Progress improving   Outcome Evaluation   Outcome Summary/Follow up Plan Pt had planned R knee sx today; reaction post preop vanco admin; cardiac workup done per Dr. Moreno; per pt chest pain trending down, feels more like indigestion; complains 10/10 r leg pain; educated pt/family on PRN medication; NPO at [place] for possible stress test/ surgery         Problem: Perioperative Period (Adult)  Goal: Signs and Symptoms of Listed Potential Problems Will be Absent or Manageable (Perioperative Period)  Outcome: Ongoing (interventions implemented as appropriate)    Problem: Pain, Acute (Adult)  Goal: Identify Related Risk Factors and Signs and Symptoms  Outcome: Ongoing (interventions implemented as appropriate)  Goal: Acceptable Pain Control/Comfort Level  Outcome: Ongoing (interventions implemented as appropriate)

## 2017-06-13 NOTE — PERIOPERATIVE NURSING NOTE
DR. SCHWAB STATED TO CANCEL SURGERY TODAY AND ADMIT TO TELEMETRY UNIT. AWARE OF ALL EVENTS UP TO NOW. PT STILL C/O MID STERNUM CHEST PAIN, NUMBER 7. DR. GUERRA  HERE SPEAKING WITH DR. SCHWAB. AWARE THAT PT IS GOING TO BE CANCELLED AND ADMITTED TO  TELEMETRY UNIT

## 2017-06-13 NOTE — PERIOPERATIVE NURSING NOTE
/81. CONTINUES TO C/O CHEST PAIN LEVEL7. DENIES ANY SOB,RADIATING PAIN , ETC.  SENDER HERE AT BEDSIDE TO SEE PATIENT

## 2017-06-13 NOTE — PERIOPERATIVE NURSING NOTE
PT C/0  MID STERNUM CHEST PAIN. DENIES ANY RADIATION . STATES PAIN LEVEL 7. DR. JOSHUA HERE. CARDIAC ENZYMES ORDERED AND DRAWN.

## 2017-06-13 NOTE — PERIOPERATIVE NURSING NOTE
DR. GUERRA  NOTIFIED OF ALL EVENTS THAT OCCURRED WITH PATIENT. INFORMED HIM THAT VANCOMYICN WAS STOPPED 7 MINUTES AFTER STARTING VANC AT  171/HR. INSTRUCTED TO D/C ORDER.

## 2017-06-14 ENCOUNTER — APPOINTMENT (OUTPATIENT)
Dept: CARDIOLOGY | Facility: HOSPITAL | Age: 67
End: 2017-06-14
Attending: INTERNAL MEDICINE

## 2017-06-14 ENCOUNTER — APPOINTMENT (OUTPATIENT)
Dept: CT IMAGING | Facility: HOSPITAL | Age: 67
End: 2017-06-14
Attending: INTERNAL MEDICINE

## 2017-06-14 PROBLEM — I21.4 NSTEMI (NON-ST ELEVATED MYOCARDIAL INFARCTION): Status: ACTIVE | Noted: 2017-06-14

## 2017-06-14 LAB
BH CV ECHO MEAS - ACS: 2.5 CM
BH CV ECHO MEAS - AO MEAN PG (FULL): 5 MMHG
BH CV ECHO MEAS - AO MEAN PG: 6 MMHG
BH CV ECHO MEAS - AO ROOT AREA (BSA CORRECTED): 1.3
BH CV ECHO MEAS - AO ROOT AREA: 8 CM^2
BH CV ECHO MEAS - AO ROOT DIAM: 3.2 CM
BH CV ECHO MEAS - AO V2 MAX: 162 CM/SEC
BH CV ECHO MEAS - AO V2 MEAN: 114 CM/SEC
BH CV ECHO MEAS - AO V2 VTI: 32.3 CM
BH CV ECHO MEAS - ASC AORTA: 3.1 CM
BH CV ECHO MEAS - AVA(I,A): 1.9 CM^2
BH CV ECHO MEAS - AVA(I,D): 1.9 CM^2
BH CV ECHO MEAS - BSA(HAYCOCK): 2.4 M^2
BH CV ECHO MEAS - BSA: 2.4 M^2
BH CV ECHO MEAS - BZI_BMI: 31.6 KILOGRAMS/M^2
BH CV ECHO MEAS - BZI_METRIC_HEIGHT: 188 CM
BH CV ECHO MEAS - BZI_METRIC_WEIGHT: 111.6 KG
BH CV ECHO MEAS - CONTRAST EF (2CH): 49.6 ML/M^2
BH CV ECHO MEAS - CONTRAST EF 4CH: 50 ML/M^2
BH CV ECHO MEAS - EDV(CUBED): 175.6 ML
BH CV ECHO MEAS - EDV(MOD-SP2): 117 ML
BH CV ECHO MEAS - EDV(MOD-SP4): 146 ML
BH CV ECHO MEAS - EDV(TEICH): 153.7 ML
BH CV ECHO MEAS - EF(CUBED): 57.8 %
BH CV ECHO MEAS - EF(MOD-SP2): 49.6 %
BH CV ECHO MEAS - EF(MOD-SP4): 50 %
BH CV ECHO MEAS - EF(TEICH): 48.9 %
BH CV ECHO MEAS - ESV(CUBED): 74.1 ML
BH CV ECHO MEAS - ESV(MOD-SP2): 59 ML
BH CV ECHO MEAS - ESV(MOD-SP4): 73 ML
BH CV ECHO MEAS - ESV(TEICH): 78.6 ML
BH CV ECHO MEAS - FS: 25 %
BH CV ECHO MEAS - IVS/LVPW: 1
BH CV ECHO MEAS - IVSD: 1.1 CM
BH CV ECHO MEAS - LAT PEAK E' VEL: 11 CM/SEC
BH CV ECHO MEAS - LV DIASTOLIC VOL/BSA (35-75): 61.5 ML/M^2
BH CV ECHO MEAS - LV MASS(C)D: 249.3 GRAMS
BH CV ECHO MEAS - LV MASS(C)DI: 105.1 GRAMS/M^2
BH CV ECHO MEAS - LV MEAN PG: 1 MMHG
BH CV ECHO MEAS - LV SYSTOLIC VOL/BSA (12-30): 30.8 ML/M^2
BH CV ECHO MEAS - LV V1 MAX: 88 CM/SEC
BH CV ECHO MEAS - LV V1 MEAN: 53.5 CM/SEC
BH CV ECHO MEAS - LV V1 VTI: 19.2 CM
BH CV ECHO MEAS - LVIDD: 5.6 CM
BH CV ECHO MEAS - LVIDS: 4.2 CM
BH CV ECHO MEAS - LVLD AP2: 8.9 CM
BH CV ECHO MEAS - LVLD AP4: 9.5 CM
BH CV ECHO MEAS - LVLS AP2: 8.1 CM
BH CV ECHO MEAS - LVLS AP4: 8.2 CM
BH CV ECHO MEAS - LVOT AREA (M): 3.1 CM^2
BH CV ECHO MEAS - LVOT AREA: 3.1 CM^2
BH CV ECHO MEAS - LVOT DIAM: 2 CM
BH CV ECHO MEAS - LVPWD: 1.1 CM
BH CV ECHO MEAS - MED PEAK E' VEL: 9 CM/SEC
BH CV ECHO MEAS - MR MAX PG: 56 MMHG
BH CV ECHO MEAS - MR MAX VEL: 373.5 CM/SEC
BH CV ECHO MEAS - MV A DUR: 0.07 SEC
BH CV ECHO MEAS - MV A MAX VEL: 92.8 CM/SEC
BH CV ECHO MEAS - MV DEC SLOPE: 1020 CM/SEC^2
BH CV ECHO MEAS - MV DEC TIME: 0.14 SEC
BH CV ECHO MEAS - MV E MAX VEL: 119 CM/SEC
BH CV ECHO MEAS - MV E/A: 1.3
BH CV ECHO MEAS - MV MEAN PG: 4 MMHG
BH CV ECHO MEAS - MV P1/2T MAX VEL: 124 CM/SEC
BH CV ECHO MEAS - MV P1/2T: 35.6 MSEC
BH CV ECHO MEAS - MV V2 MEAN: 93.3 CM/SEC
BH CV ECHO MEAS - MV V2 VTI: 29.9 CM
BH CV ECHO MEAS - MVA P1/2T LCG: 1.8 CM^2
BH CV ECHO MEAS - MVA(P1/2T): 6.2 CM^2
BH CV ECHO MEAS - MVA(VTI): 2 CM^2
BH CV ECHO MEAS - PA ACC SLOPE: 10.3 CM/SEC^2
BH CV ECHO MEAS - PA ACC TIME: 0.11 SEC
BH CV ECHO MEAS - PA MAX PG: 4 MMHG
BH CV ECHO MEAS - PA PR(ACCEL): 31.3 MMHG
BH CV ECHO MEAS - PA V2 MAX: 99.9 CM/SEC
BH CV ECHO MEAS - PULM A REVS DUR: 0.09 SEC
BH CV ECHO MEAS - PULM A REVS VEL: 33.5 CM/SEC
BH CV ECHO MEAS - PULM DIAS VEL: 71.7 CM/SEC
BH CV ECHO MEAS - PULM S/D: 0.68
BH CV ECHO MEAS - PULM SYS VEL: 48.6 CM/SEC
BH CV ECHO MEAS - QP/QS: 1.9
BH CV ECHO MEAS - RV MEAN PG: 1 MMHG
BH CV ECHO MEAS - RV V1 MEAN: 53.3 CM/SEC
BH CV ECHO MEAS - RV V1 VTI: 17.7 CM
BH CV ECHO MEAS - RVOT AREA: 6.6 CM^2
BH CV ECHO MEAS - RVOT DIAM: 2.9 CM
BH CV ECHO MEAS - SI(AO): 109.5 ML/M^2
BH CV ECHO MEAS - SI(CUBED): 42.8 ML/M^2
BH CV ECHO MEAS - SI(LVOT): 25.4 ML/M^2
BH CV ECHO MEAS - SI(MOD-SP2): 24.4 ML/M^2
BH CV ECHO MEAS - SI(MOD-SP4): 30.8 ML/M^2
BH CV ECHO MEAS - SI(TEICH): 31.6 ML/M^2
BH CV ECHO MEAS - SV(AO): 259.8 ML
BH CV ECHO MEAS - SV(CUBED): 101.5 ML
BH CV ECHO MEAS - SV(LVOT): 60.3 ML
BH CV ECHO MEAS - SV(MOD-SP2): 58 ML
BH CV ECHO MEAS - SV(MOD-SP4): 73 ML
BH CV ECHO MEAS - SV(RVOT): 116.9 ML
BH CV ECHO MEAS - SV(TEICH): 75.1 ML
BH CV ECHO MEAS - TAPSE (>1.6): 2.9 CM2
BH CV XLRA - RV BASE: 2.6 CM
BH CV XLRA - TDI S': 16 CM/SEC
E/E' RATIO: 12
GLUCOSE BLDC GLUCOMTR-MCNC: 280 MG/DL (ref 70–130)
GLUCOSE BLDC GLUCOMTR-MCNC: 288 MG/DL (ref 70–130)
GLUCOSE BLDC GLUCOMTR-MCNC: 290 MG/DL (ref 70–130)
GLUCOSE BLDC GLUCOMTR-MCNC: 338 MG/DL (ref 70–130)
LEFT ATRIUM VOLUME INDEX: 28 ML/M2
TROPONIN T SERPL-MCNC: 0.66 NG/ML (ref 0–0.03)
TROPONIN T SERPL-MCNC: 0.97 NG/ML (ref 0–0.03)

## 2017-06-14 PROCEDURE — C1894 INTRO/SHEATH, NON-LASER: HCPCS | Performed by: INTERNAL MEDICINE

## 2017-06-14 PROCEDURE — 82962 GLUCOSE BLOOD TEST: CPT

## 2017-06-14 PROCEDURE — 93306 TTE W/DOPPLER COMPLETE: CPT | Performed by: INTERNAL MEDICINE

## 2017-06-14 PROCEDURE — 71275 CT ANGIOGRAPHY CHEST: CPT

## 2017-06-14 PROCEDURE — 25010000002 FENTANYL CITRATE (PF) 100 MCG/2ML SOLUTION: Performed by: INTERNAL MEDICINE

## 2017-06-14 PROCEDURE — 93458 L HRT ARTERY/VENTRICLE ANGIO: CPT | Performed by: INTERNAL MEDICINE

## 2017-06-14 PROCEDURE — 93005 ELECTROCARDIOGRAM TRACING: CPT | Performed by: INTERNAL MEDICINE

## 2017-06-14 PROCEDURE — C1769 GUIDE WIRE: HCPCS | Performed by: INTERNAL MEDICINE

## 2017-06-14 PROCEDURE — 63710000001 INSULIN DETEMER PER 5 UNITS: Performed by: INTERNAL MEDICINE

## 2017-06-14 PROCEDURE — B2111ZZ FLUOROSCOPY OF MULTIPLE CORONARY ARTERIES USING LOW OSMOLAR CONTRAST: ICD-10-PCS | Performed by: INTERNAL MEDICINE

## 2017-06-14 PROCEDURE — B2151ZZ FLUOROSCOPY OF LEFT HEART USING LOW OSMOLAR CONTRAST: ICD-10-PCS | Performed by: INTERNAL MEDICINE

## 2017-06-14 PROCEDURE — 25010000002 HEPARIN (PORCINE) PER 1000 UNITS: Performed by: INTERNAL MEDICINE

## 2017-06-14 PROCEDURE — 25010000002 MIDAZOLAM PER 1 MG: Performed by: INTERNAL MEDICINE

## 2017-06-14 PROCEDURE — 0 IOPAMIDOL PER 1 ML: Performed by: INTERNAL MEDICINE

## 2017-06-14 PROCEDURE — 93306 TTE W/DOPPLER COMPLETE: CPT

## 2017-06-14 PROCEDURE — 84484 ASSAY OF TROPONIN QUANT: CPT | Performed by: INTERNAL MEDICINE

## 2017-06-14 PROCEDURE — 4A023N7 MEASUREMENT OF CARDIAC SAMPLING AND PRESSURE, LEFT HEART, PERCUTANEOUS APPROACH: ICD-10-PCS | Performed by: INTERNAL MEDICINE

## 2017-06-14 PROCEDURE — 93010 ELECTROCARDIOGRAM REPORT: CPT | Performed by: INTERNAL MEDICINE

## 2017-06-14 PROCEDURE — 99225 PR SBSQ OBSERVATION CARE/DAY 25 MINUTES: CPT | Performed by: INTERNAL MEDICINE

## 2017-06-14 PROCEDURE — 63710000001 INSULIN ASPART PER 5 UNITS: Performed by: INTERNAL MEDICINE

## 2017-06-14 RX ORDER — MORPHINE SULFATE 2 MG/ML
1 INJECTION, SOLUTION INTRAMUSCULAR; INTRAVENOUS EVERY 4 HOURS PRN
Status: DISCONTINUED | OUTPATIENT
Start: 2017-06-14 | End: 2017-06-16 | Stop reason: HOSPADM

## 2017-06-14 RX ORDER — SODIUM CHLORIDE 9 MG/ML
INJECTION, SOLUTION INTRAVENOUS CONTINUOUS PRN
Status: DISCONTINUED | OUTPATIENT
Start: 2017-06-14 | End: 2017-06-14 | Stop reason: HOSPADM

## 2017-06-14 RX ORDER — ISOSORBIDE MONONITRATE 30 MG/1
30 TABLET, EXTENDED RELEASE ORAL
Status: DISCONTINUED | OUTPATIENT
Start: 2017-06-14 | End: 2017-06-16 | Stop reason: HOSPADM

## 2017-06-14 RX ORDER — LIDOCAINE HYDROCHLORIDE 20 MG/ML
INJECTION, SOLUTION INFILTRATION; PERINEURAL AS NEEDED
Status: DISCONTINUED | OUTPATIENT
Start: 2017-06-14 | End: 2017-06-14 | Stop reason: HOSPADM

## 2017-06-14 RX ORDER — NICOTINE POLACRILEX 4 MG
15 LOZENGE BUCCAL
Status: CANCELLED | OUTPATIENT
Start: 2017-06-14

## 2017-06-14 RX ORDER — MIDAZOLAM HYDROCHLORIDE 1 MG/ML
INJECTION INTRAMUSCULAR; INTRAVENOUS AS NEEDED
Status: DISCONTINUED | OUTPATIENT
Start: 2017-06-14 | End: 2017-06-14 | Stop reason: HOSPADM

## 2017-06-14 RX ORDER — DEXTROSE MONOHYDRATE 25 G/50ML
25 INJECTION, SOLUTION INTRAVENOUS
Status: CANCELLED | OUTPATIENT
Start: 2017-06-14

## 2017-06-14 RX ORDER — NALOXONE HCL 0.4 MG/ML
0.4 VIAL (ML) INJECTION
Status: DISCONTINUED | OUTPATIENT
Start: 2017-06-14 | End: 2017-06-16 | Stop reason: HOSPADM

## 2017-06-14 RX ORDER — DEXTROSE MONOHYDRATE 25 G/50ML
25 INJECTION, SOLUTION INTRAVENOUS
Status: DISCONTINUED | OUTPATIENT
Start: 2017-06-14 | End: 2017-06-16 | Stop reason: HOSPADM

## 2017-06-14 RX ORDER — SODIUM CHLORIDE 9 MG/ML
50 INJECTION, SOLUTION INTRAVENOUS CONTINUOUS
Status: ACTIVE | OUTPATIENT
Start: 2017-06-14 | End: 2017-06-14

## 2017-06-14 RX ORDER — FENTANYL CITRATE 50 UG/ML
INJECTION, SOLUTION INTRAMUSCULAR; INTRAVENOUS AS NEEDED
Status: DISCONTINUED | OUTPATIENT
Start: 2017-06-14 | End: 2017-06-14 | Stop reason: HOSPADM

## 2017-06-14 RX ORDER — HYDROCODONE BITARTRATE AND ACETAMINOPHEN 5; 325 MG/1; MG/1
1 TABLET ORAL EVERY 4 HOURS PRN
Status: DISCONTINUED | OUTPATIENT
Start: 2017-06-14 | End: 2017-06-16 | Stop reason: HOSPADM

## 2017-06-14 RX ORDER — NICOTINE POLACRILEX 4 MG
15 LOZENGE BUCCAL
Status: DISCONTINUED | OUTPATIENT
Start: 2017-06-14 | End: 2017-06-16 | Stop reason: HOSPADM

## 2017-06-14 RX ORDER — ACETAMINOPHEN 325 MG/1
650 TABLET ORAL EVERY 4 HOURS PRN
Status: DISCONTINUED | OUTPATIENT
Start: 2017-06-14 | End: 2017-06-16 | Stop reason: HOSPADM

## 2017-06-14 RX ADMIN — OXYCODONE HYDROCHLORIDE AND ACETAMINOPHEN 1 TABLET: 10; 325 TABLET ORAL at 19:19

## 2017-06-14 RX ADMIN — OXYCODONE HYDROCHLORIDE AND ACETAMINOPHEN 1 TABLET: 10; 325 TABLET ORAL at 02:10

## 2017-06-14 RX ADMIN — INSULIN DETEMIR 45 UNITS: 100 INJECTION, SOLUTION SUBCUTANEOUS at 23:20

## 2017-06-14 RX ADMIN — INSULIN ASPART 12 UNITS: 100 INJECTION, SOLUTION INTRAVENOUS; SUBCUTANEOUS at 23:20

## 2017-06-14 RX ADMIN — OXYCODONE HYDROCHLORIDE AND ACETAMINOPHEN 1 TABLET: 10; 325 TABLET ORAL at 23:21

## 2017-06-14 RX ADMIN — AMLODIPINE BESYLATE 5 MG: 5 TABLET ORAL at 09:24

## 2017-06-14 RX ADMIN — ISOSORBIDE MONONITRATE 30 MG: 30 TABLET ORAL at 16:49

## 2017-06-14 RX ADMIN — OXYCODONE HYDROCHLORIDE AND ACETAMINOPHEN 1 TABLET: 10; 325 TABLET ORAL at 14:58

## 2017-06-14 RX ADMIN — METOPROLOL TARTRATE 50 MG: 25 TABLET ORAL at 22:13

## 2017-06-14 RX ADMIN — FERROUS SULFATE TAB 325 MG (65 MG ELEMENTAL FE) 325 MG: 325 (65 FE) TAB at 09:24

## 2017-06-14 RX ADMIN — IOPAMIDOL 95 ML: 755 INJECTION, SOLUTION INTRAVENOUS at 07:40

## 2017-06-14 RX ADMIN — OXYCODONE HYDROCHLORIDE AND ACETAMINOPHEN 1 TABLET: 10; 325 TABLET ORAL at 06:09

## 2017-06-14 RX ADMIN — NITROGLYCERIN 1 INCH: 20 OINTMENT TOPICAL at 06:09

## 2017-06-14 RX ADMIN — ASPIRIN 325 MG: 325 TABLET, DELAYED RELEASE ORAL at 09:24

## 2017-06-14 RX ADMIN — ATORVASTATIN CALCIUM 40 MG: 40 TABLET, FILM COATED ORAL at 22:16

## 2017-06-14 RX ADMIN — OXYCODONE HYDROCHLORIDE AND ACETAMINOPHEN 1 TABLET: 10; 325 TABLET ORAL at 10:39

## 2017-06-14 RX ADMIN — SODIUM CHLORIDE 50 ML/HR: 9 INJECTION, SOLUTION INTRAVENOUS at 16:49

## 2017-06-14 RX ADMIN — SERTRALINE 100 MG: 100 TABLET, FILM COATED ORAL at 09:24

## 2017-06-14 RX ADMIN — LISINOPRIL 20 MG: 20 TABLET ORAL at 16:49

## 2017-06-14 RX ADMIN — METOPROLOL TARTRATE 25 MG: 25 TABLET ORAL at 09:24

## 2017-06-14 RX ADMIN — PANTOPRAZOLE SODIUM 40 MG: 40 TABLET, DELAYED RELEASE ORAL at 06:09

## 2017-06-14 NOTE — PROGRESS NOTES
Continued Stay Note  [facility]     Patient Name: Hugh R McBurney  MRN: [id number]  Today's Date: 6/14/2017    Admit Date: (Not on file)          Discharge Plan       06/14/17 1241    Case Management/Social Work Plan    Plan [facility] following    Additional Comments Correction for pt pharmacy.  [facility]      06/14/17 1110    Case Management/Social Work Plan    Plan [facility] for [facility]    Additional Comments [facility] 06/13   Spoke with patient at bedside.   I introduced myself and explained CCP role.  Verified face sheet.  Confirmed patient uses [facility] on [address]. He lives with his girlfriend in a single story house.   Pt uses a straight cane, rolling walker .  He is independent with  ADL's.  He does not use oxygen.  Reports no history of Home Health.  Pt would like referral to [facility].  He has been there in the past.  Patricia messaged. CCP will follow for patient needs.               Discharge Codes     None            Claudia Olsen RN

## 2017-06-14 NOTE — DISCHARGE INSTRUCTIONS
[facility]  [address] [place] [address]    Coronary Angiogram (Radial/Ulnar Approach) After Care    Refer to this sheet in the next few weeks. These instructions provide you with information on caring for yourself after your procedure. Your caregiver may also give you more specific instructions. Your treatment has been planned according to current medical practices, but problems sometimes occur. Call your caregiver if you have any problems or questions after your procedure.    Home Care Instructions:  · You may shower the day after the procedure. Remove the bandage (dressing) and gently wash the site with plain soap and water. Gently pat the site dry. You may apply a band aid daily for 2 days if desired.    · Do not apply powder or lotion to the site.  · Do not submerge the affected site in water for 3 to 5 days or until the site is completely healed.   · Do not lift, push or pull anything over 10 pounds for 2 days after your procedure.  · Inspect the site at least twice daily. You may notice some bruising at the site and it may be tender for 1 to 2 weeks.     · Increase your fluid intake for the next 2 days.    · Keep arm elevated for 24 hours. For the remainder of the day, keep your arm in “Pledge of Allegiance” position when up and about.     · You may drive 24 hours after the procedure unless otherwise instructed by your caregiver.  · Do not operate machinery or power tools for 24 hours.  · A responsible adult should be with you for the first 24 hours after you arrive home. Do not make any important legal decisions or sign legal papers for 24 hours.      Call Your Doctor if:   · You have unusual pain at the radial/ulnar (wrist) site.  · You have redness, warmth, swelling, or pain at the radial/ulnar (wrist) site.  · You have drainage (other than a small amount of blood on the dressing).  · You have chills or a fever > 101.  · Your arm becomes pale or dark, cool, tingly, or numb.  · You  have heavy bleeding from the site, hold pressure on the site for 20 minutes.  If the bleeding stops, apply a fresh bandage and call your cardiologist.  However, if you continue to have bleeding, call 911.

## 2017-06-14 NOTE — DISCHARGE PLACEMENT REQUEST
"McBurney, Hugh R (66 y.o. Male)     Date of Birth Social Security Number Address Home Phone MRN    1950  [address] [id number] [phone] [id number]    Christian Marital Status          None        Admission Date Admission Type Admitting Provider Attending Provider Department, Room/Bed    6/13/17 Elective Tiburcio Moreno MD Semder, Christopher A, MD [address], [facility]/1    Discharge Date Discharge Disposition Discharge Destination                      Attending Provider: Tiburcio Moreno MD     Allergies:  Vancomycin    Isolation:  None   Infection:  None   Code Status:  FULL    Ht:  74\" (188 cm)   Wt:  246 lb 11.2 oz (112 kg)    Admission Cmt:  None   Principal Problem:  None                Active Insurance as of 6/13/2017     Primary Coverage     Payor Plan Insurance Group Employer/Plan Group    MEDICARE RAILROAD MEDICARE      Payor Plan Address Payor Plan Phone Number Effective From Effective To    [address] [phone] 7/1/1992     [place] [address]       Subscriber Name Subscriber Birth Date Member ID       MAGDALENOTONE MOSELEY SHANNAN 1950 [id number]           Secondary Coverage     Payor Plan Insurance Group Employer/Plan Group    [facility] [id number]     Payor Plan Address Payor Plan Phone Number Effective From Effective To    [address] [address]  1/1/2017     [place] [address]       Subscriber Name Subscriber Birth Date Member ID       MAGDALENOTONE MOSELEY SHANNAN 1950 [id number]                 Emergency Contacts      (Rel.) Home Phone Work Phone Mobile Phone    McBurney,Reymundo (Son) [phone] -- [phone]              "

## 2017-06-14 NOTE — PLAN OF CARE
Problem: Patient Care Overview (Adult)  Goal: Plan of Care Review  Outcome: Ongoing (interventions implemented as appropriate)    06/14/17 1405   Coping/Psychosocial Response Interventions   Plan Of Care Reviewed With patient   Patient Care Overview   Progress improving   Outcome Evaluation   Outcome Summary/Follow up Plan ready for transfer       Goal: Adult Individualization and Mutuality  Outcome: Ongoing (interventions implemented as appropriate)  Goal: Discharge Needs Assessment  Outcome: Ongoing (interventions implemented as appropriate)    Problem: Perioperative Period (Adult)  Goal: Signs and Symptoms of Listed Potential Problems Will be Absent or Manageable (Perioperative Period)  Outcome: Ongoing (interventions implemented as appropriate)    Problem: Cardiac Catheterization with/without PCI (Adult)  Goal: Signs and Symptoms of Listed Potential Problems Will be Absent or Manageable (Cardiac Catheterization with/without PCI)  Outcome: Ongoing (interventions implemented as appropriate)

## 2017-06-14 NOTE — PROGRESS NOTES
Discharge Planning Assessment  [facility]     Patient Name: Hugh R McBurney  MRN: [id number]  Today's Date: 6/14/2017    Admit Date: 6/13/2017          Discharge Needs Assessment       06/14/17 1107    Living Environment    Lives With significant other    Living Arrangements house    Home Accessibility no concerns    Stair Railings at Home none    Transportation Available family or friend will provide;car    Living Environment    Provides Primary Care For no one    Primary Care Provided By spouse/significant other    Quality Of Family Relationships supportive    Able to Return to Prior Living Arrangements yes    Discharge Needs Assessment    Concerns To Be Addressed discharge planning concerns    Anticipated Changes Related to Illness none    Equipment Currently Used at Home cane, straight;grab bar;raised toilet;oxygen;wheelchair;walker, rolling    Equipment Needed After Discharge none    Discharge Disposition still a patient            Discharge Plan       06/14/17 1110    Case Management/Social Work Plan    Plan [facility]    Additional Comments IMM 06/13   Spoke with patient at bedside.   I introduced myself and explained CCP role.  Verified face sheet.  Confirmed patient uses [facility] on [address]. He lives with his girlfriend in a single story house.   Pt uses a straight cane, rolling walker .  He is independent with  ADL's.  He does not use oxygen.  Reports no history of Home Health.  Pt would like referral to [facility].  He has been there in the past.  Patricia messaged. CCP will follow for patient needs.         Discharge Placement     Facility/Agency Request Status Selected? Address Phone Number Fax Number    [facility] Pending - Request Sent     [address] [place] [address] [phone]        Claudia Olsen RN 6/14/2017 11:05    Message to Patricia                           Demographic Summary     None            Functional Status        06/14/17 1105    Functional Status Current    Ambulation 3-->assistive equipment and person    Transferring 3-->assistive equipment and person    Toileting 1-->assistive equipment    Bathing 2-->assistive person    Dressing 0-->independent    Eating 0-->independent    Communication 0-->understands/communicates without difficulty    Swallowing (if score 2 or more for any item, consult Rehab Services) 0-->swallows foods/liquids without difficulty    Change in Functional Status Since Onset of Current Illness/Injury yes    Functional Status Prior    Ambulation 1-->assistive equipment    Transferring 0-->independent    Toileting 0-->independent    Bathing 0-->independent    Dressing 0-->independent    Eating 0-->independent    Communication 0-->understands/communicates without difficulty    Swallowing 0-->swallows foods/liquids without difficulty            Psychosocial     None            Abuse/Neglect     None            Legal     None            Substance Abuse     None            Patient Forms     None          Claudia Olsen, RN

## 2017-06-14 NOTE — PROGRESS NOTES
"Orthopedic Progress Note      Patient: Hugh R McBurney  YOB: 1950     Date of Admission: 6/13/2017 11:21 AM Medical Record Number: [id number]     Attending Physician: Tiburcio Moreno MD    Planned Procedure:  REMOVAL RT TOTAL KNEE ARTHROPLASTY WITH ANTIBIOTIC SPACER (Awaiting Surgery)  The patient presented to the preoperative area today for his scheduled surgery.  Vancomycin was started as a preoperative antibiotic.  The patient developed signs of allergy with the decreased blood pressure.  He also complained of chest pain.  A cardiology consult was requested with Dr. Moreno.  He has evaluated the patient and plans to do further evaluation for ischemia.  His surgery has been held for today.     Subjective : No new orthopaedic complaints     Pain Relief: some relief with present medication.     Systemic Complaints: No Complaints  Vitals:    06/13/17 1521 06/13/17 1540 06/13/17 1802 06/13/17 1924   BP: 162/83 148/79 139/80 133/76   BP Location: Right arm Left arm Left arm Left arm   Patient Position: Lying Lying Lying Lying   Pulse:  105 106 101   Resp: 18 16  16   Temp: 98.7 °F (37.1 °C)   98.9 °F (37.2 °C)   TempSrc: Oral   Oral   SpO2:  98%  99%   Weight: 246 lb 11.2 oz (112 kg)      Height: 74\" (188 cm)          Physical Exam: 66 y.o. male    General Appearance:       Alert, cooperative, in no acute distress                  Extremities:    Right knee, positive swelling, positive tenderness, positive mild increased warmth.  Attempted movements of the right knee are painful and restricted.         No clinical sign of DVT        Able to do good movements of digits    Pulses:   Pulses palpable and equal bilaterally           Diagnostic Tests:      Results from last 7 days  Lab Units 06/13/17  1443 06/07/17  1552   WBC 10*3/mm3 15.01* 8.98   HEMOGLOBIN g/dL 12.7* 10.9*   HEMATOCRIT % 39.1* 33.7*   PLATELETS 10*3/mm3 312 290       Results from last 7 days  Lab Units 06/13/17  1443   SODIUM mmol/L " 134*   POTASSIUM mmol/L 4.2   CHLORIDE mmol/L 98   TOTAL CO2 mmol/L 20.7*   BUN mg/dL 20   CREATININE mg/dL 1.15   GLUCOSE mg/dL 223*   CALCIUM mg/dL 9.0           Nm Inflammatory Wbc Whole Body With Indium 111    Result Date: 5/25/2017  Narrative: WHOLE BODY INDIUM-111 LABELED WHITE CELL STUDY  HISTORY: 66-year-old male with left toe amputation and right knee replacement February 2017, complaining of right knee pain, evaluate for infection  COMPARISON: Correlation is made with imaging of the knee 02/14/2017  FINDINGS: 1. Physiologic distribution of whole body activity. 2. Abnormally secondary to the region of the ethmoid air cells versus with infection. 3. Mild asymmetry with relatively increased activity in the region of the right knee versus left suspicious for focal infection.  This report was finalized on 5/25/2017 4:20 PM by Dr. Ricci Tucker MD.      Xr Chest Pa & Lateral    Result Date: 6/10/2017  Narrative: PA AND LATERAL CHEST 06/07/2017.  HISTORY: Hypertension. Knee surgery.  FINDINGS: Heart size is within normal limits. Lungs appear free of acute infiltrates. Surgical anchors are seen in the right humeral head. There are minor degenerative changes in the spine.      Impression: 1. No acute process identified.  This report was finalized on 6/10/2017 12:18 AM by Dr. Ruben Michelle MD.          Current Medications:  Scheduled Meds:  amLODIPine 5 mg Oral Daily   aspirin 325 mg Oral Daily   atorvastatin 40 mg Oral Nightly   enoxaparin 40 mg Subcutaneous Q12H   famotidine      [START ON 6/14/2017] ferrous sulfate 325 mg Oral Daily With Breakfast   insulin detemir 25 Units Subcutaneous Q12H   lisinopril 20 mg Oral Q PM   metoprolol tartrate 25 mg Oral Q12H   midazolam      nitroglycerin 1 inch Topical Q6H   ondansetron      [START ON 6/14/2017] pantoprazole 40 mg Oral QAM   [START ON 6/14/2017] pantoprazole 40 mg Intravenous Q AM   sertraline 100 mg Oral Daily     Continuous Infusions:  lactated ringers 9  mL/hr Last Rate: 9 mL/hr (06/13/17 1230)     PRN Meds:.bisacodyl  •  Morphine  •  oxyCODONE-acetaminophen  •  promethazine  •  sodium chloride    Assessment:   REMOVAL RT TOTAL KNEE ARTHROPLASTY WITH ANTIBIOTIC SPACER (Awaiting Surgery)    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   • Recent MSSA (methicillin susceptible Staphylococcus aureus) septicemia   • Infection of prosthetic right knee joint   • Hyperglycemia   • Hyponatremia   • Type 2 diabetes mellitus with hyperglycemia   • Osteoarthritis, knee   • Chest pain       PLAN:   I will elevate for cardiology clearance.  I will plan for operating room  This hospital admission   If he is stable.    Kiko Marie MD    Date: 6/13/2017    Time: 10:35 PM    EMR Dragon/Transcription disclaimer:   Much of this encounter note is an electronic transcription/translation of spoken language to printed text. The electronic translation of spoken language may permit erroneous, or at times, nonsensical words or phrases to be inadvertently transcribed; Although I have reviewed the note for such errors, some may still exist.

## 2017-06-14 NOTE — PLAN OF CARE
Problem: Patient Care Overview (Adult)  Goal: Plan of Care Review  Outcome: Ongoing (interventions implemented as appropriate)    06/14/17 0436   Coping/Psychosocial Response Interventions   Plan Of Care Reviewed With patient   Outcome Evaluation   Outcome Summary/Follow up Plan no c/o cp during night. vss. medicated with pain meds for c/o knee pain. NPO since midnight. slept well.        Goal: Adult Individualization and Mutuality  Outcome: Ongoing (interventions implemented as appropriate)  Goal: Discharge Needs Assessment  Outcome: Ongoing (interventions implemented as appropriate)    Problem: Perioperative Period (Adult)  Goal: Signs and Symptoms of Listed Potential Problems Will be Absent or Manageable (Perioperative Period)  Outcome: Ongoing (interventions implemented as appropriate)    Problem: Pain, Acute (Adult)  Goal: Identify Related Risk Factors and Signs and Symptoms  Outcome: Outcome(s) achieved Date Met:  06/14/17  Goal: Acceptable Pain Control/Comfort Level  Outcome: Ongoing (interventions implemented as appropriate)

## 2017-06-15 LAB
ANION GAP SERPL CALCULATED.3IONS-SCNC: 11.2 MMOL/L
BUN BLD-MCNC: 18 MG/DL (ref 8–23)
BUN/CREAT SERPL: 17.3 (ref 7–25)
CALCIUM SPEC-SCNC: 9.2 MG/DL (ref 8.6–10.5)
CHLORIDE SERPL-SCNC: 93 MMOL/L (ref 98–107)
CO2 SERPL-SCNC: 26.8 MMOL/L (ref 22–29)
CREAT BLD-MCNC: 1.04 MG/DL (ref 0.76–1.27)
DEPRECATED RDW RBC AUTO: 45.4 FL (ref 37–54)
ERYTHROCYTE [DISTWIDTH] IN BLOOD BY AUTOMATED COUNT: 13.9 % (ref 11.5–14.5)
GFR SERPL CREATININE-BSD FRML MDRD: 71 ML/MIN/1.73
GLUCOSE BLD-MCNC: 234 MG/DL (ref 65–99)
GLUCOSE BLDC GLUCOMTR-MCNC: 227 MG/DL (ref 70–130)
GLUCOSE BLDC GLUCOMTR-MCNC: 231 MG/DL (ref 70–130)
GLUCOSE BLDC GLUCOMTR-MCNC: 269 MG/DL (ref 70–130)
GLUCOSE BLDC GLUCOMTR-MCNC: 298 MG/DL (ref 70–130)
HCT VFR BLD AUTO: 31.6 % (ref 40.4–52.2)
HGB BLD-MCNC: 10.3 G/DL (ref 13.7–17.6)
MCH RBC QN AUTO: 29.3 PG (ref 27–32.7)
MCHC RBC AUTO-ENTMCNC: 32.6 G/DL (ref 32.6–36.4)
MCV RBC AUTO: 89.8 FL (ref 79.8–96.2)
PLATELET # BLD AUTO: 244 10*3/MM3 (ref 140–500)
PMV BLD AUTO: 10.8 FL (ref 6–12)
POTASSIUM BLD-SCNC: 4.2 MMOL/L (ref 3.5–5.2)
RBC # BLD AUTO: 3.52 10*6/MM3 (ref 4.6–6)
SODIUM BLD-SCNC: 131 MMOL/L (ref 136–145)
WBC NRBC COR # BLD: 10.22 10*3/MM3 (ref 4.5–10.7)

## 2017-06-15 PROCEDURE — 93005 ELECTROCARDIOGRAM TRACING: CPT | Performed by: INTERNAL MEDICINE

## 2017-06-15 PROCEDURE — 63710000001 INSULIN ASPART PER 5 UNITS: Performed by: INTERNAL MEDICINE

## 2017-06-15 PROCEDURE — 82962 GLUCOSE BLOOD TEST: CPT

## 2017-06-15 PROCEDURE — 93010 ELECTROCARDIOGRAM REPORT: CPT | Performed by: INTERNAL MEDICINE

## 2017-06-15 PROCEDURE — 80048 BASIC METABOLIC PNL TOTAL CA: CPT | Performed by: INTERNAL MEDICINE

## 2017-06-15 PROCEDURE — 85027 COMPLETE CBC AUTOMATED: CPT | Performed by: INTERNAL MEDICINE

## 2017-06-15 PROCEDURE — 99233 SBSQ HOSP IP/OBS HIGH 50: CPT | Performed by: INTERNAL MEDICINE

## 2017-06-15 PROCEDURE — 99222 1ST HOSP IP/OBS MODERATE 55: CPT | Performed by: INTERNAL MEDICINE

## 2017-06-15 PROCEDURE — 94799 UNLISTED PULMONARY SVC/PX: CPT

## 2017-06-15 RX ORDER — CIPROFLOXACIN 750 MG/1
750 TABLET, FILM COATED ORAL EVERY 12 HOURS SCHEDULED
Status: DISCONTINUED | OUTPATIENT
Start: 2017-06-15 | End: 2017-06-16 | Stop reason: HOSPADM

## 2017-06-15 RX ORDER — RIFAMPIN 300 MG/1
300 CAPSULE ORAL EVERY 12 HOURS SCHEDULED
Status: DISCONTINUED | OUTPATIENT
Start: 2017-06-15 | End: 2017-06-16 | Stop reason: HOSPADM

## 2017-06-15 RX ADMIN — INSULIN DETEMIR 45 UNITS: 100 INJECTION, SOLUTION SUBCUTANEOUS at 21:50

## 2017-06-15 RX ADMIN — OXYCODONE HYDROCHLORIDE AND ACETAMINOPHEN 1 TABLET: 10; 325 TABLET ORAL at 17:35

## 2017-06-15 RX ADMIN — RIFAMPIN 300 MG: 300 CAPSULE ORAL at 21:44

## 2017-06-15 RX ADMIN — FERROUS SULFATE TAB 325 MG (65 MG ELEMENTAL FE) 325 MG: 325 (65 FE) TAB at 08:32

## 2017-06-15 RX ADMIN — OXYCODONE HYDROCHLORIDE AND ACETAMINOPHEN 1 TABLET: 10; 325 TABLET ORAL at 13:05

## 2017-06-15 RX ADMIN — METOPROLOL TARTRATE 50 MG: 25 TABLET ORAL at 08:32

## 2017-06-15 RX ADMIN — OXYCODONE HYDROCHLORIDE AND ACETAMINOPHEN 1 TABLET: 10; 325 TABLET ORAL at 04:08

## 2017-06-15 RX ADMIN — CIPROFLOXACIN 750 MG: 750 TABLET, FILM COATED ORAL at 21:44

## 2017-06-15 RX ADMIN — PANTOPRAZOLE SODIUM 40 MG: 40 TABLET, DELAYED RELEASE ORAL at 08:05

## 2017-06-15 RX ADMIN — METOPROLOL TARTRATE 50 MG: 25 TABLET ORAL at 21:45

## 2017-06-15 RX ADMIN — INSULIN ASPART 8 UNITS: 100 INJECTION, SOLUTION INTRAVENOUS; SUBCUTANEOUS at 08:31

## 2017-06-15 RX ADMIN — AMLODIPINE BESYLATE 5 MG: 5 TABLET ORAL at 08:32

## 2017-06-15 RX ADMIN — INSULIN ASPART 8 UNITS: 100 INJECTION, SOLUTION INTRAVENOUS; SUBCUTANEOUS at 21:44

## 2017-06-15 RX ADMIN — OXYCODONE HYDROCHLORIDE AND ACETAMINOPHEN 1 TABLET: 10; 325 TABLET ORAL at 08:31

## 2017-06-15 RX ADMIN — INSULIN ASPART 12 UNITS: 100 INJECTION, SOLUTION INTRAVENOUS; SUBCUTANEOUS at 17:38

## 2017-06-15 RX ADMIN — INSULIN DETEMIR 45 UNITS: 100 INJECTION, SOLUTION SUBCUTANEOUS at 08:31

## 2017-06-15 RX ADMIN — OXYCODONE HYDROCHLORIDE AND ACETAMINOPHEN 1 TABLET: 10; 325 TABLET ORAL at 21:44

## 2017-06-15 RX ADMIN — ISOSORBIDE MONONITRATE 30 MG: 30 TABLET ORAL at 08:32

## 2017-06-15 RX ADMIN — LISINOPRIL 20 MG: 20 TABLET ORAL at 17:35

## 2017-06-15 RX ADMIN — ATORVASTATIN CALCIUM 40 MG: 40 TABLET, FILM COATED ORAL at 21:44

## 2017-06-15 RX ADMIN — INSULIN ASPART 12 UNITS: 100 INJECTION, SOLUTION INTRAVENOUS; SUBCUTANEOUS at 11:54

## 2017-06-15 RX ADMIN — SERTRALINE 100 MG: 100 TABLET, FILM COATED ORAL at 08:32

## 2017-06-15 RX ADMIN — ASPIRIN 325 MG: 325 TABLET, DELAYED RELEASE ORAL at 08:32

## 2017-06-15 NOTE — PROGRESS NOTES
LOS: 1 day   Patient Care Team:  Guille Nieves MD as PCP - General  Guille Nieves MD as PCP - Family Medicine    Chief Complaint: NSTEMI       Interval History:  No chest pain.  His knee hurts terribly.    Cath yesterday:    1. Left main: Mild calcification. No stenosis  2. LAD: Diffuse 20% proximal stenosis. Diffuse 50% stenosis mid segment. Discrete 90% proximal first diagonal stenosis with JANINE 2 flow. Small caliber  3. LCX: Diffuse 50% mid vessel stenosis  4. RCA: Discrete 70-80% proximal RPL 2 stenosis. Small caliber  5. Moderately reduced left ventricular systolic function. Ejection fraction 40%. Mid to distal anterior wall, apical severe hypokinesis. Moderate distal inferior wall hypokinesis. More consistent with stress-induced cardiomyopathy than flow limiting coronary artery disease in that territory.    No intervention performed.      Objective   Vital Signs  Temp:  [98.3 °F (36.8 °C)-99 °F (37.2 °C)] 98.3 °F (36.8 °C)  Heart Rate:  [81-97] 91  Resp:  [16] 16  BP: (115-143)/(68-98) 121/73    Intake/Output Summary (Last 24 hours) at 06/15/17 0919  Last data filed at 06/15/17 0400   Gross per 24 hour   Intake             1400 ml   Output             2450 ml   Net            -1050 ml           Physical Exam   Constitutional: He is oriented to person, place, and time. He appears well-developed and well-nourished.   HENT:   Head: Normocephalic.   Nose: Nose normal.   Mouth/Throat: Oropharynx is clear and moist.   Eyes: Conjunctivae and EOM are normal. Pupils are equal, round, and reactive to light.   Neck: Normal range of motion. No JVD present.   Cardiovascular: Normal rate, regular rhythm, normal heart sounds and intact distal pulses.    Pulmonary/Chest: Effort normal and breath sounds normal.   Abdominal: Soft. He exhibits no mass. There is no tenderness.   Musculoskeletal: Normal range of motion. He exhibits tenderness and deformity. He exhibits no edema (swollen warm right knee).   Neurological:  He is alert and oriented to person, place, and time. No cranial nerve deficit.   Skin: Skin is warm and dry. No erythema.   Psychiatric: He has a normal mood and affect. His behavior is normal. Judgment and thought content normal.   Vitals reviewed.      Results Review:        Results from last 7 days  Lab Units 06/15/17  0336 06/13/17  1443   SODIUM mmol/L 131* 134*   POTASSIUM mmol/L 4.2 4.2   CHLORIDE mmol/L 93* 98   TOTAL CO2 mmol/L 26.8 20.7*   BUN mg/dL 18 20   CREATININE mg/dL 1.04 1.15   GLUCOSE mg/dL 234* 223*   CALCIUM mg/dL 9.2 9.0       Results from last 7 days  Lab Units 06/14/17  0431 06/13/17  2357 06/13/17  1515  06/13/17  1353   CK TOTAL U/L  --   --  50  --  71   TROPONIN T ng/mL 0.971* 0.655* <0.010  < >  --    < > = values in this interval not displayed.    Results from last 7 days  Lab Units 06/15/17  0336 06/13/17  1443   WBC 10*3/mm3 10.22 15.01*   HEMOGLOBIN g/dL 10.3* 12.7*   HEMATOCRIT % 31.6* 39.1*   PLATELETS 10*3/mm3 244 312                       I reviewed the patient's new clinical results.  I personally viewed and interpreted the patient's EKG/Telemetry data        Medication Review:     amLODIPine 5 mg Oral Daily   aspirin 325 mg Oral Daily   atorvastatin 40 mg Oral Nightly   ferrous sulfate 325 mg Oral Daily With Breakfast   insulin aspart 0-24 Units Subcutaneous 4x Daily With Meals & Nightly   insulin detemir 45 Units Subcutaneous Q12H   isosorbide mononitrate 30 mg Oral Q24H   lisinopril 20 mg Oral Q PM   metoprolol tartrate 50 mg Oral Q12H   pantoprazole 40 mg Oral QAM   pantoprazole 40 mg Intravenous Q AM   sertraline 100 mg Oral Daily         lactated ringers 9 mL/hr Last Rate: 9 mL/hr (06/13/17 1230)       Assessment/Plan     1.  NSTEMI -- small vessel disease vs stress induced cardiomyopathy.  EKG with diffuse ST depressions, but now with extremely subtle VINCENZO in lateral leads.   I will review with interventional cardiology.  He has NO chest pain right now.      2.  Knee OA, ?  "Septic joint -- await ortho recommendations.  Ideally, surgery would be postponed. However, if there is infection, this is a totally different issue.  I will d/w Dr. Marie re: optimal timing.      Add:    This is quite a \"rock and a hard spot\" situation.  I discussed with Dr. Marie and the knee is definitely infected and the patient is at risk for sepsis.  He has small vessel disease only and preserved LV systolic function.    If possible, holding off for two weeks would be ideal but this may not be possible.  I will ask Dr. Mayberry to see him to recommend interim suppressive antibiotics (if indicated).    Also, upon review of the EKG, there definitely are lateral EKG changes but he has absolutely NO chest pain right now.  I will repeat the EKG.      Truman Marshall MD  06/15/17  9:19 AM      "

## 2017-06-15 NOTE — DISCHARGE SUMMARY
"  Date of Discharge:  6/16/2017  Date of Admit: 6/13/2017    Discharge Diagnosis:    1.  NSTEMI, small vessel CAD  2.  Possible stress induced cardiomyopathy  3.  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  Knee OA with prosthetic joint infection  5.  HTN  6.  Diabetes      Hospital Course: Mr. McBurney is a 66 y.o. man patient with hypertesnion, hyperlipidemia, diabetes, GERD, and PRISCILLA (non-compliant with CPAP) who came in for elective knee surgery.  He has had a right TKA and got a prosthetic joint infection.    While in preop holding, vancomycin was infusing and he developed classic \"red man syndrome\" but also got chest discomfort.  He ruled in for a non-STEMI.  His EKG showed diffuse ST depressions.    Cardiac catheterization showed significant, chronic-appearing disease of the diagonal and RPL2 disease. There was a wall motion abnormality on ventriculogram that was out of proportion to CAD and was concerning for stress induced cardiomyopathy.  An echocardiogram did not show any evidence of this though.    I discussed the case with Dr. Marie and Dr. Mayberry.  He will be resumed on oral medications (antibiotics) and surgery will be deferred for 2-4 weeks as long as he is stable from an ID standpoint.      Procedures Performed  Procedure(s):  Left Heart Cath       Conclusions:   1. Left main: Mild calcification. No stenosis  2. LAD: Diffuse 20% proximal stenosis. Diffuse 50% stenosis mid segment. Discrete 90% proximal first diagonal stenosis with JANINE 2 flow. Small caliber  3. LCX: Diffuse 50% mid vessel stenosis  4. RCA: Discrete 70-80% proximal RPL 2 stenosis. Small caliber  5. Moderately reduced left ventricular systolic function. Ejection fraction 40%. Mid to distal anterior wall, apical severe hypokinesis. Moderate distal inferior wall hypokinesis. More consistent with stress-induced cardiomyopathy then flow limiting coronary artery disease in that territory     Recommendations: Continue ACE inhibitor " and beta blocker. Continue aspirin and statin. Low-dose isosorbide mononitrate. Postpone knee surgery for minimum 2 weeks and preferably 1 month.    Consults     Date and Time Order Name Status Description    6/15/2017 1257 Inpatient Consult to Infectious Diseases Completed           Pertinent Test Results:   TTE  · The left ventricular cavity is mildly dilated.  · Left ventricular systolic function is low normal. Calculated EF = 50%.  · There is borderline global hypokinesis.  · Left ventricular diastolic dysfunction is noted (grade II w/high LAP) consistent with pseudonormalization.  · Left atrial cavity size is mildly dilated.  · There is moderate to heavy mitral annular calcification, mainly at the posterior annulus  · Mild mitral valve regurgitation is present.  · There is no evidence of pericardial effusion.  .      Discharge Physical Exam:    General Appearance No acute distress   Neck No adenopathy, supple, trachea midline, no thyromegaly, no carotid bruit, no JVD   Lungs Clear to auscultation,respirations regular, even and unlabored   Heart Regular rhythm and normal rate, normal S1 and S2, no murmur, no gallop, no rub, no click   Chest wall No abnormalities observed   Abdomen Normal bowel sounds, no masses, no hepatomegaly, soft   Extremities Moves all extremities well, no edema; right knee swollen     Neurological Alert and oriented x 3     Discharge Medications -- see MAR    Discharge Diet: Cardiac, carb control    Activity at Discharge: ad faraz    Discharge disposition: Home    Condition on Discharge: fair     Follow-up Appointments  Future Appointments  Date Time Provider Department Center   8/10/2017 1:00 PM Amor Mayberry MD [facility] None   9/1/2017 10:40 AM [facility] None   9/8/2017 10:40 AM Adarsh Love MD [facility] None     Additional Instructions for the Follow-ups that You Need to Schedule     Obtain informed consent    As directed    Informed  Consent Given For:  total knee arthroplasty explantation and placement of antibiotic spacer   Laterality:  Right               [facility] will call with appointment with Ashley Suarez in one week, and an echo will be performed that day as well.       Truman Marshall MD  06/16/17  10:09 AM

## 2017-06-15 NOTE — CONSULTS
"Referring Provider: Dr Marshall  Reason for Consultation: Need for abx?      Subjective   History of present illness:        Per prior notes: Very nice 66-year-old gentleman with history of osteoarthritis complicated by right TKA PJI culture negative in 2011 treated with two-stage replacement and prolonged antibiotic therapy, admitted on with 3-4 weeks of left first toe swelling, fever and right artificial knee pain. Was found to have MSSA in his blood and underwent I&D with amputation of the first left toe by Dr. Brand on 2/14. Dr. Marie also performed liner exchange with I&D of right TKA on 2/14. His left toe and right TKA cultures all grew MSSA. Transthoracic echocardiogram without obvious vegetation. In discussions with orthopedics and infectious diseases, patient elected to pursue device retention strategy. We will therefore recommend Ancef 2 g IV every 8 hours and rifampin 300 mg by mouth every 12 hours for 6 weeks with an anticipated stop date of 3/27/17. We then plan 6 months of rifampin plus PO abx (likely quinolone). He was readmitted for fever and hyperglycemia from 2/25 to 3/4/17 but all cultures were negative and transesophageal echocardiogram was negative\"      At clinic visit on 3/27/17, he was transitioned off IV antibiotics to 750 mg of Cipro and rifampin 300 mg twice a day.   I last saw him in clinic in May 2017 and had worsening knee pain concerning for progressive infection.  He tells me that he actually had his antibiotics discontinued by his orthopedic surgeon about 3 weeks ago and had a tagged white blood cell scan that showed possible focal infection of the left knee.  He is admitted for explantation of his right TKA on 6/13, but developed red man syndrome during a vancomycin infusion.  He says that he turn beet red, had severe flushing, tachycardia and chest pain.  This subsided after discontinuation of the vancomycin and administration of Benadryl.  He denies any associated fevers.  " Unfortunately he ruled in for an myocardial infarction and had left heart catheterization that showed just small vessel disease.  I did talk to his cardiologist Dr. Marshall and she says surgery is going to have to be delayed to at least 2 weeks.  But for his knee pain he feels fine         PAST MEDICAL HISTORY:  1. Uncontrolled diabetes mellitus type 2.  2. Hypertension.  3. Hyperlipidemia.  4. GERD.  5. History of nephrolithiasis.  6. Chronic pain.  7. Osteoarthritis status post bilateral joint replacement with right PJI in 2011 treated with 2-stage replacement and antibiotic therapy.  8. Back hardware placement.  9. Placement of an orthopedic screw on the right shoulder.  10. Appendectomy.  11. History of pancreatitis.  12.  Coronary artery disease    FAMILY HISTORY: No family history of infection.      SOCIAL HISTORY: Lives in [place], [place] with his girlfriend. 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 He is a former smoker, but quit about 3 decades ago. He denies current tobacco, ethanol or drug use.       Review of Systems  Pertinent items are noted in HPI, all other systems reviewed and negative    Objective     Physical Exam:   Vital Signs   Temp:  [98.3 °F (36.8 °C)-99 °F (37.2 °C)] 98.4 °F (36.9 °C)  Heart Rate:  [73-97] 73  Resp:  [16] 16  BP: (109-143)/(66-86) 109/66    GENERAL: Awake and alert, in no acute distress.   HEENT: Oropharynx is clear. Hearing is grossly normal.   EYES: PERRL. No conjunctival injection. No lid lag.   LYMPHATICS: No lymphadenopathy of the neck or inguinal regions.   HEART: Regular rate and rhythm. No peripheral edema.   LUNGS: Clear to auscultation anteriorly with normal respiratory effort.   GI: Soft, nontender, nondistended. No appreciable organomegaly.   SKIN: Warm and dry without cutaneous eruptions; R tka warm and painful to palpation  PSYCHIATRIC: Appropriate mood, affect, insight, and judgment.     Results Review:   I reviewed the patient's new  "clinical results.    Glucose 231-338  Creatinine 1.04  White blood cell 10    Assessment/Plan   1.  NSTEMI  2.  Right MSSA prosthetic knee joint infection  3.  Diabetes mellitus type 2 with hyperglycemia  4.  \"Red man\" syndrome    Discussed with Dr. Marshall and patient is going to need at least 2 weeks to convalesce prior to his revision TKA.  I recommend he resume his ciprofloxacin and rifampin to prevent his MSSA knee infection from becoming systemic.  I will reorder and he has sufficient supplies at home.     He has red man syndrome and can be given vancomycin again at a slower rate and with antihistamine pre-administration. I have updated his allergy list. Given that he is colonized with MSSA and not MRSA, I think preoperative cefazolin would be just fine in his case.    Otherwise he is fine to go from my end when okay with others.    I discussed the patients findings and my recommendations with patient and consulting provider       Amor Mayberry MD  06/15/17  2:53 PM    ADDENDUM: D/w Dr. Marie this afternoon and agrees with plan to restart PO antibiotics.  He will see him in clinic in two weeks and get him scheduled for repeat surgery.      "

## 2017-06-15 NOTE — PLAN OF CARE
Problem: Patient Care Overview (Adult)  Goal: Plan of Care Review  Outcome: Ongoing (interventions implemented as appropriate)    06/15/17 0556   Coping/Psychosocial Response Interventions   Plan Of Care Reviewed With patient   Patient Care Overview   Progress improving   Outcome Evaluation   Outcome Summary/Follow up Plan Pt complaining of pain in knee, also complained of not getting any insulin while here. Ice pack on right knee. Got order for insuline. Cont to monitor, safety maintained.        Goal: Discharge Needs Assessment  Outcome: Ongoing (interventions implemented as appropriate)    Problem: Perioperative Period (Adult)  Goal: Signs and Symptoms of Listed Potential Problems Will be Absent or Manageable (Perioperative Period)  Outcome: Ongoing (interventions implemented as appropriate)    Problem: Pain, Acute (Adult)  Goal: Acceptable Pain Control/Comfort Level  Outcome: Ongoing (interventions implemented as appropriate)    Problem: Cardiac Catheterization with/without PCI (Adult)  Goal: Signs and Symptoms of Listed Potential Problems Will be Absent or Manageable (Cardiac Catheterization with/without PCI)  Outcome: Ongoing (interventions implemented as appropriate)

## 2017-06-16 VITALS
HEIGHT: 74 IN | DIASTOLIC BLOOD PRESSURE: 87 MMHG | SYSTOLIC BLOOD PRESSURE: 142 MMHG | OXYGEN SATURATION: 97 % | RESPIRATION RATE: 18 BRPM | HEART RATE: 87 BPM | WEIGHT: 246.7 LBS | BODY MASS INDEX: 31.66 KG/M2 | TEMPERATURE: 97.8 F

## 2017-06-16 DIAGNOSIS — I21.4 NSTEMI (NON-ST ELEVATED MYOCARDIAL INFARCTION) (HCC): Primary | ICD-10-CM

## 2017-06-16 PROBLEM — L03.116 CELLULITIS OF LEFT FOOT: Status: RESOLVED | Noted: 2017-02-14 | Resolved: 2017-06-16

## 2017-06-16 LAB
GLUCOSE BLDC GLUCOMTR-MCNC: 188 MG/DL (ref 70–130)
GLUCOSE BLDC GLUCOMTR-MCNC: 271 MG/DL (ref 70–130)

## 2017-06-16 PROCEDURE — 99239 HOSP IP/OBS DSCHRG MGMT >30: CPT | Performed by: INTERNAL MEDICINE

## 2017-06-16 PROCEDURE — 99232 SBSQ HOSP IP/OBS MODERATE 35: CPT | Performed by: INTERNAL MEDICINE

## 2017-06-16 PROCEDURE — 82962 GLUCOSE BLOOD TEST: CPT

## 2017-06-16 PROCEDURE — 63710000001 INSULIN ASPART PER 5 UNITS: Performed by: INTERNAL MEDICINE

## 2017-06-16 RX ORDER — ASPIRIN 81 MG/1
81 TABLET ORAL DAILY
Status: ON HOLD
Start: 2017-06-16 | End: 2017-07-11

## 2017-06-16 RX ORDER — RIFAMPIN 300 MG/1
300 CAPSULE ORAL EVERY 12 HOURS SCHEDULED
Refills: 0
Start: 2017-06-16 | End: 2017-07-11 | Stop reason: HOSPADM

## 2017-06-16 RX ORDER — METOPROLOL TARTRATE 50 MG/1
50 TABLET, FILM COATED ORAL EVERY 12 HOURS SCHEDULED
Qty: 60 TABLET | Refills: 1 | Status: SHIPPED | OUTPATIENT
Start: 2017-06-16 | End: 2017-08-30 | Stop reason: SDUPTHER

## 2017-06-16 RX ORDER — ATORVASTATIN CALCIUM 40 MG/1
40 TABLET, FILM COATED ORAL NIGHTLY
Qty: 30 TABLET | Refills: 1 | Status: SHIPPED | OUTPATIENT
Start: 2017-06-16 | End: 2017-10-09 | Stop reason: SDUPTHER

## 2017-06-16 RX ORDER — CIPROFLOXACIN 750 MG/1
750 TABLET, FILM COATED ORAL EVERY 12 HOURS SCHEDULED
Refills: 0
Start: 2017-06-16 | End: 2017-06-30

## 2017-06-16 RX ORDER — ISOSORBIDE MONONITRATE 30 MG/1
30 TABLET, EXTENDED RELEASE ORAL
Qty: 30 TABLET | Refills: 1 | Status: SHIPPED | OUTPATIENT
Start: 2017-06-16 | End: 2020-01-03

## 2017-06-16 RX ADMIN — INSULIN ASPART 12 UNITS: 100 INJECTION, SOLUTION INTRAVENOUS; SUBCUTANEOUS at 11:48

## 2017-06-16 RX ADMIN — INSULIN DETEMIR 45 UNITS: 100 INJECTION, SOLUTION SUBCUTANEOUS at 09:13

## 2017-06-16 RX ADMIN — SERTRALINE 100 MG: 100 TABLET, FILM COATED ORAL at 09:53

## 2017-06-16 RX ADMIN — ISOSORBIDE MONONITRATE 30 MG: 30 TABLET ORAL at 09:58

## 2017-06-16 RX ADMIN — PANTOPRAZOLE SODIUM 40 MG: 40 TABLET, DELAYED RELEASE ORAL at 06:13

## 2017-06-16 RX ADMIN — RIFAMPIN 300 MG: 300 CAPSULE ORAL at 09:58

## 2017-06-16 RX ADMIN — OXYCODONE HYDROCHLORIDE AND ACETAMINOPHEN 1 TABLET: 10; 325 TABLET ORAL at 02:04

## 2017-06-16 RX ADMIN — OXYCODONE HYDROCHLORIDE AND ACETAMINOPHEN 1 TABLET: 10; 325 TABLET ORAL at 06:13

## 2017-06-16 RX ADMIN — CIPROFLOXACIN 750 MG: 750 TABLET, FILM COATED ORAL at 09:58

## 2017-06-16 RX ADMIN — METOPROLOL TARTRATE 50 MG: 25 TABLET ORAL at 09:58

## 2017-06-16 RX ADMIN — INSULIN ASPART 4 UNITS: 100 INJECTION, SOLUTION INTRAVENOUS; SUBCUTANEOUS at 09:13

## 2017-06-16 RX ADMIN — AMLODIPINE BESYLATE 5 MG: 5 TABLET ORAL at 09:53

## 2017-06-16 RX ADMIN — ASPIRIN 325 MG: 325 TABLET, DELAYED RELEASE ORAL at 09:53

## 2017-06-16 RX ADMIN — OXYCODONE HYDROCHLORIDE AND ACETAMINOPHEN 1 TABLET: 10; 325 TABLET ORAL at 10:50

## 2017-06-16 NOTE — PROGRESS NOTES
"INFECTIOUS DISEASES PROGRESS NOTE    CC: f/u pji    S:   Tolerating cipro/rifampin  No f/c/ns    O:  Physical Exam:  Temp:  [97.8 °F (36.6 °C)-98.4 °F (36.9 °C)] 97.8 °F (36.6 °C)  Heart Rate:  [67-89] 89  Resp:  [16-20] 20  BP: (109-135)/(65-86) 135/72  Physical Exam  NAD  R knee swollen and minimally ttp     Diagnostics:     glc 188-298      Assessment/Plan   1. NSTEMI  2. Right MSSA prosthetic knee joint infection  3. Diabetes mellitus type 2 with hyperglycemia  4. \"Red man\" syndrome    Doing fine.  Plan is for discharge on ciprofloxacin and rifampin to help keep infection \"at bay\" while allowing heart to heal.  Will follow up with Dr. Marie in 2 weeks to reschedule for revision tka and I will see when he gets readmitted for this surgery or sooner if needed.      Amor Mayberry MD  9:21 AM  06/16/17         "

## 2017-06-16 NOTE — PLAN OF CARE
Problem: Patient Care Overview (Adult)  Goal: Plan of Care Review  Outcome: Ongoing (interventions implemented as appropriate)    06/16/17 0656   Coping/Psychosocial Response Interventions   Plan Of Care Reviewed With patient   Patient Care Overview   Progress progress toward functional goals as expected   Outcome Evaluation   Outcome Summary/Follow up Plan Percocet q 4 hrs for knee pain infection. Started PO abx last night. Pt will prob go home today. Cont to monitor, safety maintained.         Problem: Perioperative Period (Adult)  Goal: Signs and Symptoms of Listed Potential Problems Will be Absent or Manageable (Perioperative Period)  Outcome: Ongoing (interventions implemented as appropriate)    Problem: Pain, Acute (Adult)  Goal: Acceptable Pain Control/Comfort Level  Outcome: Ongoing (interventions implemented as appropriate)    Problem: Cardiac Catheterization with/without PCI (Adult)  Goal: Signs and Symptoms of Listed Potential Problems Will be Absent or Manageable (Cardiac Catheterization with/without PCI)  Outcome: Ongoing (interventions implemented as appropriate)

## 2017-06-16 NOTE — PROGRESS NOTES
Continued Stay Note  [facility]     Patient Name: Hugh R McBurney  MRN: [id number]  Today's Date: 6/16/2017    Admit Date: 6/13/2017          Discharge Plan       06/16/17 1350    Final Note    Final Note Home no needs  Declined                 Discharge Codes       06/16/17 1350    Discharge Codes    Discharge Codes 01  Discharge to home        Expected Discharge Date and Time     Expected Discharge Date Expected Discharge Time    Jun 16, 2017             Claudia Olsen RN

## 2017-06-26 ENCOUNTER — OFFICE VISIT (OUTPATIENT)
Dept: CARDIOLOGY | Facility: CLINIC | Age: 67
End: 2017-06-26

## 2017-06-26 ENCOUNTER — HOSPITAL ENCOUNTER (OUTPATIENT)
Dept: CARDIOLOGY | Facility: HOSPITAL | Age: 67
Discharge: HOME | End: 2017-06-26
Attending: INTERNAL MEDICINE | Admitting: INTERNAL MEDICINE

## 2017-06-26 VITALS
WEIGHT: 248 LBS | HEIGHT: 74 IN | OXYGEN SATURATION: 100 % | HEART RATE: 93 BPM | BODY MASS INDEX: 31.83 KG/M2 | SYSTOLIC BLOOD PRESSURE: 138 MMHG | DIASTOLIC BLOOD PRESSURE: 78 MMHG

## 2017-06-26 VITALS
HEART RATE: 86 BPM | WEIGHT: 246 LBS | SYSTOLIC BLOOD PRESSURE: 120 MMHG | BODY MASS INDEX: 31.57 KG/M2 | DIASTOLIC BLOOD PRESSURE: 70 MMHG | HEIGHT: 74 IN

## 2017-06-26 DIAGNOSIS — I42.9 CARDIOMYOPATHY (HCC): Primary | ICD-10-CM

## 2017-06-26 DIAGNOSIS — I25.10 CORONARY ARTERY DISEASE INVOLVING NATIVE CORONARY ARTERY OF NATIVE HEART WITHOUT ANGINA PECTORIS: ICD-10-CM

## 2017-06-26 DIAGNOSIS — I21.4 NSTEMI (NON-ST ELEVATED MYOCARDIAL INFARCTION) (HCC): ICD-10-CM

## 2017-06-26 PROBLEM — Z96.659 INFECTION OF PROSTHETIC KNEE JOINT: Status: ACTIVE | Noted: 2017-02-16

## 2017-06-26 PROBLEM — T84.59XA INFECTION OF PROSTHETIC KNEE JOINT (HCC): Status: ACTIVE | Noted: 2017-02-16

## 2017-06-26 LAB
ASCENDING AORTA: 2.6 CM
BH CV ECHO MEAS - ACS: 2.6 CM
BH CV ECHO MEAS - AO MAX PG (FULL): 3.4 MMHG
BH CV ECHO MEAS - AO MAX PG: 6.3 MMHG
BH CV ECHO MEAS - AO MEAN PG (FULL): 2 MMHG
BH CV ECHO MEAS - AO MEAN PG: 3.4 MMHG
BH CV ECHO MEAS - AO ROOT AREA (BSA CORRECTED): 1.6
BH CV ECHO MEAS - AO ROOT AREA: 11.2 CM^2
BH CV ECHO MEAS - AO ROOT DIAM: 3.8 CM
BH CV ECHO MEAS - AO V2 MAX: 125.1 CM/SEC
BH CV ECHO MEAS - AO V2 MEAN: 85.9 CM/SEC
BH CV ECHO MEAS - AO V2 VTI: 24.2 CM
BH CV ECHO MEAS - BSA(HAYCOCK): 2.4 M^2
BH CV ECHO MEAS - BSA: 2.4 M^2
BH CV ECHO MEAS - BZI_BMI: 31.6 KILOGRAMS/M^2
BH CV ECHO MEAS - BZI_METRIC_HEIGHT: 188 CM
BH CV ECHO MEAS - BZI_METRIC_WEIGHT: 111.6 KG
BH CV ECHO MEAS - CONTRAST EF (2CH): 42.2 ML/M^2
BH CV ECHO MEAS - CONTRAST EF 4CH: 42.4 ML/M^2
BH CV ECHO MEAS - EDV(MOD-SP2): 199 ML
BH CV ECHO MEAS - EDV(MOD-SP4): 165 ML
BH CV ECHO MEAS - EDV(TEICH): 185.6 ML
BH CV ECHO MEAS - EF(CUBED): 50.2 %
BH CV ECHO MEAS - EF(MOD-SP2): 42.2 %
BH CV ECHO MEAS - EF(MOD-SP4): 42.4 %
BH CV ECHO MEAS - EF(TEICH): 41.5 %
BH CV ECHO MEAS - ESV(MOD-SP2): 115 ML
BH CV ECHO MEAS - ESV(MOD-SP4): 95 ML
BH CV ECHO MEAS - ESV(TEICH): 108.6 ML
BH CV ECHO MEAS - FS: 20.7 %
BH CV ECHO MEAS - IVS/LVPW: 1
BH CV ECHO MEAS - IVSD: 1.4 CM
BH CV ECHO MEAS - LAT PEAK E' VEL: 11 CM/SEC
BH CV ECHO MEAS - LV DIASTOLIC VOL/BSA (35-75): 69.5 ML/M^2
BH CV ECHO MEAS - LV MASS(C)D: 400.8 GRAMS
BH CV ECHO MEAS - LV MASS(C)DI: 168.9 GRAMS/M^2
BH CV ECHO MEAS - LV MAX PG: 2.8 MMHG
BH CV ECHO MEAS - LV MEAN PG: 1.5 MMHG
BH CV ECHO MEAS - LV SYSTOLIC VOL/BSA (12-30): 40 ML/M^2
BH CV ECHO MEAS - LV V1 MAX: 84.4 CM/SEC
BH CV ECHO MEAS - LV V1 MEAN: 55.1 CM/SEC
BH CV ECHO MEAS - LV V1 VTI: 17.1 CM
BH CV ECHO MEAS - LVIDD: 6.1 CM
BH CV ECHO MEAS - LVIDS: 4.8 CM
BH CV ECHO MEAS - LVLD AP2: 9.9 CM
BH CV ECHO MEAS - LVLD AP4: 9.2 CM
BH CV ECHO MEAS - LVLS AP2: 9 CM
BH CV ECHO MEAS - LVLS AP4: 7.9 CM
BH CV ECHO MEAS - LVPWD: 1.4 CM
BH CV ECHO MEAS - MED PEAK E' VEL: 7 CM/SEC
BH CV ECHO MEAS - MR MAX PG: 9.4 MMHG
BH CV ECHO MEAS - MR MAX VEL: 152.9 CM/SEC
BH CV ECHO MEAS - MV A DUR: 0.15 SEC
BH CV ECHO MEAS - MV A MAX VEL: 91.2 CM/SEC
BH CV ECHO MEAS - MV DEC SLOPE: 172.6 CM/SEC^2
BH CV ECHO MEAS - MV DEC TIME: 0.27 SEC
BH CV ECHO MEAS - MV E MAX VEL: 49.5 CM/SEC
BH CV ECHO MEAS - MV E/A: 0.54
BH CV ECHO MEAS - MV MAX PG: 4.7 MMHG
BH CV ECHO MEAS - MV MEAN PG: 1.7 MMHG
BH CV ECHO MEAS - MV P1/2T MAX VEL: 47.5 CM/SEC
BH CV ECHO MEAS - MV P1/2T: 80.6 MSEC
BH CV ECHO MEAS - MV V2 MAX: 108.6 CM/SEC
BH CV ECHO MEAS - MV V2 MEAN: 60.3 CM/SEC
BH CV ECHO MEAS - MV V2 VTI: 20.8 CM
BH CV ECHO MEAS - MVA P1/2T LCG: 4.6 CM^2
BH CV ECHO MEAS - MVA(P1/2T): 2.7 CM^2
BH CV ECHO MEAS - PA ACC TIME: 0.14 SEC
BH CV ECHO MEAS - PA MAX PG (FULL): 0.97 MMHG
BH CV ECHO MEAS - PA MAX PG: 3.2 MMHG
BH CV ECHO MEAS - PA PR(ACCEL): 17.2 MMHG
BH CV ECHO MEAS - PA V2 MAX: 89.5 CM/SEC
BH CV ECHO MEAS - PULM A REVS DUR: 0.11 SEC
BH CV ECHO MEAS - PULM A REVS VEL: 36.2 CM/SEC
BH CV ECHO MEAS - PULM DIAS VEL: 28.8 CM/SEC
BH CV ECHO MEAS - PULM S/D: 1.4
BH CV ECHO MEAS - PULM SYS VEL: 39.7 CM/SEC
BH CV ECHO MEAS - PVA(V,A): 2.7 CM^2
BH CV ECHO MEAS - PVA(V,D): 2.7 CM^2
BH CV ECHO MEAS - RV MAX PG: 2.2 MMHG
BH CV ECHO MEAS - RV MEAN PG: 1.5 MMHG
BH CV ECHO MEAS - RV V1 MAX: 74.7 CM/SEC
BH CV ECHO MEAS - RV V1 MEAN: 59.3 CM/SEC
BH CV ECHO MEAS - RV V1 VTI: 14.6 CM
BH CV ECHO MEAS - RVOT AREA: 3.2 CM^2
BH CV ECHO MEAS - RVOT DIAM: 2 CM
BH CV ECHO MEAS - SI(AO): 114.4 ML/M^2
BH CV ECHO MEAS - SI(CUBED): 47.5 ML/M^2
BH CV ECHO MEAS - SI(MOD-SP2): 35.4 ML/M^2
BH CV ECHO MEAS - SI(MOD-SP4): 29.5 ML/M^2
BH CV ECHO MEAS - SI(TEICH): 32.4 ML/M^2
BH CV ECHO MEAS - SV(AO): 271.4 ML
BH CV ECHO MEAS - SV(CUBED): 112.8 ML
BH CV ECHO MEAS - SV(MOD-SP2): 84 ML
BH CV ECHO MEAS - SV(MOD-SP4): 70 ML
BH CV ECHO MEAS - SV(RVOT): 47 ML
BH CV ECHO MEAS - SV(TEICH): 77 ML
BH CV ECHO MEAS - TAPSE (>1.6): 2.4 CM2
BH CV XLRA - RV BASE: 3.4 CM
BH CV XLRA - TDI S': 13 CM/SEC
E/E' RATIO: 6
LEFT ATRIUM VOLUME INDEX: 35 ML/M2
LV EF 2D ECHO EST: 42 %
SINUS: 3.5 CM
STJ: 2.7 CM

## 2017-06-26 PROCEDURE — C8929 TTE W OR WO FOL WCON,DOPPLER: HCPCS

## 2017-06-26 PROCEDURE — 99214 OFFICE O/P EST MOD 30 MIN: CPT | Performed by: PHYSICIAN ASSISTANT

## 2017-06-26 PROCEDURE — 93306 TTE W/DOPPLER COMPLETE: CPT | Performed by: INTERNAL MEDICINE

## 2017-06-26 PROCEDURE — 25010000002 PERFLUTREN (DEFINITY) 8.476 MG IN SODIUM CHLORIDE 10 ML INJECTION: Performed by: INTERNAL MEDICINE

## 2017-06-26 PROCEDURE — 93000 ELECTROCARDIOGRAM COMPLETE: CPT | Performed by: PHYSICIAN ASSISTANT

## 2017-06-26 RX ADMIN — PERFLUTREN 1.5 ML: 6.52 INJECTION, SUSPENSION INTRAVENOUS at 13:53

## 2017-06-26 NOTE — PROGRESS NOTES
"  Date of Office Visit: 2017  Encounter Provider: MAKAYLA Vidal  Place of Service: [facility] CARDIOLOGY  Patient Name: Hugh R McBurney  :1950    Chief Complaint   Patient presents with   • Coronary Artery Disease     1 week hospital follow up   :     HPI: Hugh R McBurney is a 66 y.o. male , new to me, who presents today for follow-up.  Old records have been obtained and reviewed by me.  He is a patient with a past medical history significant for hypertension, hyperlipidemia, diabetes, GERD, and obstructive sleep apnea (noncompliant with CPAP).  On 2017, he presented for elective knee surgery.  He was status post right TKA and subsequent prosthetic joint infection.  While he was in preop holding, he developed classic \"red man syndrome\" after vancomycin infusion began.  He also experienced chest discomfort and ruled in for a non-STEMI.  Cardiac catheterization showed a normal left main, 20% proximal LAD, 50% mid LAD, discrete 90% proximal first diagonal (small caliber vessel), diffuse 50% mid circumflex, and a discrete 70-80% proximal RPL 2 stenosis (small caliber vessel).  He did have wall motion abnormalities that were noted on LV gram that were felt to be out of proportion to his coronary disease, and was in disagreement with an echocardiogram performed earlier that day.  It was felt that he may have a stress induced cardiomyopathy.  Recommendations were to continue an ACE inhibitor and beta blocker, as well as aspirin and a statin.  Low-dose isosorbide mononitrate was added to his regimen.  Discussion was had with both his orthopedic surgeon and infectious disease, and it was recommended for the patient to resume oral antibiotics and delay surgery for 2-4 weeks as long as he was stable from an infectious disease standpoint.   He did have an echocardiogram today.  I did discuss the findings of the echocardiogram with Dr. Marshall, who feels that his ejection " fraction is still around 40%.  Since he's been home he's been doing well from a cardiac standpoint.  He denies any chest pain, shortness of breath, palpitations, dizziness, or syncope.  He does have some swelling of his right leg secondary to his knee infection.  His knee is extremely painful.  He did see Dr. Marie earlier today, and his surgery is tentatively scheduled for 7/7/2017.  Evidently he is going to be getting an antibiotic spacer and will not have a  for about 8 weeks.  Then if the infection is cleared, he will return for knee replacement.        Past Medical History:   Diagnosis Date   • Chronic pain     BILATERAL KNEES AND BACK   • Coronary artery disease    • Depression    • Diabetes mellitus    • Hyperlipidemia    • Hypertension    • Kidney stone    • MSSA (methicillin susceptible Staphylococcus aureus) infection     RIGHT KNEE   • NSTEMI (non-ST elevated myocardial infarction) 6/14/2017       Past Surgical History:   Procedure Laterality Date   • AMPUTATION FOOT / TOE Left    • APPENDECTOMY     • BACK SURGERY     • CARDIAC CATHETERIZATION N/A 6/14/2017    Procedure: Left Heart Cath;  Surgeon: Tiburcio Moreno MD;  Location: [facility] INVASIVE LOCATION;  Service:    • COLONOSCOPY     • CORONARY ANGIOPLASTY     • JOINT REPLACEMENT      BILATERAL KNEES    • TOTAL KNEE  PROSTHESIS REMOVAL W/ SPACER INSERTION Right 2/14/2017    Procedure: INCISION AND DRAINAGE RIGHT KNEE, POLY CHANGE;  Surgeon: Kiko Marie MD;  Location: [facility];  Service:    • TRANS METATARSAL AMPUTATION Left 2/14/2017    Procedure: EXCISIONAL DEBRIDEMENT LT. FIRST TOE DIABETIC ULCER, DRAINAGE ABSCESS FIRST TOE, FIRST TOE AMPUTATION;  Surgeon: Osmin Brand MD;  Location: [facility];  Service:        Social History     Social History   • Marital status:      Spouse name: N/A   • Number of children: N/A   • Years of education: N/A     Occupational History   • Not on file.     Social History  Main Topics   • Smoking status: Former Smoker     Packs/day: 3.00     Years: 20.00   • Smokeless tobacco: Not on file      Comment: quit 35 years ago    • Alcohol use No   • Drug use: Yes     Special: Other      Comment: vicodin-prescribed    • Sexual activity: Defer     Other Topics Concern   • Not on file     Social History Narrative       Family History   Problem Relation Age of Onset   • Heart disease Mother    • Heart disease Father    • Diabetes Sister    • Dementia Maternal Grandmother    • No Known Problems Maternal Grandfather    • Heart disease Paternal Grandmother    • Heart attack Paternal Grandfather    • Heart disease Paternal Grandfather    • Heart disease Son    • Heart attack Son    • No Known Problems Sister    • Malig Hyperthermia Neg Hx        Review of Systems   Constitution: Negative for chills, fever, malaise/fatigue, weight gain and weight loss.   HENT: Negative for ear pain, headaches, hearing loss, nosebleeds and sore throat.    Eyes: Negative for double vision, pain and visual disturbance.   Cardiovascular: Positive for leg swelling. Negative for chest pain, dyspnea on exertion, irregular heartbeat, near-syncope, orthopnea, palpitations, paroxysmal nocturnal dyspnea and syncope.   Respiratory: Negative for cough, shortness of breath, sleep disturbances due to breathing, snoring and wheezing.    Endocrine: Negative for cold intolerance, heat intolerance and polyuria.   Skin: Negative for itching and rash.   Musculoskeletal: Positive for joint pain and joint swelling. Negative for myalgias.   Gastrointestinal: Negative for abdominal pain, diarrhea, melena, nausea and vomiting.   Genitourinary: Negative for frequency, hematuria and hesitancy.   Neurological: Negative for excessive daytime sleepiness, light-headedness, numbness, paresthesias and seizures.   Psychiatric/Behavioral: Negative for altered mental status and depression.   Allergic/Immunologic: Negative.    All other systems reviewed  and are negative.      Allergies   Allergen Reactions   • Vancomycin Other (See Comments)     Red Man syndrome, slow rate and premedicate with benadryl         Current Outpatient Prescriptions:   •  amLODIPine (NORVASC) 5 MG tablet, Take 1 tablet by mouth Daily., Disp: , Rfl:   •  aspirin EC 81 MG EC tablet, Take 1 tablet by mouth Daily., Disp: , Rfl:   •  atorvastatin (LIPITOR) 40 MG tablet, Take 1 tablet by mouth Every Night., Disp: 30 tablet, Rfl: 1  •  bisacodyl (DULCOLAX) 10 MG suppository, Insert 1 suppository into the rectum Daily As Needed for constipation., Disp: , Rfl: 0  •  ciprofloxacin (CIPRO) 750 MG tablet, Take 1 tablet by mouth Every 12 (Twelve) Hours for 14 days. Indications: Bone and/or Joint Infection, Disp: , Rfl: 0  •  ferrous sulfate 325 (65 FE) MG tablet, Take 325 mg by mouth Daily With Breakfast., Disp: , Rfl:   •  glucose blood (ACCU-CHEK COMPACT PLUS) test strip, Use to test blood sugar 5 times daily, Disp: 500 each, Rfl: 3  •  glucose blood (ACCU-CHEK COMPACT STRIPS) test strip, Use to test blood sugar 4 times daily  ICD 10 code E11.9, Disp: 400 each, Rfl: 3  •  HYDROcodone-acetaminophen (NORCO)  MG per tablet, Take 1 or 2 tablets every 4 hours as needed for moderate to severe pain, Disp: 12 tablet, Rfl: 0  •  insulin aspart (NOVOLOG FLEXPEN) 100 UNIT/ML solution pen-injector sc pen, 6 units with each meal, with a maxmium of 48 units per day. (Patient taking differently: 6 Units 3 (Three) Times a Day With Meals. 6 units with each meal, with a maxmium of 48 units per day.), Disp: 20 pen, Rfl: 1  •  Insulin Glargine (LANTUS SOLOSTAR) 100 UNIT/ML injection pen, Inject 40 Units under the skin 2 (Two) Times a Day. (Patient taking differently: Inject 45 Units under the skin 2 (Two) Times a Day.), Disp: 4 pen, Rfl: 2  •  Insulin Pen Needle (BD PEN NEEDLE JUAN DAVID U/F) 32G X 4 MM misc, TO INJECT 4 TIMES DAILY, Disp: 100 each, Rfl: 3  •  isosorbide mononitrate (IMDUR) 30 MG 24 hr tablet, Take 1  "tablet by mouth Daily., Disp: 30 tablet, Rfl: 1  •  Lancets (ACCU-CHEK MULTICLIX) lancets, Use to test blood sugar 5 times daily, Disp: 500 each, Rfl: 3  •  lisinopril (PRINIVIL,ZESTRIL) 20 MG tablet, Take 20 mg by mouth Every Evening., Disp: , Rfl:   •  metoprolol tartrate (LOPRESSOR) 50 MG tablet, Take 1 tablet by mouth Every 12 (Twelve) Hours., Disp: 60 tablet, Rfl: 1  •  Multiple Vitamin (MULTI VITAMIN DAILY PO), Take 1 tablet by mouth Daily. HOLD PRIOR TO SURGERY, Disp: , Rfl:   •  omeprazole (priLOSEC) 40 MG capsule, Take 40 mg by mouth 2 (Two) Times a Day., Disp: , Rfl:   •  promethazine (PHENERGAN) 25 MG tablet, Take 1 tablet by mouth Every 8 (Eight) Hours As Needed for Nausea or Vomiting., Disp: 30 tablet, Rfl: 2  •  rifAMPin (RIFADIN) 300 MG capsule, Take 1 capsule by mouth Every 12 (Twelve) Hours. Indications: Bone and/or Joint Infection, Disp: , Rfl: 0  •  sertraline (ZOLOFT) 100 MG tablet, Take 100 mg by mouth Daily., Disp: , Rfl:   No current facility-administered medications for this visit.      Objective:     Vitals:    06/26/17 1414 06/26/17 1428   BP: 132/70 138/78   BP Location: Right arm Left arm   Pulse: 93    SpO2: 100%    Weight: 248 lb (112 kg)    Height: 74\" (188 cm)      Body mass index is 31.84 kg/(m^2).    PHYSICAL EXAM:    Physical Exam   Constitutional: He is oriented to person, place, and time. He appears well-developed and well-nourished. No distress.   HENT:   Head: Normocephalic and atraumatic.   Eyes: Pupils are equal, round, and reactive to light.   Neck: No JVD present. No thyromegaly present.   Cardiovascular: Normal rate, regular rhythm, normal heart sounds and intact distal pulses.    No murmur heard.  Pulmonary/Chest: Effort normal and breath sounds normal. No respiratory distress. He has no rales.   Abdominal: Soft. Bowel sounds are normal. He exhibits no distension and no ascites. There is no splenomegaly or hepatomegaly. There is no tenderness.   Musculoskeletal: Normal " range of motion. He exhibits no edema.   Erythema and edema of the right knee.   Neurological: He is alert and oriented to person, place, and time.   Skin: Skin is warm and dry. He is not diaphoretic. No erythema.   Psychiatric: He has a normal mood and affect. His behavior is normal. Judgment normal.         ECG 12 Lead  Date/Time: 6/26/2017 2:35 PM  Performed by: HARISH HAMMER  Authorized by: HARISH HAMMER   Comparison: compared with previous ECG from 6/15/2017  Similar to previous ECG  Rhythm: sinus rhythm  BPM: 93  Other findings: LVH  Clinical impression: abnormal ECG  Comments: Indication: Coronary artery disease, status post non-STEMI.              Assessment:       Diagnosis Plan   1. Cardiomyopathy     2. Coronary artery disease involving native coronary artery of native heart without angina pectoris  ECG 12 Lead     Orders Placed This Encounter   Procedures   • ECG 12 Lead     This order was created via procedure documentation          Plan:       1.  Coronary Artery Disease  Assessment  • The patient has no angina  • There is a new diagnosis of stable angina in the past 12 months  • The patient is having symptoms consistent with unstable angina     Subjective - Objective  • There is a history of past MI  • Current antiplatelet therapy includes aspirin 81 mg  • The patient qualifies for cardiac rehabilitation, but has not been referred for medical reasons  • Overall he's doing well from a cardiac standpoint.  He had a non-STEMI in the setting of an allergic reaction to vancomycin and subsequent hypotension.  He has mild to moderate coronary artery disease.  He is on a good medical regimen for this.  I think that once he is over this issue with his knee, we will need to focus on a heart healthy diet and other lifestyle modifications.  He is planning on having his knee surgery on 7/7/2017.  I did discuss this with Dr. Moreno and Dr. Marie.  As of right now, he is cleared from a cardiac standpoint to  undergo his knee surgery.  I think that hypotension should be avoided if at all possible.    2.  Heart Failure  Assessment  • NYHA class I - There is no limitation of physical activity. Physical activity does not cause fatigue, palpitations or shortness of breath.  • The patient was newly diagnosed with heart failure within the past 12 months  • ACE inhibitor prescribed  • Beta blocker prescribed  • Left ventricular function is mildly reduced by qualitative assessment    Plan  • The heart failure care plan was discussed with the patient today including: continuing the current program    Subjective/Objective    • Physical exam findings negative for rales, peripheral edema, elevated JVP, hepatomegaly and ascites.  • He had a stress induced cardiomyopathy.  His ejection fraction seems to be about the same today as it was when he left the hospital, around 40%.  He is on a good medical regimen.  Continue current plan.    He will follow-up with Dr. Moreno in about 3 months after his knee surgery.  I have asked his orthopedic surgeon to let us know when he is admitted to the hospital.  As always, it has been a pleasure to participate in your patient's care.      Sincerely,         Ashley Suarez PA-C

## 2017-06-30 ENCOUNTER — APPOINTMENT (OUTPATIENT)
Dept: PREADMISSION TESTING | Facility: HOSPITAL | Age: 67
End: 2017-06-30

## 2017-06-30 LAB
ABO GROUP BLD: NORMAL
ANION GAP SERPL CALCULATED.3IONS-SCNC: 13.7 MMOL/L
APTT PPP: 28.7 SECONDS (ref 22.7–35.4)
BACTERIA UR QL AUTO: ABNORMAL /HPF
BASOPHILS # BLD AUTO: 0.04 10*3/MM3 (ref 0–0.2)
BASOPHILS NFR BLD AUTO: 0.4 % (ref 0–1.5)
BILIRUB UR QL STRIP: NEGATIVE
BLD GP AB SCN SERPL QL: NEGATIVE
BUN BLD-MCNC: 16 MG/DL (ref 8–23)
BUN/CREAT SERPL: 14.4 (ref 7–25)
CALCIUM SPEC-SCNC: 9.1 MG/DL (ref 8.6–10.5)
CHLORIDE SERPL-SCNC: 91 MMOL/L (ref 98–107)
CLARITY UR: CLEAR
CO2 SERPL-SCNC: 23.3 MMOL/L (ref 22–29)
COLOR UR: YELLOW
CREAT BLD-MCNC: 1.11 MG/DL (ref 0.76–1.27)
DEPRECATED RDW RBC AUTO: 45 FL (ref 37–54)
EOSINOPHIL # BLD AUTO: 0.37 10*3/MM3 (ref 0–0.7)
EOSINOPHIL NFR BLD AUTO: 4 % (ref 0.3–6.2)
ERYTHROCYTE [DISTWIDTH] IN BLOOD BY AUTOMATED COUNT: 14.4 % (ref 11.5–14.5)
GFR SERPL CREATININE-BSD FRML MDRD: 66 ML/MIN/1.73
GLUCOSE BLD-MCNC: 313 MG/DL (ref 65–99)
GLUCOSE UR STRIP-MCNC: NEGATIVE MG/DL
HBA1C MFR BLD: 9.7 % (ref 4.8–5.6)
HCT VFR BLD AUTO: 36.4 % (ref 40.4–52.2)
HGB BLD-MCNC: 12.1 G/DL (ref 13.7–17.6)
HGB UR QL STRIP.AUTO: ABNORMAL
HYALINE CASTS UR QL AUTO: ABNORMAL /LPF
IMM GRANULOCYTES # BLD: 0 10*3/MM3 (ref 0–0.03)
IMM GRANULOCYTES NFR BLD: 0 % (ref 0–0.5)
INR PPP: 1.1 (ref 0.9–1.1)
KETONES UR QL STRIP: NEGATIVE
LEUKOCYTE ESTERASE UR QL STRIP.AUTO: NEGATIVE
LYMPHOCYTES # BLD AUTO: 1.85 10*3/MM3 (ref 0.9–4.8)
LYMPHOCYTES NFR BLD AUTO: 19.8 % (ref 19.6–45.3)
MCH RBC QN AUTO: 28.7 PG (ref 27–32.7)
MCHC RBC AUTO-ENTMCNC: 33.2 G/DL (ref 32.6–36.4)
MCV RBC AUTO: 86.5 FL (ref 79.8–96.2)
MONOCYTES # BLD AUTO: 0.49 10*3/MM3 (ref 0.2–1.2)
MONOCYTES NFR BLD AUTO: 5.2 % (ref 5–12)
NEUTROPHILS # BLD AUTO: 6.6 10*3/MM3 (ref 1.9–8.1)
NEUTROPHILS NFR BLD AUTO: 70.6 % (ref 42.7–76)
NITRITE UR QL STRIP: NEGATIVE
PH UR STRIP.AUTO: 6.5 [PH] (ref 5–8)
PLATELET # BLD AUTO: 339 10*3/MM3 (ref 140–500)
PMV BLD AUTO: 10.8 FL (ref 6–12)
POTASSIUM BLD-SCNC: 4.2 MMOL/L (ref 3.5–5.2)
PROT UR QL STRIP: ABNORMAL
PROTHROMBIN TIME: 13.8 SECONDS (ref 11.7–14.2)
RBC # BLD AUTO: 4.21 10*6/MM3 (ref 4.6–6)
RBC # UR: ABNORMAL /HPF
REF LAB TEST METHOD: ABNORMAL
RH BLD: POSITIVE
SODIUM BLD-SCNC: 128 MMOL/L (ref 136–145)
SP GR UR STRIP: 1.01 (ref 1–1.03)
SQUAMOUS #/AREA URNS HPF: ABNORMAL /HPF
UROBILINOGEN UR QL STRIP: ABNORMAL
WBC NRBC COR # BLD: 9.35 10*3/MM3 (ref 4.5–10.7)
WBC UR QL AUTO: ABNORMAL /HPF

## 2017-06-30 PROCEDURE — 81001 URINALYSIS AUTO W/SCOPE: CPT | Performed by: ORTHOPAEDIC SURGERY

## 2017-06-30 PROCEDURE — 80048 BASIC METABOLIC PNL TOTAL CA: CPT | Performed by: ORTHOPAEDIC SURGERY

## 2017-06-30 PROCEDURE — 86901 BLOOD TYPING SEROLOGIC RH(D): CPT | Performed by: ORTHOPAEDIC SURGERY

## 2017-06-30 PROCEDURE — 83036 HEMOGLOBIN GLYCOSYLATED A1C: CPT | Performed by: ORTHOPAEDIC SURGERY

## 2017-06-30 PROCEDURE — 36415 COLL VENOUS BLD VENIPUNCTURE: CPT

## 2017-06-30 PROCEDURE — 85610 PROTHROMBIN TIME: CPT | Performed by: ORTHOPAEDIC SURGERY

## 2017-06-30 PROCEDURE — 85025 COMPLETE CBC W/AUTO DIFF WBC: CPT | Performed by: ORTHOPAEDIC SURGERY

## 2017-06-30 PROCEDURE — 86850 RBC ANTIBODY SCREEN: CPT | Performed by: ORTHOPAEDIC SURGERY

## 2017-06-30 PROCEDURE — 85730 THROMBOPLASTIN TIME PARTIAL: CPT | Performed by: ORTHOPAEDIC SURGERY

## 2017-06-30 PROCEDURE — 86900 BLOOD TYPING SEROLOGIC ABO: CPT | Performed by: ORTHOPAEDIC SURGERY

## 2017-07-07 ENCOUNTER — HOSPITAL ENCOUNTER (INPATIENT)
Facility: HOSPITAL | Age: 67
LOS: 4 days | Discharge: NURSING, REHAB OR LONG-TERM CARE | End: 2017-07-11
Attending: ORTHOPAEDIC SURGERY | Admitting: ORTHOPAEDIC SURGERY

## 2017-07-07 ENCOUNTER — ANESTHESIA EVENT (OUTPATIENT)
Dept: PERIOP | Facility: HOSPITAL | Age: 67
End: 2017-07-07

## 2017-07-07 ENCOUNTER — APPOINTMENT (OUTPATIENT)
Dept: GENERAL RADIOLOGY | Facility: HOSPITAL | Age: 67
End: 2017-07-07

## 2017-07-07 ENCOUNTER — ANESTHESIA (OUTPATIENT)
Dept: PERIOP | Facility: HOSPITAL | Age: 67
End: 2017-07-07

## 2017-07-07 DIAGNOSIS — M00.9 INFECTION OF RIGHT KNEE (HCC): ICD-10-CM

## 2017-07-07 PROBLEM — T84.53XA INFECTION OF PROSTHETIC RIGHT KNEE JOINT (HCC): Chronic | Status: ACTIVE | Noted: 2017-07-07

## 2017-07-07 LAB
GLUCOSE BLDC GLUCOMTR-MCNC: 179 MG/DL (ref 70–130)
GLUCOSE BLDC GLUCOMTR-MCNC: 187 MG/DL (ref 70–130)

## 2017-07-07 PROCEDURE — C1713 ANCHOR/SCREW BN/BN,TIS/BN: HCPCS | Performed by: ORTHOPAEDIC SURGERY

## 2017-07-07 PROCEDURE — 25010000002 VANCOMYCIN PER 500 MG: Performed by: ORTHOPAEDIC SURGERY

## 2017-07-07 PROCEDURE — 0SPC0JZ REMOVAL OF SYNTHETIC SUBSTITUTE FROM RIGHT KNEE JOINT, OPEN APPROACH: ICD-10-PCS | Performed by: ORTHOPAEDIC SURGERY

## 2017-07-07 PROCEDURE — 87070 CULTURE OTHR SPECIMN AEROBIC: CPT | Performed by: ORTHOPAEDIC SURGERY

## 2017-07-07 PROCEDURE — 82962 GLUCOSE BLOOD TEST: CPT

## 2017-07-07 PROCEDURE — 25010000002 MORPHINE PER 10 MG: Performed by: ORTHOPAEDIC SURGERY

## 2017-07-07 PROCEDURE — 87176 TISSUE HOMOGENIZATION CULTR: CPT | Performed by: ORTHOPAEDIC SURGERY

## 2017-07-07 PROCEDURE — 25010000002 FENTANYL CITRATE (PF) 100 MCG/2ML SOLUTION: Performed by: NURSE ANESTHETIST, CERTIFIED REGISTERED

## 2017-07-07 PROCEDURE — 94799 UNLISTED PULMONARY SVC/PX: CPT

## 2017-07-07 PROCEDURE — 25010000002 PROPOFOL 10 MG/ML EMULSION: Performed by: NURSE ANESTHETIST, CERTIFIED REGISTERED

## 2017-07-07 PROCEDURE — 87186 SC STD MICRODIL/AGAR DIL: CPT | Performed by: ORTHOPAEDIC SURGERY

## 2017-07-07 PROCEDURE — L1830 KO IMMOB CANVAS LONG PRE OTS: HCPCS | Performed by: ORTHOPAEDIC SURGERY

## 2017-07-07 PROCEDURE — 87205 SMEAR GRAM STAIN: CPT | Performed by: ORTHOPAEDIC SURGERY

## 2017-07-07 PROCEDURE — 25010000002 PHENYLEPHRINE PER 1 ML: Performed by: NURSE ANESTHETIST, CERTIFIED REGISTERED

## 2017-07-07 PROCEDURE — 87147 CULTURE TYPE IMMUNOLOGIC: CPT | Performed by: ORTHOPAEDIC SURGERY

## 2017-07-07 PROCEDURE — 25010000002 HYDROMORPHONE PER 4 MG: Performed by: NURSE ANESTHETIST, CERTIFIED REGISTERED

## 2017-07-07 PROCEDURE — 73560 X-RAY EXAM OF KNEE 1 OR 2: CPT

## 2017-07-07 PROCEDURE — 25010000003 CEFAZOLIN IN DEXTROSE 2-4 GM/100ML-% SOLUTION: Performed by: ORTHOPAEDIC SURGERY

## 2017-07-07 PROCEDURE — 25010000002 ROPIVACAINE PER 1 MG: Performed by: ANESTHESIOLOGY

## 2017-07-07 PROCEDURE — 0SHC08Z INSERTION OF SPACER INTO RIGHT KNEE JOINT, OPEN APPROACH: ICD-10-PCS | Performed by: ORTHOPAEDIC SURGERY

## 2017-07-07 PROCEDURE — 25010000002 MIDAZOLAM PER 1 MG: Performed by: ANESTHESIOLOGY

## 2017-07-07 PROCEDURE — 25010000002 FENTANYL CITRATE (PF) 100 MCG/2ML SOLUTION: Performed by: ANESTHESIOLOGY

## 2017-07-07 DEVICE — IMPLANTABLE DEVICE: Type: IMPLANTABLE DEVICE | Site: KNEE | Status: FUNCTIONAL

## 2017-07-07 DEVICE — CMT BONE SIMPLEX/P TMYCIN FDOS SGL: Type: IMPLANTABLE DEVICE | Site: KNEE | Status: FUNCTIONAL

## 2017-07-07 RX ORDER — SODIUM CHLORIDE 9 MG/ML
100 INJECTION, SOLUTION INTRAVENOUS CONTINUOUS
Status: ACTIVE | OUTPATIENT
Start: 2017-07-07 | End: 2017-07-08

## 2017-07-07 RX ORDER — MIDAZOLAM HYDROCHLORIDE 1 MG/ML
2 INJECTION INTRAMUSCULAR; INTRAVENOUS
Status: DISCONTINUED | OUTPATIENT
Start: 2017-07-07 | End: 2017-07-07 | Stop reason: HOSPADM

## 2017-07-07 RX ORDER — HYDROCODONE BITARTRATE AND ACETAMINOPHEN 7.5; 325 MG/1; MG/1
1 TABLET ORAL EVERY 4 HOURS PRN
Status: DISCONTINUED | OUTPATIENT
Start: 2017-07-07 | End: 2017-07-11 | Stop reason: HOSPADM

## 2017-07-07 RX ORDER — LIDOCAINE HYDROCHLORIDE AND EPINEPHRINE BITARTRATE 20; .01 MG/ML; MG/ML
INJECTION, SOLUTION SUBCUTANEOUS AS NEEDED
Status: DISCONTINUED | OUTPATIENT
Start: 2017-07-07 | End: 2017-07-07 | Stop reason: SURG

## 2017-07-07 RX ORDER — PROMETHAZINE HYDROCHLORIDE 25 MG/1
25 SUPPOSITORY RECTAL ONCE AS NEEDED
Status: DISCONTINUED | OUTPATIENT
Start: 2017-07-07 | End: 2017-07-07 | Stop reason: HOSPADM

## 2017-07-07 RX ORDER — LISINOPRIL 20 MG/1
20 TABLET ORAL EVERY EVENING
Status: DISCONTINUED | OUTPATIENT
Start: 2017-07-07 | End: 2017-07-09

## 2017-07-07 RX ORDER — OXYCODONE AND ACETAMINOPHEN 10; 325 MG/1; MG/1
2 TABLET ORAL EVERY 4 HOURS PRN
Status: DISCONTINUED | OUTPATIENT
Start: 2017-07-07 | End: 2017-07-11 | Stop reason: HOSPADM

## 2017-07-07 RX ORDER — CEFAZOLIN SODIUM 2 G/100ML
2 INJECTION, SOLUTION INTRAVENOUS EVERY 8 HOURS
Status: COMPLETED | OUTPATIENT
Start: 2017-07-07 | End: 2017-07-08

## 2017-07-07 RX ORDER — BISACODYL 10 MG
10 SUPPOSITORY, RECTAL RECTAL DAILY PRN
Status: DISCONTINUED | OUTPATIENT
Start: 2017-07-07 | End: 2017-07-11 | Stop reason: HOSPADM

## 2017-07-07 RX ORDER — SODIUM CHLORIDE 0.9 % (FLUSH) 0.9 %
1-10 SYRINGE (ML) INJECTION AS NEEDED
Status: DISCONTINUED | OUTPATIENT
Start: 2017-07-07 | End: 2017-07-07 | Stop reason: HOSPADM

## 2017-07-07 RX ORDER — FENTANYL CITRATE 50 UG/ML
INJECTION, SOLUTION INTRAMUSCULAR; INTRAVENOUS AS NEEDED
Status: DISCONTINUED | OUTPATIENT
Start: 2017-07-07 | End: 2017-07-07 | Stop reason: SURG

## 2017-07-07 RX ORDER — FAMOTIDINE 10 MG/ML
20 INJECTION, SOLUTION INTRAVENOUS ONCE
Status: COMPLETED | OUTPATIENT
Start: 2017-07-07 | End: 2017-07-07

## 2017-07-07 RX ORDER — NALOXONE HCL 0.4 MG/ML
0.4 VIAL (ML) INJECTION
Status: DISCONTINUED | OUTPATIENT
Start: 2017-07-07 | End: 2017-07-11 | Stop reason: HOSPADM

## 2017-07-07 RX ORDER — FENTANYL CITRATE 50 UG/ML
50 INJECTION, SOLUTION INTRAMUSCULAR; INTRAVENOUS
Status: DISCONTINUED | OUTPATIENT
Start: 2017-07-07 | End: 2017-07-07 | Stop reason: HOSPADM

## 2017-07-07 RX ORDER — ISOSORBIDE MONONITRATE 30 MG/1
30 TABLET, EXTENDED RELEASE ORAL
Status: DISCONTINUED | OUTPATIENT
Start: 2017-07-07 | End: 2017-07-11 | Stop reason: HOSPADM

## 2017-07-07 RX ORDER — ACETAMINOPHEN 325 MG/1
325 TABLET ORAL EVERY 4 HOURS PRN
Status: DISCONTINUED | OUTPATIENT
Start: 2017-07-07 | End: 2017-07-11 | Stop reason: HOSPADM

## 2017-07-07 RX ORDER — PROMETHAZINE HYDROCHLORIDE 25 MG/1
25 TABLET ORAL ONCE AS NEEDED
Status: DISCONTINUED | OUTPATIENT
Start: 2017-07-07 | End: 2017-07-07 | Stop reason: HOSPADM

## 2017-07-07 RX ORDER — HYDRALAZINE HYDROCHLORIDE 20 MG/ML
5 INJECTION INTRAMUSCULAR; INTRAVENOUS
Status: DISCONTINUED | OUTPATIENT
Start: 2017-07-07 | End: 2017-07-07 | Stop reason: HOSPADM

## 2017-07-07 RX ORDER — CEFAZOLIN SODIUM 2 G/100ML
2 INJECTION, SOLUTION INTRAVENOUS ONCE
Status: COMPLETED | OUTPATIENT
Start: 2017-07-07 | End: 2017-07-07

## 2017-07-07 RX ORDER — DIPHENHYDRAMINE HYDROCHLORIDE 50 MG/ML
12.5 INJECTION INTRAMUSCULAR; INTRAVENOUS
Status: DISCONTINUED | OUTPATIENT
Start: 2017-07-07 | End: 2017-07-07 | Stop reason: HOSPADM

## 2017-07-07 RX ORDER — HYDROMORPHONE HCL 110MG/55ML
PATIENT CONTROLLED ANALGESIA SYRINGE INTRAVENOUS AS NEEDED
Status: DISCONTINUED | OUTPATIENT
Start: 2017-07-07 | End: 2017-07-07 | Stop reason: SURG

## 2017-07-07 RX ORDER — ONDANSETRON 2 MG/ML
4 INJECTION INTRAMUSCULAR; INTRAVENOUS ONCE AS NEEDED
Status: DISCONTINUED | OUTPATIENT
Start: 2017-07-07 | End: 2017-07-07 | Stop reason: HOSPADM

## 2017-07-07 RX ORDER — CIPROFLOXACIN 750 MG/1
750 TABLET, FILM COATED ORAL 2 TIMES DAILY
COMMUNITY
End: 2017-07-11 | Stop reason: HOSPADM

## 2017-07-07 RX ORDER — SODIUM CHLORIDE, SODIUM LACTATE, POTASSIUM CHLORIDE, CALCIUM CHLORIDE 600; 310; 30; 20 MG/100ML; MG/100ML; MG/100ML; MG/100ML
9 INJECTION, SOLUTION INTRAVENOUS CONTINUOUS
Status: DISCONTINUED | OUTPATIENT
Start: 2017-07-07 | End: 2017-07-11 | Stop reason: HOSPADM

## 2017-07-07 RX ORDER — NALOXONE HCL 0.4 MG/ML
0.2 VIAL (ML) INJECTION AS NEEDED
Status: DISCONTINUED | OUTPATIENT
Start: 2017-07-07 | End: 2017-07-07 | Stop reason: HOSPADM

## 2017-07-07 RX ORDER — HYDROMORPHONE HYDROCHLORIDE 1 MG/ML
0.5 INJECTION, SOLUTION INTRAMUSCULAR; INTRAVENOUS; SUBCUTANEOUS
Status: DISCONTINUED | OUTPATIENT
Start: 2017-07-07 | End: 2017-07-07 | Stop reason: HOSPADM

## 2017-07-07 RX ORDER — CEFAZOLIN SODIUM 2 G/100ML
INJECTION, SOLUTION INTRAVENOUS
Status: DISPENSED
Start: 2017-07-07 | End: 2017-07-07

## 2017-07-07 RX ORDER — RIFAMPIN 300 MG/1
300 CAPSULE ORAL EVERY 12 HOURS SCHEDULED
Status: DISCONTINUED | OUTPATIENT
Start: 2017-07-07 | End: 2017-07-11 | Stop reason: HOSPADM

## 2017-07-07 RX ORDER — HYDROCODONE BITARTRATE AND ACETAMINOPHEN 7.5; 325 MG/1; MG/1
1 TABLET ORAL ONCE AS NEEDED
Status: DISCONTINUED | OUTPATIENT
Start: 2017-07-07 | End: 2017-07-07 | Stop reason: HOSPADM

## 2017-07-07 RX ORDER — PANTOPRAZOLE SODIUM 40 MG/1
40 TABLET, DELAYED RELEASE ORAL EVERY MORNING
Status: DISCONTINUED | OUTPATIENT
Start: 2017-07-08 | End: 2017-07-08

## 2017-07-07 RX ORDER — LIDOCAINE HYDROCHLORIDE 20 MG/ML
INJECTION, SOLUTION INFILTRATION; PERINEURAL AS NEEDED
Status: DISCONTINUED | OUTPATIENT
Start: 2017-07-07 | End: 2017-07-07 | Stop reason: SURG

## 2017-07-07 RX ORDER — OXYCODONE AND ACETAMINOPHEN 10; 325 MG/1; MG/1
1 TABLET ORAL EVERY 4 HOURS PRN
COMMUNITY
End: 2017-07-11 | Stop reason: HOSPADM

## 2017-07-07 RX ORDER — METOPROLOL TARTRATE 50 MG/1
50 TABLET, FILM COATED ORAL EVERY 12 HOURS SCHEDULED
Status: DISCONTINUED | OUTPATIENT
Start: 2017-07-07 | End: 2017-07-11 | Stop reason: HOSPADM

## 2017-07-07 RX ORDER — MIDAZOLAM HYDROCHLORIDE 1 MG/ML
1 INJECTION INTRAMUSCULAR; INTRAVENOUS
Status: DISCONTINUED | OUTPATIENT
Start: 2017-07-07 | End: 2017-07-07 | Stop reason: HOSPADM

## 2017-07-07 RX ORDER — TRANEXAMIC ACID 100 MG/ML
INJECTION, SOLUTION INTRAVENOUS AS NEEDED
Status: DISCONTINUED | OUTPATIENT
Start: 2017-07-07 | End: 2017-07-07 | Stop reason: SURG

## 2017-07-07 RX ORDER — ASPIRIN 81 MG/1
81 TABLET ORAL DAILY
Status: DISCONTINUED | OUTPATIENT
Start: 2017-07-07 | End: 2017-07-08

## 2017-07-07 RX ORDER — OXYCODONE AND ACETAMINOPHEN 10; 325 MG/1; MG/1
1 TABLET ORAL EVERY 4 HOURS PRN
Status: DISCONTINUED | OUTPATIENT
Start: 2017-07-07 | End: 2017-07-07

## 2017-07-07 RX ORDER — EPHEDRINE SULFATE 50 MG/ML
5 INJECTION, SOLUTION INTRAVENOUS ONCE AS NEEDED
Status: DISCONTINUED | OUTPATIENT
Start: 2017-07-07 | End: 2017-07-07 | Stop reason: HOSPADM

## 2017-07-07 RX ORDER — PROPOFOL 10 MG/ML
VIAL (ML) INTRAVENOUS AS NEEDED
Status: DISCONTINUED | OUTPATIENT
Start: 2017-07-07 | End: 2017-07-07 | Stop reason: SURG

## 2017-07-07 RX ORDER — ONDANSETRON 2 MG/ML
4 INJECTION INTRAMUSCULAR; INTRAVENOUS EVERY 6 HOURS PRN
Status: DISCONTINUED | OUTPATIENT
Start: 2017-07-07 | End: 2017-07-11 | Stop reason: HOSPADM

## 2017-07-07 RX ORDER — OXYCODONE AND ACETAMINOPHEN 10; 325 MG/1; MG/1
1 TABLET ORAL EVERY 4 HOURS PRN
Status: DISCONTINUED | OUTPATIENT
Start: 2017-07-07 | End: 2017-07-11 | Stop reason: HOSPADM

## 2017-07-07 RX ORDER — ROPIVACAINE HYDROCHLORIDE 5 MG/ML
INJECTION, SOLUTION EPIDURAL; INFILTRATION; PERINEURAL AS NEEDED
Status: DISCONTINUED | OUTPATIENT
Start: 2017-07-07 | End: 2017-07-07 | Stop reason: SURG

## 2017-07-07 RX ORDER — OXYCODONE HCL 10 MG/1
10 TABLET, FILM COATED, EXTENDED RELEASE ORAL ONCE
Status: COMPLETED | OUTPATIENT
Start: 2017-07-07 | End: 2017-07-07

## 2017-07-07 RX ORDER — PROMETHAZINE HYDROCHLORIDE 25 MG/1
25 TABLET ORAL EVERY 8 HOURS PRN
Status: DISCONTINUED | OUTPATIENT
Start: 2017-07-07 | End: 2017-07-11 | Stop reason: HOSPADM

## 2017-07-07 RX ORDER — PROMETHAZINE HYDROCHLORIDE 25 MG/1
12.5 TABLET ORAL ONCE AS NEEDED
Status: DISCONTINUED | OUTPATIENT
Start: 2017-07-07 | End: 2017-07-07 | Stop reason: HOSPADM

## 2017-07-07 RX ORDER — FAMOTIDINE 10 MG/ML
20 INJECTION, SOLUTION INTRAVENOUS ONCE
Status: DISCONTINUED | OUTPATIENT
Start: 2017-07-07 | End: 2017-07-07 | Stop reason: HOSPADM

## 2017-07-07 RX ORDER — PROMETHAZINE HYDROCHLORIDE 25 MG/ML
12.5 INJECTION, SOLUTION INTRAMUSCULAR; INTRAVENOUS ONCE AS NEEDED
Status: DISCONTINUED | OUTPATIENT
Start: 2017-07-07 | End: 2017-07-07 | Stop reason: HOSPADM

## 2017-07-07 RX ORDER — DOCUSATE SODIUM 100 MG/1
100 CAPSULE, LIQUID FILLED ORAL 2 TIMES DAILY
Status: COMPLETED | OUTPATIENT
Start: 2017-07-07 | End: 2017-07-10

## 2017-07-07 RX ORDER — AMLODIPINE BESYLATE 5 MG/1
5 TABLET ORAL DAILY
Status: DISCONTINUED | OUTPATIENT
Start: 2017-07-07 | End: 2017-07-11 | Stop reason: HOSPADM

## 2017-07-07 RX ORDER — ROCURONIUM BROMIDE 10 MG/ML
INJECTION, SOLUTION INTRAVENOUS AS NEEDED
Status: DISCONTINUED | OUTPATIENT
Start: 2017-07-07 | End: 2017-07-07 | Stop reason: SURG

## 2017-07-07 RX ORDER — ATORVASTATIN CALCIUM 20 MG/1
40 TABLET, FILM COATED ORAL NIGHTLY
Status: DISCONTINUED | OUTPATIENT
Start: 2017-07-07 | End: 2017-07-11 | Stop reason: HOSPADM

## 2017-07-07 RX ORDER — SERTRALINE HYDROCHLORIDE 100 MG/1
100 TABLET, FILM COATED ORAL DAILY
Status: DISCONTINUED | OUTPATIENT
Start: 2017-07-07 | End: 2017-07-11 | Stop reason: HOSPADM

## 2017-07-07 RX ORDER — OXYCODONE AND ACETAMINOPHEN 7.5; 325 MG/1; MG/1
1 TABLET ORAL ONCE AS NEEDED
Status: DISCONTINUED | OUTPATIENT
Start: 2017-07-07 | End: 2017-07-07 | Stop reason: HOSPADM

## 2017-07-07 RX ORDER — FERROUS SULFATE 325(65) MG
325 TABLET ORAL
Status: DISCONTINUED | OUTPATIENT
Start: 2017-07-07 | End: 2017-07-11 | Stop reason: HOSPADM

## 2017-07-07 RX ORDER — PROMETHAZINE HYDROCHLORIDE 25 MG/ML
5 INJECTION, SOLUTION INTRAMUSCULAR; INTRAVENOUS
Status: DISCONTINUED | OUTPATIENT
Start: 2017-07-07 | End: 2017-07-07 | Stop reason: HOSPADM

## 2017-07-07 RX ORDER — LABETALOL HYDROCHLORIDE 5 MG/ML
5 INJECTION, SOLUTION INTRAVENOUS
Status: DISCONTINUED | OUTPATIENT
Start: 2017-07-07 | End: 2017-07-07 | Stop reason: HOSPADM

## 2017-07-07 RX ORDER — FLUMAZENIL 0.1 MG/ML
0.2 INJECTION INTRAVENOUS AS NEEDED
Status: DISCONTINUED | OUTPATIENT
Start: 2017-07-07 | End: 2017-07-07 | Stop reason: HOSPADM

## 2017-07-07 RX ORDER — OXYCODONE HCL 10 MG/1
10 TABLET, FILM COATED, EXTENDED RELEASE ORAL ONCE
Status: COMPLETED | OUTPATIENT
Start: 2017-07-08 | End: 2017-07-07

## 2017-07-07 RX ORDER — ASPIRIN 325 MG
325 TABLET, DELAYED RELEASE (ENTERIC COATED) ORAL 2 TIMES DAILY
Status: DISCONTINUED | OUTPATIENT
Start: 2017-07-07 | End: 2017-07-11 | Stop reason: HOSPADM

## 2017-07-07 RX ADMIN — MIDAZOLAM 1 MG: 1 INJECTION INTRAMUSCULAR; INTRAVENOUS at 12:30

## 2017-07-07 RX ADMIN — OXYCODONE HYDROCHLORIDE 10 MG: 10 TABLET, FILM COATED, EXTENDED RELEASE ORAL at 23:25

## 2017-07-07 RX ADMIN — LIDOCAINE HYDROCHLORIDE AND EPINEPHRINE 30 ML: 20; 10 INJECTION, SOLUTION INFILTRATION; PERINEURAL at 13:00

## 2017-07-07 RX ADMIN — FENTANYL CITRATE 100 MCG: 50 INJECTION INTRAMUSCULAR; INTRAVENOUS at 13:20

## 2017-07-07 RX ADMIN — PHENYLEPHRINE HYDROCHLORIDE 100 MCG: 10 INJECTION INTRAVENOUS at 13:51

## 2017-07-07 RX ADMIN — SERTRALINE 100 MG: 100 TABLET, FILM COATED ORAL at 19:26

## 2017-07-07 RX ADMIN — OXYCODONE HYDROCHLORIDE 10 MG: 10 TABLET, FILM COATED, EXTENDED RELEASE ORAL at 12:30

## 2017-07-07 RX ADMIN — OXYCODONE HYDROCHLORIDE AND ACETAMINOPHEN 1 TABLET: 10; 325 TABLET ORAL at 19:21

## 2017-07-07 RX ADMIN — ATORVASTATIN CALCIUM 40 MG: 20 TABLET, FILM COATED ORAL at 22:11

## 2017-07-07 RX ADMIN — FERROUS SULFATE TAB 325 MG (65 MG ELEMENTAL FE) 325 MG: 325 (65 FE) TAB at 19:26

## 2017-07-07 RX ADMIN — LIDOCAINE HYDROCHLORIDE 100 MG: 20 INJECTION, SOLUTION INFILTRATION; PERINEURAL at 13:20

## 2017-07-07 RX ADMIN — CEFAZOLIN SODIUM 2 G: 2 INJECTION, SOLUTION INTRAVENOUS at 20:56

## 2017-07-07 RX ADMIN — FAMOTIDINE 20 MG: 10 INJECTION INTRAVENOUS at 12:30

## 2017-07-07 RX ADMIN — PHENYLEPHRINE HYDROCHLORIDE 100 MCG: 10 INJECTION INTRAVENOUS at 13:55

## 2017-07-07 RX ADMIN — SODIUM CHLORIDE 100 ML/HR: 9 INJECTION, SOLUTION INTRAVENOUS at 20:57

## 2017-07-07 RX ADMIN — ROCURONIUM BROMIDE 40 MG: 10 INJECTION INTRAVENOUS at 13:20

## 2017-07-07 RX ADMIN — ROPIVACAINE HYDROCHLORIDE 30 ML: 5 INJECTION, SOLUTION EPIDURAL; INFILTRATION; PERINEURAL at 13:00

## 2017-07-07 RX ADMIN — PHENYLEPHRINE HYDROCHLORIDE 100 MCG: 10 INJECTION INTRAVENOUS at 15:20

## 2017-07-07 RX ADMIN — METOPROLOL TARTRATE 50 MG: 50 TABLET ORAL at 22:10

## 2017-07-07 RX ADMIN — MIDAZOLAM 1 MG: 1 INJECTION INTRAMUSCULAR; INTRAVENOUS at 12:56

## 2017-07-07 RX ADMIN — FENTANYL CITRATE 50 MCG: 50 INJECTION INTRAMUSCULAR; INTRAVENOUS at 12:58

## 2017-07-07 RX ADMIN — FENTANYL CITRATE 50 MCG: 50 INJECTION INTRAMUSCULAR; INTRAVENOUS at 12:52

## 2017-07-07 RX ADMIN — ASPIRIN 325 MG: 325 TABLET, DELAYED RELEASE ORAL at 19:25

## 2017-07-07 RX ADMIN — PHENYLEPHRINE HYDROCHLORIDE 100 MCG: 10 INJECTION INTRAVENOUS at 14:25

## 2017-07-07 RX ADMIN — OXYCODONE HYDROCHLORIDE AND ACETAMINOPHEN 2 TABLET: 10; 325 TABLET ORAL at 23:25

## 2017-07-07 RX ADMIN — MORPHINE SULFATE 4 MG: 4 INJECTION, SOLUTION INTRAMUSCULAR; INTRAVENOUS at 20:02

## 2017-07-07 RX ADMIN — DOCUSATE SODIUM 100 MG: 100 CAPSULE, LIQUID FILLED ORAL at 19:25

## 2017-07-07 RX ADMIN — RIFAMPIN 300 MG: 300 CAPSULE ORAL at 22:11

## 2017-07-07 RX ADMIN — PROPOFOL 200 MG: 10 INJECTION, EMULSION INTRAVENOUS at 13:20

## 2017-07-07 RX ADMIN — SODIUM CHLORIDE, POTASSIUM CHLORIDE, SODIUM LACTATE AND CALCIUM CHLORIDE: 600; 310; 30; 20 INJECTION, SOLUTION INTRAVENOUS at 14:10

## 2017-07-07 RX ADMIN — PHENYLEPHRINE HYDROCHLORIDE 100 MCG: 10 INJECTION INTRAVENOUS at 15:10

## 2017-07-07 RX ADMIN — CEFAZOLIN SODIUM 2 G: 2 INJECTION, SOLUTION INTRAVENOUS at 13:23

## 2017-07-07 RX ADMIN — HYDROMORPHONE HYDROCHLORIDE 0.5 MG: 2 INJECTION, SOLUTION INTRAMUSCULAR; INTRAVENOUS; SUBCUTANEOUS at 14:10

## 2017-07-07 RX ADMIN — SODIUM CHLORIDE, POTASSIUM CHLORIDE, SODIUM LACTATE AND CALCIUM CHLORIDE 9 ML/HR: 600; 310; 30; 20 INJECTION, SOLUTION INTRAVENOUS at 12:12

## 2017-07-07 RX ADMIN — TRANEXAMIC ACID 1000 MG: 100 INJECTION, SOLUTION INTRAVENOUS at 15:11

## 2017-07-07 RX ADMIN — PHENYLEPHRINE HYDROCHLORIDE 100 MCG: 10 INJECTION INTRAVENOUS at 14:45

## 2017-07-07 RX ADMIN — PHENYLEPHRINE HYDROCHLORIDE 100 MCG: 10 INJECTION INTRAVENOUS at 14:20

## 2017-07-07 RX ADMIN — LISINOPRIL 20 MG: 20 TABLET ORAL at 19:26

## 2017-07-07 RX ADMIN — PHENYLEPHRINE HYDROCHLORIDE 100 MCG: 10 INJECTION INTRAVENOUS at 15:00

## 2017-07-07 RX ADMIN — MIDAZOLAM 1 MG: 1 INJECTION INTRAMUSCULAR; INTRAVENOUS at 12:52

## 2017-07-07 RX ADMIN — PHENYLEPHRINE HYDROCHLORIDE 100 MCG: 10 INJECTION INTRAVENOUS at 14:35

## 2017-07-07 RX ADMIN — MORPHINE SULFATE 4 MG: 4 INJECTION, SOLUTION INTRAMUSCULAR; INTRAVENOUS at 22:10

## 2017-07-07 NOTE — ANESTHESIA PROCEDURE NOTES
Peripheral Block    Patient location during procedure: holding area  Start time: 7/7/2017 12:50 PM  Stop time: 7/7/2017 1:07 PM  Reason for block: at surgeon's request and post-op pain management  Performed by  Anesthesiologist: SUZETTE CHEN  Preanesthetic Checklist  Completed: patient identified, site marked, surgical consent, pre-op evaluation, timeout performed, IV checked, risks and benefits discussed and monitors and equipment checked  Prep:  Sterile barriers:cap, gloves and mask  Prep: ChloraPrep  Patient monitoring: blood pressure monitoring, continuous pulse oximetry and EKG  Procedure  Sedation:yes    Guidance:nerve stimulator and landmark technique  Images:still images not obtained    Laterality:right  Block Type:femoral and sciatic  Injection Technique:single-shot  Needle Type:short-bevel  Needle Gauge:22 G    Medications  Local Injected:ropivacaine 0.5% and lidocaine 2% with epinephrine Local Amount Injected:60mL

## 2017-07-07 NOTE — H&P (VIEW-ONLY)
"Orthopedic Progress Note      Patient: Hugh R McBurney  YOB: 1950     Date of Admission: 6/13/2017 11:21 AM Medical Record Number: [id number]     Attending Physician: Tiburcio Moreno MD    Planned Procedure:  REMOVAL RT TOTAL KNEE ARTHROPLASTY WITH ANTIBIOTIC SPACER (Awaiting Surgery)  The patient presented to the preoperative area today for his scheduled surgery.  Vancomycin was started as a preoperative antibiotic.  The patient developed signs of allergy with the decreased blood pressure.  He also complained of chest pain.  A cardiology consult was requested with Dr. Moreno.  He has evaluated the patient and plans to do further evaluation for ischemia.  His surgery has been held for today.     Subjective : No new orthopaedic complaints     Pain Relief: some relief with present medication.     Systemic Complaints: No Complaints  Vitals:    06/13/17 1521 06/13/17 1540 06/13/17 1802 06/13/17 1924   BP: 162/83 148/79 139/80 133/76   BP Location: Right arm Left arm Left arm Left arm   Patient Position: Lying Lying Lying Lying   Pulse:  105 106 101   Resp: 18 16  16   Temp: 98.7 °F (37.1 °C)   98.9 °F (37.2 °C)   TempSrc: Oral   Oral   SpO2:  98%  99%   Weight: 246 lb 11.2 oz (112 kg)      Height: 74\" (188 cm)          Physical Exam: 66 y.o. male    General Appearance:       Alert, cooperative, in no acute distress                  Extremities:    Right knee, positive swelling, positive tenderness, positive mild increased warmth.  Attempted movements of the right knee are painful and restricted.         No clinical sign of DVT        Able to do good movements of digits    Pulses:   Pulses palpable and equal bilaterally           Diagnostic Tests:      Results from last 7 days  Lab Units 06/13/17  1443 06/07/17  1552   WBC 10*3/mm3 15.01* 8.98   HEMOGLOBIN g/dL 12.7* 10.9*   HEMATOCRIT % 39.1* 33.7*   PLATELETS 10*3/mm3 312 290       Results from last 7 days  Lab Units 06/13/17  1443   SODIUM mmol/L " 134*   POTASSIUM mmol/L 4.2   CHLORIDE mmol/L 98   TOTAL CO2 mmol/L 20.7*   BUN mg/dL 20   CREATININE mg/dL 1.15   GLUCOSE mg/dL 223*   CALCIUM mg/dL 9.0           Nm Inflammatory Wbc Whole Body With Indium 111    Result Date: 5/25/2017  Narrative: WHOLE BODY INDIUM-111 LABELED WHITE CELL STUDY  HISTORY: 66-year-old male with left toe amputation and right knee replacement February 2017, complaining of right knee pain, evaluate for infection  COMPARISON: Correlation is made with imaging of the knee 02/14/2017  FINDINGS: 1. Physiologic distribution of whole body activity. 2. Abnormally secondary to the region of the ethmoid air cells versus with infection. 3. Mild asymmetry with relatively increased activity in the region of the right knee versus left suspicious for focal infection.  This report was finalized on 5/25/2017 4:20 PM by Dr. Ricci Tucker MD.      Xr Chest Pa & Lateral    Result Date: 6/10/2017  Narrative: PA AND LATERAL CHEST 06/07/2017.  HISTORY: Hypertension. Knee surgery.  FINDINGS: Heart size is within normal limits. Lungs appear free of acute infiltrates. Surgical anchors are seen in the right humeral head. There are minor degenerative changes in the spine.      Impression: 1. No acute process identified.  This report was finalized on 6/10/2017 12:18 AM by Dr. Ruben Michelle MD.          Current Medications:  Scheduled Meds:  amLODIPine 5 mg Oral Daily   aspirin 325 mg Oral Daily   atorvastatin 40 mg Oral Nightly   enoxaparin 40 mg Subcutaneous Q12H   famotidine      [START ON 6/14/2017] ferrous sulfate 325 mg Oral Daily With Breakfast   insulin detemir 25 Units Subcutaneous Q12H   lisinopril 20 mg Oral Q PM   metoprolol tartrate 25 mg Oral Q12H   midazolam      nitroglycerin 1 inch Topical Q6H   ondansetron      [START ON 6/14/2017] pantoprazole 40 mg Oral QAM   [START ON 6/14/2017] pantoprazole 40 mg Intravenous Q AM   sertraline 100 mg Oral Daily     Continuous Infusions:  lactated ringers 9  mL/hr Last Rate: 9 mL/hr (06/13/17 1230)     PRN Meds:.bisacodyl  •  Morphine  •  oxyCODONE-acetaminophen  •  promethazine  •  sodium chloride    Assessment:   REMOVAL RT TOTAL KNEE ARTHROPLASTY WITH ANTIBIOTIC SPACER (Awaiting Surgery)    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   • Recent MSSA (methicillin susceptible Staphylococcus aureus) septicemia   • Infection of prosthetic right knee joint   • Hyperglycemia   • Hyponatremia   • Type 2 diabetes mellitus with hyperglycemia   • Osteoarthritis, knee   • Chest pain       PLAN:   I will elevate for cardiology clearance.  I will plan for operating room  This hospital admission   If he is stable.    Kiko Marie MD    Date: 6/13/2017    Time: 10:35 PM    EMR Dragon/Transcription disclaimer:   Much of this encounter note is an electronic transcription/translation of spoken language to printed text. The electronic translation of spoken language may permit erroneous, or at times, nonsensical words or phrases to be inadvertently transcribed; Although I have reviewed the note for such errors, some may still exist.

## 2017-07-07 NOTE — ANESTHESIA PREPROCEDURE EVALUATION
Anesthesia Evaluation     Patient summary reviewed and Nursing notes reviewed   NPO Solid Status: > 8 hours  NPO Liquid Status: > 2 hours     Airway   Mallampati: II  no difficulty expected  Dental    (+) edentulous and upper dentures    Pulmonary     breath sounds clear to auscultation  (+) sleep apnea,   Cardiovascular     ECG reviewed  Rhythm: regular    (+) hypertension, past MI  <3 months, CAD, PVD, hyperlipidemia    ROS comment: Mild MI in June after Vancomycin rx--no stents-medical tx only    1. Left main: Mild calcification. No stenosis  2. LAD: Diffuse 20% proximal stenosis. Diffuse 50% stenosis mid segment. Discrete 90% proximal first diagonal stenosis with JANINE 2 flow. Small caliber  3. LCX: Diffuse 50% mid vessel stenosis  4. RCA: Discrete 70-80% proximal RPL 2 stenosis. Small caliber  5. Moderately reduced left ventricular systolic function. Ejection fraction 40%. Mid to distal anterior wall, apical severe hypokinesis. Moderate distal inferior wall hypokinesis. More consistent with stress-induced cardiomyopathy then flow limiting coronary artery disease in that territory     Recommendations: Continue ACE inhibitor and beta blocker. Continue aspirin and statin. Low-dose isosorbide mononitrate. Postpone knee surgery for 1 month.    Neuro/Psych  GI/Hepatic/Renal/Endo    (+) obesity,  GERD, diabetes mellitus type 1 poorly controlled,     Musculoskeletal     Abdominal    Substance History      OB/GYN          Other                                      Anesthesia Plan    ASA 3     general and regional     intravenous induction   Anesthetic plan and risks discussed with patient.    Plan discussed with CRNA.    · Left ventricular wall thickness is consistent with mild-to-moderate concentric hypertrophy.  · Left ventricular systolic function is mildly decreased. Estimated EF = 42%. There is global hypokinesis worse in the anterior wall and anterior septum.  Moderate mitral annular calcification is present. Mild  mitral valve regurgitation is present.

## 2017-07-07 NOTE — PLAN OF CARE
Problem: Patient Care Overview (Adult)  Goal: Plan of Care Review  Outcome: Ongoing (interventions implemented as appropriate)    07/1950   Coping/Psychosocial Response Interventions   Plan Of Care Reviewed With patient   Patient Care Overview   Progress progress towards functional goals is fair   Outcome Evaluation   Outcome Summary/Follow up Plan Pt s/p right TKA removal with spacer. Pt was sedated upon arrival, he is now awake and alert. The right foot is numb to touch, but has good circulation and pulses. The pt complains of sudden increase of incisional pain. Medicated with PO Percocet. Cardiology consulted due to pt's recent history of MI. Will continue to closely monitor oxygenation related to history of sleep apnea.        Goal: Adult Individualization and Mutuality  Outcome: Ongoing (interventions implemented as appropriate)  Goal: Discharge Needs Assessment  Outcome: Ongoing (interventions implemented as appropriate)    Problem: Perioperative Period (Adult)  Goal: Signs and Symptoms of Listed Potential Problems Will be Absent or Manageable (Perioperative Period)  Outcome: Ongoing (interventions implemented as appropriate)    Problem: Knee Replacement, Total (Adult)  Goal: Signs and Symptoms of Listed Potential Problems Will be Absent or Manageable (Knee Replacement, Total)  Outcome: Ongoing (interventions implemented as appropriate)    Problem: Fall Risk (Adult)  Goal: Identify Related Risk Factors and Signs and Symptoms  Outcome: Outcome(s) achieved Date Met:  07/07/17  Goal: Absence of Falls  Outcome: Ongoing (interventions implemented as appropriate)

## 2017-07-07 NOTE — BRIEF OP NOTE
KNEE IMPLANT REMOVAL WITH INSERTION OF SPACER AND ANTIBIOTIC CEMENT  Procedure Note    Hugh R McBurney  7/7/2017    Pre-op Diagnosis:   Infection associated with internal right knee prosthesis [[id number]]    Post-op Diagnosis:     Post-Op Diagnosis Codes:     * Infection associated with internal right knee prosthesis [T84.53XA]    Procedure/CPT® Codes:      Procedure(s):  REMOVAL RT TOTAL KNEE ARTHROPLASTY AND ANITBIOTIC SPACER    Surgeon(s):  Kiko Marie MD    Anesthesia: General    Staff:   Circulator: July Graf RN; Kinza Barbosa RN  Scrub Person: Panchito Rdz RN; Chely Slater  Assistant: SHERIN Benedict    Estimated Blood Loss: 200 mL  Urine Voided: * No values recorded between 7/7/2017  1:13 PM and 7/7/2017  3:15 PM *    Specimens:                  ID Type Source Tests Collected by Time Destination   1 : right knee Tissue Knee, Right TISSUE/BONE CULTURE Kiko Marie MD 7/7/2017 1359    2 : right knee 2 Tissue Knee, Right TISSUE/BONE CULTURE Kiko Marie MD 7/7/2017 1416          Drains:   Drain/Device Site 07/07/17 1502 Right knee collapsible closed device (Active)           Findings: see dict     Complications: melissa Marie MD     Date: 7/7/2017  Time: 3:15 PM

## 2017-07-07 NOTE — ANESTHESIA PROCEDURE NOTES
Airway  Urgency: elective    Difficult airway    General Information and Staff    Patient location during procedure: OR  Anesthesiologist: GARCÍA RICKETTS  CRNA: MANSI ROBERSON    Indications and Patient Condition  Indications for airway management: airway protection    Preoxygenated: yes  MILS maintained throughout  Mask difficulty assessment: 1 - vent by mask    Final Airway Details  Final airway type: endotracheal airway      Successful airway: ETT  Cuffed: yes   Successful intubation technique: direct laryngoscopy  Facilitating devices/methods: intubating stylet and cricoid pressure  Endotracheal tube insertion site: oral  Blade: Slater  Blade size: #2  ETT size: 7.5 mm  Cormack-Lehane Classification: grade IIb - view of arytenoids or posterior of glottis only  Placement verified by: chest auscultation and capnometry   Measured from: lips  ETT to lips (cm): 22  Number of attempts at approach: 3 or more    Additional Comments  Smooth iv induction with no complications excessive secreations vocal cords anterior

## 2017-07-07 NOTE — ANESTHESIA POSTPROCEDURE EVALUATION
Patient: Hugh R McBurney    Procedure Summary     Date Anesthesia Start Anesthesia Stop Room / Location    07/07/17 1313 1550 [facility] / [facility]       Procedure Diagnosis Surgeon Provider    REMOVAL RIGHT TOTAL KNEE ARTHROPLASTY AND ANITBIOTIC SPACER (Right Knee) Infection associated with internal right knee prosthesis MD Magali Solis MD          Anesthesia Type: general, regional  Last vitals  /82 (07/07/17 1635)    Temp 36.8 °C (98.3 °F) (07/07/17 1652)    Pulse 85 (07/07/17 1635)   Resp 12 (07/07/17 1635)    SpO2 95 % (07/07/17 1635)      Post Anesthesia Care and Evaluation    Patient location during evaluation: PACU  Patient participation: complete - patient participated  Level of consciousness: awake  Pain management: adequate  Airway patency: patent  Anesthetic complications: No anesthetic complications  PONV Status: none  Cardiovascular status: acceptable  Respiratory status: acceptable  Hydration status: acceptable

## 2017-07-07 NOTE — INTERVAL H&P NOTE
H&P reviewed. The patient was examined and there are no changes to the H&P.   His surgery was cancelled previously due to acute MI and now he is cleared by cardiology for his knee surgery. Patient has high risk.

## 2017-07-07 NOTE — PLAN OF CARE
"Problem: Patient Care Overview (Adult)  Goal: Plan of Care Review  Outcome: Ongoing (interventions implemented as appropriate)    07/07/17 1218   Coping/Psychosocial Response Interventions   Plan Of Care Reviewed With patient   Patient Care Overview   Progress no change       Goal: Adult Individualization and Mutuality  Outcome: Ongoing (interventions implemented as appropriate)    07/07/17 1218   Mutuality/Individual Preferences   What Anxieties, Fears or Concerns Do You Have About Your Health or Care? PT DENIES    Individualization   Patient Specific Goals \"TO GET RID OF THIS INFECTION SO I CAN GET BETTER\"       Goal: Discharge Needs Assessment  Outcome: Ongoing (interventions implemented as appropriate)    07/07/17 1218   Discharge Needs Assessment   Concerns To Be Addressed denies needs/concerns at this time         Problem: Perioperative Period (Adult)  Goal: Signs and Symptoms of Listed Potential Problems Will be Absent or Manageable (Perioperative Period)  Outcome: Ongoing (interventions implemented as appropriate)    07/07/17 1218   Perioperative Period   Problems Assessed (Perioperative Period) pain;hypoxia/hypoxemia   Problems Present (Perioperative Period) pain           "

## 2017-07-08 PROBLEM — I25.10 CAD (CORONARY ARTERY DISEASE): Status: ACTIVE | Noted: 2017-07-08

## 2017-07-08 PROBLEM — IMO0002 TYPE II DIABETES MELLITUS, UNCONTROLLED: Status: ACTIVE | Noted: 2017-07-08

## 2017-07-08 LAB
ANION GAP SERPL CALCULATED.3IONS-SCNC: 16.2 MMOL/L
BASOPHILS # BLD AUTO: 0.02 10*3/MM3 (ref 0–0.2)
BASOPHILS NFR BLD AUTO: 0.2 % (ref 0–1.5)
BUN BLD-MCNC: 19 MG/DL (ref 8–23)
BUN/CREAT SERPL: 20.2 (ref 7–25)
CALCIUM SPEC-SCNC: 9.1 MG/DL (ref 8.6–10.5)
CHLORIDE SERPL-SCNC: 96 MMOL/L (ref 98–107)
CO2 SERPL-SCNC: 21.8 MMOL/L (ref 22–29)
CREAT BLD-MCNC: 0.94 MG/DL (ref 0.76–1.27)
DEPRECATED RDW RBC AUTO: 47.7 FL (ref 37–54)
EOSINOPHIL # BLD AUTO: 0 10*3/MM3 (ref 0–0.7)
EOSINOPHIL NFR BLD AUTO: 0 % (ref 0.3–6.2)
ERYTHROCYTE [DISTWIDTH] IN BLOOD BY AUTOMATED COUNT: 14.7 % (ref 11.5–14.5)
GFR SERPL CREATININE-BSD FRML MDRD: 80 ML/MIN/1.73
GLUCOSE BLD-MCNC: 236 MG/DL (ref 65–99)
GLUCOSE BLDC GLUCOMTR-MCNC: 231 MG/DL (ref 70–130)
GLUCOSE BLDC GLUCOMTR-MCNC: 233 MG/DL (ref 70–130)
GLUCOSE BLDC GLUCOMTR-MCNC: 281 MG/DL (ref 70–130)
GLUCOSE BLDC GLUCOMTR-MCNC: 307 MG/DL (ref 70–130)
HCT VFR BLD AUTO: 32.2 % (ref 40.4–52.2)
HGB BLD-MCNC: 10.7 G/DL (ref 13.7–17.6)
IMM GRANULOCYTES # BLD: 0.02 10*3/MM3 (ref 0–0.03)
IMM GRANULOCYTES NFR BLD: 0.2 % (ref 0–0.5)
LYMPHOCYTES # BLD AUTO: 1.56 10*3/MM3 (ref 0.9–4.8)
LYMPHOCYTES NFR BLD AUTO: 11.8 % (ref 19.6–45.3)
MCH RBC QN AUTO: 29.6 PG (ref 27–32.7)
MCHC RBC AUTO-ENTMCNC: 33.2 G/DL (ref 32.6–36.4)
MCV RBC AUTO: 89 FL (ref 79.8–96.2)
MONOCYTES # BLD AUTO: 1.1 10*3/MM3 (ref 0.2–1.2)
MONOCYTES NFR BLD AUTO: 8.3 % (ref 5–12)
NEUTROPHILS # BLD AUTO: 10.55 10*3/MM3 (ref 1.9–8.1)
NEUTROPHILS NFR BLD AUTO: 79.5 % (ref 42.7–76)
PLATELET # BLD AUTO: 213 10*3/MM3 (ref 140–500)
PMV BLD AUTO: 10.8 FL (ref 6–12)
POTASSIUM BLD-SCNC: 4.8 MMOL/L (ref 3.5–5.2)
RBC # BLD AUTO: 3.62 10*6/MM3 (ref 4.6–6)
SODIUM BLD-SCNC: 134 MMOL/L (ref 136–145)
WBC NRBC COR # BLD: 13.25 10*3/MM3 (ref 4.5–10.7)

## 2017-07-08 PROCEDURE — 97161 PT EVAL LOW COMPLEX 20 MIN: CPT | Performed by: PHYSICAL THERAPIST

## 2017-07-08 PROCEDURE — 82962 GLUCOSE BLOOD TEST: CPT

## 2017-07-08 PROCEDURE — 63710000001 INSULIN DETEMER PER 5 UNITS: Performed by: ORTHOPAEDIC SURGERY

## 2017-07-08 PROCEDURE — 99232 SBSQ HOSP IP/OBS MODERATE 35: CPT | Performed by: INTERNAL MEDICINE

## 2017-07-08 PROCEDURE — 63710000001 INSULIN ASPART PER 5 UNITS: Performed by: HOSPITALIST

## 2017-07-08 PROCEDURE — 99223 1ST HOSP IP/OBS HIGH 75: CPT | Performed by: INTERNAL MEDICINE

## 2017-07-08 PROCEDURE — 63710000001 INSULIN ASPART PER 5 UNITS: Performed by: ORTHOPAEDIC SURGERY

## 2017-07-08 PROCEDURE — 97116 GAIT TRAINING THERAPY: CPT | Performed by: PHYSICAL THERAPIST

## 2017-07-08 PROCEDURE — 93005 ELECTROCARDIOGRAM TRACING: CPT | Performed by: INTERNAL MEDICINE

## 2017-07-08 PROCEDURE — 93010 ELECTROCARDIOGRAM REPORT: CPT | Performed by: INTERNAL MEDICINE

## 2017-07-08 PROCEDURE — 80048 BASIC METABOLIC PNL TOTAL CA: CPT | Performed by: ORTHOPAEDIC SURGERY

## 2017-07-08 PROCEDURE — 85025 COMPLETE CBC W/AUTO DIFF WBC: CPT | Performed by: ORTHOPAEDIC SURGERY

## 2017-07-08 PROCEDURE — 25010000002 MORPHINE PER 10 MG: Performed by: ORTHOPAEDIC SURGERY

## 2017-07-08 PROCEDURE — 25010000003 CEFAZOLIN IN DEXTROSE 2-4 GM/100ML-% SOLUTION: Performed by: INTERNAL MEDICINE

## 2017-07-08 PROCEDURE — 25010000003 CEFAZOLIN IN DEXTROSE 2-4 GM/100ML-% SOLUTION: Performed by: ORTHOPAEDIC SURGERY

## 2017-07-08 RX ORDER — CEFAZOLIN SODIUM 2 G/100ML
2 INJECTION, SOLUTION INTRAVENOUS EVERY 8 HOURS
Status: DISCONTINUED | OUTPATIENT
Start: 2017-07-08 | End: 2017-07-11 | Stop reason: HOSPADM

## 2017-07-08 RX ORDER — PANTOPRAZOLE SODIUM 40 MG/1
40 TABLET, DELAYED RELEASE ORAL
Status: DISCONTINUED | OUTPATIENT
Start: 2017-07-08 | End: 2017-07-11 | Stop reason: HOSPADM

## 2017-07-08 RX ORDER — OXYCODONE HCL 20 MG/1
20 TABLET, FILM COATED, EXTENDED RELEASE ORAL EVERY 12 HOURS SCHEDULED
Status: DISCONTINUED | OUTPATIENT
Start: 2017-07-08 | End: 2017-07-11 | Stop reason: HOSPADM

## 2017-07-08 RX ADMIN — INSULIN ASPART 14 UNITS: 100 INJECTION, SOLUTION INTRAVENOUS; SUBCUTANEOUS at 08:08

## 2017-07-08 RX ADMIN — LISINOPRIL 20 MG: 20 TABLET ORAL at 17:24

## 2017-07-08 RX ADMIN — OXYCODONE HYDROCHLORIDE 20 MG: 20 TABLET, FILM COATED, EXTENDED RELEASE ORAL at 11:30

## 2017-07-08 RX ADMIN — OXYCODONE HYDROCHLORIDE AND ACETAMINOPHEN 2 TABLET: 10; 325 TABLET ORAL at 23:33

## 2017-07-08 RX ADMIN — OXYCODONE HYDROCHLORIDE 20 MG: 20 TABLET, FILM COATED, EXTENDED RELEASE ORAL at 21:54

## 2017-07-08 RX ADMIN — INSULIN ASPART 14 UNITS: 100 INJECTION, SOLUTION INTRAVENOUS; SUBCUTANEOUS at 17:25

## 2017-07-08 RX ADMIN — ISOSORBIDE MONONITRATE 30 MG: 30 TABLET ORAL at 08:07

## 2017-07-08 RX ADMIN — MORPHINE SULFATE 4 MG: 4 INJECTION, SOLUTION INTRAMUSCULAR; INTRAVENOUS at 18:45

## 2017-07-08 RX ADMIN — OXYCODONE HYDROCHLORIDE AND ACETAMINOPHEN 2 TABLET: 10; 325 TABLET ORAL at 03:25

## 2017-07-08 RX ADMIN — SERTRALINE 100 MG: 100 TABLET, FILM COATED ORAL at 08:07

## 2017-07-08 RX ADMIN — AMLODIPINE BESYLATE 5 MG: 5 TABLET ORAL at 08:07

## 2017-07-08 RX ADMIN — CEFAZOLIN SODIUM 2 G: 2 INJECTION, SOLUTION INTRAVENOUS at 22:55

## 2017-07-08 RX ADMIN — INSULIN ASPART 14 UNITS: 100 INJECTION, SOLUTION INTRAVENOUS; SUBCUTANEOUS at 12:37

## 2017-07-08 RX ADMIN — INSULIN DETEMIR 45 UNITS: 100 INJECTION, SOLUTION SUBCUTANEOUS at 12:38

## 2017-07-08 RX ADMIN — CEFAZOLIN SODIUM 2 G: 2 INJECTION, SOLUTION INTRAVENOUS at 04:08

## 2017-07-08 RX ADMIN — MORPHINE SULFATE 4 MG: 4 INJECTION, SOLUTION INTRAMUSCULAR; INTRAVENOUS at 10:25

## 2017-07-08 RX ADMIN — CEFAZOLIN SODIUM 2 G: 2 INJECTION, SOLUTION INTRAVENOUS at 16:22

## 2017-07-08 RX ADMIN — RIFAMPIN 300 MG: 300 CAPSULE ORAL at 08:07

## 2017-07-08 RX ADMIN — OXYCODONE HYDROCHLORIDE AND ACETAMINOPHEN 2 TABLET: 10; 325 TABLET ORAL at 15:32

## 2017-07-08 RX ADMIN — MORPHINE SULFATE 4 MG: 4 INJECTION, SOLUTION INTRAMUSCULAR; INTRAVENOUS at 14:47

## 2017-07-08 RX ADMIN — FERROUS SULFATE TAB 325 MG (65 MG ELEMENTAL FE) 325 MG: 325 (65 FE) TAB at 12:43

## 2017-07-08 RX ADMIN — MORPHINE SULFATE 4 MG: 4 INJECTION, SOLUTION INTRAMUSCULAR; INTRAVENOUS at 06:08

## 2017-07-08 RX ADMIN — METOPROLOL TARTRATE 50 MG: 50 TABLET ORAL at 20:52

## 2017-07-08 RX ADMIN — MORPHINE SULFATE 4 MG: 4 INJECTION, SOLUTION INTRAMUSCULAR; INTRAVENOUS at 08:08

## 2017-07-08 RX ADMIN — ASPIRIN 325 MG: 325 TABLET, DELAYED RELEASE ORAL at 17:25

## 2017-07-08 RX ADMIN — OXYCODONE HYDROCHLORIDE AND ACETAMINOPHEN 2 TABLET: 10; 325 TABLET ORAL at 07:32

## 2017-07-08 RX ADMIN — OXYCODONE HYDROCHLORIDE AND ACETAMINOPHEN 2 TABLET: 10; 325 TABLET ORAL at 11:30

## 2017-07-08 RX ADMIN — DOCUSATE SODIUM 100 MG: 100 CAPSULE, LIQUID FILLED ORAL at 17:25

## 2017-07-08 RX ADMIN — PANTOPRAZOLE SODIUM 40 MG: 40 TABLET, DELAYED RELEASE ORAL at 21:54

## 2017-07-08 RX ADMIN — MORPHINE SULFATE 4 MG: 4 INJECTION, SOLUTION INTRAMUSCULAR; INTRAVENOUS at 12:38

## 2017-07-08 RX ADMIN — METOPROLOL TARTRATE 50 MG: 50 TABLET ORAL at 08:07

## 2017-07-08 RX ADMIN — MORPHINE SULFATE 4 MG: 4 INJECTION, SOLUTION INTRAMUSCULAR; INTRAVENOUS at 22:55

## 2017-07-08 RX ADMIN — MORPHINE SULFATE 4 MG: 4 INJECTION, SOLUTION INTRAMUSCULAR; INTRAVENOUS at 00:10

## 2017-07-08 RX ADMIN — ATORVASTATIN CALCIUM 40 MG: 20 TABLET, FILM COATED ORAL at 20:51

## 2017-07-08 RX ADMIN — INSULIN ASPART 4 UNITS: 100 INJECTION, SOLUTION INTRAVENOUS; SUBCUTANEOUS at 21:53

## 2017-07-08 RX ADMIN — RIFAMPIN 300 MG: 300 CAPSULE ORAL at 20:52

## 2017-07-08 RX ADMIN — MORPHINE SULFATE 4 MG: 4 INJECTION, SOLUTION INTRAMUSCULAR; INTRAVENOUS at 02:07

## 2017-07-08 RX ADMIN — DOCUSATE SODIUM 100 MG: 100 CAPSULE, LIQUID FILLED ORAL at 08:07

## 2017-07-08 RX ADMIN — ASPIRIN 325 MG: 325 TABLET, DELAYED RELEASE ORAL at 08:07

## 2017-07-08 RX ADMIN — MORPHINE SULFATE 4 MG: 4 INJECTION, SOLUTION INTRAMUSCULAR; INTRAVENOUS at 16:51

## 2017-07-08 RX ADMIN — INSULIN DETEMIR 45 UNITS: 100 INJECTION, SOLUTION SUBCUTANEOUS at 21:53

## 2017-07-08 RX ADMIN — MORPHINE SULFATE 4 MG: 4 INJECTION, SOLUTION INTRAMUSCULAR; INTRAVENOUS at 04:08

## 2017-07-08 RX ADMIN — MORPHINE SULFATE 4 MG: 4 INJECTION, SOLUTION INTRAMUSCULAR; INTRAVENOUS at 20:52

## 2017-07-08 RX ADMIN — PANTOPRAZOLE SODIUM 40 MG: 40 TABLET, DELAYED RELEASE ORAL at 06:08

## 2017-07-08 RX ADMIN — OXYCODONE HYDROCHLORIDE AND ACETAMINOPHEN 2 TABLET: 10; 325 TABLET ORAL at 19:30

## 2017-07-08 NOTE — PROGRESS NOTES
Orthopedic Progress Note      Patient: Hugh R McBurney  YOB: 1950     Date of Admission: 7/7/2017 11:40 AM Medical Record Number: [id number]     Attending Physician: Kiko Marie, *    Status Post:  REMOVAL  RT TOTAL KNEE ARTHROPLASTY AND ANITBIOTIC SPACER Post Operative Day Number: 1    Subjective : No new orthopaedic complaints     Pain Relief: some relief with present medication.     Systemic Complaints: No Complaints  Vitals:    07/07/17 2357 07/08/17 0352 07/08/17 0700 07/08/17 1100   BP: 118/69 137/71 139/68 93/53   BP Location: Right arm Right arm Right arm Right arm   Patient Position: Lying Lying Lying Sitting   Pulse: 89 94 100 77   Resp: 16 16 16 16   Temp: 98.1 °F (36.7 °C) 99.7 °F (37.6 °C) 97 °F (36.1 °C) 98.9 °F (37.2 °C)   TempSrc: Oral Oral Oral Oral   SpO2: 96% 96% 96% 95%   Weight:       Height:           Physical Exam: 66 y.o. male    General Appearance:       Alert, cooperative, in no acute distress                  Extremities:    Dressing Clean, Dry and Intact         Incision healthy without signs or symptoms of infections         No clinical sign of DVT        Able to do good movements of digits    Pulses:   Pulses palpable and equal bilaterally           Diagnostic Tests:      Results from last 7 days  Lab Units 07/08/17  0351   WBC 10*3/mm3 13.25*   HEMOGLOBIN g/dL 10.7*   HEMATOCRIT % 32.2*   PLATELETS 10*3/mm3 213       Results from last 7 days  Lab Units 07/08/17  0351   SODIUM mmol/L 134*   POTASSIUM mmol/L 4.8   CHLORIDE mmol/L 96*   CO2 mmol/L 21.8*   BUN mg/dL 19   CREATININE mg/dL 0.94   GLUCOSE mg/dL 236*   CALCIUM mg/dL 9.1           Xr Knee 1 Or 2 View Right    Result Date: 7/7/2017  Narrative: XR KNEE 1 OR 2 VW RIGHT-  INDICATIONS: Postoperative evaluation.  TECHNIQUE:     Frontal and lateral views of the right knee  COMPARISON: 02/14/2017  FINDINGS:  Surgical changes of the right knee are demonstrated, including removal of previous arthroplasty  hardware, placement of spacer material, and a quang traversing the tibiofemoral articulation, with adjacent surgical soft tissue gas, drain, overlying skin staples. No acute fracture is identified.       Impression:  Postsurgical changes.    This report was finalized on 7/7/2017 5:16 PM by Dr. Sushant Calix MD.      Ct Angiogram Chest With & Without Contrast    Result Date: 6/14/2017  Narrative: CT ANGIOGRAM OF THE CHEST. MULTIPLE CORONAL, SAGITTAL, AND 3-D RECONSTRUCTIONS  HISTORY: 66-year-old male with chest pain and elevated D-dimer.  TECHNIQUE: CT angiogram of the chest was performed. Multiple coronal, sagittal, and 3-D reconstruction images were obtained. There is no previous chest CT for comparison.  FINDINGS: There is suboptimal opacification of the pulmonary artery branches, but there is no convincing evidence for pulmonary thromboemboli. There are no pulmonary airspace consolidations and there are no pleural or pericardial effusions. There is a 4 mm right upper lobe pulmonary nodule, image 30. There is no lymphadenopathy within the chest. Extensive coronary artery calcifications are noted.      Impression: 1. There is no convincing evidence for pulmonary thromboemboli. 2. Extensive coronary artery calcifications. Consider quantification with coronary artery calcium scoring CT. 3. 4 mm right upper lobe pulmonary nodule. The nodule is likely benign, but reevaluation is recommended with follow-up noncontrasted chest CT in 6 months.  This report was finalized on 6/14/2017 1:22 PM by Dr. Corinne Bob MD.          Current Medications:  Scheduled Meds:  amLODIPine 5 mg Oral Daily   aspirin 325 mg Oral BID   atorvastatin 40 mg Oral Nightly   docusate sodium 100 mg Oral BID   ferrous sulfate 325 mg Oral Daily With Lunch   insulin aspart 14 Units Subcutaneous TID With Meals   insulin detemir 45 Units Subcutaneous Q12H   isosorbide mononitrate 30 mg Oral Q24H   lisinopril 20 mg Oral Q PM   metoprolol tartrate 50 mg  Oral Q12H   oxyCODONE 20 mg Oral Q12H   pantoprazole 40 mg Oral QAM   rifAMPin 300 mg Oral Q12H   sertraline 100 mg Oral Daily     Continuous Infusions:  lactated ringers 9 mL/hr Last Rate: 9 mL/hr (07/07/17 1212)   lactated ringers 9 mL/hr    sodium chloride 100 mL/hr Last Rate: 100 mL/hr (07/07/17 2057)     PRN Meds:.•  acetaminophen  •  bisacodyl  •  bisacodyl  •  HYDROcodone-acetaminophen  •  Morphine **AND** naloxone  •  ondansetron  •  oxyCODONE-acetaminophen **OR** oxyCODONE-acetaminophen  •  promethazine    Assessment: Status post  WA REVISE KNEE JOINT REPLACE,1 PART [33811] (REVISION RT TOTAL KNEE ARTHROPLASTY AND ANITBIOTIC SPACER)    Patient Active Problem List   Diagnosis   • Hypertension   • Hyperlipidemia   • Chronic pain   • Knee pain, hx of previous prosthetic joint infection   • Great toe pain, with cellulitis and gangrene   • Diabetic ulcer of 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, with necrosis of bone   • 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   • Recent MSSA (methicillin susceptible Staphylococcus aureus) septicemia   • Infection of prosthetic knee joint   • Hyperglycemia   • Hyponatremia   • Osteoarthritis, knee   • Contusion of knee   • History of knee surgery   • Infection of prosthetic right knee joint       PLAN:   Continues current post-op course  Pain control: WILL ADD OXYCONTIN  Anticoagulation: ASPIRIN started   Hemovac Drain to be removed TOMORROW  Dressing Change in am  Mobilize with PT as tolerated per protocol  CULTURES POSITIVE FOR GRAM POSITIVE COCCI  AWAITING ID AND CARDIAC CONSULTS  WILL NEED PICC LINE AND IV ABX FOR  AT LEAST 6 WEEKS  WILL REQUEST [facility] TO SEE     Weight Bearing: TOE TOUCH WB  Discharge Plan: OK to plan for discharge in  MONDAY to SNF  from orthopadic perspective.    Kiko Marie MD    Date: 7/8/2017    Time: 2:13 PM

## 2017-07-08 NOTE — SIGNIFICANT NOTE
07/08/17 1003   Rehab Treatment   Discipline occupational therapist   Rehab Evaluation   Evaluation Not Performed other (see comments)  (PT d/[facility]. will defer OT eval to [facility] level at this time. discussed with pt. 0421)

## 2017-07-08 NOTE — PLAN OF CARE
Problem: Patient Care Overview (Adult)  Goal: Plan of Care Review  Outcome: Ongoing (interventions implemented as appropriate)    07/08/17 0944   Coping/Psychosocial Response Interventions   Plan Of Care Reviewed With patient   Outcome Evaluation   Outcome Summary/Follow up Plan PT EVAL completed. MD orders indicated that pt is able to be WBAT with knee immobilizer in place. Pt did not agree with this and performed standing and ambulation wth RLE NWB. Pt transferred supine to sit with min x 2 and use of bed rails. Pt performed sit to stand with min x 2 and RW. Pt ambulated 8 ft to chair with CGA x 1 and RW. Pt required min x 1 to sit in chair. Pt requires skilled therapy due to balance deficits, WB status, and decreaesd activity tolerance. Recommend D/C home with assist and outpatient services.         Problem: Inpatient Physical Therapy  Goal: Bed Mobility Goal LTG- PT  Outcome: Ongoing (interventions implemented as appropriate)    07/08/17 0944   Bed Mobility PT LTG   Bed Mobility PT LTG, Date Established 07/08/17   Bed Mobility PT LTG, Time to Achieve 1 wk   Bed Mobility PT LTG, Activity Type all bed mobility   Bed Mobility PT LTG, [place] Level supervision required       Goal: Transfer Training Goal 1 LTG- PT  Outcome: Ongoing (interventions implemented as appropriate)    07/08/17 0944   Transfer Training PT LTG   Transfer Training PT LTG, Date Established 07/08/17   Transfer Training PT LTG, Time to Achieve 1 wk   Transfer Training PT LTG, Activity Type all transfers   Transfer Training PT LTG, [place] Level conditional independence   Transfer Training PT LTG, Assist Device walker, rolling       Goal: Gait Training Goal LTG- PT  Outcome: Ongoing (interventions implemented as appropriate)    07/08/17 0944   Gait Training PT LTG   Gait Training Goal PT LTG, Date Established 07/08/17   Gait Training Goal PT LTG, Time to Achieve 1 wk   Gait Training Goal PT LTG, [place] Level supervision required    Gait Training Goal PT LTG, Assist Device walker, rolling   Gait Training Goal PT LTG, Distance to Achieve 200       Goal: Stair Training Goal LTG- PT  Outcome: Ongoing (interventions implemented as appropriate)    07/08/17 0944   Stair Training PT LTG   Stair Training Goal PT LTG, Date Established 07/08/17   Stair Training Goal PT LTG, Time to Achieve 1 wk   Stair Training Goal PT LTG, Number of Steps 2   Stair Training Goal PT LTG, [place] Level contact guard assist   Stair Training Goal PT LTG, Assist Device 2 handrails

## 2017-07-08 NOTE — CONSULTS
Referring Provider: Kiko Marie MD  [address] [facility]  [address]  [place] [address]  Reason for Consultation: Right prosthetic knee septic arthritis    Subjective   History of present illness:  This is a 66-year-old male with coronary artery disease, diabetes, hypertension, and osteoarthritis requiring right total knee replacement complicated by MSSA prosthetic knee septic arthritis who was admitted on July 7 for right knee hardware removal.    The patient is well-known to the infectious disease service.  He is followed by my partner Dr. Dunne.  In summary the patient has osteoarthritis complicated by right culture negative prosthetic knee septic arthritis in 2011.  In February 2017 he developed left great toe infection requiring amputation as well as right prosthetic knee septic arthritis.  Cultures for both sites grew MSSA as did his blood.  The patient elected to retain his hardware and he was treated with IV antibiotics for 6 weeks and transitioned to oral ciprofloxacin and rifampin in March 2017.  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  He is scheduled to undergo hardware explantation when he developed red man syndrome secondary to vancomycin infusion and a myocardial infarction.  Due to this his surgery was delayed.  He was discharged on ciprofloxacin and rifampin.  He presents back to the hospital on July 7 for removal of the right total knee arthroplasty with antibiotic spacer placement.  Currently the patient is experiencing a lot of postoperative pain.  He denies any fevers chills or night sweats.  He denies any abdominal pain nausea vomiting or diarrhea.  No cough rhinorrhea or sore throat.    Past Medical History:   Diagnosis Date   • Coronary artery disease    • Depression    • Diabetes mellitus    • GERD (gastroesophageal reflux disease)    • History of kidney stones    •  Hyperlipidemia    • Hypertension    • Sleep apnea        Past Surgical History:   Procedure Laterality Date   • APPENDECTOMY     • JOINT REPLACEMENT      BILATERAL KNEES    • LUMBAR FUSION     • TOTAL KNEE  PROSTHESIS REMOVAL W/ SPACER INSERTION Right 2/14/2017    Procedure: INCISION AND DRAINAGE RIGHT KNEE, POLY CHANGE;  Surgeon: Kiko Marie MD;  Location: [facility];  Service:    • TRANS METATARSAL AMPUTATION Left 2/14/2017    Procedure: EXCISIONAL DEBRIDEMENT LT. FIRST TOE DIABETIC ULCER, DRAINAGE ABSCESS FIRST TOE, FIRST TOE AMPUTATION;  Surgeon: Osmin Brand MD;  Location: [facility];  Service:         reports that he has quit smoking. He has a 60.00 pack-year smoking history. He has quit using smokeless tobacco. He reports that he does not drink alcohol or use illicit drugs.    family history includes Dementia in his maternal grandmother; Diabetes in his sister; Heart attack in his paternal grandfather and son; Heart disease in his father, mother, paternal grandfather, paternal grandmother, and son; No Known Problems in his maternal grandfather and sister. There is no history of Malig Hyperthermia.    Allergies   Allergen Reactions   • Vancomycin Other (See Comments)     Red Man syndrome, slow rate and premedicate with benadryl       Medication:  Antibiotics:  Rifampin 300 mg by mouth every 12 hours    Please refer to the medical record for a full medication list    Review of Systems  Pertinent items are noted in HPI, all other systems reviewed and negative    Objective   Vital Signs   Temp:  [97 °F (36.1 °C)-99.7 °F (37.6 °C)] 98.9 °F (37.2 °C)  Heart Rate:  [] 77  Resp:  [10-16] 16  BP: ()/(53-84) 93/53    Physical Exam:   General: In no acute distress  HEENT: Normocephalic, atraumatic, PERRL, EOMI, no scleral icterus. Oropharynx is clear and moist  Neck: Supple, trachea is midline  Cardiovascular: Normal rate, regular rhythm, joe S1 and S2, no murmurs, rubs, or  gallops    Respiratory: Lungs are cleat to ascultation bilaterally, no wheezing   GI: Abdomen is soft, non-tender, non-distended, positive bowel sounds bilaterally, no masses  Musculoskeletal: Right knee covered in dressing  Skin: No rashes or lesions  Extremities: no E/C/C  Neurological: Alert and oriented, moving all 4 extremities  Psychiatric: Normal mood and affect     Results Review:   I reviewed the patient's new clinical results.  I reviewed the patient's new imaging results and agree with the interpretation.    Lab Results   Component Value Date    WBC 13.25 (H) 07/08/2017    HGB 10.7 (L) 07/08/2017    HCT 32.2 (L) 07/08/2017    MCV 89.0 07/08/2017     07/08/2017       Lab Results   Component Value Date    GLUCOSE 236 (H) 07/08/2017    BUN 19 07/08/2017    CREATININE 0.94 07/08/2017    EGFRIFNONA 80 07/08/2017    BCR 20.2 07/08/2017    CO2 21.8 (L) 07/08/2017    CALCIUM 9.1 07/08/2017    ALBUMIN 3.30 (L) 06/13/2017    LABIL2 0.8 06/13/2017    AST 12 06/13/2017    ALT 8 06/13/2017     Urinalysis moderate blood negative nitrite negative leukocyte esterase 0-2 white blood cells    Microbiology:  7/7 operative cultures gram-positive cocci ×2    Radiology:  X-ray of the right knee shows removed hardware and placement of antibiotic spacer    Assessment/Plan   1.  MSSA right prosthetic knee septic arthritis status post hardware removal and antibiotic spacer placement on July 7  - Start cefazolin 2 g IV every 8 hours  - Continue rifampin for now  - Follow-up operative cultures and adjust antibiotics as needed  - Final antibiotic course and duration will be determined once operative cultures are back.    2.  Diabetes complicating above    3.  Leukocytosis is most likely reactive after surgery  - Continue to monitor    I discussed the patients findings and my recommendations with patient and nursing staff    Thank you for this consult.  Dr. Mayberry we'll see again on Monday

## 2017-07-08 NOTE — PLAN OF CARE
Problem: Patient Care Overview (Adult)  Goal: Plan of Care Review  Outcome: Ongoing (interventions implemented as appropriate)    07/08/17 9034   Coping/Psychosocial Response Interventions   Plan Of Care Reviewed With patient   Patient Care Overview   Progress no change   Outcome Evaluation   Outcome Summary/Follow up Plan pt having increased pain. pt taking po and iv medications every 2 hours rates pain a 7. KI in place. vss. pt not ambulating well. clarification of ttwb was given per MD. pt to be d/c'd to rehab monday.discussed with pt the importance of glucose monitoring with H/O DM, blood pressure monitoring and control wtih H/O HTN, and oxygenation with H/O Sleep apnea.        Goal: Adult Individualization and Mutuality  Outcome: Ongoing (interventions implemented as appropriate)  Goal: Discharge Needs Assessment  Outcome: Ongoing (interventions implemented as appropriate)    Problem: Perioperative Period (Adult)  Goal: Signs and Symptoms of Listed Potential Problems Will be Absent or Manageable (Perioperative Period)  Outcome: Ongoing (interventions implemented as appropriate)    Problem: Knee Replacement, Total (Adult)  Goal: Signs and Symptoms of Listed Potential Problems Will be Absent or Manageable (Knee Replacement, Total)  Outcome: Ongoing (interventions implemented as appropriate)    Problem: Fall Risk (Adult)  Goal: Identify Related Risk Factors and Signs and Symptoms  Outcome: Ongoing (interventions implemented as appropriate)  Goal: Absence of Falls  Outcome: Ongoing (interventions implemented as appropriate)

## 2017-07-08 NOTE — OP NOTE
ORTHOPAEDIC OPERATIVE NOTE    Patient: Hugh R McBurney  YOB: 1950    Medical Record Number: [id number]    Attending Physician: Kiko Marie, *    Primary Care Physician: Guille Nieves MD    DATE OF PROCEDURE: 7/7/2017    SURGEON: Kiko Marie MD        PREOPERATIVE DIAGNOSIS:  Infection associated with internal right knee prosthesis [T84.53XA].    POSTOPERATIVE DIAGNOSIS:   Infection associated with internal right knee prosthesis [T84.53XA] .    PROCEDURE PERFORMED: Removal of right total knee arthroplasty implants and placement of antibiotic spacer.     SURGEON: Kiko Marie MD     ASSISTANT:     Joon Marrufo MD, Fellow    SHERIN Pruitt  The services of a first assist were necessary for performing the procedure safely and expeditiously.  The first assist was present for the entire duration of the case and helped with positioning, retraction and closure of the incision.     ANESTHESIA:   General with regional block  Anesthesiologist: Geremias Levin MD; Magali Castle MD  CRNA: Jhoanny Cormier CRNA     Staff:   Circulator: July Graf RN; Kinza Barbosa RN  Scrub Person: Panchito Rdz RN; Chely Slater  Assistant: SHERIN Benedict    ESTIMATED BLOOD LOSS: 200 mL    SPECIMENS:    Order Name Source Comment Collection Info Order Time   TISSUE/BONE CULTURE Knee, Right  Collected By: Kiko Marie MD 7/7/2017  2:06 PM   TISSUE/BONE CULTURE Knee, Right  Collected By: Kiko Marie MD 7/7/2017  2:20 PM   .     COMPLICATIONS:  Nil.     DRAINS:  10 [place] Hemovac  Drain/Device Site 07/07/17 1502 Right knee collapsible closed device (Active)   Insertion Site clean and dry;dressing intact 7/8/2017  4:08 AM   Drainage Characteristics/Odor serosanguineous 7/8/2017  4:08 AM   Drainage Amount moderate 7/8/2017  4:08 AM   General output (mL) 125 7/8/2017  9:12 AM       .     INDICATIONS: The  patient is a 66 y.o. male  Who  has a history of prosthetic knee joint infection on the right.  He has a history of revision right total knee arthroplasty by Dr. Camacho in 2012 for infection.  Subsequently, he has done well.  He had a diabetic foot and an open toe on the left side about 3-4 months back in March.  He was managed by Dr. Brand and went on to heal his toe infection.  Secondary to the open sores back in March  the patient underwent only a arthrotomy irrigation debridement and the liner change.  He received IV antibiotics for MSSA.  Unfortunately, he continued to have persistent pain in his right knee.  His diabetes was uncontrolled.  Treatment options and alternatives were discussed in detail with the patient who is indicated for a removal of his right total knee arthroplasty and placement of antibiotic spacer .  He was scheduled for surgery but unfortunately he had symptoms of chest pain and decreased blood pressure in the preoperative holding area.  This was back in June 2017.  He was seen by Dr. Moreno cardiologist and the patient has been managed for his acute MI and has been cleared for this knee surgery.  Likely risks and benefits of the procedure, including but not limited to   infection, DVT, pulmonary embolism, persistent infection, inability to eradicate the infection, inability to reimplant, above the knee amputation, possibility of injury to tendons, ligaments, nerves or vessels and risk for mortality and morbidity have been discussed in detail. Despite the risks involved, the patient elected to proceed and informed consent was obtained and the patient was scheduled for surgery. The patient was seen in the preoperative holding area and the operative site was marked.     DESCRIPTION OF PROCEDURE:   The patient was transferred to [facility] operating room. Preoperative antibiotics in the form of Kefzol  intravenously was infused prior to the incision and prior to the  tourniquet placement according to the SCIP protocol. A surgical time out was done with the team and the correct patient, surgical side and site were identified.     After achieving adequate general anesthesia the operative extremity was prepped and draped in the usual sterile fashion.  A well-padded tourniquet was placed prior to the prep. The tourniquet was  elevated to a pressure of 250 mmHg.  A skin incision was made incorporating the previous surgical scar.  The patient had significant swelling around the right knee.  He also had stiffness of the right knee.  The range of motion was 10-90°.  Skin and subcutaneous tenderness tissue were incised and the medial parapatellar arthrotomy was done.  There was a large effusion with cloudy fluid.  There was evidence of synovitis.  The patella was subluxed.  It was difficult to mobilize the joint.  There was partial peeling off of the patellar tendon.  I did a quadriceps snip.  The patella was mobilized.  After doing an extensive partial synovectomy.  This was a Depuy TC 3 polyethylene.  This was a very thick polyethylene.  This was removed.  The femur was inspected.  There was some ostial lysis under the anterior flange of the prosthesis.  The prosthesis was further loosened with the help of flexible osteotomes and the femur was removed without any bone loss.  There was extensive membrane formation under the femur on the anterior and distal aspect of the femoral condyles.  This, along with synovium was sent for culture sensitivity.  Attention was then directed to the tibia.  I isolated the tibia subluxed  It and ran a saw blade under the tibial baseplate between the baseplate and the metaphyseal sleeve.  Followed by this, a V punch was utilized to remove the tibial baseplate.  The baseplate came out along with the stem and the metaphyseal sleeve.  There was good ingrowth of bone into the metaphyseal sleeve.  Secondary to her osteopenia, I believe the entire sleeve came  out with a little bit of cancellous bone.  The rim structure and the circumference of the proximal tibia was intact.    I completed the synovectomy.  The bone ends were thoroughly and repeatedly curetted.  Intramedullary canals were opened and curetted.  There was a pedestal at the tip of the tibial stem.  This was broken through and opened up.  Thorough irrigation was done with pulse lavage.  Patella was inspected.  The patellar implant was removed.  Again there was evidence of infection underneath the patella implant.  All the plastic and bone cement was removed.    I used intramedullary irrigation as well.  Intramedullary reamers were utilized in the tibia.  Having been satisfied with the debridement, I elected to proceed with the stating spacer.  An antibiotic nail was fashioned utilizing a 40 [place] chest tube, a Rush quang, tobramycin Simplex cement 2 batches with 2 g of vancomycin powder.  I placed the antibiotic nail intramedullary followed by 2 batches of [place] cement with 4 g of vancomycin powder and the stating spacer was completed.  Cement was allowed to set.  A 10 [place] Hemovac drain was placed.  The arthrotomy was closed by utilizing 0 looped PDS.  I utilized a 3.5 mm metal suture anchor to tag the patellar tendon to the proximal tibia.    The incision was thoroughly irrigated with saline and closed in layers.  Sterile dressings were placed. The patient tolerated the procedure well.  There were no complications.  The patient had good distal pulses and adequate capillary refill.    Plan to start aspirin in a.m. for DVT prophylaxis.  The patient will receive Kefzol intravenously every 8 for 2 more doses according to SCIP protocol.  Plan to mobilize in a.m. with physical therapy  Toe touch Weightbearing .  I will request for infectious disease service consult.  I will request for cardiology consult.    I discussed the satisfactory performance of the procedure with the patient's family and discussed with  them The postoperative management.       Kiko Marie M.D.    7/8/2017    CC: Guille Nieves MD; MD Kiko Yoon, *

## 2017-07-08 NOTE — CONSULTS
[facility]  Consult Note                                                                              7/8/2017  Kiko Marie,*    Patient Identification:  Hugh R McBurney:   66 y.o.  male  1950     Date of Admission:7/7/2017    CC: Of coronary artery disease, cardiomyopathy with recent myocardial infarction    History of Present Illness: Mr. McBurney is a 65 y/o patient of Dr. Moerno's with a documented h/o HTN, HLD, DM, GERD, PRISCILLA-no CPAP, right TKA with joint infection. In June 2017 he was seen by Dr. Moreno because he had a  Vancomyocin reaction with chest pain, n/v and diaphoresis and a non-ST elevation myocardial infarction when here for knee surgery. A cardiac cath was done which showed a normal left main, 20% proximal LAD, 50% mid LAD, 90% proximal first diagonal, diffuse 50% mid circumflex, and a  70-80% proximal RPL 2 stenosis. An echo showed an EF of 42%, possibly from stress induced cardiomyopathy. He was sent home on an ace, beta blocker and nitrate to wait for 2-4 weeks before attempting surgery again. He was last seen in our office by Ashley BENAVIDES on 6/26/17. At that time he had a repeat echo which showed an EF of 40% with findings suggestive of stress-induced cardiomyopathy He denied any cardiac issues and his exam was negative for CHF. He was to follow up with Dr. Moreno after this knee surgery.    He was admitted yesterday after having right knee prosthesis removal and antibiotic spacer placement. We have been asked to see postoperatively due to his h/o recent MI. His vitals have been stable, last documented HR was 77, bp 93/53. His lisinopril, metoprolol and imdur have been resumed. He denies chest pain, but is having significant knee pain. His labs today are stable; WBC elevated at 13.25. Tissue cultures from knee are growing GPCs.Infectious disease is following and he is on IV antibiotics.     His resting comfortably without any chest pain palpitations syncope near  syncope.  Vitals are stable.  He does have severe right knee pain.  Repeat EKG unchanged.    Previous testing:  Echo 6/26/17  · Left ventricular wall thickness is consistent with mild-to-moderate concentric hypertrophy.  · Left ventricular systolic function is mildly decreased. Estimated EF = 42%. There is global hypokinesis worse in the anterior wall and anterior septum.  · Moderate mitral annular calcification is present. Mild mitral valve regurgitation is present.      Cardiac Cath 6/14/17  Conclusions:   1. Left main: Mild calcification. No stenosis  2. LAD: Diffuse 20% proximal stenosis. Diffuse 50% stenosis mid segment. Discrete 90% proximal first diagonal stenosis with JANINE 2 flow. Small caliber  3. LCX: Diffuse 50% mid vessel stenosis  4. RCA: Discrete 70-80% proximal RPL 2 stenosis. Small caliber  5. Moderately reduced left ventricular systolic function. Ejection fraction 40%. Mid to distal anterior wall, apical severe hypokinesis. Moderate distal inferior wall hypokinesis. More consistent with stress-induced cardiomyopathy then flow limiting coronary artery disease in that territory    ANASTASIA 3/1/17  · Left ventricular function is normal.  · Left ventricular function is normal. Estimated EF appears to be in the range of 56 - 60%. Normal left ventricular cavity size and wall thickness noted. All left ventricular wall segments contract normally.  · No evidence of any valve infection       Nuclear stress test in 2015:          Past Medical History:  Past Medical History:   Diagnosis Date   • Arthritis    • Chronic pain     BILATERAL KNEES AND BACK   • Coronary artery disease    • Depression    • Diabetes mellitus    • GERD (gastroesophageal reflux disease)    • History of kidney stones    • Hyperlipidemia    • Hypertension    • Kidney stone    • MSSA (methicillin susceptible Staphylococcus aureus) infection     RIGHT KNEE   • NSTEMI (non-ST elevated myocardial infarction) 06/14/2017   • Sleep apnea     DOES NOT  USE CPAP       Past Surgical History:  Past Surgical History:   Procedure Laterality Date   • APPENDECTOMY     • CARDIAC CATHETERIZATION N/A 6/14/2017    Procedure: Left Heart Cath;  Surgeon: Tiburcio Moreno MD;  Location: [facility] INVASIVE LOCATION;  Service:    • COLONOSCOPY     • JOINT REPLACEMENT      BILATERAL KNEES    • LUMBAR FUSION     • TOTAL KNEE  PROSTHESIS REMOVAL W/ SPACER INSERTION Right 2/14/2017    Procedure: INCISION AND DRAINAGE RIGHT KNEE, POLY CHANGE;  Surgeon: Kiko Marie MD;  Location: [facility] OR;  Service:    • TRANS METATARSAL AMPUTATION Left 2/14/2017    Procedure: EXCISIONAL DEBRIDEMENT LT. FIRST TOE DIABETIC ULCER, DRAINAGE ABSCESS FIRST TOE, FIRST TOE AMPUTATION;  Surgeon: Osmin Brand MD;  Location: [facility] OR;  Service:        Allergies:  Allergies   Allergen Reactions   • Vancomycin Other (See Comments)     Red Man syndrome, slow rate and premedicate with benadryl       Home Meds:  Prescriptions Prior to Admission   Medication Sig Dispense Refill Last Dose   • amLODIPine (NORVASC) 5 MG tablet Take 1 tablet by mouth Daily.   7/7/2017 at 0900   • aspirin EC 81 MG EC tablet Take 1 tablet by mouth Daily. (Patient taking differently: Take 81 mg by mouth Daily. PT STATES TO HOLD 3 DAYS PRIOR TO SURGERY)   7/4/2017   • atorvastatin (LIPITOR) 40 MG tablet Take 1 tablet by mouth Every Night. 30 tablet 1 7/6/2017 at 2000   • ciprofloxacin (CIPRO) 750 MG tablet Take 750 mg by mouth 2 (Two) Times a Day.   7/6/2017 at 2000   • ferrous sulfate 325 (65 FE) MG tablet Take 325 mg by mouth Daily With Lunch.   7/4/2017 at Unknown time   • insulin aspart (novoLOG FLEXPEN) 100 UNIT/ML solution pen-injector sc pen Inject 14 Units under the skin 3 (Three) Times a Day With Meals.   7/6/2017 at 2000   • Insulin Glargine (LANTUS SOLOSTAR) 100 UNIT/ML injection pen Inject 40 Units under the skin 2 (Two) Times a Day. (Patient taking differently: Inject 45 Units under the skin 2  (Two) Times a Day.) 4 pen 2 7/6/2017 at 2200   • Insulin Pen Needle (BD PEN NEEDLE JUAN DAVID U/F) 32G X 4 MM misc TO INJECT 4 TIMES DAILY 100 each 3 Taking   • isosorbide mononitrate (IMDUR) 30 MG 24 hr tablet Take 1 tablet by mouth Daily. 30 tablet 1 7/7/2017 at 0900   • lisinopril (PRINIVIL,ZESTRIL) 20 MG tablet Take 20 mg by mouth Every Evening.   7/6/2017 at 2000   • metoprolol tartrate (LOPRESSOR) 50 MG tablet Take 1 tablet by mouth Every 12 (Twelve) Hours. 60 tablet 1 7/7/2017 at 0900   • Multiple Vitamin (MULTI VITAMIN DAILY PO) Take 1 tablet by mouth Daily. HOLD PRIOR TO SURGERY   7/4/2017   • omeprazole (priLOSEC) 40 MG capsule Take 40 mg by mouth 2 (Two) Times a Day.   7/7/2017 at 0900   • oxyCODONE-acetaminophen (PERCOCET)  MG per tablet Take 1 tablet by mouth Every 4 (Four) Hours As Needed for Moderate Pain (4-6).   7/7/2017 at 0900   • promethazine (PHENERGAN) 25 MG tablet Take 1 tablet by mouth Every 8 (Eight) Hours As Needed for Nausea or Vomiting. 30 tablet 2 7/6/2017 at 0900   • rifAMPin (RIFADIN) 300 MG capsule Take 1 capsule by mouth Every 12 (Twelve) Hours. Indications: Bone and/or Joint Infection  0 7/6/2017 at 2000   • sertraline (ZOLOFT) 100 MG tablet Take 100 mg by mouth Daily.   7/6/2017 at 0800   • bisacodyl (DULCOLAX) 10 MG suppository Insert 1 suppository into the rectum Daily As Needed for constipation.  0 More than a month at Unknown time   • glucose blood (ACCU-CHEK COMPACT PLUS) test strip Use to test blood sugar 5 times daily 500 each 3 Taking   • glucose blood (ACCU-CHEK COMPACT STRIPS) test strip Use to test blood sugar 4 times daily   ICD 10 code E11.9 400 each 3 Taking   • Lancets (ACCU-CHEK MULTICLIX) lancets Use to test blood sugar 5 times daily 500 each 3 Taking       Current Meds  Scheduled Meds:  amLODIPine 5 mg Oral Daily   aspirin 325 mg Oral BID   atorvastatin 40 mg Oral Nightly   ceFAZolin 2 g Intravenous Q8H   docusate sodium 100 mg Oral BID   ferrous sulfate 325 mg  Oral Daily With Lunch   insulin aspart 14 Units Subcutaneous TID With Meals   insulin detemir 45 Units Subcutaneous Q12H   isosorbide mononitrate 30 mg Oral Q24H   lisinopril 20 mg Oral Q PM   metoprolol tartrate 50 mg Oral Q12H   oxyCODONE 20 mg Oral Q12H   pantoprazole 40 mg Oral QAM   rifAMPin 300 mg Oral Q12H   sertraline 100 mg Oral Daily     Continuous Infusions:  lactated ringers 9 mL/hr Last Rate: 9 mL/hr (07/07/17 1212)   lactated ringers 9 mL/hr    sodium chloride 100 mL/hr Last Rate: 100 mL/hr (07/07/17 2057)       Social History:   Social History     Social History   • Marital status:      Spouse name: N/A   • Number of children: N/A   • Years of education: N/A     Occupational History   • Not on file.     Social History Main Topics   • Smoking status: Former Smoker     Packs/day: 3.00     Years: 20.00   • Smokeless tobacco: Former User      Comment: quit 35 years ago. 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    • Alcohol use No   • Drug use: No   • Sexual activity: Defer     Other Topics Concern   • Not on file     Social History Narrative       Family History:  Family History   Problem Relation Age of Onset   • Heart disease Mother    • Heart disease Father    • Diabetes Sister    • Dementia Maternal Grandmother    • No Known Problems Maternal Grandfather    • Heart disease Paternal Grandmother    • Heart attack Paternal Grandfather    • Heart disease Paternal Grandfather    • Heart disease Son    • Heart attack Son    • No Known Problems Sister    • Malig Hyperthermia Neg Hx        REVIEW OF SYSTEMS:   CONSTITUTIONAL: No weight loss, fever, chills, weakness or fatigue.   HEENT: Eyes: No visual loss, blurred vision, double vision or yellow sclerae. Ears, Nose, Throat: No hearing loss, sneezing, congestion, runny nose or sore throat.   SKIN: No rash or itching.     RESPIRATORY: No shortness of breath, hemoptysis, cough or sputum.   GASTROINTESTINAL: No anorexia, nausea, vomiting or diarrhea. No abdominal pain,  "bright red blood per rectum or melena.  GENITOURINARY: No burning on urination, hematuria or increased frequency.  NEUROLOGICAL: No headache, dizziness, syncope, paralysis, ataxia, numbness or tingling in the extremities. No change in bowel or bladder control.   MUSCULOSKELETAL: No muscle, back pain  HEMATOLOGIC: No anemia, bleeding or bruising.   LYMPHATICS: No enlarged nodes. No history of splenectomy.   PSYCHIATRIC: No history of depression, anxiety, hallucinations.   ENDOCRINOLOGIC: No reports of sweating, cold or heat intolerance. No polyuria or polydipsia.     Physical Exam    BP 93/53 (BP Location: Right arm, Patient Position: Sitting)  Pulse 77  Temp 98.9 °F (37.2 °C) (Oral)   Resp 16  Ht 74\" (188 cm)  Wt 249 lb 3 oz (113 kg)  SpO2 95%  BMI 31.99 kg/m2    General Appearance Well developed, cooperative and well nourished and no acute distress   Head Normocephalic, without abnormality, atraumatic   Ears Ears appear intact with no abnormalities noted   Throat No oral lesions, no thrush, oral mucosa moist   Neck No adenopathy, supple, trachea midline, no thyromegaly, no carotid bruit, no JVD   Back No skin lesions, erythema or scars, no tenderness to percussion or palptaion,range of motion is normal   Lungs Clear to auscultation,respirations regular, even and unlabored   Heart Regular rhythm and normal rate, normal S1 and S2, no murmur, no gallop, no rub, no click   Chest wall No abnormalities observed   Abdomen Normal bowel sounds, no masses, no hepatomegaly,    Extremities Moves all extremities well, no edema, no cyanosis, no redness, right knee and bandage and brace    Pulses Pulses palpable and equal bilaterally. Normal radial, carotid, femoral, dorsalis pedis and posterior tibial pulses bilaterally. Normal abdominal aorta   Skin No bleeding, bruising or rash   Psyhiatric Alert and oriented x 3, normal mood and affect      Results from last 7 days  Lab Units 07/08/17  0351   SODIUM mmol/L 134* "   POTASSIUM mmol/L 4.8   CHLORIDE mmol/L 96*   CO2 mmol/L 21.8*   BUN mg/dL 19   CREATININE mg/dL 0.94   CALCIUM mg/dL 9.1   GLUCOSE mg/dL 236*         )  Results from last 7 days  Lab Units 07/08/17  0351   WBC 10*3/mm3 13.25*   HEMOGLOBIN g/dL 10.7*   HEMATOCRIT % 32.2*   PLATELETS 10*3/mm3 213   EKG:  pending        Baseline:        I personally viewed and interpreted the patient's EKG/Telemetry data    Assessment and Plan  1.  Recent non-ST elevation myocardial infarction, no anginal symptoms.  CV status stable.  Dr. Bell see again on Monday.  These call with questions  2.  Coronary artery disease as above  3.  Cardiomyopathy possibly stress-induced  4.  Status post right knee prosthesis removal with antibiotic spacer placement  5.  Diabetes  6.  Hypertension  7.  Hyperlipidemia  8.  Obstructive sleep apnea    Mini Justyn  7/8/2017  2:25 PM    50min spent in reviewing records, discussion and examination of the patient and discussion with other members of the patient's medical team.     Dictated utilizing Dragon dictation

## 2017-07-08 NOTE — PROGRESS NOTES
Continued Stay Note  [facility]     Patient Name: Hugh R McBurney  MRN: [id number]  Today's Date: 7/8/2017    Admit Date: 7/7/2017          Discharge Plan       07/08/17 1626    Case Management/Social Work Plan    Plan [facility]    Additional Comments CCP consult noted. Met with pt at bedside and he plans to go to [facility] at discharge. Email to Patricia with [facility] to evaluate on Monday. CCP will continue to follow and assist as needed.................KRISTIE Baxter, CCP              Discharge Codes     None            Semaj Mishra RN

## 2017-07-08 NOTE — SIGNIFICANT NOTE
07/08/17 0959   Rehab Treatment   Discipline occupational therapist   Rehab Evaluation   Evaluation Not Performed other (see comments)  (pt going d/c home with wife and HH. pt been walking to bathroom and has wife to assist with ADLs as needed. signing off at this time. 0434)

## 2017-07-08 NOTE — THERAPY EVALUATION
Acute Care - Physical Therapy Initial Evaluation  [facility]     Patient Name: Hugh R McBurney  : 1950  MRN: [id number]  Today's Date: 2017   Onset of Illness/Injury or Date of Surgery Date: 17  Date of Referral to PT: 17  Referring Physician: Joon Marrufo MD      Admit Date: 2017     Visit Dx:    ICD-10-CM ICD-9-CM   1. 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   • Recent MSSA (methicillin susceptible Staphylococcus aureus) septicemia   • Infection of prosthetic knee joint   • Hyperglycemia   • Hyponatremia   • Osteoarthritis, knee   • Contusion of knee   • History of knee surgery   • Infection of prosthetic right knee joint     Past Medical History:   Diagnosis Date   • Arthritis    • Chronic pain     BILATERAL KNEES AND BACK   • Coronary artery disease    • Depression    • Diabetes mellitus    • GERD (gastroesophageal reflux disease)    • History of kidney stones    • Hyperlipidemia    • Hypertension    • Kidney stone    • MSSA (methicillin susceptible Staphylococcus aureus) infection     RIGHT KNEE   • NSTEMI (non-ST elevated myocardial infarction) 2017   • Sleep apnea     DOES NOT USE CPAP     Past Surgical History:   Procedure Laterality Date   • APPENDECTOMY     • CARDIAC CATHETERIZATION N/A 2017    Procedure: Left Heart Cath;  Surgeon: Tiburcio Moreno MD;  Location: [facility] INVASIVE LOCATION;  Service:    • COLONOSCOPY     • JOINT REPLACEMENT      BILATERAL KNEES    • LUMBAR FUSION     • TOTAL KNEE  PROSTHESIS REMOVAL W/ SPACER INSERTION Right 2017    Procedure: INCISION AND DRAINAGE  RIGHT KNEE, POLY CHANGE;  Surgeon: Kiko Marie MD;  Location: [facility] OR;  Service:    • TRANS METATARSAL AMPUTATION Left 2/14/2017    Procedure: EXCISIONAL DEBRIDEMENT LT. FIRST TOE DIABETIC ULCER, DRAINAGE ABSCESS FIRST TOE, FIRST TOE AMPUTATION;  Surgeon: Osmin Brand MD;  Location: [facility] OR;  Service:           PT ASSESSMENT (last 72 hours)      PT Evaluation       07/08/17 0900 07/08/17 0408    Rehab Evaluation    Document Type evaluation  -     Subjective Information agree to therapy;complains of;weakness;pain;fatigue;nausea/vomiting;numbness  -     General Information    Patient Profile Review yes  -     Onset of Illness/Injury or Date of Surgery Date 07/07/17  -     Referring Physician Joon Marrufo MD  -     General Observations awake, alert, supine, IV, 2L O2/NC, R knee immobilizer  -     Pertinent History Of Current Problem s/p REMOVAL RT TOTAL KNEE ARTHROPLASTY AND PLACEMENT OF ANITBIOTIC SPACER  -     Precautions/Limitations brace on when up;fall precautions   WBAT per MD orders  -     Prior Level of Function independent:;all household mobility;community mobility;gait;transfer;bed mobility;ADL's;home management  -     Equipment Currently Used at Home walker, rolling;cane, straight;grab bar;raised toilet;wheelchair;glucometer  -     Plans/Goals Discussed With patient;agreed upon  -     Risks Reviewed patient:;LOB;nausea/vomiting;dizziness;increased discomfort;change in vital signs;increased drainage;lines disloged  -     Benefits Reviewed patient:;improve function;increase independence;increase strength;increase balance;decrease pain;decrease risk of DVT;improve skin integrity;increase knowledge  -     Barriers to Rehab physical barrier  -     Living Environment    Lives With significant other  -     Living Arrangements house  -     Home Accessibility stairs to enter home;stairs within home  -     Number of Stairs to Enter Home 1  -      Number of Stairs Within Home 2  -LC     Stair Railings at Home inside, present at both sides;outside, present at both sides  -     Clinical Impression    Date of Referral to PT 07/08/17  -     PT Diagnosis impaired gait and mobility  -     Patient/Family Goals Statement return home with assist  -     Criteria for Skilled Therapeutic Interventions Met yes;treatment indicated  -     Vital Signs    Pre SpO2 (%) 97  -LC     O2 Delivery Pre Treatment --   2L  -LC     O2 Delivery Intra Treatment room air  -     Post SpO2 (%) 96  -LC     O2 Delivery Post Treatment room air  -     Pain Assessment    Pain Assessment 0-10  -     Pain Score 8  -     Pain Type Acute pain  -     Pain Location Knee  -     Pain Orientation Right  -     Pain Descriptors Aching;Sore;Sharp  -     Vision Assessment/Intervention    Visual Impairment WFL with corrective lenses  -     Cognitive Assessment/Intervention    Current Cognitive/Communication Assessment functional  -     Orientation Status oriented x 4  -     Follows Commands/Answers Questions 100% of the time;able to follow multi-step instructions  -     Personal Safety WNL/WFL  -     Personal Safety Interventions fall prevention program maintained;gait belt;nonskid shoes/slippers when out of bed;supervised activity;elopement precautions initiated  -     ROM (Range of Motion)    General ROM Detail R knee in immobilizer terminal knee extension, LLE AROM WFL  -     MMT (Manual Muscle Testing)    General MMT Assessment Detail LLE grossly 4/5; RLE grossly 3/5  -     Bed Mobility, Assessment/Treatment    Bed Mobility, Assistive Device bed rails  -     Bed Mobility, Roll Right, [place] minimum assist (75% patient effort);1 person + 1 person to manage equipment  -     Bed Mobility, Scoot/Bridge, [place] contact guard assist;1 person + 1 person to manage equipment  -     Bed Mob, Supine to Sit, [place] minimum assist (75% patient effort);1  person + 1 person to manage equipment  -LC     Bed Mobility, Safety Issues decreased use of legs for bridging/pushing  -LC     Bed Mobility, Impairments unable to maintain weight bearing status;strength decreased;impaired balance  -LC     Transfer Assessment/Treatment    Transfers, Sit-Stand [place] minimum assist (75% patient effort);2 person assist required  -LC     Transfers, Stand-Sit [place] minimum assist (75% patient effort);2 person assist required  -LC     Transfers, Sit-Stand-Sit, Assist Device rolling walker  -LC     Transfer, Safety Issues weight-shifting ability decreased  -     Transfer, Impairments strength decreased;impaired balance;pain  -LC     Gait Assessment/Treatment    Gait, [place] Level contact guard assist  -LC     Gait, Assistive Device rolling walker  -LC     Gait, Distance (Feet) 8  -     Gait, Gait Deviations step length decreased;stride length decreased;forward flexed posture;antalgic  -     Gait, Safety Issues weight-shifting ability decreased;step length decreased  -     Gait, Impairments impaired balance;strength decreased;pain  -LC     Gait, Comment MD orders are WBAT, but pt does not want to put his foot down on ground during ambulation  -     Motor Skills/Interventions    Additional Documentation Balance Skills Training (Group)  -     Balance Skills Training    Sitting-Level of Assistance Independent  -     Sitting-Balance Support Right upper extremity supported;Left upper extremity supported  -     Standing-Level of Assistance Contact guard  -LC     Static Standing Balance Support assistive device  -LC     Gait Balance-Level of Assistance Minimum assistance  -LC     Gait Balance Support assistive device  -LC     Residual Limb Assessment  other (see comments)    Residual Limb Assessment - Properties Group Location (Residual Limb): other (see comments)  -AM, LEFT GREAT TOE      Shape/Appearance (Residual Limb)  incision healed  -TT    Positioning and  Restraints    Pre-Treatment Position in bed  -     Post Treatment Position chair  -     In Chair notified nsg;reclined;call light within reach;encouraged to call for assist;legs elevated  -       07/08/17 0010 07/07/17 2002    Residual Limb Assessment  other (see comments)    Residual Limb Assessment - Properties Group Location (Residual Limb): other (see comments)  -AM, LEFT GREAT TOE      Shape/Appearance (Residual Limb) incision healed  -TT incision healed  -TT      07/07/17 1223 07/07/17 1215    General Information    Equipment Currently Used at Home walker, rolling;cane, straight;grab bar;raised toilet;wheelchair;glucometer  -AM     Living Environment    Lives With significant other  -AM     Living Arrangements house  -AM     Home Accessibility stairs to enter home;stairs within home  -AM     Number of Stairs to Enter Home 1   DONE RAILING OUTSIDE   -AM     Number of Stairs Within Home 2  -AM     Stair Railings at Home inside, present on left side  -AM     Type of Financial/Environmental Concern none  -AM     Transportation Available ambulance  -AM     Living Environment Comment PT GOING TO [facility] FOR REHAB  -AM     Residual Limb Assessment  other (see comments)    Residual Limb Assessment - Properties Group Location (Residual Limb): other (see comments)  -AM, LEFT GREAT TOE      Shape/Appearance (Residual Limb)  incision healed  -AM      User Key  (r) = Recorded By, (t) = Taken By, (c) = Cosigned By    Initials Name Provider Type    AM Beata Han, RN Registered Nurse    TT Asia Marcial, RN Registered Nurse    SCOOTER Leigh, PT DPT Physical Therapist          Physical Therapy Education     Title: PT OT SLP Therapies (Active)     Topic: Physical Therapy (Active)     Point: Mobility training (Done)    Learning Progress Summary    Learner Readiness Method Response Comment Documented by Status   Patient Acceptance E,D NR,DU,VU benefits of activity, safety during ambulation  07/08/17 0943 Done                       User Key     Initials Effective Dates Name Provider Type Discipline     08/02/16 -  Tyrell Leigh, PT DPT Physical Therapist PT                PT Recommendation and Plan  Anticipated Discharge Disposition: home with assist, home with outpatient services  PT Frequency: daily, 2 times/day  Plan of Care Review  Plan Of Care Reviewed With: patient  Outcome Summary/Follow up Plan: PT ZAHIRA completed. MD orders indicated that pt is able to be WBAT with knee immobilizer in place. Pt did not agree with this and performed standing and ambulation wth RLE NWB. Pt transferred supine to sit with min x 2 and use of bed rails. Pt performed sit to stand with min x 2 and RW. Pt ambulated 8 ft to chair with CGA x 1 and RW. Pt required min x 1 to sit in chair. Pt requires skilled therapy due to balance deficits, WB status, and decreaesd activity tolerance. Recommend D/C home with assist and outpatient services.          IP PT Goals       07/08/17 0944          Bed Mobility PT LTG    Bed Mobility PT LTG, Date Established 07/08/17  -LC      Bed Mobility PT LTG, Time to Achieve 1 wk  -LC      Bed Mobility PT LTG, Activity Type all bed mobility  -LC      Bed Mobility PT LTG, [place] Level supervision required  -LC      Transfer Training PT LTG    Transfer Training PT LTG, Date Established 07/08/17  -LC      Transfer Training PT LTG, Time to Achieve 1 wk  -LC      Transfer Training PT LTG, Activity Type all transfers  -LC      Transfer Training PT LTG, [place] Level conditional independence  -LC      Transfer Training PT LTG, Assist Device walker, rolling  -LC      Gait Training PT LTG    Gait Training Goal PT LTG, Date Established 07/08/17  -LC      Gait Training Goal PT LTG, Time to Achieve 1 wk  -LC      Gait Training Goal PT LTG, [place] Level supervision required  -LC      Gait Training Goal PT LTG, Assist Device walker, rolling  -LC      Gait Training Goal PT LTG, Distance to Achieve 200  -LC       Stair Training PT LTG    Stair Training Goal PT LTG, Date Established 07/08/17  -      Stair Training Goal PT LTG, Time to Achieve 1 wk  -      Stair Training Goal PT LTG, Number of Steps 2  -      Stair Training Goal PT LTG, [place] Level contact guard assist  -      Stair Training Goal PT LTG, Assist Device 2 handrails  -        User Key  (r) = Recorded By, (t) = Taken By, (c) = Cosigned By    Initials Name Provider Type     Tyrell Leigh, PT DPT Physical Therapist               Time Calculation:         PT Charges       07/08/17 0947          Time Calculation    Start Time 0915  -      Stop Time 0940  -      Time Calculation (min) 25 min  -      PT Received On 07/08/17  -      PT - Next Appointment 07/09/17  -      PT Goal Re-Cert Due Date 07/15/17  -      Time Calculation- PT    Total Timed Code Minutes- PT 25 minute(s)  -        User Key  (r) = Recorded By, (t) = Taken By, (c) = Cosigned By    Initials Name Provider Type    SCOOTER Leigh, PT DPT Physical Therapist          Therapy Charges for Today     Code Description Service Date Service Provider Modifiers Qty    53682924763 HC PT EVAL LOW COMPLEXITY 1 7/8/2017 Tyrell Leigh, PT DPT GP 1    53557688035 HC GAIT TRAINING EA 15 MIN 7/8/2017 Tyrell Leigh, PT DPT GP 1                 Tyrell Leigh, PT DPT  7/8/2017

## 2017-07-08 NOTE — CONSULTS
[facility]    Inpatient Consult to Internal Medicine  Consult performed by: JENN CRUZ LOC  Consult ordered by: KIKO GUERRA  Reason for consult: DM 2, medical management      Date of Admit: 7/7/2017  Date of Consult: 07/08/17    Subjective   66 y.o. male with a history of bilateral knee replacements in February this year had diabetic foot infection, MSSA with cellulitis and gangrene left great toe that required amputation. Infection seeded his right knee and he was supposed to have surgery about a month ago however in preop while vancomycin was infusing develops red man syndrome and ruled in for non-ST elevation MI. Surgery was held for a few weeks and he is admitted for that surgery.    Past Medical History:   Diagnosis Date   • Arthritis    • Chronic pain     BILATERAL KNEES AND BACK   • Coronary artery disease    • Depression    • Diabetes mellitus    • GERD (gastroesophageal reflux disease)    • History of kidney stones    • Hyperlipidemia    • Hypertension    • Kidney stone    • MSSA (methicillin susceptible Staphylococcus aureus) infection     RIGHT KNEE   • NSTEMI (non-ST elevated myocardial infarction) 06/14/2017   • Sleep apnea     DOES NOT USE CPAP     Past Surgical History:   Procedure Laterality Date   • APPENDECTOMY     • CARDIAC CATHETERIZATION N/A 6/14/2017    Procedure: Left Heart Cath;  Surgeon: Tiburcio Moreno MD;  Location: [facility] INVASIVE LOCATION;  Service:    • COLONOSCOPY     • JOINT REPLACEMENT      BILATERAL KNEES    • LUMBAR FUSION     • TOTAL KNEE  PROSTHESIS REMOVAL W/ SPACER INSERTION Right 2/14/2017    Procedure: INCISION AND DRAINAGE RIGHT KNEE, POLY CHANGE;  Surgeon: Kiko Guerra MD;  Location: [facility] OR;  Service:    • TRANS METATARSAL AMPUTATION Left 2/14/2017    Procedure: EXCISIONAL DEBRIDEMENT LT. FIRST TOE DIABETIC ULCER, DRAINAGE ABSCESS FIRST TOE, FIRST TOE AMPUTATION;  Surgeon:  Osmin Brand MD;  Location: [facility] OR;  Service:      Current medications reviewed.    Family History   Problem Relation Age of Onset   • Heart disease Mother    • Heart disease Father    • Diabetes Sister    • Dementia Maternal Grandmother    • No Known Problems Maternal Grandfather    • Heart disease Paternal Grandmother    • Heart attack Paternal Grandfather    • Heart disease Paternal Grandfather    • Heart disease Son    • Heart attack Son    • No Known Problems Sister    • Malig Hyperthermia Neg Hx      Social History   Substance Use Topics   • Smoking status: Former Smoker     Packs/day: 3.00     Years: 20.00   • Smokeless tobacco: Former User      Comment: quit 35 years ago. 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    • Alcohol use No     Review of Systems   Constitutional: Negative.  Negative for chills and fever.   HENT: Negative.    Eyes: Negative.    Respiratory: Negative.  Negative for chest tightness and shortness of breath.    Cardiovascular: Negative.  Negative for chest pain.   Gastrointestinal: Negative.  Negative for diarrhea, nausea and vomiting.   Endocrine: Negative.    Genitourinary: Negative.  Negative for dysuria.   Musculoskeletal:        Right knee pain   Skin: Negative.    Allergic/Immunologic: Negative.    Neurological: Negative.    Hematological: Negative.    Psychiatric/Behavioral: Negative.      Objective      Vital Signs  Temp:  [97 °F (36.1 °C)-99.7 °F (37.6 °C)] 97.2 °F (36.2 °C)  Heart Rate:  [] 82  Resp:  [16] 16  BP: ()/(52-71) 114/56    Physical Exam   Constitutional: He is oriented to person, place, and time. No distress.   Cardiovascular: Normal rate and regular rhythm.    Pulmonary/Chest: Effort normal and breath sounds normal. No respiratory distress.   Abdominal: Soft. There is no tenderness.   Musculoskeletal: He exhibits no edema.        Right knee: Decreased range of motion: knee immobilizer.   Neurological: He is alert and oriented to person, place, and time.    Skin: Skin is warm and dry.     Lab Results   Component Value Date    WBC 13.25 (H) 07/08/2017    HGB 10.7 (L) 07/08/2017    HCT 32.2 (L) 07/08/2017     07/08/2017     Lab Results   Component Value Date     (L) 07/08/2017    K 4.8 07/08/2017    CL 96 (L) 07/08/2017    CO2 21.8 (L) 07/08/2017    BUN 19 07/08/2017    CREATININE 0.94 07/08/2017    GLUCOSE 236 (H) 07/08/2017     Lab Results   Component Value Date    CALCIUM 9.1 07/08/2017     Estimated Creatinine Clearance: 103.3 mL/min (by C-G formula based on Cr of 0.94).    A1c 9.70    Assessment/Plan   Active Hospital Problems (** Indicates Principal Problem)    Diagnosis Date Noted   • **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] 07/07/2017   • CAD (coronary artery disease) [I25.10] 07/08/2017   • Type II diabetes mellitus, uncontrolled [E11.65] 07/08/2017   • Osteoarthritis, knee [M17.9] 06/13/2017   • Hypertension [I10] 02/14/2017   • History of knee surgery [Z98.890] 01/30/2013      Resolved Hospital Problems    Diagnosis Date Noted Date Resolved   No resolved problems to display.     · 66 y.o. male S/P removal of right total knee arthroplasty implants and placement of antibiotic spacer 7/8/17.   · ASA per ortho  · ID managing antibiotics. Leukocytosis reactive post op.  · A1c is 9.70. Will add correctional insulin and consult his endocrinologist. He is on levemir and mealtime insulin.  · Cardiology has been asked to see. He had recent NSTEMI  · Discussed findings and recommendations with patient. Patient was given opportunity to ask questions. Further management as patient's clinical course unfolds.  · [facility]'s care. Will follow along with you.    Axel Zapata MD  07/08/17  6:46 PM

## 2017-07-08 NOTE — PLAN OF CARE
Problem: Patient Care Overview (Adult)  Goal: Plan of Care Review  Outcome: Ongoing (interventions implemented as appropriate)    07/08/17 0551   Coping/Psychosocial Response Interventions   Plan Of Care Reviewed With patient   Patient Care Overview   Progress no change   Outcome Evaluation   Outcome Summary/Follow up Plan patient have a lot of pain through the night, called and got his percocets increased and a one time dose of oxycontin, it has worked very little to decrease his pain, morphine 4mg also given Q2 with little relief, VSS, urinating per urinal, 500ml out of his drain this shift, will continue to monitor for S/S blood loss, hemoglobin 10.7 this AM, awaiting eval from cardiology and infectious disease       Goal: Adult Individualization and Mutuality  Outcome: Ongoing (interventions implemented as appropriate)  Goal: Discharge Needs Assessment  Outcome: Ongoing (interventions implemented as appropriate)    Problem: Perioperative Period (Adult)  Goal: Signs and Symptoms of Listed Potential Problems Will be Absent or Manageable (Perioperative Period)  Outcome: Ongoing (interventions implemented as appropriate)    Problem: Knee Replacement, Total (Adult)  Goal: Signs and Symptoms of Listed Potential Problems Will be Absent or Manageable (Knee Replacement, Total)  Outcome: Ongoing (interventions implemented as appropriate)    Problem: Fall Risk (Adult)  Goal: Absence of Falls  Outcome: Ongoing (interventions implemented as appropriate)

## 2017-07-09 PROBLEM — E87.1 HYPONATREMIA: Status: ACTIVE | Noted: 2017-07-09

## 2017-07-09 LAB
ANION GAP SERPL CALCULATED.3IONS-SCNC: 12.8 MMOL/L
BASOPHILS # BLD AUTO: 0.03 10*3/MM3 (ref 0–0.2)
BASOPHILS NFR BLD AUTO: 0.3 % (ref 0–1.5)
BUN BLD-MCNC: 28 MG/DL (ref 8–23)
BUN/CREAT SERPL: 21.7 (ref 7–25)
CALCIUM SPEC-SCNC: 9 MG/DL (ref 8.6–10.5)
CHLORIDE SERPL-SCNC: 92 MMOL/L (ref 98–107)
CO2 SERPL-SCNC: 23.2 MMOL/L (ref 22–29)
CREAT BLD-MCNC: 1.29 MG/DL (ref 0.76–1.27)
DEPRECATED RDW RBC AUTO: 48.1 FL (ref 37–54)
EOSINOPHIL # BLD AUTO: 0.37 10*3/MM3 (ref 0–0.7)
EOSINOPHIL NFR BLD AUTO: 4.1 % (ref 0.3–6.2)
ERYTHROCYTE [DISTWIDTH] IN BLOOD BY AUTOMATED COUNT: 15 % (ref 11.5–14.5)
GFR SERPL CREATININE-BSD FRML MDRD: 56 ML/MIN/1.73
GLUCOSE BLD-MCNC: 135 MG/DL (ref 65–99)
GLUCOSE BLDC GLUCOMTR-MCNC: 101 MG/DL (ref 70–130)
GLUCOSE BLDC GLUCOMTR-MCNC: 112 MG/DL (ref 70–130)
GLUCOSE BLDC GLUCOMTR-MCNC: 113 MG/DL (ref 70–130)
GLUCOSE BLDC GLUCOMTR-MCNC: 127 MG/DL (ref 70–130)
HCT VFR BLD AUTO: 27.7 % (ref 40.4–52.2)
HGB BLD-MCNC: 9.4 G/DL (ref 13.7–17.6)
IMM GRANULOCYTES # BLD: 0.02 10*3/MM3 (ref 0–0.03)
IMM GRANULOCYTES NFR BLD: 0.2 % (ref 0–0.5)
LYMPHOCYTES # BLD AUTO: 2.1 10*3/MM3 (ref 0.9–4.8)
LYMPHOCYTES NFR BLD AUTO: 23.2 % (ref 19.6–45.3)
MCH RBC QN AUTO: 29.7 PG (ref 27–32.7)
MCHC RBC AUTO-ENTMCNC: 33.9 G/DL (ref 32.6–36.4)
MCV RBC AUTO: 87.4 FL (ref 79.8–96.2)
MONOCYTES # BLD AUTO: 1 10*3/MM3 (ref 0.2–1.2)
MONOCYTES NFR BLD AUTO: 11 % (ref 5–12)
NEUTROPHILS # BLD AUTO: 5.54 10*3/MM3 (ref 1.9–8.1)
NEUTROPHILS NFR BLD AUTO: 61.2 % (ref 42.7–76)
PLATELET # BLD AUTO: 174 10*3/MM3 (ref 140–500)
PMV BLD AUTO: 10.5 FL (ref 6–12)
POTASSIUM BLD-SCNC: 4.2 MMOL/L (ref 3.5–5.2)
RBC # BLD AUTO: 3.17 10*6/MM3 (ref 4.6–6)
SODIUM BLD-SCNC: 128 MMOL/L (ref 136–145)
WBC NRBC COR # BLD: 9.06 10*3/MM3 (ref 4.5–10.7)

## 2017-07-09 PROCEDURE — 82962 GLUCOSE BLOOD TEST: CPT

## 2017-07-09 PROCEDURE — 83036 HEMOGLOBIN GLYCOSYLATED A1C: CPT | Performed by: INTERNAL MEDICINE

## 2017-07-09 PROCEDURE — 85025 COMPLETE CBC W/AUTO DIFF WBC: CPT | Performed by: ORTHOPAEDIC SURGERY

## 2017-07-09 PROCEDURE — 97116 GAIT TRAINING THERAPY: CPT | Performed by: PHYSICAL THERAPIST

## 2017-07-09 PROCEDURE — 99233 SBSQ HOSP IP/OBS HIGH 50: CPT | Performed by: INTERNAL MEDICINE

## 2017-07-09 PROCEDURE — 94799 UNLISTED PULMONARY SVC/PX: CPT

## 2017-07-09 PROCEDURE — 25010000003 CEFAZOLIN IN DEXTROSE 2-4 GM/100ML-% SOLUTION: Performed by: INTERNAL MEDICINE

## 2017-07-09 PROCEDURE — 63710000001 INSULIN ASPART PER 5 UNITS: Performed by: ORTHOPAEDIC SURGERY

## 2017-07-09 PROCEDURE — 80048 BASIC METABOLIC PNL TOTAL CA: CPT | Performed by: ORTHOPAEDIC SURGERY

## 2017-07-09 PROCEDURE — 25010000002 MORPHINE PER 10 MG: Performed by: ORTHOPAEDIC SURGERY

## 2017-07-09 PROCEDURE — 97530 THERAPEUTIC ACTIVITIES: CPT | Performed by: PHYSICAL THERAPIST

## 2017-07-09 RX ORDER — SODIUM CHLORIDE 9 MG/ML
75 INJECTION, SOLUTION INTRAVENOUS CONTINUOUS
Status: DISCONTINUED | OUTPATIENT
Start: 2017-07-09 | End: 2017-07-11 | Stop reason: HOSPADM

## 2017-07-09 RX ADMIN — MORPHINE SULFATE 4 MG: 4 INJECTION, SOLUTION INTRAMUSCULAR; INTRAVENOUS at 23:41

## 2017-07-09 RX ADMIN — ATORVASTATIN CALCIUM 40 MG: 20 TABLET, FILM COATED ORAL at 21:24

## 2017-07-09 RX ADMIN — RIFAMPIN 300 MG: 300 CAPSULE ORAL at 21:24

## 2017-07-09 RX ADMIN — FERROUS SULFATE TAB 325 MG (65 MG ELEMENTAL FE) 325 MG: 325 (65 FE) TAB at 17:07

## 2017-07-09 RX ADMIN — CEFAZOLIN SODIUM 2 G: 2 INJECTION, SOLUTION INTRAVENOUS at 14:08

## 2017-07-09 RX ADMIN — ASPIRIN 325 MG: 325 TABLET, DELAYED RELEASE ORAL at 17:11

## 2017-07-09 RX ADMIN — MORPHINE SULFATE 4 MG: 4 INJECTION, SOLUTION INTRAMUSCULAR; INTRAVENOUS at 04:41

## 2017-07-09 RX ADMIN — METOPROLOL TARTRATE 50 MG: 50 TABLET ORAL at 08:20

## 2017-07-09 RX ADMIN — MORPHINE SULFATE 4 MG: 4 INJECTION, SOLUTION INTRAMUSCULAR; INTRAVENOUS at 01:04

## 2017-07-09 RX ADMIN — PANTOPRAZOLE SODIUM 40 MG: 40 TABLET, DELAYED RELEASE ORAL at 06:48

## 2017-07-09 RX ADMIN — SODIUM CHLORIDE 75 ML/HR: 9 INJECTION, SOLUTION INTRAVENOUS at 14:08

## 2017-07-09 RX ADMIN — ASPIRIN 325 MG: 325 TABLET, DELAYED RELEASE ORAL at 08:20

## 2017-07-09 RX ADMIN — MORPHINE SULFATE 4 MG: 4 INJECTION, SOLUTION INTRAMUSCULAR; INTRAVENOUS at 13:01

## 2017-07-09 RX ADMIN — PANTOPRAZOLE SODIUM 40 MG: 40 TABLET, DELAYED RELEASE ORAL at 17:07

## 2017-07-09 RX ADMIN — OXYCODONE HYDROCHLORIDE AND ACETAMINOPHEN 2 TABLET: 10; 325 TABLET ORAL at 11:36

## 2017-07-09 RX ADMIN — INSULIN ASPART 14 UNITS: 100 INJECTION, SOLUTION INTRAVENOUS; SUBCUTANEOUS at 08:20

## 2017-07-09 RX ADMIN — MORPHINE SULFATE 4 MG: 4 INJECTION, SOLUTION INTRAMUSCULAR; INTRAVENOUS at 06:48

## 2017-07-09 RX ADMIN — INSULIN ASPART 14 UNITS: 100 INJECTION, SOLUTION INTRAVENOUS; SUBCUTANEOUS at 17:08

## 2017-07-09 RX ADMIN — MORPHINE SULFATE 4 MG: 4 INJECTION, SOLUTION INTRAMUSCULAR; INTRAVENOUS at 09:25

## 2017-07-09 RX ADMIN — OXYCODONE HYDROCHLORIDE AND ACETAMINOPHEN 2 TABLET: 10; 325 TABLET ORAL at 03:33

## 2017-07-09 RX ADMIN — OXYCODONE HYDROCHLORIDE 20 MG: 20 TABLET, FILM COATED, EXTENDED RELEASE ORAL at 21:24

## 2017-07-09 RX ADMIN — DOCUSATE SODIUM 100 MG: 100 CAPSULE, LIQUID FILLED ORAL at 17:07

## 2017-07-09 RX ADMIN — RIFAMPIN 300 MG: 300 CAPSULE ORAL at 08:20

## 2017-07-09 RX ADMIN — MORPHINE SULFATE 4 MG: 4 INJECTION, SOLUTION INTRAMUSCULAR; INTRAVENOUS at 17:07

## 2017-07-09 RX ADMIN — INSULIN ASPART 14 UNITS: 100 INJECTION, SOLUTION INTRAVENOUS; SUBCUTANEOUS at 11:36

## 2017-07-09 RX ADMIN — DOCUSATE SODIUM 100 MG: 100 CAPSULE, LIQUID FILLED ORAL at 08:23

## 2017-07-09 RX ADMIN — CEFAZOLIN SODIUM 2 G: 2 INJECTION, SOLUTION INTRAVENOUS at 06:48

## 2017-07-09 RX ADMIN — OXYCODONE HYDROCHLORIDE 20 MG: 20 TABLET, FILM COATED, EXTENDED RELEASE ORAL at 08:20

## 2017-07-09 RX ADMIN — METOPROLOL TARTRATE 50 MG: 50 TABLET ORAL at 21:24

## 2017-07-09 RX ADMIN — SERTRALINE 100 MG: 100 TABLET, FILM COATED ORAL at 08:20

## 2017-07-09 RX ADMIN — FERROUS SULFATE TAB 325 MG (65 MG ELEMENTAL FE) 325 MG: 325 (65 FE) TAB at 08:20

## 2017-07-09 RX ADMIN — OXYCODONE HYDROCHLORIDE AND ACETAMINOPHEN 2 TABLET: 10; 325 TABLET ORAL at 07:32

## 2017-07-09 RX ADMIN — INSULIN DETEMIR 45 UNITS: 100 INJECTION, SOLUTION SUBCUTANEOUS at 08:24

## 2017-07-09 RX ADMIN — OXYCODONE HYDROCHLORIDE AND ACETAMINOPHEN 2 TABLET: 10; 325 TABLET ORAL at 19:59

## 2017-07-09 RX ADMIN — OXYCODONE HYDROCHLORIDE AND ACETAMINOPHEN 2 TABLET: 10; 325 TABLET ORAL at 15:41

## 2017-07-09 RX ADMIN — CEFAZOLIN SODIUM 2 G: 2 INJECTION, SOLUTION INTRAVENOUS at 23:04

## 2017-07-09 NOTE — PLAN OF CARE
Problem: Patient Care Overview (Adult)  Goal: Plan of Care Review  Outcome: Ongoing (interventions implemented as appropriate)    07/09/17 0815   Coping/Psychosocial Response Interventions   Plan Of Care Reviewed With patient   Patient Care Overview   Progress improving   Outcome Evaluation   Outcome Summary/Follow up Plan pain better controlled through the night, KI, ice, accu check AC&HS with sliding scale insulin, monitor blood pressure, sleep apnea without machine, rehab on Monday       Goal: Adult Individualization and Mutuality  Outcome: Ongoing (interventions implemented as appropriate)  Goal: Discharge Needs Assessment  Outcome: Ongoing (interventions implemented as appropriate)    Problem: Perioperative Period (Adult)  Goal: Signs and Symptoms of Listed Potential Problems Will be Absent or Manageable (Perioperative Period)  Outcome: Ongoing (interventions implemented as appropriate)    Problem: Fall Risk (Adult)  Goal: Identify Related Risk Factors and Signs and Symptoms  Outcome: Outcome(s) achieved Date Met:  07/09/17  Goal: Absence of Falls  Outcome: Ongoing (interventions implemented as appropriate)

## 2017-07-09 NOTE — PROGRESS NOTES
I have seen and evaluated the patient this morning.  He reports that his pain is well-controlled.  Denies any problems or issues.    Dressings are clean and dry.  The drain remains in place and has had moderate output.  Calf is soft.  He can plantar flexion dorsally his ankle and toes with good strength.  Sensation intact.  Brisk cap refill.    Cultures are positive for staph aureus.  Hemoglobin 9.4    Assessment: Postoperative day 2 status post removal of infected total knee prosthesis and placement of antibiotic spacer    Plan: Continue antibiotics and pain management.  Discharge disposition is pending at present.    Willie Rodgers MD

## 2017-07-09 NOTE — PLAN OF CARE
Problem: Patient Care Overview (Adult)  Goal: Plan of Care Review  Outcome: Ongoing (interventions implemented as appropriate)    07/09/17 1011 07/09/17 1652   Coping/Psychosocial Response Interventions   Plan Of Care Reviewed With --  patient   Patient Care Overview   Progress improving --    Outcome Evaluation   Outcome Summary/Follow up Plan --  pt still having pain. requires pain medication every 2 hours. vss. immoblizer in place. working with therapy. blood sugars better. discussed with pt the imporatance of blood sugar control and monitoring. blood pressures have been running low. pt asymptomatic. discussed wtih pt the importance of taking blood pressure before taking meds.        Goal: Adult Individualization and Mutuality  Outcome: Ongoing (interventions implemented as appropriate)  Goal: Discharge Needs Assessment  Outcome: Ongoing (interventions implemented as appropriate)    Problem: Perioperative Period (Adult)  Goal: Signs and Symptoms of Listed Potential Problems Will be Absent or Manageable (Perioperative Period)  Outcome: Ongoing (interventions implemented as appropriate)    Problem: Knee Replacement, Total (Adult)  Goal: Signs and Symptoms of Listed Potential Problems Will be Absent or Manageable (Knee Replacement, Total)  Outcome: Ongoing (interventions implemented as appropriate)    Problem: Fall Risk (Adult)  Goal: Identify Related Risk Factors and Signs and Symptoms  Outcome: Ongoing (interventions implemented as appropriate)  Goal: Absence of Falls  Outcome: Ongoing (interventions implemented as appropriate)

## 2017-07-09 NOTE — CONSULTS
66 y.o.  Patient Care Team:  Guille Nieves MD as PCP - General  Guille Nieves MD as PCP - Family Medicine  Tiburcio Moreno MD as Consulting Physician (Cardiology)    Chief complaint - Right knee hardware removal.     Reason for consultation - Type 2 dm    HPI 67 y/o WM with Pmhx of CAD, Type 2dm, HLP, HTN, Diabetic neuropathy resulting in left great toe osteomyelitis status post amputation complicated with right knee joint seeding underwent incision, debridement and polyliner exchange of the right TKA, now admitted for the right knee hardware removal.   Pt is well - known to me, I see him for the management of his Type 2 dm.     Type 2 dm - Diagnosed about 10 -15 years ago. Was started on oral agents initially. Insulin was started about 5 years ago. Currently on levemir 45 units bid, Novolog 14 units with each meal plus ssi tid ac and hs - 3 units for 50 above 150 mg/dl. Avg novolog 12 - 16 units with each meal.   Checks BG 3 - 5 times a day.   FBG - 90 - 110 mg/dl and Pre - meals - 230 - 250 Mg/dl  No BG less than 60 mg/dl in the last one month, does have hypoglycemic awareness. Highest BG in the last 1 month was - 302 mg/dl. No c/o nausea and vomiting.   Up to date with eye exam in the last 1 month and no dm retinopathy.   C/o tingling and numbness in his b/l feet, Great toe amputation of his left foot is healed well. Now s/p right knee joint hardware removed.   No CAD, CKD,CVA per pt.   Pt is physically not active due to knee pain. Weight has been stable.   Following diabetic diet for most.   On Ace inb.  Prior history of pancreatitis.        Past Medical History:   Diagnosis Date   • Arthritis    • Chronic pain     BILATERAL KNEES AND BACK   • Coronary artery disease    • Depression    • Diabetes mellitus    • GERD (gastroesophageal reflux disease)    • History of kidney stones    • Hyperlipidemia    • Hypertension    • Kidney stone    • MSSA (methicillin susceptible Staphylococcus aureus)  infection     RIGHT KNEE   • NSTEMI (non-ST elevated myocardial infarction) 06/14/2017   • Sleep apnea     DOES NOT USE CPAP       Family History   Problem Relation Age of Onset   • Heart disease Mother    • Heart disease Father    • Diabetes Sister    • Dementia Maternal Grandmother    • No Known Problems Maternal Grandfather    • Heart disease Paternal Grandmother    • Heart attack Paternal Grandfather    • Heart disease Paternal Grandfather    • Heart disease Son    • Heart attack Son    • No Known Problems Sister    • Malig Hyperthermia Neg Hx        Social History     Social History   • Marital status:      Spouse name: N/A   • Number of children: N/A   • Years of education: N/A     Occupational History   • Not on file.     Social History Main Topics   • Smoking status: Former Smoker     Packs/day: 3.00     Years: 20.00   • Smokeless tobacco: Former User      Comment: quit 35 years ago. 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    • Alcohol use No   • Drug use: No   • Sexual activity: Defer     Other Topics Concern   • Not on file     Social History Narrative       Allergies   Allergen Reactions   • Vancomycin Other (See Comments)     Red Man syndrome, slow rate and premedicate with benadryl         Current Facility-Administered Medications:   •  acetaminophen (TYLENOL) tablet 325 mg, 325 mg, Oral, Q4H PRN, Joon Marrufo MD  •  amLODIPine (NORVASC) tablet 5 mg, 5 mg, Oral, Daily, Kiko Marie MD, 5 mg at 07/08/17 0807  •  aspirin EC tablet 325 mg, 325 mg, Oral, BID, Joon Marrufo MD, 325 mg at 07/09/17 0820  •  atorvastatin (LIPITOR) tablet 40 mg, 40 mg, Oral, Nightly, iKko Marie MD, 40 mg at 07/08/17 2051  •  bisacodyl (DULCOLAX) suppository 10 mg, 10 mg, Rectal, Daily PRN, Joon Marrufo MD  •  bisacodyl (DULCOLAX) suppository 10 mg, 10 mg, Rectal, Daily PRN, Kiko Marie MD  •  ceFAZolin in dextrose (ANCEF) IVPB solution 2 g, 2 g, Intravenous, Q8H, Corinne Shepherd MD, 2 g at  07/09/17 1408  •  docusate sodium (COLACE) capsule 100 mg, 100 mg, Oral, BID, Joon Marrufo MD, 100 mg at 07/09/17 0823  •  ferrous sulfate tablet 325 mg, 325 mg, Oral, Daily With Lunch, Kiko Marie MD, 325 mg at 07/09/17 0820  •  HYDROcodone-acetaminophen (NORCO) 7.5-325 MG per tablet 1 tablet, 1 tablet, Oral, Q4H PRN, Joon Marrufo MD  •  insulin aspart (novoLOG) injection 0-9 Units, 0-9 Units, Subcutaneous, 4x Daily With Meals & Nightly, Axel Zapata MD, 4 Units at 07/08/17 2153  •  insulin aspart (novoLOG) injection 14 Units, 14 Units, Subcutaneous, TID With Meals, Kiko Marie MD, 14 Units at 07/09/17 1136  •  insulin detemir (LEVEMIR) injection 45 Units, 45 Units, Subcutaneous, Q12H, Kiko Marie MD, 45 Units at 07/09/17 0824  •  isosorbide mononitrate (IMDUR) 24 hr tablet 30 mg, 30 mg, Oral, Q24H, Kiko Marie MD, 30 mg at 07/08/17 0807  •  lactated ringers infusion, 9 mL/hr, Intravenous, Continuous, Flaquito Castrejon MD, Last Rate: 9 mL/hr at 07/07/17 1212  •  lactated ringers infusion, 9 mL/hr, Intravenous, Continuous, Tiburcio Mahajan MD  •  metoprolol tartrate (LOPRESSOR) tablet 50 mg, 50 mg, Oral, Q12H, Kiko Marie MD, 50 mg at 07/09/17 0820  •  morphine injection 4 mg, 4 mg, Intravenous, Q2H PRN, 4 mg at 07/09/17 1301 **AND** naloxone (NARCAN) injection 0.4 mg, 0.4 mg, Intravenous, Q5 Min PRN, Joon Marrufo MD  •  ondansetron (ZOFRAN) injection 4 mg, 4 mg, Intravenous, Q6H PRN, Joon Marrufo MD  •  oxyCODONE ER (oxyCONTIN) 12 hr tablet 20 mg, 20 mg, Oral, Q12H, Kiko Marie MD, 20 mg at 07/09/17 0820  •  oxyCODONE-acetaminophen (PERCOCET)  MG per tablet 2 tablet, 2 tablet, Oral, Q4H PRN, 2 tablet at 07/09/17 1541 **OR** oxyCODONE-acetaminophen (PERCOCET)  MG per tablet 1 tablet, 1 tablet, Oral, Q4H PRN, Willie Rodgers MD  •  pantoprazole (PROTONIX) EC tablet 40 mg, 40 mg, Oral, BID AC, Kiko CAMPBELL  MD Frank, 40 mg at 07/09/17 0648  •  promethazine (PHENERGAN) tablet 25 mg, 25 mg, Oral, Q8H PRN, Kiko Marie MD  •  rifAMPin (RIFADIN) capsule 300 mg, 300 mg, Oral, Q12H, Kiko Marie MD, 300 mg at 07/09/17 0820  •  sertraline (ZOLOFT) tablet 100 mg, 100 mg, Oral, Daily, Kiko Marie MD, 100 mg at 07/09/17 0820  •  sodium chloride 0.9 % infusion, 75 mL/hr, Intravenous, Continuous, [facility] Logan Zapata MD, Last Rate: 75 mL/hr at 07/09/17 1408, 75 mL/hr at 07/09/17 1408         Review of Systems   Constitutional: Positive for appetite change and fatigue. Negative for fever.   HENT: Negative for facial swelling, nosebleeds, trouble swallowing and voice change.    Eyes: Negative for pain and redness.   Respiratory: Negative for shortness of breath and wheezing.    Cardiovascular: Negative for palpitations and leg swelling.   Gastrointestinal: Negative for abdominal pain, diarrhea and vomiting.   Endocrine: Positive for polyuria. Negative for polydipsia.   Genitourinary: Negative for decreased urine volume and frequency.   Musculoskeletal: Positive for arthralgias, joint swelling and myalgias. Negative for neck pain.   Skin: Positive for wound. Negative for rash.   Allergic/Immunologic: Negative for immunocompromised state.   Neurological: Negative for seizures and facial asymmetry.   Hematological: Does not bruise/bleed easily.   Psychiatric/Behavioral: Negative for agitation and confusion.     Objective     Vital Signs  Temp:  [97.1 °F (36.2 °C)-99.3 °F (37.4 °C)] 97.1 °F (36.2 °C)  Heart Rate:  [87-93] 87  Resp:  [16] 16  BP: ()/(56-68) 96/59      Physical Exam  Gen exam - alert and oriented x 3, obese male, not in distress.   HEENT - Acanthosis nigricans and skin tags. Thyroid palpable.   Resp - Clear to auscultation.   CVS - S1,S2 heard and no murmurs,central obesity.   Abd - Non tender, BS heard.   Ext - No edema and decreased pin prick and intact proprioception. Rt knee in  brace.     Results Review:    I reviewed the patient's new clinical results.  Glucose   Date/Time Value Ref Range Status   07/09/2017 1028 113 70 - 130 mg/dL Final   07/09/2017 0612 127 70 - 130 mg/dL Final   07/08/2017 2135 231 (H) 70 - 130 mg/dL Final   07/08/2017 1636 281 (H) 70 - 130 mg/dL Final   07/08/2017 1030 307 (H) 70 - 130 mg/dL Final   07/08/2017 0642 233 (H) 70 - 130 mg/dL Final   07/07/2017 2143 187 (H) 70 - 130 mg/dL Final   07/07/2017 1152 179 (H) 70 - 130 mg/dL Final     Lab Results (last 72 hours)     Procedure Component Value Units Date/Time    POC Glucose Fingerstick [[id number]]  (Abnormal) Collected:  07/07/17 1152    Specimen:  Blood Updated:  07/07/17 1154     Glucose 179 (H) mg/dL     Narrative:       Meter: [id number] : [id number] Henrique VÁSQUEZ    POC Glucose Fingerstick [[id number]]  (Abnormal) Collected:  07/07/17 2143    Specimen:  Blood Updated:  07/07/17 2151     Glucose 187 (H) mg/dL     Narrative:       Meter: [id number] : [id number] Benjamin Dawson    CBC & Differential [[id number]] Collected:  07/08/17 0351    Specimen:  Blood Updated:  07/08/17 0420    Narrative:       The following orders were created for panel order CBC & Differential.  Procedure                               Abnormality         Status                     ---------                               -----------         ------                     CBC Auto Differential[[id number]]        Abnormal            Final result                 Please view results for these tests on the individual orders.    CBC Auto Differential [[id number]]  (Abnormal) Collected:  07/08/17 0351    Specimen:  Blood Updated:  07/08/17 0420     WBC 13.25 (H) 10*3/mm3      RBC 3.62 (L) 10*6/mm3      Hemoglobin 10.7 (L) g/dL      Hematocrit 32.2 (L) %      MCV 89.0 fL      MCH 29.6 pg      MCHC 33.2 g/dL      RDW 14.7 (H) %      RDW-SD 47.7 fl      MPV 10.8 fL      Platelets 213 10*3/mm3      Neutrophil % 79.5 (H) %      Lymphocyte % 11.8 (L) %       Monocyte % 8.3 %      Eosinophil % 0.0 (L) %      Basophil % 0.2 %      Immature Grans % 0.2 %      Neutrophils, Absolute 10.55 (H) 10*3/mm3      Lymphocytes, Absolute 1.56 10*3/mm3      Monocytes, Absolute 1.10 10*3/mm3      Eosinophils, Absolute 0.00 10*3/mm3      Basophils, Absolute 0.02 10*3/mm3      Immature Grans, Absolute 0.02 10*3/mm3     Basic Metabolic Panel [[id number]]  (Abnormal) Collected:  07/08/17 0351    Specimen:  Blood Updated:  07/08/17 0439     Glucose 236 (H) mg/dL      BUN 19 mg/dL      Creatinine 0.94 mg/dL      Sodium 134 (L) mmol/L      Potassium 4.8 mmol/L      Chloride 96 (L) mmol/L      CO2 21.8 (L) mmol/L      Calcium 9.1 mg/dL      eGFR Non African Amer 80 mL/min/1.73      BUN/Creatinine Ratio 20.2     Anion Gap 16.2 mmol/L     Narrative:       GFR Normal >60  Chronic Kidney Disease <60  Kidney Failure <15    POC Glucose Fingerstick [[id number]]  (Abnormal) Collected:  07/08/17 0642    Specimen:  Blood Updated:  07/08/17 0653     Glucose 233 (H) mg/dL     Narrative:       Meter: [id number] : [id number] Benjamin Dawson    POC Glucose Fingerstick [[id number]]  (Abnormal) Collected:  07/08/17 1030    Specimen:  Blood Updated:  07/08/17 1031     Glucose 307 (H) mg/dL     Narrative:       Meter: [id number] : [id number] [facility]    POC Glucose Fingerstick [[id number]]  (Abnormal) Collected:  07/08/17 1636    Specimen:  Blood Updated:  07/08/17 1638     Glucose 281 (H) mg/dL     Narrative:       Meter: [id number] : [id number] [facility]    POC Glucose Fingerstick [[id number]]  (Abnormal) Collected:  07/08/17 2135    Specimen:  Blood Updated:  07/08/17 2136     Glucose 231 (H) mg/dL     Narrative:       Meter: [id number] : [id number] Alden BOUCHERA    CBC & Differential [[id number]] Collected:  07/09/17 0459    Specimen:  Blood Updated:  07/09/17 0532    Narrative:       The following orders were created for panel order CBC & Differential.  Procedure                                Abnormality         Status                     ---------                               -----------         ------                     CBC Auto Differential[[id number]]        Abnormal            Final result                 Please view results for these tests on the individual orders.    CBC Auto Differential [[id number]]  (Abnormal) Collected:  07/09/17 0459    Specimen:  Blood Updated:  07/09/17 0532     WBC 9.06 10*3/mm3      RBC 3.17 (L) 10*6/mm3      Hemoglobin 9.4 (L) g/dL      Hematocrit 27.7 (L) %      MCV 87.4 fL      MCH 29.7 pg      MCHC 33.9 g/dL      RDW 15.0 (H) %      RDW-SD 48.1 fl      MPV 10.5 fL      Platelets 174 10*3/mm3      Neutrophil % 61.2 %      Lymphocyte % 23.2 %      Monocyte % 11.0 %      Eosinophil % 4.1 %      Basophil % 0.3 %      Immature Grans % 0.2 %      Neutrophils, Absolute 5.54 10*3/mm3      Lymphocytes, Absolute 2.10 10*3/mm3      Monocytes, Absolute 1.00 10*3/mm3      Eosinophils, Absolute 0.37 10*3/mm3      Basophils, Absolute 0.03 10*3/mm3      Immature Grans, Absolute 0.02 10*3/mm3     Basic Metabolic Panel [[id number]]  (Abnormal) Collected:  07/09/17 0459    Specimen:  Blood Updated:  07/09/17 0554     Glucose 135 (H) mg/dL      BUN 28 (H) mg/dL      Creatinine 1.29 (H) mg/dL      Sodium 128 (L) mmol/L      Potassium 4.2 mmol/L      Chloride 92 (L) mmol/L      CO2 23.2 mmol/L      Calcium 9.0 mg/dL      eGFR Non African Amer 56 (L) mL/min/1.73      BUN/Creatinine Ratio 21.7     Anion Gap 12.8 mmol/L     Narrative:       GFR Normal >60  Chronic Kidney Disease <60  Kidney Failure <15    POC Glucose Fingerstick [[id number]]  (Normal) Collected:  07/09/17 0612    Specimen:  Blood Updated:  07/09/17 0613     Glucose 127 mg/dL     Narrative:       Meter: [id number] : [id number] Alden Maynard CNA    POC Glucose Fingerstick [[id number]]  (Normal) Collected:  07/09/17 1028    Specimen:  Blood Updated:  07/09/17 1029     Glucose 113 mg/dL     Narrative:       Meter:  [id number] : [id number] Emerson Cruz    Tissue/Bone Culture [[id number]]  (Abnormal) Collected:  07/07/17 1416    Specimen:  Tissue from Knee, Right Updated:  07/09/17 1049     Culture Light growth (2+) Staphylococcus aureus (A)     Gram Stain Result Few (2+) WBCs per low power field      Few (2+) Gram positive cocci in pairs and clusters    Tissue/Bone Culture [[id number]]  (Abnormal) Collected:  07/07/17 1359    Specimen:  Tissue from Knee, Right Updated:  07/09/17 1100     Culture Scant growth (1+) Staphylococcus aureus (A)     Gram Stain Result Moderate (3+) WBCs per low power field      Rare (1+) Gram positive cocci in pairs and clusters          Imaging Results (last 72 hours)     Procedure Component Value Units Date/Time    XR Knee 1 or 2 View Right [[id number]] Collected:  07/07/17 1714     Updated:  07/07/17 1719    Narrative:       XR KNEE 1 OR 2 VW RIGHT-     INDICATIONS: Postoperative evaluation.     TECHNIQUE:     Frontal and lateral views of the right knee     COMPARISON: 02/14/2017     FINDINGS:     Surgical changes of the right knee are demonstrated, including removal  of previous arthroplasty hardware, placement of spacer material, and a  quang traversing the tibiofemoral articulation, with adjacent surgical  soft tissue gas, drain, overlying skin staples. No acute fracture is  identified.          Impression:          Postsurgical changes.           This report was finalized on 7/7/2017 5:16 PM by Dr. Sushant Calix MD.             Assessment/Plan     Active Hospital Problems (** Indicates Principal Problem)    Diagnosis Date Noted   • **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] 07/07/2017   • Hyponatremia [E87.1] 07/09/2017   • CAD (coronary artery disease) [I25.10] 07/08/2017   • Type II diabetes mellitus, uncontrolled [E11.65] 07/08/2017   • Osteoarthritis, knee [M17.9] 06/13/2017   • Hypertension [I10] 02/14/2017   • History of knee surgery [Z98.890] 01/30/2013      Resolved  "Hospital Problems    Diagnosis Date Noted Date Resolved   No resolved problems to display.     Type 2 dm -   Will check Hba1c.   Will continue levemir 45 units Q am and 45 units Q pm.  Continue novolog 14 units tid ac  Will cover with novolog high dose ssi tid ac and hs.  Will adjust further based on BG trend.     Will check Lipid panel   Continue lipitor 40 mg po daily.     Right knee hardware removed  Followed by ID and ortho.     Hyponatremia - Managed by Primary.    55 minutes out of 110 minutes face to face spent on floor managing care and counseling patient/family on treatment plan and follow up.    Adarsh Love MD.  07/09/17  4:26 PM        EMR Dragon / transcription disclaimer:    \"Dictated utilizing Dragon dictation\".   "

## 2017-07-09 NOTE — PLAN OF CARE
Problem: Patient Care Overview (Adult)  Goal: Plan of Care Review  Outcome: Ongoing (interventions implemented as appropriate)    07/09/17 1011   Coping/Psychosocial Response Interventions   Plan Of Care Reviewed With patient   Patient Care Overview   Progress improving   Outcome Evaluation   Outcome Summary/Follow up Plan Pt transferred sit to stand with CGA and RW. Pt ambulated 20 ft with CGA and RW. Pt transferred stand to sit with supervision and was left sitting EOB independently with feet supported.

## 2017-07-09 NOTE — THERAPY TREATMENT NOTE
Acute Care - Physical Therapy Treatment Note  [facility]     Patient Name: Hugh R McBurney  : 1950  MRN: [id number]  Today's Date: 2017  Onset of Illness/Injury or Date of Surgery Date: 17  Date of Referral to PT: 17  Referring Physician: Joon Marrufo MD    Admit Date: 2017    Visit Dx:    ICD-10-CM ICD-9-CM   1. 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   • Recent MSSA (methicillin susceptible Staphylococcus aureus) septicemia   • Infection of prosthetic knee joint   • Hyperglycemia   • Hyponatremia   • Osteoarthritis, knee   • Contusion of knee   • History of knee surgery   • Infection of prosthetic right knee joint   • CAD (coronary artery disease)   • Type II diabetes mellitus, uncontrolled               Adult Rehabilitation Note       17 0845          Rehab Assessment/Intervention    Discipline physical therapist  -      Document Type therapy note (daily note)  -      Subjective Information agree to therapy;complains of;fatigue;pain  -      Recorded by [LC] Tyrell Leigh, PT DPT      Pain Assessment    Pain Assessment 0-10  -      Pain Score 6  -      Pain Type Acute pain;Surgical pain  -      Pain Location Knee  -      Pain Orientation Right  -LC      Recorded by [LC] Tyrell Leigh, PT DPT      Cognitive Assessment/Intervention    Current Cognitive/Communication Assessment functional  -      Orientation Status oriented x 4  -      Follows Commands/Answers Questions 100% of the time;able to follow multi-step instructions  -      Personal Safety WNL/WFL  -LC      Recorded by [LC] Tyrell GUNTER  SAGAR LeighT      Bed Mobility, Assessment/Treatment    Bed Mobility, Comment up in chair  -LC      Recorded by [LC] Tyrell Leigh PT DPT      Transfer Assessment/Treatment    Transfers, Sit-Stand Temple contact guard assist  -LC      Transfers, Stand-Sit Temple contact guard assist  -LC      Transfers, Sit-Stand-Sit, Assist Device rolling walker  -LC      Recorded by [LC] Tyrell Leigh PT DPT      Gait Assessment/Treatment    Gait, Temple Level contact guard assist  -LC      Gait, Assistive Device rolling walker  -LC      Gait, Distance (Feet) 20  -LC      Gait, Gait Deviations right:;antalgic;step length decreased;stride length decreased;narrow base  -LC      Gait, Maintain Weight Bearing Status able to maintain weight bearing status  -LC      Gait, Safety Issues step length decreased  -LC      Gait, Impairments strength decreased;impaired balance;pain  -LC      Recorded by [LC] SAGAR MendezT      Positioning and Restraints    Pre-Treatment Position sitting in chair/recliner  -LC      Post Treatment Position bed  -LC      In Bed sitting EOB;call light within reach;encouraged to call for assist;side rails up x2  -LC      Recorded by [LC] SAGAR MendezT        User Key  (r) = Recorded By, (t) = Taken By, (c) = Cosigned By    Initials Name Effective Dates    LC Tyrell Leigh, SAGAR DPT 08/02/16 -                 IP PT Goals       07/08/17 0944          Bed Mobility PT LTG    Bed Mobility PT LTG, Date Established 07/08/17  -LC      Bed Mobility PT LTG, Time to Achieve 1 wk  -LC      Bed Mobility PT LTG, Activity Type all bed mobility  -LC      Bed Mobility PT LTG, Temple Level supervision required  -LC      Transfer Training PT LTG    Transfer Training PT LTG, Date Established 07/08/17  -LC      Transfer Training PT LTG, Time to Achieve 1 wk  -LC      Transfer Training PT LTG, Activity Type all transfers  -LC      Transfer Training PT LTG, Temple Level conditional  independence  -LC      Transfer Training PT LTG, Assist Device walker, rolling  -LC      Gait Training PT LTG    Gait Training Goal PT LTG, Date Established 07/08/17  -LC      Gait Training Goal PT LTG, Time to Achieve 1 wk  -LC      Gait Training Goal PT LTG, [place] Level supervision required  -LC      Gait Training Goal PT LTG, Assist Device walker, rolling  -LC      Gait Training Goal PT LTG, Distance to Achieve 200  -LC      Stair Training PT LTG    Stair Training Goal PT LTG, Date Established 07/08/17  -LC      Stair Training Goal PT LTG, Time to Achieve 1 wk  -LC      Stair Training Goal PT LTG, Number of Steps 2  -LC      Stair Training Goal PT LTG, [place] Level contact guard assist  -LC      Stair Training Goal PT LTG, Assist Device 2 handrails  -LC        User Key  (r) = Recorded By, (t) = Taken By, (c) = Cosigned By    Initials Name Provider Type     Tyrell Leigh, PT DPT Physical Therapist          Physical Therapy Education     Title: PT OT SLP Therapies (Active)     Topic: Physical Therapy (Active)     Point: Mobility training (Done)    Learning Progress Summary    Learner Readiness Method Response Comment Documented by Status   Patient Acceptance E,D VU,DU benefits of activity, safety during ambulation  07/09/17 1011 Done    Acceptance E,D NR,DU,VU benefits of activity, safety during ambulation  07/08/17 0943 Done                      User Key     Initials Effective Dates Name Provider Type [facility] 08/02/16 -  Tyrell Leigh, PT DPT Physical Therapist PT                    PT Recommendation and Plan  Anticipated Discharge Disposition: home with assist, home with outpatient services  PT Frequency: daily, 2 times/day  Plan of Care Review  Plan Of Care Reviewed With: patient  Progress: improving  Outcome Summary/Follow up Plan: Pt transferred sit to stand with CGA and RW. Pt ambulated 20 ft with CGA and RW. Pt transferred stand to sit with supervision and was left sitting EOB  independently with feet supported.         Time Calculation:         PT Charges       07/09/17 1012          Time Calculation    Start Time 0845  -      Stop Time 0930  -      Time Calculation (min) 45 min  -      PT Received On 07/09/17  -      PT - Next Appointment 07/10/17  -      Time Calculation- PT    Total Timed Code Minutes- PT 45 minute(s)  -        User Key  (r) = Recorded By, (t) = Taken By, (c) = Cosigned By    Initials Name Provider Type     Tyrell Leigh, PT DPT Physical Therapist          Therapy Charges for Today     Code Description Service Date Service Provider Modifiers Qty    81975655922 HC PT EVAL LOW COMPLEXITY 1 7/8/2017 Tyrell Leigh, PT DPT GP 1    65493289725 HC GAIT TRAINING EA 15 MIN 7/8/2017 Tyrell Leigh PT DPT GP 1    17643748445 HC GAIT TRAINING EA 15 MIN 7/9/2017 Tyrell Leigh, PT DPT GP 2    74128965598 HC PT THERAPEUTIC ACT EA 15 MIN 7/9/2017 Tyrell Leigh PT DPT GP 1               Tyrell Leigh, PT DPT  7/9/2017

## 2017-07-10 ENCOUNTER — APPOINTMENT (OUTPATIENT)
Dept: GENERAL RADIOLOGY | Facility: HOSPITAL | Age: 67
End: 2017-07-10
Attending: INTERNAL MEDICINE

## 2017-07-10 PROBLEM — M17.9 OSTEOARTHRITIS, KNEE: Status: RESOLVED | Noted: 2017-06-13 | Resolved: 2017-07-10

## 2017-07-10 PROBLEM — R80.9 TYPE 2 DIABETES MELLITUS WITH PROTEINURIA (HCC): Status: ACTIVE | Noted: 2017-07-08

## 2017-07-10 PROBLEM — E11.29 TYPE 2 DIABETES MELLITUS WITH PROTEINURIA: Status: ACTIVE | Noted: 2017-07-08

## 2017-07-10 LAB
ANION GAP SERPL CALCULATED.3IONS-SCNC: 13.2 MMOL/L
BACTERIA SPEC AEROBE CULT: ABNORMAL
BACTERIA SPEC AEROBE CULT: ABNORMAL
BUN BLD-MCNC: 27 MG/DL (ref 8–23)
BUN/CREAT SERPL: 30.7 (ref 7–25)
CALCIUM SPEC-SCNC: 9.2 MG/DL (ref 8.6–10.5)
CHLORIDE SERPL-SCNC: 100 MMOL/L (ref 98–107)
CHOLEST SERPL-MCNC: 128 MG/DL (ref 0–200)
CO2 SERPL-SCNC: 21.8 MMOL/L (ref 22–29)
CREAT BLD-MCNC: 0.88 MG/DL (ref 0.76–1.27)
GFR SERPL CREATININE-BSD FRML MDRD: 87 ML/MIN/1.73
GLUCOSE BLD-MCNC: 129 MG/DL (ref 65–99)
GLUCOSE BLDC GLUCOMTR-MCNC: 118 MG/DL (ref 70–130)
GLUCOSE BLDC GLUCOMTR-MCNC: 148 MG/DL (ref 70–130)
GLUCOSE BLDC GLUCOMTR-MCNC: 190 MG/DL (ref 70–130)
GLUCOSE BLDC GLUCOMTR-MCNC: 252 MG/DL (ref 70–130)
GRAM STN SPEC: ABNORMAL
HBA1C MFR BLD: 9.05 % (ref 4.8–5.6)
HDLC SERPL-MCNC: 42 MG/DL (ref 40–60)
LDLC SERPL CALC-MCNC: 62 MG/DL (ref 0–100)
LDLC/HDLC SERPL: 1.48 {RATIO}
POTASSIUM BLD-SCNC: 4.1 MMOL/L (ref 3.5–5.2)
SODIUM BLD-SCNC: 135 MMOL/L (ref 136–145)
TRIGL SERPL-MCNC: 120 MG/DL (ref 0–150)
VLDLC SERPL-MCNC: 24 MG/DL (ref 5–40)

## 2017-07-10 PROCEDURE — 99232 SBSQ HOSP IP/OBS MODERATE 35: CPT | Performed by: INTERNAL MEDICINE

## 2017-07-10 PROCEDURE — C1894 INTRO/SHEATH, NON-LASER: HCPCS

## 2017-07-10 PROCEDURE — 80048 BASIC METABOLIC PNL TOTAL CA: CPT | Performed by: HOSPITALIST

## 2017-07-10 PROCEDURE — 84165 PROTEIN E-PHORESIS SERUM: CPT | Performed by: INTERNAL MEDICINE

## 2017-07-10 PROCEDURE — 99231 SBSQ HOSP IP/OBS SF/LOW 25: CPT | Performed by: INTERNAL MEDICINE

## 2017-07-10 PROCEDURE — 02HV33Z INSERTION OF INFUSION DEVICE INTO SUPERIOR VENA CAVA, PERCUTANEOUS APPROACH: ICD-10-PCS | Performed by: INTERNAL MEDICINE

## 2017-07-10 PROCEDURE — 63710000001 INSULIN ASPART PER 5 UNITS: Performed by: HOSPITALIST

## 2017-07-10 PROCEDURE — 86334 IMMUNOFIX E-PHORESIS SERUM: CPT | Performed by: INTERNAL MEDICINE

## 2017-07-10 PROCEDURE — 80061 LIPID PANEL: CPT | Performed by: INTERNAL MEDICINE

## 2017-07-10 PROCEDURE — 25010000003 CEFAZOLIN IN DEXTROSE 2-4 GM/100ML-% SOLUTION: Performed by: INTERNAL MEDICINE

## 2017-07-10 PROCEDURE — 25010000002 MORPHINE PER 10 MG: Performed by: ORTHOPAEDIC SURGERY

## 2017-07-10 PROCEDURE — 84155 ASSAY OF PROTEIN SERUM: CPT | Performed by: INTERNAL MEDICINE

## 2017-07-10 PROCEDURE — 82962 GLUCOSE BLOOD TEST: CPT

## 2017-07-10 PROCEDURE — 63710000001 INSULIN ASPART PER 5 UNITS: Performed by: ORTHOPAEDIC SURGERY

## 2017-07-10 PROCEDURE — 97110 THERAPEUTIC EXERCISES: CPT

## 2017-07-10 RX ORDER — LISINOPRIL 20 MG/1
20 TABLET ORAL EVERY EVENING
Status: DISCONTINUED | OUTPATIENT
Start: 2017-07-10 | End: 2017-07-10

## 2017-07-10 RX ORDER — SODIUM CHLORIDE 0.9 % (FLUSH) 0.9 %
10 SYRINGE (ML) INJECTION AS NEEDED
Status: DISCONTINUED | OUTPATIENT
Start: 2017-07-10 | End: 2017-07-11 | Stop reason: HOSPADM

## 2017-07-10 RX ORDER — OXYCODONE AND ACETAMINOPHEN 10; 325 MG/1; MG/1
1 TABLET ORAL EVERY 4 HOURS PRN
Qty: 72 TABLET | Refills: 0 | Status: SHIPPED | OUTPATIENT
Start: 2017-07-10 | End: 2017-07-30

## 2017-07-10 RX ORDER — SODIUM CHLORIDE 0.9 % (FLUSH) 0.9 %
10 SYRINGE (ML) INJECTION EVERY 12 HOURS SCHEDULED
Status: DISCONTINUED | OUTPATIENT
Start: 2017-07-10 | End: 2017-07-11 | Stop reason: HOSPADM

## 2017-07-10 RX ORDER — OXYCODONE HCL 20 MG/1
20 TABLET, FILM COATED, EXTENDED RELEASE ORAL EVERY 12 HOURS SCHEDULED
Qty: 40 TABLET | Refills: 0 | Status: SHIPPED | OUTPATIENT
Start: 2017-07-10 | End: 2017-07-30

## 2017-07-10 RX ORDER — LISINOPRIL 5 MG/1
5 TABLET ORAL EVERY EVENING
Status: DISCONTINUED | OUTPATIENT
Start: 2017-07-10 | End: 2017-07-11

## 2017-07-10 RX ADMIN — ASPIRIN 325 MG: 325 TABLET, DELAYED RELEASE ORAL at 08:43

## 2017-07-10 RX ADMIN — INSULIN DETEMIR 45 UNITS: 100 INJECTION, SOLUTION SUBCUTANEOUS at 08:43

## 2017-07-10 RX ADMIN — RIFAMPIN 300 MG: 300 CAPSULE ORAL at 08:42

## 2017-07-10 RX ADMIN — DOCUSATE SODIUM 100 MG: 100 CAPSULE, LIQUID FILLED ORAL at 08:42

## 2017-07-10 RX ADMIN — RIFAMPIN 300 MG: 300 CAPSULE ORAL at 20:56

## 2017-07-10 RX ADMIN — OXYCODONE HYDROCHLORIDE AND ACETAMINOPHEN 2 TABLET: 10; 325 TABLET ORAL at 11:50

## 2017-07-10 RX ADMIN — CEFAZOLIN SODIUM 2 G: 2 INJECTION, SOLUTION INTRAVENOUS at 06:45

## 2017-07-10 RX ADMIN — LISINOPRIL 5 MG: 5 TABLET ORAL at 17:27

## 2017-07-10 RX ADMIN — INSULIN ASPART 4 UNITS: 100 INJECTION, SOLUTION INTRAVENOUS; SUBCUTANEOUS at 22:37

## 2017-07-10 RX ADMIN — ATORVASTATIN CALCIUM 40 MG: 20 TABLET, FILM COATED ORAL at 20:56

## 2017-07-10 RX ADMIN — INSULIN ASPART 14 UNITS: 100 INJECTION, SOLUTION INTRAVENOUS; SUBCUTANEOUS at 12:12

## 2017-07-10 RX ADMIN — SERTRALINE 100 MG: 100 TABLET, FILM COATED ORAL at 08:43

## 2017-07-10 RX ADMIN — CEFAZOLIN SODIUM 2 G: 2 INJECTION, SOLUTION INTRAVENOUS at 22:37

## 2017-07-10 RX ADMIN — OXYCODONE HYDROCHLORIDE 20 MG: 20 TABLET, FILM COATED, EXTENDED RELEASE ORAL at 20:56

## 2017-07-10 RX ADMIN — MORPHINE SULFATE 4 MG: 4 INJECTION, SOLUTION INTRAMUSCULAR; INTRAVENOUS at 03:40

## 2017-07-10 RX ADMIN — FERROUS SULFATE TAB 325 MG (65 MG ELEMENTAL FE) 325 MG: 325 (65 FE) TAB at 12:12

## 2017-07-10 RX ADMIN — OXYCODONE HYDROCHLORIDE AND ACETAMINOPHEN 2 TABLET: 10; 325 TABLET ORAL at 16:41

## 2017-07-10 RX ADMIN — AMLODIPINE BESYLATE 5 MG: 5 TABLET ORAL at 08:42

## 2017-07-10 RX ADMIN — ASPIRIN 325 MG: 325 TABLET, DELAYED RELEASE ORAL at 17:27

## 2017-07-10 RX ADMIN — OXYCODONE HYDROCHLORIDE AND ACETAMINOPHEN 2 TABLET: 10; 325 TABLET ORAL at 00:36

## 2017-07-10 RX ADMIN — SODIUM CHLORIDE 75 ML/HR: 9 INJECTION, SOLUTION INTRAVENOUS at 05:25

## 2017-07-10 RX ADMIN — OXYCODONE HYDROCHLORIDE 20 MG: 20 TABLET, FILM COATED, EXTENDED RELEASE ORAL at 08:43

## 2017-07-10 RX ADMIN — Medication 10 ML: at 20:59

## 2017-07-10 RX ADMIN — INSULIN ASPART 14 UNITS: 100 INJECTION, SOLUTION INTRAVENOUS; SUBCUTANEOUS at 08:43

## 2017-07-10 RX ADMIN — MORPHINE SULFATE 4 MG: 4 INJECTION, SOLUTION INTRAMUSCULAR; INTRAVENOUS at 10:15

## 2017-07-10 RX ADMIN — ISOSORBIDE MONONITRATE 30 MG: 30 TABLET ORAL at 08:42

## 2017-07-10 RX ADMIN — METOPROLOL TARTRATE 50 MG: 50 TABLET ORAL at 20:56

## 2017-07-10 RX ADMIN — PANTOPRAZOLE SODIUM 40 MG: 40 TABLET, DELAYED RELEASE ORAL at 17:28

## 2017-07-10 RX ADMIN — CEFAZOLIN SODIUM 2 G: 2 INJECTION, SOLUTION INTRAVENOUS at 15:33

## 2017-07-10 RX ADMIN — OXYCODONE HYDROCHLORIDE AND ACETAMINOPHEN 2 TABLET: 10; 325 TABLET ORAL at 05:19

## 2017-07-10 RX ADMIN — METOPROLOL TARTRATE 50 MG: 50 TABLET ORAL at 08:42

## 2017-07-10 RX ADMIN — INSULIN DETEMIR 45 UNITS: 100 INJECTION, SOLUTION SUBCUTANEOUS at 22:36

## 2017-07-10 RX ADMIN — INSULIN ASPART 2 UNITS: 100 INJECTION, SOLUTION INTRAVENOUS; SUBCUTANEOUS at 11:49

## 2017-07-10 RX ADMIN — PANTOPRAZOLE SODIUM 40 MG: 40 TABLET, DELAYED RELEASE ORAL at 08:43

## 2017-07-10 RX ADMIN — OXYCODONE HYDROCHLORIDE AND ACETAMINOPHEN 2 TABLET: 10; 325 TABLET ORAL at 20:56

## 2017-07-10 NOTE — PLAN OF CARE
Problem: Patient Care Overview (Adult)  Goal: Plan of Care Review  Outcome: Ongoing (interventions implemented as appropriate)    07/10/17 1502   Coping/Psychosocial Response Interventions   Plan Of Care Reviewed With patient   Patient Care Overview   Progress improving   Outcome Evaluation   Outcome Summary/Follow up Plan patient is taking pain meds less frequently, Diabetes controlled with insulin       Goal: Discharge Needs Assessment  Outcome: Ongoing (interventions implemented as appropriate)    07/10/17 1502   Discharge Needs Assessment   Concerns To Be Addressed no discharge needs identified         Problem: Perioperative Period (Adult)  Goal: Signs and Symptoms of Listed Potential Problems Will be Absent or Manageable (Perioperative Period)  Outcome: Ongoing (interventions implemented as appropriate)    07/10/17 1502   Perioperative Period   Problems Assessed (Perioperative Period) all   Problems Present (Perioperative Period) pain         Problem: Knee Replacement, Total (Adult)  Goal: Signs and Symptoms of Listed Potential Problems Will be Absent or Manageable (Knee Replacement, Total)  Outcome: Ongoing (interventions implemented as appropriate)    07/10/17 1502   Knee Replacement, Total   Problems Assessed (Total Knee Replacement) all   Problems Present (Total Knee Replacement) pain;decreased range of motion         Problem: Fall Risk (Adult)  Goal: Identify Related Risk Factors and Signs and Symptoms  Outcome: Outcome(s) achieved Date Met:  07/10/17    07/10/17 1502   Fall Risk   Fall Risk: Related Risk Factors age-related changes       Goal: Absence of Falls  Outcome: Ongoing (interventions implemented as appropriate)    07/10/17 1502   Fall Risk (Adult)   Absence of Falls achieves outcome

## 2017-07-10 NOTE — PROGRESS NOTES
[facility]     LOS: 3 days     Name: Hugh R McBurney  Age: 66 y.o.  Sex: male  :  1950  MRN: [id number]         Primary Care Physician: Guille Nieves MD    Diet Regular; Consistent Carbohydrate    Subjective   No chest pain, shortness of breath. Pain okay.    Objective   Temp:  [97.1 °F (36.2 °C)-98.9 °F (37.2 °C)] 98.3 °F (36.8 °C)  Heart Rate:  [81-92] 87  Resp:  [16] 16  BP: ()/(59-82) 116/67  SpO2:  [93 %-96 %] 93 %  on   ;   O2 Device: room air  Body mass index is 31.99 kg/(m^2).    Physical Exam   Constitutional: He is oriented to person, place, and time. No distress.   Cardiovascular: Normal rate and regular rhythm.    Pulmonary/Chest: Effort normal and breath sounds normal. No respiratory distress.   Abdominal: Soft. There is no tenderness.   Musculoskeletal: He exhibits no edema.   Right knee immobilizer   Neurological: He is alert and oriented to person, place, and time.   Skin: Skin is warm and dry.     Reviewed medications and new clinical results    amLODIPine 5 mg Oral Daily   aspirin 325 mg Oral BID   atorvastatin 40 mg Oral Nightly   ceFAZolin 2 g Intravenous Q8H   ferrous sulfate 325 mg Oral Daily With Lunch   insulin aspart 0-9 Units Subcutaneous 4x Daily With Meals & Nightly   insulin aspart 14 Units Subcutaneous TID With Meals   insulin detemir 45 Units Subcutaneous Q12H   isosorbide mononitrate 30 mg Oral Q24H   metoprolol tartrate 50 mg Oral Q12H   oxyCODONE 20 mg Oral Q12H   pantoprazole 40 mg Oral BID AC   rifAMPin 300 mg Oral Q12H   sertraline 100 mg Oral Daily       lactated ringers 9 mL/hr Last Rate: 9 mL/hr (17 1212)   lactated ringers 9 mL/hr    sodium chloride 75 mL/hr Last Rate: 75 mL/hr (07/10/17 0525)       Results from last 7 days  Lab Units 17  0459 17  0351   WBC 10*3/mm3 9.06 13.25*   HEMOGLOBIN g/dL 9.4* 10.7*   PLATELETS 10*3/mm3 174 213     Results from last 7 days  Lab Units  07/10/17  0425 07/09/17  0459 07/08/17  0351   SODIUM mmol/L 135* 128* 134*   POTASSIUM mmol/L 4.1 4.2 4.8   CHLORIDE mmol/L 100 92* 96*   CO2 mmol/L 21.8* 23.2 21.8*   BUN mg/dL 27* 28* 19   CREATININE mg/dL 0.88 1.29* 0.94   CALCIUM mg/dL 9.2 9.0 9.1   GLUCOSE mg/dL 129* 135* 236*     Glucose   Date/Time Value Ref Range Status   07/10/2017 0626 148 (H) 70 - 130 mg/dL Final   07/09/2017 2110 101 70 - 130 mg/dL Final   07/09/2017 1659 112 70 - 130 mg/dL Final   07/09/2017 1028 113 70 - 130 mg/dL Final   07/09/2017 0612 127 70 - 130 mg/dL Final   07/08/2017 2135 231 (H) 70 - 130 mg/dL Final   07/08/2017 1636 281 (H) 70 - 130 mg/dL Final   07/08/2017 1030 307 (H) 70 - 130 mg/dL Final     Hemoglobin A1C   Date Value Ref Range Status   07/09/2017 9.05 (H) 4.80 - 5.60 % Final     Estimated Creatinine Clearance: 110.4 mL/min (by C-G formula based on Cr of 0.88).    Assessment/Plan   Active Hospital Problems (** Indicates Principal Problem)    Diagnosis Date Noted   • **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] 07/07/2017   • Hyponatremia [E87.1] 07/09/2017   • CAD (coronary artery disease) [I25.10] 07/08/2017   • Type II diabetes mellitus, uncontrolled [E11.65] 07/08/2017   • Osteoarthritis, knee [M17.9] 06/13/2017   • Hypertension [I10] 02/14/2017   • History of knee surgery [Z98.890] 01/30/2013      Resolved Hospital Problems    Diagnosis Date Noted Date Resolved   No resolved problems to display.     · S/P removal of right total knee arthroplasty implants and placement of antibiotic spacer 7/8/17. Recent non-ST elevation MI.  · Monitor for acute blood loss anemia  · Antibiotics per infectious diseases: Cefazolin 2 g IV every 8 hours, stop date August 17, 2017 weekly CBC diff. Cr, ESR, CRP faxed to [phone]  · Endocrinology managing DM2  · Creatinine improved. S/P IVF. Resume ACE-I but at reduced dose for now, monitoring BP.    Axel Zapata MD   07/10/17  10:11 AM

## 2017-07-10 NOTE — PLAN OF CARE
Problem: Patient Care Overview (Adult)  Goal: Plan of Care Review  Outcome: Ongoing (interventions implemented as appropriate)    07/10/17 0240   Coping/Psychosocial Response Interventions   Plan Of Care Reviewed With patient   Patient Care Overview   Progress progress toward functional goals as expected   Outcome Evaluation   Outcome Summary/Follow up Plan Pt is a post op day 2 of a rt knee component removal with antibiotic spacer placed. Pt continues with a pain level of 7. Pt has been utilizing all pain meds ordered. Pt continues with immobilizer and ice. Pt refused his evening Levemir, blood sugar was 101. Pt educated on importance of monitoring blood sugars related to his comorbidity of diabetes. Pt voiced understanding. Pt was able to ambulate from recliner to bed with an assist of 1. Pt continues on IV Ancef Q8hrs. Pt is possibly going to [facility] in AM.        Goal: Adult Individualization and Mutuality  Outcome: Ongoing (interventions implemented as appropriate)  Goal: Discharge Needs Assessment  Outcome: Ongoing (interventions implemented as appropriate)    Problem: Perioperative Period (Adult)  Goal: Signs and Symptoms of Listed Potential Problems Will be Absent or Manageable (Perioperative Period)  Outcome: Ongoing (interventions implemented as appropriate)    Problem: Knee Replacement, Total (Adult)  Goal: Signs and Symptoms of Listed Potential Problems Will be Absent or Manageable (Knee Replacement, Total)  Outcome: Ongoing (interventions implemented as appropriate)    Problem: Fall Risk (Adult)  Goal: Identify Related Risk Factors and Signs and Symptoms  Outcome: Ongoing (interventions implemented as appropriate)  Goal: Absence of Falls  Outcome: Ongoing (interventions implemented as appropriate)

## 2017-07-10 NOTE — PLAN OF CARE
Problem: Patient Care Overview (Adult)  Goal: Plan of Care Review  Outcome: Ongoing (interventions implemented as appropriate)    07/10/17 8153   Coping/Psychosocial Response Interventions   Plan Of Care Reviewed With patient   Outcome Evaluation   Outcome Summary/Follow up Plan Pt w/ increased pain w/ mobility today which limited distance. Maintained TTWB/NWB on RLE w/ activity. Will get clarification from MD of WB status.

## 2017-07-10 NOTE — PROGRESS NOTES
Hospital Follow Up    LOS:  LOS: 3 days   Patient Name: Hugh R McBurney  Age/Sex: 66 y.o. male  : 1950  MRN: [id number]    Day of Service: 07/10/17   Length of Stay: 3  Encounter Provider: Billy Rodriges MD  Place of Service: [facility]  Patient Care Team:  Guille Nieves MD as PCP - General  Guille Nieves MD as PCP - Family Medicine  Tiburcio Moreno MD as Consulting Physician (Cardiology)    Subjective:     Chief Complaint: History of coronary artery disease/cardiomyopathy/recent MI/infected knee.    Interval History: Patient says right knee still hurts no cardiac symptoms no chest pain shortness of breath dizziness lightheadedness    Objective:     Objective:  Temp:  [97.3 °F (36.3 °C)-98.9 °F (37.2 °C)] 97.7 °F (36.5 °C)  Heart Rate:  [81-99] 99  Resp:  [16] 16  BP: (104-131)/(53-82) 108/53     Intake/Output Summary (Last 24 hours) at 07/10/17 1330  Last data filed at 07/10/17 0900   Gross per 24 hour   Intake             1955 ml   Output             2125 ml   Net             -170 ml     Body mass index is 31.99 kg/(m^2).  Last 3 weights    17  1203   Weight: 249 lb 3 oz (113 kg)     Weight change:       Physical Exam:   General : Alert, cooperative, in no acute distress.  Neuro: alert,cooperative and oriented  Lungs: CTAB. Normal respiratory effort and rate.  CV:: Regular rate and rhythm, normal S1 and S2, no murmurs, gallops or rubs.  ABD: Soft, nontender, non-distended. positive bowel sounds  Extr: No edema or cyanosis, moves all extremities    Lab Review:     Results from last 7 days  Lab Units 07/10/17  0425 17  0459   SODIUM mmol/L 135* 128*   POTASSIUM mmol/L 4.1 4.2   CHLORIDE mmol/L 100 92*   CO2 mmol/L 21.8* 23.2   BUN mg/dL 27* 28*   CREATININE mg/dL 0.88 1.29*   GLUCOSE mg/dL 129* 135*   CALCIUM mg/dL 9.2 9.0           Results from last 7 days  Lab Units 17  0459 17  0351   WBC 10*3/mm3 9.06 13.25*    HEMOGLOBIN g/dL 9.4* 10.7*   HEMATOCRIT % 27.7* 32.2*   PLATELETS 10*3/mm3 174 213               Results from last 7 days  Lab Units 07/10/17  0425   CHOLESTEROL mg/dL 128   TRIGLYCERIDES mg/dL 120   HDL CHOL mg/dL 42   LDL CHOL mg/dL 62               Current Medications:   Scheduled Meds:  amLODIPine 5 mg Oral Daily   aspirin 325 mg Oral BID   atorvastatin 40 mg Oral Nightly   ceFAZolin 2 g Intravenous Q8H   ferrous sulfate 325 mg Oral Daily With Lunch   insulin aspart 0-9 Units Subcutaneous 4x Daily With Meals & Nightly   insulin aspart 14 Units Subcutaneous TID With Meals   insulin detemir 45 Units Subcutaneous Q12H   isosorbide mononitrate 30 mg Oral Q24H   lisinopril 5 mg Oral Q PM   metoprolol tartrate 50 mg Oral Q12H   oxyCODONE 20 mg Oral Q12H   pantoprazole 40 mg Oral BID AC   rifAMPin 300 mg Oral Q12H   sertraline 100 mg Oral Daily     Continuous Infusions:  lactated ringers 9 mL/hr Last Rate: 9 mL/hr (07/07/17 1212)   lactated ringers 9 mL/hr    sodium chloride 75 mL/hr Last Rate: 75 mL/hr (07/10/17 0525)       Allergies:  Allergies   Allergen Reactions   • Vancomycin Other (See Comments)     Red Man syndrome, slow rate and premedicate with benadryl       Assessment:     Principal Problem:    Infection of prosthetic right knee joint  Active Problems:    Hypertension    Osteoarthritis, knee    History of knee surgery    CAD (coronary artery disease)    Type II diabetes mellitus, uncontrolled    Hyponatremia        Plan:   1.  Recent myocardial infarction.    2.  CAD  3.  Stress-induced cardiomyopathy  4.  Infected right knee status post removal with spacer placement   5  Hypertension blood pressures good  6.  Will follow        Billy Rodriges MD  07/10/17  1:30 PM

## 2017-07-10 NOTE — PROGRESS NOTES
Orthopedic Progress Note      Patient: Hugh R McBurney  YOB: 1950     Date of Admission: 7/7/2017 11:40 AM Medical Record Number: [id number]     Attending Physician: Kiko Marie, *    Status Post:  REMOVAL  RT TOTAL KNEE ARTHROPLASTY AND ANITBIOTIC SPACER Post Operative Day Number: 3    Subjective : No new orthopaedic complaints     Pain Relief: some relief with present medication.     Systemic Complaints: No Complaints  Vitals:    07/10/17 0727 07/10/17 1112 07/10/17 1542 07/10/17 1856   BP: 116/67 108/53 119/66 122/65   BP Location: Left arm Left arm Right arm Right arm   Patient Position: Lying Lying Lying Sitting   Pulse: 87 99 84 89   Resp: 16 16 16 16   Temp: 98.3 °F (36.8 °C) 97.7 °F (36.5 °C) 98.7 °F (37.1 °C) 97.7 °F (36.5 °C)   TempSrc: Oral Oral Oral Oral   SpO2: 93% 95% 92% 98%   Weight:       Height:           Physical Exam: 66 y.o. male    General Appearance:       Alert, cooperative, in no acute distress                  Extremities:    Dressing Clean, Dry and Intact         Incision healthy without signs or symptoms of infections         No clinical sign of DVT        Able to do good movements of digits    Pulses:   Pulses palpable and equal bilaterally           Diagnostic Tests:       Results from last 7 days  Lab Units 07/09/17  0459 07/08/17  0351   WBC 10*3/mm3 9.06 13.25*   HEMOGLOBIN g/dL 9.4* 10.7*   HEMATOCRIT % 27.7* 32.2*   PLATELETS 10*3/mm3 174 213       Results from last 7 days  Lab Units 07/10/17  0425 07/09/17  0459 07/08/17  0351   SODIUM mmol/L 135* 128* 134*   POTASSIUM mmol/L 4.1 4.2 4.8   CHLORIDE mmol/L 100 92* 96*   CO2 mmol/L 21.8* 23.2 21.8*   BUN mg/dL 27* 28* 19   CREATININE mg/dL 0.88 1.29* 0.94   GLUCOSE mg/dL 129* 135* 236*   CALCIUM mg/dL 9.2 9.0 9.1           Xr Knee 1 Or 2 View Right    Result Date: 7/7/2017  Narrative: XR KNEE 1 OR 2 VW RIGHT-  INDICATIONS: Postoperative evaluation.  TECHNIQUE:     Frontal and lateral views of the right  knee  COMPARISON: 02/14/2017  FINDINGS:  Surgical changes of the right knee are demonstrated, including removal of previous arthroplasty hardware, placement of spacer material, and a quang traversing the tibiofemoral articulation, with adjacent surgical soft tissue gas, drain, overlying skin staples. No acute fracture is identified.       Impression:  Postsurgical changes.    This report was finalized on 7/7/2017 5:16 PM by Dr. Sushant Calix MD.      Ct Angiogram Chest With & Without Contrast    Result Date: 6/14/2017  Narrative: CT ANGIOGRAM OF THE CHEST. MULTIPLE CORONAL, SAGITTAL, AND 3-D RECONSTRUCTIONS  HISTORY: 66-year-old male with chest pain and elevated D-dimer.  TECHNIQUE: CT angiogram of the chest was performed. Multiple coronal, sagittal, and 3-D reconstruction images were obtained. There is no previous chest CT for comparison.  FINDINGS: There is suboptimal opacification of the pulmonary artery branches, but there is no convincing evidence for pulmonary thromboemboli. There are no pulmonary airspace consolidations and there are no pleural or pericardial effusions. There is a 4 mm right upper lobe pulmonary nodule, image 30. There is no lymphadenopathy within the chest. Extensive coronary artery calcifications are noted.      Impression: 1. There is no convincing evidence for pulmonary thromboemboli. 2. Extensive coronary artery calcifications. Consider quantification with coronary artery calcium scoring CT. 3. 4 mm right upper lobe pulmonary nodule. The nodule is likely benign, but reevaluation is recommended with follow-up noncontrasted chest CT in 6 months.  This report was finalized on 6/14/2017 1:22 PM by Dr. Corinne Bob MD.      Xr Chest Post Cva Port    Result Date: 7/10/2017  Narrative: AP CHEST 07/10/2017  HISTORY: PICC line placement.  Left upper extremity PICC line is seen with its tip overlying the superior cavoatrial junction. Heart size is within normal limits. Lungs appear clear.       Impression: Left upper extremity PICC line tip in the superior cavoatrial junction.          Current Medications:  Scheduled Meds:  amLODIPine 5 mg Oral Daily   aspirin 325 mg Oral BID   atorvastatin 40 mg Oral Nightly   ceFAZolin 2 g Intravenous Q8H   ferrous sulfate 325 mg Oral Daily With Lunch   insulin aspart 0-9 Units Subcutaneous 4x Daily With Meals & Nightly   insulin aspart 14 Units Subcutaneous TID With Meals   insulin detemir 45 Units Subcutaneous Q12H   isosorbide mononitrate 30 mg Oral Q24H   lisinopril 5 mg Oral Q PM   metoprolol tartrate 50 mg Oral Q12H   oxyCODONE 20 mg Oral Q12H   pantoprazole 40 mg Oral BID AC   rifAMPin 300 mg Oral Q12H   sertraline 100 mg Oral Daily   sodium chloride 10 mL Intracatheter Q12H     Continuous Infusions:  lactated ringers 9 mL/hr Last Rate: 9 mL/hr (07/07/17 1212)   lactated ringers 9 mL/hr    sodium chloride 75 mL/hr Last Rate: 75 mL/hr (07/10/17 0525)     PRN Meds:.•  acetaminophen  •  bisacodyl  •  bisacodyl  •  HYDROcodone-acetaminophen  •  Morphine **AND** naloxone  •  ondansetron  •  oxyCODONE-acetaminophen **OR** oxyCODONE-acetaminophen  •  promethazine  •  sodium chloride    Assessment: Status post  REMOVAL  RT TOTAL KNEE ARTHROPLASTY AND ANITBIOTIC SPACER     Patient Active Problem List   Diagnosis   • Hypertension   • Hyperlipidemia   • Chronic pain   • Knee pain, hx of previous prosthetic joint infection   • Great toe pain, with cellulitis and gangrene   • Diabetic ulcer of 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, with necrosis of bone   • 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   • Recent MSSA (methicillin susceptible Staphylococcus aureus) septicemia   • Infection of prosthetic knee joint   • Hyperglycemia   • Hyponatremia   • Osteoarthritis, knee   • Contusion of knee   • History of knee surgery   • Infection of prosthetic right knee joint   • CAD (coronary artery disease)   • Type II diabetes mellitus,  uncontrolled   • Hyponatremia       PLAN:   Continues current post-op course  Anticoagulation: Lovenox started   Mobilize with PT as tolerated per protocol    Weight Bearing: toe touch weight bearing  Discharge Plan: OK to plan for discharge in  tomorrow to SNF  from orthopadic perspective.    Kiko Marie MD    Date: 7/10/2017    Time: 7:23 PM

## 2017-07-10 NOTE — DISCHARGE SUMMARY
Orthopedic Discharge Summary      Patient: Hugh R McBurney   YOB: 1950    Medical Record Number: [id number]    Attending Physician: Kiko Marie, *  Consulting Physician(s):   Date of Admission: 7/7/2017 11:40 AM  Date of Discharge:      REVISION RT TOTAL KNEE ARTHROPLASTY AND ANITBIOTIC SPACER    Patient Active Problem List   Diagnosis   • Hypertension   • Hyperlipidemia   • Chronic pain   • Knee pain, hx of previous prosthetic joint infection   • Great toe pain, with cellulitis and gangrene   • Diabetic ulcer of 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, with necrosis of bone   • 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   • Recent MSSA (methicillin susceptible Staphylococcus aureus) septicemia   • Infection of prosthetic knee joint   • Hyperglycemia   • Hyponatremia   • Contusion of knee   • History of knee surgery   • Infection of prosthetic right knee joint   • CAD (coronary artery disease)   • Type II diabetes mellitus, uncontrolled   • Hyponatremia          Allergies   Allergen Reactions   • Vancomycin Other (See Comments)     Red Man syndrome, slow rate and premedicate with benadryl        McBurney, Hugh R   Home Medication Instructions GABINO:[id number]    Printed on:07/10/17 1945   Medication Information                      amLODIPine (NORVASC) 5 MG tablet  Take 1 tablet by mouth Daily.             aspirin  MG EC tablet  Take 1 tablet by mouth Daily for 19 days.             aspirin EC 81 MG EC tablet  Take 1 tablet by mouth Daily.             atorvastatin (LIPITOR) 40 MG tablet  Take 1 tablet by mouth Every Night.             bisacodyl (DULCOLAX) 10 MG suppository  Insert 1 suppository into the rectum Daily As Needed for constipation.             ferrous sulfate 325 (65 FE) MG tablet  Take 325 mg by mouth Daily With Lunch.             glucose blood (ACCU-CHEK COMPACT PLUS) test strip  Use to test blood sugar 5 times daily              glucose blood (ACCU-CHEK COMPACT STRIPS) test strip  Use to test blood sugar 4 times daily   ICD 10 code E11.9             insulin aspart (novoLOG FLEXPEN) 100 UNIT/ML solution pen-injector sc pen  Inject 14 Units under the skin 3 (Three) Times a Day With Meals.             Insulin Glargine (LANTUS SOLOSTAR) 100 UNIT/ML injection pen  Inject 40 Units under the skin 2 (Two) Times a Day.             Insulin Pen Needle (BD PEN NEEDLE JUAN DAVID U/F) 32G X 4 MM misc  TO INJECT 4 TIMES DAILY             isosorbide mononitrate (IMDUR) 30 MG 24 hr tablet  Take 1 tablet by mouth Daily.             Lancets (ACCU-CHEK MULTICLIX) lancets  Use to test blood sugar 5 times daily             lisinopril (PRINIVIL,ZESTRIL) 20 MG tablet  Take 20 mg by mouth Every Evening.             metoprolol tartrate (LOPRESSOR) 50 MG tablet  Take 1 tablet by mouth Every 12 (Twelve) Hours.             Multiple Vitamin (MULTI VITAMIN DAILY PO)  Take 1 tablet by mouth Daily. HOLD PRIOR TO SURGERY             omeprazole (priLOSEC) 40 MG capsule  Take 40 mg by mouth 2 (Two) Times a Day.             oxyCODONE ER (oxyCONTIN) 20 MG 12 hr tablet  Take 1 tablet by mouth Every 12 (Twelve) Hours for 20 days.             oxyCODONE-acetaminophen (PERCOCET)  MG per tablet  Take 1 tablet by mouth Every 4 (Four) Hours As Needed for Moderate Pain (4-6) for up to 20 days.             promethazine (PHENERGAN) 25 MG tablet  Take 1 tablet by mouth Every 8 (Eight) Hours As Needed for Nausea or Vomiting.             sertraline (ZOLOFT) 100 MG tablet  Take 100 mg by mouth Daily.                    Past Medical History:   Diagnosis Date   • Arthritis    • Chronic pain     BILATERAL KNEES AND BACK   • Coronary artery disease    • Depression    • Diabetes mellitus    • GERD (gastroesophageal reflux disease)    • History of kidney stones    • Hyperlipidemia    • Hypertension    • Kidney stone    • MSSA (methicillin susceptible Staphylococcus aureus) infection     RIGHT  KNEE   • NSTEMI (non-ST elevated myocardial infarction) 06/14/2017   • Sleep apnea     DOES NOT USE CPAP        Past Surgical History:   Procedure Laterality Date   • APPENDECTOMY     • CARDIAC CATHETERIZATION N/A 6/14/2017    Procedure: Left Heart Cath;  Surgeon: Tiburcio Moreno MD;  Location: [facility] INVASIVE LOCATION;  Service:    • COLONOSCOPY     • JOINT REPLACEMENT      BILATERAL KNEES    • LUMBAR FUSION     • TOTAL KNEE  PROSTHESIS REMOVAL W/ SPACER INSERTION Right 2/14/2017    Procedure: INCISION AND DRAINAGE RIGHT KNEE, POLY CHANGE;  Surgeon: Kiko Marie MD;  Location: [facility];  Service:    • TOTAL KNEE  PROSTHESIS REMOVAL W/ SPACER INSERTION Right 7/7/2017    Procedure: REMOVAL RIGHT TOTAL KNEE ARTHROPLASTY AND ANITBIOTIC SPACER;  Surgeon: Kiko Marie MD;  Location: [facility];  Service:    • TRANS METATARSAL AMPUTATION Left 2/14/2017    Procedure: EXCISIONAL DEBRIDEMENT LT. FIRST TOE DIABETIC ULCER, DRAINAGE ABSCESS FIRST TOE, FIRST TOE AMPUTATION;  Surgeon: Osmin Brand MD;  Location: [facility];  Service:         Social History     Occupational History   • Not on file.     Social History Main Topics   • Smoking status: Former Smoker     Packs/day: 3.00     Years: 20.00   • Smokeless tobacco: Former User      Comment: quit 35 years ago. 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    • Alcohol use No   • Drug use: No   • Sexual activity: Defer      Social History     Social History Narrative        Family History   Problem Relation Age of Onset   • Heart disease Mother    • Heart disease Father    • Diabetes Sister    • Dementia Maternal Grandmother    • No Known Problems Maternal Grandfather    • Heart disease Paternal Grandmother    • Heart attack Paternal Grandfather    • Heart disease Paternal Grandfather    • Heart disease Son    • Heart attack Son    • No Known Problems Sister    • Malig Hyperthermia Neg Hx        Physical Exam: 66 y.o. male  General Appearance:     Alert, cooperative, in no acute distress                      Vitals:    07/10/17 0727 07/10/17 1112 07/10/17 1542 07/10/17 1856   BP: 116/67 108/53 119/66 122/65   BP Location: Left arm Left arm Right arm Right arm   Patient Position: Lying Lying Lying Sitting   Pulse: 87 99 84 89   Resp: 16 16 16 16   Temp: 98.3 °F (36.8 °C) 97.7 °F (36.5 °C) 98.7 °F (37.1 °C) 97.7 °F (36.5 °C)   TempSrc: Oral Oral Oral Oral   SpO2: 93% 95% 92% 98%   Weight:       Height:            Hospital Course:  66 y.o. male admitted to [facility] services of Kiko Marie, Juan with Infected right total knee arthroplasty on 7/7/2017 and underwent removal of right total knee implants and placement of antibiotic spacer, Per Kiko Marie MD. Antibiotic and VTE prophylaxis were per SCIP protocols and included  Kefzol 2 gm every 8 hours and Aspirin 325 mg twice daily . Post-operatively the patient transferred to the post-operative floor where the patient underwent mobilization therapy . Opioids were titrated to achieve appropriate pain management to allow for participation in mobilization exercises. Vital signs are now stable. The incision is intact without signs or symptoms of infection. Operative extremity neurovascular status remains intact.   He was seen by Dr Mayberry for ID consult and had PICC line and advised Kefzol for 6 weeks with a stop date Aug 19, 2017.   He has been seen by cardiology service for consult.   Appropriate education re: incision care, activity levels, medications, and follow up visits was completed and all questions were answered. The patient is now deemed stable for discharge.      DIAGNOSTIC TESTS:     Admission on 07/07/2017   Component Date Value Ref Range Status   • Glucose 07/07/2017 179* 70 - 130 mg/dL Final   • Culture 07/07/2017 Scant growth (1+) Staphylococcus aureus*  Final    Refer to previous tissue culture collected on 7/7/17 for DNIO's.     • Gram Stain Result 07/07/2017  "Moderate (3+) WBCs per low power field   Final   • Gram Stain Result 07/07/2017 Rare (1+) Gram positive cocci in pairs and clusters   Final   • Culture 07/07/2017 Light growth (2+) Staphylococcus aureus*  Final    D test is positive. Isolate exhibits \"inducible\" resistance to Clindamycin.     • Gram Stain Result 07/07/2017 Few (2+) WBCs per low power field   Final   • Gram Stain Result 07/07/2017 Few (2+) Gram positive cocci in pairs and clusters   Final   • Glucose 07/07/2017 187* 70 - 130 mg/dL Final   • Glucose 07/08/2017 236* 65 - 99 mg/dL Final   • BUN 07/08/2017 19  8 - 23 mg/dL Final   • Creatinine 07/08/2017 0.94  0.76 - 1.27 mg/dL Final   • Sodium 07/08/2017 134* 136 - 145 mmol/L Final   • Potassium 07/08/2017 4.8  3.5 - 5.2 mmol/L Final   • Chloride 07/08/2017 96* 98 - 107 mmol/L Final   • CO2 07/08/2017 21.8* 22.0 - 29.0 mmol/L Final   • Calcium 07/08/2017 9.1  8.6 - 10.5 mg/dL Final   • eGFR Non African Amer 07/08/2017 80  >60 mL/min/1.73 Final   • BUN/Creatinine Ratio 07/08/2017 20.2  7.0 - 25.0 Final   • Anion Gap 07/08/2017 16.2  mmol/L Final   • WBC 07/08/2017 13.25* 4.50 - 10.70 10*3/mm3 Final   • RBC 07/08/2017 3.62* 4.60 - 6.00 10*6/mm3 Final   • Hemoglobin 07/08/2017 10.7* 13.7 - 17.6 g/dL Final   • Hematocrit 07/08/2017 32.2* 40.4 - 52.2 % Final   • MCV 07/08/2017 89.0  79.8 - 96.2 fL Final   • MCH 07/08/2017 29.6  27.0 - 32.7 pg Final   • MCHC 07/08/2017 33.2  32.6 - 36.4 g/dL Final   • RDW 07/08/2017 14.7* 11.5 - 14.5 % Final   • RDW-SD 07/08/2017 47.7  37.0 - 54.0 fl Final   • MPV 07/08/2017 10.8  6.0 - 12.0 fL Final   • Platelets 07/08/2017 213  140 - 500 10*3/mm3 Final   • Neutrophil % 07/08/2017 79.5* 42.7 - 76.0 % Final   • Lymphocyte % 07/08/2017 11.8* 19.6 - 45.3 % Final   • Monocyte % 07/08/2017 8.3  5.0 - 12.0 % Final   • Eosinophil % 07/08/2017 0.0* 0.3 - 6.2 % Final   • Basophil % 07/08/2017 0.2  0.0 - 1.5 % Final   • Immature Grans % 07/08/2017 0.2  0.0 - 0.5 % Final   • " Neutrophils, Absolute 07/08/2017 10.55* 1.90 - 8.10 10*3/mm3 Final   • Lymphocytes, Absolute 07/08/2017 1.56  0.90 - 4.80 10*3/mm3 Final   • Monocytes, Absolute 07/08/2017 1.10  0.20 - 1.20 10*3/mm3 Final   • Eosinophils, Absolute 07/08/2017 0.00  0.00 - 0.70 10*3/mm3 Final   • Basophils, Absolute 07/08/2017 0.02  0.00 - 0.20 10*3/mm3 Final   • Immature Grans, Absolute 07/08/2017 0.02  0.00 - 0.03 10*3/mm3 Final   • Glucose 07/08/2017 233* 70 - 130 mg/dL Final   • Glucose 07/08/2017 307* 70 - 130 mg/dL Final   • Glucose 07/08/2017 281* 70 - 130 mg/dL Final   • Glucose 07/08/2017 231* 70 - 130 mg/dL Final   • Glucose 07/09/2017 135* 65 - 99 mg/dL Final   • BUN 07/09/2017 28* 8 - 23 mg/dL Final   • Creatinine 07/09/2017 1.29* 0.76 - 1.27 mg/dL Final   • Sodium 07/09/2017 128* 136 - 145 mmol/L Final   • Potassium 07/09/2017 4.2  3.5 - 5.2 mmol/L Final   • Chloride 07/09/2017 92* 98 - 107 mmol/L Final   • CO2 07/09/2017 23.2  22.0 - 29.0 mmol/L Final   • Calcium 07/09/2017 9.0  8.6 - 10.5 mg/dL Final   • eGFR Non  Amer 07/09/2017 56* >60 mL/min/1.73 Final   • BUN/Creatinine Ratio 07/09/2017 21.7  7.0 - 25.0 Final   • Anion Gap 07/09/2017 12.8  mmol/L Final   • WBC 07/09/2017 9.06  4.50 - 10.70 10*3/mm3 Final   • RBC 07/09/2017 3.17* 4.60 - 6.00 10*6/mm3 Final   • Hemoglobin 07/09/2017 9.4* 13.7 - 17.6 g/dL Final   • Hematocrit 07/09/2017 27.7* 40.4 - 52.2 % Final   • MCV 07/09/2017 87.4  79.8 - 96.2 fL Final   • MCH 07/09/2017 29.7  27.0 - 32.7 pg Final   • MCHC 07/09/2017 33.9  32.6 - 36.4 g/dL Final   • RDW 07/09/2017 15.0* 11.5 - 14.5 % Final   • RDW-SD 07/09/2017 48.1  37.0 - 54.0 fl Final   • MPV 07/09/2017 10.5  6.0 - 12.0 fL Final   • Platelets 07/09/2017 174  140 - 500 10*3/mm3 Final   • Neutrophil % 07/09/2017 61.2  42.7 - 76.0 % Final   • Lymphocyte % 07/09/2017 23.2  19.6 - 45.3 % Final   • Monocyte % 07/09/2017 11.0  5.0 - 12.0 % Final   • Eosinophil % 07/09/2017 4.1  0.3 - 6.2 % Final   • Basophil %  07/09/2017 0.3  0.0 - 1.5 % Final   • Immature Grans % 07/09/2017 0.2  0.0 - 0.5 % Final   • Neutrophils, Absolute 07/09/2017 5.54  1.90 - 8.10 10*3/mm3 Final   • Lymphocytes, Absolute 07/09/2017 2.10  0.90 - 4.80 10*3/mm3 Final   • Monocytes, Absolute 07/09/2017 1.00  0.20 - 1.20 10*3/mm3 Final   • Eosinophils, Absolute 07/09/2017 0.37  0.00 - 0.70 10*3/mm3 Final   • Basophils, Absolute 07/09/2017 0.03  0.00 - 0.20 10*3/mm3 Final   • Immature Grans, Absolute 07/09/2017 0.02  0.00 - 0.03 10*3/mm3 Final   • Glucose 07/09/2017 127  70 - 130 mg/dL Final   • Glucose 07/09/2017 113  70 - 130 mg/dL Final   • Glucose 07/09/2017 112  70 - 130 mg/dL Final   • Glucose 07/09/2017 101  70 - 130 mg/dL Final   • Glucose 07/10/2017 129* 65 - 99 mg/dL Final   • BUN 07/10/2017 27* 8 - 23 mg/dL Final   • Creatinine 07/10/2017 0.88  0.76 - 1.27 mg/dL Final   • Sodium 07/10/2017 135* 136 - 145 mmol/L Final   • Potassium 07/10/2017 4.1  3.5 - 5.2 mmol/L Final   • Chloride 07/10/2017 100  98 - 107 mmol/L Final   • CO2 07/10/2017 21.8* 22.0 - 29.0 mmol/L Final   • Calcium 07/10/2017 9.2  8.6 - 10.5 mg/dL Final   • eGFR Non African Amer 07/10/2017 87  >60 mL/min/1.73 Final   • BUN/Creatinine Ratio 07/10/2017 30.7* 7.0 - 25.0 Final   • Anion Gap 07/10/2017 13.2  mmol/L Final   • Hemoglobin A1C 07/09/2017 9.05* 4.80 - 5.60 % Final   • Total Cholesterol 07/10/2017 128  0 - 200 mg/dL Final   • Triglycerides 07/10/2017 120  0 - 150 mg/dL Final   • HDL Cholesterol 07/10/2017 42  40 - 60 mg/dL Final   • LDL Cholesterol  07/10/2017 62  0 - 100 mg/dL Final   • VLDL Cholesterol 07/10/2017 24  5 - 40 mg/dL Final   • LDL/HDL Ratio 07/10/2017 1.48   Final   • Glucose 07/10/2017 148* 70 - 130 mg/dL Final   • Glucose 07/10/2017 190* 70 - 130 mg/dL Final   • Glucose 07/10/2017 118  70 - 130 mg/dL Final       Xr Knee 1 Or 2 View Right    Result Date: 7/7/2017  Narrative: XR KNEE 1 OR 2 VW RIGHT-  INDICATIONS: Postoperative evaluation.  TECHNIQUE:      Frontal and lateral views of the right knee  COMPARISON: 02/14/2017  FINDINGS:  Surgical changes of the right knee are demonstrated, including removal of previous arthroplasty hardware, placement of spacer material, and a quang traversing the tibiofemoral articulation, with adjacent surgical soft tissue gas, drain, overlying skin staples. No acute fracture is identified.       Impression:  Postsurgical changes.    This report was finalized on 7/7/2017 5:16 PM by Dr. Sushant Calix MD.      Ct Angiogram Chest With & Without Contrast    Result Date: 6/14/2017  Narrative: CT ANGIOGRAM OF THE CHEST. MULTIPLE CORONAL, SAGITTAL, AND 3-D RECONSTRUCTIONS  HISTORY: 66-year-old male with chest pain and elevated D-dimer.  TECHNIQUE: CT angiogram of the chest was performed. Multiple coronal, sagittal, and 3-D reconstruction images were obtained. There is no previous chest CT for comparison.  FINDINGS: There is suboptimal opacification of the pulmonary artery branches, but there is no convincing evidence for pulmonary thromboemboli. There are no pulmonary airspace consolidations and there are no pleural or pericardial effusions. There is a 4 mm right upper lobe pulmonary nodule, image 30. There is no lymphadenopathy within the chest. Extensive coronary artery calcifications are noted.      Impression: 1. There is no convincing evidence for pulmonary thromboemboli. 2. Extensive coronary artery calcifications. Consider quantification with coronary artery calcium scoring CT. 3. 4 mm right upper lobe pulmonary nodule. The nodule is likely benign, but reevaluation is recommended with follow-up noncontrasted chest CT in 6 months.  This report was finalized on 6/14/2017 1:22 PM by Dr. Corinne Bob MD.      Xr Chest Post Cva Port    Result Date: 7/10/2017  Narrative: AP CHEST 07/10/2017  HISTORY: PICC line placement.  Left upper extremity PICC line is seen with its tip overlying the superior cavoatrial junction. Heart size is within  normal limits. Lungs appear clear.      Impression: Left upper extremity PICC line tip in the superior cavoatrial junction.        Discharge and Follow up Instructions:     Removal of Total Knee implant and placement of static antibiotic spacer  Discharge Instructions:    I. ACTIVITIES:  1. Exercises:  ? Complete exercise program as taught by the hospital physical therapist 2 times per day  ? Exercise program will be advanced by your home health physical therapist  ? During the day be up ambulating every 2 hours (while awake) for short distances  ? Complete the ankle pump exercises at least 10 times per hour (while awake)  ? Elevate legs most of the day the first week post operatively and thereafter elevate legs when in bed and for at least 30 minutes during the day.   ? Caution must be taken to avoid pillow placement under the bend of the knee as this can led to flexion contractures of the knee. Pillow placement under the heel is encouraged.  ? Use cold packs 20-30 minutes approximately 5 times per day. This should be done before and after completing your exercises and at any time you are experiencing pain/ stiffness in your operative extremity.      2. Activities of Daily Living:  ? No tub baths, hot tubs, or swimming pools for 4 weeks  ? May shower and let water run over the incision on post-operative day #5 if no drainage. Do not scrub or rub the incision. Simply let the water run over the incision and pat dry.    II. Restrictions  ? Do not cross legs or kneel  ? Your surgeon will discuss with you when you will be able to drive again. Usual guidelines are you are to be off pain medications prior to driving.  ? Weight bearing is NOn weight bearing or toe touch  ? First week stay inside on even terrain. May go up and down stairs one stair at a time utilizing the hand rail.  ? After one week, you may venture outside.    III. Precautions:  ? Everyone that comes near you should wash their hands  ? No elective dental,  genital-urinary, or colon procedures or surgical procedures for 12 weeks after surgery unless absolutely necessary.  ?  If dental work or surgical procedure is deemed absolutely necessary, you will need to contact your surgeon as you will need to take antibiotics 1 hour prior to any dental work (including teeth cleanings).  ? Please discuss with your surgeon prophylactic antibiotics as the length of time this intervention will be necessary for you varies with each patient’s health history and situation.  ? Avoid sick people. If you must be around someone who is ill, they should wear a mask.  ? Avoid visits to the Emergency Room or Urgent Care. If you feel you need to go to the emergency room, please notify your surgeon.    ? Stockings are to be worn for one week after surgery and are to be placed on in the morning and removed at night. Observe your skin when stocking is removed for any problems. Monitor the stockings to ensure that any swelling is not causing the stockings to become too tight. In this case, remove stockings immediately.    IV. INCISION CARE:  ? Wash your hands prior to dressing changes  ? Change the dressing as needed to keep incision clean and dry. Utilize dry gauze and paper tape. Avoid touching the side of the gauze that goes against the incision with your hands.  ? No creams or ointments to the incision  ? May remove dressing once the incision is free of drainage  ? Do not touch or pick at the incision  ? Check incision every day and notify surgeon immediately if any of the following signs or symptoms are noted:  o Increase in redness  o Increase in swelling around the incision and of the entire extremity  o Increase in pain  o Drainage oozing from the incision  o Pulling apart of the edges of the incision  o Increase in overall body temperature (greater than 100.5 degrees)  ? Your  Staples will be removed July 19, 2017 between 10-14 days postoperation.  This may be done by either the home  health nurse, rehabilitation nurse or during your return visit to Dr. Marie's office.  You will then be instructed on how to care for the incision.  (Please call the office if your staples have not been removed within 14 days after surgery).    V. Medications:   1. Anticoagulants: You will be discharged on an anticoagulant. This is a prophylactic medication that helps prevent blood clots during your post-operative period.  You will be on Aspirin 325 mg  daily for 21 days in addition to your regular home dose 81 mg aspirin.   ? While taking the anticoagulant, you should avoid taking any additional , ibuprofen (Advil or Motrin), Aleve (Naprosyn) or other non-steroidal anti-inflammatory medications.   ? Notify surgeon immediately if any fan bleeding is noted in the urine, stool, emesis, or from the nose or the incision. Blood in the stool will often appear as black rather than red. Blood in urine may appear as pink. Blood in emesis may appear as brown/black like coffee grounds.  ? You will need to apply pressure for longer periods of time to any cuts or abrasions to stop bleeding  ? Avoid alcohol while taking anticoagulants    2. Stool Softeners: You will be at greater risk of constipation after surgery due to being less mobile and the pain medications.   ? Take stool softeners as instructed by your surgeon while on pain medications. Over the counter Colace 100 mg 1-2 capsules twice daily.   ? If stools become too loose or too frequent, please decreases the dosage or stop the stool softener.  ? If constipation occurs despite use of stool softeners, you are to continue the stool softeners and add a laxative (Milk of Magnesia 1 ounce daily as needed).  ? Dulcolax oral tabs or suppository, or a fleets enema can also be utilized for constipation and can be obtained over the counter.   ? If above interventions are unsuccessful in inducing bowel movements, please contact your surgeon's office / family physician's  office.  ? Drink plenty of fluids, and eat fruits and vegetables during your recovery time    3. Pain Medications utilized after surgery are narcotics and the law requires that the following information be given to all patients that are prescribed narcotics:  ? CLASSIFICATION: Pain medications are called Opioids and are narcotics  ? LEGALITIES: It is illegal to share narcotics with others and to drive within 24 hours of taking narcotics  ? POTENTIAL SIDE EFFECTS: Potential side effects of opioids include: nausea, vomiting, itching, dizziness, drowsiness, dry mouth, constipation, and difficulty urinating.  ? POTENTIAL ADVERSE EFFECTS:   o Opioid tolerance can develop with use of pain medications and this simply means that it requires more and more of the medication to control pain; however, this is seen more in patients that use opioids for longer periods of time.  o Opioid dependence can develop with use of Opioids and this simply means that to stop the medication can cause withdrawal symptoms; however, this is seen with patients that use Opioids for longer periods of time.  o Opioid addiction can develop with use of Opioids and the incidence of this is very unlikely in patients who take the medications as ordered and stop the medications as instructed.  o Opioid overdose can be dangerous, but is unlikely when the medication is taken as ordered and stopped when ordered. It is important not to mix opioids with alcohol or with and type of sedative such as Benadryl as this can lead to over sedation and respiratory difficulty.  ? DOSAGE:   o Pain medications will need to be taken consistently for the first week to decrease pain and promote adequate pain relief and participation in physical therapy.  o After the initial surgical pain begins to resolve, you may begin to decrease the pain medication. By the end of 6 weeks, you should be off of pain medications.  o Refills will not be given by the office during evening  hours, on weekends, or after 6 weeks post-op.  o To seek refills on pain medications during the initial 6 week post-operative period, you must call the office 48 hours in advance to request the refill. The office will then notify you when to  the prescription. DO NOT wait until you are out of the medication to request a refill.    V. FOLLOW-UP VISITS:  ? You will need to follow up in the office with your surgeon in 2 weeks Aug 2, 2017. Please call this number [phone] to schedule this appointment.  ? If you have any concerns or suspected complications prior to your follow up visit, please call your surgeons office. Do not wait until your appointment time if you suspect complications. These will need to be addressed in the office promptly.      Date:     Kiko Marie MD    CC: Guille Nieves MD; Kiko Marie MD

## 2017-07-10 NOTE — THERAPY TREATMENT NOTE
Acute Care - Physical Therapy Treatment Note  [facility]     Patient Name: Hugh R McBurney  : 1950  MRN: [id number]  Today's Date: 7/10/2017  Onset of Illness/Injury or Date of Surgery Date: 17  Date of Referral to PT: 17  Referring Physician: Joon Marrufo MD    Admit Date: 2017    Visit Dx:    ICD-10-CM ICD-9-CM   1. 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   • Recent MSSA (methicillin susceptible Staphylococcus aureus) septicemia   • Infection of prosthetic knee joint   • Hyperglycemia   • Hyponatremia   • Osteoarthritis, knee   • Contusion of knee   • History of knee surgery   • Infection of prosthetic right knee joint   • CAD (coronary artery disease)   • Type II diabetes mellitus, uncontrolled   • Hyponatremia               Adult Rehabilitation Note       07/10/17 1500 17 0845       Rehab Assessment/Intervention    Discipline physical therapy assistant  -RW physical therapist  -LC     Document Type therapy note (daily note)  -RW therapy note (daily note)  -LC     Subjective Information agree to therapy;complains of;pain  -RW agree to therapy;complains of;fatigue;pain  -LC     Patient Effort, Rehab Treatment good  -RW      Precautions/Limitations fall precautions;non-weight bearing status   TTWB on RLE  -RW      Specific Treatment Considerations Per MD inital note, pt to be TTWB on RLE.   -RW      Recorded by [RW] Sara Cristina PTA [LC] Tyrell Leigh, PT DPT     Pain Assessment    Pain Assessment 0-10  -RW 0-10  -LC     Pain Score 7  -RW 6  -LC     Pain Type Acute pain;Surgical pain  -RW Acute pain;Surgical  pain  -LC     Pain Location Knee  -RW Knee  -LC     Pain Orientation Right  -RW Right  -LC     Pain Intervention(s) Medication (See MAR);Cold applied;Repositioned;Ambulation/increased activity  -RW      Response to Interventions tolerated  -RW      Recorded by [RW] Sara Cristina PTA [] Tyrell Leigh, PT DPT     Cognitive Assessment/Intervention    Current Cognitive/Communication Assessment functional  -RW functional  -LC     Orientation Status oriented x 4  -RW oriented x 4  -LC     Follows Commands/Answers Questions 100% of the time;able to follow single-step instructions;needs cueing  -% of the time;able to follow multi-step instructions  -     Personal Safety WNL/WFL  -RW WNL/WFL  -LC     Personal Safety Interventions fall prevention program maintained;gait belt;nonskid shoes/slippers when out of bed  -RW      Recorded by [RW] Sara Cristina PTA [] Tyrell Leigh, PT DPT     Bed Mobility, Assessment/Treatment    Bed Mob, Supine to Sit, [place] minimum assist (75% patient effort);verbal cues required;nonverbal cues required (demo/gesture)   assist w/ RLE  -RW      Bed Mob, Sit to Supine, [place] not tested   Pt up in chair  -RW      Bed Mobility, Comment  up in chair  -     Recorded by [RW] Sara Cristina PTA [] Tyrell Leigh, PT DPT     Transfer Assessment/Treatment    Transfers, Sit-Stand [place] contact guard assist  -RW contact guard assist  -     Transfers, Stand-Sit [place] contact guard assist;minimum assist (75% patient effort);verbal cues required   assist w/ RLE  -RW contact guard assist  -LC     Transfers, Sit-Stand-Sit, Assist Device rolling walker;elevated surface  -RW rolling walker  -     Transfer, Maintain Weight Bearing Status able to maintain weight bearing status  -RW      Recorded by [RW] Sara Cristina PTA [] Tyrell Leigh, PT DPT     Gait Assessment/Treatment    Gait, [place] Level contact guard assist;verbal cues required  -RW  contact guard assist  -LC     Gait, Assistive Device rolling walker  -RW rolling walker  -LC     Gait, Distance (Feet) 10  -RW 20  -LC     Gait, Gait Deviations forward flexed posture;right:;leon decreased;decreased heel strike;step length decreased  -RW right:;antalgic;step length decreased;stride length decreased;narrow base  -LC     Gait, Maintain Weight Bearing Status able to maintain weight bearing status  -RW able to maintain weight bearing status  -LC     Gait, Safety Issues step length decreased  -RW step length decreased  -LC     Gait, Impairments strength decreased;impaired balance  -RW strength decreased;impaired balance;pain  -LC     Gait, Comment Further mobility limited due to pain.   -RW      Recorded by [RW] Sara Cristina PTA [LC] Tyrell Leigh, PT DPT     Positioning and Restraints    Pre-Treatment Position in bed  -RW sitting in chair/recliner  -LC     Post Treatment Position chair  -RW bed  -LC     In Bed  sitting EOB;call light within reach;encouraged to call for assist;side rails up x2  -LC     In Chair reclined;call light within reach;encouraged to call for assist   ice applied to R knee  -RW      Recorded by [RW] Sara Cristina PTA [LC] Tyrell Leigh, PT DPT       User Key  (r) = Recorded By, (t) = Taken By, (c) = Cosigned By    Initials Name Effective Dates     Sara Cristina PTA 04/06/16 -     LC Tyrell Leigh, PT DPT 08/02/16 -                 IP PT Goals       07/08/17 0944          Bed Mobility PT LTG    Bed Mobility PT LTG, Date Established 07/08/17  -LC      Bed Mobility PT LTG, Time to Achieve 1 wk  -LC      Bed Mobility PT LTG, Activity Type all bed mobility  -LC      Bed Mobility PT LTG, [place] Level supervision required  -LC      Transfer Training PT LTG    Transfer Training PT LTG, Date Established 07/08/17  -LC      Transfer Training PT LTG, Time to Achieve 1 wk  -LC      Transfer Training PT LTG, Activity Type all transfers  -LC      Transfer Training PT LTG,  [place] Level conditional independence  -LC      Transfer Training PT LTG, Assist Device walker, rolling  -LC      Gait Training PT LTG    Gait Training Goal PT LTG, Date Established 07/08/17  -LC      Gait Training Goal PT LTG, Time to Achieve 1 wk  -LC      Gait Training Goal PT LTG, [place] Level supervision required  -LC      Gait Training Goal PT LTG, Assist Device walker, rolling  -LC      Gait Training Goal PT LTG, Distance to Achieve 200  -LC      Stair Training PT LTG    Stair Training Goal PT LTG, Date Established 07/08/17  -LC      Stair Training Goal PT LTG, Time to Achieve 1 wk  -LC      Stair Training Goal PT LTG, Number of Steps 2  -LC      Stair Training Goal PT LTG, [place] Level contact guard assist  -LC      Stair Training Goal PT LTG, Assist Device 2 handrails  -LC        User Key  (r) = Recorded By, (t) = Taken By, (c) = Cosigned By    Initials Name Provider Type    SCOOTER Leigh, PT DPT Physical Therapist          Physical Therapy Education     Title: PT OT SLP Therapies (Active)     Topic: Physical Therapy (Active)     Point: Mobility training (Done)    Learning Progress Summary    Learner Readiness Method Response Comment Documented by Status   Patient Acceptance E,TB,D VU,NR   07/10/17 1551 Done    Acceptance E,TB VU   07/09/17 1655 Done    Acceptance E,D VU,DU benefits of activity, safety during ambulation  07/09/17 1011 Done    Acceptance E,D NR,DU,VU benefits of activity, safety during ambulation  07/08/17 0943 Done               Point: Body mechanics (Done)    Learning Progress Summary    Learner Readiness Method Response Comment Documented by Status   Patient Acceptance E,TB,D VU,NR   07/10/17 1551 Done               Point: Precautions (Done)    Learning Progress Summary    Learner Readiness Method Response Comment Documented by Status   Patient Acceptance E,TB,D VU,NR   07/10/17 1551 Done                      User Key     Initials Effective Dates Name  Provider Type Discipline     06/16/16 -  Analilia Monique, RN Registered Nurse Nurse    RW 04/06/16 -  Sara Cristina PTA Physical Therapy Assistant PT    LC 08/02/16 -  Tyrell Leigh PT DPT Physical Therapist PT                    PT Recommendation and Plan  Anticipated Discharge Disposition: home with home health, skilled nursing facility  PT Frequency: daily, 2 times/day  Plan of Care Review  Plan Of Care Reviewed With: patient  Outcome Summary/Follow up Plan: Pt w/ increased pain w/ mobility today which limited distance. Maintained TTWB/NWB on RLE w/ activity. Will get clarification from MD of WB status.           Outcome Measures       07/10/17 1500          How much help from another person do you currently need...    Turning from your back to your side while in flat bed without using bedrails? 3  -RW      Moving from lying on back to sitting on the side of a flat bed without bedrails? 3  -RW      Moving to and from a bed to a chair (including a wheelchair)? 3  -RW      Standing up from a chair using your arms (e.g., wheelchair, bedside chair)? 3  -RW      Climbing 3-5 steps with a railing? 2  -RW      To walk in hospital room? 3  -RW      AM-PAC 6 Clicks Score 17  -RW      Functional Assessment    Outcome Measure Options AM-PAC 6 Clicks Basic Mobility (PT)  -RW        User Key  (r) = Recorded By, (t) = Taken By, (c) = Cosigned By    Initials Name Provider Type     Sara Cristina PTA Physical Therapy Assistant           Time Calculation:         PT Charges       07/10/17 1546          Time Calculation    Start Time 1527  -RW      Stop Time 1542  -RW      Time Calculation (min) 15 min  -RW      PT Received On 07/10/17  -RW      PT - Next Appointment 07/11/17  -        User Key  (r) = Recorded By, (t) = Taken By, (c) = Cosigned By    Initials Name Provider Type     Sara Cristina PTA Physical Therapy Assistant          Therapy Charges for Today     Code Description Service Date Service Provider  Modifiers Qty    [id number] HC PT THER PROC EA 15 MIN 7/10/2017 Sara Cristina, PTA GP 1          PT G-Codes  Outcome Measure Options: AM-PAC 6 Clicks Basic Mobility (PT)    Sara Cristina, RHONDA  7/10/2017

## 2017-07-10 NOTE — PROGRESS NOTES
Discharge Planning Assessment  [facility]     Patient Name: Hugh R McBurney  MRN: [id number]  Today's Date: 7/10/2017    Admit Date: 7/7/2017          Discharge Needs Assessment     None            Discharge Plan       07/10/17 1229    Case Management/Social Work Plan    Plan [facility]     Patient/Family In Agreement With Plan yes    Additional Comments S/w Patricia/Tamika, bed available when ready.         Discharge Placement     Facility/Agency Request Status Selected? Address Phone Number Fax Number    [facility] Accepted    [address] [facility] [id number] [phone]        Daphne De La Cruz RN 7/10/2017 12:31    Email to [place] to evaluate for placement Semaj Mishra RN   7/10/2017 Bed available per Patricia.                              Demographic Summary     None            Functional Status     None            Psychosocial     None            Abuse/Neglect     None            Legal     None            Substance Abuse     None            Patient Forms     None          Daphne De La Cruz, RN

## 2017-07-10 NOTE — PROGRESS NOTES
ID note    CC: f/u [facility]    S:  Still with postoperative knee pain  No f/c/ns      O  AF  NAD  R knee in brace with drain and dressed    glc 101-148  A1c 9.05  Cr 0.88  7/7 Surgical Cx MSSA (R-clinda, erythro, pcn, rif)    A/P  1.  Right prosthetic knee joint infection secondary to MSSA now status post removal of hardware and placement of antibiotic spacer on 7/7/17  2.  Diabetes mellitus type 2, continue glycemic control efforts to prevent/control infectious complications  3.  Leukocytosis, likely reactive secondary to operation, now white count normal at 9.1    Satisfactory postoperative course.  Will order PICC line for today and okay to discharge as below when okay with other providers.    Final Infectious Diseases Consult Note    Brief summary of hospitalization:    Very nice 66-year-old gentleman with history of osteoarthritis complicated by right TKA [facility]I culture negative in 2011 treated with two-stage replacement and prolonged antibiotic therapy, admitted on with 3-4 weeks of left first toe swelling, fever and right artificial knee pain. Was found to have MSSA in his blood and underwent I&D with amputation of the first left toe by Dr. Brand on 2/14. Dr. Marie also performed liner exchange with I&D of right TKA on 2/14. His left toe and right TKA cultures all grew MSSA. Transthoracic echocardiogram without obvious vegetation. In discussions with orthopedics and infectious diseases, patient elected to pursue device retention strategy. We will therefore recommend Ancef 2 g IV every 8 hours and rifampin 300 mg by mouth every 12 hours for 6 weeks with an anticipated stop date of 3/27/17. We then plan 6 months of rifampin plus PO abx (likely quinolone). He was readmitted for fever and hyperglycemia from 2/25 to 3/4/17 but all cultures were negative and transesophageal echocardiogram was negative.  At clinic visit on 3/27/17, he was transitioned off IV antibiotics to 750 mg of Cipro and rifampin 300 mg twice a  day.  He developed progressive    He developed progressive pain and concern was for progressive infection.  He was admitted for explantation of his right TKA on 6/13 but developed red man syndrome and a NSTEMI, delaying procedures.  He was readmitted on 7/7/17 and underwent removal of hardware with placement of antibiotic spacer.  Surgical cultures again grew MSSA.  We will re-induce with 6 more weeks of IV cefazolin, anticipated stop date August 17, 2017.    Final treatment recommendations:  The patient should receive the following antibiotics:  1.  Cefazolin 2 g IV every 8 hours, stop date August 17, 2017    Laboratory monitoring:  The next set of labs should be drawn on: 7/16/17    It is recommended that the patient have the following laboratories while he/she is receiving antibiotic therapy as an inpatient and outpatient: CBC with differential, serum creatinine,  ESR and CRP weekly    Please fax the results of all labs to [facility] Infectious Diseases clinic at [phone]    Follow up:  The patient will follow-up in the Infectious Disease clinic on August 17, 2017 at 1:40 PM        Thank you for allowing us to participate in the care of this patient. [facility] Infectious Disease consult service will sign off. Please call or re-consult if we can be of further assistance.

## 2017-07-11 VITALS
TEMPERATURE: 98.3 F | BODY MASS INDEX: 31.98 KG/M2 | RESPIRATION RATE: 16 BRPM | WEIGHT: 249.19 LBS | SYSTOLIC BLOOD PRESSURE: 147 MMHG | DIASTOLIC BLOOD PRESSURE: 66 MMHG | HEIGHT: 74 IN | OXYGEN SATURATION: 99 % | HEART RATE: 88 BPM

## 2017-07-11 PROBLEM — D62 ANEMIA, POSTHEMORRHAGIC, ACUTE: Status: ACTIVE | Noted: 2017-07-11

## 2017-07-11 LAB
GLUCOSE BLDC GLUCOMTR-MCNC: 134 MG/DL (ref 70–130)
GLUCOSE BLDC GLUCOMTR-MCNC: 166 MG/DL (ref 70–130)
GLUCOSE BLDC GLUCOMTR-MCNC: 185 MG/DL (ref 70–130)
HCT VFR BLD AUTO: 25.9 % (ref 40.4–52.2)
HGB BLD-MCNC: 8.7 G/DL (ref 13.7–17.6)

## 2017-07-11 PROCEDURE — 99231 SBSQ HOSP IP/OBS SF/LOW 25: CPT | Performed by: INTERNAL MEDICINE

## 2017-07-11 PROCEDURE — 63710000001 INSULIN ASPART PER 5 UNITS: Performed by: ORTHOPAEDIC SURGERY

## 2017-07-11 PROCEDURE — 25010000003 CEFAZOLIN IN DEXTROSE 2-4 GM/100ML-% SOLUTION: Performed by: INTERNAL MEDICINE

## 2017-07-11 PROCEDURE — 63710000001 INSULIN ASPART PER 5 UNITS: Performed by: HOSPITALIST

## 2017-07-11 PROCEDURE — 85014 HEMATOCRIT: CPT | Performed by: HOSPITALIST

## 2017-07-11 PROCEDURE — 85018 HEMOGLOBIN: CPT | Performed by: HOSPITALIST

## 2017-07-11 PROCEDURE — 82962 GLUCOSE BLOOD TEST: CPT

## 2017-07-11 RX ORDER — LISINOPRIL 10 MG/1
10 TABLET ORAL EVERY EVENING
Status: DISCONTINUED | OUTPATIENT
Start: 2017-07-11 | End: 2017-07-11 | Stop reason: HOSPADM

## 2017-07-11 RX ORDER — ASPIRIN 81 MG/1
81 TABLET ORAL DAILY
Start: 2017-07-11 | End: 2017-09-08 | Stop reason: HOSPADM

## 2017-07-11 RX ORDER — CEFAZOLIN SODIUM 2 G/100ML
2 INJECTION, SOLUTION INTRAVENOUS EVERY 8 HOURS
Qty: 100 ML
Start: 2017-07-11 | End: 2017-08-17

## 2017-07-11 RX ADMIN — AMLODIPINE BESYLATE 5 MG: 5 TABLET ORAL at 08:32

## 2017-07-11 RX ADMIN — INSULIN ASPART 14 UNITS: 100 INJECTION, SOLUTION INTRAVENOUS; SUBCUTANEOUS at 12:37

## 2017-07-11 RX ADMIN — INSULIN ASPART 2 UNITS: 100 INJECTION, SOLUTION INTRAVENOUS; SUBCUTANEOUS at 08:33

## 2017-07-11 RX ADMIN — ISOSORBIDE MONONITRATE 30 MG: 30 TABLET ORAL at 08:32

## 2017-07-11 RX ADMIN — SERTRALINE 100 MG: 100 TABLET, FILM COATED ORAL at 08:32

## 2017-07-11 RX ADMIN — LISINOPRIL 10 MG: 10 TABLET ORAL at 17:48

## 2017-07-11 RX ADMIN — PANTOPRAZOLE SODIUM 40 MG: 40 TABLET, DELAYED RELEASE ORAL at 17:48

## 2017-07-11 RX ADMIN — FERROUS SULFATE TAB 325 MG (65 MG ELEMENTAL FE) 325 MG: 325 (65 FE) TAB at 11:13

## 2017-07-11 RX ADMIN — RIFAMPIN 300 MG: 300 CAPSULE ORAL at 08:32

## 2017-07-11 RX ADMIN — OXYCODONE HYDROCHLORIDE AND ACETAMINOPHEN 2 TABLET: 10; 325 TABLET ORAL at 00:51

## 2017-07-11 RX ADMIN — INSULIN ASPART 14 UNITS: 100 INJECTION, SOLUTION INTRAVENOUS; SUBCUTANEOUS at 17:49

## 2017-07-11 RX ADMIN — METOPROLOL TARTRATE 50 MG: 50 TABLET ORAL at 08:32

## 2017-07-11 RX ADMIN — OXYCODONE HYDROCHLORIDE AND ACETAMINOPHEN 2 TABLET: 10; 325 TABLET ORAL at 04:39

## 2017-07-11 RX ADMIN — INSULIN ASPART 14 UNITS: 100 INJECTION, SOLUTION INTRAVENOUS; SUBCUTANEOUS at 08:34

## 2017-07-11 RX ADMIN — INSULIN DETEMIR 45 UNITS: 100 INJECTION, SOLUTION SUBCUTANEOUS at 08:37

## 2017-07-11 RX ADMIN — Medication 10 ML: at 15:26

## 2017-07-11 RX ADMIN — ASPIRIN 325 MG: 325 TABLET, DELAYED RELEASE ORAL at 17:48

## 2017-07-11 RX ADMIN — OXYCODONE HYDROCHLORIDE AND ACETAMINOPHEN 2 TABLET: 10; 325 TABLET ORAL at 15:26

## 2017-07-11 RX ADMIN — PANTOPRAZOLE SODIUM 40 MG: 40 TABLET, DELAYED RELEASE ORAL at 07:17

## 2017-07-11 RX ADMIN — OXYCODONE HYDROCHLORIDE 20 MG: 20 TABLET, FILM COATED, EXTENDED RELEASE ORAL at 08:32

## 2017-07-11 RX ADMIN — CEFAZOLIN SODIUM 2 G: 2 INJECTION, SOLUTION INTRAVENOUS at 07:17

## 2017-07-11 RX ADMIN — Medication 10 ML: at 08:32

## 2017-07-11 RX ADMIN — CEFAZOLIN SODIUM 2 G: 2 INJECTION, SOLUTION INTRAVENOUS at 14:09

## 2017-07-11 RX ADMIN — INSULIN ASPART 2 UNITS: 100 INJECTION, SOLUTION INTRAVENOUS; SUBCUTANEOUS at 17:48

## 2017-07-11 RX ADMIN — OXYCODONE HYDROCHLORIDE AND ACETAMINOPHEN 2 TABLET: 10; 325 TABLET ORAL at 11:13

## 2017-07-11 RX ADMIN — ASPIRIN 325 MG: 325 TABLET, DELAYED RELEASE ORAL at 08:32

## 2017-07-11 NOTE — PROGRESS NOTES
"  ENDOCRINE    Subjective   AND PLANS  Hugh R McBurney is a 66 y.o. male.     Afebrile.  Culture growing MSSA.  On cefazolin.    Fasting glucose 148.  Random glucose 101-190.  Continue Levemir 45 units every 12 hours and NovoLog 14 units with each meal plus as needed.    Patient has hyperlipidemia and is on Lipitor 40 mg once a day.  LDL 62.  HDL 42.  Triglycerides 120.  Continue with Lipitor and low-fat diet.    Patient has albuminuria on urine sample taken in June 2017.  Continue lisinopril.  Check serum protein electrophoresis for completeness.    Objective   /65 (BP Location: Right arm, Patient Position: Sitting)  Pulse 89  Temp 97.7 °F (36.5 °C) (Oral)   Resp 16  Ht 74\" (188 cm)  Wt 249 lb 3 oz (113 kg)  SpO2 98%  BMI 31.99 kg/m2  Physical Exam    Awake, alert, not in distress.  No rales or wheezes.  Regular heart rate and rhythm.  No gallop.  Abdomen soft, nontender.  No cyanosis or clubbing.  Right knee in a brace.  No calf tenderness.  Lab Results (last 24 hours)     Procedure Component Value Units Date/Time    POC Glucose Fingerstick [[id number]]  (Normal) Collected:  07/09/17 2110    Specimen:  Blood Updated:  07/09/17 2112     Glucose 101 mg/dL     Narrative:       Meter: [id number] : [id number] Cheng DOWNEY    Hemoglobin A1c [[id number]]  (Abnormal) Collected:  07/09/17 0459    Specimen:  Blood Updated:  07/10/17 0246     Hemoglobin A1C 9.05 (H) %     Narrative:       Hemoglobin A1C Ranges:    Increased Risk for Diabetes  5.7% to 6.4%  Diabetes                     >= 6.5%  Diabetic Goal                < 7.0%    Basic Metabolic Panel [[id number]]  (Abnormal) Collected:  07/10/17 0425    Specimen:  Blood Updated:  07/10/17 0529     Glucose 129 (H) mg/dL      BUN 27 (H) mg/dL      Creatinine 0.88 mg/dL      Sodium 135 (L) mmol/L      Potassium 4.1 mmol/L      Chloride 100 mmol/L      CO2 21.8 (L) mmol/L      Calcium 9.2 mg/dL      eGFR Non African Amer 87 mL/min/1.73      BUN/Creatinine " "Ratio 30.7 (H)     Anion Gap 13.2 mmol/L     Narrative:       GFR Normal >60  Chronic Kidney Disease <60  Kidney Failure <15    Lipid Panel [[id number]] Collected:  07/10/17 0425    Specimen:  Blood Updated:  07/10/17 0529     Total Cholesterol 128 mg/dL      Triglycerides 120 mg/dL      HDL Cholesterol 42 mg/dL      LDL Cholesterol  62 mg/dL      VLDL Cholesterol 24 mg/dL      LDL/HDL Ratio 1.48    Narrative:       Cholesterol Reference Ranges  (U.S. Department of Health and Human Services ATP III Classifications)    Desirable          <200 mg/dL  Borderline High    200-239 mg/dL  High Risk          >240 mg/dL      Triglyceride Reference Ranges  (U.S. Department of Health and Human Services ATP III Classifications)    Normal           <150 mg/dL  Borderline High  150-199 mg/dL  High             200-499 mg/dL  Very High        >500 mg/dL    HDL Reference Ranges  (U.S. Department of Health and Human Services ATP III Classifcations)    Low     <40 mg/dl (major risk factor for CHD)  High    >60 mg/dl ('negative' risk factor for CHD)        LDL Reference Ranges  (U.S. Department of Health and Human Services ATP III Classifcations)    Optimal          <100 mg/dL  Near Optimal     100-129 mg/dL  Borderline High  130-159 mg/dL  High             160-189 mg/dL  Very High        >189 mg/dL    POC Glucose Fingerstick [[id number]]  (Abnormal) Collected:  07/10/17 0626    Specimen:  Blood Updated:  07/10/17 0628     Glucose 148 (H) mg/dL     Narrative:       Meter: [id number] : [id number] Shahanaearl Ryanily    Tissue/Bone Culture [[id number]]  (Abnormal)  (Susceptibility) Collected:  07/07/17 1416    Specimen:  Tissue from Knee, Right Updated:  07/10/17 0700     Culture Light growth (2+) Staphylococcus aureus (A)      D test is positive. Isolate exhibits \"inducible\" resistance to Clindamycin.          Gram Stain Result Few (2+) WBCs per low power field      Few (2+) Gram positive cocci in pairs and clusters    Susceptibility      " Staphylococcus aureus     DINO     Clindamycin Resistant     Erythromycin >=8 ug/ml Resistant     Oxacillin 0.5 ug/ml Susceptible     Penicillin G >=0.5 ug/ml Resistant     Rifampin 16 ug/ml Resistant     Tetracycline <=1 ug/ml Susceptible     Trimethoprim + Sulfamethoxazole <=10 ug/ml Susceptible     Vancomycin 1 ug/ml Susceptible                    Tissue/Bone Culture [[id number]]  (Abnormal) Collected:  07/07/17 1359    Specimen:  Tissue from Knee, Right Updated:  07/10/17 0702     Culture Scant growth (1+) Staphylococcus aureus (A)      Refer to previous tissue culture collected on 7/7/17 for DINO's.          Gram Stain Result Moderate (3+) WBCs per low power field      Rare (1+) Gram positive cocci in pairs and clusters    POC Glucose Fingerstick [[id number]]  (Abnormal) Collected:  07/10/17 1111    Specimen:  Blood Updated:  07/10/17 1113     Glucose 190 (H) mg/dL     Narrative:       Meter: [id number] : [id number] Erick Headley    POC Glucose Fingerstick [[id number]]  (Normal) Collected:  07/10/17 1614    Specimen:  Blood Updated:  07/10/17 1616     Glucose 118 mg/dL     Narrative:       Meter: [id number] : [id number] Erick Headley            Principal Problem:    Infection of prosthetic right knee joint  Active Problems:    Hypertension    Hyperlipidemia    History of knee surgery    CAD (coronary artery disease)    Type 2 diabetes mellitus with proteinuria    Hyponatremia    Insulin therapy as discussed above.  Continue Lipitor.  Continue lisinopril.

## 2017-07-11 NOTE — PLAN OF CARE
Problem: Patient Care Overview (Adult)  Goal: Plan of Care Review  Outcome: Outcome(s) achieved Date Met:  07/11/17 07/11/17 6855   Coping/Psychosocial Response Interventions   Plan Of Care Reviewed With patient   Patient Care Overview   Progress improving   Outcome Evaluation   Outcome Summary/Follow up Plan patient discharging to [facility] today. PICC TRICE for IV abx therapy for 6 weeks; flushes, good blood return. voiding per urinal. NWB to RLE, knee immobilizer in place. dressing changed and drain dc'd. educated on insulin regimin during hospital stay and to monitor BP when taking BP meds. current MSSA infection to right knee.        Goal: Adult Individualization and Mutuality  Outcome: Outcome(s) achieved Date Met:  07/11/17  Goal: Discharge Needs Assessment  Outcome: Outcome(s) achieved Date Met:  07/11/17    Problem: Perioperative Period (Adult)  Goal: Signs and Symptoms of Listed Potential Problems Will be Absent or Manageable (Perioperative Period)  Outcome: Outcome(s) achieved Date Met:  07/11/17    Problem: Knee Replacement, Total (Adult)  Goal: Signs and Symptoms of Listed Potential Problems Will be Absent or Manageable (Knee Replacement, Total)  Outcome: Outcome(s) achieved Date Met:  07/11/17    Problem: Fall Risk (Adult)  Goal: Absence of Falls  Outcome: Outcome(s) achieved Date Met:  07/11/17

## 2017-07-11 NOTE — PROGRESS NOTES
"  ENDOCRINE    Subjective   AND PLANS  Hugh R McBurney is a 66 y.o. male.     No new complaints.  MAXIMUM TEMPERATURE 99.8.  On IV cefazolin.    Appetite good.  Fasting glucose 166.  Random glucose 118-252.  Patient was not given suppertime NovoLog last night which made his bedtime glucose rise.  Continue Levemir/Lantus 45 units every 12 hours and NovoLog/Humalog 14 units with each meal plus as needed.  If discharged, follow-up with Dr. Love  as scheduled.    Objective   /88 (BP Location: Right arm, Patient Position: Lying)  Pulse 90  Temp 99.8 °F (37.7 °C) (Oral)   Resp 16  Ht 74\" (188 cm)  Wt 249 lb 3 oz (113 kg)  SpO2 98%  BMI 31.99 kg/m2  Physical Exam    Awake, alert, not in distress.  Normal rales or wheezes.  Regular heart rate and rhythm.  Abdomen soft, nontender.  No calf tenderness.  No cyanosis.    Lab Results (last 24 hours)     Procedure Component Value Units Date/Time    POC Glucose Fingerstick [[id number]]  (Abnormal) Collected:  07/10/17 1111    Specimen:  Blood Updated:  07/10/17 1113     Glucose 190 (H) mg/dL     Narrative:       Meter: [id number] : [id number] Erick Fang    POC Glucose Fingerstick [[id number]]  (Normal) Collected:  07/10/17 1614    Specimen:  Blood Updated:  07/10/17 1616     Glucose 118 mg/dL     Narrative:       Meter: [id number] : [id number] Erick Fang    POC Glucose Fingerstick [[id number]]  (Abnormal) Collected:  07/10/17 2211    Specimen:  Blood Updated:  07/10/17 2213     Glucose 252 (H) mg/dL     Narrative:       Meter: [id number] : [id number] Alden Maynard CNA    SHONDA + PE [[id number]] Collected:  07/10/17 2240    Specimen:  Blood Updated:  07/10/17 2253    Hemoglobin & Hematocrit, Blood [[id number]]  (Abnormal) Collected:  07/11/17 0441    Specimen:  Blood Updated:  07/11/17 0524     Hemoglobin 8.7 (L) g/dL      Hematocrit 25.9 (L) %     POC Glucose Fingerstick [[id number]]  (Abnormal) Collected:  07/11/17 0624    Specimen:  Blood Updated:  " 07/11/17 0626     Glucose 166 (H) mg/dL     Narrative:       Meter: [id number] : [id number] Alden Maynard CNA            Principal Problem:    Infection of prosthetic right knee joint  Active Problems:    Hypertension    Hyperlipidemia    History of knee surgery    CAD (coronary artery disease)    Type 2 diabetes mellitus with proteinuria    Hyponatremia    Insulin therapy as discussed above.

## 2017-07-11 NOTE — PROGRESS NOTES
[facility]     LOS: 4 days     Name: Hugh R McBurney  Age: 66 y.o.  Sex: male  :  1950  MRN: [id number]         Primary Care Physician: Guille Nieves MD    Diet Regular; Consistent Carbohydrate    Subjective   No chest pain, shortness of breath. Pain okay.    Objective   Temp:  [97.7 °F (36.5 °C)-99.8 °F (37.7 °C)] 99.8 °F (37.7 °C)  Heart Rate:  [82-99] 90  Resp:  [16-17] 16  BP: (108-150)/(53-88) 150/88  SpO2:  [92 %-98 %] 98 %  on   ;   O2 Device: room air  Body mass index is 31.99 kg/(m^2).    Physical Exam   Constitutional: He is oriented to person, place, and time. No distress.   Cardiovascular: Normal rate and regular rhythm.    Pulmonary/Chest: Effort normal and breath sounds normal. No respiratory distress.   Abdominal: Soft. There is no tenderness.   Musculoskeletal: He exhibits no edema.   Right knee immobilizer   Neurological: He is alert and oriented to person, place, and time.   Skin: Skin is warm and dry.     Reviewed medications and new clinical results    amLODIPine 5 mg Oral Daily   aspirin 325 mg Oral BID   atorvastatin 40 mg Oral Nightly   ceFAZolin 2 g Intravenous Q8H   ferrous sulfate 325 mg Oral Daily With Lunch   insulin aspart 0-9 Units Subcutaneous 4x Daily With Meals & Nightly   insulin aspart 14 Units Subcutaneous TID With Meals   insulin detemir 45 Units Subcutaneous Q12H   isosorbide mononitrate 30 mg Oral Q24H   lisinopril 10 mg Oral Q PM   metoprolol tartrate 50 mg Oral Q12H   oxyCODONE 20 mg Oral Q12H   pantoprazole 40 mg Oral BID AC   rifAMPin 300 mg Oral Q12H   sertraline 100 mg Oral Daily   sodium chloride 10 mL Intracatheter Q12H       lactated ringers 9 mL/hr Last Rate: 9 mL/hr (17 1212)   lactated ringers 9 mL/hr    sodium chloride 75 mL/hr Last Rate: 75 mL/hr (07/10/17 0525)       Results from last 7 days  Lab Units 17  0441 17  0459 17  0351   WBC 10*3/mm3  --  9.06 13.25*    HEMOGLOBIN g/dL 8.7* 9.4* 10.7*   PLATELETS 10*3/mm3  --  174 213       Results from last 7 days  Lab Units 07/10/17  0425 07/09/17  0459 07/08/17  0351   SODIUM mmol/L 135* 128* 134*   POTASSIUM mmol/L 4.1 4.2 4.8   CHLORIDE mmol/L 100 92* 96*   CO2 mmol/L 21.8* 23.2 21.8*   BUN mg/dL 27* 28* 19   CREATININE mg/dL 0.88 1.29* 0.94   CALCIUM mg/dL 9.2 9.0 9.1   GLUCOSE mg/dL 129* 135* 236*     Glucose   Date/Time Value Ref Range Status   07/11/2017 0624 166 (H) 70 - 130 mg/dL Final   07/10/2017 2211 252 (H) 70 - 130 mg/dL Final   07/10/2017 1614 118 70 - 130 mg/dL Final   07/10/2017 1111 190 (H) 70 - 130 mg/dL Final   07/10/2017 0626 148 (H) 70 - 130 mg/dL Final   07/09/2017 2110 101 70 - 130 mg/dL Final   07/09/2017 1659 112 70 - 130 mg/dL Final   07/09/2017 1028 113 70 - 130 mg/dL Final     Hemoglobin A1C   Date Value Ref Range Status   07/09/2017 9.05 (H) 4.80 - 5.60 % Final     Estimated Creatinine Clearance: 110.4 mL/min (by C-G formula based on Cr of 0.88).    Assessment/Plan   Active Hospital Problems (** Indicates Principal Problem)    Diagnosis Date Noted   • **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] 07/07/2017   • Anemia, posthemorrhagic, acute [D62] 07/11/2017   • Hyponatremia [E87.1] 07/09/2017   • CAD (coronary artery disease) [I25.10] 07/08/2017   • Type 2 diabetes mellitus with proteinuria [E11.29, R80.9] 07/08/2017   • Hypertension [I10] 02/14/2017   • Hyperlipidemia [E78.5] 02/14/2017   • History of knee surgery [Z98.890] 01/30/2013      Resolved Hospital Problems    Diagnosis Date Noted Date Resolved   • Osteoarthritis, knee [M17.9] 06/13/2017 07/10/2017     · S/P removal of right total knee arthroplasty implants and placement of antibiotic spacer 7/8/17. Recent non-ST elevation MI.  · Antibiotics per infectious diseases: Cefazolin 2 g IV every 8 hours, stop date August 17, 2017 weekly CBC diff. Cr, ESR, CRP faxed to [phone]  · Endocrinology managing DM2  · Creatinine improved. BP  increased resume home BP med on discharge.  · Hold 81 mg ASA (home dose) until finished 325 mg ASA  · Rehab today. Follow up Hgb at rehab.    Axel Zapata MD   07/11/17  9:48 AM

## 2017-07-11 NOTE — PLAN OF CARE
Problem: Patient Care Overview (Adult)  Goal: Plan of Care Review  Outcome: Ongoing (interventions implemented as appropriate)    07/11/17 0757   Coping/Psychosocial Response Interventions   Plan Of Care Reviewed With patient   Patient Care Overview   Progress improving   Outcome Evaluation   Outcome Summary/Follow up Plan pain much better tonight, ambulating well with the walker, VSS, educated on insulin regimen, voiding per urinal, PICC line placed yesterday-flushes well and has good blood return, ttwb, to [facility] today.       Goal: Adult Individualization and Mutuality  Outcome: Ongoing (interventions implemented as appropriate)  Goal: Discharge Needs Assessment  Outcome: Ongoing (interventions implemented as appropriate)    Problem: Knee Replacement, Total (Adult)  Goal: Signs and Symptoms of Listed Potential Problems Will be Absent or Manageable (Knee Replacement, Total)  Outcome: Ongoing (interventions implemented as appropriate)    Problem: Fall Risk (Adult)  Goal: Absence of Falls  Outcome: Ongoing (interventions implemented as appropriate)

## 2017-07-11 NOTE — PROGRESS NOTES
"Patient Care Team:  Guille Nieves MD as PCP - General  Guille Nieves MD as PCP - Family Medicine  Tiburcio Moreno MD as Consulting Physician (Cardiology)    Chief Complaint: Follow-up coronary artery disease, previous stress-induced cardiomyopathy    Interval History:   No complaints currently.  Doing well.    Objective   Vital Signs  Temp:  [97.7 °F (36.5 °C)-99.8 °F (37.7 °C)] 99.8 °F (37.7 °C)  Heart Rate:  [82-99] 90  Resp:  [16-17] 16  BP: (108-150)/(53-88) 150/88    Intake/Output Summary (Last 24 hours) at 07/11/17 1004  Last data filed at 07/11/17 0839   Gross per 24 hour   Intake              700 ml   Output             3750 ml   Net            -3050 ml     Flowsheet Rows         First Filed Value    Admission Height  74\" (188 cm) Documented at 07/07/2017 1203    Admission Weight  249 lb 3 oz (113 kg) Documented at 07/07/2017 1203          General Appearance:    Alert, cooperative, in no acute distress   Head:    Normocephalic, without obvious abnormality, atraumatic       Neck:   No adenopathy, supple, no thyromegaly, no carotid bruit, no    JVD   Lungs:     Clear to auscultation bilaterally, no wheezes, rales, or     rhonchi    Heart:    Normal rate, regular rhythm,  No murmur, rub, or gallop   Chest Wall:    No abnormalities observed   Abdomen:     Normal bowel sounds, soft, non-tender, non-distended,            no rebound tenderness   Extremities:   Right lower extremity in brace    Pulses:   Pulses palpable and equal bilaterally   Skin:   No bleeding or rash       Neurologic:   Cranial nerves 2 - 12 grossly intact, sensation intact             amLODIPine 5 mg Oral Daily   aspirin 325 mg Oral BID   atorvastatin 40 mg Oral Nightly   ceFAZolin 2 g Intravenous Q8H   ferrous sulfate 325 mg Oral Daily With Lunch   insulin aspart 0-9 Units Subcutaneous 4x Daily With Meals & Nightly   insulin aspart 14 Units Subcutaneous TID With Meals   insulin detemir 45 Units Subcutaneous Q12H   isosorbide " mononitrate 30 mg Oral Q24H   lisinopril 10 mg Oral Q PM   metoprolol tartrate 50 mg Oral Q12H   oxyCODONE 20 mg Oral Q12H   pantoprazole 40 mg Oral BID AC   rifAMPin 300 mg Oral Q12H   sertraline 100 mg Oral Daily   sodium chloride 10 mL Intracatheter Q12H         lactated ringers 9 mL/hr Last Rate: 9 mL/hr (07/07/17 1212)   lactated ringers 9 mL/hr    sodium chloride 75 mL/hr Last Rate: 75 mL/hr (07/10/17 0525)       Results Review:      Results from last 7 days  Lab Units 07/10/17  0425   SODIUM mmol/L 135*   POTASSIUM mmol/L 4.1   CHLORIDE mmol/L 100   CO2 mmol/L 21.8*   BUN mg/dL 27*   CREATININE mg/dL 0.88   GLUCOSE mg/dL 129*   CALCIUM mg/dL 9.2           Results from last 7 days  Lab Units 07/11/17  0441 07/09/17  0459   WBC 10*3/mm3  --  9.06   HEMOGLOBIN g/dL 8.7* 9.4*   HEMATOCRIT % 25.9* 27.7*   PLATELETS 10*3/mm3  --  174           Results from last 7 days  Lab Units 07/10/17  0425   CHOLESTEROL mg/dL 128           Results from last 7 days  Lab Units 07/10/17  0425   CHOLESTEROL mg/dL 128   TRIGLYCERIDES mg/dL 120   HDL CHOL mg/dL 42     @LABRCNT(bnp)@  I reviewed the patient's new clinical results.  I personally viewed and interpreted the patient's EKG/Telemetry data        Assessment/Plan   1.  Recent non-ST elevation MI  2.  Coronary artery disease  3.  Stress-induced cardiomyopathy, recent diagnosis  4.  Infected right knee status post removal of prosthesis  5.  Essential hypertension    -Patient has done well postoperatively.  No additional cardiac evaluation at this time.  We will sign off.  Follow-up in 4 weeks with Ashley Suarez

## 2017-07-12 LAB
ALBUMIN SERPL-MCNC: 2.3 G/DL (ref 2.9–4.4)
ALBUMIN/GLOB SERPL: 0.7 {RATIO} (ref 0.7–1.7)
ALPHA1 GLOB FLD ELPH-MCNC: 0.5 G/DL (ref 0–0.4)
ALPHA2 GLOB SERPL ELPH-MCNC: 1 G/DL (ref 0.4–1)
B-GLOBULIN SERPL ELPH-MCNC: 1.2 G/DL (ref 0.7–1.3)
GAMMA GLOB SERPL ELPH-MCNC: 1 G/DL (ref 0.4–1.8)
GLOBULIN SER CALC-MCNC: 3.8 G/DL (ref 2.2–3.9)
IGA SERPL-MCNC: 388 MG/DL (ref 61–437)
IGG SERPL-MCNC: 1158 MG/DL (ref 700–1600)
IGM SERPL-MCNC: 6 MG/DL (ref 20–172)
INTERPRETATION SERPL IEP-IMP: ABNORMAL
Lab: ABNORMAL
M-SPIKE: ABNORMAL G/DL
PROT SERPL-MCNC: 6.1 G/DL (ref 6–8.5)

## 2017-08-17 ENCOUNTER — OFFICE VISIT (OUTPATIENT)
Dept: INFECTIOUS DISEASES | Facility: CLINIC | Age: 67
End: 2017-08-17

## 2017-08-17 VITALS
SYSTOLIC BLOOD PRESSURE: 96 MMHG | DIASTOLIC BLOOD PRESSURE: 59 MMHG | WEIGHT: 250 LBS | TEMPERATURE: 97.9 F | HEIGHT: 74 IN | BODY MASS INDEX: 32.08 KG/M2 | HEART RATE: 70 BPM

## 2017-08-17 DIAGNOSIS — A41.01 MSSA (METHICILLIN SUSCEPTIBLE STAPHYLOCOCCUS AUREUS) SEPTICEMIA (HCC): ICD-10-CM

## 2017-08-17 DIAGNOSIS — T84.59XD INFECTED PROSTHETIC KNEE JOINT, SUBSEQUENT ENCOUNTER: Primary | ICD-10-CM

## 2017-08-17 DIAGNOSIS — Z96.659 INFECTED PROSTHETIC KNEE JOINT, SUBSEQUENT ENCOUNTER: Primary | ICD-10-CM

## 2017-08-17 DIAGNOSIS — Z79.2 LONG TERM CURRENT USE OF ANTIBIOTICS: ICD-10-CM

## 2017-08-17 DIAGNOSIS — D69.6 THROMBOCYTOPENIA (HCC): ICD-10-CM

## 2017-08-17 PROCEDURE — 99214 OFFICE O/P EST MOD 30 MIN: CPT | Performed by: INTERNAL MEDICINE

## 2017-08-17 NOTE — PROGRESS NOTES
"cc: f/u PJI    Per prior notes: \"Very nice 66-year-old gentleman with history of osteoarthritis complicated by right TKA PJI culture negative in 2011 treated with two-stage replacement and prolonged antibiotic therapy, admitted on with 3-4 weeks of left first toe swelling, fever and right artificial knee pain. Was found to have MSSA in his blood and underwent I&D with amputation of the first left toe by Dr. Brand on 2/14. Dr. Marie also performed liner exchange with I&D of right TKA on 2/14. His left toe and right TKA cultures all grew MSSA. Transthoracic echocardiogram without obvious vegetation. In discussions with orthopedics and infectious diseases, patient elected to pursue device retention strategy. We will therefore recommend Ancef 2 g IV every 8 hours and rifampin 300 mg by mouth every 12 hours for 6 weeks with an anticipated stop date of 3/27/17. We then plan 6 months of rifampin plus PO abx (likely quinolone). He was readmitted for fever and hyperglycemia from 2/25 to 3/4/17 but all cultures were negative and transesophageal echocardiogram was negative.  At clinic visit on 3/27/17, he was transitioned off IV antibiotics to 750 mg of Cipro and rifampin 300 mg twice a day.  He developed progressive     He developed progressive pain and concern was for progressive infection.  He was admitted for explantation of his right TKA on 6/13 but developed red man syndrome and a NSTEMI, delaying procedures.  He was readmitted on 7/7/17 and underwent removal of hardware with placement of antibiotic spacer.  Surgical cultures again grew MSSA.  We will re-induce with 6 more weeks of IV cefazolin, anticipated stop date August 17, 2017.\"    Since discharge, the patient reports he is done well.  In regards to his prosthetic joint infection he reports his knee is much less painful.  He denies constitutional symptoms of infection such as fever, chills, night sweats.  In regards to his long-term use antibiotics he denies " "any side effects from the cefazolin or any missed doses.  I noticed on his antibiotic monitoring labs he's had progressive thrombocytopenia down to 121 on 8/14/17 compared to 360 on 7/24/17.  He denies any symptoms from this such as easy bruising or bleeding.  He is due to have revision TKA next month.  He sees orthopedics in the next upcoming weeks.    PAST MEDICAL HISTORY:  1. Uncontrolled diabetes mellitus type 2.  2. Hypertension.  3. Hyperlipidemia.  4. GERD.  5. History of nephrolithiasis.  6. Chronic pain.  7. Osteoarthritis status post bilateral joint replacement with right PJI in 2011 treated with 2-stage replacement and antibiotic therapy.  8. Back hardware placement.  9. Placement of an orthopedic screw on the right shoulder.  10. Appendectomy.  11. History of pancreatitis.  12.  Coronary artery disease  13.  MSSA septicemia and left foot osteomyelitis and right prosthetic knee infection        Review of Systems: All other reviewed and negative except as per HPI    Blood pressure 96/59, pulse 70, temperature 97.9 °F (36.6 °C), height 74\" (188 cm), weight 250 lb (113 kg).  GENERAL: Awake and alert, in no acute distress.   Muscle skeletal: Right knee nontender without effusion.  No appreciable cellulitic features.  SKIN: Warm and dry without cutaneous eruptions   PSYCHIATRIC: Appropriate mood, affect, insight, and judgment.       DIAGNOSTICS:  Antibiotic monitoring labs reviewed.  Creatinine is ranged between 0.9 and 1.0.  Her white count was 6.4, 44% neutrophils, 37% lymphocytes, 11% eosinophils.  CRP was 7.5 which is downgoing.  The upper lobe and a normal on that scale was 5.0.  ESR was 44 and is similarly decreasing the upper limit of normal is around 15.    Assessment and Plan  1.  Right MSSA prosthetic knee infection  2.  Thrombocytopenia, likely related to antibiotic therapy.  Should improve off of antibiotic therapy.  3.  Long-term use antibiotics    Doing quite well with his infection.  His " inflammatory markers are resolving.  I agree with monitoring him off of antibiotics for 2-8 weeks at a revision TKA.  We will go ahead and discontinue his antibiotics and PICC line today.  We discussed signs and symptoms of recrudescent infection and when to seek medical attention.  I will plan to see him again after his revision TKA.

## 2017-08-29 ENCOUNTER — HOSPITAL ENCOUNTER (OUTPATIENT)
Dept: GENERAL RADIOLOGY | Facility: HOSPITAL | Age: 67
Discharge: HOME | End: 2017-08-29

## 2017-08-29 ENCOUNTER — TELEPHONE (OUTPATIENT)
Dept: ENDOCRINOLOGY | Age: 67
End: 2017-08-29

## 2017-08-29 ENCOUNTER — HOSPITAL ENCOUNTER (OUTPATIENT)
Dept: GENERAL RADIOLOGY | Facility: HOSPITAL | Age: 67
Discharge: HOME | End: 2017-08-29
Admitting: ORTHOPAEDIC SURGERY

## 2017-08-29 ENCOUNTER — APPOINTMENT (OUTPATIENT)
Dept: PREADMISSION TESTING | Facility: HOSPITAL | Age: 67
End: 2017-08-29

## 2017-08-29 VITALS
DIASTOLIC BLOOD PRESSURE: 49 MMHG | HEART RATE: 65 BPM | OXYGEN SATURATION: 97 % | HEIGHT: 74 IN | WEIGHT: 246 LBS | SYSTOLIC BLOOD PRESSURE: 86 MMHG | RESPIRATION RATE: 20 BRPM | BODY MASS INDEX: 31.57 KG/M2 | TEMPERATURE: 98.5 F

## 2017-08-29 DIAGNOSIS — I70.209 DIABETES TYPE 2 WITH ATHEROSCLEROSIS OF ARTERIES OF EXTREMITIES (HCC): ICD-10-CM

## 2017-08-29 DIAGNOSIS — L97.529 DIABETIC ULCER OF LEFT FOOT ASSOCIATED WITH TYPE 2 DIABETES MELLITUS (HCC): ICD-10-CM

## 2017-08-29 DIAGNOSIS — E55.9 VITAMIN D DEFICIENCY: ICD-10-CM

## 2017-08-29 DIAGNOSIS — E78.49 OTHER HYPERLIPIDEMIA: ICD-10-CM

## 2017-08-29 DIAGNOSIS — E11.621 DIABETIC ULCER OF LEFT FOOT ASSOCIATED WITH TYPE 2 DIABETES MELLITUS (HCC): ICD-10-CM

## 2017-08-29 DIAGNOSIS — E11.43 DIABETIC AUTONOMIC NEUROPATHY ASSOCIATED WITH TYPE 2 DIABETES MELLITUS (HCC): ICD-10-CM

## 2017-08-29 DIAGNOSIS — E11.51 DIABETES TYPE 2 WITH ATHEROSCLEROSIS OF ARTERIES OF EXTREMITIES (HCC): ICD-10-CM

## 2017-08-29 LAB
25(OH)D3 SERPL-MCNC: 25.8 NG/ML (ref 30–100)
ALBUMIN SERPL-MCNC: 3.9 G/DL (ref 3.5–5.2)
ALBUMIN UR-MCNC: 51.2 MG/L
ALBUMIN/GLOB SERPL: 1.1 G/DL
ALP SERPL-CCNC: 100 U/L (ref 39–117)
ALT SERPL W P-5'-P-CCNC: 9 U/L (ref 1–41)
ANION GAP SERPL CALCULATED.3IONS-SCNC: 13.4 MMOL/L
APTT PPP: 25 SECONDS (ref 22.7–35.4)
AST SERPL-CCNC: 12 U/L (ref 1–40)
BACTERIA UR QL AUTO: ABNORMAL /HPF
BASOPHILS # BLD AUTO: 0.05 10*3/MM3 (ref 0–0.2)
BASOPHILS NFR BLD AUTO: 0.5 % (ref 0–1.5)
BILIRUB SERPL-MCNC: 0.3 MG/DL (ref 0.1–1.2)
BILIRUB UR QL STRIP: NEGATIVE
BUN BLD-MCNC: 34 MG/DL (ref 8–23)
BUN/CREAT SERPL: 23 (ref 7–25)
CALCIUM SPEC-SCNC: 9.2 MG/DL (ref 8.6–10.5)
CHLORIDE SERPL-SCNC: 101 MMOL/L (ref 98–107)
CHOLEST SERPL-MCNC: 122 MG/DL (ref 0–200)
CLARITY UR: CLEAR
CO2 SERPL-SCNC: 24.6 MMOL/L (ref 22–29)
COLOR UR: YELLOW
CREAT BLD-MCNC: 1.48 MG/DL (ref 0.76–1.27)
CREAT UR-MCNC: 88 MG/DL
CRP SERPL-MCNC: 0.63 MG/DL (ref 0–0.5)
DEPRECATED RDW RBC AUTO: 51.9 FL (ref 37–54)
EOSINOPHIL # BLD AUTO: 0.41 10*3/MM3 (ref 0–0.7)
EOSINOPHIL NFR BLD AUTO: 4 % (ref 0.3–6.2)
ERYTHROCYTE [DISTWIDTH] IN BLOOD BY AUTOMATED COUNT: 14.8 % (ref 11.5–14.5)
ERYTHROCYTE [SEDIMENTATION RATE] IN BLOOD: 50 MM/HR (ref 0–20)
FOLATE SERPL-MCNC: >20 NG/ML (ref 4.78–24.2)
GFR SERPL CREATININE-BSD FRML MDRD: 48 ML/MIN/1.73
GLOBULIN UR ELPH-MCNC: 3.7 GM/DL
GLUCOSE BLD-MCNC: 144 MG/DL (ref 65–99)
GLUCOSE UR STRIP-MCNC: NEGATIVE MG/DL
HBA1C MFR BLD: 6.93 % (ref 4.8–5.6)
HCT VFR BLD AUTO: 36.2 % (ref 40.4–52.2)
HDLC SERPL-MCNC: 48 MG/DL (ref 40–60)
HGB BLD-MCNC: 11.4 G/DL (ref 13.7–17.6)
HGB UR QL STRIP.AUTO: ABNORMAL
HYALINE CASTS UR QL AUTO: ABNORMAL /LPF
IMM GRANULOCYTES # BLD: 0.03 10*3/MM3 (ref 0–0.03)
IMM GRANULOCYTES NFR BLD: 0.3 % (ref 0–0.5)
INR PPP: 1.05 (ref 0.9–1.1)
KETONES UR QL STRIP: NEGATIVE
LDLC SERPL CALC-MCNC: 58 MG/DL (ref 0–100)
LDLC/HDLC SERPL: 1.21 {RATIO}
LEUKOCYTE ESTERASE UR QL STRIP.AUTO: NEGATIVE
LYMPHOCYTES # BLD AUTO: 2.13 10*3/MM3 (ref 0.9–4.8)
LYMPHOCYTES NFR BLD AUTO: 21 % (ref 19.6–45.3)
MCH RBC QN AUTO: 30.1 PG (ref 27–32.7)
MCHC RBC AUTO-ENTMCNC: 31.5 G/DL (ref 32.6–36.4)
MCV RBC AUTO: 95.5 FL (ref 79.8–96.2)
MICROALBUMIN/CREAT UR: 581.8 MG/G
MONOCYTES # BLD AUTO: 0.66 10*3/MM3 (ref 0.2–1.2)
MONOCYTES NFR BLD AUTO: 6.5 % (ref 5–12)
NEUTROPHILS # BLD AUTO: 6.86 10*3/MM3 (ref 1.9–8.1)
NEUTROPHILS NFR BLD AUTO: 67.7 % (ref 42.7–76)
NITRITE UR QL STRIP: NEGATIVE
PH UR STRIP.AUTO: 6 [PH] (ref 5–8)
PLATELET # BLD AUTO: 243 10*3/MM3 (ref 140–500)
PMV BLD AUTO: 11.8 FL (ref 6–12)
POTASSIUM BLD-SCNC: 5.2 MMOL/L (ref 3.5–5.2)
PROT SERPL-MCNC: 7.6 G/DL (ref 6–8.5)
PROT UR QL STRIP: ABNORMAL
PROTHROMBIN TIME: 13.3 SECONDS (ref 11.7–14.2)
RBC # BLD AUTO: 3.79 10*6/MM3 (ref 4.6–6)
RBC # UR: ABNORMAL /HPF
REF LAB TEST METHOD: ABNORMAL
SODIUM BLD-SCNC: 139 MMOL/L (ref 136–145)
SP GR UR STRIP: 1.01 (ref 1–1.03)
SQUAMOUS #/AREA URNS HPF: ABNORMAL /HPF
TRIGL SERPL-MCNC: 80 MG/DL (ref 0–150)
TSH SERPL DL<=0.05 MIU/L-ACNC: 1.61 MIU/ML (ref 0.27–4.2)
UROBILINOGEN UR QL STRIP: ABNORMAL
VIT B12 BLD-MCNC: 477 PG/ML (ref 211–946)
VLDLC SERPL-MCNC: 16 MG/DL (ref 5–40)
WBC NRBC COR # BLD: 10.14 10*3/MM3 (ref 4.5–10.7)
WBC UR QL AUTO: ABNORMAL /HPF

## 2017-08-29 PROCEDURE — 85610 PROTHROMBIN TIME: CPT | Performed by: ORTHOPAEDIC SURGERY

## 2017-08-29 PROCEDURE — 36415 COLL VENOUS BLD VENIPUNCTURE: CPT

## 2017-08-29 PROCEDURE — 85730 THROMBOPLASTIN TIME PARTIAL: CPT | Performed by: ORTHOPAEDIC SURGERY

## 2017-08-29 PROCEDURE — 84443 ASSAY THYROID STIM HORMONE: CPT | Performed by: INTERNAL MEDICINE

## 2017-08-29 PROCEDURE — 83036 HEMOGLOBIN GLYCOSYLATED A1C: CPT | Performed by: INTERNAL MEDICINE

## 2017-08-29 PROCEDURE — 85025 COMPLETE CBC W/AUTO DIFF WBC: CPT | Performed by: ORTHOPAEDIC SURGERY

## 2017-08-29 PROCEDURE — 71020 HC CHEST PA AND LATERAL: CPT

## 2017-08-29 PROCEDURE — 85652 RBC SED RATE AUTOMATED: CPT | Performed by: ORTHOPAEDIC SURGERY

## 2017-08-29 PROCEDURE — 82607 VITAMIN B-12: CPT | Performed by: INTERNAL MEDICINE

## 2017-08-29 PROCEDURE — 86140 C-REACTIVE PROTEIN: CPT | Performed by: ORTHOPAEDIC SURGERY

## 2017-08-29 PROCEDURE — 87086 URINE CULTURE/COLONY COUNT: CPT | Performed by: ORTHOPAEDIC SURGERY

## 2017-08-29 PROCEDURE — 82043 UR ALBUMIN QUANTITATIVE: CPT | Performed by: INTERNAL MEDICINE

## 2017-08-29 PROCEDURE — 81001 URINALYSIS AUTO W/SCOPE: CPT | Performed by: ORTHOPAEDIC SURGERY

## 2017-08-29 PROCEDURE — 82306 VITAMIN D 25 HYDROXY: CPT | Performed by: INTERNAL MEDICINE

## 2017-08-29 PROCEDURE — 80053 COMPREHEN METABOLIC PANEL: CPT | Performed by: INTERNAL MEDICINE

## 2017-08-29 PROCEDURE — 82570 ASSAY OF URINE CREATININE: CPT | Performed by: INTERNAL MEDICINE

## 2017-08-29 PROCEDURE — 80061 LIPID PANEL: CPT | Performed by: INTERNAL MEDICINE

## 2017-08-29 PROCEDURE — 73560 X-RAY EXAM OF KNEE 1 OR 2: CPT

## 2017-08-29 PROCEDURE — 82746 ASSAY OF FOLIC ACID SERUM: CPT | Performed by: INTERNAL MEDICINE

## 2017-08-29 RX ORDER — PREGABALIN 100 MG/1
200 CAPSULE ORAL NIGHTLY
COMMUNITY
End: 2018-04-03 | Stop reason: SDUPTHER

## 2017-08-29 RX ORDER — CHLORHEXIDINE GLUCONATE 500 MG/1
1 CLOTH TOPICAL TAKE AS DIRECTED
Status: ON HOLD | COMMUNITY
End: 2017-09-05

## 2017-08-29 RX ORDER — ERGOCALCIFEROL 1.25 MG/1
50000 CAPSULE ORAL WEEKLY
Qty: 30 CAPSULE | Refills: 11 | Status: SHIPPED | OUTPATIENT
Start: 2017-08-29 | End: 2021-03-08

## 2017-08-29 RX ORDER — BACLOFEN 20 MG/1
10 TABLET ORAL 3 TIMES DAILY
COMMUNITY
End: 2017-08-30 | Stop reason: DRUGHIGH

## 2017-08-29 NOTE — TELEPHONE ENCOUNTER
Spoke with patient last week he was trying to get an earlier appt.due to having knee surgery. I let patient know that we had no opening that I had one on Aug 25th 2017     Patient was a no show called patient he stated that I told  his appointment was Sept 1 2017  He was very upset I told him I was the he talked to and that we set the appointment up for the 25 of Aug because we had no open slot available at that time. Patient wants to be worked in on Friday or Weds.

## 2017-08-30 ENCOUNTER — OFFICE VISIT (OUTPATIENT)
Dept: ENDOCRINOLOGY | Age: 67
End: 2017-08-30

## 2017-08-30 VITALS — BODY MASS INDEX: 31.57 KG/M2 | HEIGHT: 74 IN | WEIGHT: 246 LBS | OXYGEN SATURATION: 97 % | HEART RATE: 67 BPM

## 2017-08-30 DIAGNOSIS — L97.529 DIABETIC ULCER OF LEFT FOOT ASSOCIATED WITH TYPE 2 DIABETES MELLITUS (HCC): ICD-10-CM

## 2017-08-30 DIAGNOSIS — I70.209 DIABETES TYPE 2 WITH ATHEROSCLEROSIS OF ARTERIES OF EXTREMITIES (HCC): Primary | ICD-10-CM

## 2017-08-30 DIAGNOSIS — E11.51 DIABETES TYPE 2 WITH ATHEROSCLEROSIS OF ARTERIES OF EXTREMITIES (HCC): Primary | ICD-10-CM

## 2017-08-30 DIAGNOSIS — E55.9 VITAMIN D DEFICIENCY: ICD-10-CM

## 2017-08-30 DIAGNOSIS — E11.621 DIABETIC ULCER OF LEFT FOOT ASSOCIATED WITH TYPE 2 DIABETES MELLITUS (HCC): ICD-10-CM

## 2017-08-30 DIAGNOSIS — E78.49 OTHER HYPERLIPIDEMIA: ICD-10-CM

## 2017-08-30 PROCEDURE — 99214 OFFICE O/P EST MOD 30 MIN: CPT | Performed by: INTERNAL MEDICINE

## 2017-08-30 RX ORDER — BACLOFEN 10 MG/1
5 TABLET ORAL 2 TIMES DAILY
COMMUNITY
End: 2017-11-30

## 2017-08-30 RX ORDER — LISINOPRIL 10 MG/1
10 TABLET ORAL EVERY EVENING
Qty: 30 TABLET | Refills: 11 | Status: SHIPPED | OUTPATIENT
Start: 2017-08-30 | End: 2017-10-03 | Stop reason: ALTCHOICE

## 2017-08-30 NOTE — PROGRESS NOTES
66 y.o.    Patient Care Team:  Guille Nieves MD as PCP - General  Guille Nieves MD as PCP - Family Medicine  Tiburcio Moreno MD as Consulting Physician (Cardiology)    Chief Complaint:    3 Month Follow Up / Type 2 Diabetes Mellitus   Subjective     HPI  Gui SHANNAN McBurney,66 y.o. WM is here as a follow up pt for the management of type 2 diabetes mellitus.        Type 2 dm - Diagnosed about 10 -15 years ago. Was started on oral agents initially. Insulin was started about 5 years ago. Currently on Lantus 40 units bid, Novolog 14 units with each meal, plus ssi tid ac and hs - 3 units for 50 above 150 mg/dl.  Avg novolog 17 - 18 units with each meal.   Checks BG 3 - 5 times a day.   FBG - 150 - 160 mg/dl and Pre - meals - 140 - 180 Mg/dl  No increased urination or increased thirst. No BG less than 60 mg/dl in the last one month, does have hypoglycemic awareness. Highest BG in the last 1 month was - 278 mg/dl.  Pt did get his log book for me. No c/o nausea and vomiting.   Up to date with eye exam in the last 4 - 5  month and no dm retinopathy.   C/o tingling and numbness in his b/l feet, Great toe amputation of his left foot it healed well, has been released by wound care. Right knee replaced joint is infected not on abx at this time but they are performing another surgery on 09/5/17.   Hx of CAD, no hx of CKD,CVA per pt.   Pt is physically not active due to knee pain. Weight has been stable.   Following diabetic diet for most.   On Ace inb.  Prior history of pancreatitis.      Diabetic neuropathy resulting in left great toe osteomyelitis status post amputation complicated with right knee joint seeding underwent incision, debridement and polyliner exchange of the right TKA, being followed by orthopedics and ID. the patient is going to be admitted on 9/5/2017 for prosthetic knee joint replacement.  Hospitalization was further complicated with an episode of NSTEMI.    Blood pressure-patient has been noted to  have low blood pressure episodes-86/49 mm of Hg during his office visit.  Upon questioning the patient he reports that he has been having some dizzy spells and there are low motivation for the last few weeks.  Currently on Norvasc 5 mg oral daily, Aricept 20 mg oral daily, immature 30 mg, metoprolol 50 mg twice daily.      Interval History      The following portions of the patient's history were reviewed and updated as appropriate: allergies, current medications, past family history, past medical history, past social history, past surgical history and problem list.    Past Medical History:   Diagnosis Date   • Arthritis    • Chronic pain     BILATERAL KNEES AND BACK   • Coronary artery disease    • Depression    • Diabetes mellitus    • GERD (gastroesophageal reflux disease)    • History of kidney stones    • Hyperlipidemia    • Hypertension    • Kidney stone    • MSSA (methicillin susceptible Staphylococcus aureus) infection     RIGHT KNEE   • NSTEMI (non-ST elevated myocardial infarction) 06/14/2017   • Sleep apnea     DOES NOT USE CPAP     Family History   Problem Relation Age of Onset   • Heart disease Mother    • Heart disease Father    • Diabetes Sister    • Dementia Maternal Grandmother    • No Known Problems Maternal Grandfather    • Heart disease Paternal Grandmother    • Heart attack Paternal Grandfather    • Heart disease Paternal Grandfather    • Heart disease Son    • Heart attack Son    • No Known Problems Sister    • Malig Hyperthermia Neg Hx      Social History     Social History   • Marital status:      Spouse name: N/A   • Number of children: N/A   • Years of education: N/A     Occupational History   • Not on file.     Social History Main Topics   • Smoking status: Former Smoker     Packs/day: 3.00     Years: 20.00   • Smokeless tobacco: Former User      Comment: quit 35 years ago. 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    • Alcohol use No   • Drug use: No   • Sexual activity: Defer     Other Topics  Concern   • Not on file     Social History Narrative     Allergies   Allergen Reactions   • Vancomycin Other (See Comments)     Red Man syndrome, slow rate and premedicate with benadryl       Current Outpatient Prescriptions:   •  aspirin 81 MG EC tablet, Take 1 tablet by mouth Daily. Resume after completing 325 mg aspirin. (Patient taking differently: Take 81 mg by mouth Daily. HOLD 1 WEEK PRIOR TO SURGERY), Disp: , Rfl:   •  atorvastatin (LIPITOR) 40 MG tablet, Take 1 tablet by mouth Every Night., Disp: 30 tablet, Rfl: 1  •  baclofen (LIORESAL) 10 MG tablet, Take 10 mg by mouth 3 (Three) Times a Day., Disp: , Rfl:   •  bisacodyl (DULCOLAX) 10 MG suppository, Insert 1 suppository into the rectum Daily As Needed for constipation., Disp: , Rfl: 0  •  Chlorhexidine Gluconate Cloth 2 % pads, Apply  topically. AS DIRECTED PRIOR TO SURGERY, Disp: , Rfl:   •  ferrous sulfate 325 (65 FE) MG tablet, Take 325 mg by mouth Daily With Lunch., Disp: , Rfl:   •  glucose blood (ACCU-CHEK COMPACT PLUS) test strip, Use to test blood sugar 5 times daily, Disp: 500 each, Rfl: 3  •  glucose blood (ACCU-CHEK COMPACT STRIPS) test strip, Use to test blood sugar 4 times daily  ICD 10 code E11.9, Disp: 400 each, Rfl: 3  •  insulin aspart (novoLOG FLEXPEN) 100 UNIT/ML solution pen-injector sc pen, Inject 14 Units under the skin 3 (Three) Times a Day With Meals., Disp: , Rfl:   •  Insulin Glargine (LANTUS SOLOSTAR) 100 UNIT/ML injection pen, Inject 40 Units under the skin 2 (Two) Times a Day. (Patient taking differently: Inject 45 Units under the skin 2 (Two) Times a Day.), Disp: 4 pen, Rfl: 2  •  Insulin Pen Needle (BD PEN NEEDLE JUAN DAVID U/F) 32G X 4 MM misc, TO INJECT 4 TIMES DAILY, Disp: 100 each, Rfl: 3  •  isosorbide mononitrate (IMDUR) 30 MG 24 hr tablet, Take 1 tablet by mouth Daily., Disp: 30 tablet, Rfl: 1  •  Lancets (ACCU-CHEK MULTICLIX) lancets, Use to test blood sugar 5 times daily, Disp: 500 each, Rfl: 3  •  lisinopril  (PRINIVIL,ZESTRIL) 10 MG tablet, Take 1 tablet by mouth Every Evening., Disp: 30 tablet, Rfl: 11  •  metoprolol tartrate (LOPRESSOR) 25 MG tablet, Take 1 tablet by mouth Every 12 (Twelve) Hours., Disp: 60 tablet, Rfl: 11  •  Multiple Vitamin (MULTI VITAMIN DAILY PO), Take 1 tablet by mouth Daily. HOLD PRIOR TO SURGERY, Disp: , Rfl:   •  mupirocin (BACTROBAN) 2 % nasal ointment, into each nostril. AS DIRECTED PRIOR TO SURGERY, Disp: , Rfl:   •  omeprazole (priLOSEC) 40 MG capsule, Take 40 mg by mouth 2 (Two) Times a Day., Disp: , Rfl:   •  promethazine (PHENERGAN) 25 MG tablet, Take 1 tablet by mouth Every 8 (Eight) Hours As Needed for Nausea or Vomiting., Disp: 30 tablet, Rfl: 2  •  sertraline (ZOLOFT) 100 MG tablet, Take 100 mg by mouth Daily., Disp: , Rfl:   •  vitamin D (ERGOCALCIFEROL) 11175 units capsule capsule, Take 1 capsule by mouth 1 (One) Time Per Week., Disp: 30 capsule, Rfl: 11  •  pregabalin (LYRICA) 100 MG capsule, Take 200 mg by mouth 2 (Two) Times a Day., Disp: , Rfl:   No current facility-administered medications for this visit.     Facility-Administered Medications Ordered in Other Visits:   •  mupirocin (BACTROBAN) 2 % nasal ointment, , Each Nare, BID, Kiko Marie MD        Review of Systems   Constitutional: Negative for fever.   HENT: Negative for facial swelling, nosebleeds, trouble swallowing and voice change.    Eyes: Negative for pain and redness.   Respiratory: Negative for shortness of breath and wheezing.    Cardiovascular: Positive for leg swelling. Negative for palpitations.   Gastrointestinal: Negative for abdominal pain, diarrhea and vomiting.   Endocrine: Negative for polydipsia and polyuria.   Genitourinary: Negative for decreased urine volume and frequency.   Musculoskeletal: Negative for joint swelling and neck pain.   Skin: Negative for rash.   Allergic/Immunologic: Negative for immunocompromised state.   Neurological: Negative for seizures and facial asymmetry.  "  Hematological: Does not bruise/bleed easily.   Psychiatric/Behavioral: Negative for agitation and confusion.       Objective       Vitals:    08/30/17 1309   Pulse: 67   SpO2: 97%   Weight: 246 lb (112 kg)   Height: 74\" (188 cm)     Body mass index is 31.58 kg/(m^2).      Physical Exam   Constitutional: He is oriented to person, place, and time. He appears well-nourished.   HENT:   Head: Normocephalic and atraumatic.   Eyes: Conjunctivae and EOM are normal.   Neck: Normal range of motion. Neck supple. Carotid bruit is not present. No thyromegaly present.   Skin tags   Cardiovascular: Normal rate and normal heart sounds.    Pulmonary/Chest: Effort normal and breath sounds normal. No stridor. No respiratory distress. He has no wheezes.   Abdominal: Soft. Bowel sounds are normal. He exhibits no distension. There is no tenderness.   No lipodystrophy   Musculoskeletal: He exhibits edema. He exhibits no tenderness.   On wheel chair, rt knee joint in a brace   Lymphadenopathy:     He has no cervical adenopathy.   Neurological: He is alert and oriented to person, place, and time.   Hammer toes, decreased pin prick and intact proprioception. Left toe amputation     Skin: Skin is warm and dry.   Psychiatric: He has a normal mood and affect. His behavior is normal.   Vitals reviewed.    Results Review:     I reviewed the patient's new clinical results.    Medical records reviewed  Summary: done      Appointment on 08/29/2017   Component Date Value Ref Range Status   • PTT 08/29/2017 25.0  22.7 - 35.4 seconds Final   • Protime 08/29/2017 13.3  11.7 - 14.2 Seconds Final   • INR 08/29/2017 1.05  0.90 - 1.10 Final   • Sed Rate 08/29/2017 50* 0 - 20 mm/hr Final   • C-Reactive Protein 08/29/2017 0.63* 0.00 - 0.50 mg/dL Final   • Color, UA 08/29/2017 Yellow  Yellow, Straw Final   • Appearance, UA 08/29/2017 Clear  Clear Final   • pH, UA 08/29/2017 6.0  5.0 - 8.0 Final   • Specific Gravity, UA 08/29/2017 1.013  1.005 - 1.030 Final "   • Glucose, UA 08/29/2017 Negative  Negative Final   • Ketones, UA 08/29/2017 Negative  Negative Final   • Bilirubin, UA 08/29/2017 Negative  Negative Final   • Blood, UA 08/29/2017 Small (1+)* Negative Final   • Protein, UA 08/29/2017 100 mg/dL (2+)* Negative Final   • Leuk Esterase, UA 08/29/2017 Negative  Negative Final   • Nitrite, UA 08/29/2017 Negative  Negative Final   • Urobilinogen, UA 08/29/2017 0.2 E.U./dL  0.2 - 1.0 E.U./dL Final   • Urine Culture 08/29/2017 No growth   Preliminary   • Glucose 08/29/2017 144* 65 - 99 mg/dL Final   • BUN 08/29/2017 34* 8 - 23 mg/dL Final   • Creatinine 08/29/2017 1.48* 0.76 - 1.27 mg/dL Final   • Sodium 08/29/2017 139  136 - 145 mmol/L Final   • Potassium 08/29/2017 5.2  3.5 - 5.2 mmol/L Final   • Chloride 08/29/2017 101  98 - 107 mmol/L Final   • CO2 08/29/2017 24.6  22.0 - 29.0 mmol/L Final   • Calcium 08/29/2017 9.2  8.6 - 10.5 mg/dL Final   • Total Protein 08/29/2017 7.6  6.0 - 8.5 g/dL Final   • Albumin 08/29/2017 3.90  3.50 - 5.20 g/dL Final   • ALT (SGPT) 08/29/2017 9  1 - 41 U/L Final   • AST (SGOT) 08/29/2017 12  1 - 40 U/L Final   • Alkaline Phosphatase 08/29/2017 100  39 - 117 U/L Final   • Total Bilirubin 08/29/2017 0.3  0.1 - 1.2 mg/dL Final   • eGFR Non African Amer 08/29/2017 48* >60 mL/min/1.73 Final   • Globulin 08/29/2017 3.7  gm/dL Final   • A/G Ratio 08/29/2017 1.1  g/dL Final   • BUN/Creatinine Ratio 08/29/2017 23.0  7.0 - 25.0 Final   • Anion Gap 08/29/2017 13.4  mmol/L Final   • Hemoglobin A1C 08/29/2017 6.93* 4.80 - 5.60 % Final   • Total Cholesterol 08/29/2017 122  0 - 200 mg/dL Final   • Triglycerides 08/29/2017 80  0 - 150 mg/dL Final   • HDL Cholesterol 08/29/2017 48  40 - 60 mg/dL Final   • LDL Cholesterol  08/29/2017 58  0 - 100 mg/dL Final   • VLDL Cholesterol 08/29/2017 16  5 - 40 mg/dL Final   • LDL/HDL Ratio 08/29/2017 1.21   Final   • Microalbumin/Creatinine Ratio 08/29/2017 581.8  mg/g Final   • Creatinine, Urine 08/29/2017 88.0  mg/dL  Final   • Microalbumin, Urine 08/29/2017 51.2  mg/L Final   • TSH 08/29/2017 1.610  0.270 - 4.200 mIU/mL Final   • Folate 08/29/2017 >20.00  4.78 - 24.20 ng/mL Final   • Vitamin B-12 08/29/2017 477  211 - 946 pg/mL Final   • 25 Hydroxy, Vitamin D 08/29/2017 25.8* 30.0 - 100.0 ng/ml Final   • WBC 08/29/2017 10.14  4.50 - 10.70 10*3/mm3 Final   • RBC 08/29/2017 3.79* 4.60 - 6.00 10*6/mm3 Final   • Hemoglobin 08/29/2017 11.4* 13.7 - 17.6 g/dL Final   • Hematocrit 08/29/2017 36.2* 40.4 - 52.2 % Final   • MCV 08/29/2017 95.5  79.8 - 96.2 fL Final   • MCH 08/29/2017 30.1  27.0 - 32.7 pg Final   • MCHC 08/29/2017 31.5* 32.6 - 36.4 g/dL Final   • RDW 08/29/2017 14.8* 11.5 - 14.5 % Final   • RDW-SD 08/29/2017 51.9  37.0 - 54.0 fl Final   • MPV 08/29/2017 11.8  6.0 - 12.0 fL Final   • Platelets 08/29/2017 243  140 - 500 10*3/mm3 Final   • Neutrophil % 08/29/2017 67.7  42.7 - 76.0 % Final   • Lymphocyte % 08/29/2017 21.0  19.6 - 45.3 % Final   • Monocyte % 08/29/2017 6.5  5.0 - 12.0 % Final   • Eosinophil % 08/29/2017 4.0  0.3 - 6.2 % Final   • Basophil % 08/29/2017 0.5  0.0 - 1.5 % Final   • Immature Grans % 08/29/2017 0.3  0.0 - 0.5 % Final   • Neutrophils, Absolute 08/29/2017 6.86  1.90 - 8.10 10*3/mm3 Final   • Lymphocytes, Absolute 08/29/2017 2.13  0.90 - 4.80 10*3/mm3 Final   • Monocytes, Absolute 08/29/2017 0.66  0.20 - 1.20 10*3/mm3 Final   • Eosinophils, Absolute 08/29/2017 0.41  0.00 - 0.70 10*3/mm3 Final   • Basophils, Absolute 08/29/2017 0.05  0.00 - 0.20 10*3/mm3 Final   • Immature Grans, Absolute 08/29/2017 0.03  0.00 - 0.03 10*3/mm3 Final   • RBC, UA 08/29/2017 3-5* None Seen, 0-2 /HPF Final   • WBC, UA 08/29/2017 0-2  None Seen, 0-2 /HPF Final   • Bacteria, UA 08/29/2017 None Seen  None Seen /HPF Final   • Squamous Epithelial Cells, UA 08/29/2017 0-2  None Seen, 0-2 /HPF Final   • Hyaline Casts, UA 08/29/2017 None Seen  None Seen /LPF Final   • Methodology 08/29/2017 Automated Microscopy   Final     Lab Results    Component Value Date    HGBA1C 6.93 (H) 08/29/2017    HGBA1C 9.05 (H) 07/09/2017    HGBA1C 9.70 (H) 06/30/2017     Lab Results   Component Value Date    MICROALBUR 51.2 08/29/2017    LDLCALC 58 08/29/2017    CREATININE 1.48 (H) 08/29/2017     Imaging Results (most recent)     None                Assessment and Plan:    Gui was seen today for diabetes.    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  -     Hemoglobin A1c; Future  -     Basic Metabolic Panel; Future  -     Lipid Panel; Future  -     Microalbumin / Creatinine Urine Ratio; Future  -     TSH; Future  -     Vitamin B12 & Folate; Future  -     Vitamin D 25 Hydroxy; Future    Diabetic ulcer of left foot associated with type 2 diabetes mellitus  -     Hemoglobin A1c; Future  -     Basic Metabolic Panel; Future  -     Lipid Panel; Future  -     Microalbumin / Creatinine Urine Ratio; Future  -     TSH; Future  -     Vitamin B12 & Folate; Future  -     Vitamin D 25 Hydroxy; Future    Vitamin D deficiency   -     Hemoglobin A1c; Future  -     Basic Metabolic Panel; Future  -     Lipid Panel; Future  -     Microalbumin / Creatinine Urine Ratio; Future  -     TSH; Future  -     Vitamin B12 & Folate; Future  -     Vitamin D 25 Hydroxy; Future    Other hyperlipidemia  -     Hemoglobin A1c; Future  -     Basic Metabolic Panel; Future  -     Lipid Panel; Future  -     Microalbumin / Creatinine Urine Ratio; Future  -     TSH; Future  -     Vitamin B12 & Folate; Future  -     Vitamin D 25 Hydroxy; Future    Other orders  -     metoprolol tartrate (LOPRESSOR) 25 MG tablet; Take 1 tablet by mouth Every 12 (Twelve) Hours.  -     lisinopril (PRINIVIL,ZESTRIL) 10 MG tablet; Take 1 tablet by mouth Every Evening.    Type 2 diabetes mellitus-HbA1c has drastically improved.  Increase Lantus to 42 units twice daily  Continue NovoLog 15 units with each meal  Continue NovoLog sliding scale 3 times a day before meals  and at bedtime-2 units for 50 about 1 50 mg/dL.    Hyperlipidemia  Continue Lipitor 40 mg oral daily.    Discussed with the patient on the importance of glycemic control for the better healing of his knee joint after the surgery.    High blood pressure  Will stop Norvasc, decreased the dose of lisinopril and metoprolol.  Continue the current doses of imdur.  Discussed with the patient to discuss with Dr. Nieves if the above changes are okay, if not he needs to discuss with him about further changes.    13 minutes out of 25 minutes face to face spent in counseling the patient extensively on medication changes on insulin changes.

## 2017-08-31 LAB — BACTERIA SPEC AEROBE CULT: NO GROWTH

## 2017-09-01 RX ORDER — CEFAZOLIN SODIUM 2 G/100ML
2 INJECTION, SOLUTION INTRAVENOUS ONCE
Status: CANCELLED | OUTPATIENT
Start: 2017-09-05 | End: 2017-09-01

## 2017-09-05 ENCOUNTER — HOSPITAL ENCOUNTER (INPATIENT)
Facility: HOSPITAL | Age: 67
LOS: 3 days | Discharge: HOME HEALTH | End: 2017-09-08
Attending: ORTHOPAEDIC SURGERY | Admitting: ORTHOPAEDIC SURGERY

## 2017-09-05 ENCOUNTER — ANESTHESIA (OUTPATIENT)
Dept: PERIOP | Facility: HOSPITAL | Age: 67
End: 2017-09-05

## 2017-09-05 ENCOUNTER — APPOINTMENT (OUTPATIENT)
Dept: GENERAL RADIOLOGY | Facility: HOSPITAL | Age: 67
End: 2017-09-05

## 2017-09-05 ENCOUNTER — ANESTHESIA EVENT (OUTPATIENT)
Dept: PERIOP | Facility: HOSPITAL | Age: 67
End: 2017-09-05

## 2017-09-05 DIAGNOSIS — Z87.39 HISTORY OF INFECTION OF TOTAL JOINT PROSTHESIS OF KNEE: ICD-10-CM

## 2017-09-05 DIAGNOSIS — R26.2 DIFFICULTY WALKING: Primary | ICD-10-CM

## 2017-09-05 PROBLEM — M17.9 OSTEOARTHRITIS, KNEE: Status: ACTIVE | Noted: 2017-09-05

## 2017-09-05 LAB
GLUCOSE BLDC GLUCOMTR-MCNC: 212 MG/DL (ref 70–130)
GLUCOSE BLDC GLUCOMTR-MCNC: 217 MG/DL (ref 70–130)
GLUCOSE BLDC GLUCOMTR-MCNC: 237 MG/DL (ref 70–130)
GLUCOSE BLDC GLUCOMTR-MCNC: 256 MG/DL (ref 70–130)

## 2017-09-05 PROCEDURE — 25010000002 DEXAMETHASONE PER 1 MG: Performed by: NURSE ANESTHETIST, CERTIFIED REGISTERED

## 2017-09-05 PROCEDURE — 25010000003 CEFAZOLIN IN DEXTROSE 2-4 GM/100ML-% SOLUTION: Performed by: ORTHOPAEDIC SURGERY

## 2017-09-05 PROCEDURE — C1776 JOINT DEVICE (IMPLANTABLE): HCPCS | Performed by: ORTHOPAEDIC SURGERY

## 2017-09-05 PROCEDURE — L1830 KO IMMOB CANVAS LONG PRE OTS: HCPCS | Performed by: ORTHOPAEDIC SURGERY

## 2017-09-05 PROCEDURE — 25010000002 FENTANYL CITRATE (PF) 100 MCG/2ML SOLUTION: Performed by: NURSE ANESTHETIST, CERTIFIED REGISTERED

## 2017-09-05 PROCEDURE — 63710000001 INSULIN ASPART PER 5 UNITS: Performed by: HOSPITALIST

## 2017-09-05 PROCEDURE — 87205 SMEAR GRAM STAIN: CPT | Performed by: ORTHOPAEDIC SURGERY

## 2017-09-05 PROCEDURE — 63710000001 INSULIN ASPART PER 5 UNITS: Performed by: ORTHOPAEDIC SURGERY

## 2017-09-05 PROCEDURE — 25010000002 FENTANYL CITRATE (PF) 100 MCG/2ML SOLUTION: Performed by: ANESTHESIOLOGY

## 2017-09-05 PROCEDURE — 73560 X-RAY EXAM OF KNEE 1 OR 2: CPT

## 2017-09-05 PROCEDURE — 0SRC0J9 REPLACEMENT OF RIGHT KNEE JOINT WITH SYNTHETIC SUBSTITUTE, CEMENTED, OPEN APPROACH: ICD-10-PCS | Performed by: ORTHOPAEDIC SURGERY

## 2017-09-05 PROCEDURE — 88305 TISSUE EXAM BY PATHOLOGIST: CPT | Performed by: ORTHOPAEDIC SURGERY

## 2017-09-05 PROCEDURE — 63710000001 INSULIN DETEMER PER 5 UNITS: Performed by: ORTHOPAEDIC SURGERY

## 2017-09-05 PROCEDURE — 25010000002 DAPTOMYCIN PER 1 MG: Performed by: ORTHOPAEDIC SURGERY

## 2017-09-05 PROCEDURE — 82962 GLUCOSE BLOOD TEST: CPT

## 2017-09-05 PROCEDURE — 25010000002 ROPIVACAINE PER 1 MG: Performed by: ANESTHESIOLOGY

## 2017-09-05 PROCEDURE — C1713 ANCHOR/SCREW BN/BN,TIS/BN: HCPCS | Performed by: ORTHOPAEDIC SURGERY

## 2017-09-05 PROCEDURE — 25010000002 DIPHENHYDRAMINE PER 50 MG

## 2017-09-05 PROCEDURE — 25010000002 PROPOFOL 10 MG/ML EMULSION: Performed by: NURSE ANESTHETIST, CERTIFIED REGISTERED

## 2017-09-05 PROCEDURE — 88332 PATH CONSLTJ SURG EA ADD BLK: CPT | Performed by: ORTHOPAEDIC SURGERY

## 2017-09-05 PROCEDURE — 88331 PATH CONSLTJ SURG 1 BLK 1SPC: CPT | Performed by: ORTHOPAEDIC SURGERY

## 2017-09-05 PROCEDURE — 0LMQ0ZZ REATTACHMENT OF RIGHT KNEE TENDON, OPEN APPROACH: ICD-10-PCS | Performed by: ORTHOPAEDIC SURGERY

## 2017-09-05 PROCEDURE — 87176 TISSUE HOMOGENIZATION CULTR: CPT | Performed by: ORTHOPAEDIC SURGERY

## 2017-09-05 PROCEDURE — 25010000002 ONDANSETRON PER 1 MG: Performed by: NURSE ANESTHETIST, CERTIFIED REGISTERED

## 2017-09-05 PROCEDURE — 25010000002 HYDROMORPHONE PER 4 MG: Performed by: NURSE ANESTHETIST, CERTIFIED REGISTERED

## 2017-09-05 PROCEDURE — 87070 CULTURE OTHR SPECIMN AEROBIC: CPT | Performed by: ORTHOPAEDIC SURGERY

## 2017-09-05 PROCEDURE — 94799 UNLISTED PULMONARY SVC/PX: CPT

## 2017-09-05 PROCEDURE — 87075 CULTR BACTERIA EXCEPT BLOOD: CPT | Performed by: ORTHOPAEDIC SURGERY

## 2017-09-05 PROCEDURE — 25010000002 MIDAZOLAM PER 1 MG: Performed by: ANESTHESIOLOGY

## 2017-09-05 PROCEDURE — 0SPC08Z REMOVAL OF SPACER FROM RIGHT KNEE JOINT, OPEN APPROACH: ICD-10-PCS | Performed by: ORTHOPAEDIC SURGERY

## 2017-09-05 PROCEDURE — 25010000002 HYDROCORTISONE SODIUM SUCCINATE 100 MG RECONSTITUTED SOLUTION

## 2017-09-05 PROCEDURE — 25010000002 VANCOMYCIN: Performed by: ORTHOPAEDIC SURGERY

## 2017-09-05 PROCEDURE — 25010000002 NEOSTIGMINE PER 0.5 MG: Performed by: NURSE ANESTHETIST, CERTIFIED REGISTERED

## 2017-09-05 DEVICE — P.F.C. SIGMA FEMORAL ADAPTER 5 DEGREE
Type: IMPLANTABLE DEVICE | Site: KNEE | Status: FUNCTIONAL
Brand: P.F.C. SIGMA

## 2017-09-05 DEVICE — UNIVERSAL STEM FLUTED 115MM X 14MM: Type: IMPLANTABLE DEVICE | Site: KNEE | Status: FUNCTIONAL

## 2017-09-05 DEVICE — SUT/ANCH CORKSCREW/FT2  W/3NO2FW 5.5X16.3MM: Type: IMPLANTABLE DEVICE | Site: KNEE | Status: FUNCTIONAL

## 2017-09-05 DEVICE — M.B.T. REVISION METAPHYSEAL SLEEVE POROUS 53MM: Type: IMPLANTABLE DEVICE | Site: KNEE | Status: FUNCTIONAL

## 2017-09-05 DEVICE — SIGMA FEMORAL TC3 CEMENTED 4 RIGHT
Type: IMPLANTABLE DEVICE | Site: KNEE | Status: FUNCTIONAL
Brand: SIGMA

## 2017-09-05 DEVICE — P.F.C. SIGMA FEMORAL ADAPTER BOLT NEUTRAL
Type: IMPLANTABLE DEVICE | Site: KNEE | Status: FUNCTIONAL
Brand: P.F.C. SIGMA

## 2017-09-05 DEVICE — CMT BONE SIMPLEX/P TMYCIN FDOS SGL: Type: IMPLANTABLE DEVICE | Site: KNEE | Status: FUNCTIONAL

## 2017-09-05 DEVICE — UNIVERSAL FEMORAL SLEEVE DISTAL POROUS 40MM: Type: IMPLANTABLE DEVICE | Site: KNEE | Status: FUNCTIONAL

## 2017-09-05 DEVICE — TIBIAL TRAY ROTATING PLATFORM M.B.T. REVISION THICK TRAY SIZE 4 15MM CEMENTED: Type: IMPLANTABLE DEVICE | Site: KNEE | Status: FUNCTIONAL

## 2017-09-05 DEVICE — UNIVERSAL STEM FLUTED 75MM X 22MM: Type: IMPLANTABLE DEVICE | Site: KNEE | Status: FUNCTIONAL

## 2017-09-05 DEVICE — SIGMA TIBIAL INSERT ROTATING PLATFORM STABILIZED AOX SIZE 4 15MM
Type: IMPLANTABLE DEVICE | Site: KNEE | Status: FUNCTIONAL
Brand: SIGMA AOX

## 2017-09-05 RX ORDER — SODIUM CHLORIDE 0.9 % (FLUSH) 0.9 %
1-10 SYRINGE (ML) INJECTION AS NEEDED
Status: DISCONTINUED | OUTPATIENT
Start: 2017-09-05 | End: 2017-09-05 | Stop reason: HOSPADM

## 2017-09-05 RX ORDER — PROMETHAZINE HYDROCHLORIDE 25 MG/ML
12.5 INJECTION, SOLUTION INTRAMUSCULAR; INTRAVENOUS ONCE AS NEEDED
Status: DISCONTINUED | OUTPATIENT
Start: 2017-09-05 | End: 2017-09-05 | Stop reason: HOSPADM

## 2017-09-05 RX ORDER — TRANEXAMIC ACID 100 MG/ML
INJECTION, SOLUTION INTRAVENOUS AS NEEDED
Status: DISCONTINUED | OUTPATIENT
Start: 2017-09-05 | End: 2017-09-05 | Stop reason: SURG

## 2017-09-05 RX ORDER — SODIUM CHLORIDE 9 MG/ML
100 INJECTION, SOLUTION INTRAVENOUS CONTINUOUS
Status: ACTIVE | OUTPATIENT
Start: 2017-09-05 | End: 2017-09-06

## 2017-09-05 RX ORDER — EPHEDRINE SULFATE 50 MG/ML
INJECTION, SOLUTION INTRAVENOUS AS NEEDED
Status: DISCONTINUED | OUTPATIENT
Start: 2017-09-05 | End: 2017-09-05 | Stop reason: SURG

## 2017-09-05 RX ORDER — PROPOFOL 10 MG/ML
VIAL (ML) INTRAVENOUS AS NEEDED
Status: DISCONTINUED | OUTPATIENT
Start: 2017-09-05 | End: 2017-09-05 | Stop reason: SURG

## 2017-09-05 RX ORDER — FLUMAZENIL 0.1 MG/ML
0.2 INJECTION INTRAVENOUS AS NEEDED
Status: DISCONTINUED | OUTPATIENT
Start: 2017-09-05 | End: 2017-09-05 | Stop reason: HOSPADM

## 2017-09-05 RX ORDER — ROPIVACAINE HYDROCHLORIDE 5 MG/ML
INJECTION, SOLUTION EPIDURAL; INFILTRATION; PERINEURAL AS NEEDED
Status: DISCONTINUED | OUTPATIENT
Start: 2017-09-05 | End: 2017-09-05 | Stop reason: SURG

## 2017-09-05 RX ORDER — HYDROMORPHONE HYDROCHLORIDE 1 MG/ML
0.5 INJECTION, SOLUTION INTRAMUSCULAR; INTRAVENOUS; SUBCUTANEOUS
Status: DISCONTINUED | OUTPATIENT
Start: 2017-09-05 | End: 2017-09-05 | Stop reason: HOSPADM

## 2017-09-05 RX ORDER — ASPIRIN 325 MG
325 TABLET, DELAYED RELEASE (ENTERIC COATED) ORAL 2 TIMES DAILY
Status: DISCONTINUED | OUTPATIENT
Start: 2017-09-05 | End: 2017-09-08 | Stop reason: HOSPADM

## 2017-09-05 RX ORDER — DOCUSATE SODIUM 100 MG/1
100 CAPSULE, LIQUID FILLED ORAL 2 TIMES DAILY
Status: COMPLETED | OUTPATIENT
Start: 2017-09-05 | End: 2017-09-08

## 2017-09-05 RX ORDER — MIDAZOLAM HYDROCHLORIDE 1 MG/ML
1 INJECTION INTRAMUSCULAR; INTRAVENOUS
Status: DISCONTINUED | OUTPATIENT
Start: 2017-09-05 | End: 2017-09-05 | Stop reason: HOSPADM

## 2017-09-05 RX ORDER — CEFAZOLIN SODIUM 2 G/100ML
2 INJECTION, SOLUTION INTRAVENOUS ONCE
Status: COMPLETED | OUTPATIENT
Start: 2017-09-05 | End: 2017-09-05

## 2017-09-05 RX ORDER — HYDROCODONE BITARTRATE AND ACETAMINOPHEN 7.5; 325 MG/1; MG/1
1 TABLET ORAL ONCE AS NEEDED
Status: DISCONTINUED | OUTPATIENT
Start: 2017-09-05 | End: 2017-09-05 | Stop reason: HOSPADM

## 2017-09-05 RX ORDER — SERTRALINE HYDROCHLORIDE 100 MG/1
100 TABLET, FILM COATED ORAL DAILY
Status: DISCONTINUED | OUTPATIENT
Start: 2017-09-05 | End: 2017-09-08 | Stop reason: HOSPADM

## 2017-09-05 RX ORDER — BISACODYL 10 MG
10 SUPPOSITORY, RECTAL RECTAL DAILY PRN
Status: DISCONTINUED | OUTPATIENT
Start: 2017-09-05 | End: 2017-09-08 | Stop reason: HOSPADM

## 2017-09-05 RX ORDER — OXYCODONE HCL 10 MG/1
10 TABLET, FILM COATED, EXTENDED RELEASE ORAL ONCE
Status: COMPLETED | OUTPATIENT
Start: 2017-09-05 | End: 2017-09-05

## 2017-09-05 RX ORDER — PROMETHAZINE HYDROCHLORIDE 25 MG/1
25 TABLET ORAL EVERY 8 HOURS PRN
Status: DISCONTINUED | OUTPATIENT
Start: 2017-09-05 | End: 2017-09-08 | Stop reason: HOSPADM

## 2017-09-05 RX ORDER — LISINOPRIL 10 MG/1
10 TABLET ORAL NIGHTLY
Status: DISCONTINUED | OUTPATIENT
Start: 2017-09-05 | End: 2017-09-07

## 2017-09-05 RX ORDER — SODIUM CHLORIDE, SODIUM LACTATE, POTASSIUM CHLORIDE, CALCIUM CHLORIDE 600; 310; 30; 20 MG/100ML; MG/100ML; MG/100ML; MG/100ML
9 INJECTION, SOLUTION INTRAVENOUS CONTINUOUS
Status: DISCONTINUED | OUTPATIENT
Start: 2017-09-05 | End: 2017-09-05 | Stop reason: HOSPADM

## 2017-09-05 RX ORDER — FENTANYL CITRATE 50 UG/ML
50 INJECTION, SOLUTION INTRAMUSCULAR; INTRAVENOUS
Status: DISCONTINUED | OUTPATIENT
Start: 2017-09-05 | End: 2017-09-05 | Stop reason: HOSPADM

## 2017-09-05 RX ORDER — PREGABALIN 100 MG/1
100 CAPSULE ORAL 3 TIMES DAILY
COMMUNITY
End: 2018-07-05 | Stop reason: ALTCHOICE

## 2017-09-05 RX ORDER — MAGNESIUM HYDROXIDE 1200 MG/15ML
LIQUID ORAL AS NEEDED
Status: DISCONTINUED | OUTPATIENT
Start: 2017-09-05 | End: 2017-09-05 | Stop reason: HOSPADM

## 2017-09-05 RX ORDER — PROMETHAZINE HYDROCHLORIDE 25 MG/1
25 TABLET ORAL ONCE AS NEEDED
Status: DISCONTINUED | OUTPATIENT
Start: 2017-09-05 | End: 2017-09-05 | Stop reason: HOSPADM

## 2017-09-05 RX ORDER — OXYCODONE AND ACETAMINOPHEN 10; 325 MG/1; MG/1
2 TABLET ORAL EVERY 4 HOURS PRN
Status: DISCONTINUED | OUTPATIENT
Start: 2017-09-05 | End: 2017-09-08 | Stop reason: HOSPADM

## 2017-09-05 RX ORDER — DIPHENHYDRAMINE HYDROCHLORIDE 50 MG/ML
25 INJECTION INTRAMUSCULAR; INTRAVENOUS ONCE
Status: COMPLETED | OUTPATIENT
Start: 2017-09-05 | End: 2017-09-05

## 2017-09-05 RX ORDER — LABETALOL HYDROCHLORIDE 5 MG/ML
5 INJECTION, SOLUTION INTRAVENOUS
Status: DISCONTINUED | OUTPATIENT
Start: 2017-09-05 | End: 2017-09-05 | Stop reason: HOSPADM

## 2017-09-05 RX ORDER — ISOSORBIDE MONONITRATE 30 MG/1
30 TABLET, EXTENDED RELEASE ORAL
Status: DISCONTINUED | OUTPATIENT
Start: 2017-09-06 | End: 2017-09-08 | Stop reason: HOSPADM

## 2017-09-05 RX ORDER — ONDANSETRON 2 MG/ML
INJECTION INTRAMUSCULAR; INTRAVENOUS AS NEEDED
Status: DISCONTINUED | OUTPATIENT
Start: 2017-09-05 | End: 2017-09-05 | Stop reason: SURG

## 2017-09-05 RX ORDER — DEXAMETHASONE SODIUM PHOSPHATE 10 MG/ML
INJECTION INTRAMUSCULAR; INTRAVENOUS AS NEEDED
Status: DISCONTINUED | OUTPATIENT
Start: 2017-09-05 | End: 2017-09-05 | Stop reason: SURG

## 2017-09-05 RX ORDER — PANTOPRAZOLE SODIUM 40 MG/1
40 TABLET, DELAYED RELEASE ORAL
Status: DISCONTINUED | OUTPATIENT
Start: 2017-09-06 | End: 2017-09-08 | Stop reason: HOSPADM

## 2017-09-05 RX ORDER — PROMETHAZINE HYDROCHLORIDE 25 MG/1
25 SUPPOSITORY RECTAL ONCE AS NEEDED
Status: DISCONTINUED | OUTPATIENT
Start: 2017-09-05 | End: 2017-09-05 | Stop reason: HOSPADM

## 2017-09-05 RX ORDER — FAMOTIDINE 10 MG/ML
20 INJECTION, SOLUTION INTRAVENOUS ONCE
Status: COMPLETED | OUTPATIENT
Start: 2017-09-05 | End: 2017-09-05

## 2017-09-05 RX ORDER — ROCURONIUM BROMIDE 10 MG/ML
INJECTION, SOLUTION INTRAVENOUS AS NEEDED
Status: DISCONTINUED | OUTPATIENT
Start: 2017-09-05 | End: 2017-09-05 | Stop reason: SURG

## 2017-09-05 RX ORDER — HYDROMORPHONE HCL 110MG/55ML
PATIENT CONTROLLED ANALGESIA SYRINGE INTRAVENOUS AS NEEDED
Status: DISCONTINUED | OUTPATIENT
Start: 2017-09-05 | End: 2017-09-05 | Stop reason: SURG

## 2017-09-05 RX ORDER — DIPHENHYDRAMINE HYDROCHLORIDE 50 MG/ML
12.5 INJECTION INTRAMUSCULAR; INTRAVENOUS
Status: DISCONTINUED | OUTPATIENT
Start: 2017-09-05 | End: 2017-09-05 | Stop reason: HOSPADM

## 2017-09-05 RX ORDER — PREGABALIN 50 MG/1
100 CAPSULE ORAL DAILY
Status: DISCONTINUED | OUTPATIENT
Start: 2017-09-05 | End: 2017-09-08 | Stop reason: HOSPADM

## 2017-09-05 RX ORDER — NICOTINE POLACRILEX 4 MG
15 LOZENGE BUCCAL
Status: DISCONTINUED | OUTPATIENT
Start: 2017-09-05 | End: 2017-09-08 | Stop reason: HOSPADM

## 2017-09-05 RX ORDER — NALOXONE HCL 0.4 MG/ML
0.2 VIAL (ML) INJECTION AS NEEDED
Status: DISCONTINUED | OUTPATIENT
Start: 2017-09-05 | End: 2017-09-05 | Stop reason: HOSPADM

## 2017-09-05 RX ORDER — HYDRALAZINE HYDROCHLORIDE 20 MG/ML
5 INJECTION INTRAMUSCULAR; INTRAVENOUS
Status: DISCONTINUED | OUTPATIENT
Start: 2017-09-05 | End: 2017-09-05 | Stop reason: HOSPADM

## 2017-09-05 RX ORDER — FERROUS SULFATE 325(65) MG
325 TABLET ORAL
Status: DISCONTINUED | OUTPATIENT
Start: 2017-09-05 | End: 2017-09-08 | Stop reason: HOSPADM

## 2017-09-05 RX ORDER — ONDANSETRON 2 MG/ML
4 INJECTION INTRAMUSCULAR; INTRAVENOUS EVERY 6 HOURS PRN
Status: DISCONTINUED | OUTPATIENT
Start: 2017-09-05 | End: 2017-09-08 | Stop reason: HOSPADM

## 2017-09-05 RX ORDER — GLYCOPYRROLATE 0.2 MG/ML
INJECTION INTRAMUSCULAR; INTRAVENOUS AS NEEDED
Status: DISCONTINUED | OUTPATIENT
Start: 2017-09-05 | End: 2017-09-05 | Stop reason: SURG

## 2017-09-05 RX ORDER — ACETAMINOPHEN 325 MG/1
325 TABLET ORAL EVERY 4 HOURS PRN
Status: DISCONTINUED | OUTPATIENT
Start: 2017-09-05 | End: 2017-09-08 | Stop reason: HOSPADM

## 2017-09-05 RX ORDER — BACLOFEN 10 MG/1
10 TABLET ORAL 3 TIMES DAILY
Status: DISCONTINUED | OUTPATIENT
Start: 2017-09-05 | End: 2017-09-08 | Stop reason: HOSPADM

## 2017-09-05 RX ORDER — LIDOCAINE HYDROCHLORIDE 20 MG/ML
INJECTION, SOLUTION INFILTRATION; PERINEURAL AS NEEDED
Status: DISCONTINUED | OUTPATIENT
Start: 2017-09-05 | End: 2017-09-05 | Stop reason: SURG

## 2017-09-05 RX ORDER — MIDAZOLAM HYDROCHLORIDE 1 MG/ML
2 INJECTION INTRAMUSCULAR; INTRAVENOUS
Status: DISCONTINUED | OUTPATIENT
Start: 2017-09-05 | End: 2017-09-05 | Stop reason: HOSPADM

## 2017-09-05 RX ORDER — HYDROCODONE BITARTRATE AND ACETAMINOPHEN 7.5; 325 MG/1; MG/1
1 TABLET ORAL EVERY 4 HOURS PRN
Status: DISCONTINUED | OUTPATIENT
Start: 2017-09-05 | End: 2017-09-05

## 2017-09-05 RX ORDER — EPHEDRINE SULFATE 50 MG/ML
5 INJECTION, SOLUTION INTRAVENOUS ONCE AS NEEDED
Status: DISCONTINUED | OUTPATIENT
Start: 2017-09-05 | End: 2017-09-05 | Stop reason: HOSPADM

## 2017-09-05 RX ORDER — PROMETHAZINE HYDROCHLORIDE 25 MG/1
12.5 TABLET ORAL ONCE AS NEEDED
Status: DISCONTINUED | OUTPATIENT
Start: 2017-09-05 | End: 2017-09-05 | Stop reason: HOSPADM

## 2017-09-05 RX ORDER — OXYCODONE AND ACETAMINOPHEN 10; 325 MG/1; MG/1
1 TABLET ORAL EVERY 4 HOURS PRN
Status: DISCONTINUED | OUTPATIENT
Start: 2017-09-05 | End: 2017-09-08 | Stop reason: HOSPADM

## 2017-09-05 RX ORDER — ONDANSETRON 2 MG/ML
4 INJECTION INTRAMUSCULAR; INTRAVENOUS ONCE AS NEEDED
Status: DISCONTINUED | OUTPATIENT
Start: 2017-09-05 | End: 2017-09-05 | Stop reason: HOSPADM

## 2017-09-05 RX ORDER — DEXTROSE MONOHYDRATE 25 G/50ML
25 INJECTION, SOLUTION INTRAVENOUS
Status: DISCONTINUED | OUTPATIENT
Start: 2017-09-05 | End: 2017-09-08 | Stop reason: HOSPADM

## 2017-09-05 RX ORDER — OXYCODONE AND ACETAMINOPHEN 7.5; 325 MG/1; MG/1
1 TABLET ORAL ONCE AS NEEDED
Status: DISCONTINUED | OUTPATIENT
Start: 2017-09-05 | End: 2017-09-05 | Stop reason: HOSPADM

## 2017-09-05 RX ORDER — NALOXONE HCL 0.4 MG/ML
0.4 VIAL (ML) INJECTION
Status: DISCONTINUED | OUTPATIENT
Start: 2017-09-05 | End: 2017-09-08 | Stop reason: HOSPADM

## 2017-09-05 RX ORDER — CEFAZOLIN SODIUM 2 G/100ML
2 INJECTION, SOLUTION INTRAVENOUS EVERY 8 HOURS
Status: COMPLETED | OUTPATIENT
Start: 2017-09-05 | End: 2017-09-07

## 2017-09-05 RX ORDER — FENTANYL CITRATE 50 UG/ML
INJECTION, SOLUTION INTRAMUSCULAR; INTRAVENOUS AS NEEDED
Status: DISCONTINUED | OUTPATIENT
Start: 2017-09-05 | End: 2017-09-05 | Stop reason: SURG

## 2017-09-05 RX ORDER — DIPHENHYDRAMINE HYDROCHLORIDE 50 MG/ML
INJECTION INTRAMUSCULAR; INTRAVENOUS
Status: COMPLETED
Start: 2017-09-05 | End: 2017-09-05

## 2017-09-05 RX ADMIN — MIDAZOLAM 1 MG: 1 INJECTION INTRAMUSCULAR; INTRAVENOUS at 10:50

## 2017-09-05 RX ADMIN — FENTANYL CITRATE 50 MCG: 50 INJECTION INTRAMUSCULAR; INTRAVENOUS at 11:28

## 2017-09-05 RX ADMIN — ROPIVACAINE HYDROCHLORIDE 25 ML: 5 INJECTION, SOLUTION EPIDURAL; INFILTRATION; PERINEURAL at 11:32

## 2017-09-05 RX ADMIN — GLYCOPYRROLATE 0.4 MG: 0.2 INJECTION INTRAMUSCULAR; INTRAVENOUS at 13:45

## 2017-09-05 RX ADMIN — SODIUM CHLORIDE 100 ML/HR: 9 INJECTION, SOLUTION INTRAVENOUS at 16:09

## 2017-09-05 RX ADMIN — GLYCOPYRROLATE 0.2 MG: 0.2 INJECTION INTRAMUSCULAR; INTRAVENOUS at 12:15

## 2017-09-05 RX ADMIN — EPHEDRINE SULFATE 10 MG: 50 INJECTION INTRAMUSCULAR; INTRAVENOUS; SUBCUTANEOUS at 13:00

## 2017-09-05 RX ADMIN — INSULIN ASPART 5 UNITS: 100 INJECTION, SOLUTION INTRAVENOUS; SUBCUTANEOUS at 23:10

## 2017-09-05 RX ADMIN — EPHEDRINE SULFATE 10 MG: 50 INJECTION INTRAMUSCULAR; INTRAVENOUS; SUBCUTANEOUS at 12:15

## 2017-09-05 RX ADMIN — DEXAMETHASONE SODIUM PHOSPHATE 8 MG: 10 INJECTION INTRAMUSCULAR; INTRAVENOUS at 12:07

## 2017-09-05 RX ADMIN — PROPOFOL 180 MG: 10 INJECTION, EMULSION INTRAVENOUS at 11:53

## 2017-09-05 RX ADMIN — OXYCODONE HYDROCHLORIDE 10 MG: 10 TABLET, FILM COATED, EXTENDED RELEASE ORAL at 10:07

## 2017-09-05 RX ADMIN — SODIUM CHLORIDE, POTASSIUM CHLORIDE, SODIUM LACTATE AND CALCIUM CHLORIDE: 600; 310; 30; 20 INJECTION, SOLUTION INTRAVENOUS at 12:40

## 2017-09-05 RX ADMIN — HYDROCORTISONE SODIUM SUCCINATE 100 MG: 100 INJECTION, POWDER, FOR SOLUTION INTRAMUSCULAR; INTRAVENOUS at 10:48

## 2017-09-05 RX ADMIN — HYDROMORPHONE HYDROCHLORIDE 0.5 MG: 2 INJECTION, SOLUTION INTRAMUSCULAR; INTRAVENOUS; SUBCUTANEOUS at 12:26

## 2017-09-05 RX ADMIN — SODIUM CHLORIDE, POTASSIUM CHLORIDE, SODIUM LACTATE AND CALCIUM CHLORIDE: 600; 310; 30; 20 INJECTION, SOLUTION INTRAVENOUS at 11:41

## 2017-09-05 RX ADMIN — ASPIRIN 325 MG: 325 TABLET, DELAYED RELEASE ORAL at 18:44

## 2017-09-05 RX ADMIN — LISINOPRIL 10 MG: 10 TABLET ORAL at 21:09

## 2017-09-05 RX ADMIN — FAMOTIDINE 20 MG: 10 INJECTION, SOLUTION INTRAVENOUS at 10:49

## 2017-09-05 RX ADMIN — PREGABALIN 200 MG: 75 CAPSULE ORAL at 21:09

## 2017-09-05 RX ADMIN — FENTANYL CITRATE 50 MCG: 50 INJECTION INTRAMUSCULAR; INTRAVENOUS at 12:30

## 2017-09-05 RX ADMIN — EPHEDRINE SULFATE 10 MG: 50 INJECTION INTRAMUSCULAR; INTRAVENOUS; SUBCUTANEOUS at 13:30

## 2017-09-05 RX ADMIN — FERROUS SULFATE TAB 325 MG (65 MG ELEMENTAL FE) 325 MG: 325 (65 FE) TAB at 17:53

## 2017-09-05 RX ADMIN — DIPHENHYDRAMINE HYDROCHLORIDE 25 MG: 50 INJECTION INTRAMUSCULAR; INTRAVENOUS at 10:48

## 2017-09-05 RX ADMIN — SODIUM CHLORIDE, POTASSIUM CHLORIDE, SODIUM LACTATE AND CALCIUM CHLORIDE 9 ML/HR: 600; 310; 30; 20 INJECTION, SOLUTION INTRAVENOUS at 11:18

## 2017-09-05 RX ADMIN — FENTANYL CITRATE 50 MCG: 50 INJECTION INTRAMUSCULAR; INTRAVENOUS at 11:53

## 2017-09-05 RX ADMIN — INSULIN ASPART 14 UNITS: 100 INJECTION, SOLUTION INTRAVENOUS; SUBCUTANEOUS at 17:53

## 2017-09-05 RX ADMIN — HYDROMORPHONE HYDROCHLORIDE 0.5 MG: 2 INJECTION, SOLUTION INTRAMUSCULAR; INTRAVENOUS; SUBCUTANEOUS at 12:34

## 2017-09-05 RX ADMIN — INSULIN DETEMIR 20 UNITS: 100 INJECTION, SOLUTION SUBCUTANEOUS at 23:11

## 2017-09-05 RX ADMIN — TRANEXAMIC ACID 1000 MG: 100 INJECTION, SOLUTION INTRAVENOUS at 13:46

## 2017-09-05 RX ADMIN — OXYCODONE AND ACETAMINOPHEN 2 TABLET: 10; 325 TABLET ORAL at 18:44

## 2017-09-05 RX ADMIN — OXYCODONE AND ACETAMINOPHEN 2 TABLET: 10; 325 TABLET ORAL at 23:03

## 2017-09-05 RX ADMIN — CEFAZOLIN SODIUM 2 G: 2 INJECTION, SOLUTION INTRAVENOUS at 11:48

## 2017-09-05 RX ADMIN — DAPTOMYCIN 670 MG: 500 INJECTION, POWDER, LYOPHILIZED, FOR SOLUTION INTRAVENOUS at 11:41

## 2017-09-05 RX ADMIN — FENTANYL CITRATE 50 MCG: 50 INJECTION INTRAMUSCULAR; INTRAVENOUS at 13:10

## 2017-09-05 RX ADMIN — METOPROLOL TARTRATE 25 MG: 25 TABLET ORAL at 21:09

## 2017-09-05 RX ADMIN — EPHEDRINE SULFATE 10 MG: 50 INJECTION INTRAMUSCULAR; INTRAVENOUS; SUBCUTANEOUS at 14:20

## 2017-09-05 RX ADMIN — NEOSTIGMINE METHYLSULFATE 3.5 MG: 1 INJECTION INTRAMUSCULAR; INTRAVENOUS; SUBCUTANEOUS at 13:45

## 2017-09-05 RX ADMIN — ROCURONIUM BROMIDE 35 MG: 10 INJECTION INTRAVENOUS at 11:53

## 2017-09-05 RX ADMIN — CEFAZOLIN SODIUM 2 G: 2 INJECTION, SOLUTION INTRAVENOUS at 21:09

## 2017-09-05 RX ADMIN — MIDAZOLAM 1 MG: 1 INJECTION INTRAMUSCULAR; INTRAVENOUS at 11:28

## 2017-09-05 RX ADMIN — SERTRALINE 100 MG: 100 TABLET, FILM COATED ORAL at 17:53

## 2017-09-05 RX ADMIN — ROPIVACAINE HYDROCHLORIDE 25 ML: 5 INJECTION, SOLUTION EPIDURAL; INFILTRATION; PERINEURAL at 11:25

## 2017-09-05 RX ADMIN — MIDAZOLAM 1 MG: 1 INJECTION INTRAMUSCULAR; INTRAVENOUS at 11:20

## 2017-09-05 RX ADMIN — DOCUSATE SODIUM 100 MG: 100 CAPSULE, LIQUID FILLED ORAL at 17:53

## 2017-09-05 RX ADMIN — FENTANYL CITRATE 50 MCG: 50 INJECTION INTRAMUSCULAR; INTRAVENOUS at 15:52

## 2017-09-05 RX ADMIN — FENTANYL CITRATE 50 MCG: 50 INJECTION INTRAMUSCULAR; INTRAVENOUS at 11:21

## 2017-09-05 RX ADMIN — HYDROMORPHONE HYDROCHLORIDE 0.5 MG: 2 INJECTION, SOLUTION INTRAMUSCULAR; INTRAVENOUS; SUBCUTANEOUS at 12:50

## 2017-09-05 RX ADMIN — ONDANSETRON 4 MG: 2 INJECTION INTRAMUSCULAR; INTRAVENOUS at 12:07

## 2017-09-05 RX ADMIN — LIDOCAINE HYDROCHLORIDE 50 MG: 20 INJECTION, SOLUTION INFILTRATION; PERINEURAL at 11:53

## 2017-09-05 RX ADMIN — BACLOFEN 10 MG: 10 TABLET ORAL at 21:09

## 2017-09-05 NOTE — PERIOPERATIVE NURSING NOTE
"PER DR. VICTORIA'S ORDER, VANCOMYCIN IV STARTED AT 1020. AT 1040, PATIENT STARTED C/O OF \"A HOT FEELING\" IN CHEST LOWER EXTREMITIES AND STARTED TO BECOME RED IN THE FACE. VANCOMYCIN STOPPED AND DR. GUERRA CALLED. VANCOMYCIN DC'D. DR. CHEN NOTIFIED. PT MEDICATED WITH 100MG SOLU-CORTEF AND 25MG BENADRYL. AFTER A FEW MINUTES, PATIENTS SYMPTOMS SUBSIDED.   "

## 2017-09-05 NOTE — BRIEF OP NOTE
TOTAL KNEE ARTHROPLASTY REVISION  Procedure Note    Hugh R McBurney  9/5/2017    Pre-op Diagnosis:   Infection of prosthetic right knee joint    Post-op Diagnosis:     Post-Op Diagnosis Codes:     * 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]    Procedure/CPT® Codes:      Procedure(s):  REMOVAL OF ANTIBIOTIC CEMENT SPACER; RIGHT TOTAL KNEE ARTHROPLASTY REVISION    Surgeon(s):  Kiko Marie MD    Anesthesia: General    Staff:   Circulator: Yolanda Garsia RN; Vesna Rdz RN  Scrub Person: Bishop Bucio; Kevin Rodriguez  Vendor Representative: Stanley Henriquez  Assistant: Ekaterina Fong    Estimated Blood Loss: 200 mL  Urine Voided: * No values recorded between 9/5/2017 11:41 AM and 9/5/2017  2:02 PM *    Specimens:                  ID Type Source Tests Collected by Time Destination   1 :  Tissue Knee, Right ANAEROBIC CULTURE, TISSUE/BONE CULTURE Kiko Marie MD 9/5/2017 1237    A : Right knee tissue; Room 12 #8898 Tissue Knee, Right TISSUE EXAM Kiko Marie MD 9/5/2017 1223          Drains:   Drain/Device Site 09/05/17 1357 Right knee collapsible closed device (Active)           Findings: see dict     Complications: nil      Kiko Marie MD     Date: 9/5/2017  Time: 2:02 PM

## 2017-09-05 NOTE — NURSING NOTE
DR. GUERRA AT BEDSIDE. VANCOMYCIN ORDERED TO START IN PREOP. INFORMED DR. GUERRA OF PTS PREVIOUS REACTION TO VANCOMYCIN. OKAY FOR PT TO RECEIVE VANCOMYCIN, BUT START AT A SLOWER RATE.

## 2017-09-05 NOTE — ANESTHESIA POSTPROCEDURE EVALUATION
"Patient: Hugh R McBurney    Procedure Summary     Date Anesthesia Start Anesthesia Stop Room / Location    09/05/17 [id number]  [facility] / [facility]       Procedure Diagnosis Surgeon Provider    REMOVAL OF ANTIBIOTIC CEMENT SPACER; RIGHT TOTAL KNEE ARTHROPLASTY REVISION (Right Knee) Infection of prosthetic right knee joint MD Henrry Solis MD          Anesthesia Type: general  Last vitals  BP        Temp        Pulse       Resp        SpO2          Post Anesthesia Care and Evaluation    Patient location during evaluation: PACU  Patient participation: complete - patient participated  Level of consciousness: awake and alert  Pain management: adequate  Airway patency: patent  Anesthetic complications: No anesthetic complications    Cardiovascular status: acceptable  Respiratory status: acceptable  Hydration status: acceptable    Comments: /82  Pulse 89  Temp 36.6 °C (97.9 °F) (Oral)   Resp 12  Ht 74\" (188 cm)  Wt 246 lb (112 kg)  SpO2 98%  BMI 31.58 kg/m2        "

## 2017-09-05 NOTE — ANESTHESIA PREPROCEDURE EVALUATION
Anesthesia Evaluation     Patient summary reviewed and Nursing notes reviewed   NPO Solid Status: > 8 hours  NPO Liquid Status: > 8 hours     Airway   Mallampati: II  Neck ROM: full  no difficulty expected  Dental    (+) upper dentures and lower dentures    Pulmonary     breath sounds clear to auscultation  (+) a smoker Former, sleep apnea,   Cardiovascular     Rhythm: regular    (+) hypertension, past MI , CAD, PVD, hyperlipidemia      Neuro/Psych  (+) psychiatric history Depression,    GI/Hepatic/Renal/Endo    (+) obesity,  GERD, diabetes mellitus,     Musculoskeletal     Abdominal   (+) obese,    Substance History      OB/GYN          Other   (+) arthritis                                     Anesthesia Plan    ASA 3     general   (Adductor canal)  intravenous induction   Anesthetic plan and risks discussed with patient.

## 2017-09-05 NOTE — PLAN OF CARE
Problem: Fall Risk (Adult)  Goal: Identify Related Risk Factors and Signs and Symptoms  Outcome: Outcome(s) achieved Date Met:  09/05/17

## 2017-09-05 NOTE — PLAN OF CARE
Problem: Patient Care Overview (Adult)  Goal: Plan of Care Review  Outcome: Ongoing (interventions implemented as appropriate)    09/05/17 1614   Coping/Psychosocial Response Interventions   Plan Of Care Reviewed With patient   Patient Care Overview   Progress improving   Outcome Evaluation   Outcome Summary/Follow up Plan VSS, Numbness and tingling baseline in right foot, no movement yet in the right foot due to block, cap refil brisk, dressing CDI, 100 cc emptied from hemovac, pain level 4after pain meds given, family updated- pt ready to transport to floor         Problem: Perioperative Period (Adult)  Goal: Signs and Symptoms of Listed Potential Problems Will be Absent or Manageable (Perioperative Period)  Outcome: Ongoing (interventions implemented as appropriate)

## 2017-09-05 NOTE — PLAN OF CARE
Problem: Patient Care Overview (Adult)  Goal: Plan of Care Review  Outcome: Ongoing (interventions implemented as appropriate)    09/05/17 3935   Coping/Psychosocial Response Interventions   Plan Of Care Reviewed With patient   Patient Care Overview   Progress improving   Outcome Evaluation   Outcome Summary/Follow up Plan pain tolerable on PO pain meds, vital signs stable, educated pt on blood pressure monitoring at home r/t htn, pt verblized understanding.         Problem: Perioperative Period (Adult)  Goal: Signs and Symptoms of Listed Potential Problems Will be Absent or Manageable (Perioperative Period)  Outcome: Ongoing (interventions implemented as appropriate)    Problem: Fall Risk (Adult)  Goal: Absence of Falls  Outcome: Ongoing (interventions implemented as appropriate)    Problem: Knee Replacement, Total (Adult)  Goal: Signs and Symptoms of Listed Potential Problems Will be Absent or Manageable (Knee Replacement, Total)  Outcome: Ongoing (interventions implemented as appropriate)

## 2017-09-05 NOTE — ANESTHESIA PROCEDURE NOTES
Airway  Urgency: elective    Date/Time: 9/5/2017 11:53 AM  End Time:9/5/2017 11:58 AM  Airway not difficult    General Information and Staff    Patient location during procedure: OR  Anesthesiologist: YAN GROSS  CRNA: JODEE YU    Indications and Patient Condition  Indications for airway management: airway protection    Preoxygenated: yes  MILS maintained throughout  Mask difficulty assessment: 1 - vent by mask    Final Airway Details  Final airway type: endotracheal airway      Successful airway: ETT  Cuffed: yes   Successful intubation technique: direct laryngoscopy  Facilitating devices/methods: intubating stylet and cricoid pressure  Endotracheal tube insertion site: oral  Blade: Rosa  Blade size: #3  ETT size: 8.0 mm  Cormack-Lehane Classification: grade I - full view of glottis  Placement verified by: chest auscultation and capnometry   Cuff volume (mL): 7  Measured from: lips  ETT to lips (cm): 23  Number of attempts at approach: 1    Additional Comments  PreO2 X 5 mins. SIVI. Atraumatic Intubation. BBS= , teeth intact as per preop exam

## 2017-09-05 NOTE — PLAN OF CARE
"Problem: Patient Care Overview (Adult)  Goal: Plan of Care Review  Outcome: Ongoing (interventions implemented as appropriate)    09/05/17 1004   Coping/Psychosocial Response Interventions   Plan Of Care Reviewed With patient   Patient Care Overview   Progress no change       Goal: Adult Individualization and Mutuality  Outcome: Ongoing (interventions implemented as appropriate)    09/05/17 1004   Individualization   Patient Specific Preferences GOES BY TONE   Patient Specific Goals \"GET A KNEE IN AND BE ABLE TO WALK ON TWO FEET.\"   Mutuality/Individual Preferences   What Anxieties, Fears or Concerns Do You Have About Your Health or Care? PT DENIES       Goal: Discharge Needs Assessment  Outcome: Ongoing (interventions implemented as appropriate)    09/05/17 1004   Discharge Needs Assessment   Concerns To Be Addressed denies needs/concerns at this time         Problem: Perioperative Period (Adult)  Goal: Signs and Symptoms of Listed Potential Problems Will be Absent or Manageable (Perioperative Period)  Outcome: Ongoing (interventions implemented as appropriate)    09/05/17 1004   Perioperative Period   Problems Assessed (Perioperative Period) pain;hypoxia/hypoxemia   Problems Present (Perioperative Period) pain           "

## 2017-09-05 NOTE — ANESTHESIA PROCEDURE NOTES
Peripheral Block    Patient location during procedure: holding area  Start time: 9/5/2017 11:20 AM  Stop time: 9/5/2017 11:26 AM  Reason for block: at surgeon's request and post-op pain management  Performed by  Anesthesiologist: YAN GROSS  Preanesthetic Checklist  Completed: patient identified, site marked, surgical consent, pre-op evaluation, timeout performed, IV checked, risks and benefits discussed and monitors and equipment checked  Prep:  Pt Position: supine  Sterile barriers:cap, gloves and mask  Prep: ChloraPrep  Patient monitoring: blood pressure monitoring, continuous pulse oximetry and EKG  Procedure  Sedation:yes  Performed under: PNB  Guidance:ultrasound guided  ULTRASOUND INTERPRETATION.  Using ultrasound guidance a 21 G gauge needle was placed in close proximity to the femoral nerve, at which point, under ultrasound guidance anesthetic was injected in the area of the nerve and spread of the anesthesia was seen on ultrasound in close proximity thereto.  There were no abnormalities seen on ultrasound; a digital image was taken; and the patient tolerated the procedure with no complications. Images:still images obtained    Laterality:right  Block Type:femoral  Injection Technique:single-shotNeedle Gauge:21 G    Medications  Local Injected:ropivacaine 0.5% without epinephrine Local Amount Injected:25mL  Post Assessment  Injection Assessment: negative aspiration for heme, no paresthesia on injection and incremental injection  Patient Tolerance:comfortable throughout block  Complications:no

## 2017-09-05 NOTE — ANESTHESIA PROCEDURE NOTES
Peripheral Block    Patient location during procedure: holding area  Start time: 9/5/2017 11:27 AM  Stop time: 9/5/2017 11:34 AM  Reason for block: at surgeon's request and post-op pain management  Performed by  Anesthesiologist: YAN GROSS  Preanesthetic Checklist  Completed: patient identified, site marked, surgical consent, pre-op evaluation, timeout performed, IV checked, risks and benefits discussed and monitors and equipment checked  Prep:  Pt Position: left lateral decubitus  Sterile barriers:cap, gloves and mask  Prep: ChloraPrep  Patient monitoring: blood pressure monitoring, continuous pulse oximetry and EKG  Procedure  Sedation:yes  Performed under: PNB  Guidance:nerve stimulator  ULTRASOUND INTERPRETATION.  Using ultrasound guidance a 21 G gauge needle was placed in close proximity to the sciatic nerve, at which point, under ultrasound guidance anesthetic was injected in the area of the nerve and spread of the anesthesia was seen on ultrasound in close proximity thereto.  There were no abnormalities seen on ultrasound; a digital image was taken; and the patient tolerated the procedure with no complications. Images:still images obtained  Loss of twitch: 50 mA  Laterality:right  Block Type:sciatic  Injection Technique:single-shotNeedle Gauge:21 G  Resistance on Injection: less than 15 psi  Medications  Local Injected:ropivacaine 0.5% without epinephrine Local Amount Injected:25mL  Post Assessment  Injection Assessment: negative aspiration for heme, no paresthesia on injection and incremental injection  Patient Tolerance:comfortable throughout block  Complications:no

## 2017-09-05 NOTE — OP NOTE
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    PROCEDURE PERFORMED:  REMOVAL OF ANTIBIOTIC SPACER RE IMPLANTATION OF  RIGHTTOTAL KNEE by utilizing a  medial parapatellar approach with Depuy tibial tray rotating platform M.B.T.revision size 4 - 15 mm thick, a MBT revision metaphyseal sleeve porous size 53 mm, a PFC Sigma femoral component TC 3 size 4 , universal femoral sleeve distal porous 40 mm, a tibial insert rotating platform stabilized size 4, 15 mm thick. 115 mm stem for tibia and 75 mm stem for femur.   The tibia and femur were also augmented with cement  utilizing  of tobramycin Simplex cement .    SURGEON: Kiko Marie MD     ASSISTANT: Jensen Menard MD, Fellow  Maru Simms, Certified Surgical First assist.    The services of a skilled  first assist were necessary for performing the procedure safely and expeditiously.  The first assist was present for the entire duration of the case and helped with positioning, retraction and closure of the incision.      ANESTHESIA: General anaesthesia with a regional block femoral-sciatic and intraoperative periarticular  Exparel injection.    ESTIMATED BLOOD LOSS: 200 mL    SPECIMENS: 1.Tissue from  knee for culture sensitivity aerobic and anaerobic.  2. Tissue from  knee for frozen section for acute  inflammation.      COMPLICATIONS: Nil.     DRAINS: A 10 [place] Hemovac drain.     INDICATIONS: The patient is a 66 y.o. 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  The patient has finished  IV antibiotics. The patient has been followed by Dr. Mayberry infectious diseases speciality.  The inflammatory parameters were normal.  The surgical incision has healed and Patient was ready for his planned second stage to remove the spacer and reimplant.      Treatment options and alternatives were discussed in detail with the patient who is indicated for a revision total knee arthroplasty.     Likely risks and benefits of the procedure, including but not limited to infection, DVT, pulmonary embolism, future loosening of the implants, possibility of injury to tendons, ligaments, nerves or vessels and periprosthetic fractures, loss of extensor mechanism, stiffness, future above-the-knee amputation have been discussed in detail. Despite the risks involved, the patient elected to proceed and informed consent was obtained and the patient was scheduled for surgery. The patient was seen in the preoperative holding area and the operative site was marked.     DESCRIPTION OF PROCEDURE:   The patient was transferred to [facility] operating room.     Preoperative antibiotics in the form of Daptomycin and  Kefzol  intravenously was infused prior to the incision and prior to the tourniquet placement according to the SCIP protocol.     A surgical time out was done with the team and the correct patient, surgical side and site were identified.     After achieving adequate general anesthesia, a well-padded tourniquet was placed over the proximal aspect of the operative thigh. The operative leg was prepped and draped in the usual sterile fashion. Tourniquet was elevated to a pressure of 250 mm Hg.     A skin incision was made vertically  oriented centering over the patella anteriorly incorporating previous surgical scar. Skin and subcutaneous tissue were incised, full thickness flaps were raised and a medial parapatellar approach was developed. There was a no effusion. The patella was subluxed and the knee was inspected.      The antibiotic spacer was removed with the help of osteotomes and a .      Tissue from distal femur proximal tibia and patella was sent for frozen section and cultures.  The frozen section returned negative for acute inflammation with  neutrophils about 5 per high power field.      As the knee tissue was looking very benign  and as the  inflammatory markers were negative it was planned to proceed with reimplantation.  The knee joint was thoroughly irrigated with saline.  All traces of previous cement was removed.  Debridement was completed.    Attention was then directed to the proximal tibia.  Intramedullary reamer was utilized progressively.  A  stem was utilized with a conical proximal reamer.  Progressive broaching was done to achieve adequate metaphyseal stability.  A skim cut was done with an oscillating saw along the proximal end of the tibia.  The proximal tibial cut was found to be satisfactory.     Attention was  directed to the distal femur.  Intramedullary reamer was utilized for the medullary canal followed by a conical reamer followed by serial broaching.  Adequate stability was found with a metaphyseal sleeve broach.  4-in-1 cutting block was utilized followed by cutting the box cut for the TC3 component.  The cuts were completed maintaining correct rotational alignment based upon epicondylar axis.      Proximal tibia was sized and a trial reduction was performed. Reduction was found to be satisfactory with range of motion from 0° to 110° with the patella tracking well.   As the patella was very thin it was left without resurfacing.     Having been satisfied with the trials, the bony surfaces  irrigated with saline.  I have elected to proceed with press-fit components. Exparel was infiltrated into the posterior capsule medially and laterally and into periarticular tissue and subcutaneously. Followed by this, the tibial component was seated into position followed by the femoral component, followed by seating of the 15 mm thick trial liner.  The metaphyseal sleeve was first placed to obtain the orientation of broaching followed by temporarily fixing the tibial tray to the sleeve in situ to maintain tibial rotation.  Followed by this the tibial component and was removed and impacted on the back table.  The femoral component was assembled based upon the trial implant for orientation and the sleeve and femur were impacted on the back table.  I used Simplex cement with tobramycin with 1 g of vancomycin powder for obtaining some support of the component to the bone secondary to bone loss.  The cement was allowed to set excess cement was removed.  The trial liner was replaced with a 15 mm rotating platform posteriorly stabilized liner.     Again, range of motion was checked and the range was satisfactory from 0° to 110° with excellent stability throughout the range of motion. Good medial and lateral tissue tension, and soft tissue balancing. The patella was tracking well. Having been satisfied with this, the joint was thoroughly irrigated with saline. Soft tissue hemostasis was secured. A 10-[place] Hemovac drain was placed.  The patella tendon was found to be partially avulsed from the proximal tibia and this was repaired with a titanium suture anchor placed in proximal tibia and fiberwire used to repair the tendon.      Trannexamic acid was given intravenously prior to release of the tourniquet. The sponge and needle count was found to be correct.  Arthrotomy was closed with Ethibond sutures followed by closure of the incision in layers with Vicryl sutures and staples. Sterile dressings were placed and the  patient was transferred to the recovery room in stable condition. The patient was given a knee immobilizer. The patient tolerated the procedure well and is being admitted for postoperative antibiotics according to the SCIP protocol for 2 more doses /  until I see culture result.     DVT Prophylaxis -  Mechanical - TEDS and venous foot plexipulses and  Aspirin 2 times  daily will be started daily on postoperative day #1.     The patient will be mobilized in am with physical therapy. WBAT and no restrictions for ROM. Will limit range to 0 - 60 and avoid active extension.      Infectious disease consult is being placed for routine follow up and postop antibiotic recommendations.     I discussed the satisfactory performance of the procedure with the patient's family and discussed with them The postoperative management.      Kiko Marie M.D.    9/5/2017    CC: Guille Nieves MD; MD Kiko Yoon, *

## 2017-09-06 PROBLEM — E55.9 VITAMIN D INSUFFICIENCY: Status: ACTIVE | Noted: 2017-09-06

## 2017-09-06 PROBLEM — D62 ANEMIA, POSTHEMORRHAGIC, ACUTE: Status: ACTIVE | Noted: 2017-09-06

## 2017-09-06 LAB
ANION GAP SERPL CALCULATED.3IONS-SCNC: 16.1 MMOL/L
BASOPHILS # BLD AUTO: 0.01 10*3/MM3 (ref 0–0.2)
BASOPHILS NFR BLD AUTO: 0.1 % (ref 0–1.5)
BUN BLD-MCNC: 28 MG/DL (ref 8–23)
BUN/CREAT SERPL: 26.9 (ref 7–25)
CALCIUM SPEC-SCNC: 8.6 MG/DL (ref 8.6–10.5)
CHLORIDE SERPL-SCNC: 99 MMOL/L (ref 98–107)
CO2 SERPL-SCNC: 21.9 MMOL/L (ref 22–29)
CREAT BLD-MCNC: 1.04 MG/DL (ref 0.76–1.27)
DEPRECATED RDW RBC AUTO: 49.8 FL (ref 37–54)
EOSINOPHIL # BLD AUTO: 0 10*3/MM3 (ref 0–0.7)
EOSINOPHIL NFR BLD AUTO: 0 % (ref 0.3–6.2)
ERYTHROCYTE [DISTWIDTH] IN BLOOD BY AUTOMATED COUNT: 14.3 % (ref 11.5–14.5)
GFR SERPL CREATININE-BSD FRML MDRD: 71 ML/MIN/1.73
GLUCOSE BLD-MCNC: 203 MG/DL (ref 65–99)
GLUCOSE BLDC GLUCOMTR-MCNC: 184 MG/DL (ref 70–130)
GLUCOSE BLDC GLUCOMTR-MCNC: 204 MG/DL (ref 70–130)
GLUCOSE BLDC GLUCOMTR-MCNC: 207 MG/DL (ref 70–130)
GLUCOSE BLDC GLUCOMTR-MCNC: 216 MG/DL (ref 70–130)
HBA1C MFR BLD: 7.1 % (ref 4.8–5.6)
HCT VFR BLD AUTO: 28.3 % (ref 40.4–52.2)
HGB BLD-MCNC: 9.3 G/DL (ref 13.7–17.6)
IMM GRANULOCYTES # BLD: 0.02 10*3/MM3 (ref 0–0.03)
IMM GRANULOCYTES NFR BLD: 0.2 % (ref 0–0.5)
LAB AP CASE REPORT: NORMAL
LAB AP INTRADEPARTMENTAL CONSULT: NORMAL
LYMPHOCYTES # BLD AUTO: 1.28 10*3/MM3 (ref 0.9–4.8)
LYMPHOCYTES NFR BLD AUTO: 11.9 % (ref 19.6–45.3)
Lab: NORMAL
Lab: NORMAL
MCH RBC QN AUTO: 31.2 PG (ref 27–32.7)
MCHC RBC AUTO-ENTMCNC: 32.9 G/DL (ref 32.6–36.4)
MCV RBC AUTO: 95 FL (ref 79.8–96.2)
MONOCYTES # BLD AUTO: 0.8 10*3/MM3 (ref 0.2–1.2)
MONOCYTES NFR BLD AUTO: 7.4 % (ref 5–12)
NEUTROPHILS # BLD AUTO: 8.64 10*3/MM3 (ref 1.9–8.1)
NEUTROPHILS NFR BLD AUTO: 80.4 % (ref 42.7–76)
PATH REPORT.FINAL DX SPEC: NORMAL
PATH REPORT.GROSS SPEC: NORMAL
PLATELET # BLD AUTO: 157 10*3/MM3 (ref 140–500)
PMV BLD AUTO: 11.8 FL (ref 6–12)
POTASSIUM BLD-SCNC: 4.8 MMOL/L (ref 3.5–5.2)
RBC # BLD AUTO: 2.98 10*6/MM3 (ref 4.6–6)
SODIUM BLD-SCNC: 137 MMOL/L (ref 136–145)
WBC NRBC COR # BLD: 10.75 10*3/MM3 (ref 4.5–10.7)

## 2017-09-06 PROCEDURE — 99222 1ST HOSP IP/OBS MODERATE 55: CPT | Performed by: INTERNAL MEDICINE

## 2017-09-06 PROCEDURE — 94799 UNLISTED PULMONARY SVC/PX: CPT

## 2017-09-06 PROCEDURE — 63710000001 INSULIN ASPART PER 5 UNITS: Performed by: ORTHOPAEDIC SURGERY

## 2017-09-06 PROCEDURE — 82962 GLUCOSE BLOOD TEST: CPT

## 2017-09-06 PROCEDURE — 80048 BASIC METABOLIC PNL TOTAL CA: CPT | Performed by: ORTHOPAEDIC SURGERY

## 2017-09-06 PROCEDURE — 97162 PT EVAL MOD COMPLEX 30 MIN: CPT

## 2017-09-06 PROCEDURE — 63710000001 INSULIN ASPART PER 5 UNITS: Performed by: HOSPITALIST

## 2017-09-06 PROCEDURE — 25010000003 CEFAZOLIN IN DEXTROSE 2-4 GM/100ML-% SOLUTION: Performed by: ORTHOPAEDIC SURGERY

## 2017-09-06 PROCEDURE — 63710000001 INSULIN DETEMER PER 5 UNITS: Performed by: ORTHOPAEDIC SURGERY

## 2017-09-06 PROCEDURE — 85025 COMPLETE CBC W/AUTO DIFF WBC: CPT | Performed by: ORTHOPAEDIC SURGERY

## 2017-09-06 PROCEDURE — 83036 HEMOGLOBIN GLYCOSYLATED A1C: CPT | Performed by: HOSPITALIST

## 2017-09-06 PROCEDURE — 97110 THERAPEUTIC EXERCISES: CPT

## 2017-09-06 PROCEDURE — 63710000001 INSULIN DETEMER PER 5 UNITS: Performed by: HOSPITALIST

## 2017-09-06 PROCEDURE — 25010000002 MORPHINE PER 10 MG: Performed by: ORTHOPAEDIC SURGERY

## 2017-09-06 RX ORDER — MELATONIN
5000 DAILY
Status: DISCONTINUED | OUTPATIENT
Start: 2017-09-06 | End: 2017-09-08 | Stop reason: HOSPADM

## 2017-09-06 RX ADMIN — FERROUS SULFATE TAB 325 MG (65 MG ELEMENTAL FE) 325 MG: 325 (65 FE) TAB at 12:23

## 2017-09-06 RX ADMIN — INSULIN DETEMIR 20 UNITS: 100 INJECTION, SOLUTION SUBCUTANEOUS at 21:57

## 2017-09-06 RX ADMIN — MORPHINE SULFATE 4 MG: 4 INJECTION, SOLUTION INTRAMUSCULAR; INTRAVENOUS at 19:34

## 2017-09-06 RX ADMIN — INSULIN ASPART 5 UNITS: 100 INJECTION, SOLUTION INTRAVENOUS; SUBCUTANEOUS at 12:19

## 2017-09-06 RX ADMIN — PREGABALIN 200 MG: 75 CAPSULE ORAL at 20:19

## 2017-09-06 RX ADMIN — CEFAZOLIN SODIUM 2 G: 2 INJECTION, SOLUTION INTRAVENOUS at 10:45

## 2017-09-06 RX ADMIN — BACLOFEN 10 MG: 10 TABLET ORAL at 16:19

## 2017-09-06 RX ADMIN — INSULIN ASPART 14 UNITS: 100 INJECTION, SOLUTION INTRAVENOUS; SUBCUTANEOUS at 18:53

## 2017-09-06 RX ADMIN — INSULIN ASPART 14 UNITS: 100 INJECTION, SOLUTION INTRAVENOUS; SUBCUTANEOUS at 12:19

## 2017-09-06 RX ADMIN — OXYCODONE AND ACETAMINOPHEN 2 TABLET: 10; 325 TABLET ORAL at 20:19

## 2017-09-06 RX ADMIN — CEFAZOLIN SODIUM 2 G: 2 INJECTION, SOLUTION INTRAVENOUS at 03:20

## 2017-09-06 RX ADMIN — INSULIN ASPART 3 UNITS: 100 INJECTION, SOLUTION INTRAVENOUS; SUBCUTANEOUS at 18:53

## 2017-09-06 RX ADMIN — ASPIRIN 325 MG: 325 TABLET, DELAYED RELEASE ORAL at 18:53

## 2017-09-06 RX ADMIN — SERTRALINE 100 MG: 100 TABLET, FILM COATED ORAL at 08:11

## 2017-09-06 RX ADMIN — OXYCODONE AND ACETAMINOPHEN 2 TABLET: 10; 325 TABLET ORAL at 03:19

## 2017-09-06 RX ADMIN — OXYCODONE AND ACETAMINOPHEN 2 TABLET: 10; 325 TABLET ORAL at 12:18

## 2017-09-06 RX ADMIN — INSULIN DETEMIR 25 UNITS: 100 INJECTION, SOLUTION SUBCUTANEOUS at 22:55

## 2017-09-06 RX ADMIN — MORPHINE SULFATE 4 MG: 4 INJECTION, SOLUTION INTRAMUSCULAR; INTRAVENOUS at 22:16

## 2017-09-06 RX ADMIN — OXYCODONE AND ACETAMINOPHEN 2 TABLET: 10; 325 TABLET ORAL at 16:19

## 2017-09-06 RX ADMIN — PREGABALIN 100 MG: 50 CAPSULE ORAL at 08:12

## 2017-09-06 RX ADMIN — VITAMIN D, TAB 1000IU (100/BT) 5000 UNITS: 25 TAB at 12:23

## 2017-09-06 RX ADMIN — INSULIN ASPART 5 UNITS: 100 INJECTION, SOLUTION INTRAVENOUS; SUBCUTANEOUS at 08:14

## 2017-09-06 RX ADMIN — DOCUSATE SODIUM 100 MG: 100 CAPSULE, LIQUID FILLED ORAL at 18:53

## 2017-09-06 RX ADMIN — DOCUSATE SODIUM 100 MG: 100 CAPSULE, LIQUID FILLED ORAL at 08:11

## 2017-09-06 RX ADMIN — INSULIN ASPART 5 UNITS: 100 INJECTION, SOLUTION INTRAVENOUS; SUBCUTANEOUS at 21:57

## 2017-09-06 RX ADMIN — METOPROLOL TARTRATE 25 MG: 25 TABLET ORAL at 22:07

## 2017-09-06 RX ADMIN — BACLOFEN 10 MG: 10 TABLET ORAL at 08:11

## 2017-09-06 RX ADMIN — INSULIN ASPART 14 UNITS: 100 INJECTION, SOLUTION INTRAVENOUS; SUBCUTANEOUS at 08:11

## 2017-09-06 RX ADMIN — ISOSORBIDE MONONITRATE 30 MG: 30 TABLET ORAL at 08:11

## 2017-09-06 RX ADMIN — CEFAZOLIN SODIUM 2 G: 2 INJECTION, SOLUTION INTRAVENOUS at 19:34

## 2017-09-06 RX ADMIN — ASPIRIN 325 MG: 325 TABLET, DELAYED RELEASE ORAL at 08:11

## 2017-09-06 RX ADMIN — INSULIN DETEMIR 20 UNITS: 100 INJECTION, SOLUTION SUBCUTANEOUS at 08:15

## 2017-09-06 RX ADMIN — METOPROLOL TARTRATE 25 MG: 25 TABLET ORAL at 08:11

## 2017-09-06 RX ADMIN — BACLOFEN 10 MG: 10 TABLET ORAL at 20:19

## 2017-09-06 RX ADMIN — PANTOPRAZOLE SODIUM 40 MG: 40 TABLET, DELAYED RELEASE ORAL at 06:41

## 2017-09-06 RX ADMIN — OXYCODONE AND ACETAMINOPHEN 2 TABLET: 10; 325 TABLET ORAL at 08:12

## 2017-09-06 NOTE — THERAPY EVALUATION
Acute Care - Physical Therapy Initial Evaluation  [facility]     Patient Name: Hugh R McBurney  : 1950  MRN: [id number]  Today's Date: 2017   Onset of Illness/Injury or Date of Surgery Date: 17            Admit Date: 2017     Visit Dx:    ICD-10-CM ICD-9-CM   1. Difficulty walking R26.2 719.7   2. 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   • Recent MSSA (methicillin susceptible Staphylococcus aureus) septicemia   • Infection of prosthetic knee joint   • Hyperglycemia   • Hyponatremia   • Contusion of knee   • History of knee surgery   • Infection of prosthetic right knee joint   • CAD (coronary artery disease)   • Type 2 diabetes mellitus with proteinuria   • Hyponatremia   • Anemia, posthemorrhagic, acute   • Osteoarthritis, knee   • Anemia, posthemorrhagic, acute   • Vitamin D insufficiency     Past Medical History:   Diagnosis Date   • Arthritis    • Chronic pain     BILATERAL KNEES AND BACK   • Coronary artery disease    • Depression    • Diabetes mellitus    • GERD (gastroesophageal reflux disease)    • History of kidney stones    • Hyperlipidemia    • Hypertension    • Kidney stone    • MSSA (methicillin susceptible Staphylococcus aureus) infection     RIGHT KNEE   • NSTEMI (non-ST elevated myocardial infarction) 2017   • Sleep apnea     DOES NOT USE CPAP     Past Surgical History:   Procedure Laterality Date   • APPENDECTOMY     • CARDIAC CATHETERIZATION N/A 2017    Procedure: Left Heart Cath;  Surgeon: Tiburcio Moreno MD;  Location: [facility] CATH INVASIVE LOCATION;   Service:    • COLONOSCOPY     • JOINT REPLACEMENT      BILATERAL KNEES    • LUMBAR FUSION     • IN REVISE KNEE JOINT REPLACE,1 PART Right 9/5/2017    Procedure: REMOVAL OF ANTIBIOTIC CEMENT SPACER; RIGHT TOTAL KNEE ARTHROPLASTY REVISION;  Surgeon: Kiko Marie MD;  Location: [facility];  Service: Orthopedics   • TOTAL KNEE  PROSTHESIS REMOVAL W/ SPACER INSERTION Right 2/14/2017    Procedure: INCISION AND DRAINAGE RIGHT KNEE, POLY CHANGE;  Surgeon: Kiko Marie MD;  Location: [facility];  Service:    • TOTAL KNEE  PROSTHESIS REMOVAL W/ SPACER INSERTION Right 7/7/2017    Procedure: REMOVAL RIGHT TOTAL KNEE ARTHROPLASTY AND ANITBIOTIC SPACER;  Surgeon: Kiko Marie MD;  Location: [facility];  Service:    • TRANS METATARSAL AMPUTATION Left 2/14/2017    Procedure: EXCISIONAL DEBRIDEMENT LT. FIRST TOE DIABETIC ULCER, DRAINAGE ABSCESS FIRST TOE, FIRST TOE AMPUTATION;  Surgeon: Osmin Brand MD;  Location: [facility];  Service:           PT ASSESSMENT (last 72 hours)      PT Evaluation       09/06/17 0953 09/06/17 0930    Rehab Evaluation    Document Type  evaluation  -    Subjective Information  agree to therapy  -    Evaluation Not Performed patient unavailable for evaluation   w PT 0905  -     Patient Effort, Rehab Treatment  good  -CH    Symptoms Noted During/After Treatment  none  -    General Information    Onset of Illness/Injury or Date of Surgery Date  09/05/17  -    General Observations  supine in bed, R LE in knee immobilizer, no acute distress noted a trest  -    Pertinent History Of Current Problem  pt is post-op removal of R knee spacer and R TKA revision  -    Precautions/Limitations  fall precautions;brace on when up   KI when ambulating, active heel slides only, ROM 0-80  -    Prior Level of Function  independent:;gait;transfer;bed mobility;ADL's  -CH    Plans/Goals Discussed With  patient  -CH    Barriers to Rehab  medically complex   -    Clinical Impression    Patient/Family Goals Statement  to return to PLOF  -    Criteria for Skilled Therapeutic Interventions Met  treatment indicated  -    Impairments Found (describe specific impairments)  gait, locomotion, and balance;muscle performance  -    Rehab Potential  good, to achieve stated therapy goals  -    Pain Assessment    Pain Assessment  No/denies pain   pt reports R LE is still numb  -    Cognitive Assessment/Intervention    Current Cognitive/Communication Assessment  functional  -    Orientation Status  oriented x 4  -    Follows Commands/Answers Questions  100% of the time  -    Personal Safety  WNL/WFL  -    Personal Safety Interventions  fall prevention program maintained;gait belt;nonskid shoes/slippers when out of bed  -    ROM (Range of Motion)    General ROM  lower extremity range of motion deficits identified   R knee ROM limited post-op TKA revision  -    MMT (Manual Muscle Testing)    General MMT Assessment  lower extremity strength deficits identified   R LE weakness noted post-op  -    Bed Mobility, Assessment/Treatment    Bed Mob, Supine to Sit, [place]  verbal cues required;nonverbal cues required (demo/gesture);minimum assist (75% patient effort);2 person assist required  -    Bed Mob, Sit to Supine, [place]  not tested   sitting in chair  -    Transfer Assessment/Treatment    Transfers, Sit-Stand [place]  verbal cues required;nonverbal cues required (demo/gesture);minimum assist (75% patient effort);2 person assist required  -    Transfers, Stand-Sit [place]  verbal cues required;nonverbal cues required (demo/gesture);minimum assist (75% patient effort);2 person assist required  -    Transfers, Sit-Stand-Sit, Assist Device  rolling walker  -    Gait Assessment/Treatment    Gait, [place] Level  verbal cues required;nonverbal cues required (demo/gesture);minimum assist (75% patient effort);2 person assist required   -    Gait, Assistive Device  rolling walker  -    Gait, Distance (Feet)  5   bed to chair  -    Gait, Gait Deviations  leon decreased;forward flexed posture;step length decreased;stride length decreased;right:;toe-to-floor clearance decreased;limb motion velocity decreased  -    Gait, Safety Issues  step length decreased;weight-shifting ability decreased;loses balance backward  -    Gait, Impairments  sensation decreased;strength decreased  -    Gait, Comment  gait distance limited secondary to R LE is numb and pt having difficulty controlling the limb, KI worn when walking  -    Motor Skills/Interventions    Additional Documentation  Balance Skills Training (Group)  -    Balance Skills Training    Standing-Level of Assistance  Minimum assistance  -    Static Standing Balance Support  assistive device  -    Gait Balance-Level of Assistance  Minimum assistance;x2  -    Gait Balance Support  assistive device  -    Therapy Exercises    Right Lower Extremity  AROM:;10 reps;ankle pumps/circles;heel slides  -    Positioning and Restraints    Pre-Treatment Position  in bed  -    Post Treatment Position  chair  -    In Chair  reclined;call light within reach;encouraged to call for assist  -      09/05/17 1701 09/05/17 0950    General Information    Equipment Currently Used at Home walker, rolling;wheelchair;grab bar;raised toilet;glucometer;cane, straight  -JT walker, rolling;wheelchair;grab bar;raised toilet;glucometer;cane, straight  -AM    Living Environment    Lives With  significant other  -AM    Living Arrangements  house  -AM    Home Accessibility  stairs to enter home;stairs within home  -AM    Number of Stairs to Enter Home  1  -AM    Number of Stairs Within Home  2  -AM    Stair Railings at Home  inside, present on left side   NO RAILING OUTSIDE  -AM    Type of Financial/Environmental Concern  none  -AM    Transportation Available  car;family or friend will provide  -AM    Living  Environment Comment  [facility]  -AM      User Key  (r) = Recorded By, (t) = Taken By, (c) = Cosigned By    Initials Name Provider Type    JASBIR Villareal, OTR Occupational Therapist     Monae Scott, PT Physical Therapist    AM Beata Han, RN Registered Nurse    SANFORD Chin, RN Registered Nurse          Physical Therapy Education     Title: PT OT SLP Therapies (Done)     Topic: Physical Therapy (Done)     Point: Mobility training (Done)    Learning Progress Summary    Learner Readiness Method Response Comment Documented by Status   Patient Acceptance E,D,TB VU,NR   09/06/17 1024 Done               Point: Home exercise program (Done)    Learning Progress Summary    Learner Readiness Method Response Comment Documented by Status   Patient Acceptance E,D,TB VU,NR   09/06/17 1024 Done               Point: Body mechanics (Done)    Learning Progress Summary    Learner Readiness Method Response Comment Documented by Status   Patient Acceptance E,D,TB VU,NR   09/06/17 1024 Done               Point: Precautions (Done)    Learning Progress Summary    Learner Readiness Method Response Comment Documented by Status   Patient Acceptance E,D,TB VU,NR   09/06/17 1024 Done                      User Key     Initials Effective Dates Name Provider Type Discipline     12/01/15 -  Monae Scott, PT Physical Therapist PT                PT Recommendation and Plan  Anticipated Discharge Disposition: skilled nursing facility  Planned Therapy Interventions: balance training, bed mobility training, gait training, home exercise program, patient/family education, transfer training  PT Frequency: 2 times/day  Plan of Care Review  Plan Of Care Reviewed With: patient  Outcome Summary/Follow up Plan: Pt presents with impaired functional mobility and gait secondary to R LE weakness, and decreased ROM post-op knee revision. Pt must wear knee immobilizer when up and is to only perform active heel  slides. Pt may benefit from skilled PT to address strength, mobility, and ROM.          IP PT Goals       09/06/17 1024          Bed Mobility PT LTG    Bed Mobility PT LTG, Time to Achieve 1 wk  -CH      Bed Mobility PT LTG, Activity Type all bed mobility  -CH      Bed Mobility PT LTG, [place] Level supervision required  -CH      Transfer Training PT LTG    Transfer Training PT LTG, Time to Achieve 1 wk  -CH      Transfer Training PT LTG, Activity Type all transfers  -CH      Transfer Training PT LTG, [place] Level supervision required  -CH      Transfer Training PT LTG, Assist Device walker, rolling  -CH      Gait Training PT LTG    Gait Training Goal PT LTG, Time to Achieve 1 wk  -CH      Gait Training Goal PT LTG, [place] Level supervision required  -CH      Gait Training Goal PT LTG, Assist Device walker, rolling  -CH      Gait Training Goal PT LTG, Distance to Achieve 150  -CH        User Key  (r) = Recorded By, (t) = Taken By, (c) = Cosigned By    Initials Name Provider Type    RADU Scott PT Physical Therapist                Outcome Measures       09/06/17 1000          How much help from another person do you currently need...    Turning from your back to your side while in flat bed without using bedrails? 3  -CH      Moving from lying on back to sitting on the side of a flat bed without bedrails? 3  -CH      Moving to and from a bed to a chair (including a wheelchair)? 3  -CH      Standing up from a chair using your arms (e.g., wheelchair, bedside chair)? 3  -CH      Climbing 3-5 steps with a railing? 2  -CH      To walk in hospital room? 2  -CH      AM-PAC 6 Clicks Score 16  -CH      Functional Assessment    Outcome Measure Options AM-PAC 6 Clicks Basic Mobility (PT)  -CH        User Key  (r) = Recorded By, (t) = Taken By, (c) = Cosigned By    Initials Name Provider Type    RADU Scott PT Physical Therapist           Time Calculation:         PT Charges       09/06/17  0929          Time Calculation    Start Time 0914  -      Stop Time 0929  -      Time Calculation (min) 15 min  -      PT Received On 09/06/17  -      PT - Next Appointment 09/06/17  -      PT Goal Re-Cert Due Date 09/09/17  -        User Key  (r) = Recorded By, (t) = Taken By, (c) = Cosigned By    Initials Name Provider Type     Monae Scott, PT Physical Therapist          Therapy Charges for Today     Code Description Service Date Service Provider Modifiers Qty    74465227039 HC PT EVAL MOD COMPLEXITY 2 9/6/2017 Monae Scott, PT GP 1    39566668986 HC PT THER PROC EA 15 MIN 9/6/2017 Monae Scott, PT GP 1    57290120570 HC PT THER SUPP EA 15 MIN 9/6/2017 Monae Scott, PT GP 1          PT G-Codes  Outcome Measure Options: AM-PAC 6 Clicks Basic Mobility (PT)      Monae Scott, PT  9/6/2017

## 2017-09-06 NOTE — PLAN OF CARE
Problem: Patient Care Overview (Adult)  Goal: Plan of Care Review  Outcome: Ongoing (interventions implemented as appropriate)    09/06/17 0235   Coping/Psychosocial Response Interventions   Plan Of Care Reviewed With patient   Patient Care Overview   Progress improving   Outcome Evaluation   Outcome Summary/Follow up Plan patient c/o minimum pain, block still in effect. vitals stable. tolerated oral pain meds. dressing dry and intact. voiding without difficulty. educated patient on importance of monitoring blood pressure given h/o hypertension.       Goal: Adult Individualization and Mutuality  Outcome: Ongoing (interventions implemented as appropriate)  Goal: Discharge Needs Assessment  Outcome: Ongoing (interventions implemented as appropriate)    Problem: Perioperative Period (Adult)  Goal: Signs and Symptoms of Listed Potential Problems Will be Absent or Manageable (Perioperative Period)  Outcome: Ongoing (interventions implemented as appropriate)    Problem: Fall Risk (Adult)  Goal: Absence of Falls  Outcome: Ongoing (interventions implemented as appropriate)    Problem: Knee Replacement, Total (Adult)  Goal: Signs and Symptoms of Listed Potential Problems Will be Absent or Manageable (Knee Replacement, Total)  Outcome: Ongoing (interventions implemented as appropriate)

## 2017-09-06 NOTE — CONSULTS
"Referring Provider: Kiko Marie,*  Reason for Consultation: PJI?      Subjective   History of present illness:    Very nice 66-year-old we are asked to provide evaluation for regarding prosthetic joint infection.    Per prior notes: \"Very nice 66-year-old gentleman with history of osteoarthritis complicated by right TKA PJI culture negative in 2011 treated with two-stage replacement and prolonged antibiotic therapy, admitted on with 3-4 weeks of left first toe swelling, fever and right artificial knee pain. Was found to have MSSA in his blood and underwent I&D with amputation of the first left toe by Dr. Brand on 2/14. Dr. Marie also performed liner exchange with I&D of right TKA on 2/14. His left toe and right TKA cultures all grew MSSA. Transthoracic echocardiogram without obvious vegetation. In discussions with orthopedics and infectious diseases, patient elected to pursue device retention strategy. We will therefore recommend Ancef 2 g IV every 8 hours and rifampin 300 mg by mouth every 12 hours for 6 weeks with an anticipated stop date of 3/27/17. We then plan 6 months of rifampin plus PO abx (likely quinolone). He was readmitted for fever and hyperglycemia from 2/25 to 3/4/17 but all cultures were negative and transesophageal echocardiogram was negative.  At clinic visit on 3/27/17, he was transitioned off IV antibiotics to 750 mg of Cipro and rifampin 300 mg twice a day.  He developed progressive      He developed progressive pain and concern was for progressive infection.  He was admitted for explantation of his right TKA on 6/13 but developed red man syndrome and a NSTEMI, delaying procedures.  He was readmitted on 7/7/17 and underwent removal of hardware with placement of antibiotic spacer.  Surgical cultures again grew MSSA.  We will re-induce with 6 more weeks of IV cefazolin, anticipated stop date August 17, 2017.\"    Patient reports that he had a 2 day history of spasm-like pain along " the right knee that radiated down the leg electrical shocklike pain.  Improved with muscle relaxers and he has not had recrudescence of knee pain.  There is no associated drainage or fevers or chills or night sweats.  All in all he is feeling quite well.  At clinic visit on 8/17/17, reported that he was doing well.  Antibiotics were discontinued.      Yesterday, he was admitted and underwent revision right TKA by Dr. Marie.  Operative note reviewed and no gross evidence of infection with only 5 neutrophils per high-power field.  Operative cultures so far no growth to date.  Patient denies any significant postoperative pain and he says he's actually feeling quite well.  He tells me his A1c is now under 7!    PAST MEDICAL HISTORY:  1. Uncontrolled diabetes mellitus type 2.  2. Hypertension.  3. Hyperlipidemia.  4. GERD.  5. History of nephrolithiasis.  6. Chronic pain.  7. Osteoarthritis status post bilateral joint replacement with right PJI in 2011 treated with 2-stage replacement and antibiotic therapy.  8. Back hardware placement.  9. Placement of an orthopedic screw on the right shoulder.  10. Appendectomy.  11. History of pancreatitis.  12.  Coronary artery disease  13.  MSSA septicemia and left foot osteomyelitis and right prosthetic knee infection s/p explant on 7/7/17 and replacement on 9/5/17    FAMILY HISTORY: No family history of infection.      SOCIAL HISTORY: Lives in [place], [place] with his girlfriend. 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 He is a former smoker, but quit about 3 decades ago. He denies current tobacco, ethanol or drug use.      Allergies   Allergen Reactions   • Vancomycin Other (See Comments)     Red Man syndrome, slow rate and premedicate with benadryl     Review of Systems  Pertinent items are noted in HPI, all other systems reviewed and negative    Objective     Physical Exam:   Vital Signs   Temp:  [97.3 °F (36.3 °C)-98.8 °F (37.1 °C)] 97.6 °F (36.4  °C)  Heart Rate:  [] 79  Resp:  [10-18] 16  BP: (105-147)/(61-86) 122/68    GENERAL: Awake and alert, in no acute distress.   HEENT: Oropharynx is clear. Hearing is grossly normal.   EYES: PERRL. No conjunctival injection. No lid lag.   LYMPHATICS: No lymphadenopathy of the neck or inguinal regions.   HEART: Regular rate and rhythm. No peripheral edema.   LUNGS: Clear to auscultation anteriorly with normal respiratory effort.   GI: Soft, nontender, nondistended. No appreciable organomegaly.   SKIN: Warm and dry without cutaneous eruptions   MSK: R knee dressed in splint with driain in place  PSYCHIATRIC: Appropriate mood, affect, insight, and judgment.     Results Review:   I reviewed the patient's new clinical results.  WBC 10.8 (p80, L 12, M 7)  H/H 9.3/28    Cr 1.04        Estimated Creatinine Clearance: 93 mL/min (by C-G formula based on Cr of 1.04).      Microbiology:  9/5 surgical cx ngtd    Radiology:  9/5/17 Right knee postoperative films: Expected postoperative changes    Assessment/Plan   1.  MSSA right prosthetic knee joint infection status post explantation on 7/7/17 6 weeks IV antibiotics and now with revision TKA on 9/5/17  2. Diabetes mellitus type 2 with neuropathy, continue glycemic control efforts to prevent/control infectious complications    Patient is doing well.  No evidence of infection on gross inspection during surgery, frozen section or surgical cultures.  Continue on cefazolin 2 g IV every 8 hours while awaiting surgical cultures.  Provided cultures remain negative, do not for see additional need for antibiotics at discharge.     Thank you for this consult.  We will continue to follow along and tailor antibiotics as the patient's clinical course evolves.    Amor Mayberry MD  09/06/17  10:34 AM

## 2017-09-06 NOTE — PLAN OF CARE
Problem: Patient Care Overview (Adult)  Goal: Plan of Care Review  Outcome: Ongoing (interventions implemented as appropriate)    09/06/17 1831   Coping/Psychosocial Response Interventions   Plan Of Care Reviewed With patient   Patient Care Overview   Progress improving   Outcome Evaluation   Outcome Summary/Follow up Plan Patient ambulated using walker and stand by assist today. Pain is well managed on po meds, although has increased since this afternoon when block wore off. Voiding function is intact, patient had some hypotension today although patient has minimal complaints. PO fluids encouraged. Patient educated on glycemic control and has been treated with nutritional and sliding scaled insulin today. Drain removed this afternoon.        Goal: Adult Individualization and Mutuality  Outcome: Ongoing (interventions implemented as appropriate)    09/05/17 1004   Individualization   Patient Specific Preferences GOES BY TONE       Goal: Discharge Needs Assessment  Outcome: Ongoing (interventions implemented as appropriate)    09/06/17 1423   Discharge Needs Assessment   Discharge Facility/Level Of Care Needs home with home health;nursing facility, skilled   Living Environment   Transportation Available car;family or friend will provide         Problem: Perioperative Period (Adult)  Goal: Signs and Symptoms of Listed Potential Problems Will be Absent or Manageable (Perioperative Period)  Outcome: Outcome(s) achieved Date Met:  09/06/17 09/06/17 1831   Perioperative Period   Problems Assessed (Perioperative Period) all   Problems Present (Perioperative Period) pain         Problem: Fall Risk (Adult)  Goal: Absence of Falls  Outcome: Ongoing (interventions implemented as appropriate)    09/06/17 1831   Fall Risk (Adult)   Absence of Falls achieves outcome         Problem: Knee Replacement, Total (Adult)  Goal: Signs and Symptoms of Listed Potential Problems Will be Absent or Manageable (Knee Replacement,  Total)  Outcome: Ongoing (interventions implemented as appropriate)    09/06/17 1831   Knee Replacement, Total   Problems Assessed (Total Knee Replacement) all   Problems Present (Total Knee Replacement) pain;functional decline/self care deficit;decreased range of motion

## 2017-09-06 NOTE — PLAN OF CARE
Problem: Patient Care Overview (Adult)  Goal: Plan of Care Review    09/06/17 1619   Coping/Psychosocial Response Interventions   Plan Of Care Reviewed With patient   Patient Care Overview   Progress improving   Outcome Evaluation   Outcome Summary/Follow up Plan Improved ambulation with KI donned this date and tolerance to activity. Will continue to address deficits and improve level of independence as appropriate.

## 2017-09-06 NOTE — PROGRESS NOTES
Discharge Planning Assessment  [facility]     Patient Name: Hugh R McBurney  MRN: [id number]  Today's Date: 9/6/2017    Admit Date: 9/5/2017          Discharge Needs Assessment       09/06/17 1423    Living Environment    Lives With significant other    Living Arrangements house    Transportation Available car;family or friend will provide    Discharge Needs Assessment    Concerns To Be Addressed denies needs/concerns at this time    Equipment Currently Used at Home wheelchair;walker, rolling;grab bar;raised toilet;glucometer;cane, straight    Discharge Facility/Level Of Care Needs home with home health;nursing facility, skilled            Discharge Plan       09/06/17 1424    Case Management/Social Work Plan    Plan TBD     Additional Comments Met w/ pt at the bedside, verified facesheet and discussed d/c plan. Pt would like to d/c home w/ the help of family and SO. If SNF is needed, pt's first choice is [facility]. CCP will follow pt's progress w/ therapy. Patricia/[facility] following and Jessica/[facility].         Discharge Placement     Facility/Agency Request Status Selected? Address Phone Number Fax Number    [facility] Pending - Request Sent     [address] [facility] [address] [phone]        Daphne De La Cruz RN 9/6/2017 14:27    9/6/2017  Patricia notified.                [facility] Pending - No Request Sent     [address] [address] [phone]        Daphne De La Cruz RN 9/6/2017 14:28    9/6/2017  Jessica notified.                            Demographic Summary     None            Functional Status       09/06/17 1422    Functional Status Current    Ambulation 3-->assistive equipment and person    Transferring 3-->assistive equipment and person    Toileting 2-->assistive person    Bathing 2-->assistive person    Dressing 2-->assistive person    Eating 0-->independent    Communication  0-->understands/communicates without difficulty    Swallowing (if score 2 or more for any item, consult Rehab Services) 0-->swallows foods/liquids without difficulty    Change in Functional Status Since Onset of Current Illness/Injury yes    Functional Status Prior    Ambulation 1-->assistive equipment    Transferring 1-->assistive equipment    Toileting 0-->independent    Bathing 0-->independent    Dressing 0-->independent    Eating 0-->independent    Communication 0-->understands/communicates without difficulty    Swallowing 0-->swallows foods/liquids without difficulty    IADL    Medications independent    Meal Preparation independent    Housekeeping independent    Laundry independent    Shopping independent    Oral Care independent    Activity Tolerance    Usual Activity Tolerance moderate    Current Activity Tolerance fair    Cognitive/Perceptual/Developmental    Current Mental Status/Cognitive Functioning no deficits noted    Recent Changes in Mental Status/Cognitive Functioning no changes            Psychosocial     None            Abuse/Neglect     None            Legal     None            Substance Abuse     None            Patient Forms       09/06/17 1422    Patient Forms    Provider Choice List Delivered    Delivered to Patient    Method of delivery In person          Daphne De La Cruz RN

## 2017-09-06 NOTE — THERAPY TREATMENT NOTE
Acute Care - Physical Therapy Treatment Note  [facility]     Patient Name: Hugh R McBurney  : 1950  MRN: [id number]  Today's Date: 2017  Onset of Illness/Injury or Date of Surgery Date: 17          Admit Date: 2017    Visit Dx:    ICD-10-CM ICD-9-CM   1. Difficulty walking R26.2 719.7   2. 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   • Recent MSSA (methicillin susceptible Staphylococcus aureus) septicemia   • Infection of prosthetic knee joint   • Hyperglycemia   • Hyponatremia   • Contusion of knee   • History of knee surgery   • Infection of prosthetic right knee joint   • CAD (coronary artery disease)   • Type 2 diabetes mellitus with proteinuria   • Hyponatremia   • Anemia, posthemorrhagic, acute   • Osteoarthritis, knee   • Anemia, posthemorrhagic, acute   • Vitamin D insufficiency               Adult Rehabilitation Note       17 1605          Rehab Assessment/Intervention    Discipline physical therapist  -MA      Document Type therapy note (daily note)  -MA      Subjective Information agree to therapy;complains of;pain   ready for pain medicine per patient report  -MA      Patient Effort, Rehab Treatment good  -MA      Symptoms Noted During/After Treatment increased pain  -MA      Precautions/Limitations fall precautions;brace on when up   KI when ambulating, active heel slides only, ROM 0-80  -MA      Patient Response to Treatment tolerated well  -MA      Recorded by [MA] Claudia Saul, PT      Pain Assessment    Pain Assessment 0-10  -MA      Pain Score 5  -MA      Pain Type  Acute pain;Surgical pain  -MA      Pain Location Knee  -MA      Pain Orientation Right  -MA      Pain Intervention(s) Cold applied;Repositioned;Ambulation/increased activity;Rest  -MA      Response to Interventions tolerated  -MA      Recorded by [MA] Claudia Saul, PT      Cognitive Assessment/Intervention    Current Cognitive/Communication Assessment functional  -MA      Orientation Status oriented x 4  -MA      Follows Commands/Answers Questions 100% of the time;able to follow multi-step instructions;needs cueing;needs increased time  -MA      Personal Safety WNL/WFL  -MA      Personal Safety Interventions fall prevention program maintained;gait belt;nonskid shoes/slippers when out of bed  -MA      Recorded by [MA] Claudia Saul, PT      Bed Mobility, Assessment/Treatment    Bed Mobility, Comment Patient UIC upon PT arrival  -MA      Recorded by [MA] Claudia Saul PT      Transfer Assessment/Treatment    Transfers, Sit-Stand Bucks contact guard assist;verbal cues required  -MA      Transfers, Stand-Sit Bucks contact guard assist;verbal cues required  -MA      Transfers, Sit-Stand-Sit, Assist Device rolling walker  -MA      Transfer, Safety Issues step length decreased;weight-shifting ability decreased  -MA      Transfer, Impairments strength decreased;pain  -MA      Transfer, Comment VC for posture  -MA      Recorded by [MA] Claduia Saul, PT      Gait Assessment/Treatment    Gait, Bucks Level contact guard assist;verbal cues required  -MA      Gait, Assistive Device rolling walker  -MA      Gait, Distance (Feet) 30  -MA      Gait, Gait Deviations right:;antalgic;step length decreased;bilateral:;leon decreased  -MA      Gait, Safety Issues step length decreased;weight-shifting ability decreased  -MA      Gait, Impairments strength decreased;pain  -MA      Gait, Comment VC for posture and sequencing  -MA      Recorded by [MA] Claudia Saul, PT      Therapy  Exercises    Right Lower Extremity AROM:;15 reps;ankle pumps/circles;heel raises   heel slides 0-80 only  -MA      Recorded by [MA] Claudia Saul, PT      Positioning and Restraints    Pre-Treatment Position sitting in chair/recliner  -MA      Post Treatment Position chair  -MA      In Chair sitting;call light within reach;encouraged to call for assist;legs elevated   ice pack over R knee  -MA      Recorded by [MA] Claudia Saul, PT        User Key  (r) = Recorded By, (t) = Taken By, (c) = Cosigned By    Initials Name Effective Dates    SHARAN Saul, PT 12/13/16 -                 IP PT Goals       09/06/17 1024          Bed Mobility PT LTG    Bed Mobility PT LTG, Time to Achieve 1 wk  -CH      Bed Mobility PT LTG, Activity Type all bed mobility  -CH      Bed Mobility PT LTG, [place] Level supervision required  -CH      Transfer Training PT LTG    Transfer Training PT LTG, Time to Achieve 1 wk  -CH      Transfer Training PT LTG, Activity Type all transfers  -CH      Transfer Training PT LTG, [place] Level supervision required  -CH      Transfer Training PT LTG, Assist Device walker, rolling  -CH      Gait Training PT LTG    Gait Training Goal PT LTG, Time to Achieve 1 wk  -CH      Gait Training Goal PT LTG, [place] Level supervision required  -CH      Gait Training Goal PT LTG, Assist Device walker, rolling  -CH      Gait Training Goal PT LTG, Distance to Achieve 150  -CH        User Key  (r) = Recorded By, (t) = Taken By, (c) = Cosigned By    Initials Name Provider Type    RADU Scott, PT Physical Therapist          Physical Therapy Education     Title: PT OT SLP Therapies (Done)     Topic: Physical Therapy (Done)     Point: Mobility training (Done)    Learning Progress Summary    Learner Readiness Method Response Comment Documented by Status   Patient Acceptance E MARQUEZ  MA 09/06/17 1608 Done    Acceptance E,D,TB VU,NR   09/06/17 1024 Done               Point: Home exercise  program (Done)    Learning Progress Summary    Learner Readiness Method Response Comment Documented by Status   Patient Acceptance E MARQUEZ  MA 09/06/17 1608 Done    Acceptance E,D,TB VU,NR   09/06/17 1024 Done               Point: Body mechanics (Done)    Learning Progress Summary    Learner Readiness Method Response Comment Documented by Status   Patient Acceptance E MARQUEZ  MA 09/06/17 1608 Done    Acceptance E,D,TB VU,NR   09/06/17 1024 Done               Point: Precautions (Done)    Learning Progress Summary    Learner Readiness Method Response Comment Documented by Status   Patient Acceptance E [facility] 09/06/17 1608 Done    Acceptance E,D,TB VU,NR   09/06/17 1024 Done                      User Key     Initials Effective Dates Name Provider Type Discipline     12/01/15 -  Monae Scott, PT Physical Therapist PT    MA 12/13/16 -  Claudia Saul, PT Physical Therapist PT                    PT Recommendation and Plan  Anticipated Discharge Disposition: skilled nursing facility  Planned Therapy Interventions: balance training, bed mobility training, gait training, home exercise program, patient/family education, transfer training  PT Frequency: 2 times/day  Plan of Care Review  Plan Of Care Reviewed With: (P) patient  Progress: (P) improving  Outcome Summary/Follow up Plan: (P) Improved ambulation with KI donned this date and tolerance to activity. Will continue to address deficits and improve level of independence as appropriate.          Outcome Measures       09/06/17 1600 09/06/17 1000       How much help from another person do you currently need...    Turning from your back to your side while in flat bed without using bedrails? 3  -MA 3  -CH     Moving from lying on back to sitting on the side of a flat bed without bedrails? 3  -MA 3  -CH     Moving to and from a bed to a chair (including a wheelchair)? 3  -MA 3  -CH     Standing up from a chair using your arms (e.g., wheelchair, bedside chair)? 3  -MA  3  -CH     Climbing 3-5 steps with a railing? 2  -MA 2  -CH     To walk in hospital room? 3  -MA 2  -CH     AM-PAC 6 Clicks Score 17  -MA 16  -CH     Functional Assessment    Outcome Measure Options AM-PAC 6 Clicks Basic Mobility (PT)  -MA AM-PAC 6 Clicks Basic Mobility (PT)  -       User Key  (r) = Recorded By, (t) = Taken By, (c) = Cosigned By    Initials Name Provider Type     Monae Scott, PT Physical Therapist    SHARNA Saul, PT Physical Therapist           Time Calculation:         PT Charges       09/06/17 1616 09/06/17 0929       Time Calculation    Start Time 1606  -MA 0914  -     Stop Time 1617  -MA 0929  -     Time Calculation (min) 11 min  -MA 15 min  -     PT Received On 09/06/17  -MA 09/06/17  -     PT - Next Appointment 09/07/17  -MA 09/06/17  -     PT Goal Re-Cert Due Date  09/09/17  -       User Key  (r) = Recorded By, (t) = Taken By, (c) = Cosigned By    Initials Name Provider Type     Monae Scott, PT Physical Therapist    SHARAN Saul, PT Physical Therapist          Therapy Charges for Today     Code Description Service Date Service Provider Modifiers Qty    70530709948  PT THER PROC EA 15 MIN 9/6/2017 Claudia Saul, PT GP 1    [id number]  PT THER SUPP EA 15 MIN 9/6/2017 Claudia Saul, PT GP 1          PT G-Codes  Outcome Measure Options: AM-PAC 6 Clicks Basic Mobility (PT)    Claudia Saul, PT  9/6/2017

## 2017-09-06 NOTE — PROGRESS NOTES
[place] [facility]     LOS: 1 day     Name: Hugh R McBurney  Age: 66 y.o.  Sex: male  :  1950  MRN: [id number]         Primary Care Physician: Guille Nieves MD    Diet Regular; Consistent Carbohydrate    Subjective   No complaint. No chest pain, shortness breath. Appetite okay.    Objective   Temp:  [97.3 °F (36.3 °C)-98.8 °F (37.1 °C)] 97.6 °F (36.4 °C)  Heart Rate:  [] 79  Resp:  [10-18] 16  BP: (105-147)/(61-86) 122/68  SpO2:  [92 %-99 %] 99 %  on  Flow (L/min):  [2-4] 2;   O2 Device: nasal cannula  Body mass index is 31.58 kg/(m^2).    Physical Exam   Constitutional: He is oriented to person, place, and time. No distress.   Cardiovascular: Normal rate and regular rhythm.    Pulmonary/Chest: Effort normal and breath sounds normal. No respiratory distress.   Abdominal: Soft. There is no tenderness.   Neurological: He is alert and oriented to person, place, and time.   Skin: Skin is warm and dry.     Reviewed medications and new clinical results    aspirin 325 mg Oral BID   baclofen 10 mg Oral TID   ceFAZolin 2 g Intravenous Q8H   cholecalciferol 5,000 Units Oral Daily   docusate sodium 100 mg Oral BID   ferrous sulfate 325 mg Oral Daily With Lunch   insulin aspart 0-14 Units Subcutaneous 4x Daily With Meals & Nightly   insulin aspart 14 Units Subcutaneous TID With Meals   insulin detemir 20 Units Subcutaneous Q12H   isosorbide mononitrate 30 mg Oral Q24H   lisinopril 10 mg Oral Nightly   metoprolol tartrate 25 mg Oral Q12H   pantoprazole 40 mg Oral Q AM   pregabalin 100 mg Oral Daily   pregabalin 200 mg Oral Nightly   sertraline 100 mg Oral Daily       sodium chloride 100 mL/hr Last Rate: 100 mL/hr (17 1609)       Results from last 7 days  Lab Units 17  0322   WBC 10*3/mm3 10.75*   HEMOGLOBIN g/dL 9.3*   PLATELETS 10*3/mm3 157       Results from last 7 days  Lab Units 17  0322   SODIUM mmol/L 137   POTASSIUM mmol/L 4.8    CHLORIDE mmol/L 99   CO2 mmol/L 21.9*   BUN mg/dL 28*   CREATININE mg/dL 1.04   CALCIUM mg/dL 8.6   GLUCOSE mg/dL 203*     Glucose   Date/Time Value Ref Range Status   09/06/2017 0608 207 (H) 70 - 130 mg/dL Final   09/05/2017 2041 237 (H) 70 - 130 mg/dL Final   09/05/2017 1653 256 (H) 70 - 130 mg/dL Final   09/05/2017 1455 217 (H) 70 - 130 mg/dL Final   09/05/2017 0918 212 (H) 70 - 130 mg/dL Final     Hemoglobin A1C   Date Value Ref Range Status   09/06/2017 7.10 (H) 4.80 - 5.60 % Final     Estimated Creatinine Clearance: 93 mL/min (by C-G formula based on Cr of 1.04).    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     · S/P removal of antibiotic spacer and implantation of right knee 9/5/17  · A1c OK, steroids increasing glucose. See what next couple of glucoses and then adjust accordingly.  · Monitoring hemoglobin.  · DVT prophylaxis per orthopedic surgery  · BP stable.  · Replace vit D    Axel Zapata MD   09/06/17  9:59 AM

## 2017-09-06 NOTE — SIGNIFICANT NOTE
09/06/17 0953   Rehab Treatment   Discipline occupational therapist   Rehab Evaluation   Evaluation Not Performed patient unavailable for evaluation  (w PT 2507)   Recommendation   OT - Next Appointment 09/06/17

## 2017-09-06 NOTE — PROGRESS NOTES
Orthopedic Progress Note      Patient: Hugh R McBurney  YOB: 1950     Date of Admission: 9/5/2017  8:28 AM Medical Record Number: [id number]     Attending Physician: Kiko Marie, *    Status Post:  FL REVISE KNEE JOINT REPLACE,1 PART [97965] (RT TOTAL KNEE ARTHROPLASTY REVISION) Post Operative Day Number: 1    Subjective : No new orthopaedic complaints     Pain Relief: some relief with present medication.     Systemic Complaints: No Complaints  Vitals:    09/05/17 1900 09/05/17 2300 09/06/17 0300 09/06/17 0743   BP: 116/67 105/66 125/64 122/68   BP Location: Left arm Left arm Left arm Left arm   Patient Position: Lying Lying Lying Lying   Pulse: 96 91 91 79   Resp: 16 16 16 16   Temp: 98.4 °F (36.9 °C) 97.3 °F (36.3 °C) 98.5 °F (36.9 °C) 97.6 °F (36.4 °C)   TempSrc: Oral Oral Oral Oral   SpO2: 97% 98% 98% 99%   Weight:       Height:           Physical Exam: 66 y.o. male    General Appearance:       Alert, cooperative, in no acute distress                  Extremities:    Dressing Clean, Dry and Intact         Incision healthy without signs or symptoms of infections         No clinical sign of DVT        Able to do good movements of digits    Pulses:   Pulses palpable and equal bilaterally           Diagnostic Tests:       Results from last 7 days  Lab Units 09/06/17  0322   WBC 10*3/mm3 10.75*   HEMOGLOBIN g/dL 9.3*   HEMATOCRIT % 28.3*   PLATELETS 10*3/mm3 157       Results from last 7 days  Lab Units 09/06/17  0322   SODIUM mmol/L 137   POTASSIUM mmol/L 4.8   CHLORIDE mmol/L 99   CO2 mmol/L 21.9*   BUN mg/dL 28*   CREATININE mg/dL 1.04   GLUCOSE mg/dL 203*   CALCIUM mg/dL 8.6         Crystals, Fluid   Date Value Ref Range Status   02/14/2017   Final    Intra and Extracellular crystals observed exhibiting polarization characteristics of Calcium Pyrophosphate   ]  Culture   Date Value Ref Range Status   07/07/2017 Light growth (2+) Staphylococcus aureus (A)  Final     Comment:     D test  "is positive. Isolate exhibits \"inducible\" resistance to Clindamycin.     ]  Uric Acid   Date Value Ref Range Status   02/14/2017 7.3 (H) 3.4 - 7.0 mg/dL Final   ]  Xr Knee 1 Or 2 View Right    Result Date: 9/5/2017  Narrative: XR KNEE 1 OR 2 VW RIGHT-  POSTOP PORTABLE 2 VIEWS RIGHT KNEE  CLINICAL INFORMATION: Post arthroplasty  FINDINGS: Prosthesis is satisfactory in position. A complicating process is not identified.  This report was finalized on 9/5/2017 3:13 PM by Dr. Ji Bush MD.      Xr Knee 1 Or 2 View Right    Result Date: 8/29/2017  Narrative: XR KNEE 1 OR 2 VW RIGHT-  CLINICAL: Joint degeneration, repeat knee surgery.  COMPARISON: 07/07/2017  FINDINGS: Medullary fixation quang crosses the right knee joint. There is a spacer in position similar to the previous examination. No acute osseous abnormality. No indication of quang or spacer loosening/malalignment.  Vascular arterial calcifications within the soft tissues. The remainder is unremarkable.  This report was finalized on 8/29/2017 7:00 PM by Dr. Ji Bush MD.      Xr Chest Pa & Lateral    Result Date: 8/29/2017  Narrative: XR CHEST PA AND LATERAL-  CLINICAL: Preop knee surgery, hypertension.  COMPARISON: 07/10/2017.  FINDINGS: No acute pulmonary process is demonstrated. The heart size is within normal limits. The mediastinum and luther are satisfactory in appearance.  CONCLUSION: No active disease of the chest.  This report was finalized on 8/29/2017 7:00 PM by Dr. Ji Bush MD.          Current Medications:  Scheduled Meds:  aspirin 325 mg Oral BID   baclofen 10 mg Oral TID   ceFAZolin 2 g Intravenous Q8H   docusate sodium 100 mg Oral BID   ferrous sulfate 325 mg Oral Daily With Lunch   insulin aspart 0-14 Units Subcutaneous 4x Daily With Meals & Nightly   insulin aspart 14 Units Subcutaneous TID With Meals   insulin detemir 20 Units Subcutaneous Q12H   isosorbide mononitrate 30 mg Oral Q24H   lisinopril 10 mg Oral Nightly   metoprolol " tartrate 25 mg Oral Q12H   pantoprazole 40 mg Oral Q AM   pregabalin 100 mg Oral Daily   pregabalin 200 mg Oral Nightly   sertraline 100 mg Oral Daily     Continuous Infusions:  sodium chloride 100 mL/hr Last Rate: 100 mL/hr (09/05/17 1604)     PRN Meds:.•  acetaminophen  •  bisacodyl  •  dextrose  •  dextrose  •  glucagon (human recombinant)  •  Morphine **AND** naloxone  •  ondansetron  •  oxyCODONE-acetaminophen **OR** oxyCODONE-acetaminophen  •  promethazine    Assessment: Status post  RI REVISE KNEE JOINT REPLACE,1 PART [20452] (RT TOTAL KNEE ARTHROPLASTY REVISION)    Patient Active Problem List   Diagnosis   • Hypertension   • Hyperlipidemia   • Chronic pain   • Knee pain, hx of previous prosthetic joint infection   • Great toe pain, with cellulitis and gangrene   • Diabetic ulcer of 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, with necrosis of bone   • 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   • Recent MSSA (methicillin susceptible Staphylococcus aureus) septicemia   • Infection of prosthetic knee joint   • Hyperglycemia   • Hyponatremia   • Contusion of knee   • History of knee surgery   • Infection of prosthetic right knee joint   • CAD (coronary artery disease)   • Type 2 diabetes mellitus with proteinuria   • Hyponatremia   • Anemia, posthemorrhagic, acute   • Osteoarthritis, knee       PLAN:   Continues current post-op course  Anticoagulation: Aspirin started   Hemovac Drain to be removed today  Dressing Change in am  Mobilize with PT as tolerated per protocol    Weight Bearing: WBAT  Discharge Plan: OK to plan for discharge in  tomorrow to SNF  from orthopadic perspective.    iKko Marie MD    Date: 9/6/2017    Time: 8:00 AM

## 2017-09-06 NOTE — PLAN OF CARE
Problem: Patient Care Overview (Adult)  Goal: Plan of Care Review  Outcome: Ongoing (interventions implemented as appropriate)    09/06/17 1024   Coping/Psychosocial Response Interventions   Plan Of Care Reviewed With patient   Outcome Evaluation   Outcome Summary/Follow up Plan Pt presents with impaired functional mobility and gait secondary to R LE weakness, and decreased ROM post-op knee revision. Pt must wear knee immobilizer when up and is to only perform active heel slides. Pt may benefit from skilled PT to address strength, mobility, and ROM.         Problem: Inpatient Physical Therapy  Goal: Bed Mobility Goal LTG- PT  Outcome: Ongoing (interventions implemented as appropriate)    09/06/17 1024   Bed Mobility PT LTG   Bed Mobility PT LTG, Time to Achieve 1 wk   Bed Mobility PT LTG, Activity Type all bed mobility   Bed Mobility PT LTG, [place] Level supervision required       Goal: Transfer Training Goal 1 LTG- PT  Outcome: Ongoing (interventions implemented as appropriate)    09/06/17 1024   Transfer Training PT LTG   Transfer Training PT LTG, Time to Achieve 1 wk   Transfer Training PT LTG, Activity Type all transfers   Transfer Training PT LTG, [place] Level supervision required   Transfer Training PT LTG, Assist Device walker, rolling       Goal: Gait Training Goal LTG- PT  Outcome: Ongoing (interventions implemented as appropriate)    09/06/17 1024   Gait Training PT LTG   Gait Training Goal PT LTG, Time to Achieve 1 wk   Gait Training Goal PT LTG, [place] Level supervision required   Gait Training Goal PT LTG, Assist Device walker, rolling   Gait Training Goal PT LTG, Distance to Achieve 150

## 2017-09-06 NOTE — DISCHARGE PLACEMENT REQUEST
"McBurney, Hugh R (66 y.o. Male)     Date of Birth Social Security Number Address Home Phone MRN    1950  [address] [id number] [phone] [id number]    [employer] Marital Status          None        Admission Date Admission Type Admitting Provider Attending Provider Department, Room/Bed    9/5/17 Elective Kiko Marie MD Yakkanti, Madhusudhan R, MD [address], P779/1    Discharge Date Discharge Disposition Discharge Destination                      Attending Provider: Kiko Mraie MD     Allergies:  Vancomycin    Isolation:  None   Infection:  None   Code Status:  FULL    Ht:  74\" (188 cm)   Wt:  246 lb (112 kg)    Admission Cmt:  None   Principal Problem:  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]                 Active Insurance as of 9/5/2017     Primary Coverage     Payor Plan Insurance Group Employer/Plan Group    MEDICARE RAILROAD MEDICARE      Payor Plan Address Payor Plan Phone Number Effective From Effective To    [address] [phone] 7/1/1992     [place] [address]       Subscriber Name Subscriber Birth Date Member ID       MCBURNEY,HUGH R 1950 [id number]           Secondary Coverage     Payor Plan Insurance Group Employer/Plan Group    [facility] [id number]     Payor Plan Address Payor Plan Phone Number Effective From Effective To    [address]  1/1/2017     [place] [address]       Subscriber Name Subscriber Birth Date Member ID       MCBURNEY,HUGH R 1950 [id number]                 Emergency Contacts      (Rel.) Home Phone Work Phone Mobile Phone    McBurney,Brian (Son) [phone] -- [phone]              "

## 2017-09-06 NOTE — CONSULTS
Name: Hugh R McBurney ADMIT: 2017   : 1950  PCP: Guille Nieves MD    MRN: [id number] LOS: 0 days   AGE/SEX: 66 y.o. male  ROOM: P779/1     Inpatient Consult to Hospitalist  Consult performed by: MAXIMUS HURT  Consult ordered by: PRITI GUERRA  Reason for consult: Diabetes and medical management      Date of Admit: 2017  Date of Consult: 17    Subjective   History of Present Illness  Mr. McBurney is a 66 y.o. male that presents to [facility] following elective [place] REVISE KNEE JOINT REPLACE,1 PART [86314] (RT TOTAL KNEE ARTHROPLASTY REVISION).  He has been admitted to a orthopedic floor following surgery and we were asked to see and assist with his medical problems, specifically relating to his diabetes.  At the time of my visit he denies any chest pain, SOA, nausea, vomiting or diarrhea.  He does complain of expected postoperative discomfort.  He has tolerated a diet.  He has been in a normal state of health leading up to surgery.  This is his sixth operation on that knee.      Past Medical History:   Diagnosis Date   • Arthritis    • Chronic pain     BILATERAL KNEES AND BACK   • Coronary artery disease    • Depression    • Diabetes mellitus    • GERD (gastroesophageal reflux disease)    • History of kidney stones    • Hyperlipidemia    • Hypertension    • Kidney stone    • MSSA (methicillin susceptible Staphylococcus aureus) infection     RIGHT KNEE   • NSTEMI (non-ST elevated myocardial infarction) 2017   • Sleep apnea     DOES NOT USE CPAP     Past Surgical History:   Procedure Laterality Date   • APPENDECTOMY     • CARDIAC CATHETERIZATION N/A 2017    Procedure: Left Heart Cath;  Surgeon: Tiburcio Moreno MD;  Location: [facility] INVASIVE LOCATION;  Service:    • COLONOSCOPY     • JOINT REPLACEMENT      BILATERAL KNEES    • LUMBAR FUSION     • TOTAL KNEE  PROSTHESIS REMOVAL W/ SPACER INSERTION Right 2017    Procedure: INCISION AND  DRAINAGE RIGHT KNEE, POLY CHANGE;  Surgeon: Kiko Marie MD;  Location: [facility] OR;  Service:    • TOTAL KNEE  PROSTHESIS REMOVAL W/ SPACER INSERTION Right 7/7/2017    Procedure: REMOVAL RIGHT TOTAL KNEE ARTHROPLASTY AND ANITBIOTIC SPACER;  Surgeon: Kiko Marie MD;  Location: [facility] OR;  Service:    • TRANS METATARSAL AMPUTATION Left 2/14/2017    Procedure: EXCISIONAL DEBRIDEMENT LT. FIRST TOE DIABETIC ULCER, DRAINAGE ABSCESS FIRST TOE, FIRST TOE AMPUTATION;  Surgeon: Osmin Brand MD;  Location: [facility] OR;  Service:      Family History   Problem Relation Age of Onset   • Heart disease Mother    • Heart disease Father    • Diabetes Sister    • Dementia Maternal Grandmother    • No Known Problems Maternal Grandfather    • Heart disease Paternal Grandmother    • Heart attack Paternal Grandfather    • Heart disease Paternal Grandfather    • Heart disease Son    • Heart attack Son    • No Known Problems Sister    • Malig Hyperthermia Neg Hx      Social History   Substance Use Topics   • Smoking status: Former Smoker     Packs/day: 3.00     Years: 20.00   • Smokeless tobacco: Former User      Comment: quit 35 years ago. 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    • Alcohol use No     Prescriptions Prior to Admission   Medication Sig Dispense Refill Last Dose   • aspirin 81 MG EC tablet Take 1 tablet by mouth Daily. Resume after completing 325 mg aspirin. (Patient taking differently: Take 81 mg by mouth Daily. HOLD 1 WEEK PRIOR TO SURGERY)   9/2/2017   • atorvastatin (LIPITOR) 40 MG tablet Take 1 tablet by mouth Every Night. 30 tablet 1 9/4/2017 at 2000   • baclofen (LIORESAL) 10 MG tablet Take 10 mg by mouth 3 (Three) Times a Day.   9/4/2017 at 2000   • ferrous sulfate 325 (65 FE) MG tablet Take 325 mg by mouth Daily With Lunch.   9/4/2017 at 0930   • insulin aspart (novoLOG FLEXPEN) 100 UNIT/ML solution pen-injector sc pen Inject 14 Units under the skin 3 (Three) Times a Day With Meals.    9/4/2017 at 2000   • Insulin Glargine (LANTUS SOLOSTAR) 100 UNIT/ML injection pen Inject 40 Units under the skin 2 (Two) Times a Day. (Patient taking differently: Inject 45 Units under the skin 2 (Two) Times a Day.) 4 pen 2 9/4/2017 at 2200   • isosorbide mononitrate (IMDUR) 30 MG 24 hr tablet Take 1 tablet by mouth Daily. 30 tablet 1 9/5/2017 at 0715   • lisinopril (PRINIVIL,ZESTRIL) 10 MG tablet Take 1 tablet by mouth Every Evening. 30 tablet 11 9/4/2017 at 2000   • metoprolol tartrate (LOPRESSOR) 25 MG tablet Take 1 tablet by mouth Every 12 (Twelve) Hours. 60 tablet 11 9/5/2017 at 0715   • Multiple Vitamin (MULTI VITAMIN DAILY PO) Take 1 tablet by mouth Daily. HOLD PRIOR TO SURGERY   9/2/2017   • omeprazole (priLOSEC) 40 MG capsule Take 40 mg by mouth 2 (Two) Times a Day.   9/4/2017 at 2000   • pregabalin (LYRICA) 100 MG capsule Take 200 mg by mouth Every Night.   9/4/2017 at 2000   • pregabalin (LYRICA) 100 MG capsule Take 100 mg by mouth Every Morning.   9/4/2017 at 0800   • sertraline (ZOLOFT) 100 MG tablet Take 100 mg by mouth Daily.   9/4/2017 at 0930   • vitamin D (ERGOCALCIFEROL) 12305 units capsule capsule Take 1 capsule by mouth 1 (One) Time Per Week. (Patient taking differently: Take 50,000 Units by mouth 1 (One) Time Per Week. Friday) 30 capsule 11 9/1/2017   • bisacodyl (DULCOLAX) 10 MG suppository Insert 1 suppository into the rectum Daily As Needed for constipation.  0 FEW WEEKS AGO   • glucose blood (ACCU-CHEK COMPACT PLUS) test strip Use to test blood sugar 5 times daily 500 each 3 Taking   • glucose blood (ACCU-CHEK COMPACT STRIPS) test strip Use to test blood sugar 4 times daily   ICD 10 code E11.9 400 each 3 Taking   • Insulin Pen Needle (BD PEN NEEDLE JUAN DAVID U/F) 32G X 4 MM misc TO INJECT 4 TIMES DAILY 100 each 3 Taking   • Lancets (ACCU-CHEK MULTICLIX) lancets Use to test blood sugar 5 times daily 500 each 3 Taking   • promethazine (PHENERGAN) 25 MG tablet Take 1 tablet by mouth Every 8 (Eight)  Hours As Needed for Nausea or Vomiting. 30 tablet 2 More than a month at Unknown time     Allergies:  Vancomycin    Review of Systems   Constitutional: Negative.    HENT: Negative.    Eyes: Negative.    Respiratory: Negative.    Gastrointestinal: Negative.    Endocrine: Negative.    Genitourinary: Negative.    Musculoskeletal: Negative.         Nerve block still working   Skin: Negative.    Neurological: Negative.    Hematological: Negative.    Psychiatric/Behavioral: Negative.        Objective      Vital Signs  Temp:  [97.9 °F (36.6 °C)-98.8 °F (37.1 °C)] 98.4 °F (36.9 °C)  Heart Rate:  [] 96  Resp:  [10-20] 16  BP: (111-147)/(61-86) 116/67  Body mass index is 31.58 kg/(m^2).    Physical Exam   Constitutional: He is oriented to person, place, and time. He appears well-developed and well-nourished.   HENT:   Head: Normocephalic and atraumatic.   Eyes: Conjunctivae and EOM are normal. Pupils are equal, round, and reactive to light. No scleral icterus.   Neck: Normal range of motion. Neck supple. No JVD present.   Cardiovascular: Normal rate, regular rhythm and normal heart sounds.    No murmur heard.  Pulmonary/Chest: Effort normal and breath sounds normal. No respiratory distress.   Abdominal: Soft. Bowel sounds are normal. He exhibits no distension. There is no tenderness.   Musculoskeletal: Normal range of motion. He exhibits no edema.   Left knee bandaged.  Not further examined.  Did not test ROM..   Neurological: He is alert and oriented to person, place, and time. No cranial nerve deficit.   Skin: Skin is warm and dry.   Psychiatric: He has a normal mood and affect. His behavior is normal. Judgment and thought content normal.   Vitals reviewed.      Results Review:    I reviewed the patient's new clinical results.      Assessment/Plan     Active Hospital Problems (** Indicates Principal Problem)    Diagnosis Date Noted   • **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] 07/07/2017   •  Osteoarthritis, knee [M17.9] 09/05/2017   • CAD (coronary artery disease) [I25.10] 07/08/2017   • Type 2 diabetes mellitus with proteinuria [E11.29, R80.9] 07/08/2017   • 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       Assessment & Plan  - Continue his long acting insulin - LEVEMIR 20 units SC Q12hr.  - NOVOLOG -14 units TID with meals and moderate high dose correctional factor as needed.  - Monitor glucose QAC and QHS. For any BG less than 70 mg/dL, treat per hospital protocol  - Fasting BMP ordered for in the morning.  Will review.  Recent hemoglobin A1c was excellent at 6.9%.  Will not plan on changing home regimen at discharge.  He sees Dr. Love.  - Note patient did receive Solu-Cortef 100 mg IV preoperatively.  Could certainly adversely affect blood glucose.  - NCS diet  - Holding parameters have been written for LISINOPRIL.  Continue his METOPROLOL.   - Continue IVFs as ordered.    - ASA has been ordered for DVT prophylaxis per ortho.  - He was encouraged to use incentive spirometer as instructed.      Thank you very much for asking [facility] to be involved in this patient's care.  We will follow along with you.      Bryan Chopra MD  [facility]  09/05/17  10:12 PM

## 2017-09-06 NOTE — SIGNIFICANT NOTE
09/06/17 1236   Rehab Treatment   Discipline occupational therapist   Rehab Evaluation   Evaluation Not Performed other (see comments)  (noted pt plan to dc to [facility]. defer OT eval to SNU setting. )

## 2017-09-07 PROBLEM — E87.1 HYPONATREMIA: Status: ACTIVE | Noted: 2017-09-07

## 2017-09-07 LAB
ANION GAP SERPL CALCULATED.3IONS-SCNC: 12.7 MMOL/L
BASOPHILS # BLD AUTO: 0.02 10*3/MM3 (ref 0–0.2)
BASOPHILS NFR BLD AUTO: 0.2 % (ref 0–1.5)
BUN BLD-MCNC: 35 MG/DL (ref 8–23)
BUN/CREAT SERPL: 28.2 (ref 7–25)
CALCIUM SPEC-SCNC: 8.8 MG/DL (ref 8.6–10.5)
CHLORIDE SERPL-SCNC: 93 MMOL/L (ref 98–107)
CO2 SERPL-SCNC: 23.3 MMOL/L (ref 22–29)
CREAT BLD-MCNC: 1.24 MG/DL (ref 0.76–1.27)
DEPRECATED RDW RBC AUTO: 50.3 FL (ref 37–54)
EOSINOPHIL # BLD AUTO: 0.47 10*3/MM3 (ref 0–0.7)
EOSINOPHIL NFR BLD AUTO: 4.8 % (ref 0.3–6.2)
ERYTHROCYTE [DISTWIDTH] IN BLOOD BY AUTOMATED COUNT: 14.3 % (ref 11.5–14.5)
GFR SERPL CREATININE-BSD FRML MDRD: 58 ML/MIN/1.73
GLUCOSE BLD-MCNC: 122 MG/DL (ref 65–99)
GLUCOSE BLDC GLUCOMTR-MCNC: 109 MG/DL (ref 70–130)
GLUCOSE BLDC GLUCOMTR-MCNC: 119 MG/DL (ref 70–130)
GLUCOSE BLDC GLUCOMTR-MCNC: 130 MG/DL (ref 70–130)
GLUCOSE BLDC GLUCOMTR-MCNC: 148 MG/DL (ref 70–130)
HCT VFR BLD AUTO: 24.4 % (ref 40.4–52.2)
HGB BLD-MCNC: 7.9 G/DL (ref 13.7–17.6)
IMM GRANULOCYTES # BLD: 0 10*3/MM3 (ref 0–0.03)
IMM GRANULOCYTES NFR BLD: 0 % (ref 0–0.5)
LYMPHOCYTES # BLD AUTO: 3.17 10*3/MM3 (ref 0.9–4.8)
LYMPHOCYTES NFR BLD AUTO: 32.3 % (ref 19.6–45.3)
MCH RBC QN AUTO: 31.1 PG (ref 27–32.7)
MCHC RBC AUTO-ENTMCNC: 32.4 G/DL (ref 32.6–36.4)
MCV RBC AUTO: 96.1 FL (ref 79.8–96.2)
MONOCYTES # BLD AUTO: 0.88 10*3/MM3 (ref 0.2–1.2)
MONOCYTES NFR BLD AUTO: 9 % (ref 5–12)
NEUTROPHILS # BLD AUTO: 5.26 10*3/MM3 (ref 1.9–8.1)
NEUTROPHILS NFR BLD AUTO: 53.7 % (ref 42.7–76)
PLATELET # BLD AUTO: 135 10*3/MM3 (ref 140–500)
PMV BLD AUTO: 11.7 FL (ref 6–12)
POTASSIUM BLD-SCNC: 4.1 MMOL/L (ref 3.5–5.2)
RBC # BLD AUTO: 2.54 10*6/MM3 (ref 4.6–6)
SODIUM BLD-SCNC: 129 MMOL/L (ref 136–145)
WBC NRBC COR # BLD: 9.8 10*3/MM3 (ref 4.5–10.7)

## 2017-09-07 PROCEDURE — 97110 THERAPEUTIC EXERCISES: CPT

## 2017-09-07 PROCEDURE — 85025 COMPLETE CBC W/AUTO DIFF WBC: CPT | Performed by: ORTHOPAEDIC SURGERY

## 2017-09-07 PROCEDURE — 82962 GLUCOSE BLOOD TEST: CPT

## 2017-09-07 PROCEDURE — 99232 SBSQ HOSP IP/OBS MODERATE 35: CPT | Performed by: INTERNAL MEDICINE

## 2017-09-07 PROCEDURE — 25010000003 CEFAZOLIN IN DEXTROSE 2-4 GM/100ML-% SOLUTION: Performed by: ORTHOPAEDIC SURGERY

## 2017-09-07 PROCEDURE — 63710000001 INSULIN DETEMER PER 5 UNITS: Performed by: INTERNAL MEDICINE

## 2017-09-07 PROCEDURE — 63710000001 INSULIN ASPART PER 5 UNITS: Performed by: ORTHOPAEDIC SURGERY

## 2017-09-07 PROCEDURE — 80048 BASIC METABOLIC PNL TOTAL CA: CPT | Performed by: ORTHOPAEDIC SURGERY

## 2017-09-07 RX ORDER — SODIUM CHLORIDE 9 MG/ML
75 INJECTION, SOLUTION INTRAVENOUS CONTINUOUS
Status: ACTIVE | OUTPATIENT
Start: 2017-09-07 | End: 2017-09-08

## 2017-09-07 RX ADMIN — OXYCODONE AND ACETAMINOPHEN 2 TABLET: 10; 325 TABLET ORAL at 08:38

## 2017-09-07 RX ADMIN — BACLOFEN 10 MG: 10 TABLET ORAL at 21:14

## 2017-09-07 RX ADMIN — BACLOFEN 10 MG: 10 TABLET ORAL at 18:08

## 2017-09-07 RX ADMIN — DOCUSATE SODIUM 100 MG: 100 CAPSULE, LIQUID FILLED ORAL at 08:38

## 2017-09-07 RX ADMIN — CEFAZOLIN SODIUM 2 G: 2 INJECTION, SOLUTION INTRAVENOUS at 04:18

## 2017-09-07 RX ADMIN — INSULIN ASPART 14 UNITS: 100 INJECTION, SOLUTION INTRAVENOUS; SUBCUTANEOUS at 08:38

## 2017-09-07 RX ADMIN — METOPROLOL TARTRATE 25 MG: 25 TABLET ORAL at 08:38

## 2017-09-07 RX ADMIN — INSULIN ASPART 14 UNITS: 100 INJECTION, SOLUTION INTRAVENOUS; SUBCUTANEOUS at 12:03

## 2017-09-07 RX ADMIN — SERTRALINE 100 MG: 100 TABLET, FILM COATED ORAL at 08:38

## 2017-09-07 RX ADMIN — DOCUSATE SODIUM 100 MG: 100 CAPSULE, LIQUID FILLED ORAL at 18:07

## 2017-09-07 RX ADMIN — INSULIN DETEMIR 20 UNITS: 100 INJECTION, SOLUTION SUBCUTANEOUS at 23:19

## 2017-09-07 RX ADMIN — FERROUS SULFATE TAB 325 MG (65 MG ELEMENTAL FE) 325 MG: 325 (65 FE) TAB at 12:04

## 2017-09-07 RX ADMIN — ASPIRIN 325 MG: 325 TABLET, DELAYED RELEASE ORAL at 18:07

## 2017-09-07 RX ADMIN — OXYCODONE AND ACETAMINOPHEN 2 TABLET: 10; 325 TABLET ORAL at 18:08

## 2017-09-07 RX ADMIN — PREGABALIN 100 MG: 50 CAPSULE ORAL at 08:38

## 2017-09-07 RX ADMIN — SODIUM CHLORIDE 75 ML/HR: 9 INJECTION, SOLUTION INTRAVENOUS at 22:26

## 2017-09-07 RX ADMIN — SODIUM CHLORIDE 75 ML/HR: 9 INJECTION, SOLUTION INTRAVENOUS at 12:02

## 2017-09-07 RX ADMIN — INSULIN ASPART 14 UNITS: 100 INJECTION, SOLUTION INTRAVENOUS; SUBCUTANEOUS at 18:08

## 2017-09-07 RX ADMIN — PREGABALIN 200 MG: 75 CAPSULE ORAL at 21:14

## 2017-09-07 RX ADMIN — ASPIRIN 325 MG: 325 TABLET, DELAYED RELEASE ORAL at 08:38

## 2017-09-07 RX ADMIN — METOPROLOL TARTRATE 25 MG: 25 TABLET ORAL at 21:14

## 2017-09-07 RX ADMIN — VITAMIN D, TAB 1000IU (100/BT) 5000 UNITS: 25 TAB at 08:38

## 2017-09-07 RX ADMIN — PANTOPRAZOLE SODIUM 40 MG: 40 TABLET, DELAYED RELEASE ORAL at 05:03

## 2017-09-07 RX ADMIN — OXYCODONE AND ACETAMINOPHEN 2 TABLET: 10; 325 TABLET ORAL at 22:29

## 2017-09-07 RX ADMIN — BACLOFEN 10 MG: 10 TABLET ORAL at 08:38

## 2017-09-07 RX ADMIN — CEFAZOLIN SODIUM 2 G: 2 INJECTION, SOLUTION INTRAVENOUS at 12:02

## 2017-09-07 RX ADMIN — OXYCODONE AND ACETAMINOPHEN 2 TABLET: 10; 325 TABLET ORAL at 00:45

## 2017-09-07 RX ADMIN — OXYCODONE AND ACETAMINOPHEN 2 TABLET: 10; 325 TABLET ORAL at 14:48

## 2017-09-07 RX ADMIN — OXYCODONE AND ACETAMINOPHEN 2 TABLET: 10; 325 TABLET ORAL at 05:03

## 2017-09-07 NOTE — PROGRESS NOTES
INFECTIOUS DISEASES PROGRESS NOTE    CC: Follow-up prosthetic joint infection    S:   Doing well.  No significant knee pain.  No fever, chills, night sweats    O:  Physical Exam:  Temp:  [96.6 °F (35.9 °C)-97.7 °F (36.5 °C)] 97.6 °F (36.4 °C)  Heart Rate:  [68-73] 68  Resp:  [16] 16  BP: ()/(49-77) 99/53  Physical Exam  No acute distress  Right knee and cast.  No cellulitis in exposed areas.   Diagnostics:     Cr 1.24  WBC 9.8 (P54, L 32, M 9, E5)    H/H 7.9/24     Estimated Creatinine Clearance: 78 mL/min (by C-G formula based on Cr of 1.24).    Assessment/Plan       1.  MSSA right prosthetic knee joint infection status post explantation on 7/7/17 6 weeks IV antibiotics and now with revision TKA on 9/5/17  2. Diabetes mellitus type 2 with neuropathy, continue glycemic control efforts to prevent/control infectious complications  3.  Mild thrombocytopenia, likely reactive.  Continue to monitor intermittently.    Surgical culture remains no growth to date.  Provided this remains negative, do not perceive need for additional IV  antibiotics at discharge.  D/w Dr. Marie and we will plan for three months of PO abx at discharge.      Amor Mayberry MD  10:49 AM  09/07/17

## 2017-09-07 NOTE — PROGRESS NOTES
Orthopedic Progress Note      Patient: Hugh R McBurney  YOB: 1950     Date of Admission: 9/5/2017  8:28 AM Medical Record Number: [id number]     Attending Physician: Kiko Marie, *    Status Post:  [place] REVISE KNEE JOINT REPLACE,1 PART [19373] (RT TOTAL KNEE ARTHROPLASTY REVISION) Post Operative Day Number: 2    Subjective : No new orthopaedic complaints     Pain Relief: some relief with present medication.     Systemic Complaints: No Complaints  Vitals:    09/07/17 0705 09/07/17 1100 09/07/17 1101 09/07/17 1104   BP: 99/53 104/50 115/54 101/52   BP Location: Left arm Left arm     Patient Position: Lying Lying Standing Standing   Pulse: 68 72     Resp: 16 16     Temp: 97.6 °F (36.4 °C) 97.6 °F (36.4 °C)     TempSrc: Oral Oral     SpO2: 96% 97%     Weight:       Height:           Physical Exam: 66 y.o. male    General Appearance:       Alert, cooperative, in no acute distress                  Extremities:    Dressing Clean, Dry and Intact         Incision healthy without signs or symptoms of infections         No clinical sign of DVT        Able to do good movements of digits    Pulses:   Pulses palpable and equal bilaterally           Diagnostic Tests:       Results from last 7 days  Lab Units 09/07/17  0422 09/06/17  0322   WBC 10*3/mm3 9.80 10.75*   HEMOGLOBIN g/dL 7.9* 9.3*   HEMATOCRIT % 24.4* 28.3*   PLATELETS 10*3/mm3 135* 157       Results from last 7 days  Lab Units 09/07/17  0422 09/06/17  0322   SODIUM mmol/L 129* 137   POTASSIUM mmol/L 4.1 4.8   CHLORIDE mmol/L 93* 99   CO2 mmol/L 23.3 21.9*   BUN mg/dL 35* 28*   CREATININE mg/dL 1.24 1.04   GLUCOSE mg/dL 122* 203*   CALCIUM mg/dL 8.8 8.6         Crystals, Fluid   Date Value Ref Range Status   02/14/2017   Final    Intra and Extracellular crystals observed exhibiting polarization characteristics of Calcium Pyrophosphate   ]  Culture   Date Value Ref Range Status   07/07/2017 Light growth (2+) Staphylococcus aureus (A)  Final      "Comment:     D test is positive. Isolate exhibits \"inducible\" resistance to Clindamycin.     ]  Uric Acid   Date Value Ref Range Status   02/14/2017 7.3 (H) 3.4 - 7.0 mg/dL Final   ]  Xr Knee 1 Or 2 View Right    Result Date: 9/5/2017  Narrative: XR KNEE 1 OR 2 VW RIGHT-  POSTOP PORTABLE 2 VIEWS RIGHT KNEE  CLINICAL INFORMATION: Post arthroplasty  FINDINGS: Prosthesis is satisfactory in position. A complicating process is not identified.  This report was finalized on 9/5/2017 3:13 PM by Dr. Ji Bush MD.      Xr Knee 1 Or 2 View Right    Result Date: 8/29/2017  Narrative: XR KNEE 1 OR 2 VW RIGHT-  CLINICAL: Joint degeneration, repeat knee surgery.  COMPARISON: 07/07/2017  FINDINGS: Medullary fixation quang crosses the right knee joint. There is a spacer in position similar to the previous examination. No acute osseous abnormality. No indication of quang or spacer loosening/malalignment.  Vascular arterial calcifications within the soft tissues. The remainder is unremarkable.  This report was finalized on 8/29/2017 7:00 PM by Dr. Ji Bush MD.      Xr Chest Pa & Lateral    Result Date: 8/29/2017  Narrative: XR CHEST PA AND LATERAL-  CLINICAL: Preop knee surgery, hypertension.  COMPARISON: 07/10/2017.  FINDINGS: No acute pulmonary process is demonstrated. The heart size is within normal limits. The mediastinum and luther are satisfactory in appearance.  CONCLUSION: No active disease of the chest.  This report was finalized on 8/29/2017 7:00 PM by Dr. Ji Bush MD.          Current Medications:  Scheduled Meds:  aspirin 325 mg Oral BID   baclofen 10 mg Oral TID   ceFAZolin 2 g Intravenous Q8H   cholecalciferol 5,000 Units Oral Daily   docusate sodium 100 mg Oral BID   ferrous sulfate 325 mg Oral Daily With Lunch   insulin aspart 0-14 Units Subcutaneous 4x Daily With Meals & Nightly   insulin aspart 14 Units Subcutaneous TID With Meals   insulin detemir 45 Units Subcutaneous Q12H   isosorbide mononitrate 30 mg " Oral Q24H   metoprolol tartrate 25 mg Oral Q12H   pantoprazole 40 mg Oral Q AM   pregabalin 100 mg Oral Daily   pregabalin 200 mg Oral Nightly   sertraline 100 mg Oral Daily     Continuous Infusions:  sodium chloride 75 mL/hr     PRN Meds:.•  acetaminophen  •  bisacodyl  •  dextrose  •  dextrose  •  glucagon (human recombinant)  •  Morphine **AND** naloxone  •  ondansetron  •  oxyCODONE-acetaminophen **OR** oxyCODONE-acetaminophen  •  promethazine    Assessment: Status post  [place] REVISE KNEE JOINT REPLACE,1 PART [10579] (RT TOTAL KNEE ARTHROPLASTY REVISION)    Patient Active Problem List   Diagnosis   • Hypertension   • Hyperlipidemia   • Chronic pain   • Knee pain, hx of previous prosthetic joint infection   • Great toe pain, with cellulitis and gangrene   • Diabetic ulcer of 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, with necrosis of bone   • 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   • Recent MSSA (methicillin susceptible Staphylococcus aureus) septicemia   • Infection of prosthetic knee joint   • Hyperglycemia   • Hyponatremia   • Contusion of knee   • History of knee surgery   • Infection of prosthetic right knee joint   • CAD (coronary artery disease)   • Type 2 diabetes mellitus with proteinuria   • Hyponatremia   • Anemia, posthemorrhagic, acute   • Osteoarthritis, knee   • Anemia, posthemorrhagic, acute   • Vitamin D insufficiency   • Hyponatremia       PLAN:   Continues current post-op course  Pain control: prn norco  Anticoagulation:  BID started   Dressing Change today by RN  Mobilize with PT as tolerated per protocol  Continue abx while pt in house till cx neg    Weight Bearing: WBAT no flexion past 60 deg, KI while OOB  Discharge Plan: OK to plan for discharge in  tomorrow to home vs rehab pending progress with PT  from orthopadic perspective.    Jensen Menard MD    Date: 9/7/2017    Time: 11:28 AM

## 2017-09-07 NOTE — PROGRESS NOTES
Continued Stay Note  [facility]     Patient Name: Hugh R McBurney  MRN: [id number]  Today's Date: 9/7/2017    Admit Date: 9/5/2017          Discharge Plan       09/07/17 1418    Case Management/Social Work Plan    Additional Comments Pt is current w/ Sergio at home ([facility] entered in error). Pt would like to d/c home w/ Garretson, Valorie following.               Discharge Codes     None            Daphne De La Cruz RN

## 2017-09-07 NOTE — PLAN OF CARE
Problem: Patient Care Overview (Adult)  Goal: Plan of Care Review  Outcome: Ongoing (interventions implemented as appropriate)    09/07/17 6880   Coping/Psychosocial Response Interventions   Plan Of Care Reviewed With patient   Patient Care Overview   Progress improving   Outcome Evaluation   Outcome Summary/Follow up Plan WORKED C PT, UP TO CHAIR, PAIN CONTROLLED, DSG CHANGED, BG STABLE, VSS- NO HTN NOTED, VOIDING        Goal: Adult Individualization and Mutuality  Outcome: Ongoing (interventions implemented as appropriate)  Goal: Discharge Needs Assessment  Outcome: Ongoing (interventions implemented as appropriate)    Problem: Fall Risk (Adult)  Goal: Absence of Falls  Outcome: Ongoing (interventions implemented as appropriate)    Problem: Knee Replacement, Total (Adult)  Goal: Signs and Symptoms of Listed Potential Problems Will be Absent or Manageable (Knee Replacement, Total)  Outcome: Ongoing (interventions implemented as appropriate)

## 2017-09-07 NOTE — PROGRESS NOTES
[facility]     LOS: 2 days     Name: Hugh R McBurney  Age: 66 y.o.  Sex: male  :  1950  MRN: [id number]         Primary Care Physician: Guille Nieves MD    Diet Regular; Consistent Carbohydrate    Subjective   No complaint. No chest pain, shortness breath. Knee pain not terrible.    Objective   Temp:  [96.6 °F (35.9 °C)-97.7 °F (36.5 °C)] 97.6 °F (36.4 °C)  Heart Rate:  [68-73] 68  Resp:  [16] 16  BP: ()/(49-77) 99/53  SpO2:  [95 %-98 %] 96 %  on  Flow (L/min):  [2] 2;   O2 Device: room air  Body mass index is 31.58 kg/(m^2).    Physical Exam   Constitutional: He is oriented to person, place, and time. No distress.   Cardiovascular: Normal rate and regular rhythm.    Pulmonary/Chest: Effort normal and breath sounds normal. No respiratory distress.   Abdominal: Soft. There is no tenderness.   Neurological: He is alert and oriented to person, place, and time.   Skin: Skin is warm and dry.     Reviewed medications and new clinical results    aspirin 325 mg Oral BID   baclofen 10 mg Oral TID   ceFAZolin 2 g Intravenous Q8H   cholecalciferol 5,000 Units Oral Daily   docusate sodium 100 mg Oral BID   ferrous sulfate 325 mg Oral Daily With Lunch   insulin aspart 0-14 Units Subcutaneous 4x Daily With Meals & Nightly   insulin aspart 14 Units Subcutaneous TID With Meals   insulin detemir 45 Units Subcutaneous Q12H   isosorbide mononitrate 30 mg Oral Q24H   lisinopril 10 mg Oral Nightly   metoprolol tartrate 25 mg Oral Q12H   pantoprazole 40 mg Oral Q AM   pregabalin 100 mg Oral Daily   pregabalin 200 mg Oral Nightly   sertraline 100 mg Oral Daily          Results from last 7 days  Lab Units 17  0422 17  0322   WBC 10*3/mm3 9.80 10.75*   HEMOGLOBIN g/dL 7.9* 9.3*   PLATELETS 10*3/mm3 135* 157       Results from last 7 days  Lab Units 17  0422 17  0322   SODIUM mmol/L 129* 137   POTASSIUM mmol/L 4.1 4.8   CHLORIDE mmol/L 93* 99    CO2 mmol/L 23.3 21.9*   BUN mg/dL 35* 28*   CREATININE mg/dL 1.24 1.04   CALCIUM mg/dL 8.8 8.6   GLUCOSE mg/dL 122* 203*     Glucose   Date/Time Value Ref Range Status   09/07/2017 0658 119 70 - 130 mg/dL Final   09/06/2017 2114 216 (H) 70 - 130 mg/dL Final   09/06/2017 1537 184 (H) 70 - 130 mg/dL Final   09/06/2017 1122 204 (H) 70 - 130 mg/dL Final   09/06/2017 0608 207 (H) 70 - 130 mg/dL Final   09/05/2017 2041 237 (H) 70 - 130 mg/dL Final   09/05/2017 1653 256 (H) 70 - 130 mg/dL Final   09/05/2017 1455 217 (H) 70 - 130 mg/dL Final     Hemoglobin A1C   Date Value Ref Range Status   09/06/2017 7.10 (H) 4.80 - 5.60 % Final     Estimated Creatinine Clearance: 78 mL/min (by C-G formula based on Cr of 1.24).    Assessment/Plan   Active Hospital Problems (** Indicates Principal Problem)    Diagnosis Date Noted   • **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] 07/07/2017   • Hyponatremia [E87.1] 09/07/2017   • Anemia, posthemorrhagic, acute [D62] 09/06/2017   • Vitamin D insufficiency [E55.9] 09/06/2017   • Osteoarthritis, knee [M17.9] 09/05/2017   • CAD (coronary artery disease) [I25.10] 07/08/2017   • Type 2 diabetes mellitus with proteinuria [E11.29, R80.9] 07/08/2017   • 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     · S/P removal of antibiotic spacer and implantation of right knee 9/5/17  · A1c OK, steroids increasing glucose but they are coming down today. Continue to monitor.  · Monitoring hemoglobin, acute blood loss anemia.  · DVT prophylaxis per orthopedic surgery (ASA)  · BP low normal with hyponatremia and with acute blood loss anemia. Hyponatremia likely hypovolemic. Will hold lisinopril and add holding parameters for metoprolol. Give normal saline, try check orthostatics.  · Vitamin D replacement.    Axel Loc  MD [facility]   09/07/17  10:01 AM

## 2017-09-07 NOTE — PLAN OF CARE
Problem: Patient Care Overview (Adult)  Goal: Plan of Care Review    09/07/17 1157   Coping/Psychosocial Response Interventions   Plan Of Care Reviewed With patient   Patient Care Overview   Progress improving   Outcome Evaluation   Outcome Summary/Follow up Plan Improved ambulation this date with GIOVANNI moulton. Patient ascended/descended 3 steps with HR to demonstrate able to enter home when discharge is appropriate. No LOB or unsteadiness noted. Will continue to address deficits as able and improve level of independence.

## 2017-09-07 NOTE — PLAN OF CARE
Problem: Patient Care Overview (Adult)  Goal: Plan of Care Review  Outcome: Ongoing (interventions implemented as appropriate)    09/07/17 0342   Coping/Psychosocial Response Interventions   Plan Of Care Reviewed With patient   Patient Care Overview   Progress improving   Outcome Evaluation   Outcome Summary/Follow up Plan VSS, pain tolerable, did have to take a couple of doses of morphine, voiding per urinal, encouraged IS use, educated on BP monitoring and accuchecks, home soon       Goal: Adult Individualization and Mutuality  Outcome: Ongoing (interventions implemented as appropriate)  Goal: Discharge Needs Assessment  Outcome: Ongoing (interventions implemented as appropriate)    Problem: Fall Risk (Adult)  Goal: Absence of Falls  Outcome: Ongoing (interventions implemented as appropriate)    Problem: Knee Replacement, Total (Adult)  Goal: Signs and Symptoms of Listed Potential Problems Will be Absent or Manageable (Knee Replacement, Total)  Outcome: Ongoing (interventions implemented as appropriate)

## 2017-09-07 NOTE — THERAPY TREATMENT NOTE
Acute Care - Physical Therapy Treatment Note  [facility]     Patient Name: Hugh R McBurney  : 1950  MRN: [id number]  Today's Date: 2017  Onset of Illness/Injury or Date of Surgery Date: 17          Admit Date: 2017    Visit Dx:    ICD-10-CM ICD-9-CM   1. Difficulty walking R26.2 719.7   2. 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   • Recent MSSA (methicillin susceptible Staphylococcus aureus) septicemia   • Infection of prosthetic knee joint   • Hyperglycemia   • Hyponatremia   • Contusion of knee   • History of knee surgery   • Infection of prosthetic right knee joint   • CAD (coronary artery disease)   • Type 2 diabetes mellitus with proteinuria   • Hyponatremia   • Anemia, posthemorrhagic, acute   • Osteoarthritis, knee   • Anemia, posthemorrhagic, acute   • Vitamin D insufficiency   • Hyponatremia               Adult Rehabilitation Note       17 1151 17 1605       Rehab Assessment/Intervention    Discipline physical therapist  -MA physical therapist  -MA     Document Type therapy note (daily note)  -MA therapy note (daily note)  -MA     Subjective Information agree to therapy;complains of;pain  -MA agree to therapy;complains of;pain   ready for pain medicine per patient report  -MA     Patient Effort, Rehab Treatment good  -MA good  -MA     Symptoms Noted During/After Treatment increased pain  -MA increased pain  -MA     Precautions/Limitations fall precautions;brace on when up   KI when up, ROM 0-80)  -MA fall precautions;brace on when up   KI when ambulating, active heel slides  only, ROM 0-80  -MA     Patient Response to Treatment tolerated well  -MA tolerated well  -MA     Recorded by [MA] Claudia Saul, PT [MA] Claudia Saul PT     Pain Assessment    Pain Assessment 0-10  -MA 0-10  -MA     Pain Score 5  -MA 5  -MA     Pain Type Acute pain;Surgical pain  -MA Acute pain;Surgical pain  -MA     Pain Location Knee  -MA Knee  -MA     Pain Orientation Right  -MA Right  -MA     Pain Intervention(s) Cold applied;Repositioned;Ambulation/increased activity;Rest  -MA Cold applied;Repositioned;Ambulation/increased activity;Rest  -MA     Response to Interventions tolerated well  -MA tolerated  -MA     Recorded by [MA] Claudia Saul, PT [MA] Claudia Saul PT     Vision Assessment/Intervention    Visual Impairment WFL with corrective lenses  -MA      Recorded by [MA] Claudia Saul PT      Cognitive Assessment/Intervention    Current Cognitive/Communication Assessment functional  -MA functional  -MA     Orientation Status oriented x 4  -MA oriented x 4  -MA     Follows Commands/Answers Questions 100% of the time  -% of the time;able to follow multi-step instructions;needs cueing;needs increased time  -MA     Personal Safety WNL/WFL  -MA WNL/WFL  -MA     Personal Safety Interventions fall prevention program maintained;gait belt;nonskid shoes/slippers when out of bed  -MA fall prevention program maintained;gait belt;nonskid shoes/slippers when out of bed  -MA     Recorded by [MA] Claudia Saul, PT [MA] Claudia Saul, PT     Bed Mobility, Assessment/Treatment    Bed Mobility, Comment Patient UIC upon PT arrival  -MA Patient UIC upon PT arrival  -MA     Recorded by [MA] Claudia Saul, PT [MA] Claudia Saul PT     Transfer Assessment/Treatment    Transfers, Sit-Stand [place] stand by assist;verbal cues required  -MA contact guard assist;verbal cues required  -MA     Transfers, Stand-Sit [place] stand by assist;verbal cues required  -MA contact  guard assist;verbal cues required  -MA     Transfers, Sit-Stand-Sit, Assist Device rolling walker  -MA rolling walker  -MA     Transfer, Safety Issues step length decreased  -MA step length decreased;weight-shifting ability decreased  -MA     Transfer, Impairments strength decreased;pain  -MA strength decreased;pain  -MA     Transfer, Comment VC for posture  -MA VC for posture  -MA     Recorded by [MA] Claudia Saul, PT [MA] Claudia Saul, PT     Gait Assessment/Treatment    Gait, [place] Level stand by assist;verbal cues required  -MA contact guard assist;verbal cues required  -MA     Gait, Assistive Device rolling walker  -MA rolling walker  -MA     Gait, Distance (Feet) 100   KI donned  -MA 30  -MA     Gait, Gait Deviations right:;antalgic;step length decreased;bilateral:;[place]on decreased  -MA right:;antalgic;step length decreased;bilateral:;leon decreased  -MA     Gait, Safety Issues step length decreased  -MA step length decreased;weight-shifting ability decreased  -MA     Gait, Impairments ROM decreased;strength decreased;pain  -MA strength decreased;pain  -MA     Gait, Comment VC for step length  -MA VC for posture and sequencing  -MA     Recorded by [MA] Claudia Saul, PT [MA] Claudia Saul, PT     Stairs Assessment/Treatment    Number of Stairs 3  -MA      Stairs, Handrail Location left side (ascending)  -MA      Stairs, [place] Level contact guard assist;verbal cues required  -MA      Stairs, Technique Used step to step (ascending);step to step (descending)  -MA      Stairs, Impairments ROM decreased;strength decreased;pain  -MA      Stairs, Comment VC for technique and safety; good performance  -MA      Recorded by [MA] Claudia Saul, PT      Therapy Exercises    Right Lower Extremity AROM:;20 reps;sitting;ankle pumps/circles;quad sets;heel slides   0-80 only; pt reported  stated he can do quad sets now  -MA AROM:;15 reps;ankle pumps/circles;heel raises   heel  slides 0-80 only  -MA     Recorded by [MA] Claudia Saul, PT [MA] Claudia Saul PT     Positioning and Restraints    Pre-Treatment Position sitting in chair/recliner  -MA sitting in chair/recliner  -MA     Post Treatment Position chair  -MA chair  -MA     In Chair sitting;call light within reach;notified nsg;encouraged to call for assist;legs elevated   KI doffed, ice pack over R knee  -MA sitting;call light within reach;encouraged to call for assist;legs elevated   ice pack over R knee  -MA     Recorded by [MA] Claudia Saul, PT [MA] Claudia Saul, PT       User Key  (r) = Recorded By, (t) = Taken By, (c) = Cosigned By    Initials Name Effective Dates    SHARAN Saul, PT 12/13/16 -                 IP PT Goals       09/06/17 1024          Bed Mobility PT LTG    Bed Mobility PT LTG, Time to Achieve 1 wk  -CH      Bed Mobility PT LTG, Activity Type all bed mobility  -CH      Bed Mobility PT LTG, [place] Level supervision required  -CH      Transfer Training PT LTG    Transfer Training PT LTG, Time to Achieve 1 wk  -CH      Transfer Training PT LTG, Activity Type all transfers  -CH      Transfer Training PT LTG, [place] Level supervision required  -CH      Transfer Training PT LTG, Assist Device walker, rolling  -CH      Gait Training PT LTG    Gait Training Goal PT LTG, Time to Achieve 1 wk  -CH      Gait Training Goal PT LTG, [place] Level supervision required  -CH      Gait Training Goal PT LTG, Assist Device walker, rolling  -CH      Gait Training Goal PT LTG, Distance to Achieve 150  -CH        User Key  (r) = Recorded By, (t) = Taken By, (c) = Cosigned By    Initials Name Provider Type    RADU Scott PT Physical Therapist          Physical Therapy Education     Title: PT OT SLP Therapies (Done)     Topic: Physical Therapy (Done)     Point: Mobility training (Done)    Learning Progress Summary    Learner Readiness Method Response Comment Documented by Status    Patient Acceptance E VU  MA 09/07/17 1157 Done    Acceptance E VU  MA 09/06/17 1608 Done    Acceptance E,D,TB VU,NR   09/06/17 1024 Done               Point: Home exercise program (Done)    Learning Progress Summary    Learner Readiness Method Response Comment Documented by Status   Patient Acceptance E VU  MA 09/07/17 1157 Done    Acceptance E VU  MA 09/06/17 1608 Done    Acceptance E,D,TB VU,NR   09/06/17 1024 Done               Point: Body mechanics (Done)    Learning Progress Summary    Learner Readiness Method Response Comment Documented by Status   Patient Acceptance E VU  MA 09/07/17 1157 Done    Acceptance E VU  MA 09/06/17 1608 Done    Acceptance E,D,TB VU,NR   09/06/17 1024 Done               Point: Precautions (Done)    Learning Progress Summary    Learner Readiness Method Response Comment Documented by Status   Patient Acceptance E VU  MA 09/07/17 1157 Done    Acceptance E [facility] 09/06/17 1608 Done    Acceptance E,D,TB VU,NR   09/06/17 1024 Done                      User Key     Initials Effective Dates Name Provider Type Discipline     12/01/15 -  Monae Scott, PT Physical Therapist PT    MA 12/13/16 -  Claudia Saul, PT Physical Therapist PT                    PT Recommendation and Plan  Anticipated Discharge Disposition: skilled nursing facility  Planned Therapy Interventions: balance training, bed mobility training, gait training, home exercise program, patient/family education, transfer training  PT Frequency: 2 times/day  Plan of Care Review  Plan Of Care Reviewed With: (P) patient  Progress: (P) improving  Outcome Summary/Follow up Plan: (P) Improved ambulation this date with GIOVANNI moulton. Patient ascended/descended 3 steps with HR to demonstrate able to enter home when discharge is appropriate. No LOB or unsteadiness noted. Will continue to address deficits as able and improve level of independence.          Outcome Measures       09/07/17 1100 09/06/17 1600 09/06/17 1000     How much help from another person do you currently need...    Turning from your back to your side while in flat bed without using bedrails? 4  -MA 3  -MA 3  -CH    Moving from lying on back to sitting on the side of a flat bed without bedrails? 4  -MA 3  -MA 3  -CH    Moving to and from a bed to a chair (including a wheelchair)? 3  -MA 3  -MA 3  -CH    Standing up from a chair using your arms (e.g., wheelchair, bedside chair)? 3  -MA 3  -MA 3  -CH    Climbing 3-5 steps with a railing? 3  -MA 2  -MA 2  -CH    To walk in hospital room? 3  -MA 3  -MA 2  -CH    AM-PAC 6 Clicks Score 20  -MA 17  -MA 16  -CH    Functional Assessment    Outcome Measure Options AM-PAC 6 Clicks Basic Mobility (PT)  -MA AM-PAC 6 Clicks Basic Mobility (PT)  -MA AM-PAC 6 Clicks Basic Mobility (PT)  -CH      User Key  (r) = Recorded By, (t) = Taken By, (c) = Cosigned By    Initials Name Provider Type     Monae Scott, PT Physical Therapist    SHARAN Saul PT Physical Therapist           Time Calculation:         PT Charges       09/07/17 1159          Time Calculation    Start Time 1131  -MA      Stop Time 1150  -MA      Time Calculation (min) 19 min  -MA      PT Received On 09/07/17  -MA      PT - Next Appointment 09/07/17  -MA        User Key  (r) = Recorded By, (t) = Taken By, (c) = Cosigned By    Initials Name Provider Type    SHARAN Saul PT Physical Therapist          Therapy Charges for Today     Code Description Service Date Service Provider Modifiers Qty    42663243096  PT THER PROC EA 15 MIN 9/6/2017 Claudia Saul PT GP 1    79823048562 HC PT THER SUPP EA 15 MIN 9/6/2017 Claudia Saul, PT GP 1    46988592705 HC PT THER PROC EA 15 MIN 9/7/2017 Claudia Saul PT GP 1          PT G-Codes  Outcome Measure Options: AM-PAC 6 Clicks Basic Mobility (PT)    Claudia Saul PT  9/7/2017

## 2017-09-08 VITALS
DIASTOLIC BLOOD PRESSURE: 58 MMHG | RESPIRATION RATE: 18 BRPM | WEIGHT: 246 LBS | BODY MASS INDEX: 31.57 KG/M2 | SYSTOLIC BLOOD PRESSURE: 110 MMHG | TEMPERATURE: 98 F | OXYGEN SATURATION: 97 % | HEIGHT: 74 IN | HEART RATE: 70 BPM

## 2017-09-08 PROBLEM — E87.1 HYPONATREMIA: Status: RESOLVED | Noted: 2017-09-07 | Resolved: 2017-09-08

## 2017-09-08 PROBLEM — T84.53XA INFECTION OF PROSTHETIC RIGHT KNEE JOINT: Chronic | Status: RESOLVED | Noted: 2017-07-07 | Resolved: 2017-09-08

## 2017-09-08 PROBLEM — M17.9 OSTEOARTHRITIS, KNEE: Status: RESOLVED | Noted: 2017-09-05 | Resolved: 2017-09-08

## 2017-09-08 LAB
ANION GAP SERPL CALCULATED.3IONS-SCNC: 9.8 MMOL/L
BACTERIA SPEC AEROBE CULT: NORMAL
BUN BLD-MCNC: 28 MG/DL (ref 8–23)
BUN/CREAT SERPL: 30.1 (ref 7–25)
CALCIUM SPEC-SCNC: 9.1 MG/DL (ref 8.6–10.5)
CHLORIDE SERPL-SCNC: 103 MMOL/L (ref 98–107)
CO2 SERPL-SCNC: 24.2 MMOL/L (ref 22–29)
CREAT BLD-MCNC: 0.93 MG/DL (ref 0.76–1.27)
GFR SERPL CREATININE-BSD FRML MDRD: 81 ML/MIN/1.73
GLUCOSE BLD-MCNC: 138 MG/DL (ref 65–99)
GLUCOSE BLDC GLUCOMTR-MCNC: 136 MG/DL (ref 70–130)
GLUCOSE BLDC GLUCOMTR-MCNC: 143 MG/DL (ref 70–130)
GLUCOSE BLDC GLUCOMTR-MCNC: 185 MG/DL (ref 70–130)
GRAM STN SPEC: NORMAL
HCT VFR BLD AUTO: 27 % (ref 40.4–52.2)
HGB BLD-MCNC: 8.5 G/DL (ref 13.7–17.6)
POTASSIUM BLD-SCNC: 4.6 MMOL/L (ref 3.5–5.2)
SODIUM BLD-SCNC: 137 MMOL/L (ref 136–145)

## 2017-09-08 PROCEDURE — 63710000001 INSULIN ASPART PER 5 UNITS: Performed by: ORTHOPAEDIC SURGERY

## 2017-09-08 PROCEDURE — 85018 HEMOGLOBIN: CPT | Performed by: HOSPITALIST

## 2017-09-08 PROCEDURE — 82962 GLUCOSE BLOOD TEST: CPT

## 2017-09-08 PROCEDURE — 63710000001 INSULIN ASPART PER 5 UNITS: Performed by: HOSPITALIST

## 2017-09-08 PROCEDURE — 99232 SBSQ HOSP IP/OBS MODERATE 35: CPT | Performed by: INTERNAL MEDICINE

## 2017-09-08 PROCEDURE — 85014 HEMATOCRIT: CPT | Performed by: HOSPITALIST

## 2017-09-08 PROCEDURE — 80048 BASIC METABOLIC PNL TOTAL CA: CPT | Performed by: HOSPITALIST

## 2017-09-08 RX ORDER — OXYCODONE AND ACETAMINOPHEN 10; 325 MG/1; MG/1
1 TABLET ORAL EVERY 4 HOURS PRN
Qty: 72 TABLET | Refills: 0 | Status: SHIPPED | OUTPATIENT
Start: 2017-09-08 | End: 2017-09-20

## 2017-09-08 RX ORDER — SULFAMETHOXAZOLE AND TRIMETHOPRIM 800; 160 MG/1; MG/1
1 TABLET ORAL EVERY 12 HOURS SCHEDULED
Status: DISCONTINUED | OUTPATIENT
Start: 2017-09-08 | End: 2017-09-08 | Stop reason: HOSPADM

## 2017-09-08 RX ORDER — SULFAMETHOXAZOLE AND TRIMETHOPRIM 800; 160 MG/1; MG/1
1 TABLET ORAL EVERY 12 HOURS SCHEDULED
Qty: 60 TABLET | Refills: 3 | Status: SHIPPED | OUTPATIENT
Start: 2017-09-08 | End: 2017-12-07

## 2017-09-08 RX ADMIN — SULFAMETHOXAZOLE AND TRIMETHOPRIM 160 MG: 800; 160 TABLET ORAL at 11:46

## 2017-09-08 RX ADMIN — BACLOFEN 10 MG: 10 TABLET ORAL at 08:26

## 2017-09-08 RX ADMIN — INSULIN ASPART 3 UNITS: 100 INJECTION, SOLUTION INTRAVENOUS; SUBCUTANEOUS at 11:46

## 2017-09-08 RX ADMIN — PANTOPRAZOLE SODIUM 40 MG: 40 TABLET, DELAYED RELEASE ORAL at 06:38

## 2017-09-08 RX ADMIN — VITAMIN D, TAB 1000IU (100/BT) 5000 UNITS: 25 TAB at 08:25

## 2017-09-08 RX ADMIN — SERTRALINE 100 MG: 100 TABLET, FILM COATED ORAL at 08:25

## 2017-09-08 RX ADMIN — OXYCODONE AND ACETAMINOPHEN 2 TABLET: 10; 325 TABLET ORAL at 06:38

## 2017-09-08 RX ADMIN — OXYCODONE AND ACETAMINOPHEN 2 TABLET: 10; 325 TABLET ORAL at 02:31

## 2017-09-08 RX ADMIN — ASPIRIN 325 MG: 325 TABLET, DELAYED RELEASE ORAL at 08:25

## 2017-09-08 RX ADMIN — PREGABALIN 50 MG: 50 CAPSULE ORAL at 08:26

## 2017-09-08 RX ADMIN — FERROUS SULFATE TAB 325 MG (65 MG ELEMENTAL FE) 325 MG: 325 (65 FE) TAB at 11:46

## 2017-09-08 RX ADMIN — OXYCODONE AND ACETAMINOPHEN 1 TABLET: 10; 325 TABLET ORAL at 11:45

## 2017-09-08 RX ADMIN — INSULIN ASPART 14 UNITS: 100 INJECTION, SOLUTION INTRAVENOUS; SUBCUTANEOUS at 11:45

## 2017-09-08 RX ADMIN — INSULIN ASPART 14 UNITS: 100 INJECTION, SOLUTION INTRAVENOUS; SUBCUTANEOUS at 08:27

## 2017-09-08 RX ADMIN — METOPROLOL TARTRATE 25 MG: 25 TABLET ORAL at 08:25

## 2017-09-08 RX ADMIN — DOCUSATE SODIUM 100 MG: 100 CAPSULE, LIQUID FILLED ORAL at 08:27

## 2017-09-08 NOTE — PROGRESS NOTES
INFECTIOUS DISEASES PROGRESS NOTE    CC: f/u MSSA    S:   No acute knee pain.      no fever, chills, night sweats     O:  Physical Exam:  Temp:  [97.3 °F (36.3 °C)-98.4 °F (36.9 °C)] 98.4 °F (36.9 °C)  Heart Rate:  [67-72] 69  Resp:  [16] 16  BP: (101-138)/(50-68) 115/62  Physical Exam   Constitutional: He appears well-developed. No distress.   Pulmonary/Chest: Effort normal.   Musculoskeletal: He exhibits edema (R knee).   Skin: Skin is warm and dry.        Diagnostics:     Surgical cx neg    Assessment/Plan   1.  MSSA right prosthetic knee joint infection status post explantation on 7/7/17 6 weeks IV antibiotics and now with revision TKA on 9/5/17  2. Diabetes mellitus type 2 with neuropathy, continue glycemic control efforts to prevent/control infectious complications  3.  Mild thrombocytopenia, likely reactive.  Continue to monitor intermittently.    Surgical culture negative.  Discussed with Dr. Marie and we'll transition to Bactrim double strength twice a day ×90 days.  Discussed with patient side effects and proper dosing of medications.  Will follow-up with me in about one month or sooner if needed..    Amor Mayberry MD  9:34 AM  09/08/17

## 2017-09-08 NOTE — DISCHARGE SUMMARY
Orthopedic Discharge Summary      Patient: Hugh R McBurney   YOB: 1950    Medical Record Number: [id number]     Attending Physician: Kiko Marie, *  Consulting Physician(s):   Date of Admission: 9/5/2017  8:28 AM  Date of Discharge:      Removal of antibiotic spacer and   VA REVISE KNEE JOINT REPLACE,1 PART [96677] (RT TOTAL KNEE ARTHROPLASTY REVISION)    Patient Active Problem List   Diagnosis   • Hypertension   • Hyperlipidemia   • Chronic pain   • Knee pain, hx of previous prosthetic joint infection   • Great toe pain, with cellulitis and gangrene   • Diabetic ulcer of 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, with necrosis of bone   • 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   • Recent MSSA (methicillin susceptible Staphylococcus aureus) septicemia   • Infection of prosthetic knee joint   • Hyperglycemia   • Hyponatremia   • Contusion of knee   • History of knee surgery   • CAD (coronary artery disease)   • Type 2 diabetes mellitus with proteinuria   • Hyponatremia   • Anemia, posthemorrhagic, acute   • Anemia, posthemorrhagic, acute   • Vitamin D insufficiency          Allergies   Allergen Reactions   • Vancomycin Other (See Comments)     Red Man syndrome, slow rate and premedicate with benadryl        McBurney, Hugh R   Home Medication Instructions GABINO:[id number]    Printed on:09/08/17 0695   Medication Information                      aspirin  MG EC tablet  Take 1 tablet by mouth 2 (Two) Times a Day for 19 days.             atorvastatin (LIPITOR) 40 MG tablet  Take 1 tablet by mouth Every Night.             baclofen (LIORESAL) 10 MG tablet  Take 10 mg by mouth 3 (Three) Times a Day.             bisacodyl (DULCOLAX) 10 MG suppository  Insert 1 suppository into the rectum Daily As Needed for constipation.             ferrous sulfate 325 (65 FE) MG tablet  Take 325 mg by mouth Daily With Lunch.             glucose blood (ACCU-CHEK COMPACT  PLUS) test strip  Use to test blood sugar 5 times daily             glucose blood (ACCU-CHEK COMPACT STRIPS) test strip  Use to test blood sugar 4 times daily   ICD 10 code E11.9             insulin aspart (novoLOG FLEXPEN) 100 UNIT/ML solution pen-injector sc pen  Inject 14 Units under the skin 3 (Three) Times a Day With Meals.             Insulin Glargine (LANTUS SOLOSTAR) 100 UNIT/ML injection pen  Inject 40 Units under the skin 2 (Two) Times a Day.             Insulin Pen Needle (BD PEN NEEDLE JUAN DAVID U/F) 32G X 4 MM misc  TO INJECT 4 TIMES DAILY             isosorbide mononitrate (IMDUR) 30 MG 24 hr tablet  Take 1 tablet by mouth Daily.             Lancets (ACCU-CHEK MULTICLIX) lancets  Use to test blood sugar 5 times daily             lisinopril (PRINIVIL,ZESTRIL) 10 MG tablet  Take 1 tablet by mouth Every Evening.             metoprolol tartrate (LOPRESSOR) 25 MG tablet  Take 1 tablet by mouth Every 12 (Twelve) Hours.             Multiple Vitamin (MULTI VITAMIN DAILY PO)  Take 1 tablet by mouth Daily. HOLD PRIOR TO SURGERY             omeprazole (priLOSEC) 40 MG capsule  Take 40 mg by mouth 2 (Two) Times a Day.             oxyCODONE-acetaminophen (PERCOCET)  MG per tablet  Take 1 tablet by mouth Every 4 (Four) Hours As Needed for Moderate Pain  for up to 12 days.             pregabalin (LYRICA) 100 MG capsule  Take 200 mg by mouth Every Night.             pregabalin (LYRICA) 100 MG capsule  Take 100 mg by mouth Every Morning.             promethazine (PHENERGAN) 25 MG tablet  Take 1 tablet by mouth Every 8 (Eight) Hours As Needed for Nausea or Vomiting.             sertraline (ZOLOFT) 100 MG tablet  Take 100 mg by mouth Daily.             sulfamethoxazole-trimethoprim (BACTRIM DS,SEPTRA DS) 800-160 MG per tablet  Take 1 tablet by mouth Every 12 (Twelve) Hours for 90 days. Indications: Bone and/or Joint Infection, PROPHYLAXIS             vitamin D (ERGOCALCIFEROL) 21326 units capsule capsule  Take 1 capsule  by mouth 1 (One) Time Per Week.                    Past Medical History:   Diagnosis Date   • Arthritis    • Chronic pain     BILATERAL KNEES AND BACK   • Coronary artery disease    • Depression    • Diabetes mellitus    • GERD (gastroesophageal reflux disease)    • History of kidney stones    • Hyperlipidemia    • Hypertension    • Kidney stone    • MSSA (methicillin susceptible Staphylococcus aureus) infection     RIGHT KNEE   • NSTEMI (non-ST elevated myocardial infarction) 06/14/2017   • Sleep apnea     DOES NOT USE CPAP        Past Surgical History:   Procedure Laterality Date   • APPENDECTOMY     • CARDIAC CATHETERIZATION N/A 6/14/2017    Procedure: Left Heart Cath;  Surgeon: Tiburcio Moreno MD;  Location: [facility] INVASIVE LOCATION;  Service:    • COLONOSCOPY     • JOINT REPLACEMENT      BILATERAL KNEES    • LUMBAR FUSION     • HI REVISE KNEE JOINT REPLACE,1 PART Right 9/5/2017    Procedure: REMOVAL OF ANTIBIOTIC CEMENT SPACER; RIGHT TOTAL KNEE ARTHROPLASTY REVISION;  Surgeon: Kiko Marie MD;  Location: [facility];  Service: Orthopedics   • TOTAL KNEE  PROSTHESIS REMOVAL W/ SPACER INSERTION Right 2/14/2017    Procedure: INCISION AND DRAINAGE RIGHT KNEE, POLY CHANGE;  Surgeon: Kiko Marie MD;  Location: [facility];  Service:    • TOTAL KNEE  PROSTHESIS REMOVAL W/ SPACER INSERTION Right 7/7/2017    Procedure: REMOVAL RIGHT TOTAL KNEE ARTHROPLASTY AND ANITBIOTIC SPACER;  Surgeon: Kiko Marie MD;  Location: [facility] OR;  Service:    • TRANS METATARSAL AMPUTATION Left 2/14/2017    Procedure: EXCISIONAL DEBRIDEMENT LT. FIRST TOE DIABETIC ULCER, DRAINAGE ABSCESS FIRST TOE, FIRST TOE AMPUTATION;  Surgeon: Osmin Brand MD;  Location: [facility] OR;  Service:         Social History     Occupational History   • Not on file.     Social History Main Topics   • Smoking status: Former Smoker     Packs/day: 3.00     Years: 20.00   • Smokeless tobacco: Former  User      Comment: quit 35 years ago. 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    • Alcohol use No   • Drug use: No   • Sexual activity: Defer      Social History     Social History Narrative        Family History   Problem Relation Age of Onset   • Heart disease Mother    • Heart disease Father    • Diabetes Sister    • Dementia Maternal Grandmother    • No Known Problems Maternal Grandfather    • Heart disease Paternal Grandmother    • Heart attack Paternal Grandfather    • Heart disease Paternal Grandfather    • Heart disease Son    • Heart attack Son    • No Known Problems Sister    • Malig Hyperthermia Neg Hx        Physical Exam: 66 y.o. male  General Appearance:    Alert, cooperative, in no acute distress                      Vitals:    09/07/17 1900 09/07/17 2300 09/08/17 0233 09/08/17 0700   BP: 106/58 127/68 138/66 115/62   BP Location: Right arm Right arm Left arm Left arm   Patient Position: Sitting Lying Lying Lying   Pulse: 67 69 72 69   Resp: 16 16 16 16   Temp: 97.3 °F (36.3 °C) 97.7 °F (36.5 °C) 98.2 °F (36.8 °C) 98.4 °F (36.9 °C)   TempSrc: Oral Oral Oral Oral   SpO2: 99% 99% 99% 96%   Weight:       Height:            Hospital Course:  66 y.o. male admitted to [facility] to services of Kiko Marie, Juan with antibiotic spacer right knee after MSSA PJI  on 9/5/2017 and underwent a removal of the spacer and complex revision  total knee arthroplasty Per Kiko Marie MD. Antibiotic and VTE prophylaxis were per SCIP protocols and included  Kefzol 2 gm every 8 hours and Aspirin 325 mg twice daily . Post-operatively the patient transferred to the post-operative floor where the patient underwent mobilization therapy that included active as well as passive ROM exercises. Opioids were titrated to achieve appropriate pain management to allow for participation in mobilization exercises. Vital signs are now stable. The incision is intact without signs or symptoms of infection. Operative extremity  neurovascular status remains intact.   He was seen by ID Dr Amor Mayberry and it is planned he will be on chronic antibiotic suppression with Bactrim DS for 3 months.   He has a patellar tendon partial avulsion and we will limit active extension.   Appropriate education re: incision care, activity levels, medications, and follow up visits was completed and all questions were answered. The patient is now deemed stable for discharge.      DIAGNOSTIC TESTS:     Admission on 09/05/2017   Component Date Value Ref Range Status   • Glucose 09/05/2017 212* 70 - 130 mg/dL Final   • Case Report 09/05/2017    Final                    Value:Surgical Pathology Report                         Case: [id number]                                  Authorizing Provider:  Kiko Marie MD Collected:           09/05/2017 12:23 PM          Ordering Location:     [facility]  Received:            09/05/2017 12:42 PM                                 MAIN OR                                                                      Pathologist:           Ruben Hoffmann MD                                                       Specimen:    Knee, Right, Right knee tissue; Room 12 #[id number]                                             • Final Diagnosis 09/05/2017    Final                    Value:This result contains rich text formatting which cannot be displayed here.   • Intraoperative Consultation 09/05/2017    Final                    Value:This result contains rich text formatting which cannot be displayed here.   • Intradepartmental Consult 09/05/2017    Final                    Value:This result contains rich text formatting which cannot be displayed here.   • Gross Description 09/05/2017    Final                    Value:This result contains rich text formatting which cannot be displayed here.   • Tissue Culture 09/05/2017 No growth at 3 days   Final   • Gram Stain Result 09/05/2017 Few (2+) Red blood cells   Final   •  Gram Stain Result 09/05/2017 Rare (1+) WBCs seen   Final   • Gram Stain Result 09/05/2017 No organisms seen   Final   • Glucose 09/05/2017 217* 70 - 130 mg/dL Final   • Glucose 09/05/2017 256* 70 - 130 mg/dL Final   • Glucose 09/05/2017 237* 70 - 130 mg/dL Final   • Glucose 09/06/2017 203* 65 - 99 mg/dL Final   • BUN 09/06/2017 28* 8 - 23 mg/dL Final   • Creatinine 09/06/2017 1.04  0.76 - 1.27 mg/dL Final   • Sodium 09/06/2017 137  136 - 145 mmol/L Final   • Potassium 09/06/2017 4.8  3.5 - 5.2 mmol/L Final   • Chloride 09/06/2017 99  98 - 107 mmol/L Final   • CO2 09/06/2017 21.9* 22.0 - 29.0 mmol/L Final   • Calcium 09/06/2017 8.6  8.6 - 10.5 mg/dL Final   • eGFR Non African Amer 09/06/2017 71  >60 mL/min/1.73 Final   • BUN/Creatinine Ratio 09/06/2017 26.9* 7.0 - 25.0 Final   • Anion Gap 09/06/2017 16.1  mmol/L Final   • Hemoglobin A1C 09/06/2017 7.10* 4.80 - 5.60 % Final   • WBC 09/06/2017 10.75* 4.50 - 10.70 10*3/mm3 Final   • RBC 09/06/2017 2.98* 4.60 - 6.00 10*6/mm3 Final   • Hemoglobin 09/06/2017 9.3* 13.7 - 17.6 g/dL Final   • Hematocrit 09/06/2017 28.3* 40.4 - 52.2 % Final   • MCV 09/06/2017 95.0  79.8 - 96.2 fL Final   • MCH 09/06/2017 31.2  27.0 - 32.7 pg Final   • MCHC 09/06/2017 32.9  32.6 - 36.4 g/dL Final   • RDW 09/06/2017 14.3  11.5 - 14.5 % Final   • RDW-SD 09/06/2017 49.8  37.0 - 54.0 fl Final   • MPV 09/06/2017 11.8  6.0 - 12.0 fL Final   • Platelets 09/06/2017 157  140 - 500 10*3/mm3 Final   • Neutrophil % 09/06/2017 80.4* 42.7 - 76.0 % Final   • Lymphocyte % 09/06/2017 11.9* 19.6 - 45.3 % Final   • Monocyte % 09/06/2017 7.4  5.0 - 12.0 % Final   • Eosinophil % 09/06/2017 0.0* 0.3 - 6.2 % Final   • Basophil % 09/06/2017 0.1  0.0 - 1.5 % Final   • Immature Grans % 09/06/2017 0.2  0.0 - 0.5 % Final   • Neutrophils, Absolute 09/06/2017 8.64* 1.90 - 8.10 10*3/mm3 Final   • Lymphocytes, Absolute 09/06/2017 1.28  0.90 - 4.80 10*3/mm3 Final   • Monocytes, Absolute 09/06/2017 0.80  0.20 - 1.20 10*3/mm3  Final   • Eosinophils, Absolute 09/06/2017 0.00  0.00 - 0.70 10*3/mm3 Final   • Basophils, Absolute 09/06/2017 0.01  0.00 - 0.20 10*3/mm3 Final   • Immature Grans, Absolute 09/06/2017 0.02  0.00 - 0.03 10*3/mm3 Final   • Glucose 09/06/2017 207* 70 - 130 mg/dL Final   • Glucose 09/06/2017 204* 70 - 130 mg/dL Final   • Glucose 09/06/2017 184* 70 - 130 mg/dL Final   • Glucose 09/06/2017 216* 70 - 130 mg/dL Final   • Glucose 09/07/2017 122* 65 - 99 mg/dL Final   • BUN 09/07/2017 35* 8 - 23 mg/dL Final   • Creatinine 09/07/2017 1.24  0.76 - 1.27 mg/dL Final   • Sodium 09/07/2017 129* 136 - 145 mmol/L Final   • Potassium 09/07/2017 4.1  3.5 - 5.2 mmol/L Final   • Chloride 09/07/2017 93* 98 - 107 mmol/L Final   • CO2 09/07/2017 23.3  22.0 - 29.0 mmol/L Final   • Calcium 09/07/2017 8.8  8.6 - 10.5 mg/dL Final   • eGFR Non African Amer 09/07/2017 58* >60 mL/min/1.73 Final   • BUN/Creatinine Ratio 09/07/2017 28.2* 7.0 - 25.0 Final   • Anion Gap 09/07/2017 12.7  mmol/L Final   • WBC 09/07/2017 9.80  4.50 - 10.70 10*3/mm3 Final   • RBC 09/07/2017 2.54* 4.60 - 6.00 10*6/mm3 Final   • Hemoglobin 09/07/2017 7.9* 13.7 - 17.6 g/dL Final   • Hematocrit 09/07/2017 24.4* 40.4 - 52.2 % Final   • MCV 09/07/2017 96.1  79.8 - 96.2 fL Final   • MCH 09/07/2017 31.1  27.0 - 32.7 pg Final   • MCHC 09/07/2017 32.4* 32.6 - 36.4 g/dL Final   • RDW 09/07/2017 14.3  11.5 - 14.5 % Final   • RDW-SD 09/07/2017 50.3  37.0 - 54.0 fl Final   • MPV 09/07/2017 11.7  6.0 - 12.0 fL Final   • Platelets 09/07/2017 135* 140 - 500 10*3/mm3 Final   • Neutrophil % 09/07/2017 53.7  42.7 - 76.0 % Final   • Lymphocyte % 09/07/2017 32.3  19.6 - 45.3 % Final   • Monocyte % 09/07/2017 9.0  5.0 - 12.0 % Final   • Eosinophil % 09/07/2017 4.8  0.3 - 6.2 % Final   • Basophil % 09/07/2017 0.2  0.0 - 1.5 % Final   • Immature Grans % 09/07/2017 0.0  0.0 - 0.5 % Final   • Neutrophils, Absolute 09/07/2017 5.26  1.90 - 8.10 10*3/mm3 Final   • Lymphocytes, Absolute 09/07/2017 3.17   0.90 - 4.80 10*3/mm3 Final   • Monocytes, Absolute 09/07/2017 0.88  0.20 - 1.20 10*3/mm3 Final   • Eosinophils, Absolute 09/07/2017 0.47  0.00 - 0.70 10*3/mm3 Final   • Basophils, Absolute 09/07/2017 0.02  0.00 - 0.20 10*3/mm3 Final   • Immature Grans, Absolute 09/07/2017 0.00  0.00 - 0.03 10*3/mm3 Final   • Glucose 09/07/2017 119  70 - 130 mg/dL Final   • Glucose 09/07/2017 148* 70 - 130 mg/dL Final   • Glucose 09/07/2017 130  70 - 130 mg/dL Final   • Glucose 09/07/2017 109  70 - 130 mg/dL Final   • Hemoglobin 09/08/2017 8.5* 13.7 - 17.6 g/dL Final   • Hematocrit 09/08/2017 27.0* 40.4 - 52.2 % Final   • Glucose 09/08/2017 138* 65 - 99 mg/dL Final   • BUN 09/08/2017 28* 8 - 23 mg/dL Final   • Creatinine 09/08/2017 0.93  0.76 - 1.27 mg/dL Final   • Sodium 09/08/2017 137  136 - 145 mmol/L Final   • Potassium 09/08/2017 4.6  3.5 - 5.2 mmol/L Final   • Chloride 09/08/2017 103  98 - 107 mmol/L Final   • CO2 09/08/2017 24.2  22.0 - 29.0 mmol/L Final   • Calcium 09/08/2017 9.1  8.6 - 10.5 mg/dL Final   • eGFR Non  Amer 09/08/2017 81  >60 mL/min/1.73 Final   • BUN/Creatinine Ratio 09/08/2017 30.1* 7.0 - 25.0 Final   • Anion Gap 09/08/2017 9.8  mmol/L Final   • Glucose 09/08/2017 136* 70 - 130 mg/dL Final   • Glucose 09/08/2017 143* 70 - 130 mg/dL Final       Xr Knee 1 Or 2 View Right    Result Date: 9/5/2017  Narrative: XR KNEE 1 OR 2 VW RIGHT-  POSTOP PORTABLE 2 VIEWS RIGHT KNEE  CLINICAL INFORMATION: Post arthroplasty  FINDINGS: Prosthesis is satisfactory in position. A complicating process is not identified.  This report was finalized on 9/5/2017 3:13 PM by Dr. Ji Bush MD.      Xr Knee 1 Or 2 View Right    Result Date: 8/29/2017  Narrative: XR KNEE 1 OR 2 VW RIGHT-  CLINICAL: Joint degeneration, repeat knee surgery.  COMPARISON: 07/07/2017  FINDINGS: Medullary fixation quagn crosses the right knee joint. There is a spacer in position similar to the previous examination. No acute osseous abnormality. No  indication of quang or spacer loosening/malalignment.  Vascular arterial calcifications within the soft tissues. The remainder is unremarkable.  This report was finalized on 8/29/2017 7:00 PM by Dr. Ji Bush MD.      Xr Chest Pa & Lateral    Result Date: 8/29/2017  Narrative: XR CHEST PA AND LATERAL-  CLINICAL: Preop knee surgery, hypertension.  COMPARISON: 07/10/2017.  FINDINGS: No acute pulmonary process is demonstrated. The heart size is within normal limits. The mediastinum and luther are satisfactory in appearance.  CONCLUSION: No active disease of the chest.  This report was finalized on 8/29/2017 7:00 PM by Dr. Ji Bush MD.        Discharge and Follow up Instructions:     Total Knee Joint Replacement Discharge Instructions:    I. ACTIVITIES:  1. Exercises:  ? Complete exercise program as taught by the hospital physical therapist 2 times per day  ? Exercise program will be advanced by your home health physical therapist  ? During the day be up ambulating every 2 hours (while awake) for short distances  ? Complete the ankle pump exercises at least 10 times per hour (while awake)  ? Elevate legs most of the day the first week post operatively and thereafter elevate legs when in bed and for at least 30 minutes during the day.   ? Caution must be taken to avoid pillow placement under the bend of the knee as this can led to flexion contractures of the knee. Pillow placement under the heel is encouraged.  ? Use cold packs 20-30 minutes approximately 5 times per day. This should be done before and after completing your exercises and at any time you are experiencing pain/ stiffness in your operative extremity.      2. Activities of Daily Living:  ? No tub baths, hot tubs, or swimming pools for 4 weeks  ? May shower and let water run over the incision on post-operative day #5 if no drainage. Do not scrub or rub the incision. Simply let the water run over the incision and pat dry.    II. Restrictions  ? Do not  cross legs or kneel  ? Your surgeon will discuss with you when you will be able to drive again. Usual guidelines are you are to be off pain medications prior to driving.  ? Weight bearing is as tolerated  ? First week stay inside on even terrain. May go up and down stairs one stair at a time utilizing the hand rail.  ? After one week, you may venture outside.    III. Precautions:  ? Everyone that comes near you should wash their hands  ? No elective dental, genital-urinary, or colon procedures or surgical procedures for 12 weeks after surgery unless absolutely necessary.  ?  If dental work or surgical procedure is deemed absolutely necessary, you will need to contact your surgeon as you will need to take antibiotics 1 hour prior to any dental work (including teeth cleanings).  ? Please discuss with your surgeon prophylactic antibiotics as the length of time this intervention will be necessary for you varies with each patient’s health history and situation.  ? Avoid sick people. If you must be around someone who is ill, they should wear a mask.  ? Avoid visits to the Emergency Room or Urgent Care. If you feel you need to go to the emergency room, please notify your surgeon.    ? Stockings are to be worn for one week after surgery and are to be placed on in the morning and removed at night. Observe your skin when stocking is removed for any problems. Monitor the stockings to ensure that any swelling is not causing the stockings to become too tight. In this case, remove stockings immediately.    IV. INCISION CARE:  ? Wash your hands prior to dressing changes  ? Change the dressing as needed to keep incision clean and dry. Utilize dry gauze and paper tape. Avoid touching the side of the gauze that goes against the incision with your hands.  ? No creams or ointments to the incision  ? May remove dressing once the incision is free of drainage  ? Do not touch or pick at the incision  ? Check incision every day and notify  surgeon immediately if any of the following signs or symptoms are noted:  o Increase in redness  o Increase in swelling around the incision and of the entire extremity  o Increase in pain  o Drainage oozing from the incision  o Pulling apart of the edges of the incision  o Increase in overall body temperature (greater than 100.5 degrees)  ? Your  Staples will be removed between 10-14 days postoperation.  This may be done by either the home health nurse, rehabilitation nurse or during your return visit to Dr. Marie's office.  You will then be instructed on how to care for the incision.  (Please call the office if your staples have not been removed within 14 days after surgery).    V. Medications:   1. Anticoagulants: You will be discharged on an anticoagulant. This is a prophylactic medication that helps prevent blood clots during your post-operative period.  You will be on Aspirin 325 mg twice daily for 21 days. If you were on Aspirin 81 mg prior to surgery hold it and restart once your Aspirin 325 mg is completed.     ? While taking the anticoagulant, you should avoid taking any additional aspirin, ibuprofen (Advil or Motrin), Aleve (Naprosyn) or other non-steroidal anti-inflammatory medications.   ? Notify surgeon immediately if any fan bleeding is noted in the urine, stool, emesis, or from the nose or the incision. Blood in the stool will often appear as black rather than red. Blood in urine may appear as pink. Blood in emesis may appear as brown/black like coffee grounds.  ? You will need to apply pressure for longer periods of time to any cuts or abrasions to stop bleeding  ? Avoid alcohol while taking anticoagulants    2. Stool Softeners: You will be at greater risk of constipation after surgery due to being less mobile and the pain medications.   ? Take stool softeners as instructed by your surgeon while on pain medications. Over the counter Colace 100 mg 1-2 capsules twice daily.   ? If stools become  too loose or too frequent, please decreases the dosage or stop the stool softener.  ? If constipation occurs despite use of stool softeners, you are to continue the stool softeners and add a laxative (Milk of Magnesia 1 ounce daily as needed).  ? Dulcolax oral tabs or suppository, or a fleets enema can also be utilized for constipation and can be obtained over the counter.   ? If above interventions are unsuccessful in inducing bowel movements, please contact your surgeon's office / family physician's office.  ? Drink plenty of fluids, and eat fruits and vegetables during your recovery time    3. Pain Medications utilized after surgery are narcotics and the law requires that the following information be given to all patients that are prescribed narcotics:  ? CLASSIFICATION: Pain medications are called Opioids and are narcotics  ? LEGALITIES: It is illegal to share narcotics with others and to drive within 24 hours of taking narcotics  ? POTENTIAL SIDE EFFECTS: Potential side effects of opioids include: nausea, vomiting, itching, dizziness, drowsiness, dry mouth, constipation, and difficulty urinating.  ? POTENTIAL ADVERSE EFFECTS:   o Opioid tolerance can develop with use of pain medications and this simply means that it requires more and more of the medication to control pain; however, this is seen more in patients that use opioids for longer periods of time.  o Opioid dependence can develop with use of Opioids and this simply means that to stop the medication can cause withdrawal symptoms; however, this is seen with patients that use Opioids for longer periods of time.  o Opioid addiction can develop with use of Opioids and the incidence of this is very unlikely in patients who take the medications as ordered and stop the medications as instructed.  o Opioid overdose can be dangerous, but is unlikely when the medication is taken as ordered and stopped when ordered. It is important not to mix opioids with alcohol  or with and type of sedative such as Benadryl as this can lead to over sedation and respiratory difficulty.  ? DOSAGE:   o Pain medications will need to be taken consistently for the first week to decrease pain and promote adequate pain relief and participation in physical therapy.  o After the initial surgical pain begins to resolve, you may begin to decrease the pain medication. By the end of 6 weeks, you should be off of pain medications.  o Refills will not be given by the office during evening hours, on weekends, or after 6 weeks post-op.  o To seek refills on pain medications during the initial 6 week post-operative period, you must call the office 48 hours in advance to request the refill. The office will then notify you when to  the prescription. DO NOT wait until you are out of the medication to request a refill.    V. FOLLOW-UP VISITS:  ? You will need to follow up in the office with your surgeon in 2 weeks Sept 18, 2017. Please call this number [phone] to schedule this appointment.  ? If you have any concerns or suspected complications prior to your follow up visit, please call your surgeons office. Do not wait until your appointment time if you suspect complications. These will need to be addressed in the office promptly.      Date:     Kiko Marie MD    CC: Guille Nieves MD; Kiko Marie MD

## 2017-09-08 NOTE — PROGRESS NOTES
Orthopedic Progress Note      Patient: Hugh R McBurney  YOB: 1950     Date of Admission: 9/5/2017  8:28 AM Medical Record Number: [id number]     Attending Physician: Kiko Marie, *    Status Post:  VA REVISE KNEE JOINT REPLACE,1 PART [[id number]] (RT TOTAL KNEE ARTHROPLASTY REVISION) Post Operative Day Number: 3    Subjective : No new orthopaedic complaints     Pain Relief: some relief with present medication.     Systemic Complaints: No Complaints  Vitals:    09/07/17 1900 09/07/17 2300 09/08/17 0233 09/08/17 0700   BP: 106/58 127/68 138/66 115/62   BP Location: Right arm Right arm Left arm Left arm   Patient Position: Sitting Lying Lying Lying   Pulse: 67 69 72 69   Resp: 16 16 16 16   Temp: 97.3 °F (36.3 °C) 97.7 °F (36.5 °C) 98.2 °F (36.8 °C) 98.4 °F (36.9 °C)   TempSrc: Oral Oral Oral Oral   SpO2: 99% 99% 99% 96%   Weight:       Height:           Physical Exam: 66 y.o. male    General Appearance:       Alert, cooperative, in no acute distress                  Extremities:    Dressing Clean, Dry and Intact         Incision healthy without signs or symptoms of infections         No clinical sign of DVT        Able to do good movements of digits    Pulses:   Pulses palpable and equal bilaterally           Diagnostic Tests:       Results from last 7 days  Lab Units 09/08/17  0530 09/07/17 0422 09/06/17  0322   WBC 10*3/mm3  --  9.80 10.75*   HEMOGLOBIN g/dL 8.5* 7.9* 9.3*   HEMATOCRIT % 27.0* 24.4* 28.3*   PLATELETS 10*3/mm3  --  135* 157       Results from last 7 days  Lab Units 09/08/17  0530 09/07/17  0422 09/06/17  0322   SODIUM mmol/L 137 129* 137   POTASSIUM mmol/L 4.6 4.1 4.8   CHLORIDE mmol/L 103 93* 99   CO2 mmol/L 24.2 23.3 21.9*   BUN mg/dL 28* 35* 28*   CREATININE mg/dL 0.93 1.24 1.04   GLUCOSE mg/dL 138* 122* 203*   CALCIUM mg/dL 9.1 8.8 8.6         Crystals, Fluid   Date Value Ref Range Status   02/14/2017   Final    Intra and Extracellular crystals observed exhibiting  "polarization characteristics of Calcium Pyrophosphate   ]  Culture   Date Value Ref Range Status   07/07/2017 Light growth (2+) Staphylococcus aureus (A)  Final     Comment:     D test is positive. Isolate exhibits \"inducible\" resistance to Clindamycin.     ]  Uric Acid   Date Value Ref Range Status   02/14/2017 7.3 (H) 3.4 - 7.0 mg/dL Final   ]  Xr Knee 1 Or 2 View Right    Result Date: 9/5/2017  Narrative: XR KNEE 1 OR 2 VW RIGHT-  POSTOP PORTABLE 2 VIEWS RIGHT KNEE  CLINICAL INFORMATION: Post arthroplasty  FINDINGS: Prosthesis is satisfactory in position. A complicating process is not identified.  This report was finalized on 9/5/2017 3:13 PM by Dr. Ji Bush MD.      Xr Knee 1 Or 2 View Right    Result Date: 8/29/2017  Narrative: XR KNEE 1 OR 2 VW RIGHT-  CLINICAL: Joint degeneration, repeat knee surgery.  COMPARISON: 07/07/2017  FINDINGS: Medullary fixation quang crosses the right knee joint. There is a spacer in position similar to the previous examination. No acute osseous abnormality. No indication of quang or spacer loosening/malalignment.  Vascular arterial calcifications within the soft tissues. The remainder is unremarkable.  This report was finalized on 8/29/2017 7:00 PM by Dr. Ji Bush MD.      Xr Chest Pa & Lateral    Result Date: 8/29/2017  Narrative: XR CHEST PA AND LATERAL-  CLINICAL: Preop knee surgery, hypertension.  COMPARISON: 07/10/2017.  FINDINGS: No acute pulmonary process is demonstrated. The heart size is within normal limits. The mediastinum and luther are satisfactory in appearance.  CONCLUSION: No active disease of the chest.  This report was finalized on 8/29/2017 7:00 PM by Dr. Ji Bush MD.          Current Medications:  Scheduled Meds:  aspirin 325 mg Oral BID   baclofen 10 mg Oral TID   cholecalciferol 5,000 Units Oral Daily   ferrous sulfate 325 mg Oral Daily With Lunch   insulin aspart 0-14 Units Subcutaneous 4x Daily With Meals & Nightly   insulin aspart 14 Units " Subcutaneous TID With Meals   insulin detemir 45 Units Subcutaneous Q12H   isosorbide mononitrate 30 mg Oral Q24H   metoprolol tartrate 25 mg Oral Q12H   pantoprazole 40 mg Oral Q AM   pregabalin 100 mg Oral Daily   pregabalin 200 mg Oral Nightly   sertraline 100 mg Oral Daily   sulfamethoxazole-trimethoprim 1 tablet Oral Q12H     Continuous Infusions:  sodium chloride 75 mL/hr Last Rate: 75 mL/hr (09/07/17 0513)     PRN Meds:.•  acetaminophen  •  bisacodyl  •  dextrose  •  dextrose  •  glucagon (human recombinant)  •  Morphine **AND** naloxone  •  ondansetron  •  oxyCODONE-acetaminophen **OR** oxyCODONE-acetaminophen  •  promethazine    Assessment: Status post  AL REVISE KNEE JOINT REPLACE,1 PART [99473] (RT TOTAL KNEE ARTHROPLASTY REVISION)    Patient Active Problem List   Diagnosis   • Hypertension   • Hyperlipidemia   • Chronic pain   • Knee pain, hx of previous prosthetic joint infection   • Great toe pain, with cellulitis and gangrene   • Diabetic ulcer of 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, with necrosis of bone   • 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   • Recent MSSA (methicillin susceptible Staphylococcus aureus) septicemia   • Infection of prosthetic knee joint   • Hyperglycemia   • Hyponatremia   • Contusion of knee   • History of knee surgery   • Infection of prosthetic right knee joint   • CAD (coronary artery disease)   • Type 2 diabetes mellitus with proteinuria   • Hyponatremia   • Anemia, posthemorrhagic, acute   • Osteoarthritis, knee   • Anemia, posthemorrhagic, acute   • Vitamin D insufficiency   • Hyponatremia       PLAN:   Continues current post-op course  Mobilize with PT as tolerated per protocol    Weight Bearing: WBAT  Discharge Plan: OK to plan for discharge in  today to home and home health  from orthopadic perspective.    Kiko Marie MD    Date: 9/8/2017    Time: 9:30 AM

## 2017-09-08 NOTE — PROGRESS NOTES
Case Management Discharge Note    Final Note: D/c home w/ [facility]. Valorie/[facility] notified of d/c.     Discharge Placement     Facility/Agency Request Status Selected? Address Phone Number Fax Number    [facility] Accepted    Yes [address] [id number] [phone]        Daphne De La Cruz RN 9/7/2017 14:21    9/7/2017  [facility] following.                [facility] Accepted     [address] [facility] [address] [phone]        Daphne De La Cruz RN 9/6/2017 14:27    9/6/2017  Patricia notified.                         Discharge Codes: 06  Discharged/transferred to home under care of organized home health service organization in anticipation of skilled care

## 2017-09-08 NOTE — PROGRESS NOTES
"DAILY PROGRESS NOTE  [facility]    Patient Identification:  Name: Hugh R McBurney  Age: 66 y.o.  Sex: male  :  1950  MRN: [id number]         Primary Care Physician: Guille Nieves MD    Subjective:  Interval History:Complains of some knee pain.    Objective:    Scheduled Meds:    aspirin 325 mg Oral BID   baclofen 10 mg Oral TID   cholecalciferol 5,000 Units Oral Daily   ferrous sulfate 325 mg Oral Daily With Lunch   insulin aspart 0-14 Units Subcutaneous 4x Daily With Meals & Nightly   insulin aspart 14 Units Subcutaneous TID With Meals   insulin detemir 45 Units Subcutaneous Q12H   isosorbide mononitrate 30 mg Oral Q24H   metoprolol tartrate 25 mg Oral Q12H   pantoprazole 40 mg Oral Q AM   pregabalin 100 mg Oral Daily   pregabalin 200 mg Oral Nightly   sertraline 100 mg Oral Daily   sulfamethoxazole-trimethoprim 1 tablet Oral Q12H     Continuous Infusions:    sodium chloride 75 mL/hr Last Rate: 75 mL/hr (17 2226)       Vital signs in last 24 hours:  Temp:  [97.3 °F (36.3 °C)-98.4 °F (36.9 °C)] 98.4 °F (36.9 °C)  Heart Rate:  [67-72] 69  Resp:  [16] 16  BP: (101-138)/(50-68) 115/62    Intake/Output:    Intake/Output Summary (Last 24 hours) at 17 1023  Last data filed at 17 0900   Gross per 24 hour   Intake             1834 ml   Output             5750 ml   Net            -3916 ml       Exam:  /62 (BP Location: Left arm, Patient Position: Lying)  Pulse 69  Temp 98.4 °F (36.9 °C) (Oral)   Resp 16  Ht 74\" (188 cm)  Wt 246 lb (112 kg)  SpO2 96%  BMI 31.58 kg/m2    General Appearance:    Alert, cooperative, no distress   Head:    Normocephalic, without obvious abnormality, atraumatic   Eyes:       Throat:   Lips, tongue, gums normal   Neck:   Supple, symmetrical, trachea midline, no JVD   Lungs:     Clear to auscultation bilaterally, respirations unlabored   Chest Wall:    No tenderness or deformity    Heart:    Regular rate and rhythm, S1 and S2 normal, no " murmur,no  Rub or gallop   Abdomen:     Soft, non-tender, bowel sounds active, no masses, no organomegaly    Extremities:   Extremities normal, right knee surgical changes, no cyanosis or edema   Pulses:      Skin:   Skin is warm and dry,  no rashes or palpable lesions   Neurologic:   no focal deficits noted      [unfilled]  Data Review:    Results from last 7 days  Lab Units 09/08/17  0530 09/07/17  0422 09/06/17  0322   SODIUM mmol/L 137 129* 137   POTASSIUM mmol/L 4.6 4.1 4.8   CHLORIDE mmol/L 103 93* 99   CO2 mmol/L 24.2 23.3 21.9*   BUN mg/dL 28* 35* 28*   CREATININE mg/dL 0.93 1.24 1.04   GLUCOSE mg/dL 138* 122* 203*   CALCIUM mg/dL 9.1 8.8 8.6       Results from last 7 days  Lab Units 09/08/17  0530 09/07/17  0422 09/06/17  0322   WBC 10*3/mm3  --  9.80 10.75*   HEMOGLOBIN g/dL 8.5* 7.9* 9.3*   HEMATOCRIT % 27.0* 24.4* 28.3*   PLATELETS 10*3/mm3  --  135* 157           Results from last 7 days  Lab Units 09/06/17  0322   HEMOGLOBIN A1C % 7.10*       Lab Results  Lab Value Date/Time   TROPONINT 0.971 (C) 06/14/2017 0431   TROPONINT 0.655 (C) 06/13/2017 2357   TROPONINT <0.010 06/13/2017 1515   TROPONINT <0.010 06/13/2017 1443   TROPONINT <0.011 10/07/2015 0338   TROPONINT <0.011 10/06/2015 2042   TROPONINT <0.011 10/06/2015 1406               Invalid input(s): PROT, LABALBU        Results from last 7 days  Lab Units 09/06/17  0322   HEMOGLOBIN A1C % 7.10*     Glucose   Date/Time Value Ref Range Status   09/08/2017 0637 143 (H) 70 - 130 mg/dL Final   09/08/2017 0231 136 (H) 70 - 130 mg/dL Final   09/07/2017 2028 109 70 - 130 mg/dL Final   09/07/2017 1651 130 70 - 130 mg/dL Final   09/07/2017 1133 148 (H) 70 - 130 mg/dL Final   09/07/2017 0658 119 70 - 130 mg/dL Final   09/06/2017 2114 216 (H) 70 - 130 mg/dL Final   09/06/2017 1537 184 (H) 70 - 130 mg/dL Final           Patient Active Problem List   Diagnosis Code   • Hypertension I10   • Hyperlipidemia E78.5   • Chronic pain G89.29   • Knee pain, hx of previous  prosthetic joint infection M25.569   • Great toe pain, with cellulitis and gangrene M79.676   • Diabetic ulcer of 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, with necrosis of bone E11.621, L97.524   • 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 E11.59, I70.209   • Recent MSSA (methicillin susceptible Staphylococcus aureus) septicemia A41.01   • Infection of prosthetic knee joint T84.59XA, Z96.659   • Hyperglycemia R73.9   • Hyponatremia E87.1   • Contusion of knee S80.00XA   • History of knee surgery Z98.890   • CAD (coronary artery disease) I25.10   • Type 2 diabetes mellitus with proteinuria E11.29, R80.9   • Hyponatremia E87.1   • Anemia, posthemorrhagic, acute D62   • Anemia, posthemorrhagic, acute D62   • Vitamin D insufficiency E55.9       Assessment:  Principal Problem:    Knee pain, hx of previous prosthetic joint infection  Active Problems:    Hypertension    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 May need to hold some of hisBP meds if BP still low. He will monitor at home.    Chapin Escamilla MD  9/8/2017  10:23 AM

## 2017-09-08 NOTE — SIGNIFICANT NOTE
09/08/17 1106   PT Discharge Summary   Reason for Discharge Discharge from facility   Discharge Destination Home with assist

## 2017-09-08 NOTE — PLAN OF CARE
Problem: Patient Care Overview (Adult)  Goal: Plan of Care Review  Outcome: Ongoing (interventions implemented as appropriate)    09/08/17 1341   Coping/Psychosocial Response Interventions   Plan Of Care Reviewed With patient   Patient Care Overview   Progress improving   Outcome Evaluation   Outcome Summary/Follow up Plan DC home this afternoon. educated on blood glucose monitoring and insulin therapy.       Goal: Adult Individualization and Mutuality  Outcome: Ongoing (interventions implemented as appropriate)  Goal: Discharge Needs Assessment  Outcome: Ongoing (interventions implemented as appropriate)    Problem: Fall Risk (Adult)  Goal: Absence of Falls  Outcome: Ongoing (interventions implemented as appropriate)    09/08/17 1341   Fall Risk (Adult)   Absence of Falls achieves outcome         Problem: Knee Replacement, Total (Adult)  Goal: Signs and Symptoms of Listed Potential Problems Will be Absent or Manageable (Knee Replacement, Total)  Outcome: Ongoing (interventions implemented as appropriate)    09/08/17 1341   Knee Replacement, Total   Problems Assessed (Total Knee Replacement) all   Problems Present (Total Knee Replacement) pain;decreased range of motion;functional decline/self care deficit

## 2017-09-08 NOTE — PLAN OF CARE
Problem: Patient Care Overview (Adult)  Goal: Plan of Care Review  Outcome: Ongoing (interventions implemented as appropriate)    09/08/17 0423   Coping/Psychosocial Response Interventions   Plan Of Care Reviewed With patient   Patient Care Overview   Progress improving   Outcome Evaluation   Outcome Summary/Follow up Plan VSS, pain better controlled tonight with only the PO meds, blood sugars better controlled on home dose on levemir, educated on monitoring BP and sugar, BP meds held per [facility], voiding per urinal, home vs rehab soon       Goal: Adult Individualization and Mutuality  Outcome: Ongoing (interventions implemented as appropriate)  Goal: Discharge Needs Assessment  Outcome: Ongoing (interventions implemented as appropriate)    Problem: Fall Risk (Adult)  Goal: Absence of Falls  Outcome: Ongoing (interventions implemented as appropriate)    Problem: Knee Replacement, Total (Adult)  Goal: Signs and Symptoms of Listed Potential Problems Will be Absent or Manageable (Knee Replacement, Total)  Outcome: Ongoing (interventions implemented as appropriate)

## 2017-09-10 LAB — BACTERIA SPEC ANAEROBE CULT: NORMAL

## 2017-10-03 ENCOUNTER — OFFICE VISIT (OUTPATIENT)
Dept: CARDIOLOGY | Facility: CLINIC | Age: 67
End: 2017-10-03

## 2017-10-03 VITALS
WEIGHT: 267 LBS | HEART RATE: 70 BPM | SYSTOLIC BLOOD PRESSURE: 102 MMHG | BODY MASS INDEX: 34.27 KG/M2 | HEIGHT: 74 IN | DIASTOLIC BLOOD PRESSURE: 60 MMHG

## 2017-10-03 DIAGNOSIS — I25.10 CORONARY ARTERY DISEASE INVOLVING NATIVE CORONARY ARTERY OF NATIVE HEART WITHOUT ANGINA PECTORIS: ICD-10-CM

## 2017-10-03 DIAGNOSIS — E11.51 DIABETES TYPE 2 WITH ATHEROSCLEROSIS OF ARTERIES OF EXTREMITIES (HCC): Chronic | ICD-10-CM

## 2017-10-03 DIAGNOSIS — I51.81 STRESS-INDUCED CARDIOMYOPATHY: ICD-10-CM

## 2017-10-03 DIAGNOSIS — E78.49 OTHER HYPERLIPIDEMIA: ICD-10-CM

## 2017-10-03 DIAGNOSIS — I10 ESSENTIAL HYPERTENSION: Primary | ICD-10-CM

## 2017-10-03 DIAGNOSIS — I70.209 DIABETES TYPE 2 WITH ATHEROSCLEROSIS OF ARTERIES OF EXTREMITIES (HCC): Chronic | ICD-10-CM

## 2017-10-03 PROCEDURE — 93000 ELECTROCARDIOGRAM COMPLETE: CPT | Performed by: INTERNAL MEDICINE

## 2017-10-03 PROCEDURE — 99214 OFFICE O/P EST MOD 30 MIN: CPT | Performed by: INTERNAL MEDICINE

## 2017-10-03 RX ORDER — ASPIRIN 325 MG
325 TABLET ORAL DAILY
COMMUNITY
End: 2019-08-07

## 2017-10-03 NOTE — PROGRESS NOTES
Gui CAMPBELL RobeJayce  1950  Date of Office Visit: 10/03/2017  Encounter Provider: Tiburcio Moreno MD  Place of Service: [facility] CARDIOLOGY      CHIEF COMPLAINT:  Coronary artery disease  Stress-induced cardiomyopathy  Essential hypertension      HISTORY OF PRESENT ILLNESS:  65 y/o patient with a documented h/o HTN, HLD, DM, GERD, PRISCILLA-no CPAP, right TKA with joint infection and coronary artery disease.. In June 2017 he was seen because he had a  Vancomyocin reaction with chest pain, n/v and diaphoresis and a non-ST elevation myocardial infarction when here for knee surgery. A cardiac cath was done which showed a normal left main, 20% proximal LAD, 50% mid LAD, 90% proximal first diagonal, diffuse 50% mid circumflex, and a  70-80% proximal RPL 2 stenosis. An echo showed an EF of 42%, possibly from stress induced cardiomyopathy. He was sent home on an ace, beta blocker and nitrate to wait for 2-4 weeks before attempting surgery again. He was last seen in our office by Ashley BENAVIDES on 6/26/17. At that time he had a repeat echo which showed an EF of 40% with findings suggestive of stress-induced cardiomyopathy     Since our last visit, he states that he has been doing well.  He underwent a revision of his right total knee arthroplasty by Dr. Marie on 09/05/2017.  The operative cultures were negative.  He is now on prolonged Bactrim therapy twice a day for about three months.  He states that he has had no additional episodes of chest pain.  No dyspnea on exertion.  All in all he feels well.        Review of Systems   Constitution: Negative for fever, weakness and malaise/fatigue.   HENT: Negative for nosebleeds and sore throat.    Eyes: Negative for blurred vision and double vision.   Cardiovascular: Negative for chest pain, claudication, palpitations and syncope.   Respiratory: Negative for cough, shortness of breath and snoring.    Endocrine: Negative for cold intolerance,  heat intolerance and polydipsia.   Skin: Negative for itching, poor wound healing and rash.   Musculoskeletal: Negative for joint pain, joint swelling, muscle weakness and myalgias.   Gastrointestinal: Negative for abdominal pain, melena, nausea and vomiting.   Neurological: Negative for light-headedness, loss of balance, seizures and vertigo.   Psychiatric/Behavioral: Negative for altered mental status and depression.       Past Medical History:   Diagnosis Date   • Arthritis    • Chronic pain     BILATERAL KNEES AND BACK   • Contusion of knee    • Coronary artery disease    • Depression    • Diabetes mellitus     type 2 with atherosclerosis of arteries of extremities   • Diabetic ulcer of toe     left foot associated with type 2 diabetes mellitus, with necrosis of bone   • GERD (gastroesophageal reflux disease)    • Great toe pain     with cellulitis and gangrene   • History of kidney stones    • Hyperlipidemia    • Hypertension    • Hyponatremia    • Kidney stone    • Knee pain     hx of previous prosthetic joint infection   • MSSA (methicillin susceptible Staphylococcus aureus) infection     RIGHT KNEE   • NSTEMI (non-ST elevated myocardial infarction) 06/14/2017   • Sleep apnea     DOES NOT USE CPAP       The following portions of the patient's history were reviewed and updated as appropriate: Social history , Family history and Surgical history     Current Outpatient Prescriptions on File Prior to Visit   Medication Sig Dispense Refill   • atorvastatin (LIPITOR) 40 MG tablet Take 1 tablet by mouth Every Night. 30 tablet 1   • baclofen (LIORESAL) 10 MG tablet Take 5 mg by mouth 2 (Two) Times a Day.     • ferrous sulfate 325 (65 FE) MG tablet Take 325 mg by mouth Daily With Lunch.     • glucose blood (ACCU-CHEK COMPACT PLUS) test strip Use to test blood sugar 5 times daily 500 each 3   • glucose blood (ACCU-CHEK COMPACT STRIPS) test strip Use to test blood sugar 4 times daily   ICD 10 code E11.9 400 each 3   •  insulin aspart (novoLOG FLEXPEN) 100 UNIT/ML solution pen-injector sc pen Inject 14 Units under the skin 3 (Three) Times a Day With Meals.     • Insulin Glargine (LANTUS SOLOSTAR) 100 UNIT/ML injection pen Inject 40 Units under the skin 2 (Two) Times a Day. (Patient taking differently: Inject 45 Units under the skin 2 (Two) Times a Day.) 4 pen 2   • Insulin Pen Needle (BD PEN NEEDLE JUAN DAVID U/F) 32G X 4 MM misc TO INJECT 4 TIMES DAILY 100 each 3   • isosorbide mononitrate (IMDUR) 30 MG 24 hr tablet Take 1 tablet by mouth Daily. 30 tablet 1   • Lancets (ACCU-CHEK MULTICLIX) lancets Use to test blood sugar 5 times daily 500 each 3   • metoprolol tartrate (LOPRESSOR) 25 MG tablet Take 1 tablet by mouth Every 12 (Twelve) Hours. 60 tablet 11   • Multiple Vitamin (MULTI VITAMIN DAILY PO) Take 1 tablet by mouth Daily. HOLD PRIOR TO SURGERY     • omeprazole (priLOSEC) 40 MG capsule Take 40 mg by mouth 2 (Two) Times a Day.     • pregabalin (LYRICA) 100 MG capsule Take 200 mg by mouth Every Night.     • pregabalin (LYRICA) 100 MG capsule Take 100 mg by mouth Every Morning.     • sertraline (ZOLOFT) 100 MG tablet Take 100 mg by mouth Daily.     • sulfamethoxazole-trimethoprim (BACTRIM DS,SEPTRA DS) 800-160 MG per tablet Take 1 tablet by mouth Every 12 (Twelve) Hours for 90 days. Indications: Bone and/or Joint Infection, PROPHYLAXIS 60 tablet 3   • vitamin D (ERGOCALCIFEROL) 89440 units capsule capsule Take 1 capsule by mouth 1 (One) Time Per Week. (Patient taking differently: Take 50,000 Units by mouth 1 (One) Time Per Week. Friday) 30 capsule 11   • [DISCONTINUED] bisacodyl (DULCOLAX) 10 MG suppository Insert 1 suppository into the rectum Daily As Needed for constipation.  0   • [DISCONTINUED] lisinopril (PRINIVIL,ZESTRIL) 10 MG tablet Take 1 tablet by mouth Every Evening. 30 tablet 11   • [DISCONTINUED] promethazine (PHENERGAN) 25 MG tablet Take 1 tablet by mouth Every 8 (Eight) Hours As Needed for Nausea or Vomiting. 30 tablet  2     No current facility-administered medications on file prior to visit.        Allergies   Allergen Reactions   • Vancomycin Other (See Comments)     Red Man syndrome, slow rate and premedicate with benadryl       Vitals:    10/03/17 1554   BP: 102/60   Pulse: 70   Weight: 267 lb (121 kg)      Physical Exam   Constitutional: He is oriented to person, place, and time. He appears well-developed and well-nourished.   HENT:   Head: Normocephalic and atraumatic.   Eyes: Conjunctivae and EOM are normal. No scleral icterus.   Neck: Normal range of motion. Neck supple. Normal carotid pulses, no hepatojugular reflux and no JVD present. Carotid bruit is not present. No tracheal deviation present. No thyromegaly present.   Cardiovascular: Normal rate and regular rhythm.  Exam reveals no gallop and no friction rub.    No murmur heard.  Pulses:       Carotid pulses are 2+ on the right side, and 2+ on the left side.       Radial pulses are 2+ on the right side, and 2+ on the left side.        Femoral pulses are 2+ on the right side, and 2+ on the left side.       Dorsalis pedis pulses are 2+ on the right side, and 2+ on the left side.        Posterior tibial pulses are 2+ on the right side, and 2+ on the left side.   Pulmonary/Chest: Breath sounds normal. No respiratory distress. He has no decreased breath sounds. He has no wheezes. He has no rhonchi. He has no rales. He exhibits no tenderness.   Abdominal: Soft. Bowel sounds are normal. He exhibits no distension. There is no tenderness. There is no rebound.   Musculoskeletal: He exhibits no edema or deformity.   Neurological: He is alert and oriented to person, place, and time. He has normal strength. No sensory deficit.   Skin: No rash noted. No erythema.   Psychiatric: He has a normal mood and affect. His behavior is normal.       No components found for: CBC  No results found for: CMP  No components found for: LIPID  No results found for: BMP      ECG 12 Lead  Date/Time:  10/3/2017 3:57 PM  Performed by: SUZETTE SCHWAB  Authorized by: SUZETTE SCHWAB   Comparison: compared with previous ECG from 6/26/2017  Similar to previous ECG  Rhythm: sinus rhythm  Rate: normal  Conduction: conduction normal  ST Segments: ST segments normal  T Waves: T waves normal  QRS axis: normal  Clinical impression: non-specific ECG  Comments: Nonspecific T-wave abnormalities laterally             6/26/17 TTE  · Left ventricular wall thickness is consistent with mild-to-moderate concentric hypertrophy.  · Left ventricular systolic function is mildly decreased. Estimated EF = 42%. There is global hypokinesis worse in the anterior wall and anterior septum.  · Moderate mitral annular calcification is present. Mild mitral valve regurgitation is present.    6/14/17    Conclusions:   1. Left main: Mild calcification.  No stenosis  2. LAD: Diffuse 20% proximal stenosis.  Diffuse 50% stenosis mid segment.  Discrete 90% proximal first diagonal stenosis with JANINE 2 flow.  Small caliber  3. LCX: Diffuse 50% mid vessel stenosis  4. RCA: Discrete 70-80% proximal RPL 2 stenosis.  Small caliber  5.  Moderately reduced left ventricular systolic function.  Ejection fraction 40%.  Mid to distal anterior wall, apical severe hypokinesis.  Moderate distal inferior wall hypokinesis.  More consistent with stress-induced cardiomyopathy than flow limiting coronary artery disease in that territory     Recommendations: Continue ACE inhibitor and beta blocker.  Continue aspirin and statin.  Low-dose isosorbide mononitrate.      DISCUSSION/SUMMARY  The patient is a very pleasant 66-year-old gentleman with a medical history as documented above including hypertension, hyperlipidemia, diabetes mellitus, a right total knee arthroplasty with prior joint infection and revision who had a non-ST elevation myocardial infarction and was found to have a stress induced cardiomyopathy with an ejection fraction of 40%.  He has no angina  or dyspnea on exertion.  No clinical evidence of heart failure.       1. Stress induced cardiomyopathy.  Continue metoprolol tartrate at the current dose.  He has also previously been on an ACE inhibitor; however, was unable to tolerate that secondary to low normal blood pressures.  Repeat echocardiogram on his next visit to see if his ejection fraction has normalized.    2. Hyperlipidemia; continue atorvastatin at current dose.    3. Coronary artery disease; no indication for intervention.  Small caliber disease.  Continue aspirin and statin.   4. Diabetes mellitus; per primary care provider.       I will see the patient back in six months or earlier with problems.

## 2017-10-09 ENCOUNTER — APPOINTMENT (OUTPATIENT)
Dept: LAB | Facility: HOSPITAL | Age: 67
End: 2017-10-09

## 2017-10-09 ENCOUNTER — OFFICE VISIT (OUTPATIENT)
Dept: INFECTIOUS DISEASES | Facility: CLINIC | Age: 67
End: 2017-10-09

## 2017-10-09 VITALS
TEMPERATURE: 98.1 F | WEIGHT: 267.2 LBS | HEART RATE: 71 BPM | BODY MASS INDEX: 34.29 KG/M2 | DIASTOLIC BLOOD PRESSURE: 78 MMHG | SYSTOLIC BLOOD PRESSURE: 151 MMHG | HEIGHT: 74 IN

## 2017-10-09 DIAGNOSIS — E11.51 DIABETES TYPE 2 WITH ATHEROSCLEROSIS OF ARTERIES OF EXTREMITIES (HCC): Chronic | ICD-10-CM

## 2017-10-09 DIAGNOSIS — I70.209 DIABETES TYPE 2 WITH ATHEROSCLEROSIS OF ARTERIES OF EXTREMITIES (HCC): Chronic | ICD-10-CM

## 2017-10-09 DIAGNOSIS — Z79.2 LONG TERM CURRENT USE OF ANTIBIOTICS: ICD-10-CM

## 2017-10-09 DIAGNOSIS — T84.59XD INFECTED PROSTHETIC KNEE JOINT, SUBSEQUENT ENCOUNTER: Primary | ICD-10-CM

## 2017-10-09 DIAGNOSIS — Z96.659 INFECTED PROSTHETIC KNEE JOINT, SUBSEQUENT ENCOUNTER: Primary | ICD-10-CM

## 2017-10-09 LAB
ANION GAP SERPL CALCULATED.3IONS-SCNC: 14.6 MMOL/L
BASOPHILS # BLD AUTO: 0.04 10*3/MM3 (ref 0–0.2)
BASOPHILS NFR BLD AUTO: 0.5 % (ref 0–1.5)
BUN BLD-MCNC: 22 MG/DL (ref 8–23)
BUN/CREAT SERPL: 15.3 (ref 7–25)
CALCIUM SPEC-SCNC: 9 MG/DL (ref 8.6–10.5)
CHLORIDE SERPL-SCNC: 100 MMOL/L (ref 98–107)
CO2 SERPL-SCNC: 23.4 MMOL/L (ref 22–29)
CREAT BLD-MCNC: 1.44 MG/DL (ref 0.76–1.27)
DEPRECATED RDW RBC AUTO: 48.2 FL (ref 37–54)
EOSINOPHIL # BLD AUTO: 0.74 10*3/MM3 (ref 0–0.7)
EOSINOPHIL NFR BLD AUTO: 8.7 % (ref 0.3–6.2)
ERYTHROCYTE [DISTWIDTH] IN BLOOD BY AUTOMATED COUNT: 13.8 % (ref 11.5–14.5)
GFR SERPL CREATININE-BSD FRML MDRD: 49 ML/MIN/1.73
GLUCOSE BLD-MCNC: 171 MG/DL (ref 65–99)
HCT VFR BLD AUTO: 34.1 % (ref 40.4–52.2)
HGB BLD-MCNC: 10.8 G/DL (ref 13.7–17.6)
IMM GRANULOCYTES # BLD: 0 10*3/MM3 (ref 0–0.03)
IMM GRANULOCYTES NFR BLD: 0 % (ref 0–0.5)
LYMPHOCYTES # BLD AUTO: 3.08 10*3/MM3 (ref 0.9–4.8)
LYMPHOCYTES NFR BLD AUTO: 36.4 % (ref 19.6–45.3)
MCH RBC QN AUTO: 30.6 PG (ref 27–32.7)
MCHC RBC AUTO-ENTMCNC: 31.7 G/DL (ref 32.6–36.4)
MCV RBC AUTO: 96.6 FL (ref 79.8–96.2)
MONOCYTES # BLD AUTO: 0.6 10*3/MM3 (ref 0.2–1.2)
MONOCYTES NFR BLD AUTO: 7.1 % (ref 5–12)
NEUTROPHILS # BLD AUTO: 4.01 10*3/MM3 (ref 1.9–8.1)
NEUTROPHILS NFR BLD AUTO: 47.3 % (ref 42.7–76)
PLATELET # BLD AUTO: 195 10*3/MM3 (ref 140–500)
PMV BLD AUTO: 11.7 FL (ref 6–12)
POTASSIUM BLD-SCNC: 4.8 MMOL/L (ref 3.5–5.2)
RBC # BLD AUTO: 3.53 10*6/MM3 (ref 4.6–6)
SODIUM BLD-SCNC: 138 MMOL/L (ref 136–145)
WBC NRBC COR # BLD: 8.47 10*3/MM3 (ref 4.5–10.7)

## 2017-10-09 PROCEDURE — 85025 COMPLETE CBC W/AUTO DIFF WBC: CPT | Performed by: INTERNAL MEDICINE

## 2017-10-09 PROCEDURE — 99214 OFFICE O/P EST MOD 30 MIN: CPT | Performed by: INTERNAL MEDICINE

## 2017-10-09 PROCEDURE — 80048 BASIC METABOLIC PNL TOTAL CA: CPT | Performed by: INTERNAL MEDICINE

## 2017-10-09 PROCEDURE — 36415 COLL VENOUS BLD VENIPUNCTURE: CPT | Performed by: INTERNAL MEDICINE

## 2017-10-09 RX ORDER — ATORVASTATIN CALCIUM 40 MG/1
TABLET, FILM COATED ORAL
Qty: 30 TABLET | Refills: 3 | Status: SHIPPED | OUTPATIENT
Start: 2017-10-09

## 2017-10-10 ENCOUNTER — TELEPHONE (OUTPATIENT)
Dept: INFECTIOUS DISEASES | Facility: CLINIC | Age: 67
End: 2017-10-10

## 2017-10-10 NOTE — TELEPHONE ENCOUNTER
----- Message from Amor Mayberry MD sent at 10/10/2017 12:48 PM EDT -----  Can we let him know his labs look okay and to keep on the bactrim?

## 2017-10-10 NOTE — TELEPHONE ENCOUNTER
Phone with Mr. McBurney. Informed labs looked okay and continue Bactrim per Dr. Mayberry. Patient verbalized understanding. Tianna Dallas RN

## 2017-10-24 RX ORDER — BLOOD GLUCOSE CONTROL HIGH,LOW
EACH MISCELLANEOUS
Qty: 1 EACH | Refills: 3 | Status: SHIPPED | OUTPATIENT
Start: 2017-10-24 | End: 2021-03-08

## 2017-11-22 ENCOUNTER — LAB (OUTPATIENT)
Dept: LAB | Facility: HOSPITAL | Age: 67
End: 2017-11-22

## 2017-11-22 DIAGNOSIS — I70.209 DIABETES TYPE 2 WITH ATHEROSCLEROSIS OF ARTERIES OF EXTREMITIES (HCC): ICD-10-CM

## 2017-11-22 DIAGNOSIS — E11.51 DIABETES TYPE 2 WITH ATHEROSCLEROSIS OF ARTERIES OF EXTREMITIES (HCC): ICD-10-CM

## 2017-11-22 DIAGNOSIS — L97.529 DIABETIC ULCER OF LEFT FOOT ASSOCIATED WITH TYPE 2 DIABETES MELLITUS (HCC): ICD-10-CM

## 2017-11-22 DIAGNOSIS — E78.49 OTHER HYPERLIPIDEMIA: ICD-10-CM

## 2017-11-22 DIAGNOSIS — E11.621 DIABETIC ULCER OF LEFT FOOT ASSOCIATED WITH TYPE 2 DIABETES MELLITUS (HCC): ICD-10-CM

## 2017-11-22 DIAGNOSIS — E55.9 VITAMIN D DEFICIENCY: ICD-10-CM

## 2017-11-22 LAB
25(OH)D3 SERPL-MCNC: 25.2 NG/ML (ref 30–100)
ALBUMIN UR-MCNC: 220 MG/L
ANION GAP SERPL CALCULATED.3IONS-SCNC: 14.3 MMOL/L
BUN BLD-MCNC: 16 MG/DL (ref 8–23)
BUN/CREAT SERPL: 15 (ref 7–25)
CALCIUM SPEC-SCNC: 9.3 MG/DL (ref 8.6–10.5)
CHLORIDE SERPL-SCNC: 102 MMOL/L (ref 98–107)
CHOLEST SERPL-MCNC: 126 MG/DL (ref 0–200)
CO2 SERPL-SCNC: 24.7 MMOL/L (ref 22–29)
CREAT BLD-MCNC: 1.07 MG/DL (ref 0.76–1.27)
CREAT UR-MCNC: 87.6 MG/DL
FOLATE SERPL-MCNC: 11.49 NG/ML (ref 4.78–24.2)
GFR SERPL CREATININE-BSD FRML MDRD: 69 ML/MIN/1.73
GLUCOSE BLD-MCNC: 116 MG/DL (ref 65–99)
HBA1C MFR BLD: 7 % (ref 4.8–5.6)
HDLC SERPL-MCNC: 54 MG/DL (ref 40–60)
LDLC SERPL CALC-MCNC: 53 MG/DL (ref 0–100)
LDLC/HDLC SERPL: 0.99 {RATIO}
MICROALBUMIN/CREAT UR: 2511.4 MG/G
POTASSIUM BLD-SCNC: 4.6 MMOL/L (ref 3.5–5.2)
SODIUM BLD-SCNC: 141 MMOL/L (ref 136–145)
TRIGL SERPL-MCNC: 93 MG/DL (ref 0–150)
TSH SERPL DL<=0.05 MIU/L-ACNC: 2.67 MIU/ML (ref 0.27–4.2)
VIT B12 BLD-MCNC: 325 PG/ML (ref 211–946)
VLDLC SERPL-MCNC: 18.6 MG/DL (ref 5–40)

## 2017-11-22 PROCEDURE — 80061 LIPID PANEL: CPT

## 2017-11-22 PROCEDURE — 82043 UR ALBUMIN QUANTITATIVE: CPT

## 2017-11-22 PROCEDURE — 82306 VITAMIN D 25 HYDROXY: CPT

## 2017-11-22 PROCEDURE — 83036 HEMOGLOBIN GLYCOSYLATED A1C: CPT

## 2017-11-22 PROCEDURE — 36415 COLL VENOUS BLD VENIPUNCTURE: CPT

## 2017-11-22 PROCEDURE — 82607 VITAMIN B-12: CPT

## 2017-11-22 PROCEDURE — 82570 ASSAY OF URINE CREATININE: CPT

## 2017-11-22 PROCEDURE — 84443 ASSAY THYROID STIM HORMONE: CPT

## 2017-11-22 PROCEDURE — 80048 BASIC METABOLIC PNL TOTAL CA: CPT

## 2017-11-22 PROCEDURE — 82746 ASSAY OF FOLIC ACID SERUM: CPT

## 2017-11-30 ENCOUNTER — OFFICE VISIT (OUTPATIENT)
Dept: ENDOCRINOLOGY | Age: 67
End: 2017-11-30

## 2017-11-30 VITALS — SYSTOLIC BLOOD PRESSURE: 130 MMHG | HEART RATE: 82 BPM | DIASTOLIC BLOOD PRESSURE: 70 MMHG | OXYGEN SATURATION: 96 %

## 2017-11-30 DIAGNOSIS — E11.43 DIABETIC AUTONOMIC NEUROPATHY ASSOCIATED WITH TYPE 2 DIABETES MELLITUS (HCC): ICD-10-CM

## 2017-11-30 DIAGNOSIS — E11.621 DIABETIC ULCER OF LEFT FOOT ASSOCIATED WITH TYPE 2 DIABETES MELLITUS, UNSPECIFIED PART OF FOOT, UNSPECIFIED ULCER STAGE (HCC): ICD-10-CM

## 2017-11-30 DIAGNOSIS — I70.209 DIABETES TYPE 2 WITH ATHEROSCLEROSIS OF ARTERIES OF EXTREMITIES (HCC): Primary | ICD-10-CM

## 2017-11-30 DIAGNOSIS — L97.529 DIABETIC ULCER OF LEFT FOOT ASSOCIATED WITH TYPE 2 DIABETES MELLITUS, UNSPECIFIED PART OF FOOT, UNSPECIFIED ULCER STAGE (HCC): ICD-10-CM

## 2017-11-30 DIAGNOSIS — E11.51 DIABETES TYPE 2 WITH ATHEROSCLEROSIS OF ARTERIES OF EXTREMITIES (HCC): Primary | ICD-10-CM

## 2017-11-30 DIAGNOSIS — E55.9 VITAMIN D DEFICIENCY: ICD-10-CM

## 2017-11-30 DIAGNOSIS — E78.49 OTHER HYPERLIPIDEMIA: ICD-10-CM

## 2017-11-30 PROCEDURE — 99214 OFFICE O/P EST MOD 30 MIN: CPT | Performed by: INTERNAL MEDICINE

## 2017-11-30 NOTE — PROGRESS NOTES
66 y.o.    Patient Care Team:  Guille Nieves MD as PCP - General  Guille Nieves MD as PCP - Family Medicine  Tiburcio Moreno MD as Consulting Physician (Cardiology)    Chief Complaint:    3 Month Follow Up/ Type 2 Diabetes Mellitus  Subjective     HPI  66-year-old white male is here as a follow up patient for the management of type 2 diabetes mellitus.    Type 2 diabetes mellitus-diagnosed about 10-15 years ago.  Currently on Lantus 45 units twice daily, Humalog 14 units with each meal, plus Humalog sliding scale 3 times a day before meals and at bedtime-3 units for 50 about 1 50 mg/dL.  Average Humalog intake is around 14-16 units with each meal.  Patient checks blood sugars 3-4 times a day.  Average blood sugars are around 150-1 60 mg/dL.  Patient did not get his log book for me to review.  No increased urination or increased thirst.  No blood sugar that has been less than 60 in the last 1 month.  Does have hypoglycemic awareness.  Highest blood sugar in the last 1 month was 2 50 mg/dL.  Last eye exam was about 9 months ago and no history of diabetic retinopathy.  History of coronary artery disease, no history of CK D, CVA per patient.  Patient is trying to walk with the cane but the physical activity is limited due to the right knee joint pain.  Weight has been stable  Is trying to follow diabetic diet for most part but given the holidays is not following diabetic diet strictly.  On Ace inhibitor.  Has history of pancreatitis in the past    Diabetic neuropathy resulting in left great toe osteomyelitis status post amputation complicated with right knee joint seeding underwent incision, debridement and  polyliner exchange of the right TKA.  Currently being followed by infectious disease, patient also underwent prosthetic knee joint replacement.  On Lyrica which helps to neuropathy pain.    Hyperlipidemia  On Lipitor 40 mg oral daily.    Interval History      The following portions of the patient's  history were reviewed and updated as appropriate: allergies, current medications, past family history, past medical history, past social history, past surgical history and problem list.    Past Medical History:   Diagnosis Date   • Arthritis    • Chronic pain     BILATERAL KNEES AND BACK   • Contusion of knee    • Coronary artery disease    • Depression    • Diabetes mellitus     type 2 with atherosclerosis of arteries of extremities   • Diabetic ulcer of toe     left foot associated with type 2 diabetes mellitus, with necrosis of bone   • GERD (gastroesophageal reflux disease)    • Great toe pain     with cellulitis and gangrene   • History of kidney stones    • Hyperlipidemia    • Hypertension    • Hyponatremia    • Kidney stone    • Knee pain     hx of previous prosthetic joint infection   • MSSA (methicillin susceptible Staphylococcus aureus) infection     RIGHT KNEE   • NSTEMI (non-ST elevated myocardial infarction) 06/14/2017   • Sleep apnea     DOES NOT USE CPAP     Family History   Problem Relation Age of Onset   • Heart disease Mother    • Heart disease Father    • Diabetes Sister    • Dementia Maternal Grandmother    • No Known Problems Maternal Grandfather    • Heart disease Paternal Grandmother    • Heart attack Paternal Grandfather    • Heart disease Paternal Grandfather    • Heart disease Son    • Heart attack Son    • No Known Problems Sister    • Malig Hyperthermia Neg Hx      Social History     Social History   • Marital status:      Spouse name: N/A   • Number of children: N/A   • Years of education: N/A     Occupational History   • Not on file.     Social History Main Topics   • Smoking status: Former Smoker     Packs/day: 3.00     Years: 20.00   • Smokeless tobacco: Former User      Comment: quit 35 years ago. 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    • Alcohol use No   • Drug use: No   • Sexual activity: Defer     Other Topics Concern   • Not on file     Social History Narrative     Allergies    Allergen Reactions   • Vancomycin Other (See Comments)     Red Man syndrome, slow rate and premedicate with benadryl       Current Outpatient Prescriptions:   •  aspirin 325 MG tablet, Take 325 mg by mouth Daily., Disp: , Rfl:   •  atorvastatin (LIPITOR) 40 MG tablet, TAKE 1 TABLET BY MOUTH EVERY EVENING., Disp: 30 tablet, Rfl: 3  •  Blood Glucose Calibration (ACCU-CHEK COMPACT PLUS CONTROL) solution, AS DIRECTED, Disp: 1 each, Rfl: 3  •  ferrous sulfate 325 (65 FE) MG tablet, Take 325 mg by mouth Daily With Lunch., Disp: , Rfl:   •  glucose blood (ACCU-CHEK COMPACT PLUS) test strip, Use to test blood sugar 5 times daily, Disp: 500 each, Rfl: 3  •  glucose blood (ACCU-CHEK COMPACT STRIPS) test strip, Use to test blood sugar 4 times daily  ICD 10 code E11.9, Disp: 400 each, Rfl: 3  •  insulin aspart (novoLOG FLEXPEN) 100 UNIT/ML solution pen-injector sc pen, Inject 14 Units under the skin 3 (Three) Times a Day With Meals., Disp: , Rfl:   •  Insulin Glargine (LANTUS SOLOSTAR) 100 UNIT/ML injection pen, Inject 40 Units under the skin 2 (Two) Times a Day. (Patient taking differently: Inject 45 Units under the skin 2 (Two) Times a Day.), Disp: 4 pen, Rfl: 2  •  Insulin Pen Needle (BD PEN NEEDLE JUAN DAVID U/F) 32G X 4 MM misc, TO INJECT 4 TIMES DAILY, Disp: 100 each, Rfl: 3  •  isosorbide mononitrate (IMDUR) 30 MG 24 hr tablet, Take 1 tablet by mouth Daily., Disp: 30 tablet, Rfl: 1  •  Lancets (ACCU-CHEK MULTICLIX) lancets, Use to test blood sugar 5 times daily, Disp: 500 each, Rfl: 3  •  metoprolol tartrate (LOPRESSOR) 25 MG tablet, Take 1 tablet by mouth Every 12 (Twelve) Hours., Disp: 60 tablet, Rfl: 11  •  Multiple Vitamin (MULTI VITAMIN DAILY PO), Take 1 tablet by mouth Daily. HOLD PRIOR TO SURGERY, Disp: , Rfl:   •  omeprazole (priLOSEC) 40 MG capsule, Take 40 mg by mouth 2 (Two) Times a Day., Disp: , Rfl:   •  pregabalin (LYRICA) 100 MG capsule, Take 200 mg by mouth Every Night., Disp: , Rfl:   •  pregabalin (LYRICA)  100 MG capsule, Take 100 mg by mouth Every Morning., Disp: , Rfl:   •  sertraline (ZOLOFT) 100 MG tablet, Take 100 mg by mouth Daily., Disp: , Rfl:   •  sulfamethoxazole-trimethoprim (BACTRIM DS,SEPTRA DS) 800-160 MG per tablet, Take 1 tablet by mouth Every 12 (Twelve) Hours for 90 days. Indications: Bone and/or Joint Infection, PROPHYLAXIS, Disp: 60 tablet, Rfl: 3  •  vitamin D (ERGOCALCIFEROL) 47740 units capsule capsule, Take 1 capsule by mouth 1 (One) Time Per Week. (Patient taking differently: Take 50,000 Units by mouth 1 (One) Time Per Week. Friday), Disp: 30 capsule, Rfl: 11        Review of Systems   Constitutional: Negative for fever.   HENT: Negative for facial swelling, nosebleeds, trouble swallowing and voice change.    Eyes: Negative for pain and redness.   Respiratory: Negative for shortness of breath and wheezing.    Cardiovascular: Positive for leg swelling. Negative for palpitations.   Gastrointestinal: Negative for abdominal pain, diarrhea and vomiting.   Endocrine: Negative for polydipsia and polyuria.   Genitourinary: Negative for decreased urine volume and frequency.   Musculoskeletal: Negative for joint swelling and neck pain.   Skin: Negative for rash.   Allergic/Immunologic: Negative for immunocompromised state.   Neurological: Negative for seizures and facial asymmetry.   Hematological: Bruises/bleeds easily.   Psychiatric/Behavioral: Negative for agitation and confusion.       Objective       Vitals:    11/30/17 1155   BP: 130/70   Pulse: 82   SpO2: 96%     There is no height or weight on file to calculate BMI.      Physical Exam   Gen exam - alert and oriented x 3, obese male, not in distress.   HEENT - Acanthosis nigricans. Thyroid palpable.   Resp - Clear to auscultation.   CVS - S1,S2 heard and no murmurs.   Abd - Non tender, BS heard.   Ext - No edema and Hammer toes, decreased pin prick and intact proprioception. Left toe amputation.   Right knee joint swollen but not tender.      Results Review:     I reviewed the patient's new clinical results.    Medical records reviewed  Summary: done      Lab on 11/22/2017   Component Date Value Ref Range Status   • Hemoglobin A1C 11/22/2017 7.00* 4.80 - 5.60 % Final   • Glucose 11/22/2017 116* 65 - 99 mg/dL Final   • BUN 11/22/2017 16  8 - 23 mg/dL Final   • Creatinine 11/22/2017 1.07  0.76 - 1.27 mg/dL Final   • Sodium 11/22/2017 141  136 - 145 mmol/L Final   • Potassium 11/22/2017 4.6  3.5 - 5.2 mmol/L Final   • Chloride 11/22/2017 102  98 - 107 mmol/L Final   • CO2 11/22/2017 24.7  22.0 - 29.0 mmol/L Final   • Calcium 11/22/2017 9.3  8.6 - 10.5 mg/dL Final   • eGFR Non African Amer 11/22/2017 69  >60 mL/min/1.73 Final   • BUN/Creatinine Ratio 11/22/2017 15.0  7.0 - 25.0 Final   • Anion Gap 11/22/2017 14.3  mmol/L Final   • Total Cholesterol 11/22/2017 126  0 - 200 mg/dL Final   • Triglycerides 11/22/2017 93  0 - 150 mg/dL Final   • HDL Cholesterol 11/22/2017 54  40 - 60 mg/dL Final   • LDL Cholesterol  11/22/2017 53  0 - 100 mg/dL Final   • VLDL Cholesterol 11/22/2017 18.6  5 - 40 mg/dL Final   • LDL/HDL Ratio 11/22/2017 0.99   Final   • Microalbumin/Creatinine Ratio 11/22/2017 2511.4  mg/g Final   • Creatinine, Urine 11/22/2017 87.6  mg/dL Final   • Microalbumin, Urine 11/22/2017 220.0  mg/L Final   • TSH 11/22/2017 2.670  0.270 - 4.200 mIU/mL Final   • Folate 11/22/2017 11.49  4.78 - 24.20 ng/mL Final   • Vitamin B-12 11/22/2017 325  211 - 946 pg/mL Final   • 25 Hydroxy, Vitamin D 11/22/2017 25.2* 30.0 - 100.0 ng/ml Final     Lab Results   Component Value Date    HGBA1C 7.00 (H) 11/22/2017    HGBA1C 7.10 (H) 09/06/2017    HGBA1C 6.93 (H) 08/29/2017     Lab Results   Component Value Date    MICROALBUR 220.0 11/22/2017    LDLCALC 53 11/22/2017    CREATININE 1.07 11/22/2017     Imaging Results (most recent)     None                Assessment and Plan:    There are no diagnoses linked to this encounter.       Type 2 diabetes mellitus-decently  controlled  Increase Lantus to 47 units in the morning and 45 units at bedtime  Continue Humalog 14 units with each meal  Continue the Humalog sliding scale 3 times a day before meals and at bedtime  Given samples to the patient.  Metformin was stopped due to his abnormal creatinine levels during his last office visit.  Patient didn't want to try Farxiga due to the cost issues.  Other oral hypoglycemic agents are limited due to his prior history of pancreatitis.    Hyperlipidemia  Continue Lipitor 40 mg oral daily    Diabetic neuropathy continue lyrica.     14 minutes out of 25 minutes face to face spent in counseling the patient extensively on insulin regimen changes and treatment

## 2018-01-11 ENCOUNTER — OFFICE VISIT (OUTPATIENT)
Dept: INFECTIOUS DISEASES | Facility: CLINIC | Age: 68
End: 2018-01-11

## 2018-01-11 VITALS
SYSTOLIC BLOOD PRESSURE: 160 MMHG | RESPIRATION RATE: 12 BRPM | HEART RATE: 71 BPM | DIASTOLIC BLOOD PRESSURE: 79 MMHG | HEIGHT: 74 IN | TEMPERATURE: 97.9 F | WEIGHT: 288 LBS | BODY MASS INDEX: 36.96 KG/M2

## 2018-01-11 DIAGNOSIS — I70.209 DIABETES TYPE 2 WITH ATHEROSCLEROSIS OF ARTERIES OF EXTREMITIES (HCC): ICD-10-CM

## 2018-01-11 DIAGNOSIS — E11.51 DIABETES TYPE 2 WITH ATHEROSCLEROSIS OF ARTERIES OF EXTREMITIES (HCC): ICD-10-CM

## 2018-01-11 DIAGNOSIS — Z79.2 LONG TERM CURRENT USE OF ANTIBIOTICS: ICD-10-CM

## 2018-01-11 DIAGNOSIS — T84.59XD INFECTED PROSTHETIC KNEE JOINT, SUBSEQUENT ENCOUNTER: Primary | ICD-10-CM

## 2018-01-11 DIAGNOSIS — Z96.659 INFECTED PROSTHETIC KNEE JOINT, SUBSEQUENT ENCOUNTER: Primary | ICD-10-CM

## 2018-01-11 PROCEDURE — 99214 OFFICE O/P EST MOD 30 MIN: CPT | Performed by: INTERNAL MEDICINE

## 2018-01-11 NOTE — PROGRESS NOTES
"cc: f/u PJI    Per prior notes: \"Very nice 66-year-old gentleman with history of osteoarthritis complicated by right TKA PJI culture negative in 2011 treated with two-stage replacement and prolonged antibiotic therapy, admitted on with 3-4 weeks of left first toe swelling, fever and right artificial knee pain. Was found to have MSSA in his blood and underwent I&D with amputation of the first left toe by Dr. Brand on 2/14. Dr. Marie also performed liner exchange with I&D of right TKA on 2/14. His left toe and right TKA cultures all grew MSSA. Transthoracic echocardiogram without obvious vegetation. In discussions with orthopedics and infectious diseases, patient elected to pursue device retention strategy. We will therefore recommend Ancef 2 g IV every 8 hours and rifampin 300 mg by mouth every 12 hours for 6 weeks with an anticipated stop date of 3/27/17. We then plan 6 months of rifampin plus PO abx (likely quinolone). He was readmitted for fever and hyperglycemia from 2/25 to 3/4/17 but all cultures were negative and transesophageal echocardiogram was negative.  At clinic visit on 3/27/17, he was transitioned off IV antibiotics to 750 mg of Cipro and rifampin 300 mg twice a day.  He developed progressive pain and concern was for progressive infection.  He was admitted for explantation of his right TKA on 6/13 but developed red man syndrome and a NSTEMI, delaying procedures.  He was readmitted on 7/7/17 and underwent removal of hardware with placement of antibiotic spacer.  Surgical cultures again grew MSSA.  We will re-induce with 6 more weeks of IV cefazolin, anticipated stop date August 17, 2017. Patient had antibiotics discontinued on 8/17/17.  He then underwent revision right TKA by Dr. Marie on 9/5/17.  Operative cultures were negative, we'll elected to discharge him on 90 days of Bactrim double strength by mouth twice a day.\"    I last saw the patient in October 2017 and he was doing well.  " "Although we initially planned on discontinue Bactrim in  December 2017, he has remained on it.  He reports the terms of the knee infection, he has some stable pain in the knee.  No drainage or constitutional symptoms of infection such as fever, chills, night sweats.  He's able to walk around on the knee continues with physical therapy.  In regards to his long-term use antibiotics he is tolerating them well without side effects or missed doses.  In regards to his diabetes mellitus type 2, this remains well controlled with last hemoglobin A1c of 7.  A.m. blood sugars have been well-controlled range between 100 150.  He did put on some weight with the holidays but overall is done much better with his diabetes.  He is due to see Dr. Marie at the end of this month.      PAST MEDICAL HISTORY:  1. Diabetes mellitus type 2.  2. Hypertension.  3. Hyperlipidemia.  4. GERD.  5. History of nephrolithiasis.  6. Chronic pain.  7. Osteoarthritis status post bilateral joint replacement with right PJI in 2011 treated with 2-stage replacement and antibiotic therapy.  8. Back hardware placement.  9. Placement of an orthopedic screw on the right shoulder.  10. Appendectomy.  11. History of pancreatitis.  12.  Coronary artery disease  13.  MSSA septicemia and left foot osteomyelitis and right prosthetic knee infection s/p explant on 7/7/17 and replacement on 9/5/17    Review of Systems: All other reviewed and negative except as per HPI    Blood pressure 160/79, pulse 71, temperature 97.9 °F (36.6 °C), resp. rate 12, height 188 cm (74\"), weight 131 kg (288 lb).  GENERAL: Awake and alert, in no acute distress.   SKIN: Warm and dry without cutaneous eruptions. Knee incision benign  PSYCHIATRIC: Appropriate mood, affect, insight, and judgment.     Assessment and Plan  1.  MSSA right prosthetic knee joint infection status post explantation on 7/7/17 6 weeks IV antibiotics and now with revision TKA on 9/5/17  2. Diabetes mellitus type 2 " with neuropathy, continue glycemic control efforts to prevent/control infectious complications  3. Long term current use of antibiotics    Doing well.  Has been on Bactrim now for about 3+ months and okay to discontinue from my end.  He will discuss with Dr. Marie at his next appointment.  Going to see his PCP next week and wants to have blood drawn there.  Have ordered CBC with differential, BMP and ESR and CRP.  Otherwise return to clinic in 6 months to make sure stable off antibiotics.

## 2018-01-16 ENCOUNTER — RESULTS ENCOUNTER (OUTPATIENT)
Dept: INFECTIOUS DISEASES | Facility: CLINIC | Age: 68
End: 2018-01-16

## 2018-01-16 DIAGNOSIS — Z79.2 LONG TERM CURRENT USE OF ANTIBIOTICS: ICD-10-CM

## 2018-01-16 DIAGNOSIS — Z96.659 INFECTED PROSTHETIC KNEE JOINT, SUBSEQUENT ENCOUNTER: ICD-10-CM

## 2018-01-16 DIAGNOSIS — T84.59XD INFECTED PROSTHETIC KNEE JOINT, SUBSEQUENT ENCOUNTER: ICD-10-CM

## 2018-02-28 ENCOUNTER — RESULTS ENCOUNTER (OUTPATIENT)
Dept: ENDOCRINOLOGY | Age: 68
End: 2018-02-28

## 2018-02-28 DIAGNOSIS — E55.9 VITAMIN D DEFICIENCY: ICD-10-CM

## 2018-02-28 DIAGNOSIS — E11.51 DIABETES TYPE 2 WITH ATHEROSCLEROSIS OF ARTERIES OF EXTREMITIES (HCC): ICD-10-CM

## 2018-02-28 DIAGNOSIS — L97.529 DIABETIC ULCER OF LEFT FOOT ASSOCIATED WITH TYPE 2 DIABETES MELLITUS, UNSPECIFIED PART OF FOOT, UNSPECIFIED ULCER STAGE (HCC): ICD-10-CM

## 2018-02-28 DIAGNOSIS — E11.43 DIABETIC AUTONOMIC NEUROPATHY ASSOCIATED WITH TYPE 2 DIABETES MELLITUS (HCC): ICD-10-CM

## 2018-02-28 DIAGNOSIS — E78.49 OTHER HYPERLIPIDEMIA: ICD-10-CM

## 2018-02-28 DIAGNOSIS — E11.621 DIABETIC ULCER OF LEFT FOOT ASSOCIATED WITH TYPE 2 DIABETES MELLITUS, UNSPECIFIED PART OF FOOT, UNSPECIFIED ULCER STAGE (HCC): ICD-10-CM

## 2018-02-28 DIAGNOSIS — I70.209 DIABETES TYPE 2 WITH ATHEROSCLEROSIS OF ARTERIES OF EXTREMITIES (HCC): ICD-10-CM

## 2018-03-21 LAB
25(OH)D3+25(OH)D2 SERPL-MCNC: 21.9 NG/ML (ref 30–100)
ALBUMIN/CREAT UR: 3762.4 MG/G CREAT (ref 0–30)
BUN SERPL-MCNC: 19 MG/DL (ref 8–23)
BUN/CREAT SERPL: 19 (ref 7–25)
CALCIUM SERPL-MCNC: 9.3 MG/DL (ref 8.6–10.5)
CHLORIDE SERPL-SCNC: 101 MMOL/L (ref 98–107)
CHOLEST SERPL-MCNC: 162 MG/DL (ref 0–200)
CO2 SERPL-SCNC: 27.9 MMOL/L (ref 22–29)
CREAT SERPL-MCNC: 1 MG/DL (ref 0.76–1.27)
CREAT UR-MCNC: 130.6 MG/DL
FOLATE SERPL-MCNC: 17.49 NG/ML (ref 4.78–24.2)
GFR SERPLBLD CREATININE-BSD FMLA CKD-EPI: 75 ML/MIN/1.73
GFR SERPLBLD CREATININE-BSD FMLA CKD-EPI: 90 ML/MIN/1.73
GLUCOSE SERPL-MCNC: 123 MG/DL (ref 65–99)
HBA1C MFR BLD: 8.53 % (ref 4.8–5.6)
HDLC SERPL-MCNC: 54 MG/DL (ref 40–60)
INTERPRETATION: NORMAL
LDLC SERPL CALC-MCNC: 68 MG/DL (ref 0–100)
Lab: NORMAL
MICROALBUMIN UR-MCNC: 4913.7 UG/ML
POTASSIUM SERPL-SCNC: 4.5 MMOL/L (ref 3.5–5.2)
SODIUM SERPL-SCNC: 142 MMOL/L (ref 136–145)
TRIGL SERPL-MCNC: 199 MG/DL (ref 0–150)
TSH SERPL DL<=0.005 MIU/L-ACNC: 3.18 MIU/ML (ref 0.27–4.2)
VIT B12 SERPL-MCNC: 476 PG/ML (ref 211–946)
VLDLC SERPL CALC-MCNC: 39.8 MG/DL (ref 5–40)

## 2018-04-03 ENCOUNTER — OFFICE VISIT (OUTPATIENT)
Dept: ENDOCRINOLOGY | Age: 68
End: 2018-04-03

## 2018-04-03 VITALS
DIASTOLIC BLOOD PRESSURE: 76 MMHG | OXYGEN SATURATION: 95 % | WEIGHT: 282 LBS | SYSTOLIC BLOOD PRESSURE: 136 MMHG | BODY MASS INDEX: 36.19 KG/M2 | HEIGHT: 74 IN | HEART RATE: 90 BPM

## 2018-04-03 DIAGNOSIS — E11.43 DIABETIC AUTONOMIC NEUROPATHY ASSOCIATED WITH TYPE 2 DIABETES MELLITUS (HCC): ICD-10-CM

## 2018-04-03 DIAGNOSIS — I70.209 DIABETES TYPE 2 WITH ATHEROSCLEROSIS OF ARTERIES OF EXTREMITIES (HCC): Primary | ICD-10-CM

## 2018-04-03 DIAGNOSIS — E11.621 DIABETIC ULCER OF LEFT FOOT ASSOCIATED WITH TYPE 2 DIABETES MELLITUS, UNSPECIFIED PART OF FOOT, UNSPECIFIED ULCER STAGE (HCC): ICD-10-CM

## 2018-04-03 DIAGNOSIS — L97.529 DIABETIC ULCER OF LEFT FOOT ASSOCIATED WITH TYPE 2 DIABETES MELLITUS, UNSPECIFIED PART OF FOOT, UNSPECIFIED ULCER STAGE (HCC): ICD-10-CM

## 2018-04-03 DIAGNOSIS — E11.51 DIABETES TYPE 2 WITH ATHEROSCLEROSIS OF ARTERIES OF EXTREMITIES (HCC): Primary | ICD-10-CM

## 2018-04-03 DIAGNOSIS — E78.49 OTHER HYPERLIPIDEMIA: ICD-10-CM

## 2018-04-03 PROCEDURE — 99214 OFFICE O/P EST MOD 30 MIN: CPT | Performed by: INTERNAL MEDICINE

## 2018-04-03 RX ORDER — ERGOCALCIFEROL 1.25 MG/1
50000 CAPSULE ORAL WEEKLY
Qty: 30 CAPSULE | Refills: 11 | Status: SHIPPED | OUTPATIENT
Start: 2018-04-03 | End: 2018-06-07 | Stop reason: SDUPTHER

## 2018-04-03 NOTE — PROGRESS NOTES
67 y.o.    Patient Care Team:  Guille Nieves MD as PCP - General  Guille Nieves MD as PCP - Family Medicine  Tiburcio Moreno MD as Consulting Physician (Cardiology)    Chief Complaint:    FOLLOW UP APPOINTMENT/ TYPE 2 DIABETES MELLITUS  Subjective     HPI    67-year-old white male is here as a follow up patient for the management of type 2 diabetes mellitus.     Type 2 diabetes mellitus-diagnosed about 10-15 years ago.  Currently on Lantus 42 units twice daily, Humalog 14 units with each meals and ssi tid ac and bed time - 3 units for 50 above 150 mg/dl.   Did miss few injections in the last few months as he is in the donut hole for the year.   Checks BG - 3 - 4 times.   Avg - 150 - 180 mg/dl. He didn't get his log book for me to review.   No increased urination or thirst. No BG less than 60 mg/dl in the last 1 month. Does have hypoglycemic awareness.   Highest BG was 235 mg/dl.   Last eye exam was last fall 2017, no dm retinopathy.   History of coronary artery disease, no history of CK D, CVA per patient.  Patient is trying to walk with the cane but the physical activity is limited due to the right knee joint pain.  Weight has been stable  Is trying to follow diabetic diet for most part but given the holidays is not following diabetic diet strictly.  On Ace inhibitor.  Has history of pancreatitis in the past     Diabetic neuropathy resulting in left great toe osteomyelitis status post amputation complicated with right knee joint seeding underwent incision, debridement and  polyliner exchange of the right TKA. Patient also underwent prosthetic knee joint replacement.  On Lyrica which helps to neuropathy pain.     Hyperlipidemia  On Lipitor 40 mg oral daily.       Interval History      The following portions of the patient's history were reviewed and updated as appropriate: allergies, current medications, past family history, past medical history, past social history, past surgical history and problem  list.    Past Medical History:   Diagnosis Date   • Arthritis    • Chronic pain     BILATERAL KNEES AND BACK   • Contusion of knee    • Coronary artery disease    • Depression    • Diabetes mellitus     type 2 with atherosclerosis of arteries of extremities   • Diabetic ulcer of toe     left foot associated with type 2 diabetes mellitus, with necrosis of bone   • GERD (gastroesophageal reflux disease)    • Great toe pain     with cellulitis and gangrene   • History of kidney stones    • Hyperlipidemia    • Hypertension    • Hyponatremia    • Kidney stone    • Knee pain     hx of previous prosthetic joint infection   • MSSA (methicillin susceptible Staphylococcus aureus) infection     RIGHT KNEE   • NSTEMI (non-ST elevated myocardial infarction) 06/14/2017   • Sleep apnea     DOES NOT USE CPAP     Family History   Problem Relation Age of Onset   • Heart disease Mother    • Heart disease Father    • Diabetes Sister    • Dementia Maternal Grandmother    • No Known Problems Maternal Grandfather    • Heart disease Paternal Grandmother    • Heart attack Paternal Grandfather    • Heart disease Paternal Grandfather    • Heart disease Son    • Heart attack Son    • No Known Problems Sister    • Malig Hyperthermia Neg Hx      Social History     Social History   • Marital status:      Spouse name: N/A   • Number of children: N/A   • Years of education: N/A     Occupational History   • Not on file.     Social History Main Topics   • Smoking status: Former Smoker     Packs/day: 3.00     Years: 20.00   • Smokeless tobacco: Former User      Comment: quit 35 years ago. 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    • Alcohol use No   • Drug use: No   • Sexual activity: Defer     Other Topics Concern   • Not on file     Social History Narrative   • No narrative on file     Allergies   Allergen Reactions   • Vancomycin Other (See Comments)     Red Man syndrome, slow rate and premedicate with benadryl       Current Outpatient Prescriptions:   •   aspirin 325 MG tablet, Take 325 mg by mouth Daily., Disp: , Rfl:   •  atorvastatin (LIPITOR) 40 MG tablet, TAKE 1 TABLET BY MOUTH EVERY EVENING., Disp: 30 tablet, Rfl: 3  •  Blood Glucose Calibration (ACCU-CHEK COMPACT PLUS CONTROL) solution, AS DIRECTED, Disp: 1 each, Rfl: 3  •  glucose blood (ACCU-CHEK COMPACT PLUS) test strip, Use to test blood sugar 5 times daily, Disp: 500 each, Rfl: 3  •  glucose blood (ACCU-CHEK COMPACT STRIPS) test strip, Use to test blood sugar 4 times daily ICD 10 code E11.9, Disp: 400 each, Rfl: 3  •  insulin aspart (novoLOG FLEXPEN) 100 UNIT/ML solution pen-injector sc pen, Inject 14 Units under the skin 3 (Three) Times a Day With Meals., Disp: , Rfl:   •  Insulin Glargine (LANTUS SOLOSTAR) 100 UNIT/ML injection pen, Inject 40 Units under the skin 2 (Two) Times a Day. (Patient taking differently: Inject 45 Units under the skin 2 (Two) Times a Day.), Disp: 4 pen, Rfl: 2  •  Insulin Pen Needle (BD PEN NEEDLE JUAN DAVID U/F) 32G X 4 MM misc, TO INJECT 4 TIMES DAILY, Disp: 100 each, Rfl: 3  •  isosorbide mononitrate (IMDUR) 30 MG 24 hr tablet, Take 1 tablet by mouth Daily., Disp: 30 tablet, Rfl: 1  •  Lancets (ACCU-CHEK MULTICLIX) lancets, Use to test blood sugar 5 times daily, Disp: 500 each, Rfl: 3  •  metoprolol tartrate (LOPRESSOR) 25 MG tablet, Take 1 tablet by mouth Every 12 (Twelve) Hours., Disp: 60 tablet, Rfl: 11  •  Multiple Vitamin (MULTI VITAMIN DAILY PO), Take 1 tablet by mouth Daily. HOLD PRIOR TO SURGERY, Disp: , Rfl:   •  omeprazole (priLOSEC) 40 MG capsule, Take 40 mg by mouth 2 (Two) Times a Day., Disp: , Rfl:   •  pregabalin (LYRICA) 100 MG capsule, Take 100 mg by mouth 3 (Three) Times a Day., Disp: , Rfl:   •  sertraline (ZOLOFT) 100 MG tablet, Take 100 mg by mouth Daily., Disp: , Rfl:   •  ferrous sulfate 325 (65 FE) MG tablet, Take 325 mg by mouth Daily With Lunch., Disp: , Rfl:   •  vitamin D (ERGOCALCIFEROL) 68011 units capsule capsule, Take 1 capsule by mouth 1 (One) Time  "Per Week. (Patient taking differently: Take 50,000 Units by mouth 1 (One) Time Per Week. Friday), Disp: 30 capsule, Rfl: 11        Review of Systems   Constitutional: Positive for fatigue. Negative for fever.   HENT: Negative for facial swelling, nosebleeds, trouble swallowing and voice change.    Eyes: Negative for pain and redness.   Respiratory: Negative for shortness of breath and wheezing.    Cardiovascular: Negative for palpitations and leg swelling.   Gastrointestinal: Negative for abdominal pain, diarrhea and vomiting.   Endocrine: Negative for polydipsia and polyuria.   Genitourinary: Negative for decreased urine volume and frequency.   Musculoskeletal: Positive for joint swelling. Negative for neck pain.   Skin: Negative for rash.   Allergic/Immunologic: Negative for immunocompromised state.   Neurological: Negative for dizziness, seizures, facial asymmetry, light-headedness and headaches.   Hematological: Bruises/bleeds easily.   Psychiatric/Behavioral: Negative for agitation and confusion.       Objective       Vitals:    04/03/18 1304   BP: 136/76   Pulse: 90   SpO2: 95%   Weight: 128 kg (282 lb)   Height: 188 cm (74\")     Body mass index is 36.21 kg/m².      Physical Exam   Gen exam - alert and oriented x 3, obese male, not in distress.   HEENT - Acanthosis nigricans. Thyroid palpable.   Resp - Clear to auscultation.   CVS - S1,S2 heard and no murmurs.   Abd - Non tender, BS heard.   Ext - No edema,Hammer toes, decreased pin prick and intact proprioception.     Results Review:     I reviewed the patient's new clinical results.    Medical records reviewed  Summary: done      Results Encounter on 02/28/2018   Component Date Value Ref Range Status   • Hemoglobin A1C 03/21/2018 8.53* 4.80 - 5.60 % Final    Comment: Hemoglobin A1C Ranges:  Increased Risk for Diabetes  5.7% to 6.4%  Diabetes                     >= 6.5%  Diabetic Goal                < 7.0%     • Glucose 03/21/2018 123* 65 - 99 mg/dL Final   • " BUN 03/21/2018 19  8 - 23 mg/dL Final   • Creatinine 03/21/2018 1.00  0.76 - 1.27 mg/dL Final   • eGFR Non  Am 03/21/2018 75  >60 mL/min/1.73 Final   • eGFR African Am 03/21/2018 90  >60 mL/min/1.73 Final   • BUN/Creatinine Ratio 03/21/2018 19.0  7.0 - 25.0 Final   • Sodium 03/21/2018 142  136 - 145 mmol/L Final   • Potassium 03/21/2018 4.5  3.5 - 5.2 mmol/L Final   • Chloride 03/21/2018 101  98 - 107 mmol/L Final   • Total CO2 03/21/2018 27.9  22.0 - 29.0 mmol/L Final   • Calcium 03/21/2018 9.3  8.6 - 10.5 mg/dL Final   • Total Cholesterol 03/21/2018 162  0 - 200 mg/dL Final   • Triglycerides 03/21/2018 199* 0 - 150 mg/dL Final   • HDL Cholesterol 03/21/2018 54  40 - 60 mg/dL Final   • VLDL Cholesterol 03/21/2018 39.8  5 - 40 mg/dL Final   • LDL Cholesterol  03/21/2018 68  0 - 100 mg/dL Final   • Creatinine, Urine 03/21/2018 130.6  Not Estab. mg/dL Final   • Microalbumin, Urine 03/21/2018 4913.7  Not Estab. ug/mL Final    Comment: Results confirmed on  dilution.     • Microalbumin/Creatinine Ratio 03/21/2018 3762.4* 0.0 - 30.0 mg/g creat Final   • TSH 03/21/2018 3.180  0.270 - 4.200 mIU/mL Final   • Vitamin B-12 03/21/2018 476  211 - 946 pg/mL Final   • Folate 03/21/2018 17.49  4.78 - 24.20 ng/mL Final   • 25 Hydroxy, Vitamin D 03/21/2018 21.9* 30.0 - 100.0 ng/ml Final    Comment: Reference Range for Total Vitamin D 25(OH)  Deficiency    <20.0 ng/mL  Insufficiency 21-29 ng/mL  Sufficiency    ng/mL  Toxicity      >100 ng/ml        • Interpretation 03/21/2018 Note   Final   • PDF Image 03/21/2018 Not applicable   Final     Lab Results   Component Value Date    HGBA1C 8.53 (H) 03/20/2018    HGBA1C 7.00 (H) 11/22/2017    HGBA1C 7.10 (H) 09/06/2017     Lab Results   Component Value Date    MICROALBUR 4,913.7 03/20/2018    CREATININE 1.00 03/20/2018     Imaging Results (most recent)     None                Assessment and Plan:    Gui was seen today for diabetes.    Diagnoses and all orders for this  visit:    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    Diabetic ulcer of left foot associated with type 2 diabetes mellitus, unspecified part of foot, unspecified ulcer stage    Other hyperlipidemia    Diabetic autonomic neuropathy associated with type 2 diabetes mellitus    Other orders  -     vitamin D (ERGOCALCIFEROL) 57226 units capsule capsule; Take 1 capsule by mouth 1 (One) Time Per Week.    Type 2 diabetes mellitus-uncontrolled  Discussed with the patient extensively about Novolin N and and regular insulin  Gave patient handout on patient report was forgetting his insulins covered.  Advised the patient to call us with in the next 1 week to let us know if his insulins will be covered if not we will be moving towards Novolin N and regular insulin.  If patient is continued on the current insulin regimen he would make the below changes  Continue Lantus 42 units twice daily  Increase Humalog to 16 units with each meal, high dose sliding scale with each meal and at bedtime    If patient is considering the Novolin N and regular insulin  Novolin N and 34 units twice daily with meals  Regular insulin 15 units 3 times a day before meals  Regular insulin sliding scale 3 times a day before meals only.  Diabetic neuropathy  Continue Lyrica    Hyperlipidemia  Continue Lipitor 40 mg oral daily  Noted that triglyceride levels are elevated could be due to the elevated blood sugars.    Vitamin D deficiency  Increase to vitamin D replacement 50,000 units once a week.25          Reviewed Lab results with the patient.       The total face to face time spent 25 minutes with the patient,13minutes (greater than 50% of the total time) was spent counseling and coordination of the care on

## 2018-04-11 ENCOUNTER — TELEPHONE (OUTPATIENT)
Dept: ENDOCRINOLOGY | Age: 68
End: 2018-04-11

## 2018-04-11 NOTE — TELEPHONE ENCOUNTER
PATIENT CALLED OFFICE ABOUT MEDICATION PATIENT WANTED TO USE UP THE INSULIN HE HAS NOW BEFORE HE STARTED THE NEW INSULIN REGIME DR GILLESPIE STATED IN HIS LAST OFFICE VISIT. LET PATIENT KNOW I WILL LET DR GILLESPIE KNOW THAT IS DOING.

## 2018-06-07 ENCOUNTER — OFFICE VISIT (OUTPATIENT)
Dept: ENDOCRINOLOGY | Age: 68
End: 2018-06-07

## 2018-06-07 VITALS
OXYGEN SATURATION: 95 % | BODY MASS INDEX: 35.45 KG/M2 | HEART RATE: 68 BPM | DIASTOLIC BLOOD PRESSURE: 84 MMHG | HEIGHT: 74 IN | WEIGHT: 276.2 LBS | SYSTOLIC BLOOD PRESSURE: 140 MMHG

## 2018-06-07 DIAGNOSIS — E78.1 PURE HYPERGLYCERIDEMIA: ICD-10-CM

## 2018-06-07 DIAGNOSIS — IMO0002 UNCONTROLLED TYPE 2 DIABETES MELLITUS WITH COMPLICATION, WITH LONG-TERM CURRENT USE OF INSULIN: Primary | ICD-10-CM

## 2018-06-07 PROCEDURE — 99214 OFFICE O/P EST MOD 30 MIN: CPT | Performed by: INTERNAL MEDICINE

## 2018-06-07 NOTE — PROGRESS NOTES
Chief Complaint   Patient presents with   • Diabetes   8 week follow up /type 2 diabetes Mellitus    Hugh R McBurney 67 y.o. white male is here as a follow up patient for the management of type 2 diabetes mellitus.     Type 2 diabetes mellitus-diagnosed about 10-15 years ago.    Today in clinic lantus 42 units twice daily, Humalog 16 units with each meal and ssi - 3 units for 50 above 150 mg/dl - each meal and bed time.   Checks BG 3 - 4 times.   Avg 150 - 170 mg/dl. He got a log book for me to review.   He reports that when he has been having pain in his right knee and when he gets up from sleep he snacks a bit and next day morning he wakes up with 200 mg/dl.   No increased urination or thirst. No BG less than 60 mg/dl in the last 1 month. Does have hypoglycemic awareness.   Highest BG was 250 mg/dl.    Last eye exam was in Aug 2017 and no hx dm retinopathy.   History of coronary artery disease, no history of CK D, CVA per patient.  Weight has been relatively stable.  He is trying to follow diabetic diet for most part.  On Ace inhibitor.  Does have history of pancreatitis in the past.    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  Patient also underwent prosthetic knee joint replacement.  Currently on Lyrica which helps his neuropathy pain.    Hyperlipidemia  On Lipitor 40 mg oral daily.      Reviewed primary care physician's/consulting physician documentation and lab results :     I have reviewed the patient's allergies, medicines, past medical hx, family hx and social hx in detail.    Past Medical History:   Diagnosis Date   • Arthritis    • Chronic pain     BILATERAL KNEES AND BACK   • Contusion of knee    • Coronary artery disease    • Depression    • Diabetes mellitus     type 2 with atherosclerosis of arteries of extremities   • Diabetic ulcer of toe      left foot associated with type 2 diabetes mellitus, with necrosis of bone   • GERD (gastroesophageal reflux disease)    • Great toe pain     with cellulitis and gangrene   • History of kidney stones    • Hyperlipidemia    • Hypertension    • Hyponatremia    • Kidney stone    • Knee pain     hx of previous prosthetic joint infection   • MSSA (methicillin susceptible Staphylococcus aureus) infection     RIGHT KNEE   • NSTEMI (non-ST elevated myocardial infarction) 06/14/2017   • Sleep apnea     DOES NOT USE CPAP       Family History   Problem Relation Age of Onset   • Heart disease Mother    • Heart disease Father    • Diabetes Sister    • Dementia Maternal Grandmother    • No Known Problems Maternal Grandfather    • Heart disease Paternal Grandmother    • Heart attack Paternal Grandfather    • Heart disease Paternal Grandfather    • Heart disease Son    • Heart attack Son    • No Known Problems Sister    • Malig Hyperthermia Neg Hx        Social History     Social History   • Marital status:      Spouse name: N/A   • Number of children: N/A   • Years of education: N/A     Occupational History   • Not on file.     Social History Main Topics   • Smoking status: Former Smoker     Packs/day: 3.00     Years: 20.00   • Smokeless tobacco: Former User      Comment: quit 35 years ago. 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    • Alcohol use No   • Drug use: No   • Sexual activity: Defer     Other Topics Concern   • Not on file     Social History Narrative   • No narrative on file       Allergies   Allergen Reactions   • Vancomycin Other (See Comments)     Red Man syndrome, slow rate and premedicate with benadryl         Current Outpatient Prescriptions:   •  aspirin 325 MG tablet, Take 325 mg by mouth Daily., Disp: , Rfl:   •  atorvastatin (LIPITOR) 40 MG tablet, TAKE 1 TABLET BY MOUTH EVERY EVENING., Disp: 30 tablet, Rfl: 3  •  Blood Glucose Calibration (ACCU-CHEK COMPACT PLUS CONTROL) solution, AS DIRECTED, Disp: 1 each, Rfl: 3  •  " ferrous sulfate 325 (65 FE) MG tablet, Take 325 mg by mouth Daily With Lunch., Disp: , Rfl:   •  glucose blood (ACCU-CHEK COMPACT PLUS) test strip, Use to test blood sugar 5 times daily, Disp: 500 each, Rfl: 3  •  Insulin Glargine (LANTUS SOLOSTAR) 100 UNIT/ML injection pen, Inject 40 Units under the skin 2 (Two) Times a Day. (Patient taking differently: Inject 45 Units under the skin 2 (Two) Times a Day.), Disp: 4 pen, Rfl: 2  •  isosorbide mononitrate (IMDUR) 30 MG 24 hr tablet, Take 1 tablet by mouth Daily., Disp: 30 tablet, Rfl: 1  •  Lancets (ACCU-CHEK MULTICLIX) lancets, Use to test blood sugar 5 times daily, Disp: 500 each, Rfl: 3  •  metoprolol tartrate (LOPRESSOR) 25 MG tablet, Take 1 tablet by mouth Every 12 (Twelve) Hours., Disp: 60 tablet, Rfl: 11  •  Multiple Vitamin (MULTI VITAMIN DAILY PO), Take 1 tablet by mouth Daily. HOLD PRIOR TO SURGERY, Disp: , Rfl:   •  omeprazole (priLOSEC) 40 MG capsule, Take 40 mg by mouth 2 (Two) Times a Day., Disp: , Rfl:   •  pregabalin (LYRICA) 100 MG capsule, Take 100 mg by mouth 3 (Three) Times a Day., Disp: , Rfl:   •  sertraline (ZOLOFT) 100 MG tablet, Take 100 mg by mouth Daily., Disp: , Rfl:   •  vitamin D (ERGOCALCIFEROL) 01685 units capsule capsule, Take 1 capsule by mouth 1 (One) Time Per Week. (Patient taking differently: Take 50,000 Units by mouth 1 (One) Time Per Week. Friday), Disp: 30 capsule, Rfl: 11  •  insulin aspart (novoLOG FLEXPEN) 100 UNIT/ML solution pen-injector sc pen, Inject 14 Units under the skin 3 (Three) Times a Day With Meals., Disp: , Rfl:   •  insulin lispro (HUMALOG) 100 UNIT/ML injection, 100 Units., Disp: , Rfl:   •  Insulin Pen Needle (BD PEN NEEDLE JUAN DAVID U/F) 32G X 4 MM misc, TO INJECT 4 TIMES DAILY, Disp: 100 each, Rfl: 3    Review of Systems   Cardiovascular: Positive for leg swelling.       Objective:     /84   Pulse 68   Ht 188 cm (74\")   Wt 125 kg (276 lb 3.2 oz)   SpO2 95%   BMI 35.46 kg/m²     Physical Exam "   Constitutional: He is oriented to person, place, and time. He appears well-nourished.   obese   HENT:   Head: Normocephalic and atraumatic.   Wide neck   Eyes: Conjunctivae and EOM are normal.   Neck: Normal range of motion. Neck supple. Carotid bruit is not present. No thyromegaly present.   Acanthosis nigricans   Cardiovascular: Normal rate and normal heart sounds.    Pulmonary/Chest: Effort normal and breath sounds normal. No stridor. No respiratory distress.   Abdominal: Soft. Bowel sounds are normal. He exhibits no distension. There is no tenderness.   Central obesity   Musculoskeletal: He exhibits edema, tenderness and deformity.   Neurological: He is alert and oriented to person, place, and time.   Skin: Skin is warm and dry.   Psychiatric: He has a normal mood and affect. His behavior is normal.   Vitals reviewed.         Results Review:    I reviewed the patient's new clinical results.    Results Encounter on 02/28/2018   Component Date Value Ref Range Status   • Hemoglobin A1C 03/20/2018 8.53* 4.80 - 5.60 % Final    Comment: Hemoglobin A1C Ranges:  Increased Risk for Diabetes  5.7% to 6.4%  Diabetes                     >= 6.5%  Diabetic Goal                < 7.0%     • Glucose 03/20/2018 123* 65 - 99 mg/dL Final   • BUN 03/20/2018 19  8 - 23 mg/dL Final   • Creatinine 03/20/2018 1.00  0.76 - 1.27 mg/dL Final   • eGFR Non  Am 03/20/2018 75  >60 mL/min/1.73 Final   • eGFR African Am 03/20/2018 90  >60 mL/min/1.73 Final   • BUN/Creatinine Ratio 03/20/2018 19.0  7.0 - 25.0 Final   • Sodium 03/20/2018 142  136 - 145 mmol/L Final   • Potassium 03/20/2018 4.5  3.5 - 5.2 mmol/L Final   • Chloride 03/20/2018 101  98 - 107 mmol/L Final   • Total CO2 03/20/2018 27.9  22.0 - 29.0 mmol/L Final   • Calcium 03/20/2018 9.3  8.6 - 10.5 mg/dL Final   • Total Cholesterol 03/20/2018 162  0 - 200 mg/dL Final   • Triglycerides 03/20/2018 199* 0 - 150 mg/dL Final   • HDL Cholesterol 03/20/2018 54  40 - 60 mg/dL Final    • VLDL Cholesterol 03/20/2018 39.8  5 - 40 mg/dL Final   • LDL Cholesterol  03/20/2018 68  0 - 100 mg/dL Final   • Creatinine, Urine 03/20/2018 130.6  Not Estab. mg/dL Final   • Microalbumin, Urine 03/20/2018 4913.7  Not Estab. ug/mL Final    Comment: Results confirmed on  dilution.     • Microalbumin/Creatinine Ratio 03/20/2018 3762.4* 0.0 - 30.0 mg/g creat Final   • TSH 03/20/2018 3.180  0.270 - 4.200 mIU/mL Final   • Vitamin B-12 03/20/2018 476  211 - 946 pg/mL Final   • Folate 03/20/2018 17.49  4.78 - 24.20 ng/mL Final   • 25 Hydroxy, Vitamin D 03/20/2018 21.9* 30.0 - 100.0 ng/ml Final    Comment: Reference Range for Total Vitamin D 25(OH)  Deficiency    <20.0 ng/mL  Insufficiency 21-29 ng/mL  Sufficiency    ng/mL  Toxicity      >100 ng/ml        • Interpretation 03/20/2018 Note   Final    Supplemental report is available.   • PDF Image 03/20/2018 Not applicable   Final       Gui was seen today for diabetes.    Diagnoses and all orders for this visit:    Uncontrolled type 2 diabetes mellitus with complication, with long-term current use of insulin  -     Hemoglobin A1c; Future  -     Basic Metabolic Panel; Future  -     Lipid Panel; Future  -     TSH; Future  -     Vitamin B12 & Folate; Future    Pure hyperglyceridemia  -     Hemoglobin A1c; Future  -     Basic Metabolic Panel; Future  -     Lipid Panel; Future  -     TSH; Future  -     Vitamin B12 & Folate; Future    Type 2 diabetes mellitus-uncontrolled  Continue Lantus 42 units twice daily  Continue NovoLog 16 units with each meal along with Humalog sliding scale 3 times a day before meals and at bedtime  Advised the patient to take Humalog 6 units with each snack.  Advised the patient to check his blood sugars at least 3-4 times a day.    Hyperlipidemia  Continue Lipitor 40 mg oral daily    Diabetic neuropathy  Continue recurrent.    Thank you for asking me to see your patient, Hugh R McBurney in consultation.        Adarsh Love,  "MD  06/07/18    EMR Dragon / transcription disclaimer:     \"Dictated utilizing Dragon dictation\".   "

## 2018-06-11 ENCOUNTER — TELEPHONE (OUTPATIENT)
Dept: ENDOCRINOLOGY | Age: 68
End: 2018-06-11

## 2018-07-05 ENCOUNTER — OFFICE VISIT (OUTPATIENT)
Dept: CARDIOLOGY | Facility: CLINIC | Age: 68
End: 2018-07-05

## 2018-07-05 ENCOUNTER — HOSPITAL ENCOUNTER (OUTPATIENT)
Dept: CARDIOLOGY | Facility: HOSPITAL | Age: 68
Discharge: HOME | End: 2018-07-05
Attending: INTERNAL MEDICINE | Admitting: INTERNAL MEDICINE

## 2018-07-05 VITALS
DIASTOLIC BLOOD PRESSURE: 76 MMHG | HEIGHT: 74 IN | SYSTOLIC BLOOD PRESSURE: 126 MMHG | BODY MASS INDEX: 35.42 KG/M2 | WEIGHT: 276 LBS | HEART RATE: 70 BPM

## 2018-07-05 VITALS
BODY MASS INDEX: 35.42 KG/M2 | DIASTOLIC BLOOD PRESSURE: 78 MMHG | HEIGHT: 74 IN | SYSTOLIC BLOOD PRESSURE: 138 MMHG | WEIGHT: 276 LBS | HEART RATE: 74 BPM

## 2018-07-05 DIAGNOSIS — I70.209 DIABETES TYPE 2 WITH ATHEROSCLEROSIS OF ARTERIES OF EXTREMITIES (HCC): Chronic | ICD-10-CM

## 2018-07-05 DIAGNOSIS — E11.51 DIABETES TYPE 2 WITH ATHEROSCLEROSIS OF ARTERIES OF EXTREMITIES (HCC): Chronic | ICD-10-CM

## 2018-07-05 DIAGNOSIS — E78.1 PURE HYPERGLYCERIDEMIA: Primary | ICD-10-CM

## 2018-07-05 DIAGNOSIS — I10 ESSENTIAL HYPERTENSION: ICD-10-CM

## 2018-07-05 DIAGNOSIS — I25.10 CORONARY ARTERY DISEASE INVOLVING NATIVE CORONARY ARTERY OF NATIVE HEART WITHOUT ANGINA PECTORIS: ICD-10-CM

## 2018-07-05 LAB
BH CV ECHO MEAS - ACS: 2.2 CM
BH CV ECHO MEAS - AO MAX PG (FULL): 9.3 MMHG
BH CV ECHO MEAS - AO MAX PG: 13 MMHG
BH CV ECHO MEAS - AO MEAN PG (FULL): 5.8 MMHG
BH CV ECHO MEAS - AO MEAN PG: 7.9 MMHG
BH CV ECHO MEAS - AO ROOT AREA (BSA CORRECTED): 1.4
BH CV ECHO MEAS - AO ROOT AREA: 9.8 CM^2
BH CV ECHO MEAS - AO ROOT DIAM: 3.5 CM
BH CV ECHO MEAS - AO V2 MAX: 180.3 CM/SEC
BH CV ECHO MEAS - AO V2 MEAN: 133.3 CM/SEC
BH CV ECHO MEAS - AO V2 VTI: 37.5 CM
BH CV ECHO MEAS - AVA(I,A): 2.6 CM^2
BH CV ECHO MEAS - AVA(I,D): 2.6 CM^2
BH CV ECHO MEAS - AVA(V,A): 2.5 CM^2
BH CV ECHO MEAS - AVA(V,D): 2.5 CM^2
BH CV ECHO MEAS - BSA(HAYCOCK): 2.6 M^2
BH CV ECHO MEAS - BSA: 2.5 M^2
BH CV ECHO MEAS - BZI_BMI: 35.4 KILOGRAMS/M^2
BH CV ECHO MEAS - BZI_METRIC_HEIGHT: 188 CM
BH CV ECHO MEAS - BZI_METRIC_WEIGHT: 125.2 KG
BH CV ECHO MEAS - CONTRAST EF (2CH): 51.9 ML/M^2
BH CV ECHO MEAS - CONTRAST EF 4CH: 51.9 ML/M^2
BH CV ECHO MEAS - EDV(MOD-SP2): 104 ML
BH CV ECHO MEAS - EDV(MOD-SP4): 104 ML
BH CV ECHO MEAS - EDV(TEICH): 164 ML
BH CV ECHO MEAS - EF(CUBED): 62.7 %
BH CV ECHO MEAS - EF(MOD-BP): 52 %
BH CV ECHO MEAS - EF(MOD-SP2): 51.9 %
BH CV ECHO MEAS - EF(MOD-SP4): 51.9 %
BH CV ECHO MEAS - EF(TEICH): 53.5 %
BH CV ECHO MEAS - ESV(MOD-SP2): 50 ML
BH CV ECHO MEAS - ESV(MOD-SP4): 50 ML
BH CV ECHO MEAS - ESV(TEICH): 76.3 ML
BH CV ECHO MEAS - FS: 28 %
BH CV ECHO MEAS - IVS/LVPW: 1
BH CV ECHO MEAS - IVSD: 1.3 CM
BH CV ECHO MEAS - LAT PEAK E' VEL: 9 CM/SEC
BH CV ECHO MEAS - LV DIASTOLIC VOL/BSA (35-75): 41.7 ML/M^2
BH CV ECHO MEAS - LV MASS(C)D: 324.6 GRAMS
BH CV ECHO MEAS - LV MASS(C)DI: 130.3 GRAMS/M^2
BH CV ECHO MEAS - LV MAX PG: 3.7 MMHG
BH CV ECHO MEAS - LV MEAN PG: 2.1 MMHG
BH CV ECHO MEAS - LV SYSTOLIC VOL/BSA (12-30): 20.1 ML/M^2
BH CV ECHO MEAS - LV V1 MAX: 96 CM/SEC
BH CV ECHO MEAS - LV V1 MEAN: 67.7 CM/SEC
BH CV ECHO MEAS - LV V1 VTI: 20.7 CM
BH CV ECHO MEAS - LVIDD: 5.8 CM
BH CV ECHO MEAS - LVIDS: 4.1 CM
BH CV ECHO MEAS - LVLD AP2: 9 CM
BH CV ECHO MEAS - LVLD AP4: 8.7 CM
BH CV ECHO MEAS - LVLS AP2: 6.6 CM
BH CV ECHO MEAS - LVLS AP4: 7.7 CM
BH CV ECHO MEAS - LVOT AREA (M): 4.5 CM^2
BH CV ECHO MEAS - LVOT AREA: 4.7 CM^2
BH CV ECHO MEAS - LVOT DIAM: 2.4 CM
BH CV ECHO MEAS - LVPWD: 1.3 CM
BH CV ECHO MEAS - MED PEAK E' VEL: 5 CM/SEC
BH CV ECHO MEAS - MV A DUR: 0.11 SEC
BH CV ECHO MEAS - MV A MAX VEL: 115.9 CM/SEC
BH CV ECHO MEAS - MV DEC SLOPE: 275.5 CM/SEC^2
BH CV ECHO MEAS - MV DEC TIME: 0.29 SEC
BH CV ECHO MEAS - MV E MAX VEL: 78.1 CM/SEC
BH CV ECHO MEAS - MV E/A: 0.67
BH CV ECHO MEAS - MV MAX PG: 8.2 MMHG
BH CV ECHO MEAS - MV MEAN PG: 3.3 MMHG
BH CV ECHO MEAS - MV P1/2T MAX VEL: 77.6 CM/SEC
BH CV ECHO MEAS - MV P1/2T: 82.5 MSEC
BH CV ECHO MEAS - MV V2 MAX: 143.3 CM/SEC
BH CV ECHO MEAS - MV V2 MEAN: 84.9 CM/SEC
BH CV ECHO MEAS - MV V2 VTI: 34.7 CM
BH CV ECHO MEAS - MVA P1/2T LCG: 2.8 CM^2
BH CV ECHO MEAS - MVA(P1/2T): 2.7 CM^2
BH CV ECHO MEAS - MVA(VTI): 2.8 CM^2
BH CV ECHO MEAS - PA ACC TIME: 0.19 SEC
BH CV ECHO MEAS - PA MAX PG (FULL): 1.5 MMHG
BH CV ECHO MEAS - PA MAX PG: 3.3 MMHG
BH CV ECHO MEAS - PA PR(ACCEL): -8.1 MMHG
BH CV ECHO MEAS - PA V2 MAX: 90.9 CM/SEC
BH CV ECHO MEAS - PULM A REVS DUR: 0.13 SEC
BH CV ECHO MEAS - PULM A REVS VEL: 26.1 CM/SEC
BH CV ECHO MEAS - PULM DIAS VEL: 30.5 CM/SEC
BH CV ECHO MEAS - PULM S/D: 1.5
BH CV ECHO MEAS - PULM SYS VEL: 45.8 CM/SEC
BH CV ECHO MEAS - PVA(V,A): 3 CM^2
BH CV ECHO MEAS - PVA(V,D): 3 CM^2
BH CV ECHO MEAS - QP/QS: 0.76
BH CV ECHO MEAS - RV MAX PG: 1.8 MMHG
BH CV ECHO MEAS - RV MEAN PG: 1.2 MMHG
BH CV ECHO MEAS - RV V1 MAX: 67.4 CM/SEC
BH CV ECHO MEAS - RV V1 MEAN: 52.2 CM/SEC
BH CV ECHO MEAS - RV V1 VTI: 18.6 CM
BH CV ECHO MEAS - RVOT AREA: 4 CM^2
BH CV ECHO MEAS - RVOT DIAM: 2.3 CM
BH CV ECHO MEAS - SI(AO): 147.1 ML/M^2
BH CV ECHO MEAS - SI(CUBED): 48.1 ML/M^2
BH CV ECHO MEAS - SI(LVOT): 39.2 ML/M^2
BH CV ECHO MEAS - SI(MOD-SP2): 21.7 ML/M^2
BH CV ECHO MEAS - SI(MOD-SP4): 21.7 ML/M^2
BH CV ECHO MEAS - SI(TEICH): 35.2 ML/M^2
BH CV ECHO MEAS - SV(AO): 366.6 ML
BH CV ECHO MEAS - SV(CUBED): 119.9 ML
BH CV ECHO MEAS - SV(LVOT): 97.6 ML
BH CV ECHO MEAS - SV(MOD-SP2): 54 ML
BH CV ECHO MEAS - SV(MOD-SP4): 54 ML
BH CV ECHO MEAS - SV(RVOT): 74.4 ML
BH CV ECHO MEAS - SV(TEICH): 87.7 ML
BH CV ECHO MEAS - TAPSE (>1.6): 2.3 CM2
BH CV ECHO MEASUREMENTS AVERAGE E/E' RATIO: 11.16
BH CV XLRA - RV BASE: 3.5 CM
BH CV XLRA - TDI S': 13 CM/SEC
LEFT ATRIUM VOLUME INDEX: 28 ML/M2
SINUS: 3.6 CM
STJ: 3.5 CM

## 2018-07-05 PROCEDURE — 93306 TTE W/DOPPLER COMPLETE: CPT

## 2018-07-05 PROCEDURE — 93000 ELECTROCARDIOGRAM COMPLETE: CPT | Performed by: INTERNAL MEDICINE

## 2018-07-05 PROCEDURE — 93306 TTE W/DOPPLER COMPLETE: CPT | Performed by: INTERNAL MEDICINE

## 2018-07-05 PROCEDURE — 99214 OFFICE O/P EST MOD 30 MIN: CPT | Performed by: INTERNAL MEDICINE

## 2018-07-05 RX ORDER — GABAPENTIN 300 MG/1
3 CAPSULE ORAL DAILY
COMMUNITY
End: 2019-08-07

## 2018-07-05 RX ORDER — OXYCODONE AND ACETAMINOPHEN 10; 325 MG/1; MG/1
1 TABLET ORAL EVERY 6 HOURS PRN
COMMUNITY

## 2018-07-05 NOTE — PROGRESS NOTES
Gui CAMPBELL Brigittemallorie  1950  Date of Office Visit: 7/5/18  Encounter Provider: Tiburcio Moreno MD  Place of Service: [facility] CARDIOLOGY      CHIEF COMPLAINT:  Coronary artery disease  Stress-induced cardiomyopathy  Essential hypertension      HISTORY OF PRESENT ILLNESS:  65 y/o patient with a documented h/o HTN, HLD, DM, GERD, PRISCILLA-no CPAP, right TKA with joint infection and coronary artery disease.. In June 2017 he was seen because he had a  Vancomyocin reaction with chest pain, n/v and diaphoresis and a non-ST elevation myocardial infarction when here for knee surgery. A cardiac cath was done which showed a normal left main, 20% proximal LAD, 50% mid LAD, 90% proximal first diagonal, diffuse 50% mid circumflex, and a  70-80% proximal RPL 2 stenosis. An echo showed an EF of 42%, possibly from stress induced cardiomyopathy. He was sent home on an ace, beta blocker and nitrate to wait for 2-4 weeks before attempting surgery again. He was last seen in our office by Ashley BENAVIDES on 6/26/17. At that time he had a repeat echo which showed an EF of 40% with findings suggestive of stress-induced cardiomyopathy         Review of Systems   Constitution: Negative for fever, weakness and malaise/fatigue.   HENT: Negative for nosebleeds and sore throat.    Eyes: Negative for blurred vision and double vision.   Cardiovascular: Negative for chest pain, claudication, palpitations and syncope.   Respiratory: Negative for cough, shortness of breath and snoring.    Endocrine: Negative for cold intolerance, heat intolerance and polydipsia.   Skin: Negative for itching, poor wound healing and rash.   Musculoskeletal: Negative for joint pain, joint swelling, muscle weakness and myalgias.   Gastrointestinal: Negative for abdominal pain, melena, nausea and vomiting.   Neurological: Negative for light-headedness, loss of balance, seizures and vertigo.   Psychiatric/Behavioral: Negative for altered  mental status and depression.   Since our last visit he has actually been doing very well.  He denies any dyspnea on exertion or chest pain.  He denies orthopnea or PND.  He is tolerating his medical regimen well.  His blood pressure today is 138/78 and states that most of the time his blood pressure is staying right around there.  He had a transthoracic echocardiogram performed today, and my preliminary review is that the ejection fraction is low normal at 50-55%, which is improved for him.          Past Medical History:   Diagnosis Date   • Arthritis    • Chronic pain     BILATERAL KNEES AND BACK   • Contusion of knee    • Coronary artery disease    • Depression    • Diabetes mellitus     type 2 with atherosclerosis of arteries of extremities   • Diabetic ulcer of toe     left foot associated with type 2 diabetes mellitus, with necrosis of bone   • GERD (gastroesophageal reflux disease)    • Great toe pain     with cellulitis and gangrene   • History of kidney stones    • Hyperlipidemia    • Hypertension    • Hyponatremia    • Kidney stone    • Knee pain     hx of previous prosthetic joint infection   • MSSA (methicillin susceptible Staphylococcus aureus) infection     RIGHT KNEE   • NSTEMI (non-ST elevated myocardial infarction) 06/14/2017   • Sleep apnea     DOES NOT USE CPAP       The following portions of the patient's history were reviewed and updated as appropriate: Social history , Family history and Surgical history     Current Outpatient Prescriptions on File Prior to Visit   Medication Sig Dispense Refill   • atorvastatin (LIPITOR) 40 MG tablet Take 1 tablet by mouth Every Night. 30 tablet 1   • baclofen (LIORESAL) 10 MG tablet Take 5 mg by mouth 2 (Two) Times a Day.     • ferrous sulfate 325 (65 FE) MG tablet Take 325 mg by mouth Daily With Lunch.     • glucose blood (ACCU-CHEK COMPACT PLUS) test strip Use to test blood sugar 5 times daily 500 each 3   • glucose blood (ACCU-CHEK COMPACT STRIPS) test  strip Use to test blood sugar 4 times daily   ICD 10 code E11.9 400 each 3   • insulin aspart (novoLOG FLEXPEN) 100 UNIT/ML solution pen-injector sc pen Inject 14 Units under the skin 3 (Three) Times a Day With Meals.     • Insulin Glargine (LANTUS SOLOSTAR) 100 UNIT/ML injection pen Inject 40 Units under the skin 2 (Two) Times a Day. (Patient taking differently: Inject 45 Units under the skin 2 (Two) Times a Day.) 4 pen 2   • Insulin Pen Needle (BD PEN NEEDLE JUAN DAVID U/F) 32G X 4 MM misc TO INJECT 4 TIMES DAILY 100 each 3   • isosorbide mononitrate (IMDUR) 30 MG 24 hr tablet Take 1 tablet by mouth Daily. 30 tablet 1   • Lancets (ACCU-CHEK MULTICLIX) lancets Use to test blood sugar 5 times daily 500 each 3   • metoprolol tartrate (LOPRESSOR) 25 MG tablet Take 1 tablet by mouth Every 12 (Twelve) Hours. 60 tablet 11   • Multiple Vitamin (MULTI VITAMIN DAILY PO) Take 1 tablet by mouth Daily. HOLD PRIOR TO SURGERY     • omeprazole (priLOSEC) 40 MG capsule Take 40 mg by mouth 2 (Two) Times a Day.     • pregabalin (LYRICA) 100 MG capsule Take 200 mg by mouth Every Night.     • pregabalin (LYRICA) 100 MG capsule Take 100 mg by mouth Every Morning.     • sertraline (ZOLOFT) 100 MG tablet Take 100 mg by mouth Daily.     • sulfamethoxazole-trimethoprim (BACTRIM DS,SEPTRA DS) 800-160 MG per tablet Take 1 tablet by mouth Every 12 (Twelve) Hours for 90 days. Indications: Bone and/or Joint Infection, PROPHYLAXIS 60 tablet 3   • vitamin D (ERGOCALCIFEROL) 30951 units capsule capsule Take 1 capsule by mouth 1 (One) Time Per Week. (Patient taking differently: Take 50,000 Units by mouth 1 (One) Time Per Week. Friday) 30 capsule 11   • [DISCONTINUED] bisacodyl (DULCOLAX) 10 MG suppository Insert 1 suppository into the rectum Daily As Needed for constipation.  0   • [DISCONTINUED] lisinopril (PRINIVIL,ZESTRIL) 10 MG tablet Take 1 tablet by mouth Every Evening. 30 tablet 11   • [DISCONTINUED] promethazine (PHENERGAN) 25 MG tablet Take 1  tablet by mouth Every 8 (Eight) Hours As Needed for Nausea or Vomiting. 30 tablet 2     No current facility-administered medications on file prior to visit.        Allergies   Allergen Reactions   • Vancomycin Other (See Comments)     Red Man syndrome, slow rate and premedicate with benadryl                Blood pressure 138/78  Heart rate 74  Weight 276 pounds    Physical Exam   Constitutional: He is oriented to person, place, and time. He appears well-developed and well-nourished.   HENT:   Head: Normocephalic and atraumatic.   Eyes: Conjunctivae and EOM are normal. No scleral icterus.   Neck: Normal range of motion. Neck supple. Normal carotid pulses, no hepatojugular reflux and no JVD present. Carotid bruit is not present. No tracheal deviation present. No thyromegaly present.   Cardiovascular: Normal rate and regular rhythm.  Exam reveals no gallop and no friction rub.    No murmur heard.  Pulses:       Carotid pulses are 2+ on the right side, and 2+ on the left side.       Radial pulses are 2+ on the right side, and 2+ on the left side.        Femoral pulses are 2+ on the right side, and 2+ on the left side.       Dorsalis pedis pulses are 2+ on the right side, and 2+ on the left side.        Posterior tibial pulses are 2+ on the right side, and 2+ on the left side.   Pulmonary/Chest: Breath sounds normal. No respiratory distress. He has no decreased breath sounds. He has no wheezes. He has no rhonchi. He has no rales. He exhibits no tenderness.   Abdominal: Soft. Bowel sounds are normal. He exhibits no distension. There is no tenderness. There is no rebound.   Musculoskeletal: He exhibits no edema or deformity.   Neurological: He is alert and oriented to person, place, and time. He has normal strength. No sensory deficit.   Skin: No rash noted. No erythema.   Psychiatric: He has a normal mood and affect. His behavior is normal.       No components found for: CBC  No results found for: CMP  No components found  for: LIPID  No results found for: BMP      ECG 12 Lead  Date/Time: 7/5/2018 1:13 PM  Performed by: SUZETTE SCHWAB  Authorized by: SUZETTE SCHWAB   Comparison: compared with previous ECG from 10/3/2017  Similar to previous ECG  Rhythm: sinus rhythm  Rate: normal  QRS axis: normal  Clinical impression: non-specific ECG  Comments: Nonspecific depolarization abnormality diffuse leads.  Unchanged.             6/26/17 TTE  · Left ventricular wall thickness is consistent with mild-to-moderate concentric hypertrophy.  · Left ventricular systolic function is mildly decreased. Estimated EF = 42%. There is global hypokinesis worse in the anterior wall and anterior septum.  · Moderate mitral annular calcification is present. Mild mitral valve regurgitation is present.    6/14/17    Conclusions:   1. Left main: Mild calcification.  No stenosis  2. LAD: Diffuse 20% proximal stenosis.  Diffuse 50% stenosis mid segment.  Discrete 90% proximal first diagonal stenosis with JANINE 2 flow.  Small caliber  3. LCX: Diffuse 50% mid vessel stenosis  4. RCA: Discrete 70-80% proximal RPL 2 stenosis.  Small caliber  5.  Moderately reduced left ventricular systolic function.  Ejection fraction 40%.  Mid to distal anterior wall, apical severe hypokinesis.  Moderate distal inferior wall hypokinesis.  More consistent with stress-induced cardiomyopathy than flow limiting coronary artery disease in that territory     Recommendations: Continue ACE inhibitor and beta blocker.  Continue aspirin and statin.  Low-dose isosorbide mononitrate.      DISCUSSION/SUMMARY  67-year-old gentleman with a medical history as documented above including hypertension, hyperlipidemia, diabetes mellitus, a right total knee arthroplasty with prior joint infection and revision who had a non-ST elevation myocardial infarction and was found to have a stress induced cardiomyopathy with an ejection fraction of 40%.  He has no angina or dyspnea on exertion.  No  clinical evidence of heart failure.   Eection fraction has improved on therapy.     1. Stress induced cardiomyopathy.  Continue metoprolol tartrate at the current dose.  He has also previously been on an ACE inhibitor; however, was unable to tolerate that secondary to low normal blood pressures.    -Repeat transthoracic echocardiogram was performed today.  Left ventricular ejection fraction preliminarily 50-55%.  Improved.  2. Hyperlipidemia; continue atorvastatin at current dose.  Lipid panel has been ordered and is pending.     3. Coronary artery disease; no indication for intervention.  Small caliber disease.  Continue aspirin and statin.   4. Diabetes mellitus; per primary care provider.       I will see the patient back in six months or earlier with problems.     Heart Failure  Assessment  • NYHA class I - There is no limitation of physical activity. Physical activity does not cause fatigue, palpitations or shortness of breath.  • ACE inhibitor not prescribed for medical reasons  • Beta blocker prescribed  • Diuretics not prescribed for medical reasons  • Angiotensin receptor blocker (ARB) not prescribed for medical reasons  • Calcium channel blockers not prescribed  • Left ventricular function is normal by qualitative assessment    Plan  • The patient has received heart failure education on the following topics: physical activity, weight monitoring and minimizing alcohol intake  • The heart failure care plan was discussed with the patient today including: continuing the current program    Subjective/Objective    • Physical exam findings negative for rales and elevated JVP.        Coronary Artery Disease  Assessment  • The patient has no angina    Plan  • Lifestyle modifications discussed include adhering to a heart healthy diet, maintenance of a healthy weight and regular exercise    Subjective - Objective  • Current antiplatelet therapy includes aspirin 81 mg

## 2018-07-12 ENCOUNTER — OFFICE VISIT (OUTPATIENT)
Dept: INFECTIOUS DISEASES | Facility: CLINIC | Age: 68
End: 2018-07-12

## 2018-07-12 VITALS
TEMPERATURE: 97.5 F | DIASTOLIC BLOOD PRESSURE: 87 MMHG | BODY MASS INDEX: 35.65 KG/M2 | HEIGHT: 74 IN | SYSTOLIC BLOOD PRESSURE: 171 MMHG | HEART RATE: 66 BPM | WEIGHT: 277.8 LBS

## 2018-07-12 DIAGNOSIS — I70.209 DIABETES TYPE 2 WITH ATHEROSCLEROSIS OF ARTERIES OF EXTREMITIES (HCC): ICD-10-CM

## 2018-07-12 DIAGNOSIS — T84.59XD INFECTED PROSTHETIC KNEE JOINT, SUBSEQUENT ENCOUNTER: Primary | ICD-10-CM

## 2018-07-12 DIAGNOSIS — Z96.659 INFECTED PROSTHETIC KNEE JOINT, SUBSEQUENT ENCOUNTER: Primary | ICD-10-CM

## 2018-07-12 DIAGNOSIS — E11.51 DIABETES TYPE 2 WITH ATHEROSCLEROSIS OF ARTERIES OF EXTREMITIES (HCC): ICD-10-CM

## 2018-07-12 PROCEDURE — 99213 OFFICE O/P EST LOW 20 MIN: CPT | Performed by: INTERNAL MEDICINE

## 2018-07-12 NOTE — PROGRESS NOTES
"cc: f/u PJI    Per prior notes: \"Very nice 66-year-old gentleman with history of osteoarthritis complicated by right TKA PJI culture negative in 2011 treated with two-stage replacement and prolonged antibiotic therapy, admitted on with 3-4 weeks of left first toe swelling, fever and right artificial knee pain. Was found to have MSSA in his blood and underwent I&D with amputation of the first left toe by Dr. Brand on 2/14. Dr. Marie also performed liner exchange with I&D of right TKA on 2/14. His left toe and right TKA cultures all grew MSSA. Transthoracic echocardiogram without obvious vegetation. In discussions with orthopedics and infectious diseases, patient elected to pursue device retention strategy. We will therefore recommend Ancef 2 g IV every 8 hours and rifampin 300 mg by mouth every 12 hours for 6 weeks with an anticipated stop date of 3/27/17. We then plan 6 months of rifampin plus PO abx (likely quinolone). He was readmitted for fever and hyperglycemia from 2/25 to 3/4/17 but all cultures were negative and transesophageal echocardiogram was negative.  At clinic visit on 3/27/17, he was transitioned off IV antibiotics to 750 mg of Cipro and rifampin 300 mg twice a day.  He developed progressive pain and concern was for progressive infection.  He was admitted for explantation of his right TKA on 6/13 but developed red man syndrome and a NSTEMI, delaying procedures.  He was readmitted on 7/7/17 and underwent removal of hardware with placement of antibiotic spacer.  Surgical cultures again grew MSSA.  We will re-induce with 6 more weeks of IV cefazolin, anticipated stop date August 17, 2017. Patient had antibiotics discontinued on 8/17/17.  He then underwent revision right TKA by Dr. Marie on 9/5/17.  Operative cultures were negative, we'll elected to discharge him on 90 days of Bactrim double strength by mouth twice a day.\"    I last saw the patient in October 2017 and he was doing well.  " "Although we initially planned on discontinue Bactrim in  December 2017, he has remained on it.  I last saw in clinic in January 2018 and we decided that he can come off the antibiotic.  He saw Dr. Marie shortly thereafter discontinue the Bactrim.  Since that time he says that he's been doing reasonably well.  He has some residual joint pain in the right knee especially at night after being on it throughout the day.  Overall is improved.  He denies any significant pain and no drainage from the knee.  No constitutional symptoms of infection such as fever, chills, night sweats.  He reports that his sugars are much better controlled with glucoses running on average about 150.    PAST MEDICAL HISTORY:  1. Diabetes mellitus type 2.  2. Hypertension.  3. Hyperlipidemia.  4. GERD.  5. History of nephrolithiasis.  6. Chronic pain.  7. Osteoarthritis status post bilateral joint replacement with right PJI in 2011 treated with 2-stage replacement and antibiotic therapy.  8. Back hardware placement.  9. Placement of an orthopedic screw on the right shoulder.  10. Appendectomy.  11. History of pancreatitis.  12.  Coronary artery disease  13.  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  GENERAL: Awake and alert, in no acute distress.   SKIN: Warm and dry without cutaneous eruptions. Knee incision benign  PSYCHIATRIC: Appropriate mood, affect, insight, and judgment.     Assessment and Plan  1.  MSSA right prosthetic knee joint infection status post explantation on 7/7/17 6 weeks IV antibiotics and now with revision TKA on 9/5/17  2. Diabetes mellitus type 2 with neuropathy, continue glycemic control efforts to prevent/control infectious " complications      Doing well.  He has been stable off antibiotics with no signs or symptoms of recurrent infection.  I discussed with him signs and symptoms of recrudescent infection when to seek medical attention.  Congratulated him on good glycemic control and encouraged him to continue these measures.  I have not scheduled him follow-up appointment, but he knows to return to see me anytime he needs.

## 2018-09-04 LAB
BUN SERPL-MCNC: 15 MG/DL (ref 8–23)
BUN/CREAT SERPL: 13.3 (ref 7–25)
CALCIUM SERPL-MCNC: 9 MG/DL (ref 8.6–10.5)
CHLORIDE SERPL-SCNC: 101 MMOL/L (ref 98–107)
CHOLEST SERPL-MCNC: 143 MG/DL (ref 0–200)
CO2 SERPL-SCNC: 26.6 MMOL/L (ref 22–29)
CREAT SERPL-MCNC: 1.13 MG/DL (ref 0.76–1.27)
FOLATE SERPL-MCNC: >20 NG/ML (ref 4.78–24.2)
GLUCOSE SERPL-MCNC: 172 MG/DL (ref 65–99)
HBA1C MFR BLD: 8.43 % (ref 4.8–5.6)
HDLC SERPL-MCNC: 53 MG/DL (ref 40–60)
INTERPRETATION: NORMAL
LDLC SERPL CALC-MCNC: 70 MG/DL (ref 0–100)
Lab: NORMAL
POTASSIUM SERPL-SCNC: 4.7 MMOL/L (ref 3.5–5.2)
SODIUM SERPL-SCNC: 141 MMOL/L (ref 136–145)
TRIGL SERPL-MCNC: 101 MG/DL (ref 0–150)
TSH SERPL DL<=0.005 MIU/L-ACNC: 1.29 MIU/ML (ref 0.27–4.2)
VIT B12 SERPL-MCNC: 517 PG/ML (ref 211–946)
VLDLC SERPL CALC-MCNC: 20.2 MG/DL (ref 5–40)

## 2018-09-05 ENCOUNTER — RESULTS ENCOUNTER (OUTPATIENT)
Dept: ENDOCRINOLOGY | Age: 68
End: 2018-09-05

## 2018-09-05 DIAGNOSIS — IMO0002 UNCONTROLLED TYPE 2 DIABETES MELLITUS WITH COMPLICATION, WITH LONG-TERM CURRENT USE OF INSULIN: ICD-10-CM

## 2018-09-05 DIAGNOSIS — E78.1 PURE HYPERGLYCERIDEMIA: ICD-10-CM

## 2018-09-18 ENCOUNTER — OFFICE VISIT (OUTPATIENT)
Dept: ENDOCRINOLOGY | Age: 68
End: 2018-09-18

## 2018-09-18 VITALS
BODY MASS INDEX: 35.29 KG/M2 | WEIGHT: 275 LBS | HEART RATE: 68 BPM | SYSTOLIC BLOOD PRESSURE: 150 MMHG | HEIGHT: 74 IN | OXYGEN SATURATION: 98 % | DIASTOLIC BLOOD PRESSURE: 70 MMHG

## 2018-09-18 DIAGNOSIS — L97.529 DIABETIC ULCER OF LEFT FOOT ASSOCIATED WITH TYPE 2 DIABETES MELLITUS, UNSPECIFIED PART OF FOOT, UNSPECIFIED ULCER STAGE (HCC): ICD-10-CM

## 2018-09-18 DIAGNOSIS — IMO0002 UNCONTROLLED TYPE 2 DIABETES MELLITUS WITH COMPLICATION, WITH LONG-TERM CURRENT USE OF INSULIN: Primary | ICD-10-CM

## 2018-09-18 DIAGNOSIS — E78.1 PURE HYPERGLYCERIDEMIA: ICD-10-CM

## 2018-09-18 DIAGNOSIS — E11.621 DIABETIC ULCER OF LEFT FOOT ASSOCIATED WITH TYPE 2 DIABETES MELLITUS, UNSPECIFIED PART OF FOOT, UNSPECIFIED ULCER STAGE (HCC): ICD-10-CM

## 2018-09-18 PROCEDURE — 99214 OFFICE O/P EST MOD 30 MIN: CPT | Performed by: INTERNAL MEDICINE

## 2018-09-18 PROCEDURE — 95250 CONT GLUC MNTR PHYS/QHP EQP: CPT | Performed by: INTERNAL MEDICINE

## 2018-09-18 NOTE — PROGRESS NOTES
67 y.o.    Patient Care Team:  Guille Nieves MD as PCP - General  Guille Nieves MD as PCP - Family Medicine  Tiburcio Moreno MD as Consulting Physician (Cardiology)    Chief Complaint:    3 MONTH FOLLOW UP/ TYPE 2 DIABETES MELLITUS  Subjective     HPI    Hugh R McBurney 67 y.o. white male is here as a follow up patient for the management of type 2 diabetes mellitus.     Type 2 diabetes mellitus-diagnosed about 10-15 years ago.    Today in the clinic patient reports that he is on Lantus 42 units twice daily.  Humalog 16 units with each meal, high dose sliding scale-3 units for 50 about 1 50 mg/dL.  He hardly uses the sliding scale has he does not check his blood sugars.  Checks blood sugars one to 2 times a day. Most of his blood sugars are in 200s range.  He got his glucometer for me to review.  He does report that some of his high blood sugars are due to the steroid shots to his thumb due to the trigger finger.  No blood sugar less than 60 in the last 1 month.  Due for his eye examination, no history of diabetic retinopathy.  Does have history of tingling, numbness and burning sensation in his bilateral feet.  History of coronary artery disease, no history of CK D, CVA.  He tries to follow diabetic diet as much as he can.  On Ace inhibitor.  Does have history of pancreatitis in the past.     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  Patient also underwent prosthetic knee joint replacement.  Currently on Lyrica which helps his neuropathy pain.     Hyperlipidemia  On Lipitor 40 mg oral daily.       Reviewed primary care physician's/consulting physician documentation and lab results :     Interval History      The following portions of the patient's history were reviewed and updated as appropriate: allergies, current medications, past family  history, past medical history, past social history, past surgical history and problem list.    Past Medical History:   Diagnosis Date   • Arthritis    • Chronic pain     BILATERAL KNEES AND BACK   • Contusion of knee    • Coronary artery disease    • Depression    • Diabetes mellitus (CMS/[facility])     type 2 with atherosclerosis of arteries of extremities   • Diabetic ulcer of toe (CMS/[facility])     left foot associated with type 2 diabetes mellitus, with necrosis of bone   • GERD (gastroesophageal reflux disease)    • Great toe pain     with cellulitis and gangrene   • History of kidney stones    • Hyperlipidemia    • Hypertension    • Hyponatremia    • Kidney stone    • Knee pain     hx of previous prosthetic joint infection   • MSSA (methicillin susceptible Staphylococcus aureus) infection     RIGHT KNEE   • NSTEMI (non-ST elevated myocardial infarction) (CMS/[facility]) 06/14/2017   • Sleep apnea     DOES NOT USE CPAP     Family History   Problem Relation Age of Onset   • Heart disease Mother    • Heart disease Father    • Diabetes Sister    • Dementia Maternal Grandmother    • No Known Problems Maternal Grandfather    • Heart disease Paternal Grandmother    • Heart attack Paternal Grandfather    • Heart disease Paternal Grandfather    • Heart disease Son    • Heart attack Son    • No Known Problems Sister    • Malig Hyperthermia Neg Hx      Social History     Social History   • Marital status:      Spouse name: N/A   • Number of children: N/A   • Years of education: N/A     Occupational History   • Not on file.     Social History Main Topics   • Smoking status: Former Smoker     Packs/day: 3.00     Years: 20.00   • Smokeless tobacco: Former User      Comment: quit 35 years ago. 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    • Alcohol use No   • Drug use: No   • Sexual activity: Defer     Other Topics Concern   • Not on file     Social History Narrative   • No narrative on file     Allergies   Allergen Reactions   • Vancomycin Other (See  Comments)     Red Man syndrome, slow rate and premedicate with benadryl       Current Outpatient Prescriptions:   •  aspirin 325 MG tablet, Take 325 mg by mouth Daily., Disp: , Rfl:   •  atorvastatin (LIPITOR) 40 MG tablet, TAKE 1 TABLET BY MOUTH EVERY EVENING., Disp: 30 tablet, Rfl: 3  •  Blood Glucose Calibration (ACCU-CHEK COMPACT PLUS CONTROL) solution, AS DIRECTED, Disp: 1 each, Rfl: 3  •  ferrous sulfate 325 (65 FE) MG tablet, Take 325 mg by mouth Daily With Lunch., Disp: , Rfl:   •  gabapentin (NEURONTIN) 300 MG capsule, Take 3 capsules by mouth Daily. 1 in am 2 in pm, Disp: , Rfl:   •  glucose blood (ACCU-CHEK COMPACT PLUS) test strip, Use to test blood sugar 5 times daily, Disp: 500 each, Rfl: 3  •  Insulin Glargine (LANTUS SOLOSTAR) 100 UNIT/ML injection pen, Inject 42 Units under the skin 2 (Two) Times a Day., Disp: 10 mL, Rfl: 3  •  Insulin Lispro (HUMALOG KWIKPEN) 100 UNIT/ML solution pen-injector, 16 units with each meal, max of 100 units per day, Disp: 10 pen, Rfl: 11  •  Insulin Pen Needle (BD PEN NEEDLE JUAN DAVID U/F) 32G X 4 MM misc, TO INJECT 4 TIMES DAILY, Disp: 100 each, Rfl: 3  •  isosorbide mononitrate (IMDUR) 30 MG 24 hr tablet, Take 1 tablet by mouth Daily., Disp: 30 tablet, Rfl: 1  •  Lancets (ACCU-CHEK MULTICLIX) lancets, Use to test blood sugar 5 times daily, Disp: 500 each, Rfl: 3  •  metoprolol tartrate (LOPRESSOR) 25 MG tablet, Take 1 tablet by mouth Every 12 (Twelve) Hours., Disp: 60 tablet, Rfl: 11  •  Multiple Vitamin (MULTI VITAMIN DAILY PO), Take 1 tablet by mouth Daily. HOLD PRIOR TO SURGERY, Disp: , Rfl:   •  omeprazole (priLOSEC) 40 MG capsule, Take 40 mg by mouth 2 (Two) Times a Day., Disp: , Rfl:   •  oxyCODONE-acetaminophen (PERCOCET)  MG per tablet, Take 1 tablet by mouth As Needed., Disp: , Rfl:   •  sertraline (ZOLOFT) 100 MG tablet, Take 100 mg by mouth Daily., Disp: , Rfl:   •  vitamin D (ERGOCALCIFEROL) 41535 units capsule capsule, Take 1 capsule by mouth 1 (One) Time  "Per Week. (Patient taking differently: Take 50,000 Units by mouth 1 (One) Time Per Week. Friday), Disp: 30 capsule, Rfl: 11        Review of Systems   Constitutional: Negative for appetite change, fatigue and fever.   Eyes: Negative for visual disturbance.   Respiratory: Negative for shortness of breath.    Cardiovascular: Positive for leg swelling. Negative for palpitations.   Gastrointestinal: Negative for abdominal pain and vomiting.   Endocrine: Negative for polydipsia and polyuria.   Musculoskeletal: Positive for joint swelling. Negative for neck pain.   Skin: Negative for rash.   Neurological: Negative for weakness and numbness.   Psychiatric/Behavioral: Negative for behavioral problems.       Objective       Vitals:    09/18/18 1100   BP: 150/70   Pulse: 68   SpO2: 98%   Weight: 125 kg (275 lb)   Height: 188 cm (74\")     Body mass index is 35.31 kg/m².      Physical Exam   Constitutional: He is oriented to person, place, and time. He appears well-nourished.   obese   HENT:   Head: Normocephalic and atraumatic.   Wide neck   Eyes: Conjunctivae and EOM are normal.   Neck: Normal range of motion. Neck supple. Carotid bruit is not present. No thyromegaly present.   Acanthosis nigricans   Cardiovascular: Normal rate and normal heart sounds.    Pulmonary/Chest: Effort normal and breath sounds normal. No stridor. No respiratory distress.   Abdominal: Soft. Bowel sounds are normal. He exhibits no distension. There is no tenderness.   Central obesity   Musculoskeletal: He exhibits no edema or tenderness.   Neurological: He is alert and oriented to person, place, and time.   Skin: Skin is warm and dry.   Psychiatric: He has a normal mood and affect. His behavior is normal.   Vitals reviewed.    Results Review:     I reviewed the patient's new clinical results and mentioned them above in HPI and in plan as well.    Medical records reviewed  Summary: done      Results Encounter on 09/05/2018   Component Date Value Ref " Range Status   • Hemoglobin A1C 09/04/2018 8.43* 4.80 - 5.60 % Final    Comment: Hemoglobin A1C Ranges:  Increased Risk for Diabetes  5.7% to 6.4%  Diabetes                     >= 6.5%  Diabetic Goal                < 7.0%     • Glucose 09/04/2018 172* 65 - 99 mg/dL Final   • BUN 09/04/2018 15  8 - 23 mg/dL Final   • Creatinine 09/04/2018 1.13  0.76 - 1.27 mg/dL Final   • eGFR Non  Am 09/04/2018 65  >60 mL/min/1.73 Final   • eGFR African Am 09/04/2018 78  >60 mL/min/1.73 Final   • BUN/Creatinine Ratio 09/04/2018 13.3  7.0 - 25.0 Final   • Sodium 09/04/2018 141  136 - 145 mmol/L Final   • Potassium 09/04/2018 4.7  3.5 - 5.2 mmol/L Final   • Chloride 09/04/2018 101  98 - 107 mmol/L Final   • Total CO2 09/04/2018 26.6  22.0 - 29.0 mmol/L Final   • Calcium 09/04/2018 9.0  8.6 - 10.5 mg/dL Final   • Total Cholesterol 09/04/2018 143  0 - 200 mg/dL Final   • Triglycerides 09/04/2018 101  0 - 150 mg/dL Final   • HDL Cholesterol 09/04/2018 53  40 - 60 mg/dL Final   • VLDL Cholesterol 09/04/2018 20.2  5 - 40 mg/dL Final   • LDL Cholesterol  09/04/2018 70  0 - 100 mg/dL Final   • TSH 09/04/2018 1.290  0.270 - 4.200 mIU/mL Final   • Vitamin B-12 09/04/2018 517  211 - 946 pg/mL Final   • Folate 09/04/2018 >20.00  4.78 - 24.20 ng/mL Final   • Interpretation 09/04/2018 Note   Final    Supplemental report is available.   • PDF Image 09/04/2018 Not applicable   Final     Lab Results   Component Value Date    HGBA1C 8.43 (H) 09/04/2018    HGBA1C 8.53 (H) 03/20/2018    HGBA1C 7.00 (H) 11/22/2017     Lab Results   Component Value Date    MICROALBUR 4,913.7 03/20/2018    CREATININE 1.13 09/04/2018     Imaging Results (most recent)     None                Assessment and Plan:    Gui was seen today for diabetes.    Diagnoses and all orders for this visit:    Uncontrolled type 2 diabetes mellitus with complication, with long-term current use of insulin (CMS/[facility])  -     Hemoglobin A1c; Future  -     Basic Metabolic Panel; Future  -    "  Lipid Panel; Future  -     TSH; Future  -     Vitamin B12 & Folate; Future    Pure hyperglyceridemia  -     Hemoglobin A1c; Future  -     Basic Metabolic Panel; Future  -     Lipid Panel; Future  -     TSH; Future  -     Vitamin B12 & Folate; Future    Diabetic ulcer of left foot associated with type 2 diabetes mellitus, unspecified part of foot, unspecified ulcer stage (CMS/HCC)  -     Hemoglobin A1c; Future  -     Basic Metabolic Panel; Future  -     Lipid Panel; Future  -     TSH; Future  -     Vitamin B12 & Folate; Future        Type 2 diabetes mellitus-uncontrolled  Explained to the patient the importance of blood glucose checks.  Continue the current insulin regimen.  Placed continues glucose monitoring on the patient to further assess his blood glucose trends as patient is not checking his blood glucose levels has he is supposed to.    Hyperlipidemia  Continue Lipitor.    Diabetic neuropathy  Continue Neurontin.  Explained to the patient importance of his glycemic control to prevent further infections.    Placed CGM on the pt for BG monitoring.   Explained the benefits of CGM to the pt.   CGM would help in adjusting pt's insulin regimen and prevent hyperglycemia and hypoglycemia episodes.   Pt would wear CGM for the next 2 weeks and based on the data downloaded we will adjust his DM medications.         Reviewed Lab results with the patient.             Adarsh Love MD  09/18/18    EM[facility] Dragon / transcription disclaimer:     \"Dictated utilizing Dragon dictation\".  "

## 2018-10-03 ENCOUNTER — TREATMENT (OUTPATIENT)
Dept: ENDOCRINOLOGY | Age: 68
End: 2018-10-03

## 2018-10-03 DIAGNOSIS — E11.51 DIABETES TYPE 2 WITH ATHEROSCLEROSIS OF ARTERIES OF EXTREMITIES (HCC): Chronic | ICD-10-CM

## 2018-10-03 DIAGNOSIS — I70.209 DIABETES TYPE 2 WITH ATHEROSCLEROSIS OF ARTERIES OF EXTREMITIES (HCC): Chronic | ICD-10-CM

## 2018-10-03 PROCEDURE — 95251 CONT GLUC MNTR ANALYSIS I&R: CPT | Performed by: INTERNAL MEDICINE

## 2018-10-03 NOTE — PROGRESS NOTES
Continues glucose monitoring data    Patient wore continuous glucose monitoring-free style merly Pro from Sep 18th - Oct 2nd  Average blood glucose reading was 172 mg/dl.   Estimated HbA1c - 7.5 -8%   Likelihood of low blood glucose levels was mild   Likelihood of high blood glucose levels was high  Blood glucose trends showed high BG with meals.       Current treatment regimen  Lantus 42 units twice daily.  Humalog 16 units with each meal, high dose sliding scale-3 units for 50 about 1 50 mg/dL.     Changes to the treatment regimen    Lantus 48 units Q am and 40 units at bed time.   Humalog 18 units with BF, Humalog 20 units with lunch and 22 units with dinner.  Humalog ssi with each meal only.        BG less than 150 - zero  151 - 200 - 3 unit  201 - 250 - 6 units  251 - 300 - 9 units  301 - 350 - 12 units  Above 350 mg/dl - 15 units.     Will call the pt and make these changes.

## 2018-10-04 ENCOUNTER — TELEPHONE (OUTPATIENT)
Dept: ENDOCRINOLOGY | Age: 68
End: 2018-10-04

## 2018-10-04 NOTE — TELEPHONE ENCOUNTER
SPOKE WITH PATIENT ABOUT THE RESULTS OF THE CGM AND MEDICATION CHANGE PATIENT VOICE UNDERSTANDING          Changes to the treatment regimen     Lantus 48 units Q am and 40 units at bed time.   Humalog 18 units with BF, Humalog 20 units with lunch and 22 units with dinner.  Humalog ssi with each meal only.          BG less than 150 - zero  151 - 200 - 3 unit  201 - 250 - 6 units  251 - 300 - 9 units  301 - 350 - 12 units  Above 350 mg/dl - 15 units.

## 2018-12-05 ENCOUNTER — LAB (OUTPATIENT)
Dept: ENDOCRINOLOGY | Age: 68
End: 2018-12-05

## 2018-12-05 DIAGNOSIS — IMO0002 UNCONTROLLED TYPE 2 DIABETES MELLITUS WITH COMPLICATION, WITH LONG-TERM CURRENT USE OF INSULIN: ICD-10-CM

## 2018-12-05 DIAGNOSIS — L97.529 DIABETIC ULCER OF LEFT FOOT ASSOCIATED WITH TYPE 2 DIABETES MELLITUS, UNSPECIFIED PART OF FOOT, UNSPECIFIED ULCER STAGE (HCC): ICD-10-CM

## 2018-12-05 DIAGNOSIS — E11.621 DIABETIC ULCER OF LEFT FOOT ASSOCIATED WITH TYPE 2 DIABETES MELLITUS, UNSPECIFIED PART OF FOOT, UNSPECIFIED ULCER STAGE (HCC): ICD-10-CM

## 2018-12-05 DIAGNOSIS — E78.1 PURE HYPERGLYCERIDEMIA: ICD-10-CM

## 2018-12-05 LAB
BUN SERPL-MCNC: 17 MG/DL (ref 8–23)
BUN/CREAT SERPL: 15.9 (ref 7–25)
CALCIUM SERPL-MCNC: 9.2 MG/DL (ref 8.6–10.5)
CHLORIDE SERPL-SCNC: 103 MMOL/L (ref 98–107)
CHOLEST SERPL-MCNC: 140 MG/DL (ref 0–200)
CO2 SERPL-SCNC: 25.7 MMOL/L (ref 22–29)
CREAT SERPL-MCNC: 1.07 MG/DL (ref 0.76–1.27)
FOLATE SERPL-MCNC: 9.81 NG/ML (ref 4.78–24.2)
GLUCOSE SERPL-MCNC: 152 MG/DL (ref 65–99)
HBA1C MFR BLD: 8.12 % (ref 4.8–5.6)
HDLC SERPL-MCNC: 44 MG/DL (ref 40–60)
INTERPRETATION: NORMAL
LDLC SERPL CALC-MCNC: 67 MG/DL (ref 0–100)
Lab: NORMAL
POTASSIUM SERPL-SCNC: 4.7 MMOL/L (ref 3.5–5.2)
SODIUM SERPL-SCNC: 140 MMOL/L (ref 136–145)
TRIGL SERPL-MCNC: 145 MG/DL (ref 0–150)
TSH SERPL DL<=0.005 MIU/L-ACNC: 3.19 MIU/ML (ref 0.27–4.2)
VIT B12 SERPL-MCNC: 453 PG/ML (ref 211–946)
VLDLC SERPL CALC-MCNC: 29 MG/DL (ref 5–40)

## 2018-12-19 ENCOUNTER — OFFICE VISIT (OUTPATIENT)
Dept: ENDOCRINOLOGY | Age: 68
End: 2018-12-19

## 2018-12-19 VITALS
HEIGHT: 74 IN | HEART RATE: 71 BPM | DIASTOLIC BLOOD PRESSURE: 82 MMHG | WEIGHT: 281 LBS | OXYGEN SATURATION: 98 % | SYSTOLIC BLOOD PRESSURE: 144 MMHG | BODY MASS INDEX: 36.06 KG/M2

## 2018-12-19 DIAGNOSIS — E55.9 VITAMIN D DEFICIENCY: ICD-10-CM

## 2018-12-19 DIAGNOSIS — IMO0002 UNCONTROLLED TYPE 2 DIABETES MELLITUS WITH COMPLICATION, WITH LONG-TERM CURRENT USE OF INSULIN: Primary | ICD-10-CM

## 2018-12-19 DIAGNOSIS — E78.1 PURE HYPERGLYCERIDEMIA: ICD-10-CM

## 2018-12-19 PROCEDURE — 99214 OFFICE O/P EST MOD 30 MIN: CPT | Performed by: INTERNAL MEDICINE

## 2018-12-19 NOTE — PATIENT INSTRUCTIONS
Lantus 48 units Q am and 40 units at bed time.   Humalog 18 units with BF, Humalog 20 units with lunch and 22 units with dinner.  Humalog ssi with each meal only.    BG less than 150 - zero  151 - 200 - 3 unit  201 - 250 - 6 units  251 - 300 - 9 units  301 - 350 - 12 units  Above 350 mg/dl - 15 units.

## 2018-12-19 NOTE — PROGRESS NOTES
68 y.o.    Patient Care Team:  Guille Nieves MD as PCP - General  Guille Nieves MD as PCP - Family Medicine  Tiburcio Moreno MD as Consulting Physician (Cardiology)    Chief Complaint:    3 MONTH FOLLOW UP/TYPE 2 DIABETES MELLITUS  Subjective     HPI    Hugh R McBurney 67 y.o. white male is here as a follow up patient for the management of type 2 diabetes mellitus.     Type 2 diabetes mellitus-diagnosed about 10-15 years ago.    Today in clinic pt reports that he is on lantus 42 units bid, Humalog 16 units tid ac, high dose sliding scale 3 units for 50 above 150 mg/dl.   Checks 2 - 3 times. FBG - 140 - 180,  Pre meal - 150 - 200.   He got his glucometer for me to review.   Lowest BG was 56 mg/dl, only once in the last 3 months at least.   Highest BG was 326 mg/dl.   No increased urination and no increased thirst.   Still due for eye examination and no hx of dm retinopathy.   Does have history of tingling, numbness and burning sensation in his bilateral feet.  History of coronary artery disease, no history of CK D, CVA.  He tries to follow diabetic diet as much as he can.  On Ace inhibitor.  Does have history of pancreatitis in the past.     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  Patient also underwent prosthetic knee joint replacement.  Currently on Lyrica which helps his neuropathy pain.     Hyperlipidemia  On Lipitor 40 mg oral daily.      Reviewed primary care physician's/consulting physician documentation and lab results :     Interval History      The following portions of the patient's history were reviewed and updated as appropriate: allergies, current medications, past family history, past medical history, past social history, past surgical history and problem list.    Past Medical History:   Diagnosis Date   • Arthritis    • Chronic  pain     BILATERAL KNEES AND BACK   • Contusion of knee    • Coronary artery disease    • Depression    • Diabetes mellitus (CMS/[facility])     type 2 with atherosclerosis of arteries of extremities   • Diabetic ulcer of toe (CMS/[facility])     left foot associated with type 2 diabetes mellitus, with necrosis of bone   • GERD (gastroesophageal reflux disease)    • Great toe pain     with cellulitis and gangrene   • History of kidney stones    • Hyperlipidemia    • Hypertension    • Hyponatremia    • Kidney stone    • Knee pain     hx of previous prosthetic joint infection   • MSSA (methicillin susceptible Staphylococcus aureus) infection     RIGHT KNEE   • NSTEMI (non-ST elevated myocardial infarction) (CMS/[facility]) 06/14/2017   • Sleep apnea     DOES NOT USE CPAP     Family History   Problem Relation Age of Onset   • Heart disease Mother    • Heart disease Father    • Diabetes Sister    • Dementia Maternal Grandmother    • No Known Problems Maternal Grandfather    • Heart disease Paternal Grandmother    • Heart attack Paternal Grandfather    • Heart disease Paternal Grandfather    • Heart disease Son    • Heart attack Son    • No Known Problems Sister    • Malig Hyperthermia Neg Hx      Social History     Socioeconomic History   • Marital status:      Spouse name: Not on file   • Number of children: Not on file   • Years of education: Not on file   • Highest education level: Not on file   Social Needs   • Financial resource strain: Not on file   • Food insecurity - worry: Not on file   • Food insecurity - inability: Not on file   • Transportation needs - medical: Not on file   • Transportation needs - non-medical: Not on file   Occupational History   • Not on file   Tobacco Use   • Smoking status: Former Smoker     Packs/day: 3.00     Years: 20.00     Pack years: 60.00   • Smokeless tobacco: Former User   • Tobacco comment: quit 35 years ago. 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    Substance and Sexual Activity   • Alcohol use: No   •  Drug use: No   • Sexual activity: Defer   Other Topics Concern   • Not on file   Social History Narrative   • Not on file     Allergies   Allergen Reactions   • Vancomycin Other (See Comments)     Red Man syndrome, slow rate and premedicate with benadryl       Current Outpatient Medications:   •  atorvastatin (LIPITOR) 40 MG tablet, TAKE 1 TABLET BY MOUTH EVERY EVENING., Disp: 30 tablet, Rfl: 3  •  Blood Glucose Calibration (ACCU-CHEK COMPACT PLUS CONTROL) solution, AS DIRECTED, Disp: 1 each, Rfl: 3  •  gabapentin (NEURONTIN) 300 MG capsule, Take 3 capsules by mouth Daily. 1 in am 2 in pm, Disp: , Rfl:   •  glucose blood (ACCU-CHEK COMPACT PLUS) test strip, Use to test blood sugar 5 times daily, Disp: 500 each, Rfl: 3  •  Insulin Glargine (LANTUS SOLOSTAR) 100 UNIT/ML injection pen, Inject 42 Units under the skin 2 (Two) Times a Day., Disp: 10 mL, Rfl: 3  •  Insulin Lispro (HUMALOG KWIKPEN) 100 UNIT/ML solution pen-injector, 16 units with each meal, max of 100 units per day, Disp: 10 pen, Rfl: 11  •  Insulin Pen Needle (BD PEN NEEDLE JUAN DAVID U/F) 32G X 4 MM misc, TO INJECT 4 TIMES DAILY, Disp: 100 each, Rfl: 3  •  isosorbide mononitrate (IMDUR) 30 MG 24 hr tablet, Take 1 tablet by mouth Daily., Disp: 30 tablet, Rfl: 1  •  Lancets (ACCU-CHEK MULTICLIX) lancets, Use to test blood sugar 5 times daily, Disp: 500 each, Rfl: 3  •  metoprolol tartrate (LOPRESSOR) 25 MG tablet, Take 1 tablet by mouth Every 12 (Twelve) Hours., Disp: 60 tablet, Rfl: 11  •  Multiple Vitamin (MULTI VITAMIN DAILY PO), Take 1 tablet by mouth Daily. HOLD PRIOR TO SURGERY, Disp: , Rfl:   •  omeprazole (priLOSEC) 40 MG capsule, Take 40 mg by mouth 2 (Two) Times a Day., Disp: , Rfl:   •  oxyCODONE-acetaminophen (PERCOCET)  MG per tablet, Take 1 tablet by mouth As Needed., Disp: , Rfl:   •  sertraline (ZOLOFT) 100 MG tablet, Take 100 mg by mouth Daily., Disp: , Rfl:   •  aspirin 325 MG tablet, Take 325 mg by mouth Daily., Disp: , Rfl:   •  ferrous  "sulfate 325 (65 FE) MG tablet, Take 325 mg by mouth Daily With Lunch., Disp: , Rfl:   •  vitamin D (ERGOCALCIFEROL) 31355 units capsule capsule, Take 1 capsule by mouth 1 (One) Time Per Week. (Patient taking differently: Take 50,000 Units by mouth 1 (One) Time Per Week. Friday), Disp: 30 capsule, Rfl: 11        Review of Systems   Constitutional: Negative for appetite change, fatigue and fever.   Eyes: Negative for visual disturbance.   Respiratory: Negative for shortness of breath.    Cardiovascular: Positive for leg swelling. Negative for palpitations.   Gastrointestinal: Negative for abdominal pain and vomiting.   Endocrine: Negative for polydipsia and polyuria.   Musculoskeletal: Negative for joint swelling and neck pain.   Skin: Negative for rash.   Neurological: Positive for weakness and numbness (feet).   Psychiatric/Behavioral: Negative for behavioral problems.       Objective       Vitals:    12/19/18 1159   BP: 144/82   Pulse: 71   SpO2: 98%   Weight: 127 kg (281 lb)   Height: 188 cm (74\")     Body mass index is 36.08 kg/m².      Physical Exam   Constitutional: He is oriented to person, place, and time. He appears well-nourished.   obese   HENT:   Head: Normocephalic and atraumatic.   Wide neck   Eyes: Conjunctivae and EOM are normal.   Neck: Normal range of motion. Neck supple. Carotid bruit is not present. No thyromegaly present.   Acanthosis nigricans   Cardiovascular: Normal rate and normal heart sounds.   Pulmonary/Chest: Effort normal and breath sounds normal. No stridor. No respiratory distress.   Abdominal: Soft. Bowel sounds are normal. He exhibits no distension. There is no tenderness.   Central obesity   Musculoskeletal: He exhibits edema. He exhibits no tenderness.   Neurological: He is alert and oriented to person, place, and time.   Skin: Skin is warm and dry.   Psychiatric: He has a normal mood and affect. His behavior is normal.   Vitals reviewed.    Results Review:     I reviewed the " patient's new clinical results and mentioned them above in HPI and in plan as well.    Medical records reviewed  Summary: done      Lab on 12/05/2018   Component Date Value Ref Range Status   • TSH 12/05/2018 3.190  0.270 - 4.200 mIU/mL Final   • Total Cholesterol 12/05/2018 140  0 - 200 mg/dL Final   • Triglycerides 12/05/2018 145  0 - 150 mg/dL Final   • HDL Cholesterol 12/05/2018 44  40 - 60 mg/dL Final   • VLDL Cholesterol 12/05/2018 29  5 - 40 mg/dL Final   • LDL Cholesterol  12/05/2018 67  0 - 100 mg/dL Final   • Glucose 12/05/2018 152* 65 - 99 mg/dL Final   • BUN 12/05/2018 17  8 - 23 mg/dL Final   • Creatinine 12/05/2018 1.07  0.76 - 1.27 mg/dL Final   • eGFR Non  Am 12/05/2018 69  >60 mL/min/1.73 Final   • eGFR African Am 12/05/2018 84  >60 mL/min/1.73 Final   • BUN/Creatinine Ratio 12/05/2018 15.9  7.0 - 25.0 Final   • Sodium 12/05/2018 140  136 - 145 mmol/L Final   • Potassium 12/05/2018 4.7  3.5 - 5.2 mmol/L Final   • Chloride 12/05/2018 103  98 - 107 mmol/L Final   • Total CO2 12/05/2018 25.7  22.0 - 29.0 mmol/L Final   • Calcium 12/05/2018 9.2  8.6 - 10.5 mg/dL Final   • Hemoglobin A1C 12/05/2018 8.12* 4.80 - 5.60 % Final    Comment: Hemoglobin A1C Ranges:  Increased Risk for Diabetes  5.7% to 6.4%  Diabetes                     >= 6.5%  Diabetic Goal                < 7.0%     • Vitamin B-12 12/05/2018 453  211 - 946 pg/mL Final   • Folate 12/05/2018 9.81  4.78 - 24.20 ng/mL Final   • Interpretation 12/05/2018 Note   Final    Supplemental report is available.   • PDF Image 12/05/2018 Not applicable   Final     Lab Results   Component Value Date    HGBA1C 8.12 (H) 12/05/2018    HGBA1C 8.43 (H) 09/04/2018    HGBA1C 8.53 (H) 03/20/2018     Lab Results   Component Value Date    MICROALBUR 4,913.7 03/20/2018    CREATININE 1.07 12/05/2018     Imaging Results (most recent)     None                Assessment and Plan:    Gui was seen today for diabetes.    Diagnoses and all orders for this  "visit:    Uncontrolled type 2 diabetes mellitus with complication, with long-term current use of insulin (CMS/[facility])  -     Basic Metabolic Panel; Future  -     Hemoglobin A1c; Future  -     Vitamin D 25 Hydroxy; Future  -     Lipid Panel; Future    Pure hyperglyceridemia  -     Basic Metabolic Panel; Future  -     Hemoglobin A1c; Future  -     Vitamin D 25 Hydroxy; Future  -     Lipid Panel; Future    Vitamin D deficiency   -     Basic Metabolic Panel; Future  -     Hemoglobin A1c; Future  -     Vitamin D 25 Hydroxy; Future  -     Lipid Panel; Future      Type 2 diabetes mellitus-uncontrolled  Will change insulin regimen as follows   Lantus 48 units Q am and 40 units at bed time.   Humalog 18 units with BF, Humalog 20 units with lunch and 22 units with dinner.  Humalog ssi with each meal only.    BG less than 150 - zero  151 - 200 - 3 unit  201 - 250 - 6 units  251 - 300 - 9 units  301 - 350 - 12 units  Above 350 mg/dl - 15 units.     Advised the patient to take 16 units of Humalog with dinner if he is eating carbohydrate meal or his blood sugar is less than 100 at that time.    Diabetic neuropathy  Will change Neurontin to check on  Will start Lyrica 75 mg oral 3 times a day.    Hyperlipidemia  Continue Lipitor 40 mg oral daily.    Reviewed Lab results with the patient.             Adarsh Love MD  12/19/18    EMR Dragon / transcription disclaimer:     \"Dictated utilizing Dragon dictation\".  "

## 2018-12-28 RX ORDER — PREGABALIN 75 MG/1
75 CAPSULE ORAL 3 TIMES DAILY
Qty: 270 CAPSULE | Refills: 3 | Status: SHIPPED | OUTPATIENT
Start: 2018-12-28 | End: 2019-08-07 | Stop reason: ALTCHOICE

## 2019-02-01 NOTE — PROGRESS NOTES
-- Message is from the [facility]--    Patient is requesting a medication refill - medication is on active medication list    Patient is currently OUT of the requested medication.    RX Name and Dose:  (copy from med list)  Lantus solostar 100 Unit ML pen injector  INJECT 48 TO 50 UNITS SUBCUTANEOUSLY AT NIGHT     Duration: 6 months  days    Pharmacy  [id number] - [place], [place] - [address]    Patient confirmed the above pharmacy as correct?  Yes    Caller Information       Type Contact Phone    01/31/2019 06:34 PM Phone (Incoming) Kofi Shirley (Self) [phone] (H)          Alternative phone number: none    Turnaround time given to caller:   \"This message will be sent to [state Provider's name]. The clinical team will fulfill your request as soon as they review your message when the office opens tomorrow.\"   INFECTIOUS DISEASES PROGRESS NOTE    CC: f/u [facility]    S:   He notes his blood pressure is little bit higher.  No significant knee pain.  No fever, chills, night sweats    O:  Physical Exam:  Temp:  [98.7 °F (37.1 °C)-99.5 °F (37.5 °C)] 98.9 °F (37.2 °C)  Heart Rate:  [92-99] 98  Resp:  [18-20] 18  BP: (155-182)/() 182/100  Physical Exam   Constitutional: He appears well-developed. No distress.   Pulmonary/Chest: Effort normal and breath sounds normal.   Abdominal: Soft. He exhibits no distension. There is no tenderness.   Neurological: He is alert.   Skin: Skin is warm and dry.   Psychiatric: He has a normal mood and affect. His behavior is normal.        Diagnostics:    WBC 10.6  H/H 8.6/26    Cr 1.20  glc 275-360    All cx negative    Assessment/Plan   MSSA septicemia  Left 1 toe osteo 2/2 MSSA  R TKA PJI 2/2 MSSA  Uncontrolled DM II, complicating above     White count normal fevers resolved.  May have had low grade blood stream infection that's since resolved.  We'll continue on cefazolin and rifampin.  No objection to discharge once stable from others perspectives    Amor Mayberry MD  8:21 AM  03/02/17

## 2019-03-19 ENCOUNTER — RESULTS ENCOUNTER (OUTPATIENT)
Dept: ENDOCRINOLOGY | Age: 69
End: 2019-03-19

## 2019-03-19 DIAGNOSIS — IMO0002 UNCONTROLLED TYPE 2 DIABETES MELLITUS WITH COMPLICATION, WITH LONG-TERM CURRENT USE OF INSULIN: ICD-10-CM

## 2019-03-19 DIAGNOSIS — E78.1 PURE HYPERGLYCERIDEMIA: ICD-10-CM

## 2019-03-19 DIAGNOSIS — E55.9 VITAMIN D DEFICIENCY: ICD-10-CM

## 2019-03-26 LAB
25(OH)D3+25(OH)D2 SERPL-MCNC: 16.7 NG/ML (ref 30–100)
BUN SERPL-MCNC: 12 MG/DL (ref 8–23)
BUN/CREAT SERPL: 11.5 (ref 7–25)
CALCIUM SERPL-MCNC: 8.7 MG/DL (ref 8.6–10.5)
CHLORIDE SERPL-SCNC: 101 MMOL/L (ref 98–107)
CHOLEST SERPL-MCNC: 141 MG/DL (ref 0–200)
CO2 SERPL-SCNC: 27.3 MMOL/L (ref 22–29)
CREAT SERPL-MCNC: 1.04 MG/DL (ref 0.76–1.27)
GLUCOSE SERPL-MCNC: 157 MG/DL (ref 65–99)
HBA1C MFR BLD: 7.5 % (ref 4.8–5.6)
HDLC SERPL-MCNC: 53 MG/DL (ref 40–60)
INTERPRETATION: NORMAL
LDLC SERPL CALC-MCNC: 65 MG/DL (ref 0–100)
Lab: NORMAL
POTASSIUM SERPL-SCNC: 4.6 MMOL/L (ref 3.5–5.2)
SODIUM SERPL-SCNC: 142 MMOL/L (ref 136–145)
TRIGL SERPL-MCNC: 116 MG/DL (ref 0–150)
VLDLC SERPL CALC-MCNC: 23.2 MG/DL (ref 5–40)

## 2019-04-09 ENCOUNTER — OFFICE VISIT (OUTPATIENT)
Dept: ENDOCRINOLOGY | Age: 69
End: 2019-04-09

## 2019-04-09 VITALS
HEIGHT: 74 IN | DIASTOLIC BLOOD PRESSURE: 70 MMHG | SYSTOLIC BLOOD PRESSURE: 138 MMHG | BODY MASS INDEX: 35.94 KG/M2 | OXYGEN SATURATION: 98 % | HEART RATE: 69 BPM | WEIGHT: 280 LBS

## 2019-04-09 DIAGNOSIS — IMO0002 UNCONTROLLED TYPE 2 DIABETES MELLITUS WITH COMPLICATION, WITH LONG-TERM CURRENT USE OF INSULIN: Primary | ICD-10-CM

## 2019-04-09 DIAGNOSIS — E78.49 OTHER HYPERLIPIDEMIA: ICD-10-CM

## 2019-04-09 DIAGNOSIS — E11.43 DIABETIC AUTONOMIC NEUROPATHY ASSOCIATED WITH TYPE 2 DIABETES MELLITUS (HCC): ICD-10-CM

## 2019-04-09 PROCEDURE — 99214 OFFICE O/P EST MOD 30 MIN: CPT | Performed by: INTERNAL MEDICINE

## 2019-04-09 NOTE — PROGRESS NOTES
68 y.o.    Patient Care Team:  Guille Nieves MD as PCP - General  Guille Nieves MD as PCP - Family Medicine  Guille Nieves MD as PCP - [facility]  Tiburcio Moreno MD as Consulting Physician (Cardiology)    Chief Complaint:    4 MONTH FOLLOW UP/ TYPE 2 DIABETES MELLITUS  Subjective     HPI    Hugh R McBurney 68 y.o. white male is here as a follow up patient for the management of type 2 diabetes mellitus.     Type 2 diabetes mellitus-diagnosed about 10-15 years ago.    Today in the clinic patient reports he is on Lantus 48 units in the morning and 42 units at bedtime, Humalog 16 units with each meal plus the high-dose sliding scale 3 units for 50 above 150.  He reports checking his blood sugars 3 times a day.  Reports his blood sugars to be averaging around 120-180.  He failed to bring his glucometer for me to review.  He does admit that he has been having some low blood sugars later part of the evening, lowest blood sugar was 63.  This he reports is secondary to him as skipping the lunch or eating less amounts and has been active.  Highest blood sugar was around 250.  He is still due for his eye examination, no known history of diabetic retinopathy.  Does have history of tingling and numbness in his bilateral feet.  Currently on Neurontin and reports that it is helping him.  History of coronary artery disease, no history of CKD or CVA.  He tries to follow diabetic diet as much as he can.  Not on ACE inhibitor.  Does have history of pancreatitis in the past.     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  Patient also underwent prosthetic knee joint replacement.  Currently on Neurontin     Hyperlipidemia  On Lipitor 40 mg oral daily.    Reviewed primary care physician's/consulting physician documentation and lab results :     Interval  History      The following portions of the patient's history were reviewed and updated as appropriate: allergies, current medications, past family history, past medical history, past social history, past surgical history and problem list.    Past Medical History:   Diagnosis Date   • Arthritis    • Chronic pain     BILATERAL KNEES AND BACK   • Contusion of knee    • Coronary artery disease    • Depression    • Diabetes mellitus (CMS/[facility])     type 2 with atherosclerosis of arteries of extremities   • Diabetic ulcer of toe (CMS/[facility])     left foot associated with type 2 diabetes mellitus, with necrosis of bone   • GERD (gastroesophageal reflux disease)    • Great toe pain     with cellulitis and gangrene   • History of kidney stones    • Hyperlipidemia    • Hypertension    • Hyponatremia    • Kidney stone    • Knee pain     hx of previous prosthetic joint infection   • MSSA (methicillin susceptible Staphylococcus aureus) infection     RIGHT KNEE   • NSTEMI (non-ST elevated myocardial infarction) (CMS/[facility]) 06/14/2017   • Sleep apnea     DOES NOT USE CPAP     Family History   Problem Relation Age of Onset   • Heart disease Mother    • Heart disease Father    • Diabetes Sister    • Dementia Maternal Grandmother    • No Known Problems Maternal Grandfather    • Heart disease Paternal Grandmother    • Heart attack Paternal Grandfather    • Heart disease Paternal Grandfather    • Heart disease Son    • Heart attack Son    • No Known Problems Sister    • Malig Hyperthermia Neg Hx      Social History     Socioeconomic History   • Marital status:      Spouse name: Not on file   • Number of children: Not on file   • Years of education: Not on file   • Highest education level: Not on file   Tobacco Use   • Smoking status: Former Smoker     Packs/day: 3.00     Years: 20.00     Pack years: 60.00   • Smokeless tobacco: Former User   • Tobacco comment: quit 35 years ago. 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    Substance and Sexual  Activity   • Alcohol use: No   • Drug use: No   • Sexual activity: Defer     Allergies   Allergen Reactions   • Vancomycin Other (See Comments)     Red Man syndrome, slow rate and premedicate with benadryl       Current Outpatient Medications:   •  atorvastatin (LIPITOR) 40 MG tablet, TAKE 1 TABLET BY MOUTH EVERY EVENING., Disp: 30 tablet, Rfl: 3  •  Blood Glucose Calibration (ACCU-CHEK COMPACT PLUS CONTROL) solution, AS DIRECTED, Disp: 1 each, Rfl: 3  •  gabapentin (NEURONTIN) 300 MG capsule, Take 3 capsules by mouth Daily. 1 in am 2 in pm, Disp: , Rfl:   •  glucose blood (ACCU-CHEK COMPACT PLUS) test strip, Use to test blood sugar 5 times daily, Disp: 500 each, Rfl: 3  •  glucose blood (ACCU-CHEK COMPACT PLUS) test strip, TEST FOUR TIMES A DAY, Disp: 400 each, Rfl: 3  •  Insulin Glargine (LANTUS SOLOSTAR) 100 UNIT/ML injection pen, Inject 42 Units under the skin 2 (Two) Times a Day. (Patient taking differently: Inject 48 Units under the skin into the appropriate area as directed 2 (Two) Times a Day.), Disp: 10 mL, Rfl: 3  •  Insulin Lispro (HUMALOG KWIKPEN) 100 UNIT/ML solution pen-injector, 16 units with each meal, max of 100 units per day, Disp: 10 pen, Rfl: 11  •  Insulin Pen Needle (BD PEN NEEDLE JUAN DAVID U/F) 32G X 4 MM misc, TO INJECT 4 TIMES DAILY, Disp: 100 each, Rfl: 3  •  isosorbide mononitrate (IMDUR) 30 MG 24 hr tablet, Take 1 tablet by mouth Daily., Disp: 30 tablet, Rfl: 1  •  Lancets (ACCU-CHEK MULTICLIX) lancets, Use to test blood sugar 5 times daily, Disp: 500 each, Rfl: 3  •  metoprolol tartrate (LOPRESSOR) 25 MG tablet, Take 1 tablet by mouth Every 12 (Twelve) Hours., Disp: 60 tablet, Rfl: 11  •  Multiple Vitamin (MULTI VITAMIN DAILY PO), Take 1 tablet by mouth Daily. HOLD PRIOR TO SURGERY, Disp: , Rfl:   •  omeprazole (priLOSEC) 40 MG capsule, Take 40 mg by mouth 2 (Two) Times a Day., Disp: , Rfl:   •  oxyCODONE-acetaminophen (PERCOCET)  MG per tablet, Take 1 tablet by mouth As Needed., Disp:  ", Rfl:   •  sertraline (ZOLOFT) 100 MG tablet, Take 100 mg by mouth Daily., Disp: , Rfl:   •  aspirin 325 MG tablet, Take 325 mg by mouth Daily., Disp: , Rfl:   •  ferrous sulfate 325 (65 FE) MG tablet, Take 325 mg by mouth Daily With Lunch., Disp: , Rfl:   •  pregabalin (LYRICA) 75 MG capsule, Take 1 capsule by mouth 3 (Three) Times a Day., Disp: 270 capsule, Rfl: 3  •  vitamin D (ERGOCALCIFEROL) 11673 units capsule capsule, Take 1 capsule by mouth 1 (One) Time Per Week. (Patient taking differently: Take 50,000 Units by mouth 1 (One) Time Per Week. Friday), Disp: 30 capsule, Rfl: 11        Review of Systems   Constitutional: Positive for fatigue. Negative for appetite change and fever.   Eyes: Negative for visual disturbance.   Respiratory: Negative for shortness of breath.    Cardiovascular: Negative for palpitations and leg swelling.   Gastrointestinal: Negative for abdominal pain and vomiting.   Endocrine: Negative for polydipsia and polyuria.   Musculoskeletal: Negative for joint swelling and neck pain.   Skin: Negative for rash.   Neurological: Negative for weakness and numbness.   Psychiatric/Behavioral: Negative for behavioral problems.       Objective       Vitals:    04/09/19 1214   BP: 138/70   Pulse: 69   SpO2: 98%   Weight: 127 kg (280 lb)   Height: 188 cm (74\")     Body mass index is 35.95 kg/m².      Physical Exam   Constitutional: He is oriented to person, place, and time. He appears well-nourished.   obese   HENT:   Head: Normocephalic and atraumatic.   Wide neck   Eyes: Conjunctivae and EOM are normal.   Neck: Normal range of motion. Neck supple. Carotid bruit is not present. No thyromegaly present.   Acanthosis nigricans   Cardiovascular: Normal rate and normal heart sounds.   Pulmonary/Chest: Effort normal and breath sounds normal. No stridor. No respiratory distress.   Abdominal: Soft. Bowel sounds are normal. He exhibits no distension. There is no tenderness.   Central obesity "   Musculoskeletal: He exhibits edema. He exhibits no tenderness.   Knee joint swollen   Neurological: He is alert and oriented to person, place, and time.   Skin: Skin is warm and dry.   Psychiatric: He has a normal mood and affect. His behavior is normal.   Vitals reviewed.    Results Review:     I reviewed the patient's new clinical results and mentioned them above in HPI and in plan as well.    Medical records reviewed  Summary:done      Results Encounter on 03/19/2019   Component Date Value Ref Range Status   • Glucose 03/26/2019 157* 65 - 99 mg/dL Final   • BUN 03/26/2019 12  8 - 23 mg/dL Final   • Creatinine 03/26/2019 1.04  0.76 - 1.27 mg/dL Final   • eGFR Non African Am 03/26/2019 71  >60 mL/min/1.73 Final   • eGFR African Am 03/26/2019 86  >60 mL/min/1.73 Final   • BUN/Creatinine Ratio 03/26/2019 11.5  7.0 - 25.0 Final   • Sodium 03/26/2019 142  136 - 145 mmol/L Final   • Potassium 03/26/2019 4.6  3.5 - 5.2 mmol/L Final   • Chloride 03/26/2019 101  98 - 107 mmol/L Final   • Total CO2 03/26/2019 27.3  22.0 - 29.0 mmol/L Final   • Calcium 03/26/2019 8.7  8.6 - 10.5 mg/dL Final   • Hemoglobin A1C 03/26/2019 7.50* 4.80 - 5.60 % Final    Comment: Hemoglobin A1C Ranges:  Increased Risk for Diabetes  5.7% to 6.4%  Diabetes                     >= 6.5%  Diabetic Goal                < 7.0%     • 25 Hydroxy, Vitamin D 03/26/2019 16.7* 30.0 - 100.0 ng/ml Final    Comment: Reference Range for Total Vitamin D 25(OH)  Deficiency <20.0 ng/mL  Insufficiency 21-29 ng/mL  Sufficiency  ng/mL  Toxicity >100 ng/ml     • Total Cholesterol 03/26/2019 141  0 - 200 mg/dL Final   • Triglycerides 03/26/2019 116  0 - 150 mg/dL Final   • HDL Cholesterol 03/26/2019 53  40 - 60 mg/dL Final   • VLDL Cholesterol 03/26/2019 23.2  5 - 40 mg/dL Final   • LDL Cholesterol  03/26/2019 65  0 - 100 mg/dL Final   • Interpretation 03/26/2019 Note   Final    Supplemental report is available.   • PDF Image 03/26/2019 Not applicable   Final      Lab Results   Component Value Date    HGBA1C 7.50 (H) 03/26/2019    HGBA1C 8.12 (H) 12/05/2018    HGBA1C 8.43 (H) 09/04/2018     Lab Results   Component Value Date    MICROALBUR 4,913.7 03/20/2018    CREATININE 1.04 03/26/2019     Imaging Results (most recent)     None                Assessment and Plan:    Gui was seen today for diabetes.    Diagnoses and all orders for this visit:    Uncontrolled type 2 diabetes mellitus with complication, with long-term current use of insulin (CMS/[facility])  -     Hemoglobin A1c; Future  -     Basic Metabolic Panel; Future  -     Lipid Panel; Future  -     TSH; Future  -     Vitamin B12 & Folate; Future  -     Microalbumin / Creatinine Urine Ratio - Urine, Clean Catch; Future    Other hyperlipidemia  -     Hemoglobin A1c; Future  -     Basic Metabolic Panel; Future  -     Lipid Panel; Future  -     TSH; Future  -     Vitamin B12 & Folate; Future  -     Microalbumin / Creatinine Urine Ratio - Urine, Clean Catch; Future    Diabetic autonomic neuropathy associated with type 2 diabetes mellitus (CMS/[facility])  -     Hemoglobin A1c; Future  -     Basic Metabolic Panel; Future  -     Lipid Panel; Future  -     TSH; Future  -     Vitamin B12 & Folate; Future  -     Microalbumin / Creatinine Urine Ratio - Urine, Clean Catch; Future      Type 2 diabetes mellitus-uncontrolled  HbA1c improved since last visit, but not at goal.  On making insulin regimen changes as patient does not have any blood sugar numbers.  Recommended the patient to bring his glucometer in the next 1-2 weeks to make further changes to his insulin regimen  Given his complaint of low blood sugars will make the following changes  Continue the same dosages of Lantus.  Decrease the dosage of NovoLog to 16/12/16 with each meal  Continue the high-dose NovoLog sliding scale.    Hyperlipidemia  Continue Lipitor 40 mg oral daily    Diabetic neuropathy  Continue Neurontin.    Reviewed Lab results with the patient.             Adarsh  "MD Ivan  04/09/19    EM[facility] Dragon / transcription disclaimer:     \"Dictated utilizing Dragon dictation\".  "

## 2019-05-05 ENCOUNTER — HOSPITAL ENCOUNTER (EMERGENCY)
Facility: HOSPITAL | Age: 69
Discharge: HOME | End: 2019-05-05
Attending: EMERGENCY MEDICINE | Admitting: EMERGENCY MEDICINE

## 2019-05-05 ENCOUNTER — APPOINTMENT (OUTPATIENT)
Dept: GENERAL RADIOLOGY | Facility: HOSPITAL | Age: 69
End: 2019-05-05

## 2019-05-05 VITALS
WEIGHT: 280 LBS | DIASTOLIC BLOOD PRESSURE: 90 MMHG | HEIGHT: 74 IN | HEART RATE: 61 BPM | OXYGEN SATURATION: 98 % | RESPIRATION RATE: 18 BRPM | SYSTOLIC BLOOD PRESSURE: 160 MMHG | TEMPERATURE: 98.8 F | BODY MASS INDEX: 35.94 KG/M2

## 2019-05-05 DIAGNOSIS — S22.31XA CLOSED FRACTURE OF ONE RIB OF RIGHT SIDE, INITIAL ENCOUNTER: Primary | ICD-10-CM

## 2019-05-05 DIAGNOSIS — S93.401A SPRAIN OF RIGHT ANKLE, UNSPECIFIED LIGAMENT, INITIAL ENCOUNTER: ICD-10-CM
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
## 2019-05-05 DIAGNOSIS — S40.012A CONTUSION OF LEFT SHOULDER, INITIAL ENCOUNTER: ICD-10-CM

## 2019-05-05 DIAGNOSIS — S80.01XA CONTUSION OF RIGHT KNEE, INITIAL ENCOUNTER: ICD-10-CM

## 2019-05-05 PROCEDURE — 73562 X-RAY EXAM OF KNEE 3: CPT

## 2019-05-05 PROCEDURE — 71101 X-RAY EXAM UNILAT RIBS/CHEST: CPT

## 2019-05-05 PROCEDURE — 73590 X-RAY EXAM OF LOWER LEG: CPT

## 2019-05-05 PROCEDURE — 73610 X-RAY EXAM OF ANKLE: CPT

## 2019-05-05 PROCEDURE — 99284 EMERGENCY DEPT VISIT MOD MDM: CPT

## 2019-05-05 PROCEDURE — 73030 X-RAY EXAM OF SHOULDER: CPT

## 2019-05-05 RX ORDER — OXYCODONE HYDROCHLORIDE AND ACETAMINOPHEN 5; 325 MG/1; MG/1
1 TABLET ORAL ONCE
Status: DISCONTINUED | OUTPATIENT
Start: 2019-05-05 | End: 2019-05-05 | Stop reason: HOSPADM

## 2019-05-05 RX ORDER — OXYCODONE HYDROCHLORIDE AND ACETAMINOPHEN 5; 325 MG/1; MG/1
1 TABLET ORAL ONCE
Status: COMPLETED | OUTPATIENT
Start: 2019-05-05 | End: 2019-05-05

## 2019-05-05 RX ADMIN — OXYCODONE HYDROCHLORIDE AND ACETAMINOPHEN 1 TABLET: 5; 325 TABLET ORAL at 17:44

## 2019-05-05 NOTE — DISCHARGE INSTRUCTIONS
Wear the ankle splint for 2 weeks.  Weight-bear as tolerated.  And up needing to use your walker at home.  Is your bruises and follow-up with your family doctor.  Off once an hour while awake for the next 2 weeks.  These return to the emergency department if you develop a fever with increasing shortness of breath.
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
## 2019-05-13 ENCOUNTER — TELEPHONE (OUTPATIENT)
Dept: CARDIOLOGY | Facility: CLINIC | Age: 69
End: 2019-05-13

## 2019-05-21 ENCOUNTER — TRANSCRIBE ORDERS (OUTPATIENT)
Dept: ADMINISTRATIVE | Facility: HOSPITAL | Age: 69
End: 2019-05-21

## 2019-05-21 DIAGNOSIS — M25.571 RIGHT ANKLE PAIN, UNSPECIFIED CHRONICITY: Primary | ICD-10-CM

## 2019-05-22 ENCOUNTER — HOSPITAL ENCOUNTER (OUTPATIENT)
Dept: CT IMAGING | Facility: HOSPITAL | Age: 69
Discharge: HOME | End: 2019-05-22
Admitting: ORTHOPAEDIC SURGERY

## 2019-05-22 DIAGNOSIS — M25.571 RIGHT ANKLE PAIN, UNSPECIFIED CHRONICITY: ICD-10-CM

## 2019-05-22 PROCEDURE — 73700 CT LOWER EXTREMITY W/O DYE: CPT

## 2019-06-21 RX ORDER — INSULIN GLARGINE 100 [IU]/ML
INJECTION, SOLUTION SUBCUTANEOUS
Qty: 90 ML | Refills: 2 | Status: SHIPPED | OUTPATIENT
Start: 2019-06-21 | End: 2020-05-21

## 2019-07-08 ENCOUNTER — RESULTS ENCOUNTER (OUTPATIENT)
Dept: ENDOCRINOLOGY | Age: 69
End: 2019-07-08

## 2019-07-08 DIAGNOSIS — E78.49 OTHER HYPERLIPIDEMIA: ICD-10-CM

## 2019-07-08 DIAGNOSIS — E11.43 DIABETIC AUTONOMIC NEUROPATHY ASSOCIATED WITH TYPE 2 DIABETES MELLITUS (HCC): ICD-10-CM

## 2019-07-08 DIAGNOSIS — IMO0002 UNCONTROLLED TYPE 2 DIABETES MELLITUS WITH COMPLICATION, WITH LONG-TERM CURRENT USE OF INSULIN: ICD-10-CM

## 2019-07-27 LAB
ALBUMIN/CREAT UR: 2977.4 MG/G CREAT (ref 0–30)
BUN SERPL-MCNC: 19 MG/DL (ref 8–23)
BUN/CREAT SERPL: 16.1 (ref 7–25)
CALCIUM SERPL-MCNC: 9.2 MG/DL (ref 8.6–10.5)
CHLORIDE SERPL-SCNC: 102 MMOL/L (ref 98–107)
CHOLEST SERPL-MCNC: 131 MG/DL (ref 0–200)
CO2 SERPL-SCNC: 26.9 MMOL/L (ref 22–29)
CREAT SERPL-MCNC: 1.18 MG/DL (ref 0.76–1.27)
CREAT UR-MCNC: 121.1 MG/DL
FOLATE SERPL-MCNC: 9.85 NG/ML (ref 4.78–24.2)
GLUCOSE SERPL-MCNC: 182 MG/DL (ref 65–99)
HBA1C MFR BLD: 7.5 % (ref 4.8–5.6)
HDLC SERPL-MCNC: 45 MG/DL (ref 40–60)
INTERPRETATION: NORMAL
LDLC SERPL CALC-MCNC: 60 MG/DL (ref 0–100)
Lab: NORMAL
MICROALBUMIN UR-MCNC: 3605.6 UG/ML
POTASSIUM SERPL-SCNC: 4.9 MMOL/L (ref 3.5–5.2)
SODIUM SERPL-SCNC: 140 MMOL/L (ref 136–145)
TRIGL SERPL-MCNC: 131 MG/DL (ref 0–150)
TSH SERPL DL<=0.005 MIU/L-ACNC: 1.6 MIU/ML (ref 0.27–4.2)
VIT B12 SERPL-MCNC: 472 PG/ML (ref 211–946)
VLDLC SERPL CALC-MCNC: 26.2 MG/DL

## 2019-08-07 ENCOUNTER — OFFICE VISIT (OUTPATIENT)
Dept: CARDIOLOGY | Facility: CLINIC | Age: 69
End: 2019-08-07

## 2019-08-07 VITALS
DIASTOLIC BLOOD PRESSURE: 82 MMHG | HEIGHT: 74 IN | SYSTOLIC BLOOD PRESSURE: 134 MMHG | HEART RATE: 62 BPM | BODY MASS INDEX: 35.55 KG/M2 | WEIGHT: 277 LBS

## 2019-08-07 DIAGNOSIS — I70.209 DIABETES TYPE 2 WITH ATHEROSCLEROSIS OF ARTERIES OF EXTREMITIES (HCC): Chronic | ICD-10-CM

## 2019-08-07 DIAGNOSIS — E78.1 PURE HYPERGLYCERIDEMIA: ICD-10-CM

## 2019-08-07 DIAGNOSIS — E11.51 DIABETES TYPE 2 WITH ATHEROSCLEROSIS OF ARTERIES OF EXTREMITIES (HCC): Chronic | ICD-10-CM

## 2019-08-07 DIAGNOSIS — I51.81 STRESS-INDUCED CARDIOMYOPATHY: ICD-10-CM

## 2019-08-07 DIAGNOSIS — I10 ESSENTIAL HYPERTENSION: Primary | ICD-10-CM

## 2019-08-07 PROCEDURE — 99214 OFFICE O/P EST MOD 30 MIN: CPT | Performed by: INTERNAL MEDICINE

## 2019-08-07 PROCEDURE — 93000 ELECTROCARDIOGRAM COMPLETE: CPT | Performed by: INTERNAL MEDICINE

## 2019-08-07 NOTE — PROGRESS NOTES
Hugh R McBurney  1950  Date of Office Visit: 8/7/19  Encounter Provider: Tiburcio Moreno MD  Place of Service: [facility] CARDIOLOGY      CHIEF COMPLAINT:  Coronary artery disease  Stress-induced cardiomyopathy  Essential hypertension      HISTORY OF PRESENT ILLNESS:  69 y/o patient with a documented h/o HTN, HLD, DM, GERD, PRISCILLA-no CPAP, right TKA with joint infection and coronary artery disease.. In June 2017 he was seen because he had a  Vancomyocin reaction with chest pain, n/v and diaphoresis and a non-ST elevation myocardial infarction when here for knee surgery. A cardiac cath was done which showed a normal left main, 20% proximal LAD, 50% mid LAD, 90% proximal first diagonal, diffuse 50% mid circumflex, and a  70-80% proximal RPL 2 stenosis. An echo showed an EF of 42%, possibly from stress induced cardiomyopathy. He was sent home on an ace, beta blocker and nitrate to wait for 2-4 weeks before attempting surgery again.      At that time he had a repeat transthoracic echocardiogram showing his ejection fraction and wall motion had normalized. He has been doing very well. He denies any chest pain or dyspnea on exertion. He has no orthopnea or PND. Overall he states that he feels well.         Review of Systems   Constitution: Negative for fever, weakness and malaise/fatigue.   HENT: Negative for nosebleeds and sore throat.    Eyes: Negative for blurred vision and double vision.   Cardiovascular: Negative for chest pain, claudication, palpitations and syncope.   Respiratory: Negative for cough, shortness of breath and snoring.    Endocrine: Negative for cold intolerance, heat intolerance and polydipsia.   Skin: Negative for itching, poor wound healing and rash.   Musculoskeletal: Negative for joint pain, joint swelling, muscle weakness and myalgias.   Gastrointestinal: Negative for abdominal pain, melena, nausea and vomiting.   Neurological: Negative for  light-headedness, loss of balance, seizures and vertigo.   Psychiatric/Behavioral: Negative for altered mental status and depression.          Past Medical History:   Diagnosis Date   • Arthritis    • Chronic pain     BILATERAL KNEES AND BACK   • Contusion of knee    • Coronary artery disease    • Depression    • Diabetes mellitus     type 2 with atherosclerosis of arteries of extremities   • Diabetic ulcer of toe     left foot associated with type 2 diabetes mellitus, with necrosis of bone   • GERD (gastroesophageal reflux disease)    • Great toe pain     with cellulitis and gangrene   • History of kidney stones    • Hyperlipidemia    • Hypertension    • Hyponatremia    • Kidney stone    • Knee pain     hx of previous prosthetic joint infection   • MSSA (methicillin susceptible Staphylococcus aureus) infection     RIGHT KNEE   • NSTEMI (non-ST elevated myocardial infarction) 06/14/2017   • Sleep apnea     DOES NOT USE CPAP       The following portions of the patient's history were reviewed and updated as appropriate: Social history , Family history and Surgical history     Current Outpatient Prescriptions on File Prior to Visit   Medication Sig Dispense Refill   • atorvastatin (LIPITOR) 40 MG tablet Take 1 tablet by mouth Every Night. 30 tablet 1   • baclofen (LIORESAL) 10 MG tablet Take 5 mg by mouth 2 (Two) Times a Day.     • ferrous sulfate 325 (65 FE) MG tablet Take 325 mg by mouth Daily With Lunch.     • glucose blood (ACCU-CHEK COMPACT PLUS) test strip Use to test blood sugar 5 times daily 500 each 3   • glucose blood (ACCU-CHEK COMPACT STRIPS) test strip Use to test blood sugar 4 times daily   ICD 10 code E11.9 400 each 3   • insulin aspart (novoLOG FLEXPEN) 100 UNIT/ML solution pen-injector sc pen Inject 14 Units under the skin 3 (Three) Times a Day With Meals.     • Insulin Glargine (LANTUS SOLOSTAR) 100 UNIT/ML injection pen Inject 40 Units under the skin 2 (Two) Times a Day. (Patient taking differently:  Inject 45 Units under the skin 2 (Two) Times a Day.) 4 pen 2   • Insulin Pen Needle (BD PEN NEEDLE JUAN DAVID U/F) 32G X 4 MM misc TO INJECT 4 TIMES DAILY 100 each 3   • isosorbide mononitrate (IMDUR) 30 MG 24 hr tablet Take 1 tablet by mouth Daily. 30 tablet 1   • Lancets (ACCU-CHEK MULTICLIX) lancets Use to test blood sugar 5 times daily 500 each 3   • metoprolol tartrate (LOPRESSOR) 25 MG tablet Take 1 tablet by mouth Every 12 (Twelve) Hours. 60 tablet 11   • Multiple Vitamin (MULTI VITAMIN DAILY PO) Take 1 tablet by mouth Daily. HOLD PRIOR TO SURGERY     • omeprazole (priLOSEC) 40 MG capsule Take 40 mg by mouth 2 (Two) Times a Day.     • pregabalin (LYRICA) 100 MG capsule Take 200 mg by mouth Every Night.     • pregabalin (LYRICA) 100 MG capsule Take 100 mg by mouth Every Morning.     • sertraline (ZOLOFT) 100 MG tablet Take 100 mg by mouth Daily.     • sulfamethoxazole-trimethoprim (BACTRIM DS,SEPTRA DS) 800-160 MG per tablet Take 1 tablet by mouth Every 12 (Twelve) Hours for 90 days. Indications: Bone and/or Joint Infection, PROPHYLAXIS 60 tablet 3   • vitamin D (ERGOCALCIFEROL) 54935 units capsule capsule Take 1 capsule by mouth 1 (One) Time Per Week. (Patient taking differently: Take 50,000 Units by mouth 1 (One) Time Per Week. Friday) 30 capsule 11   • [DISCONTINUED] bisacodyl (DULCOLAX) 10 MG suppository Insert 1 suppository into the rectum Daily As Needed for constipation.  0   • [DISCONTINUED] lisinopril (PRINIVIL,ZESTRIL) 10 MG tablet Take 1 tablet by mouth Every Evening. 30 tablet 11   • [DISCONTINUED] promethazine (PHENERGAN) 25 MG tablet Take 1 tablet by mouth Every 8 (Eight) Hours As Needed for Nausea or Vomiting. 30 tablet 2     No current facility-administered medications on file prior to visit.        Allergies   Allergen Reactions   • Vancomycin Other (See Comments)     Red Man syndrome, slow rate and premedicate with benadryl                Blood pressure 134/80  Heart rate 62  Weight 277  pounds    Physical Exam   Constitutional: He is oriented to person, place, and time. He appears well-developed and well-nourished.   HENT:   Head: Normocephalic and atraumatic.   Eyes: Conjunctivae and EOM are normal. No scleral icterus.   Neck: Normal range of motion. Neck supple. Normal carotid pulses, no hepatojugular reflux and no JVD present. Carotid bruit is not present. No tracheal deviation present. No thyromegaly present.   Cardiovascular: Normal rate and regular rhythm. Exam reveals no gallop and no friction rub.   No murmur heard.  Pulses:       Carotid pulses are 2+ on the right side, and 2+ on the left side.       Radial pulses are 2+ on the right side, and 2+ on the left side.        Femoral pulses are 2+ on the right side, and 2+ on the left side.       Dorsalis pedis pulses are 2+ on the right side, and 2+ on the left side.        Posterior tibial pulses are 2+ on the right side, and 2+ on the left side.   Pulmonary/Chest: Breath sounds normal. No respiratory distress. He has no decreased breath sounds. He has no wheezes. He has no rhonchi. He has no rales. He exhibits no tenderness.   Abdominal: Soft. Bowel sounds are normal. He exhibits no distension. There is no tenderness. There is no rebound.   Musculoskeletal: He exhibits no edema or deformity.   Neurological: He is alert and oriented to person, place, and time. He has normal strength. No sensory deficit.   Skin: No rash noted. No erythema.   Psychiatric: He has a normal mood and affect. His behavior is normal.       No components found for: CBC  No results found for: CMP  No components found for: LIPID  No results found for: BMP      ECG 12 Lead  Date/Time: 8/7/2019 1:36 PM  Performed by: Tiburcio Moreno MD  Authorized by: Tiburcio Moreno MD   Comparison: compared with previous ECG from 7/5/2018  Rhythm: sinus rhythm  Rate: normal  Conduction: non-specific intraventricular conduction delay  QRS axis: normal    Clinical  impression: non-specific ECG               7/5/18  · Left ventricular systolic function is low normal. Calculated EF = 52%. Estimated EF was in agreement with the calculated EF. Normal left ventricular cavity size noted. Left ventricular wall thickness is consistent with mild-to-moderate concentric hypertrophy. Left ventricular diastolic dysfunction is noted (grade I) consistent with impaired relaxation. Regional wall motion cannot be assessed due to limited endocardial visualization; consider a contrast study.     6/14/17  Conclusions:   1. Left main: Mild calcification.  No stenosis  2. LAD: Diffuse 20% proximal stenosis.  Diffuse 50% stenosis mid segment.  Discrete 90% proximal first diagonal stenosis with JANINE 2 flow.  Small caliber  3. LCX: Diffuse 50% mid vessel stenosis  4. RCA: Discrete 70-80% proximal RPL 2 stenosis.  Small caliber  5.  Moderately reduced left ventricular systolic function.  Ejection fraction 40%.  Mid to distal anterior wall, apical severe hypokinesis.  Moderate distal inferior wall hypokinesis.  More consistent with stress-induced cardiomyopathy than flow limiting coronary artery disease in that territory  Recommendations: Continue ACE inhibitor and beta blocker.  Continue aspirin and statin.  Low-dose isosorbide mononitrate.      DISCUSSION/SUMMARY  69-year-old gentleman with a medical history as documented above including hypertension, hyperlipidemia, diabetes mellitus, a right total knee arthroplasty with prior joint infection and revision who had a non-ST elevation myocardial infarction and was found to have a stress induced cardiomyopathy with an ejection fraction of 40%.  Since that time we have documented that his ejection fraction has normalized.  He states that he feels very well and has no symptoms other than his orthopedic issues.     1. Stress induced cardiomyopathy.  Ventricular ejection fraction and wall motion has normalized. Continue metoprolol tartrate at the current  dose.  He has also previously been on an ACE inhibitor; however, was unable to tolerate that secondary to low normal blood pressures.   2. Hyperlipidemia; continue atorvastatin at current dose.  Lipid panel 7/26/19 LDL 60.   3. Coronary artery disease; no indication for intervention.  Small caliber disease.  Continue aspirin and statin.   4. Diabetes mellitus; per primary care provider.       I will see the patient back in six months or earlier with problems.      Heart Failure  Assessment  • NYHA class I - There is no limitation of physical activity. Physical activity does not cause fatigue, palpitations or shortness of breath.  • ACE inhibitor not prescribed for medical reasons  • Beta blocker prescribed  • Diuretics not prescribed for medical reasons  • Angiotensin receptor blocker (ARB) not prescribed for medical reasons  • Calcium channel blockers not prescribed  • Left ventricular function is normal by qualitative assessment    Plan  • The patient has received heart failure education on the following topics: physical activity, weight monitoring and minimizing alcohol intake  • The heart failure care plan was discussed with the patient today including: continuing the current program    Subjective/Objective    • Physical exam findings negative for rales and elevated JVP.        Coronary Artery Disease  Assessment  • The patient has no angina    Plan  • Lifestyle modifications discussed include adhering to a heart healthy diet, maintenance of a healthy weight and regular exercise    Subjective - Objective  • Current antiplatelet therapy includes aspirin 81 mg

## 2019-08-09 ENCOUNTER — OFFICE VISIT (OUTPATIENT)
Dept: ENDOCRINOLOGY | Age: 69
End: 2019-08-09

## 2019-08-09 VITALS
HEART RATE: 66 BPM | BODY MASS INDEX: 35.55 KG/M2 | OXYGEN SATURATION: 99 % | SYSTOLIC BLOOD PRESSURE: 136 MMHG | WEIGHT: 277 LBS | DIASTOLIC BLOOD PRESSURE: 70 MMHG | HEIGHT: 74 IN

## 2019-08-09 DIAGNOSIS — IMO0002 UNCONTROLLED TYPE 2 DIABETES MELLITUS WITH COMPLICATION, WITH LONG-TERM CURRENT USE OF INSULIN: Primary | ICD-10-CM

## 2019-08-09 DIAGNOSIS — E11.43 DIABETIC AUTONOMIC NEUROPATHY ASSOCIATED WITH TYPE 2 DIABETES MELLITUS (HCC): ICD-10-CM

## 2019-08-09 DIAGNOSIS — E78.2 MIXED HYPERLIPIDEMIA: ICD-10-CM

## 2019-08-09 PROCEDURE — 99214 OFFICE O/P EST MOD 30 MIN: CPT | Performed by: INTERNAL MEDICINE

## 2019-08-09 RX ORDER — FLASH GLUCOSE SCANNING READER
1 EACH MISCELLANEOUS AS NEEDED
Qty: 1 DEVICE | Refills: 0 | Status: SHIPPED | OUTPATIENT
Start: 2019-08-09 | End: 2021-03-08

## 2019-08-09 RX ORDER — LANCETS
EACH MISCELLANEOUS
Qty: 100 EACH | Refills: 2 | Status: SHIPPED | OUTPATIENT
Start: 2019-08-09 | End: 2021-03-08 | Stop reason: SDUPTHER

## 2019-08-09 RX ORDER — FLASH GLUCOSE SENSOR
1 KIT MISCELLANEOUS
Qty: 2 EACH | Refills: 3 | Status: SHIPPED | OUTPATIENT
Start: 2019-08-09 | End: 2021-03-08

## 2019-08-09 NOTE — PROGRESS NOTES
68 y.o.    Patient Care Team:  Guille Nieves MD as PCP - General  Guille Nieves MD as PCP - Family Medicine  Adarsh Love MD as PCP - [facility]  Tiburcio Moreno MD as Consulting Physician (Cardiology)    Chief Complaint:    Follow up/ type 2 diabetes mellitus  Subjective     HPI    Hugh R McBurney 68 y.o. white male is here as a follow up patient for the management of type 2 diabetes mellitus.     Type 2 diabetes mellitus-diagnosed about 10-15 years ago.    Today in the clinic patient reports that he is on Lantus 48 units in the morning and 42 units at bedtime, Humalog 16 units with each meal plus the high-dose Humalog sliding scale.  3 units for 50 above 150.  He has been checking his blood sugars 4 times a day.  Average blood sugars are around 1/1/1980.  Lowest blood sugar was 66 mg/dL.  Does have hypoglycemic awareness.  Highest blood sugar was 300 when he had an ice cream.  Up-to-date with eye examination, no known history of diabetic retinopathy.  Does have history of tingling and numbness in his bilateral feet.  Currently on Neurontin and reports that it is helping him.  History of coronary artery disease, no history of CKD or CVA.  He tries to follow diabetic diet as much as he can.  Not on ACE inhibitor.  Does have history of pancreatitis in the past.     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  Patient also underwent prosthetic knee joint replacement.  Currently on Neurontin     Hyperlipidemia  On Lipitor 40 mg oral daily.    Reviewed primary care physician's/consulting physician documentation and lab results :     Interval History      The following portions of the patient's history were reviewed and updated as appropriate: allergies, current medications, past family history, past medical history, past social history,  past surgical history and problem list.    Past Medical History:   Diagnosis Date   • Arthritis    • Chronic pain     BILATERAL KNEES AND BACK   • Contusion of knee    • Coronary artery disease    • Depression    • Diabetes mellitus (CMS/[facility])     type 2 with atherosclerosis of arteries of extremities   • Diabetic ulcer of toe (CMS/[facility])     left foot associated with type 2 diabetes mellitus, with necrosis of bone   • GERD (gastroesophageal reflux disease)    • Great toe pain     with cellulitis and gangrene   • History of kidney stones    • Hyperlipidemia    • Hypertension    • Hyponatremia    • Kidney stone    • Knee pain     hx of previous prosthetic joint infection   • MSSA (methicillin susceptible Staphylococcus aureus) infection     RIGHT KNEE   • NSTEMI (non-ST elevated myocardial infarction) (CMS/[facility]) 06/14/2017   • Sleep apnea     DOES NOT USE CPAP     Family History   Problem Relation Age of Onset   • Heart disease Mother    • Heart disease Father    • Diabetes Sister    • Dementia Maternal Grandmother    • No Known Problems Maternal Grandfather    • Heart disease Paternal Grandmother    • Heart attack Paternal Grandfather    • Heart disease Paternal Grandfather    • Heart disease Son    • Heart attack Son    • No Known Problems Sister    • Malig Hyperthermia Neg Hx      Social History     Socioeconomic History   • Marital status:      Spouse name: Not on file   • Number of children: Not on file   • Years of education: Not on file   • Highest education level: Not on file   Tobacco Use   • Smoking status: Former Smoker     Packs/day: 3.00     Years: 20.00     Pack years: 60.00   • Smokeless tobacco: Former User   • Tobacco comment: quit 35 years ago. 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    Substance and Sexual Activity   • Alcohol use: No   • Drug use: No   • Sexual activity: Defer     Allergies   Allergen Reactions   • Vancomycin Other (See Comments)     Red Man syndrome, slow rate and premedicate with benadryl        Current Outpatient Medications:   •  atorvastatin (LIPITOR) 40 MG tablet, TAKE 1 TABLET BY MOUTH EVERY EVENING., Disp: 30 tablet, Rfl: 3  •  Blood Glucose Calibration (ACCU-CHEK COMPACT PLUS CONTROL) solution, AS DIRECTED, Disp: 1 each, Rfl: 3  •  glucose blood (ACCU-CHEK COMPACT PLUS) test strip, Use to test blood sugar 5 times daily, Disp: 500 each, Rfl: 3  •  glucose blood (ACCU-CHEK COMPACT PLUS) test strip, TEST FOUR TIMES A DAY, Disp: 400 each, Rfl: 3  •  Insulin Lispro (HUMALOG KWIKPEN) 100 UNIT/ML solution pen-injector, INJECT 16 UNITS WITH EACH MEAL AS DIRECTED, MAXIMUM  UNITS DAILY AS DIRECTED BY PRESCRIBER, Disp: 90 mL, Rfl: 3  •  Insulin Pen Needle (BD PEN NEEDLE JUAN DAVID U/F) 32G X 4 MM misc, TO INJECT 4 TIMES DAILY, Disp: 100 each, Rfl: 3  •  isosorbide mononitrate (IMDUR) 30 MG 24 hr tablet, Take 1 tablet by mouth Daily., Disp: 30 tablet, Rfl: 1  •  Lancets (ACCU-CHEK MULTICLIX) lancets, Use to test blood sugar 5 times daily, Disp: 500 each, Rfl: 3  •  LANTUS SOLOSTAR 100 UNIT/ML injection pen, INJECT 42 UNITS UNDER THE SKIN TWICE A DAY, Disp: 90 mL, Rfl: 2  •  metoprolol tartrate (LOPRESSOR) 25 MG tablet, Take 1 tablet by mouth Every 12 (Twelve) Hours., Disp: 60 tablet, Rfl: 11  •  Multiple Vitamin (MULTI VITAMIN DAILY PO), Take 1 tablet by mouth Daily. HOLD PRIOR TO SURGERY, Disp: , Rfl:   •  omeprazole (priLOSEC) 40 MG capsule, Take 40 mg by mouth 2 (Two) Times a Day., Disp: , Rfl:   •  oxyCODONE-acetaminophen (PERCOCET)  MG per tablet, Take 1 tablet by mouth As Needed., Disp: , Rfl:   •  sertraline (ZOLOFT) 100 MG tablet, Take 100 mg by mouth Daily., Disp: , Rfl:   •  vitamin D (ERGOCALCIFEROL) 12368 units capsule capsule, Take 1 capsule by mouth 1 (One) Time Per Week. (Patient taking differently: Take 50,000 Units by mouth 1 (One) Time Per Week. Friday), Disp: 30 capsule, Rfl: 11        Review of Systems   Constitutional: Negative for appetite change, fatigue and fever.   Eyes:  "Negative for visual disturbance.   Respiratory: Negative for shortness of breath.    Cardiovascular: Negative for palpitations and leg swelling.   Gastrointestinal: Negative for abdominal pain and vomiting.   Endocrine: Negative for polydipsia and polyuria.   Musculoskeletal: Negative for joint swelling and neck pain.   Skin: Negative for rash.   Neurological: Negative for weakness and numbness.   Psychiatric/Behavioral: Negative for behavioral problems.       Objective       Vitals:    08/09/19 1059   BP: 136/70   Pulse: 66   SpO2: 99%   Weight: 126 kg (277 lb)   Height: 188 cm (74\")     Body mass index is 35.56 kg/m².      Physical Exam   Constitutional: He is oriented to person, place, and time. He appears well-nourished.   obese   HENT:   Head: Normocephalic and atraumatic.   Wide neck   Eyes: Conjunctivae and EOM are normal.   Neck: Normal range of motion. Neck supple. Carotid bruit is not present. No thyromegaly present.   Acanthosis nigricans   Cardiovascular: Normal rate and normal heart sounds.   Pulmonary/Chest: Effort normal and breath sounds normal. No stridor. No respiratory distress.   Abdominal: Soft. Bowel sounds are normal. He exhibits no distension. There is no tenderness.   Central obesity   Musculoskeletal: He exhibits no edema or tenderness.   Neurological: He is alert and oriented to person, place, and time.   Skin: Skin is warm and dry.   Psychiatric: He has a normal mood and affect. His behavior is normal.   Vitals reviewed.    Results Review:     I reviewed the patient's new clinical results and mentioned them above in HPI and in plan as well.    Medical records reviewed  Summary:DONE      Results Encounter on 07/08/2019   Component Date Value Ref Range Status   • Hemoglobin A1C 07/26/2019 7.50* 4.80 - 5.60 % Final    Comment: Hemoglobin A1C Ranges:  Increased Risk for Diabetes  5.7% to 6.4%  Diabetes                     >= 6.5%  Diabetic Goal                < 7.0%     • Glucose 07/26/2019 " 182* 65 - 99 mg/dL Final   • BUN 07/26/2019 19  8 - 23 mg/dL Final   • Creatinine 07/26/2019 1.18  0.76 - 1.27 mg/dL Final   • eGFR Non  Am 07/26/2019 61  >60 mL/min/1.73 Final   • eGFR African Am 07/26/2019 74  >60 mL/min/1.73 Final   • BUN/Creatinine Ratio 07/26/2019 16.1  7.0 - 25.0 Final   • Sodium 07/26/2019 140  136 - 145 mmol/L Final   • Potassium 07/26/2019 4.9  3.5 - 5.2 mmol/L Final   • Chloride 07/26/2019 102  98 - 107 mmol/L Final   • Total CO2 07/26/2019 26.9  22.0 - 29.0 mmol/L Final   • Calcium 07/26/2019 9.2  8.6 - 10.5 mg/dL Final   • Total Cholesterol 07/26/2019 131  0 - 200 mg/dL Final   • Triglycerides 07/26/2019 131  0 - 150 mg/dL Final   • HDL Cholesterol 07/26/2019 45  40 - 60 mg/dL Final   • VLDL Cholesterol 07/26/2019 26.2  mg/dL Final   • LDL Cholesterol  07/26/2019 60  0 - 100 mg/dL Final   • TSH 07/26/2019 1.600  0.270 - 4.200 mIU/mL Final   • Vitamin B-12 07/26/2019 472  211 - 946 pg/mL Final   • Folate 07/26/2019 9.85  4.78 - 24.20 ng/mL Final   • Creatinine, Urine 07/26/2019 121.1  Not Estab. mg/dL Final   • Microalbumin, Urine 07/26/2019 3,605.6  Not Estab. ug/mL Final    Comment: Results confirmed on  dilution.     • Microalbumin/Creatinine Ratio 07/26/2019 2,977.4* 0.0 - 30.0 mg/g creat Final    Comment:                      Normal:                0.0 -  30.0                       Albuminuria:          31.0 - 300.0                       Clinical albuminuria:       >300.0     • Interpretation 07/26/2019 Note   Final    Supplemental report is available.   • PDF Image 07/26/2019 Not applicable   Final     Lab Results   Component Value Date    HGBA1C 7.50 (H) 07/26/2019    HGBA1C 7.50 (H) 03/26/2019    HGBA1C 8.12 (H) 12/05/2018     Lab Results   Component Value Date    MICROALBUR 3,605.6 07/26/2019    CREATININE 1.18 07/26/2019     Imaging Results (most recent)     None                Assessment and Plan:    Gui was seen today for diabetes.    Diagnoses and all orders for this  "visit:    Uncontrolled type 2 diabetes mellitus with complication, with long-term current use of insulin (CMS/[facility])  -     Hemoglobin A1c; Future  -     Basic Metabolic Panel; Future  -     Lipid Panel; Future  -     TSH; Future  -     T4, Free; Future    Mixed hyperlipidemia  -     Hemoglobin A1c; Future  -     Basic Metabolic Panel; Future  -     Lipid Panel; Future  -     TSH; Future  -     T4, Free; Future    Diabetic autonomic neuropathy associated with type 2 diabetes mellitus (CMS/[facility])  -     Hemoglobin A1c; Future  -     Basic Metabolic Panel; Future  -     Lipid Panel; Future  -     TSH; Future  -     T4, Free; Future      Type 2 diabetes mellitus-uncontrolled  HbA1c is still higher than 7%.  Continue the current insulin regimen  Emphasized to the patient the importance of diet and exercise in the management of his diabetes  Given his hypoglycemic episodes patient could benefit with CGM.  [facility] beginning of next year.    Hyperlipidemia  Continue Lipitor 40 mg oral daily.    Reviewed Lab results with the patient.             Adarsh Love MD  08/09/19    [facility] Dragon / transcription disclaimer:     \"Dictated utilizing Dragon dictation\".  "

## 2019-11-01 ENCOUNTER — TRANSCRIBE ORDERS (OUTPATIENT)
Dept: ADMINISTRATIVE | Facility: HOSPITAL | Age: 69
End: 2019-11-01

## 2019-11-01 DIAGNOSIS — O12.10 GESTATIONAL PROTEINURIA, ANTEPARTUM: Primary | ICD-10-CM

## 2019-11-07 ENCOUNTER — RESULTS ENCOUNTER (OUTPATIENT)
Dept: ENDOCRINOLOGY | Age: 69
End: 2019-11-07

## 2019-11-07 DIAGNOSIS — E78.2 MIXED HYPERLIPIDEMIA: ICD-10-CM

## 2019-11-07 DIAGNOSIS — IMO0002 UNCONTROLLED TYPE 2 DIABETES MELLITUS WITH COMPLICATION, WITH LONG-TERM CURRENT USE OF INSULIN: ICD-10-CM

## 2019-11-07 DIAGNOSIS — E11.43 DIABETIC AUTONOMIC NEUROPATHY ASSOCIATED WITH TYPE 2 DIABETES MELLITUS (HCC): ICD-10-CM

## 2019-11-13 ENCOUNTER — HOSPITAL ENCOUNTER (OUTPATIENT)
Dept: ULTRASOUND IMAGING | Facility: HOSPITAL | Age: 69
Discharge: HOME | End: 2019-11-13
Admitting: INTERNAL MEDICINE

## 2019-11-13 DIAGNOSIS — O12.10 GESTATIONAL PROTEINURIA, ANTEPARTUM: ICD-10-CM

## 2019-11-13 PROCEDURE — 76775 US EXAM ABDO BACK WALL LIM: CPT

## 2019-12-23 LAB
ALBUMIN SERPL ELPH-MCNC: 3.2 G/DL (ref 2.9–4.4)
ALBUMIN SERPL ELPH-MCNC: 3.3 G/DL (ref 2.9–4.4)
ALBUMIN SERPL-MCNC: 3.8 G/DL (ref 3.5–5.2)
ALBUMIN/GLOB SERPL: 1.1 {RATIO} (ref 0.7–1.7)
ALBUMIN/GLOB SERPL: 1.3 {RATIO} (ref 0.7–1.7)
ALPHA1 GLOB SERPL ELPH-MCNC: 0.2 G/DL (ref 0–0.4)
ALPHA1 GLOB SERPL ELPH-MCNC: 0.3 G/DL (ref 0–0.4)
ALPHA2 GLOB SERPL ELPH-MCNC: 0.9 G/DL (ref 0.4–1)
ALPHA2 GLOB SERPL ELPH-MCNC: 0.9 G/DL (ref 0.4–1)
ANA TITR SER IF: NEGATIVE {TITER}
APPEARANCE UR: CLEAR
B-GLOBULIN SERPL ELPH-MCNC: 1 G/DL (ref 0.7–1.3)
B-GLOBULIN SERPL ELPH-MCNC: 1 G/DL (ref 0.7–1.3)
BACTERIA #/AREA URNS HPF: NORMAL /HPF
BILIRUB UR QL STRIP: NEGATIVE
BUN SERPL-MCNC: 15 MG/DL (ref 8–23)
BUN/CREAT SERPL: 14.4 (ref 7–25)
C-ANCA TITR SER IF: NORMAL TITER
C3 SERPL-MCNC: 135 MG/DL (ref 82–167)
C4 SERPL-MCNC: 23 MG/DL (ref 14–44)
CALCIUM SERPL-MCNC: 9 MG/DL (ref 8.6–10.5)
CHLORIDE SERPL-SCNC: 101 MMOL/L (ref 98–107)
CHOLEST SERPL-MCNC: 139 MG/DL (ref 0–200)
CO2 SERPL-SCNC: 26.2 MMOL/L (ref 22–29)
COLOR UR: YELLOW
CREAT SERPL-MCNC: 1.04 MG/DL (ref 0.76–1.27)
CREAT UR-MCNC: 112.6 MG/DL
EPI CELLS #/AREA URNS HPF: NORMAL /HPF (ref 0–10)
GAMMA GLOB SERPL ELPH-MCNC: 0.6 G/DL (ref 0.4–1.8)
GAMMA GLOB SERPL ELPH-MCNC: 0.7 G/DL (ref 0.4–1.8)
GLOBULIN SER CALC-MCNC: 2.8 G/DL (ref 2.2–3.9)
GLOBULIN SER-MCNC: 2.7 G/DL (ref 2.2–3.9)
GLUCOSE SERPL-MCNC: 132 MG/DL (ref 65–99)
GLUCOSE UR QL: ABNORMAL
HBA1C MFR BLD: 8.1 % (ref 4.8–5.6)
HBV SURFACE AB SER-ACNC: 3.3 MIU/ML
HCV AB S/CO SERPL IA: <0.1 S/CO RATIO (ref 0–0.9)
HDLC SERPL-MCNC: 52 MG/DL (ref 40–60)
HGB UR QL STRIP: NEGATIVE
IGA SERPL-MCNC: 302 MG/DL (ref 61–437)
IGG SERPL-MCNC: 803 MG/DL (ref 700–1600)
IGM SERPL-MCNC: 7 MG/DL (ref 20–172)
INTERPRETATION SERPL IEP-IMP: ABNORMAL
INTERPRETATION: NORMAL
KAPPA LC FREE SER-MCNC: 26.9 MG/L (ref 3.3–19.4)
KAPPA LC FREE/LAMBDA FREE SER: 0.84 {RATIO} (ref 0.26–1.65)
KETONES UR QL STRIP: NEGATIVE
LABORATORY COMMENT REPORT: ABNORMAL
LABORATORY COMMENT REPORT: NORMAL
LABORATORY COMMENT REPORT: NORMAL
LAMBDA LC FREE SERPL-MCNC: 31.9 MG/L (ref 5.7–26.3)
LDLC SERPL CALC-MCNC: 65 MG/DL (ref 0–100)
LEUKOCYTE ESTERASE UR QL STRIP: NEGATIVE
Lab: NORMAL
M PROTEIN SERPL ELPH-MCNC: ABNORMAL G/DL
M PROTEIN SERPL ELPH-MCNC: NORMAL G/DL
MICRO URNS: ABNORMAL
MUCOUS THREADS URNS QL MICRO: PRESENT /HPF
MYELOPEROXIDASE AB SER IA-ACNC: <9 U/ML (ref 0–9)
NITRITE UR QL STRIP: NEGATIVE
P-ANCA ATYPICAL TITR SER IF: NORMAL TITER
P-ANCA TITR SER IF: NORMAL TITER
PH UR STRIP: 6 [PH] (ref 5–7.5)
PHOSPHATE SERPL-MCNC: 4.1 MG/DL (ref 2.5–4.5)
POTASSIUM SERPL-SCNC: 4.3 MMOL/L (ref 3.5–5.2)
PROT PATTERN SERPL IFE-IMP: NORMAL
PROT SERPL-MCNC: 6 G/DL (ref 6–8.5)
PROT UR QL STRIP: ABNORMAL
PROT UR-MCNC: 750 MG/DL
PROTEINASE3 AB SER IA-ACNC: <3.5 U/ML (ref 0–3.5)
RBC #/AREA URNS HPF: NORMAL /HPF (ref 0–2)
RHEUMATOID FACT SERPL-ACNC: <10 IU/ML (ref 0–13.9)
SODIUM SERPL-SCNC: 140 MMOL/L (ref 136–145)
SP GR UR: 1.02 (ref 1–1.03)
T4 FREE SERPL-MCNC: 1.17 NG/DL (ref 0.93–1.7)
TRIGL SERPL-MCNC: 110 MG/DL (ref 0–150)
TSH SERPL DL<=0.005 MIU/L-ACNC: 2.16 UIU/ML (ref 0.27–4.2)
URINALYSIS REFLEX: ABNORMAL
UROBILINOGEN UR STRIP-MCNC: 0.2 MG/DL (ref 0.2–1)
VLDLC SERPL CALC-MCNC: 22 MG/DL
WBC #/AREA URNS HPF: NORMAL /HPF (ref 0–5)

## 2020-01-03 ENCOUNTER — OFFICE VISIT (OUTPATIENT)
Dept: ENDOCRINOLOGY | Age: 70
End: 2020-01-03

## 2020-01-03 VITALS
DIASTOLIC BLOOD PRESSURE: 64 MMHG | WEIGHT: 279 LBS | HEART RATE: 69 BPM | BODY MASS INDEX: 35.81 KG/M2 | SYSTOLIC BLOOD PRESSURE: 138 MMHG | OXYGEN SATURATION: 98 % | HEIGHT: 74 IN

## 2020-01-03 DIAGNOSIS — IMO0002 UNCONTROLLED TYPE 2 DIABETES MELLITUS WITH COMPLICATION, WITH LONG-TERM CURRENT USE OF INSULIN: Primary | ICD-10-CM

## 2020-01-03 DIAGNOSIS — E78.2 MIXED HYPERLIPIDEMIA: ICD-10-CM

## 2020-01-03 DIAGNOSIS — E11.43 DIABETIC AUTONOMIC NEUROPATHY ASSOCIATED WITH TYPE 2 DIABETES MELLITUS (HCC): ICD-10-CM

## 2020-01-03 DIAGNOSIS — E55.9 VITAMIN D DEFICIENCY: ICD-10-CM

## 2020-01-03 PROCEDURE — 99214 OFFICE O/P EST MOD 30 MIN: CPT | Performed by: INTERNAL MEDICINE

## 2020-01-03 RX ORDER — LISINOPRIL 5 MG/1
5 TABLET ORAL DAILY
Qty: 30 TABLET | Refills: 11
Start: 2020-01-03 | End: 2021-01-02

## 2020-01-03 NOTE — PROGRESS NOTES
69 y.o.    Patient Care Team:  Guille Nieves MD as PCP - General  Guille Nieves MD as PCP - Family Medicine  Tiburcio Moreno MD as Consulting Physician (Cardiology)    Chief Complaint:    FOLLOW UP/ TYPE 2 DIABETES MELLITUS  Subjective     HPI    Hugh R McBurney 68 y.o. white male is here as a follow up patient for the management of type 2 diabetes mellitus.     Type 2 diabetes mellitus-diagnosed about 10-15 years ago.        Today in the clinic patient reports that he is on Lantus 48 units in the morning and 42 units at bedtime.  Humalog 16 units with each meal plus the high-dose Humalog sliding scale.  Lowest blood sugar was 70 mg/dL.  Does have hypoglycemic awareness.  Highest blood sugar was 400 when he had a cake for his birthday.  Up-to-date with his eye examination, no history of diabetic retinopathy  Does have significant diabetic neuropathy.  On Neurontin.  History of coronary artery disease, no history of CKD or CVA.  He tries to follow diabetic diet as much as he can.  On ACE inhibitor.  Does have history of pancreatitis in the past.     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  Patient also underwent prosthetic knee joint replacement.  Currently on Neurontin     Hyperlipidemia  On Lipitor 40 mg oral daily.    Reviewed primary care physician's/consulting physician documentation and lab results :     Interval History      The following portions of the patient's history were reviewed and updated as appropriate: allergies, current medications, past family history, past medical history, past social history, past surgical history and problem list.    Past Medical History:   Diagnosis Date   • Arthritis    • Chronic pain     BILATERAL KNEES AND BACK   • Contusion of knee    • Coronary artery disease    • Depression    • Diabetes mellitus  (CMS/[facility])     type 2 with atherosclerosis of arteries of extremities   • Diabetic ulcer of toe (CMS/[facility])     left foot associated with type 2 diabetes mellitus, with necrosis of bone   • GERD (gastroesophageal reflux disease)    • Great toe pain     with cellulitis and gangrene   • History of kidney stones    • Hyperlipidemia    • Hypertension    • Hyponatremia    • Kidney stone    • Knee pain     hx of previous prosthetic joint infection   • MSSA (methicillin susceptible Staphylococcus aureus) infection     RIGHT KNEE   • NSTEMI (non-ST elevated myocardial infarction) (CMS/[facility]) 06/14/2017   • Sleep apnea     DOES NOT USE CPAP     Family History   Problem Relation Age of Onset   • Heart disease Mother    • Heart disease Father    • Diabetes Sister    • Dementia Maternal Grandmother    • No Known Problems Maternal Grandfather    • Heart disease Paternal Grandmother    • Heart attack Paternal Grandfather    • Heart disease Paternal Grandfather    • Heart disease Son    • Heart attack Son    • No Known Problems Sister    • Malig Hyperthermia Neg Hx      Social History     Socioeconomic History   • Marital status:      Spouse name: Not on file   • Number of children: Not on file   • Years of education: Not on file   • Highest education level: Not on file   Tobacco Use   • Smoking status: Former Smoker     Packs/day: 3.00     Years: 20.00     Pack years: 60.00   • Smokeless tobacco: Former User   • Tobacco comment: quit 35 years ago. 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    Substance and Sexual Activity   • Alcohol use: No   • Drug use: No   • Sexual activity: Defer     Allergies   Allergen Reactions   • Vancomycin Other (See Comments)     Red Man syndrome, slow rate and premedicate with benadryl       Current Outpatient Medications:   •  atorvastatin (LIPITOR) 40 MG tablet, TAKE 1 TABLET BY MOUTH EVERY EVENING., Disp: 30 tablet, Rfl: 3  •  Blood Glucose Calibration (ACCU-CHEK COMPACT PLUS CONTROL) solution, AS DIRECTED,  Disp: 1 each, Rfl: 3  •  glucose blood (ACCU-CHEK BALTAZAR PLUS) test strip, To check 3 - 4 times a day, Disp: 100 each, Rfl: 2  •  glucose blood (ACCU-CHEK COMPACT PLUS) test strip, Use to test blood sugar 5 times daily, Disp: 500 each, Rfl: 3  •  glucose blood (ACCU-CHEK COMPACT PLUS) test strip, TEST FOUR TIMES A DAY, Disp: 400 each, Rfl: 3  •  glucose blood (ACCU-CHEK GUIDE) test strip, Use as instructed, Disp: 400 each, Rfl: 1  •  Insulin Lispro (HUMALOG KWIKPEN) 100 UNIT/ML solution pen-injector, INJECT 16 UNITS WITH EACH MEAL AS DIRECTED, MAXIMUM  UNITS DAILY AS DIRECTED BY PRESCRIBER, Disp: 90 mL, Rfl: 3  •  Insulin Pen Needle (BD PEN NEEDLE JUAN DAVID U/F) 32G X 4 MM misc, TO INJECT 4 TIMES DAILY, Disp: 100 each, Rfl: 3  •  Lancets (ACCU-CHEK MULTICLIX) lancets, Use to test blood sugar 5 times daily, Disp: 500 each, Rfl: 3  •  Lancets (ACCU-CHEK MULTICLIX) lancets, To check 3 - 4 times a day, Disp: 100 each, Rfl: 2  •  LANTUS SOLOSTAR 100 UNIT/ML injection pen, INJECT 42 UNITS UNDER THE SKIN TWICE A DAY, Disp: 90 mL, Rfl: 2  •  metoprolol tartrate (LOPRESSOR) 25 MG tablet, Take 1 tablet by mouth Every 12 (Twelve) Hours., Disp: 60 tablet, Rfl: 11  •  Multiple Vitamin (MULTI VITAMIN DAILY PO), Take 1 tablet by mouth Daily. HOLD PRIOR TO SURGERY, Disp: , Rfl:   •  omeprazole (priLOSEC) 40 MG capsule, Take 40 mg by mouth 2 (Two) Times a Day., Disp: , Rfl:   •  oxyCODONE-acetaminophen (PERCOCET)  MG per tablet, Take 1 tablet by mouth As Needed., Disp: , Rfl:   •  sertraline (ZOLOFT) 100 MG tablet, Take 100 mg by mouth Daily., Disp: , Rfl:   •  Continuous Blood Gluc  (FREESTYLE JOSE M 14 DAY READER) device, 1 Device As Needed (used to check BG 4 times a day)., Disp: 1 Device, Rfl: 0  •  Continuous Blood Gluc Sensor (FREESTYLE JOSE M SENSOR SYSTEM), 1 Device Every 14 (Fourteen) Days., Disp: 2 each, Rfl: 3  •  lisinopril (PRINIVIL,ZESTRIL) 5 MG tablet, Take 1 tablet by mouth Daily., Disp: 30 tablet, Rfl:  "11  •  vitamin D (ERGOCALCIFEROL) 22495 units capsule capsule, Take 1 capsule by mouth 1 (One) Time Per Week. (Patient taking differently: Take 50,000 Units by mouth 1 (One) Time Per Week. Friday), Disp: 30 capsule, Rfl: 11        Review of Systems   Constitutional: Negative for appetite change, fatigue and fever.   Eyes: Negative for visual disturbance.   Respiratory: Negative for shortness of breath.    Cardiovascular: Negative for palpitations and leg swelling.   Gastrointestinal: Negative for abdominal pain and vomiting.   Endocrine: Negative for polydipsia and polyuria.   Musculoskeletal: Negative for joint swelling and neck pain.   Skin: Negative for rash.   Neurological: Negative for weakness and numbness.   Psychiatric/Behavioral: Negative for behavioral problems.     I have reviewed the ROS as documented by the MA; Adarsh Love MD.      Objective       Vitals:    01/03/20 1118   BP: 138/64   Pulse: 69   SpO2: 98%   Weight: 127 kg (279 lb)   Height: 188 cm (74\")     Body mass index is 35.82 kg/m².      Physical Exam   Constitutional: He is oriented to person, place, and time. He appears well-nourished.   obese   HENT:   Head: Normocephalic and atraumatic.   Wide neck   Eyes: Conjunctivae and EOM are normal.   Neck: Normal range of motion. Neck supple. Carotid bruit is not present. No thyromegaly present.   Acanthosis nigricans   Cardiovascular: Normal rate and normal heart sounds.   Pulmonary/Chest: Effort normal and breath sounds normal. No stridor. No respiratory distress.   Abdominal: Soft. Bowel sounds are normal. He exhibits no distension. There is no tenderness.   Central obesity   Musculoskeletal: He exhibits no edema or tenderness.   Neurological: He is alert and oriented to person, place, and time.   Skin: Skin is warm and dry.   Psychiatric: He has a normal mood and affect. His behavior is normal.   Vitals reviewed.    Results Review:     I reviewed the patient's new clinical results and " mentioned them above in HPI and in plan as well.    Medical records reviewed  Summary:Done      Orders Only on 12/20/2019   Component Date Value Ref Range Status   • IgG 12/20/2019 803  700 - 1,600 mg/dL Final   • IgA 12/20/2019 302  61 - 437 mg/dL Final   • IgM 12/20/2019 7* 20 - 172 mg/dL Final    Result confirmed on concentration.   • Total Protein 12/20/2019 6.0  6.0 - 8.5 g/dL Final   • Albumin 12/20/2019 3.3  2.9 - 4.4 g/dL Final   • Alpha-1-Globulin 12/20/2019 0.2  0.0 - 0.4 g/dL Final   • Alpha-2-Globulin 12/20/2019 0.9  0.4 - 1.0 g/dL Final   • Beta Globulin 12/20/2019 1.0  0.7 - 1.3 g/dL Final   • Gamma Globulin 12/20/2019 0.6  0.4 - 1.8 g/dL Final   • M-Robe 12/20/2019 Not Observed  Not Observed g/dL Final   • Globulin 12/20/2019 2.7  2.2 - 3.9 g/dL Final   • A/G Ratio 12/20/2019 1.3  0.7 - 1.7 Final   • Immunofixation Reflex, Serum 12/20/2019 Comment   Final    No monoclonality detected.   • Please note 12/20/2019 Comment   Final    Comment: Protein electrophoresis scan will follow via computer, mail, or   delivery.     • Free Light Chain, Kappa 12/20/2019 26.9* 3.3 - 19.4 mg/L Final   • Free Lambda Light Chains 12/20/2019 31.9* 5.7 - 26.3 mg/L Final   • Kappa/Lambda Ratio 12/20/2019 0.84  0.26 - 1.65 Final   • Myeloperoxidase Ab 12/20/2019 <9.0  0.0 - 9.0 U/mL Final   • Antiproteinase 3 (WI-3) 12/20/2019 <3.5  0.0 - 3.5 U/mL Final   • C-ANCA 12/20/2019 <1:20  Neg:<1:20 titer Final   • P-ANCA 12/20/2019 <1:20  Neg:<1:20 titer Final    Comment: The presence of positive fluorescence exhibiting P-ANCA or C-ANCA  patterns alone is not specific for the diagnosis of Wegener's  Granulomatosis (WG) or microscopic polyangiitis. Decisions about  treatment should not be based solely on ANCA IFA results.  The  International ANCA Group Consensus recommends follow up testing of  positive sera with both WI-3 and MPO-ANCA enzyme immunoassays. As  many as 5% serum samples are positive only by EIA.  Ref. AM J Clin  Pathol 1999;111:507-513.     • Atypical pANCA 12/20/2019 <1:20  Neg:<1:20 titer Final    Comment: The atypical pANCA pattern has been observed in a significant  percentage of patients with ulcerative colitis, primary sclerosing  cholangitis and autoimmune hepatitis.     • Specific Gravity, UA 12/20/2019 1.019  1.005 - 1.030 Final   • pH, UA 12/20/2019 6.0  5.0 - 7.5 Final   • Color, UA 12/20/2019 Yellow  Yellow Final   • Appearance, UA 12/20/2019 Clear  Clear Final   • Leukocytes, UA 12/20/2019 Negative  Negative Final   • Protein 12/20/2019 4+* Negative/Trace Final   • Glucose, UA 12/20/2019 Trace* Negative Final   • Ketones 12/20/2019 Negative  Negative Final   • Blood, UA 12/20/2019 Negative  Negative Final   • Bilirubin, UA 12/20/2019 Negative  Negative Final   • Urobilinogen, UA 12/20/2019 0.2  0.2 - 1.0 mg/dL Final   • Nitrite, UA 12/20/2019 Negative  Negative Final   • Microscopic Examination 12/20/2019 See below:   Final    Microscopic was indicated and was performed.   • Urinalysis Reflex 12/20/2019 Comment   Final    This specimen will not reflex to a Urine Culture.   • WBC, UA 12/20/2019 0-5  0 - 5 /hpf Final   • RBC, UA 12/20/2019 0-2  0 - 2 /hpf Final   • Epithelial Cells (non renal) 12/20/2019 0-10  0 - 10 /hpf Final   • Mucus, UA 12/20/2019 Present  Not Estab. /hpf Final   • Bacteria, UA 12/20/2019 None seen  None seen/Few /hpf Final   • Albumin 12/20/2019 3.2  2.9 - 4.4 g/dL Final   • Alpha-1-Globulin 12/20/2019 0.3  0.0 - 0.4 g/dL Final   • Alpha-2-Globulin 12/20/2019 0.9  0.4 - 1.0 g/dL Final   • Beta Globulin 12/20/2019 1.0  0.7 - 1.3 g/dL Final   • Gamma Globulin 12/20/2019 0.7  0.4 - 1.8 g/dL Final   • M-Robe 12/20/2019 Not Observed  Not Observed g/dL Final   • Globulin 12/20/2019 2.8  2.2 - 3.9 g/dL Final   • A/G Ratio 12/20/2019 1.1  0.7 - 1.7 Final   • Please note 12/20/2019 Comment   Final    Comment: Protein electrophoresis scan will follow via computer, mail, or   delivery.     •  Glucose 12/20/2019 132* 65 - 99 mg/dL Final   • BUN 12/20/2019 15  8 - 23 mg/dL Final   • Creatinine 12/20/2019 1.04  0.76 - 1.27 mg/dL Final   • eGFR Non  Am 12/20/2019 71  >60 mL/min/1.73 Final   • eGFR African Am 12/20/2019 86  >60 mL/min/1.73 Final   • BUN/Creatinine Ratio 12/20/2019 14.4  7.0 - 25.0 Final   • Sodium 12/20/2019 140  136 - 145 mmol/L Final   • Potassium 12/20/2019 4.3  3.5 - 5.2 mmol/L Final   • Chloride 12/20/2019 101  98 - 107 mmol/L Final   • Total CO2 12/20/2019 26.2  22.0 - 29.0 mmol/L Final   • Calcium 12/20/2019 9.0  8.6 - 10.5 mg/dL Final   • Total Cholesterol 12/20/2019 139  0 - 200 mg/dL Final   • Triglycerides 12/20/2019 110  0 - 150 mg/dL Final   • HDL Cholesterol 12/20/2019 52  40 - 60 mg/dL Final   • VLDL Cholesterol 12/20/2019 22  mg/dL Final   • LDL Cholesterol  12/20/2019 65  0 - 100 mg/dL Final   • Immunofixation Result, Serum 12/20/2019 Comment   Final    No monoclonality detected.   • RALPH 12/20/2019 Negative   Final    Comment:                                      Negative   <1:80                                       Borderline  1:80                                       Positive   >1:80     • Please note 12/20/2019 Comment   Final    Comment: RALPH Multiplex methodology was designed to detect up to 11 antibodies  of the 100+ antibodies that may be detected by RALPH IFA methodology.     • Hemoglobin A1C 12/20/2019 8.10* 4.80 - 5.60 % Final    Comment: Hemoglobin A1C Ranges:  Increased Risk for Diabetes  5.7% to 6.4%  Diabetes                     >= 6.5%  Diabetic Goal                < 7.0%     • C4 Complement 12/20/2019 23  14 - 44 mg/dL Final   • Free T4 12/20/2019 1.17  0.93 - 1.70 ng/dL Final   • TSH 12/20/2019 2.160  0.270 - 4.200 uIU/mL Final   • C3 Complement 12/20/2019 135  82 - 167 mg/dL Final   • RA Latex Turbid 12/20/2019 <10.0  0.0 - 13.9 IU/mL Final   • Hepatitis B Surface Ab Quant 12/20/2019 3.3* Immunity>9.9 mIU/mL Final    Comment:   Status of Immunity                      Anti-HBs Level    ------------------                     --------------  Inconsistent with Immunity                   0.0 - 9.9  Consistent with Immunity                          >9.9     • Hep C Virus Ab 12/20/2019 <0.1  0.0 - 0.9 s/co ratio Final    Comment:                                   Negative:     < 0.8                               Indeterminate: 0.8 - 0.9                                    Positive:     > 0.9   The CDC recommends that a positive HCV antibody result   be followed up with a HCV Nucleic Acid Amplification   test (612322).     • Phosphorus 12/20/2019 4.1  2.5 - 4.5 mg/dL Final   • Albumin 12/20/2019 3.80  3.50 - 5.20 g/dL Final   • Total Protein, Urine 12/20/2019 750.0  mg/dL Final    Comment: Reference intervals for random urine have not been  established.  Clinical usage is dependent upon physician's interpretation  in combination with other laboratory tests.     • Creatinine, Urine 12/20/2019 112.6  mg/dL Final    Comment: Reference intervals for random urine have not been  established.  Clinical usage is dependent upon physician's interpretation  in combination with other laboratory tests.     • Interpretation 12/20/2019 Note   Final    Supplemental report is available.   • PDF Image 12/20/2019 Not applicable   Final     Lab Results   Component Value Date    HGBA1C 8.10 (H) 12/20/2019    HGBA1C 7.50 (H) 07/26/2019    HGBA1C 7.50 (H) 03/26/2019     Lab Results   Component Value Date    MICROALBUR 3,605.6 07/26/2019    CREATININE 1.04 12/20/2019     Imaging Results (Most Recent)     None                Assessment and Plan:    Diagnoses and all orders for this visit:    Uncontrolled type 2 diabetes mellitus with complication, with long-term current use of insulin (CMS/[facility])  -     TSH; Future  -     T4, Free; Future  -     Hemoglobin A1c; Future  -     Basic Metabolic Panel; Future  -     Lipid Panel; Future  -     Microalbumin / Creatinine Urine Ratio - Urine, Clean Catch;  "Future  -     Vitamin B12 & Folate; Future  -     Vitamin D 25 Hydroxy; Future    Mixed hyperlipidemia  -     TSH; Future  -     T4, Free; Future  -     Hemoglobin A1c; Future  -     Basic Metabolic Panel; Future  -     Lipid Panel; Future  -     Microalbumin / Creatinine Urine Ratio - Urine, Clean Catch; Future  -     Vitamin B12 & Folate; Future  -     Vitamin D 25 Hydroxy; Future    Diabetic autonomic neuropathy associated with type 2 diabetes mellitus (CMS/HCC)  -     TSH; Future  -     T4, Free; Future  -     Hemoglobin A1c; Future  -     Basic Metabolic Panel; Future  -     Lipid Panel; Future  -     Microalbumin / Creatinine Urine Ratio - Urine, Clean Catch; Future  -     Vitamin B12 & Folate; Future  -     Vitamin D 25 Hydroxy; Future    Vitamin D deficiency   -     Vitamin D 25 Hydroxy; Future    Other orders  -     lisinopril (PRINIVIL,ZESTRIL) 5 MG tablet; Take 1 tablet by mouth Daily.      Type 2 diabetes mellitus-uncontrolled   HbA1c was since last visit.  Change Lantus to 50 units in the morning and 42 units at bedtime  Increase Humalog to 10 units with each meal along with holding parameters  Continue Humalog sliding scale 3 times daily AC.    DM neuropathy  Continue Neurontin    Diabetic nephropathy  On lisinopril 5 mg oral daily.    Hyperlipidemia  Continue Lipitor 40 mg oral daily      Reviewed Lab results with the patient.             Adarsh Love MD  01/03/20    [facility] Dragon / transcription disclaimer:     \"Dictated utilizing Dragon dictation\".  "

## 2020-04-03 ENCOUNTER — RESULTS ENCOUNTER (OUTPATIENT)
Dept: ENDOCRINOLOGY | Age: 70
End: 2020-04-03

## 2020-04-03 DIAGNOSIS — IMO0002 UNCONTROLLED TYPE 2 DIABETES MELLITUS WITH COMPLICATION, WITH LONG-TERM CURRENT USE OF INSULIN: ICD-10-CM

## 2020-04-03 DIAGNOSIS — E78.2 MIXED HYPERLIPIDEMIA: ICD-10-CM

## 2020-04-03 DIAGNOSIS — E55.9 VITAMIN D DEFICIENCY: ICD-10-CM

## 2020-04-03 DIAGNOSIS — E11.43 DIABETIC AUTONOMIC NEUROPATHY ASSOCIATED WITH TYPE 2 DIABETES MELLITUS (HCC): ICD-10-CM

## 2020-05-21 ENCOUNTER — TELEMEDICINE (OUTPATIENT)
Dept: ENDOCRINOLOGY | Age: 70
End: 2020-05-21

## 2020-05-21 DIAGNOSIS — IMO0002 DM (DIABETES MELLITUS), TYPE 2, UNCONTROLLED W/NEUROLOGIC COMPLICATION: Primary | ICD-10-CM

## 2020-05-21 DIAGNOSIS — Z79.4 LONG-TERM INSULIN USE (HCC): ICD-10-CM

## 2020-05-21 DIAGNOSIS — E78.2 HYPERLIPEMIA, MIXED: ICD-10-CM

## 2020-05-21 PROCEDURE — 99214 OFFICE O/P EST MOD 30 MIN: CPT | Performed by: INTERNAL MEDICINE

## 2020-05-21 RX ORDER — INSULIN GLARGINE 100 [IU]/ML
INJECTION, SOLUTION SUBCUTANEOUS
Qty: 90 ML | Refills: 3 | Status: SHIPPED | OUTPATIENT
Start: 2020-05-21 | End: 2020-11-24

## 2020-05-21 NOTE — PROGRESS NOTES
69 y.o.    Patient Care Team:  Guille Nieves MD as PCP - General  Guille Nieves MD as PCP - Family Medicine  Tiburcio Moreno MD as Consulting Physician (Cardiology)    Chief Complaint:    FOLLOW UP/ TYPE 2 DIABETES MELLITUS  Subjective     HPI    Hugh R McBurney 68 y.o. white male is here as a follow up patient for the management of type 2 diabetes mellitus.  This is a video visit.     Type 2 diabetes mellitus-diagnosed about 10-15 years ago.    Today in visit patient reports that he is on Lantus 48 units in the morning and 42 units at bedtime  Humalog 16 units with each meal plus the high-dose sliding scale.  He checks his blood sugars 2-3 times a day.  Reports that his highest blood sugar was 320 when he ate something that he is not supposed to.  Due for his eye exam  Does have significant diabetic neuropathy.  On Neurontin.  History of coronary artery disease, no history of CKD or CVA.  He tries to follow diabetic diet as much as he can.  On ACE inhibitor.  Does have history of pancreatitis in the past.     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  Patient also underwent prosthetic knee joint replacement.  Currently on Neurontin     Hyperlipidemia  On Lipitor 40 mg oral daily.      You have chosen to receive care through a video visit today. Do you consent to use a video visit for your medical care today? Yes      Reviewed primary care physician's/consulting physician documentation and lab results :     Interval History      The following portions of the patient's history were reviewed and updated as appropriate: allergies, current medications, past family history, past medical history, past social history, past surgical history and problem list.    Past Medical History:   Diagnosis Date   • Arthritis    • Chronic pain     BILATERAL KNEES  AND BACK   • Contusion of knee    • Coronary artery disease    • Depression    • Diabetes mellitus (CMS/[facility])     type 2 with atherosclerosis of arteries of extremities   • Diabetic ulcer of toe (CMS/[facility])     left foot associated with type 2 diabetes mellitus, with necrosis of bone   • GERD (gastroesophageal reflux disease)    • Great toe pain     with cellulitis and gangrene   • History of kidney stones    • Hyperlipidemia    • Hypertension    • Hyponatremia    • Kidney stone    • Knee pain     hx of previous prosthetic joint infection   • MSSA (methicillin susceptible Staphylococcus aureus) infection     RIGHT KNEE   • NSTEMI (non-ST elevated myocardial infarction) (CMS/[facility]) 06/14/2017   • Sleep apnea     DOES NOT USE CPAP     Family History   Problem Relation Age of Onset   • Heart disease Mother    • Heart disease Father    • Diabetes Sister    • Dementia Maternal Grandmother    • No Known Problems Maternal Grandfather    • Heart disease Paternal Grandmother    • Heart attack Paternal Grandfather    • Heart disease Paternal Grandfather    • Heart disease Son    • Heart attack Son    • No Known Problems Sister    • Malig Hyperthermia Neg Hx      Social History     Socioeconomic History   • Marital status:      Spouse name: Not on file   • Number of children: Not on file   • Years of education: Not on file   • Highest education level: Not on file   Tobacco Use   • Smoking status: Former Smoker     Packs/day: 3.00     Years: 20.00     Pack years: 60.00   • Smokeless tobacco: Former User   • Tobacco comment: quit 35 years ago. 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    Substance and Sexual Activity   • Alcohol use: No   • Drug use: No   • Sexual activity: Defer     Allergies   Allergen Reactions   • Vancomycin Other (See Comments)     Red Man syndrome, slow rate and premedicate with benadryl       Current Outpatient Medications:   •  atorvastatin (LIPITOR) 40 MG tablet, TAKE 1 TABLET BY MOUTH EVERY EVENING., Disp: 30  tablet, Rfl: 3  •  Blood Glucose Calibration (ACCU-CHEK COMPACT PLUS CONTROL) solution, AS DIRECTED, Disp: 1 each, Rfl: 3  •  Continuous Blood Gluc  (FREESTYLE JOSE M 14 DAY READER) device, 1 Device As Needed (used to check BG 4 times a day)., Disp: 1 Device, Rfl: 0  •  Continuous Blood Gluc Sensor (FREESTYLE JOSE M SENSOR SYSTEM), 1 Device Every 14 (Fourteen) Days., Disp: 2 each, Rfl: 3  •  glucose blood (ACCU-CHEK BALTAZAR PLUS) test strip, To check 3 - 4 times a day, Disp: 100 each, Rfl: 2  •  glucose blood (ACCU-CHEK COMPACT PLUS) test strip, Use to test blood sugar 5 times daily, Disp: 500 each, Rfl: 3  •  glucose blood (ACCU-CHEK COMPACT PLUS) test strip, TEST FOUR TIMES A DAY, Disp: 400 each, Rfl: 3  •  glucose blood (ACCU-CHEK GUIDE) test strip, Use as instructed, Disp: 400 each, Rfl: 1  •  Insulin Glargine (LANTUS SC), , Disp: , Rfl:   •  Insulin Lispro (HUMALOG KWIKPEN) 100 UNIT/ML solution pen-injector, INJECT 16 UNITS WITH EACH MEAL AS DIRECTED, MAXIMUM  UNITS DAILY AS DIRECTED BY PRESCRIBER, Disp: 90 mL, Rfl: 3  •  Insulin NPH Isophane & Regular (HUMULIN 70/30 SC), , Disp: , Rfl:   •  Insulin Pen Needle (BD PEN NEEDLE JUAN DAVID U/F) 32G X 4 MM misc, TO INJECT 4 TIMES DAILY, Disp: 100 each, Rfl: 3  •  Lancets (ACCU-CHEK MULTICLIX) lancets, Use to test blood sugar 5 times daily, Disp: 500 each, Rfl: 3  •  Lancets (ACCU-CHEK MULTICLIX) lancets, To check 3 - 4 times a day, Disp: 100 each, Rfl: 2  •  LANTUS SOLOSTAR 100 UNIT/ML injection pen, INJECT 42 UNITS UNDER THE SKIN TWICE A DAY, Disp: 90 mL, Rfl: 2  •  lisinopril (PRINIVIL,ZESTRIL) 5 MG tablet, Take 1 tablet by mouth Daily., Disp: 30 tablet, Rfl: 11  •  metoprolol tartrate (LOPRESSOR) 25 MG tablet, Take 1 tablet by mouth Every 12 (Twelve) Hours., Disp: 60 tablet, Rfl: 11  •  Multiple Vitamin (MULTI VITAMIN DAILY PO), Take 1 tablet by mouth Daily. HOLD PRIOR TO SURGERY, Disp: , Rfl:   •  omeprazole (priLOSEC) 40 MG capsule, Take 40 mg by mouth 2  (Two) Times a Day., Disp: , Rfl:   •  oxyCODONE-acetaminophen (PERCOCET)  MG per tablet, Take 1 tablet by mouth As Needed., Disp: , Rfl:   •  sertraline (ZOLOFT) 100 MG tablet, Take 100 mg by mouth Daily., Disp: , Rfl:   •  vitamin D (ERGOCALCIFEROL) 89452 units capsule capsule, Take 1 capsule by mouth 1 (One) Time Per Week. (Patient taking differently: Take 50,000 Units by mouth 1 (One) Time Per Week. Friday), Disp: 30 capsule, Rfl: 11        Review of Systems   Constitutional: Negative for appetite change, fatigue and fever.   Eyes: Negative for visual disturbance.   Respiratory: Negative for shortness of breath.    Cardiovascular: Negative for palpitations and leg swelling.   Gastrointestinal: Negative for abdominal pain and vomiting.   Endocrine: Negative for polydipsia and polyuria.   Musculoskeletal: Negative for joint swelling and neck pain.   Skin: Negative for rash.   Neurological: Negative for weakness and numbness.   Psychiatric/Behavioral: Negative for behavioral problems.     I have reviewed the ROS as documented by the MA; Adarsh Love MD.      Objective       There were no vitals filed for this visit.  There is no height or weight on file to calculate BMI.      Physical Exam  General appearance - no distress  Eyes-PERRLA, anicteric sclera  Ear nose and throat-external ears and nose normal.  Noted midline trachea.  Respiratory-normal chest on inspection.  No respiratory distress noted.  Musculoskeletal-no edema.  Hammer toes noted.  Skin-no rashes.  Neuro-alert and oriented x3              Results Review:     I reviewed the patient's new clinical results and mentioned them above in HPI and in plan as well.    Medical records reviewed  Summary:Done      Orders Only on 12/20/2019   Component Date Value Ref Range Status   • IgG 12/20/2019 803  700 - 1,600 mg/dL Final   • IgA 12/20/2019 302  61 - 437 mg/dL Final   • IgM 12/20/2019 7* 20 - 172 mg/dL Final    Result confirmed on concentration.   •  Total Protein 12/20/2019 6.0  6.0 - 8.5 g/dL Final   • Albumin 12/20/2019 3.3  2.9 - 4.4 g/dL Final   • Alpha-1-Globulin 12/20/2019 0.2  0.0 - 0.4 g/dL Final   • Alpha-2-Globulin 12/20/2019 0.9  0.4 - 1.0 g/dL Final   • Beta Globulin 12/20/2019 1.0  0.7 - 1.3 g/dL Final   • Gamma Globulin 12/20/2019 0.6  0.4 - 1.8 g/dL Final   • M-Robe 12/20/2019 Not Observed  Not Observed g/dL Final   • Globulin 12/20/2019 2.7  2.2 - 3.9 g/dL Final   • A/G Ratio 12/20/2019 1.3  0.7 - 1.7 Final   • Immunofixation Reflex, Serum 12/20/2019 Comment   Final    No monoclonality detected.   • Please note 12/20/2019 Comment   Final    Comment: Protein electrophoresis scan will follow via computer, mail, or   delivery.     • Free Light Chain, Kappa 12/20/2019 26.9* 3.3 - 19.4 mg/L Final   • Free Lambda Light Chains 12/20/2019 31.9* 5.7 - 26.3 mg/L Final   • Kappa/Lambda Ratio 12/20/2019 0.84  0.26 - 1.65 Final   • Myeloperoxidase Ab 12/20/2019 <9.0  0.0 - 9.0 U/mL Final   • Antiproteinase 3 (UT-3) 12/20/2019 <3.5  0.0 - 3.5 U/mL Final   • C-ANCA 12/20/2019 <1:20  Neg:<1:20 titer Final   • P-ANCA 12/20/2019 <1:20  Neg:<1:20 titer Final    Comment: The presence of positive fluorescence exhibiting P-ANCA or C-ANCA  patterns alone is not specific for the diagnosis of Wegener's  Granulomatosis (WG) or microscopic polyangiitis. Decisions about  treatment should not be based solely on ANCA IFA results.  The  International ANCA Group Consensus recommends follow up testing of  positive sera with both UT-3 and MPO-ANCA enzyme immunoassays. As  many as 5% serum samples are positive only by EIA.  Ref. AM J Clin Pathol 1999;111:507-513.     • Atypical pANCA 12/20/2019 <1:20  Neg:<1:20 titer Final    Comment: The atypical pANCA pattern has been observed in a significant  percentage of patients with ulcerative colitis, primary sclerosing  cholangitis and autoimmune hepatitis.     • Specific Gravity, UA 12/20/2019 1.019  1.005 - 1.030 Final   • pH,  UA 12/20/2019 6.0  5.0 - 7.5 Final   • Color, UA 12/20/2019 Yellow  Yellow Final   • Appearance, UA 12/20/2019 Clear  Clear Final   • Leukocytes, UA 12/20/2019 Negative  Negative Final   • Protein 12/20/2019 4+* Negative/Trace Final   • Glucose, UA 12/20/2019 Trace* Negative Final   • Ketones 12/20/2019 Negative  Negative Final   • Blood, UA 12/20/2019 Negative  Negative Final   • Bilirubin, UA 12/20/2019 Negative  Negative Final   • Urobilinogen, UA 12/20/2019 0.2  0.2 - 1.0 mg/dL Final   • Nitrite, UA 12/20/2019 Negative  Negative Final   • Microscopic Examination 12/20/2019 See below:   Final    Microscopic was indicated and was performed.   • Urinalysis Reflex 12/20/2019 Comment   Final    This specimen will not reflex to a Urine Culture.   • WBC, UA 12/20/2019 0-5  0 - 5 /hpf Final   • RBC, UA 12/20/2019 0-2  0 - 2 /hpf Final   • Epithelial Cells (non renal) 12/20/2019 0-10  0 - 10 /hpf Final   • Mucus, UA 12/20/2019 Present  Not Estab. /hpf Final   • Bacteria, UA 12/20/2019 None seen  None seen/Few /hpf Final   • Albumin 12/20/2019 3.2  2.9 - 4.4 g/dL Final   • Alpha-1-Globulin 12/20/2019 0.3  0.0 - 0.4 g/dL Final   • Alpha-2-Globulin 12/20/2019 0.9  0.4 - 1.0 g/dL Final   • Beta Globulin 12/20/2019 1.0  0.7 - 1.3 g/dL Final   • Gamma Globulin 12/20/2019 0.7  0.4 - 1.8 g/dL Final   • M-Robe 12/20/2019 Not Observed  Not Observed g/dL Final   • Globulin 12/20/2019 2.8  2.2 - 3.9 g/dL Final   • A/G Ratio 12/20/2019 1.1  0.7 - 1.7 Final   • Please note 12/20/2019 Comment   Final    Comment: Protein electrophoresis scan will follow via computer, mail, or   delivery.     • Glucose 12/20/2019 132* 65 - 99 mg/dL Final   • BUN 12/20/2019 15  8 - 23 mg/dL Final   • Creatinine 12/20/2019 1.04  0.76 - 1.27 mg/dL Final   • eGFR Non  Am 12/20/2019 71  >60 mL/min/1.73 Final   • eGFR African Am 12/20/2019 86  >60 mL/min/1.73 Final   • BUN/Creatinine Ratio 12/20/2019 14.4  7.0 - 25.0 Final   • Sodium 12/20/2019  140  136 - 145 mmol/L Final   • Potassium 12/20/2019 4.3  3.5 - 5.2 mmol/L Final   • Chloride 12/20/2019 101  98 - 107 mmol/L Final   • Total CO2 12/20/2019 26.2  22.0 - 29.0 mmol/L Final   • Calcium 12/20/2019 9.0  8.6 - 10.5 mg/dL Final   • Total Cholesterol 12/20/2019 139  0 - 200 mg/dL Final   • Triglycerides 12/20/2019 110  0 - 150 mg/dL Final   • HDL Cholesterol 12/20/2019 52  40 - 60 mg/dL Final   • VLDL Cholesterol 12/20/2019 22  mg/dL Final   • LDL Cholesterol  12/20/2019 65  0 - 100 mg/dL Final   • Immunofixation Result, Serum 12/20/2019 Comment   Final    No monoclonality detected.   • RALPH 12/20/2019 Negative   Final    Comment:                                      Negative   <1:80                                       Borderline  1:80                                       Positive   >1:80     • Please note 12/20/2019 Comment   Final    Comment: RALPH Multiplex methodology was designed to detect up to 11 antibodies  of the 100+ antibodies that may be detected by RALPH IFA methodology.     • Hemoglobin A1C 12/20/2019 8.10* 4.80 - 5.60 % Final    Comment: Hemoglobin A1C Ranges:  Increased Risk for Diabetes  5.7% to 6.4%  Diabetes                     >= 6.5%  Diabetic Goal                < 7.0%     • C4 Complement 12/20/2019 23  14 - 44 mg/dL Final   • Free T4 12/20/2019 1.17  0.93 - 1.70 ng/dL Final   • TSH 12/20/2019 2.160  0.270 - 4.200 uIU/mL Final   • C3 Complement 12/20/2019 135  82 - 167 mg/dL Final   • RA Latex Turbid 12/20/2019 <10.0  0.0 - 13.9 IU/mL Final   • Hepatitis B Surface Ab Quant 12/20/2019 3.3* Immunity>9.9 mIU/mL Final    Comment:   Status of Immunity                     Anti-HBs Level    ------------------                     --------------  Inconsistent with Immunity                   0.0 - 9.9  Consistent with Immunity                          >9.9     • Hep C Virus Ab 12/20/2019 <0.1  0.0 - 0.9 s/co ratio Final    Comment:                                   Negative:     < 0.8                                Indeterminate: 0.8 - 0.9                                    Positive:     > 0.9   The CDC recommends that a positive HCV antibody result   be followed up with a HCV Nucleic Acid Amplification   test (233364).     • Phosphorus 12/20/2019 4.1  2.5 - 4.5 mg/dL Final   • Albumin 12/20/2019 3.80  3.50 - 5.20 g/dL Final   • Total Protein, Urine 12/20/2019 750.0  mg/dL Final    Comment: Reference intervals for random urine have not been  established.  Clinical usage is dependent upon physician's interpretation  in combination with other laboratory tests.     • Creatinine, Urine 12/20/2019 112.6  mg/dL Final    Comment: Reference intervals for random urine have not been  established.  Clinical usage is dependent upon physician's interpretation  in combination with other laboratory tests.     • Interpretation 12/20/2019 Note   Final    Supplemental report is available.   • PDF Image 12/20/2019 Not applicable   Final     Lab Results   Component Value Date    HGBA1C 8.10 (H) 12/20/2019    HGBA1C 7.50 (H) 07/26/2019    HGBA1C 7.50 (H) 03/26/2019     Lab Results   Component Value Date    MICROALBUR 3,605.6 07/26/2019    CREATININE 1.04 12/20/2019     Imaging Results (Most Recent)     None                Assessment and Plan:    Gui was seen today for diabetes.    Diagnoses and all orders for this visit:    DM (diabetes mellitus), type 2, uncontrolled w/neurologic complication (CMS/HCC)  -     Creatinine, Serum; Future  -     eGFR-Glomerular Filtration; Future  -     Hemoglobin A1c; Future  -     Vitamin B12 & Folate; Future  -     TSH; Future  -     Microalbumin / Creatinine Urine Ratio - Urine, Clean Catch; Future  -     Lipid Panel; Future  -     T4, Free; Future    Long-term insulin use (CMS/[facility])  -     Creatinine, Serum; Future  -     eGFR-Glomerular Filtration; Future  -     Hemoglobin A1c; Future  -     Vitamin B12 & Folate; Future  -     TSH; Future  -     Microalbumin / Creatinine Urine Ratio - Urine,  "Clean Catch; Future  -     Lipid Panel; Future  -     T4, Free; Future    Hyperlipemia, mixed  -     Creatinine, Serum; Future  -     eGFR-Glomerular Filtration; Future  -     Hemoglobin A1c; Future  -     Vitamin B12 & Folate; Future  -     TSH; Future  -     Microalbumin / Creatinine Urine Ratio - Urine, Clean Catch; Future  -     Lipid Panel; Future  -     T4, Free; Future          Reviewed Lab results with the patient.     Type 2 diabetes mellitus-significantly uncontrolled  HbA1c is high  Patient would be at high risk secondary to his diabetes and has a knee infection in the past.  Increase Lantus to 50 units in the morning and 42 units at bedtime  Change Humalog to 16/16/18 with each meal  Cover with Humalog sliding scale.    Patient might come into the office to  some samples.    Hyperlipidemia  Continue statin.        13   ( greater than 50% of the time) out of  25 minutes  spent counseling the patient on insulin changes, blood work-up that is necessary and follow-up instructions    Adarsh Love MD  05/21/20    [facility] Dragon / transcription disclaimer:     \"Dictated utilizing Dragon dictation\".      "

## 2020-05-27 ENCOUNTER — TELEPHONE (OUTPATIENT)
Dept: ENDOCRINOLOGY | Age: 70
End: 2020-05-27

## 2020-07-20 ENCOUNTER — TRANSCRIBE ORDERS (OUTPATIENT)
Dept: ADMINISTRATIVE | Facility: HOSPITAL | Age: 70
End: 2020-07-20

## 2020-07-20 DIAGNOSIS — K76.9 LIVER DISEASE, UNSPECIFIED: Primary | ICD-10-CM

## 2020-07-22 DIAGNOSIS — I70.209 DIABETES TYPE 2 WITH ATHEROSCLEROSIS OF ARTERIES OF EXTREMITIES (HCC): Primary | Chronic | ICD-10-CM

## 2020-07-22 DIAGNOSIS — E11.29 TYPE 2 DIABETES MELLITUS WITH PROTEINURIA (HCC): ICD-10-CM

## 2020-07-22 DIAGNOSIS — R80.9 TYPE 2 DIABETES MELLITUS WITH PROTEINURIA (HCC): ICD-10-CM

## 2020-07-22 DIAGNOSIS — E11.51 DIABETES TYPE 2 WITH ATHEROSCLEROSIS OF ARTERIES OF EXTREMITIES (HCC): Primary | Chronic | ICD-10-CM

## 2020-07-31 ENCOUNTER — APPOINTMENT (OUTPATIENT)
Dept: CT IMAGING | Facility: HOSPITAL | Age: 70
End: 2020-07-31

## 2020-08-04 ENCOUNTER — HOSPITAL ENCOUNTER (OUTPATIENT)
Dept: CT IMAGING | Facility: HOSPITAL | Age: 70
Discharge: HOME | End: 2020-08-04
Admitting: INTERNAL MEDICINE

## 2020-08-04 DIAGNOSIS — K76.9 LIVER DISEASE, UNSPECIFIED: ICD-10-CM

## 2020-08-04 PROCEDURE — 74170 CT ABD WO CNTRST FLWD CNTRST: CPT

## 2020-08-04 PROCEDURE — 0 IOPAMIDOL PER 1 ML: Performed by: INTERNAL MEDICINE

## 2020-08-04 RX ADMIN — IOPAMIDOL 100 ML: 755 INJECTION, SOLUTION INTRAVENOUS at 10:14

## 2020-08-10 ENCOUNTER — TRANSCRIBE ORDERS (OUTPATIENT)
Dept: ADMINISTRATIVE | Facility: HOSPITAL | Age: 70
End: 2020-08-10

## 2020-08-10 DIAGNOSIS — K76.9 LIVER DISEASE, UNSPECIFIED: Primary | ICD-10-CM

## 2020-08-12 ENCOUNTER — OFFICE VISIT (OUTPATIENT)
Dept: CARDIOLOGY | Facility: CLINIC | Age: 70
End: 2020-08-12

## 2020-08-12 VITALS
DIASTOLIC BLOOD PRESSURE: 80 MMHG | BODY MASS INDEX: 36.19 KG/M2 | SYSTOLIC BLOOD PRESSURE: 140 MMHG | HEIGHT: 74 IN | HEART RATE: 66 BPM | WEIGHT: 282 LBS

## 2020-08-12 DIAGNOSIS — I10 ESSENTIAL HYPERTENSION: ICD-10-CM

## 2020-08-12 DIAGNOSIS — R80.9 TYPE 2 DIABETES MELLITUS WITH PROTEINURIA (HCC): ICD-10-CM

## 2020-08-12 DIAGNOSIS — I51.81 STRESS-INDUCED CARDIOMYOPATHY: Primary | ICD-10-CM

## 2020-08-12 DIAGNOSIS — E11.29 TYPE 2 DIABETES MELLITUS WITH PROTEINURIA (HCC): ICD-10-CM

## 2020-08-12 DIAGNOSIS — E78.2 MIXED HYPERLIPIDEMIA: ICD-10-CM

## 2020-08-12 PROCEDURE — 99214 OFFICE O/P EST MOD 30 MIN: CPT | Performed by: INTERNAL MEDICINE

## 2020-08-12 PROCEDURE — 93000 ELECTROCARDIOGRAM COMPLETE: CPT | Performed by: INTERNAL MEDICINE

## 2020-08-12 NOTE — PROGRESS NOTES
Hugh R McBurney  1950  Date of Office Visit: 08/12/20  Encounter Provider: Tiburcio Moreno MD  Place of Service: [facility] CARDIOLOGY      CHIEF COMPLAINT:  Coronary artery disease  Stress-induced cardiomyopathy  Essential hypertension      HISTORY OF PRESENT ILLNESS:  68 y/o patient with a documented h/o HTN, HLD, DM, GERD, PRISCILLA-no CPAP, right TKA with joint infection and coronary artery disease.. In June 2017 he was seen because he had a  Vancomyocin reaction with chest pain, n/v and diaphoresis and a non-ST elevation myocardial infarction when here for knee surgery. A cardiac cath was done which showed a normal left main, 20% proximal LAD, 50% mid LAD, 90% proximal first diagonal, diffuse 50% mid circumflex, and a  70-80% proximal RPL 2 stenosis. An echo showed an EF of 42%, possibly from stress induced cardiomyopathy. He was sent home on an ace, beta blocker and nitrate to wait for 2-4 weeks before attempting surgery again.   At that time he had a repeat transthoracic echocardiogram showing his ejection fraction and wall motion had normalized.Since our last visit he has been doing very well. He is tolerating his current medical regimen without difficulties. His only complaint is right lower extremity edema for which he does not wear compression stockings. He has had this issue for a while and it continues to improve with elevation. He has no orthopnea or PND. His EKG is unchanged from prior.           Review of Systems   Constitution: Negative for fever and malaise/fatigue.   HENT: Negative for nosebleeds and sore throat.    Eyes: Negative for blurred vision and double vision.   Cardiovascular: Negative for chest pain, claudication, palpitations and syncope.   Respiratory: Negative for cough, shortness of breath and snoring.    Endocrine: Negative for cold intolerance, heat intolerance and polydipsia.   Skin: Negative for itching, poor wound healing and rash.    Musculoskeletal: Negative for joint pain, joint swelling, muscle weakness and myalgias.   Gastrointestinal: Negative for abdominal pain, melena, nausea and vomiting.   Neurological: Negative for light-headedness, loss of balance, seizures, vertigo and weakness.   Psychiatric/Behavioral: Negative for altered mental status and depression.          Past Medical History:   Diagnosis Date   • Arthritis    • Chronic pain     BILATERAL KNEES AND BACK   • Contusion of knee    • Coronary artery disease    • Depression    • Diabetes mellitus     type 2 with atherosclerosis of arteries of extremities   • Diabetic ulcer of toe     left foot associated with type 2 diabetes mellitus, with necrosis of bone   • GERD (gastroesophageal reflux disease)    • Great toe pain     with cellulitis and gangrene   • History of kidney stones    • Hyperlipidemia    • Hypertension    • Hyponatremia    • Kidney stone    • Knee pain     hx of previous prosthetic joint infection   • MSSA (methicillin susceptible Staphylococcus aureus) infection     RIGHT KNEE   • NSTEMI (non-ST elevated myocardial infarction) 06/14/2017   • Sleep apnea     DOES NOT USE CPAP       The following portions of the patient's history were reviewed and updated as appropriate: Social history , Family history and Surgical history     Current Outpatient Medications on File Prior to Visit   Medication Sig Dispense Refill   • atorvastatin (LIPITOR) 40 MG tablet TAKE 1 TABLET BY MOUTH EVERY EVENING. 30 tablet 3   • Blood Glucose Calibration (ACCU-CHEK COMPACT PLUS CONTROL) solution AS DIRECTED 1 each 3   • Continuous Blood Gluc  (FREESTYLE JOSE M 14 DAY READER) device 1 Device As Needed (used to check BG 4 times a day). 1 Device 0   • Continuous Blood Gluc Sensor (FREESTYLE JOSE M SENSOR SYSTEM) 1 Device Every 14 (Fourteen) Days. 2 each 3   • glucose blood (ACCU-CHEK BALTAZAR PLUS) test strip To check 3 - 4 times a day 100 each 2   • glucose blood (ACCU-CHEK COMPACT PLUS)  test strip Use to test blood sugar 5 times daily 500 each 3   • glucose blood (ACCU-CHEK COMPACT PLUS) test strip TEST FOUR TIMES A  each 3   • glucose blood (ACCU-CHEK GUIDE) test strip Use as instructed 400 each 1   • Insulin Glargine (LANTUS SC)      • Insulin Lispro (HUMALOG KWIKPEN) 100 UNIT/ML solution pen-injector INJECT 16 UNITS WITH EACH MEAL AS DIRECTED, MAXIMUM  UNITS DAILY AS DIRECTED BY PRESCRIBER 90 mL 3   • Insulin NPH Isophane & Regular (HUMULIN 70/30 SC)      • Insulin Pen Needle (BD Pen Needle Mireille U/F) 32G X 4 MM misc TO INJECT 4 TIMES DAILY 100 each 3   • Lancets (ACCU-CHEK MULTICLIX) lancets Use to test blood sugar 5 times daily 500 each 3   • Lancets (ACCU-CHEK MULTICLIX) lancets To check 3 - 4 times a day 100 each 2   • LANTUS SOLOSTAR 100 UNIT/ML injection pen INJECT 42 UNITS UNDER THE SKIN TWICE A DAY 90 mL 3   • lisinopril (PRINIVIL,ZESTRIL) 5 MG tablet Take 1 tablet by mouth Daily. 30 tablet 11   • metoprolol tartrate (LOPRESSOR) 25 MG tablet Take 1 tablet by mouth Every 12 (Twelve) Hours. 60 tablet 11   • Multiple Vitamin (MULTI VITAMIN DAILY PO) Take 1 tablet by mouth Daily. HOLD PRIOR TO SURGERY     • omeprazole (priLOSEC) 40 MG capsule Take 40 mg by mouth 2 (Two) Times a Day.     • oxyCODONE-acetaminophen (PERCOCET)  MG per tablet Take 1 tablet by mouth As Needed.     • sertraline (ZOLOFT) 100 MG tablet Take 100 mg by mouth Daily.     • vitamin D (ERGOCALCIFEROL) 27488 units capsule capsule Take 1 capsule by mouth 1 (One) Time Per Week. (Patient taking differently: Take 50,000 Units by mouth 1 (One) Time Per Week. Friday) 30 capsule 11     No current facility-administered medications on file prior to visit.        Heart Rate:  [66] 66  BP: (140)/(80) 140/80    Physical Exam   Constitutional: He is oriented to person, place, and time. He appears well-developed and well-nourished.   HENT:   Head: Normocephalic and atraumatic.   Eyes: Conjunctivae and EOM are normal. No  scleral icterus.   Neck: Normal range of motion. Neck supple. Normal carotid pulses, no hepatojugular reflux and no JVD present. Carotid bruit is not present. No tracheal deviation present. No thyromegaly present.   Cardiovascular: Normal rate and regular rhythm. Exam reveals no gallop and no friction rub.   No murmur heard.  Pulses:       Carotid pulses are 2+ on the right side, and 2+ on the left side.       Radial pulses are 2+ on the right side, and 2+ on the left side.        Femoral pulses are 2+ on the right side, and 2+ on the left side.       Dorsalis pedis pulses are 2+ on the right side, and 2+ on the left side.        Posterior tibial pulses are 2+ on the right side, and 2+ on the left side.   Pulmonary/Chest: Breath sounds normal. No respiratory distress. He has no decreased breath sounds. He has no wheezes. He has no rhonchi. He has no rales. He exhibits no tenderness.   Abdominal: Soft. Bowel sounds are normal. He exhibits no distension. There is no tenderness. There is no rebound.   Musculoskeletal: He exhibits no edema or deformity.   Neurological: He is alert and oriented to person, place, and time. He has normal strength. No sensory deficit.   Skin: No rash noted. No erythema.   Psychiatric: He has a normal mood and affect. His behavior is normal.       No components found for: CBC  No results found for: CMP  No components found for: LIPID  No results found for: BMP      ECG 12 Lead  Date/Time: 8/12/2020 6:55 PM  Performed by: Tiburcio Moreno MD  Authorized by: Tiburcio Moreno MD   Comparison: compared with previous ECG from 8/7/2019  Similar to previous ECG  Rhythm: sinus rhythm  Rate: normal  QRS axis: normal    Clinical impression: non-specific ECG  Comments: Nonspecific ST changes diffuse leads.  Unchanged.               7/5/18  · Left ventricular systolic function is low normal. Calculated EF = 52%. Estimated EF was in agreement with the calculated EF. Normal left ventricular  cavity size noted. Left ventricular wall thickness is consistent with mild-to-moderate concentric hypertrophy. Left ventricular diastolic dysfunction is noted (grade I) consistent with impaired relaxation. Regional wall motion cannot be assessed due to limited endocardial visualization; consider a contrast study.     6/14/17  Conclusions:   1. Left main: Mild calcification.  No stenosis  2. LAD: Diffuse 20% proximal stenosis.  Diffuse 50% stenosis mid segment.  Discrete 90% proximal first diagonal stenosis with JANINE 2 flow.  Small caliber  3. LCX: Diffuse 50% mid vessel stenosis  4. RCA: Discrete 70-80% proximal RPL 2 stenosis.  Small caliber  5.  Moderately reduced left ventricular systolic function.  Ejection fraction 40%.  Mid to distal anterior wall, apical severe hypokinesis.  Moderate distal inferior wall hypokinesis.  More consistent with stress-induced cardiomyopathy than flow limiting coronary artery disease in that territory  Recommendations: Continue ACE inhibitor and beta blocker.  Continue aspirin and statin.  Low-dose isosorbide mononitrate.      DISCUSSION/SUMMARY  69-year-old gentleman with a medical history as documented above including hypertension, hyperlipidemia, diabetes mellitus, a right total knee arthroplasty with prior joint infection and revision who had a non-ST elevation myocardial infarction and was found to have a stress induced cardiomyopathy with an ejection fraction of 40%.  Since that time we have documented that his ejection fraction has normalized.  He states that he feels very well and has no symptoms other than his orthopedic issues.  He continues to have right lower extremity edema and does not wear compression stocking.     1. Stress induced cardiomyopathy.  Ventricular ejection fraction and wall motion has normalized.   -Continue lisinopril and metoprolol at current dose.  He is currently tolerating lisinopril which has been advanced by his endocrinologist secondary to  proteinuria.  2. Hyperlipidemia; continue atorvastatin at current dose.  Lipid panel has been well controlled.  3. Coronary artery disease; no indication for intervention.  Small caliber disease.  Continue aspirin and statin.  Patient denies angina.  4. Endocrinology.     I will see the patient back in six months or earlier with problems.

## 2020-08-17 RX ORDER — BLOOD SUGAR DIAGNOSTIC
STRIP MISCELLANEOUS
Qty: 400 EACH | Refills: 3 | Status: SHIPPED | OUTPATIENT
Start: 2020-08-17 | End: 2021-08-27

## 2020-08-18 ENCOUNTER — HOSPITAL ENCOUNTER (OUTPATIENT)
Dept: MRI IMAGING | Facility: HOSPITAL | Age: 70
Discharge: HOME | End: 2020-08-18

## 2020-08-18 DIAGNOSIS — K76.9 LIVER DISEASE, UNSPECIFIED: ICD-10-CM

## 2020-08-21 ENCOUNTER — RESULTS ENCOUNTER (OUTPATIENT)
Dept: ENDOCRINOLOGY | Age: 70
End: 2020-08-21

## 2020-08-21 DIAGNOSIS — E78.2 HYPERLIPEMIA, MIXED: ICD-10-CM

## 2020-08-21 DIAGNOSIS — Z79.4 LONG-TERM INSULIN USE (HCC): ICD-10-CM

## 2020-08-21 DIAGNOSIS — IMO0002 DM (DIABETES MELLITUS), TYPE 2, UNCONTROLLED W/NEUROLOGIC COMPLICATION: ICD-10-CM

## 2020-08-24 RX ORDER — INSULIN LISPRO 100 [IU]/ML
INJECTION, SOLUTION INTRAVENOUS; SUBCUTANEOUS
Qty: 30 PEN | Refills: 3 | Status: SHIPPED | OUTPATIENT
Start: 2020-08-24 | End: 2021-03-08 | Stop reason: SDUPTHER

## 2020-11-21 LAB
ALBUMIN/CREAT UR: 5148 MG/G CREAT (ref 0–29)
CHOLEST SERPL-MCNC: 135 MG/DL (ref 100–199)
CREAT SERPL-MCNC: 1.14 MG/DL (ref 0.76–1.27)
CREAT UR-MCNC: 82.6 MG/DL
FOLATE SERPL-MCNC: 9.5 NG/ML
HBA1C MFR BLD: 7.7 % (ref 4.8–5.6)
HDLC SERPL-MCNC: 44 MG/DL
INTERPRETATION: NORMAL
LDLC SERPL CALC-MCNC: 71 MG/DL (ref 0–99)
Lab: NORMAL
MICROALBUMIN UR-MCNC: 4252.2 UG/ML
T4 FREE SERPL-MCNC: 1.02 NG/DL (ref 0.82–1.77)
TRIGL SERPL-MCNC: 106 MG/DL (ref 0–149)
TSH SERPL DL<=0.005 MIU/L-ACNC: 1.72 UIU/ML (ref 0.45–4.5)
VIT B12 SERPL-MCNC: 603 PG/ML (ref 232–1245)
VLDLC SERPL CALC-MCNC: 20 MG/DL (ref 5–40)

## 2020-11-24 ENCOUNTER — OFFICE VISIT (OUTPATIENT)
Dept: ENDOCRINOLOGY | Age: 70
End: 2020-11-24

## 2020-11-24 VITALS
OXYGEN SATURATION: 97 % | WEIGHT: 256.4 LBS | SYSTOLIC BLOOD PRESSURE: 136 MMHG | BODY MASS INDEX: 32.91 KG/M2 | DIASTOLIC BLOOD PRESSURE: 78 MMHG | HEART RATE: 77 BPM | HEIGHT: 74 IN

## 2020-11-24 DIAGNOSIS — E78.2 HYPERLIPEMIA, MIXED: ICD-10-CM

## 2020-11-24 DIAGNOSIS — Z79.4 LONG-TERM INSULIN USE (HCC): Primary | ICD-10-CM

## 2020-11-24 DIAGNOSIS — IMO0002 DM (DIABETES MELLITUS), TYPE 2, UNCONTROLLED W/NEUROLOGIC COMPLICATION: ICD-10-CM

## 2020-11-24 PROCEDURE — 99214 OFFICE O/P EST MOD 30 MIN: CPT | Performed by: INTERNAL MEDICINE

## 2020-11-24 RX ORDER — PREGABALIN 50 MG/1
CAPSULE ORAL
COMMUNITY
End: 2021-09-13

## 2020-11-24 NOTE — PROGRESS NOTES
69 y.o.    Patient Care Team:  Guille Nieves MD as PCP - General  Guille Nieves MD as PCP - Family Medicine  Tiburcio Moreno MD as Consulting Physician (Cardiology)    Chief Complaint:    FOLLOW UP/ TYPE 2 DM   Subjective     HPI      Hugh R McBurney 69 y.o. white male is here as a follow up patient for the management of type 2 diabetes mellitus.       Type 2 diabetes mellitus-diagnosed about 10-15 years ago.    Today in clinic pt reports being on Lantus 48 units twice daily, Humalog 16 units 3 times a day with meals, high-dose Humalog sliding scale.  He relies sometimes on the samples from our office and also has significant difficulty in getting the insulins covered by his insurance.  FBG -100-200  Pre meals -200s  Checks BG -3-4 times  Highest blood sugar was 300.  Dm retinopathy -no known history,Last eye exam -still due for the exam, delay to due to the pandemic  Dm nephropathy -no, positive urine microalbuminuria  Dm neuropathy -significant,Dm neuropathy meds -on Lyrica  CAD -yes  CVA -no  Episodes of hypoglycemia -lowest blood sugar was 60 happened in the middle of the night and sometime in the afternoon.  Reports that one episode was because of him being extremely active and was not very sure the cause for the other low blood sugar  Pt is physically active. weight has been stable.   Pt tries to follow DM diet for most part.   On Ace inb.         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  Patient also underwent prosthetic knee joint replacement.    No longer on treatment for the osteomyelitis.  Still has significant pain in his knees from the surgery.    Hyperlipidemia  On Lipitor 40 mg oral daily.      Reviewed primary care physician's/consulting physician documentation and lab results       Interval History      The following portions  of the patient's history were reviewed and updated as appropriate: allergies, current medications, past family history, past medical history, past social history, past surgical history and problem list.    Past Medical History:   Diagnosis Date   • Arthritis    • Chronic pain     BILATERAL KNEES AND BACK   • Contusion of knee    • Coronary artery disease    • Depression    • Diabetes mellitus (CMS/[facility])     type 2 with atherosclerosis of arteries of extremities   • Diabetic ulcer of toe (CMS/[facility])     left foot associated with type 2 diabetes mellitus, with necrosis of bone   • GERD (gastroesophageal reflux disease)    • Great toe pain     with cellulitis and gangrene   • History of kidney stones    • Hyperlipidemia    • Hypertension    • Hyponatremia    • Kidney stone    • Knee pain     hx of previous prosthetic joint infection   • MSSA (methicillin susceptible Staphylococcus aureus) infection     RIGHT KNEE   • NSTEMI (non-ST elevated myocardial infarction) (CMS/[facility]) 06/14/2017   • Sleep apnea     DOES NOT USE CPAP     Family History   Problem Relation Age of Onset   • Heart disease Mother    • Heart disease Father    • Diabetes Sister    • Dementia Maternal Grandmother    • No Known Problems Maternal Grandfather    • Heart disease Paternal Grandmother    • Heart attack Paternal Grandfather    • Heart disease Paternal Grandfather    • Heart disease Son    • Heart attack Son    • No Known Problems Sister    • Malig Hyperthermia Neg Hx      Social History     Socioeconomic History   • Marital status:      Spouse name: Not on file   • Number of children: Not on file   • Years of education: Not on file   • Highest education level: Not on file   Tobacco Use   • Smoking status: Former Smoker     Packs/day: 3.00     Years: 20.00     Pack years: 60.00   • Smokeless tobacco: Former User   • Tobacco comment: quit 35 years ago. 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    Substance and Sexual Activity   • Alcohol use: No   • Drug use:  No   • Sexual activity: Defer     Allergies   Allergen Reactions   • Vancomycin Other (See Comments)     Red Man syndrome, slow rate and premedicate with benadryl       Current Outpatient Medications:   •  ACCU-CHEK GUIDE test strip, TEST BLOOD SUGAR 3-4 TIMES A DAY, Disp: 400 each, Rfl: 3  •  atorvastatin (LIPITOR) 40 MG tablet, TAKE 1 TABLET BY MOUTH EVERY EVENING., Disp: 30 tablet, Rfl: 3  •  Blood Glucose Calibration (ACCU-CHEK COMPACT PLUS CONTROL) solution, AS DIRECTED, Disp: 1 each, Rfl: 3  •  Continuous Blood Gluc  (FREESTYLE JOSE M 14 DAY READER) device, 1 Device As Needed (used to check BG 4 times a day)., Disp: 1 Device, Rfl: 0  •  Continuous Blood Gluc Sensor (FREESTYLE JOSE M SENSOR SYSTEM), 1 Device Every 14 (Fourteen) Days., Disp: 2 each, Rfl: 3  •  glucose blood (ACCU-CHEK BALTAZAR PLUS) test strip, To check 3 - 4 times a day, Disp: 100 each, Rfl: 2  •  glucose blood (ACCU-CHEK COMPACT PLUS) test strip, Use to test blood sugar 5 times daily, Disp: 500 each, Rfl: 3  •  glucose blood (ACCU-CHEK COMPACT PLUS) test strip, TEST FOUR TIMES A DAY, Disp: 400 each, Rfl: 3  •  HUMALOG KWIKPEN 100 UNIT/ML solution pen-injector, INJECT 16 UNITS WITH EACH MEAL AS DIRECTED, MAXIMUM  UNITS DAILY AS DIRECTED BY PRESCRIBER, Disp: 30 pen, Rfl: 3  •  Insulin Glargine (LANTUS SC), , Disp: , Rfl:   •  Insulin Lispro (HUMALOG KWIKPEN) 100 UNIT/ML solution pen-injector, INJECT 16 UNITS WITH EACH MEAL AS DIRECTED, MAXIMUM  UNITS DAILY AS DIRECTED BY PRESCRIBER, Disp: 90 mL, Rfl: 3  •  Insulin Pen Needle (BD Pen Needle Mireille U/F) 32G X 4 MM misc, TO INJECT 4 TIMES DAILY, Disp: 100 each, Rfl: 3  •  Lancets (ACCU-CHEK MULTICLIX) lancets, Use to test blood sugar 5 times daily, Disp: 500 each, Rfl: 3  •  Lancets (ACCU-CHEK MULTICLIX) lancets, To check 3 - 4 times a day, Disp: 100 each, Rfl: 2  •  lisinopril (PRINIVIL,ZESTRIL) 5 MG tablet, Take 1 tablet by mouth Daily., Disp: 30 tablet, Rfl: 11  •  metoprolol  "tartrate (LOPRESSOR) 25 MG tablet, Take 1 tablet by mouth Every 12 (Twelve) Hours., Disp: 60 tablet, Rfl: 11  •  Multiple Vitamin (MULTI VITAMIN DAILY PO), Take 1 tablet by mouth Daily. HOLD PRIOR TO SURGERY, Disp: , Rfl:   •  omeprazole (priLOSEC) 40 MG capsule, Take 40 mg by mouth 2 (Two) Times a Day., Disp: , Rfl:   •  oxyCODONE-acetaminophen (PERCOCET)  MG per tablet, Take 1 tablet by mouth As Needed., Disp: , Rfl:   •  sertraline (ZOLOFT) 100 MG tablet, Take 100 mg by mouth Daily., Disp: , Rfl:   •  vitamin D (ERGOCALCIFEROL) 98042 units capsule capsule, Take 1 capsule by mouth 1 (One) Time Per Week. (Patient taking differently: Take 50,000 Units by mouth 1 (One) Time Per Week. Friday), Disp: 30 capsule, Rfl: 11  •  pregabalin (LYRICA) 50 MG capsule, pregabalin 50 mg capsule  take one capsule in the morning , one tablet at noon, and two at bedtime., Disp: , Rfl:         Review of Systems   Constitutional: Negative for appetite change, fatigue and fever.   Eyes: Negative for visual disturbance.   Respiratory: Negative for shortness of breath.    Cardiovascular: Negative for palpitations and leg swelling.   Gastrointestinal: Negative for abdominal pain and vomiting.   Endocrine: Negative for polydipsia and polyuria.   Musculoskeletal: Negative for joint swelling and neck pain.   Skin: Negative for rash.   Neurological: Negative for weakness and numbness.   Psychiatric/Behavioral: Negative for behavioral problems.     I have reviewed and confirmed the accuracy of the ROS as documented by the MA/LPN/RN Adarsh Love MD      Objective       Vitals:    11/24/20 1515   BP: 136/78   Pulse: 77   SpO2: 97%   Weight: 116 kg (256 lb 6.4 oz)   Height: 188 cm (74\")     Body mass index is 32.92 kg/m².      Physical Exam  Vitals signs reviewed.   Constitutional:       Appearance: Normal appearance. He is not diaphoretic.   HENT:      Head: Normocephalic and atraumatic.   Eyes:      General: No scleral icterus.     " Conjunctiva/sclera: Conjunctivae normal.   Neck:      Musculoskeletal: Normal range of motion and neck supple.      Thyroid: No thyromegaly.      Comments: Wide neck  Cardiovascular:      Rate and Rhythm: Normal rate.   Pulmonary:      Effort: Pulmonary effort is normal. No respiratory distress.   Abdominal:      General: There is no distension.      Palpations: Abdomen is soft.      Tenderness: There is no abdominal tenderness.      Comments: Central obesity   Musculoskeletal:         General: Tenderness and deformity present.   Skin:     General: Skin is warm and dry.   Neurological:      Mental Status: He is alert and oriented to person, place, and time. Mental status is at baseline.      Gait: Gait normal.      Comments: Decreased sensations   Psychiatric:         Mood and Affect: Mood normal.         Behavior: Behavior normal.         Results Review:     I reviewed the patient's new clinical results and mentioned them above in HPI and in plan as well.    Medical records reviewed  Summary:Done      Orders Only on 11/20/2020   Component Date Value Ref Range Status   • Total Cholesterol 11/20/2020 135  100 - 199 mg/dL Final   • Triglycerides 11/20/2020 106  0 - 149 mg/dL Final   • HDL Cholesterol 11/20/2020 44  >39 mg/dL Final   • VLDL Cholesterol [place] 11/20/2020 20  5 - 40 mg/dL Final   • LDL Chol Calc ([facility]) 11/20/2020 71  0 - 99 mg/dL Final   • Creatinine 11/20/2020 1.14  0.76 - 1.27 mg/dL Final   • eGFR Non  Am 11/20/2020 65  >59 mL/min/1.73 Final   • eGFR African Am 11/20/2020 75  >59 mL/min/1.73 Final   • Creatinine, Urine 11/20/2020 82.6  Not Estab. mg/dL Final   • Microalbumin, Urine 11/20/2020 4,252.2  Not Estab. ug/mL Final    Comment: Results confirmed on  dilution.     • Microalbumin/Creatinine Ratio 11/20/2020 5,148* 0 - 29 mg/g creat Final    Comment:                        Normal:                0 -  29                         Moderately increased: 30 - 300                         Severely  increased:       >300     • Interpretation 11/20/2020 Note   Final    Supplemental report is available.   • Vitamin B-12 11/20/2020 603  232 - 1,245 pg/mL Final   • Folate 11/20/2020 9.5  >3.0 ng/mL Final    Comment: A serum folate concentration of less than 3.1 ng/mL is  considered to represent clinical deficiency.     • PDF Image 11/20/2020 Not applicable   Final   • Hemoglobin A1C 11/20/2020 7.7* 4.8 - 5.6 % Final    Comment:          Prediabetes: 5.7 - 6.4           Diabetes: >6.4           Glycemic control for adults with diabetes: <7.0     • TSH 11/20/2020 1.720  0.450 - 4.500 uIU/mL Final   • Free T4 11/20/2020 1.02  0.82 - 1.77 ng/dL Final     Lab Results   Component Value Date    HGBA1C 7.7 (H) 11/20/2020    HGBA1C 8.10 (H) 12/20/2019    HGBA1C 7.50 (H) 07/26/2019     Lab Results   Component Value Date    MICROALBUR 4,252.2 11/20/2020    CREATININE 1.14 11/20/2020     Imaging Results (Most Recent)     None                Assessment and Plan:    Diagnoses and all orders for this visit:    1. Long-term insulin use (CMS/[facility]) (Primary)  -     Hemoglobin A1c; Future  -     Basic Metabolic Panel; Future  -     Lipid Panel; Future  -     TSH; Future  -     Vitamin B12 & Folate; Future  -     T4, Free; Future  -     Microalbumin / Creatinine Urine Ratio - Urine, Clean Catch; Future    2. DM (diabetes mellitus), type 2, uncontrolled w/neurologic complication (CMS/[facility])  -     Hemoglobin A1c; Future  -     Basic Metabolic Panel; Future  -     Lipid Panel; Future  -     TSH; Future  -     Vitamin B12 & Folate; Future  -     T4, Free; Future  -     Microalbumin / Creatinine Urine Ratio - Urine, Clean Catch; Future    3. Hyperlipemia, mixed  -     Hemoglobin A1c; Future  -     Basic Metabolic Panel; Future  -     Lipid Panel; Future  -     TSH; Future  -     Vitamin B12 & Folate; Future  -     T4, Free; Future  -     Microalbumin / Creatinine Urine Ratio - Urine, Clean Catch; Future        Type 2 diabetes  "mellitus-uncontrolled  Complicated with difficulty in affording the insulins and relying at times on the samples.  Change Lantus to 50 units twice daily  Patient is very uncomfortable with the plan as it might be difficult for him to afford the medications.  Advised the patient to at least increase the insulin dosages during the holiday season.    Continue Humalog as mentioned above in the HPI.  Patient's renal function has significantly improved over the years okay to consider at least a low-dose of Metformin which could be cheaper and also could help in the diabetes control.  Would consider this during his next visit.    Hyperlipidemia  Continue the statin    Obesity  Discussed with the patient about dietary management and exercise regimen which she could manage to do to help with some weight loss  Reviewed Lab results with the patient.               Adarsh Love MD  11/24/20    EMR Dragon / transcription disclaimer:     \"Dictated utilizing Dragon dictation\".      "

## 2021-01-22 ENCOUNTER — TRANSCRIBE ORDERS (OUTPATIENT)
Dept: ADMINISTRATIVE | Facility: HOSPITAL | Age: 71
End: 2021-01-22

## 2021-01-22 DIAGNOSIS — R16.0 HEPATOMEGALY: Primary | ICD-10-CM

## 2021-01-28 ENCOUNTER — TRANSCRIBE ORDERS (OUTPATIENT)
Dept: OBSTETRICS AND GYNECOLOGY | Facility: CLINIC | Age: 71
End: 2021-01-28

## 2021-01-28 DIAGNOSIS — Z01.818 OTHER SPECIFIED PRE-OPERATIVE EXAMINATION: Primary | ICD-10-CM

## 2021-02-10 ENCOUNTER — LAB (OUTPATIENT)
Dept: LAB | Facility: HOSPITAL | Age: 71
End: 2021-02-10

## 2021-02-10 DIAGNOSIS — Z01.818 OTHER SPECIFIED PRE-OPERATIVE EXAMINATION: ICD-10-CM

## 2021-02-10 LAB — SARS-COV-2 RNA PNL SPEC NAA+PROBE: NOT DETECTED

## 2021-02-10 PROCEDURE — C9803 HOPD COVID-19 SPEC COLLECT: HCPCS | Performed by: OBSTETRICS & GYNECOLOGY

## 2021-02-10 PROCEDURE — 87635 SARS-COV-2 COVID-19 AMP PRB: CPT | Performed by: OBSTETRICS & GYNECOLOGY

## 2021-02-12 ENCOUNTER — HOSPITAL ENCOUNTER (OUTPATIENT)
Dept: CT IMAGING | Facility: HOSPITAL | Age: 71
Discharge: HOME | End: 2021-02-12
Admitting: INTERNAL MEDICINE

## 2021-02-12 VITALS
WEIGHT: 275 LBS | RESPIRATION RATE: 14 BRPM | OXYGEN SATURATION: 98 % | SYSTOLIC BLOOD PRESSURE: 153 MMHG | HEART RATE: 82 BPM | HEIGHT: 74 IN | TEMPERATURE: 98.2 F | BODY MASS INDEX: 35.29 KG/M2 | DIASTOLIC BLOOD PRESSURE: 69 MMHG

## 2021-02-12 DIAGNOSIS — R16.0 HEPATOMEGALY: ICD-10-CM

## 2021-02-12 LAB
DEPRECATED RDW RBC AUTO: 45.4 FL (ref 37–54)
ERYTHROCYTE [DISTWIDTH] IN BLOOD BY AUTOMATED COUNT: 12.6 % (ref 12.3–15.4)
HCT VFR BLD AUTO: 37.3 % (ref 37.5–51)
HGB BLD-MCNC: 12.3 G/DL (ref 13–17.7)
INR PPP: 1.1 (ref 0.8–1.2)
MCH RBC QN AUTO: 32.4 PG (ref 26.6–33)
MCHC RBC AUTO-ENTMCNC: 33 G/DL (ref 31.5–35.7)
MCV RBC AUTO: 98.2 FL (ref 79–97)
PLATELET # BLD AUTO: 182 10*3/MM3 (ref 140–450)
PMV BLD AUTO: 11.1 FL (ref 6–12)
PROTHROMBIN TIME: 13.5 SECONDS (ref 12.8–15.2)
RBC # BLD AUTO: 3.8 10*6/MM3 (ref 4.14–5.8)
WBC # BLD AUTO: 9.87 10*3/MM3 (ref 3.4–10.8)

## 2021-02-12 PROCEDURE — 25010000002 MIDAZOLAM PER 1 MG: Performed by: RADIOLOGY

## 2021-02-12 PROCEDURE — 88307 TISSUE EXAM BY PATHOLOGIST: CPT | Performed by: INTERNAL MEDICINE

## 2021-02-12 PROCEDURE — 77012 CT SCAN FOR NEEDLE BIOPSY: CPT

## 2021-02-12 PROCEDURE — 25010000003 LIDOCAINE 1 % SOLUTION: Performed by: RADIOLOGY

## 2021-02-12 PROCEDURE — 85027 COMPLETE CBC AUTOMATED: CPT | Performed by: INTERNAL MEDICINE

## 2021-02-12 PROCEDURE — 25010000002 FENTANYL CITRATE (PF) 100 MCG/2ML SOLUTION: Performed by: RADIOLOGY

## 2021-02-12 PROCEDURE — 88342 IMHCHEM/IMCYTCHM 1ST ANTB: CPT | Performed by: INTERNAL MEDICINE

## 2021-02-12 PROCEDURE — 85610 PROTHROMBIN TIME: CPT

## 2021-02-12 PROCEDURE — 88341 IMHCHEM/IMCYTCHM EA ADD ANTB: CPT | Performed by: INTERNAL MEDICINE

## 2021-02-12 PROCEDURE — 88313 SPECIAL STAINS GROUP 2: CPT | Performed by: INTERNAL MEDICINE

## 2021-02-12 RX ORDER — MIDAZOLAM HYDROCHLORIDE 1 MG/ML
INJECTION INTRAMUSCULAR; INTRAVENOUS
Status: COMPLETED | OUTPATIENT
Start: 2021-02-12 | End: 2021-02-12

## 2021-02-12 RX ORDER — LANCING DEVICE
EACH MISCELLANEOUS
COMMUNITY
Start: 2020-12-28 | End: 2021-03-08

## 2021-02-12 RX ORDER — SODIUM CHLORIDE 0.9 % (FLUSH) 0.9 %
3 SYRINGE (ML) INJECTION EVERY 12 HOURS SCHEDULED
Status: DISCONTINUED | OUTPATIENT
Start: 2021-02-12 | End: 2021-02-13 | Stop reason: HOSPADM

## 2021-02-12 RX ORDER — FENTANYL CITRATE 50 UG/ML
INJECTION, SOLUTION INTRAMUSCULAR; INTRAVENOUS
Status: COMPLETED | OUTPATIENT
Start: 2021-02-12 | End: 2021-02-12

## 2021-02-12 RX ORDER — LIDOCAINE HYDROCHLORIDE 10 MG/ML
20 INJECTION, SOLUTION INFILTRATION; PERINEURAL ONCE
Status: COMPLETED | OUTPATIENT
Start: 2021-02-12 | End: 2021-02-12

## 2021-02-12 RX ORDER — LISINOPRIL 20 MG/1
30 TABLET ORAL DAILY
COMMUNITY
End: 2022-05-23 | Stop reason: DRUGHIGH

## 2021-02-12 RX ORDER — SODIUM CHLORIDE 9 MG/ML
INJECTION, SOLUTION INTRAVENOUS
Status: COMPLETED | OUTPATIENT
Start: 2021-02-12 | End: 2021-02-12

## 2021-02-12 RX ORDER — SODIUM CHLORIDE 9 MG/ML
25 INJECTION, SOLUTION INTRAVENOUS ONCE
Status: DISCONTINUED | OUTPATIENT
Start: 2021-02-12 | End: 2021-02-13 | Stop reason: HOSPADM

## 2021-02-12 RX ORDER — SODIUM CHLORIDE 0.9 % (FLUSH) 0.9 %
10 SYRINGE (ML) INJECTION AS NEEDED
Status: DISCONTINUED | OUTPATIENT
Start: 2021-02-12 | End: 2021-02-13 | Stop reason: HOSPADM

## 2021-02-12 RX ADMIN — LIDOCAINE HYDROCHLORIDE 20 ML: 10 INJECTION, SOLUTION INFILTRATION; PERINEURAL at 10:08

## 2021-02-12 RX ADMIN — FENTANYL CITRATE 50 MCG: 50 INJECTION INTRAMUSCULAR; INTRAVENOUS at 10:05

## 2021-02-12 RX ADMIN — MIDAZOLAM 1 MG: 1 INJECTION INTRAMUSCULAR; INTRAVENOUS at 10:04

## 2021-02-12 RX ADMIN — SODIUM CHLORIDE 30 ML/HR: 9 INJECTION, SOLUTION INTRAVENOUS at 10:01

## 2021-02-12 NOTE — NURSING NOTE
In x ray triage for CT guided Liver biopsy.  Patient is wearing a mask and RN is wearing a mask and goggles with each patient encounter.

## 2021-02-12 NOTE — POST-PROCEDURE NOTE
POST PROCEDURE NOTE      Pre-Procedure Diagnosis: liver mass    Post-procedure Diagnosis: same    Findings: ct liver mass biopsy    Complications: no immediate    Blood loss: none    Specimen Removed: 18 gauge     Discharge Plan:   Home:  Yes

## 2021-02-12 NOTE — DISCHARGE INSTRUCTIONS
EDUCATION /DISCHARGE INSTRUCTIONS  CT/US guided biopsy:  A biopsy is a procedure done to remove tissue for further analysis.  Before images are taken to locate the target area.  Images can be obtained using ultrasound, CT or MRI.  A physician will clean your skin with antiseptic soap, place a sterile towel around the site and administer a local anesthetic to numb the area.  The physician will then insert a special needle.  Sometimes images are taken of the needle after it is inserted to ensure the needle is in the correct area to be biopsied.   A sample is obtained and sent to the laboratory for study.  Occasionally the laboratory is unable to make a diagnosis from the sample and the procedure may need to be repeated.  Within a week the radiologist will send a report to your physician.  A pathologist will also examine the tissue and send a report.    Risks of the procedure include but are not limited to:   *  Bleeding    *  Infection   *  Puncture of surrounding organs *  Death     *  Lung collapse if the biopsy is near the chest which may require insertion of a      chest tube to re-inflate the lung if severe.    Benefits of the procedure:  Using x-ray helps to locate the area that requires a biopsy. The procedure is less invasive than a surgical procedure, there are no large incisions and it does not require anesthesia.    Alternatives to the procedure:  A biopsy can be performed surgically.  Risks of a surgical biopsy include exposure to anesthesia, infection, excessive bleeding and injury to abdominal organs.  A benefit of surgical biopsy is the ability to see the area to be biopsied and remove of a larger piece of tissue.    THIS EDUCATION INFORMATION WAS REVIEWED PRIOR TO PROCEDURE AND CONSENT. Patient initials__________________Time____0915_______________    Post Procedure:    *  Expect the biopsy site may be tender up to one week.    *  Rest today (no pushing pulling or straining).   *  Slowly increase  activity tomorrow.    *  If you received sedation do not drive for 24 hours.   *  Keep dressing clean and dry.   *  Leave dressing on puncture site for 24 hours.    *  You may shower when dressing removed.  Call your doctor if experiencing:   *  Signs of infection such as redness, swelling, excessive pain and / or foul        smelling drainage from the puncture site.   *  Chills or fever over 101 degrees (by mouth).   *  Unrelieved pain.   *  Any new or severe symptoms.   *  If experiencing sudden / severe shortness of breath or chest pain go to the       nearest emergency room.   Following the procedure:     Follow-up with the ordering physician as directed.    Continue to take other medications as directed by your physician unless    otherwise instructed.   If applicable, resume taking your blood thinners or Aspirin on _2/13/21__________.    If you have any concerns please call the Radiology Nurses Desk at [phone].  You are the most important factor in your recovery.  Follow the above instructions carefully.    Moderate Sedation  Sedation is the use of medicines to promote relaxation and relieve discomfort and anxiety during your procedure. Moderate sedation is a type of mild sedation, it is not anesthesia. When receiving moderate sedation you are less alert than normal, but you are still able to respond to instructions, touch, or both.    What are the risks?  Generally, this is a safe procedure. However, problems may occur, including but not limited to:  · Getting too much medicine (over sedation).  · Nausea or vomiting  · Allergic reaction to medicines.  · Trouble breathing. If this happens, a breathing tube may be used to help with breathing. It will be removed when you are awake and breathing on your own.  ·   What happens prior to the procedure?  · An IV catheter will be inserted into one of your veins.  · Your nurse will do a full work up including reviewing your medications, allergies, medical history, who  is taking your home and other pertinent information.  · Medicine to help you relax will be given through the IV tubing.  · The registered nurse and physician will monitor you closely during the entire procedure.  ·   What happens after the procedure?  · Your blood pressure, heart rate, breathing rate, and blood oxygen level will be monitored often until the medicines you were given have worn off.  · Do not drive for 24 hours.  · If you are an inpatient, you will return to your room where your nurse will monitor you appropriately.        I read, or had read to me, this education sheet.        __________________________________________________________      _____2/12/21 0915_________________________________  Patient/Person authorized to sign for patient                                                    Date/Time          ___________________________________________________________     _____2/12/21 0915_________________________________       Witness signature                                                                                              Date/Time    Discharge Instructions after receiving Moderate Sedation  These instructions provide you with information about caring for yourself after your procedure. Your health care provider may also give you more specific instructions. Your treatment has been planned according to current medical practices, but problems sometimes occur. Call your physican or the Radiology Nurses ([phone]) if you have any problems or questions after your procedure.    What can I expect after the procedure?  After your procedure, you may:  Feel sleepy or light headed for several hours.  Feel clumsy, dizzy or have poor balance for several hours.  Feel forgetful about what happened after the procedure.  Have poor judgment for several hours.  Feel nauseous or vomit.    For at least 24 hours after the procedure:  Have a responsible adult stay with you or frequently check on you until you  are awake and alert.   Rest as needed.    Do not:  Participate in activities in which you could fall or become injured.  Drive or operate heavy machinery  Take sleeping pills or medicines that cause drowsiness.  Make important decisions or sign legal documents.  Take care of children on your own.    Eating and drinking: Clear liquids then progress slowly to a normal diet.  If you vomit, drink water, juice, or soup when you can drink without vomiting.  Make sure you have little or no nausea before eating solid foods.    General instructions: Take over-the-counter and prescription medicines only as told by your health care provider .If you have sleep apnea, surgery and certain medicines can increase your risk for breathing problems. Follow instructions from your health care provider about wearing your sleep device: If you smoke, do not smoke without supervision.  Keep all follow-up visits as told by your health care provider. This is important.    Contact your physician if:  You continue to keep feeling nauseous or you keep vomiting. You continue to feel light-headed, develop a fever or a rash.  Get help right away if you have trouble breathing    I acknowledge the above instructions:    Patient/ Responsible person _________________________________Date ___2/12/21_________Time ___0915_________    Witnessed by____________________________________________ Date___2/12/21__________ Time___0915_________

## 2021-02-12 NOTE — NURSING NOTE
Verbal and written D/C instructions given to patient and he voices understanding and is able to teach back D/C instructions

## 2021-02-15 LAB
CYTO UR: NORMAL
LAB AP CASE REPORT: NORMAL
LAB AP CLINICAL INFORMATION: NORMAL
LAB AP SPECIAL STAINS: NORMAL
PATH REPORT.FINAL DX SPEC: NORMAL
PATH REPORT.GROSS SPEC: NORMAL

## 2021-03-08 ENCOUNTER — OFFICE VISIT (OUTPATIENT)
Dept: ENDOCRINOLOGY | Age: 71
End: 2021-03-08

## 2021-03-08 VITALS
SYSTOLIC BLOOD PRESSURE: 120 MMHG | BODY MASS INDEX: 37.5 KG/M2 | HEIGHT: 74 IN | DIASTOLIC BLOOD PRESSURE: 62 MMHG | WEIGHT: 292.2 LBS

## 2021-03-08 DIAGNOSIS — Z79.4 TYPE 2 DIABETES MELLITUS WITH MICROALBUMINURIA, WITH LONG-TERM CURRENT USE OF INSULIN (HCC): Primary | ICD-10-CM

## 2021-03-08 DIAGNOSIS — R80.9 TYPE 2 DIABETES MELLITUS WITH MICROALBUMINURIA, WITH LONG-TERM CURRENT USE OF INSULIN (HCC): Primary | ICD-10-CM

## 2021-03-08 DIAGNOSIS — E11.29 TYPE 2 DIABETES MELLITUS WITH MICROALBUMINURIA, WITH LONG-TERM CURRENT USE OF INSULIN (HCC): Primary | ICD-10-CM

## 2021-03-08 PROCEDURE — 99214 OFFICE O/P EST MOD 30 MIN: CPT | Performed by: NURSE PRACTITIONER

## 2021-03-08 RX ORDER — OMEPRAZOLE 20 MG/1
20 TABLET, DELAYED RELEASE ORAL
COMMUNITY
End: 2022-05-23 | Stop reason: ALTCHOICE

## 2021-03-08 RX ORDER — ISOSORBIDE MONONITRATE 30 MG/1
TABLET, EXTENDED RELEASE ORAL
COMMUNITY

## 2021-03-08 NOTE — PATIENT INSTRUCTIONS
Increase lantus to 48u and 44u  Increase humalog 18u base and continue sliding scale, only take before eating normal sized meals       Diabetes Mellitus and Nutrition, Adult  When you have diabetes (diabetes mellitus), it is very important to have healthy eating habits because your blood sugar (glucose) levels are greatly affected by what you eat and drink. Eating healthy foods in the appropriate amounts, at about the same times every day, can help you:  · Control your blood glucose.  · Lower your risk of heart disease.  · Improve your blood pressure.  · Reach or maintain a healthy weight.  Every person with diabetes is different, and each person has different needs for a meal plan. Your health care provider may recommend that you work with a diet and nutrition specialist (dietitian) to make a meal plan that is best for you. Your meal plan may vary depending on factors such as:  · The calories you need.  · The medicines you take.  · Your weight.  · Your blood glucose, blood pressure, and cholesterol levels.  · Your activity level.  · Other health conditions you have, such as heart or kidney disease.  How do carbohydrates affect me?  Carbohydrates, also called carbs, affect your blood glucose level more than any other type of food. Eating carbs naturally raises the amount of glucose in your blood. Carb counting is a method for keeping track of how many carbs you eat. Counting carbs is important to keep your blood glucose at a healthy level, especially if you use insulin or take certain oral diabetes medicines.  It is important to know how many carbs you can safely have in each meal. This is different for every person. Your dietitian can help you calculate how many carbs you should have at each meal and for each snack.  Foods that contain carbs include:  · Bread, cereal, rice, pasta, and crackers.  · Potatoes and corn.  · Peas, beans, and lentils.  · Milk and yogurt.  · Fruit and juice.  · Desserts, such as cakes,  "cookies, ice cream, and candy.  How does alcohol affect me?  Alcohol can cause a sudden decrease in blood glucose (hypoglycemia), especially if you use insulin or take certain oral diabetes medicines. Hypoglycemia can be a life-threatening condition. Symptoms of hypoglycemia (sleepiness, dizziness, and confusion) are similar to symptoms of having too much alcohol.  If your health care provider says that alcohol is safe for you, follow these guidelines:  · Limit alcohol intake to no more than 1 drink per day for nonpregnant women and 2 drinks per day for men. One drink equals 12 oz of beer, 5 oz of wine, or 1½ oz of hard liquor.  · Do not drink on an empty stomach.  · Keep yourself hydrated with water, diet soda, or unsweetened iced tea.  · Keep in mind that regular soda, juice, and other mixers may contain a lot of sugar and must be counted as carbs.  What are tips for following this plan?    Reading food labels  · Start by checking the serving size on the \"Nutrition Facts\" label of packaged foods and drinks. The amount of calories, carbs, fats, and other nutrients listed on the label is based on one serving of the item. Many items contain more than one serving per package.  · Check the total grams (g) of carbs in one serving. You can calculate the number of servings of carbs in one serving by dividing the total carbs by 15. For example, if a food has 30 g of total carbs, it would be equal to 2 servings of carbs.  · Check the number of grams (g) of saturated and trans fats in one serving. Choose foods that have low or no amount of these fats.  · Check the number of milligrams (mg) of salt (sodium) in one serving. Most people should limit total sodium intake to less than 2,300 mg per day.  · Always check the nutrition information of foods labeled as \"low-fat\" or \"nonfat\". These foods may be higher in added sugar or refined carbs and should be avoided.  · Talk to your dietitian to identify your daily goals for " nutrients listed on the label.  Shopping  · Avoid buying canned, premade, or processed foods. These foods tend to be high in fat, sodium, and added sugar.  · Shop around the outside edge of the grocery store. This includes fresh fruits and vegetables, bulk grains, fresh meats, and fresh dairy.  Cooking  · Use low-heat cooking methods, such as baking, instead of high-heat cooking methods like deep frying.  · Cook using healthy oils, such as olive, canola, or sunflower oil.  · Avoid cooking with butter, cream, or high-fat meats.  Meal planning  · Eat meals and snacks regularly, preferably at the same times every day. Avoid going long periods of time without eating.  · Eat foods high in fiber, such as fresh fruits, vegetables, beans, and whole grains. Talk to your dietitian about how many servings of carbs you can eat at each meal.  · Eat 4-6 ounces (oz) of lean protein each day, such as lean meat, chicken, fish, eggs, or tofu. One oz of lean protein is equal to:  ? 1 oz of meat, chicken, or fish.  ? 1 egg.  ? ¼ cup of tofu.  · Eat some foods each day that contain healthy fats, such as avocado, nuts, seeds, and fish.  Lifestyle  · Check your blood glucose regularly.  · Exercise regularly as told by your health care provider. This may include:  ? 150 minutes of moderate-intensity or vigorous-intensity exercise each week. This could be brisk walking, biking, or water aerobics.  ? Stretching and doing strength exercises, such as yoga or weightlifting, at least 2 times a week.  · Take medicines as told by your health care provider.  · Do not use any products that contain nicotine or tobacco, such as cigarettes and e-cigarettes. If you need help quitting, ask your health care provider.  · Work with a counselor or diabetes educator to identify strategies to manage stress and any emotional and social challenges.  Questions to ask a health care provider  · Do I need to meet with a diabetes educator?  · Do I need to meet with a  dietitian?  · What number can I call if I have questions?  · When are the best times to check my blood glucose?  Where to find more information:  · American Diabetes Association: diabetes.org  · Academy of Nutrition and Dietetics: www.eatright.org  · National [place] of Diabetes and Digestive and Kidney Diseases (NIH): www.niddk.nih.gov  Summary  · A healthy meal plan will help you control your blood glucose and maintain a healthy lifestyle.  · Working with a diet and nutrition specialist (dietitian) can help you make a meal plan that is best for you.  · Keep in mind that carbohydrates (carbs) and alcohol have immediate effects on your blood glucose levels. It is important to count carbs and to use alcohol carefully.  This information is not intended to replace advice given to you by your health care provider. Make sure you discuss any questions you have with your health care provider.  Document Revised: 11/30/2018 Document Reviewed: 01/22/2018  Elsevier Patient Education © 2020 [employer]

## 2021-06-01 ENCOUNTER — LAB (OUTPATIENT)
Dept: ENDOCRINOLOGY | Age: 71
End: 2021-06-01

## 2021-06-01 DIAGNOSIS — E11.29 TYPE 2 DIABETES MELLITUS WITH MICROALBUMINURIA, WITH LONG-TERM CURRENT USE OF INSULIN (HCC): ICD-10-CM

## 2021-06-01 DIAGNOSIS — R80.9 TYPE 2 DIABETES MELLITUS WITH MICROALBUMINURIA, WITH LONG-TERM CURRENT USE OF INSULIN (HCC): ICD-10-CM

## 2021-06-01 DIAGNOSIS — Z79.4 TYPE 2 DIABETES MELLITUS WITH MICROALBUMINURIA, WITH LONG-TERM CURRENT USE OF INSULIN (HCC): ICD-10-CM

## 2021-06-02 LAB
CHOLEST SERPL-MCNC: 122 MG/DL (ref 0–200)
HBA1C MFR BLD: 7.2 % (ref 4.8–5.6)
HDLC SERPL-MCNC: 46 MG/DL (ref 40–60)
IMP & REVIEW OF LAB RESULTS: NORMAL
LDLC SERPL CALC-MCNC: 57 MG/DL (ref 0–100)
TRIGL SERPL-MCNC: 103 MG/DL (ref 0–150)
VLDLC SERPL CALC-MCNC: 19 MG/DL (ref 5–40)

## 2021-06-08 ENCOUNTER — OFFICE VISIT (OUTPATIENT)
Dept: ENDOCRINOLOGY | Age: 71
End: 2021-06-08

## 2021-06-08 VITALS
BODY MASS INDEX: 38.65 KG/M2 | HEIGHT: 74 IN | DIASTOLIC BLOOD PRESSURE: 72 MMHG | WEIGHT: 301.2 LBS | OXYGEN SATURATION: 98 % | SYSTOLIC BLOOD PRESSURE: 128 MMHG

## 2021-06-08 DIAGNOSIS — E78.2 MIXED HYPERLIPIDEMIA: ICD-10-CM

## 2021-06-08 DIAGNOSIS — Z79.4 TYPE 2 DIABETES MELLITUS WITH MICROALBUMINURIA, WITH LONG-TERM CURRENT USE OF INSULIN (HCC): Primary | ICD-10-CM

## 2021-06-08 DIAGNOSIS — E11.29 TYPE 2 DIABETES MELLITUS WITH MICROALBUMINURIA, WITH LONG-TERM CURRENT USE OF INSULIN (HCC): Primary | ICD-10-CM

## 2021-06-08 DIAGNOSIS — R80.9 TYPE 2 DIABETES MELLITUS WITH MICROALBUMINURIA, WITH LONG-TERM CURRENT USE OF INSULIN (HCC): Primary | ICD-10-CM

## 2021-06-08 PROCEDURE — 99214 OFFICE O/P EST MOD 30 MIN: CPT | Performed by: NURSE PRACTITIONER

## 2021-06-08 RX ORDER — OXYCODONE HYDROCHLORIDE 10 MG/1
TABLET ORAL
COMMUNITY
End: 2022-05-23 | Stop reason: ALTCHOICE

## 2021-06-08 NOTE — PROGRESS NOTES
"Chief Complaint  Diabetes    Subjective          Hugh R McBurney presents to [facility] ENDOCRINOLOGY  History of Present Illness   Weight is up with him decreased activity from his knee injury     Had cancer burned off his liver since last visit     Left pointer finger in a bandaid- reports he tries to get something out and now it bleeds. No edema, no redness, no s/s infection. Keeping it clean and dry     Type 2 diabetes mellitus  diagnosed about 10-15 years ago.     Today in clinic pt reports being on Lantus 46u qam and 42u qpm, Humalog 16 units plus sliding scale 3 times a day with meals, high-dose Humalog sliding scale.    Checks BG -3-4 times  Am-   Noon   Pm 110-280    Dm retinopathy -no Last eye exam -UTD 2021  Dm nephropathy - positive urine microalbuminuria, sees dr jc  Dm neuropathy -yes, Dm neuropathy meds -on Lyrica  CAD -yes  CVA -no  Episodes of hypoglycemia -no  Pt could improve DM diet for most part.   On Ace inb.        2/3/2017 sepsis led to- Diabetic neuropathy resulting left great toe osteomyelitis, status post amputation complicated with right knee joint seeding underwent incision and debridement and polyliner exchange of right TKA.  Patient also underwent prosthetic knee joint replacement.  No longer on treatment for the osteomyelitis.  Still has significant pain in his knees from the surgery.     Hyperlipidemia  On Lipitor 40 mg oral daily  Lab Results   Component Value Date    CHOL 126 11/22/2017    CHLPL 122 06/01/2021    TRIG 103 06/01/2021    HDL 46 06/01/2021    LDL 57 06/01/2021          Objective   Vital Signs:   /72 (BP Location: Right arm, Patient Position: Sitting, Cuff Size: Adult)   Ht 188 cm (74.02\")   Wt (!) 137 kg (301 lb 3.2 oz)   SpO2 98%   BMI 38.66 kg/m²     Physical Exam  Vitals reviewed.   Constitutional:       General: He is not in acute distress.  HENT:      Head: Normocephalic and atraumatic.   Cardiovascular:      Rate and " Jong Silveira  Admission Date: 9/10/2020             Daily Progress Note: 9/15/2020    The patient's chart is reviewed and the patient is discussed with the staff. 72  y old WM s/p AVR 9/10  Extubated  9/10  CXR with very small effusion on Left- too small for thoracentesis noted yesterday. Post op anemia worse, hgb 7.2, receiving transfusion today    Subjective: On room air. Up to chair, alert and talkative. No complaints. Plants to [facility] to [facility] at discharge.      Current Facility-Administered Medications   Medication Dose Route Frequency    senna-docusate (PERICOLACE) 8.6-50 mg per tablet 2 Tab  2 Tab Oral BID PRN    lidocaine (XYLOCAINE) 2 % viscous solution 15 mL  15 mL Mouth/Throat PRN    aspirin delayed-release tablet 81 mg  81 mg Oral QHS    pravastatin (PRAVACHOL) tablet 40 mg  40 mg Oral QHS    alum-mag hydroxide-simeth (MYLANTA) oral suspension 30 mL  30 mL Oral Q4H PRN    famotidine (PEPCID) tablet 20 mg  20 mg Oral BID    acetaminophen (TYLENOL) tablet 650 mg  650 mg Oral Q4H PRN    amiodarone (CORDARONE) tablet 200 mg  200 mg Oral Q12H    magnesium oxide (MAG-OX) tablet 400 mg  400 mg Oral QID PRN    potassium chloride (K-DUR, KLOR-CON) SR tablet 20 mEq  20 mEq Oral BID PRN    lisinopriL (PRINIVIL, ZESTRIL) tablet 10 mg  10 mg Oral DAILY    alcohol 62% (NOZIN) nasal  1 Ampule  1 Ampule Topical Q12H    sodium chloride (NS) flush 5-40 mL  5-40 mL IntraVENous Q8H    oxyCODONE-acetaminophen (PERCOCET) 5-325 mg per tablet 1 Tab  1 Tab Oral Q4H PRN    metoprolol tartrate (LOPRESSOR) tablet 25 mg  25 mg Oral Q12H       Review of Systems  Constitutional: negative for fever, chills, sweats  Cardiovascular: negative for chest pain, palpitations, syncope, +edema  Gastrointestinal:  negative for dysphagia, reflux, vomiting, diarrhea, abdominal pain, or melena  Neurologic:  negative for focal weakness, numbness, headache    Objective:     Vitals: 09/14/20 2147 09/14/20 2323 09/15/20 0331 09/15/20 0731   BP: 134/63 121/69 128/68 128/61   Pulse: 89 74 76 77   Resp:  18 18 17   Temp:  97.9 °F (36.6 °C) 97 °F (36.1 °C) 97.6 °F (36.4 °C)   SpO2:  97% 96% 96%   Weight:   220 lb (99.8 kg)    Height:             Intake/Output Summary (Last 24 hours) at 9/15/2020 1004  Last data filed at 9/15/2020 0331  Gross per 24 hour   Intake 840 ml   Output 1450 ml   Net -610 ml       Physical Exam:   Constitution:  the patient is well developed and in no acute distress  EENMT:  Sclera clear, pupils equal, oral mucosa moist  Respiratory: slightly diminished, no wheezes  Cardiovascular:  RRR without M,G,R  Gastrointestinal: soft and non-tender; with positive bowel sounds. Musculoskeletal: warm without cyanosis. There is + lower extremity edema. Skin:  no jaundice or rashes, no open wounds   Neurologic: no gross neuro deficits     Psychiatric:  alert and oriented x 3    CXR: 9/14/2020 9/14/2020      LAB  Recent Labs     09/13/20  1555 09/13/20  1103 09/13/20  0554 09/12/20  2105 09/12/20  1602   GLUCPOC 104* 106* 107* 94 87      Recent Labs     09/15/20  0313 09/14/20  0331   WBC 4.1* 6.7   HGB 7.2* 7.6*   HCT 22.3* 23.4*    136*     Recent Labs     09/15/20  0313 09/14/20  0331 09/13/20  0303   K 4.4 4.0 3.9   MG  --  2.4 2.4     No results for input(s): PH, PCO2, PO2, HCO3, PHI, PCO2I, PO2I, HCO3I in the last 72 hours. No results for input(s): LCAD, LAC in the last 72 hours.       Assessment:  (Medical Decision Making)     Hospital Problems  Date Reviewed: 8/25/2020          Codes Class Noted POA    Aortic stenosis ICD-10-CM: I35.0  ICD-9-CM: 424.1  9/10/2020 Unknown        * (Principal) S/P AVR (aortic valve replacement) ICD-10-CM: Z95.2  ICD-9-CM: V43.3  9/10/2020 Yes        Atelectasis ICD-10-CM: J98.11  ICD-9-CM: 518.0  9/10/2020 Yes        Hypoxemia ICD-10-CM: R09.02  ICD-9-CM: 799.02  9/10/2020 Yes    Weaned to room air          Plan:  (Medical Decision Rhythm: Normal rate and regular rhythm.   Pulmonary:      Effort: Pulmonary effort is normal. No respiratory distress.   Musculoskeletal:         General: Signs of injury present. Normal range of motion.      Cervical back: Normal range of motion and neck supple.      Right lower leg: Edema present.   Skin:     General: Skin is warm and dry.   Neurological:      Mental Status: He is alert and oriented to person, place, and time. Mental status is at baseline.      Motor: Weakness present.      Gait: Gait abnormal.   Psychiatric:         Mood and Affect: Mood normal.         Behavior: Behavior normal.         Thought Content: Thought content normal.         Judgment: Judgment normal.        Result Review :   The following data was reviewed by: ABBY Alvarenga on 06/08/2021:  Common labs    Common Labsle 11/20/20 11/20/20 11/20/20 11/20/20 2/12/21 6/1/21 6/1/21    1007 1007 1007 1007  1016 1016   Creatinine  1.14        eGFR Non  Am  65        eGFR  Am  75        WBC     9.87     Hemoglobin     12.3 (A)     Hematocrit     37.3 (A)     Platelets     182     Total Cholesterol 135     122    Triglycerides 106     103    HDL Cholesterol 44     46    LDL Cholesterol  71     57    Hemoglobin A1C    7.7 (A)   7.20 (A)   Microalbumin, Urine   4,252.2       (A) Abnormal value       Comments are available for some flowsheets but are not being displayed.                     Assessment and Plan    Diagnoses and all orders for this visit:    1. Type 2 diabetes mellitus with microalbuminuria, with long-term current use of insulin (CMS/[facility]) (Primary)    2. Mixed hyperlipidemia        Follow Up   No follow-ups on file.     Has f/u with dr curtis in November   a1c near goal <7.5%  Continue current dosing with lantus and novolog   Needs strong focus on diet since he cant exercise to minimize the risk of gaining weight, declined nutrition consult at this time  Daily diabetic foot care  Continue statin    Patient was  Making)     -- continue IS  -- weaned to room air.  -- receiving PRBC today for anemia  -- plan for [facility] at d/c     More than 50% of the time documented was spent in face-to-face contact with the patient and in the care of the patient on the floor/unit where the patient is located. Milla Wallace NP      Lungs:  clear  Heart:  RRR with no Murmur/Rubs/Gallops    Additional Comments:  Stable on RA, nothing else to add, will sign off ,call if needed    I have spoken with and examined the patient. I agree with the above assessment and plan as documented.     Kayley Mackay MD given instructions and counseling regarding his condition or for health maintenance advice. Please see specific information pulled into the AVS if appropriate.     ABBY Alvarenga

## 2021-06-10 RX ORDER — INSULIN GLARGINE 100 [IU]/ML
INJECTION, SOLUTION SUBCUTANEOUS
Qty: 90 ML | Refills: 1 | OUTPATIENT
Start: 2021-06-10

## 2021-08-27 RX ORDER — BLOOD SUGAR DIAGNOSTIC
STRIP MISCELLANEOUS
Qty: 400 EACH | Refills: 2 | Status: SHIPPED | OUTPATIENT
Start: 2021-08-27 | End: 2023-03-28

## 2021-09-13 ENCOUNTER — OFFICE VISIT (OUTPATIENT)
Dept: CARDIOLOGY | Facility: CLINIC | Age: 71
End: 2021-09-13

## 2021-09-13 VITALS
DIASTOLIC BLOOD PRESSURE: 90 MMHG | BODY MASS INDEX: 38.12 KG/M2 | HEART RATE: 60 BPM | SYSTOLIC BLOOD PRESSURE: 130 MMHG | HEIGHT: 74 IN | WEIGHT: 297 LBS | OXYGEN SATURATION: 95 %

## 2021-09-13 DIAGNOSIS — I51.81 STRESS-INDUCED CARDIOMYOPATHY: ICD-10-CM

## 2021-09-13 DIAGNOSIS — E11.51 DIABETES TYPE 2 WITH ATHEROSCLEROSIS OF ARTERIES OF EXTREMITIES (HCC): Chronic | ICD-10-CM

## 2021-09-13 DIAGNOSIS — E87.1 HYPONATREMIA: ICD-10-CM

## 2021-09-13 DIAGNOSIS — E78.2 MIXED HYPERLIPIDEMIA: ICD-10-CM

## 2021-09-13 DIAGNOSIS — I70.209 DIABETES TYPE 2 WITH ATHEROSCLEROSIS OF ARTERIES OF EXTREMITIES (HCC): Chronic | ICD-10-CM

## 2021-09-13 DIAGNOSIS — I10 ESSENTIAL HYPERTENSION: ICD-10-CM

## 2021-09-13 DIAGNOSIS — I25.10 CORONARY ARTERY DISEASE INVOLVING NATIVE CORONARY ARTERY OF NATIVE HEART WITHOUT ANGINA PECTORIS: Primary | ICD-10-CM

## 2021-09-13 PROCEDURE — 99214 OFFICE O/P EST MOD 30 MIN: CPT | Performed by: NURSE PRACTITIONER

## 2021-09-13 PROCEDURE — 93000 ELECTROCARDIOGRAM COMPLETE: CPT | Performed by: NURSE PRACTITIONER

## 2021-09-13 RX ORDER — PREGABALIN 150 MG/1
CAPSULE ORAL 2 TIMES DAILY
COMMUNITY

## 2021-09-13 NOTE — PROGRESS NOTES
Date of Office Visit: 2021  Encounter Provider: ABBY Lindquist  Place of Service: [facility]  Patient Name: Hugh R McBurney  :1950    Chief Complaint   Patient presents with   • Follow-up   • Hypertension   • Diabetes   • Hyperlipidemia   • Cardiomyopathy   :     HPI: Hugh McBurney is a 70-year-old male who is a patient of Dr. Moreno and is new to me today.  He has a history of hypertension, hyperlipidemia, diabetes mellitus, GERD, struct of sleep apnea no CPAP use, right TKA with joint infection and coronary disease.  In 2017 he had a non-ST elevation MI when he was seen for chest pain after reaction to vancomycin.  He was here to get surgery for his knee.  He had a cardiac cath at the time showed a normal left main, 20% proximal LAD, 50% mid LAD, 90% proximal first diagonal, diffuse 50% mid circumflex and a 70 to 80% proximal right posterior lateral to stenosis.  EF was 42% with possibly stress-induced cardiomyopathy.  He was sent home on ACE, beta-blocker and nitrate.  He had a repeat echo that showed his EF and wall motion had normalized.  He was last in the office of August of last year and was doing well.    He denies any chest pain, palpitations or shortness of breath.  He does have some increasing edema worse in the right leg.  He is missing his kneecap in the right knee and is waiting to have another knee surgery.  He has noted some fatigue during the day and has a lot of daytime sleepiness.  He ended up going back on his CPAP machine which he believes has helped a little bit.  In November last year he was diagnosed with hepatocellular carcinoma and had a ablation to his liver lesion.  He also is seeing Dr. Yusuf Michael for some chronic kidney disease most likely due to his hypertension and diabetes.  He has significant protein in his urine.    Previous testing and notes have been reviewed by me.   Past Medical History:   Diagnosis Date    • Arthritis    • Chronic pain     BILATERAL KNEES AND BACK   • Contusion of knee    • Coronary artery disease    • Depression    • Diabetes mellitus (CMS/HCC)     type 2 with atherosclerosis of arteries of extremities   • Diabetic ulcer of toe (CMS/HCC)     left foot associated with type 2 diabetes mellitus, with necrosis of bone   • GERD (gastroesophageal reflux disease)    • Great toe pain     with cellulitis and gangrene   • History of kidney stones    • Hyperlipidemia    • Hypertension    • Hyponatremia    • Kidney stone    • Knee pain     hx of previous prosthetic joint infection   • Liver disease     liver mass   • MSSA (methicillin susceptible Staphylococcus aureus) infection     RIGHT KNEE   • NSTEMI (non-ST elevated myocardial infarction) (CMS/HCC) 06/14/2017   • Sleep apnea     DOES NOT USE CPAP       Past Surgical History:   Procedure Laterality Date   • APPENDECTOMY     • CARDIAC CATHETERIZATION N/A 6/14/2017    Procedure: Left Heart Cath;  Surgeon: Tiburcio Moreno MD;  Location: [facility] INVASIVE LOCATION;  Service:    • COLONOSCOPY     • JOINT REPLACEMENT      BILATERAL KNEES    • LUMBAR FUSION     • IN REVISE KNEE JOINT REPLACE,1 PART Right 9/5/2017    Procedure: REMOVAL OF ANTIBIOTIC CEMENT SPACER; RIGHT TOTAL KNEE ARTHROPLASTY REVISION;  Surgeon: Kiko Marie MD;  Location: [facility] OR;  Service: Orthopedics   • ROTATOR CUFF REPAIR Right    • TOTAL KNEE  PROSTHESIS REMOVAL W/ SPACER INSERTION Right 2/14/2017    Procedure: INCISION AND DRAINAGE RIGHT KNEE, POLY CHANGE;  Surgeon: Kiko Marie MD;  Location: [facility] OR;  Service:    • TOTAL KNEE  PROSTHESIS REMOVAL W/ SPACER INSERTION Right 7/7/2017    Procedure: REMOVAL RIGHT TOTAL KNEE ARTHROPLASTY AND ANITBIOTIC SPACER;  Surgeon: Kiko Marie MD;  Location: [facility] OR;  Service:    • TRANS METATARSAL AMPUTATION Left 2/14/2017    Procedure: EXCISIONAL DEBRIDEMENT LT. FIRST TOE DIABETIC ULCER,  DRAINAGE ABSCESS FIRST TOE, FIRST TOE AMPUTATION;  Surgeon: Osmin Brand MD;  Location: [facility] OR;  Service:        Social History     Socioeconomic History   • Marital status:      Spouse name: Not on file   • Number of children: Not on file   • Years of education: Not on file   • Highest education level: Not on file   Tobacco Use   • Smoking status: Former Smoker     Packs/day: 3.00     Years: 20.00     Pack years: 60.00   • Smokeless tobacco: Former User   • Tobacco comment: quit 35 years ago. 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    Substance and Sexual Activity   • Alcohol use: No   • Drug use: No   • Sexual activity: Defer       Family History   Problem Relation Age of Onset   • Heart disease Mother    • Heart disease Father    • Diabetes Sister    • Dementia Maternal Grandmother    • No Known Problems Maternal Grandfather    • Heart disease Paternal Grandmother    • Heart attack Paternal Grandfather    • Heart disease Paternal Grandfather    • Heart disease Son    • Heart attack Son    • No Known Problems Sister    • Malig Hyperthermia Neg Hx        Review of Systems   Constitutional: Positive for malaise/fatigue. Negative for diaphoresis.   Cardiovascular: Positive for leg swelling. Negative for chest pain, claudication, dyspnea on exertion, irregular heartbeat, near-syncope, orthopnea, palpitations, paroxysmal nocturnal dyspnea and syncope.   Respiratory: Negative for cough, shortness of breath and sleep disturbances due to breathing.    Musculoskeletal: Positive for joint pain and joint swelling. Negative for falls.   Neurological: Negative for dizziness and weakness.   Psychiatric/Behavioral: Negative for altered mental status and substance abuse.       Allergies   Allergen Reactions   • Vancomycin Other (See Comments)     Red Man syndrome, slow rate and premedicate with benadryl         Current Outpatient Medications:   •  Accu-Chek Guide test strip, TEST BLOOD SUGAR 3-4 TIMES A DAY, Disp: 400  "each, Rfl: 2  •  atorvastatin (LIPITOR) 40 MG tablet, TAKE 1 TABLET BY MOUTH EVERY EVENING., Disp: 30 tablet, Rfl: 3  •  Insulin Glargine (LANTUS SC), , Disp: , Rfl:   •  Insulin Glargine (LANTUS SOLOSTAR) 100 UNIT/ML injection pen, INJECT 46 UNITS IN THE AM AND 42 UNITS IN THE PM, Disp: 75 mL, Rfl: 1  •  Insulin Lispro (HUMALOG KWIKPEN) 100 UNIT/ML solution pen-injector, INJECT 16 UNITS WITH EACH MEAL AS DIRECTED, MAXIMUM  UNITS DAILY AS DIRECTED BY PRESCRIBER, Disp: 90 mL, Rfl: 3  •  Insulin Pen Needle (BD Pen Needle Mireille U/F) 32G X 4 MM misc, TO INJECT 4 TIMES DAILY, Disp: 100 each, Rfl: 3  •  isosorbide mononitrate (IMDUR) 30 MG 24 hr tablet, isosorbide mononitrate ER 30 mg tablet,extended release 24 hr, Disp: , Rfl:   •  Lancets (ACCU-CHEK MULTICLIX) lancets, Use to test blood sugar 5 times daily, Disp: 500 each, Rfl: 3  •  lisinopril (PRINIVIL,ZESTRIL) 20 MG tablet, Take 20 mg by mouth Daily., Disp: , Rfl:   •  metoprolol tartrate (LOPRESSOR) 25 MG tablet, Take 1 tablet by mouth Every 12 (Twelve) Hours., Disp: 60 tablet, Rfl: 11  •  Multiple Vitamin (MULTI VITAMIN DAILY PO), Take 1 tablet by mouth Daily. HOLD PRIOR TO SURGERY, Disp: , Rfl:   •  omeprazole (priLOSEC) 40 MG capsule, Take 40 mg by mouth 2 (Two) Times a Day., Disp: , Rfl:   •  omeprazole OTC (PriLOSEC OTC) 20 MG EC tablet, Take 20 mg by mouth., Disp: , Rfl:   •  oxyCODONE (ROXICODONE) 10 MG tablet, oxycodone 10 mg tablet  TAKE 1 TABLET BY MOUTH EVERY 6 HOURS AS NEEDED FOR PAIN, Disp: , Rfl:   •  oxyCODONE-acetaminophen (PERCOCET)  MG per tablet, Take 1 tablet by mouth Every 6 (Six) Hours As Needed., Disp: , Rfl:   •  pregabalin (LYRICA) 150 MG capsule, 2 (Two) Times a Day., Disp: , Rfl:   •  sertraline (ZOLOFT) 100 MG tablet, Take 100 mg by mouth Daily., Disp: , Rfl:       Objective:     Vitals:    09/13/21 1314 09/13/21 1328   BP: 158/90 130/90   Pulse: 60    SpO2: 95%    Weight: 135 kg (297 lb)    Height: 188 cm (74\")      Body mass " index is 38.13 kg/m².    PHYSICAL EXAM:    Constitutional:       General: Not in acute distress.     Appearance: Normal appearance. Well-developed.   Eyes:      Pupils: Pupils are equal, round, and reactive to light.   HENT:      Head: Normocephalic.   Neck:      Vascular: No carotid bruit or JVD.   Pulmonary:      Effort: Pulmonary effort is normal. No tachypnea.      Breath sounds: Normal breath sounds. No wheezing. No rales.   Cardiovascular:      Normal rate. Regular rhythm.      No gallop.   Pulses:     Intact distal pulses.   Edema:     Pretibial: bilateral 2+ edema of the pretibial area.     Ankle: 2+ edema of the left ankle and 1+ edema of the right ankle.     Feet: 1+ edema of the left foot and trace edema of the right foot.  Abdominal:      General: Bowel sounds are normal.      Palpations: Abdomen is soft.      Tenderness: There is no abdominal tenderness.   Musculoskeletal: Normal range of motion.      Cervical back: Normal range of motion and neck supple. No edema.        Legs:  Skin:     General: Skin is warm and dry.   Neurological:      Mental Status: Alert and oriented to person, place, and time.           ECG 12 Lead    Date/Time: 9/13/2021 1:55 PM  Performed by: Kailey Olvera APRN  Authorized by: Kailey Olvera APRN   Comparison: compared with previous ECG from 8/12/2020  Rhythm: sinus rhythm  Rate: normal  QRS axis: normal    Clinical impression: normal ECG              Assessment:       Diagnosis Plan   1. Coronary artery disease involving native coronary artery of native heart without angina pectoris  Adult Transthoracic Echo Complete w/ Color, Spectral and Contrast if Necessary Per Protocol   2. Mixed hyperlipidemia     3. Essential hypertension     4. Stress-induced cardiomyopathy  Adult Transthoracic Echo Complete w/ Color, Spectral and Contrast if Necessary Per Protocol   5. 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 (CMS/HCC)     6. Hyponatremia       Orders  Placed This Encounter   Procedures   • Adult Transthoracic Echo Complete w/ Color, Spectral and Contrast if Necessary Per Protocol     Standing Status:   Future     Standing Expiration Date:   9/13/2022     Order Specific Question:   Reason for exam?     Answer:   Other Reasons     Order Specific Question:   Reason for exam?     Answer:   Heart Failure, Cardiomyopathy, or Sytemic or Pulmonary Hypertension     Order Specific Question:   Other reason(s)?     Answer:   Additional Reasons     Order Specific Question:   Additional reason(s)?     Answer:   Reasons Uncertain in Most Circumstances     Order Specific Question:   Release to patient     Answer:   Immediate          Plan:       His blood pressure initially was high today and on recheck it had normalized.  He is going to check it at home over the next 2 weeks and send me a [employer] message with his readings.  And get a get an echocardiogram to make sure there is not been any change in his LV function due to his persistent fatigue.  He can see us back in 6 months time         Your medication list          Accurate as of September 13, 2021  1:53 PM. If you have any questions, ask your nurse or doctor.            CHANGE how you take these medications      Instructions Last Dose Given Next Dose Due   pregabalin 150 MG capsule  Commonly known as: LYRICA  What changed: Another medication with the same name was removed. Continue taking this medication, and follow the directions you see here.  Changed by: Kailey Olvera, APRN      2 (Two) Times a Day.          CONTINUE taking these medications      Instructions Last Dose Given Next Dose Due   Accu-Chek Guide test strip  Generic drug: glucose blood      TEST BLOOD SUGAR 3-4 TIMES A DAY       accu-chek multiclix lancets      Use to test blood sugar 5 times daily       atorvastatin 40 MG tablet  Commonly known as: LIPITOR      TAKE 1 TABLET BY MOUTH EVERY EVENING.       Insulin Lispro 100 UNIT/ML solution  pen-injector  Commonly known as: HumaLOG KwikPen      INJECT 16 UNITS WITH EACH MEAL AS DIRECTED, MAXIMUM  UNITS DAILY AS DIRECTED BY PRESCRIBER       Insulin Pen Needle 32G X 4 MM misc  Commonly known as: BD Pen Needle Mireille U/F      TO INJECT 4 TIMES DAILY       isosorbide mononitrate 30 MG 24 hr tablet  Commonly known as: IMDUR      isosorbide mononitrate ER 30 mg tablet,extended release 24 hr       LANTUS SC           Insulin Glargine 100 UNIT/ML injection pen  Commonly known as: LANTUS SOLOSTAR      INJECT 46 UNITS IN THE AM AND 42 UNITS IN THE PM       lisinopril 20 MG tablet  Commonly known as: PRINIVIL,ZESTRIL      Take 20 mg by mouth Daily.       metoprolol tartrate 25 MG tablet  Commonly known as: LOPRESSOR      Take 1 tablet by mouth Every 12 (Twelve) Hours.       multivitamin tablet tablet  Commonly known as: THERAGRAN      Take 1 tablet by mouth Daily. HOLD PRIOR TO SURGERY       omeprazole 40 MG capsule  Commonly known as: priLOSEC      Take 40 mg by mouth 2 (Two) Times a Day.       omeprazole OTC 20 MG EC tablet  Commonly known as: PriLOSEC OTC      Take 20 mg by mouth.       oxyCODONE 10 MG tablet  Commonly known as: ROXICODONE      oxycodone 10 mg tablet   TAKE 1 TABLET BY MOUTH EVERY 6 HOURS AS NEEDED FOR PAIN       oxyCODONE-acetaminophen  MG per tablet  Commonly known as: PERCOCET      Take 1 tablet by mouth Every 6 (Six) Hours As Needed.       sertraline 100 MG tablet  Commonly known as: ZOLOFT      Take 100 mg by mouth Daily.                As always, it has been a pleasure to participate in your patient's care.      Sincerely,     Kailey MORA

## 2021-10-04 ENCOUNTER — TELEPHONE (OUTPATIENT)
Dept: CARDIOLOGY | Facility: CLINIC | Age: 71
End: 2021-10-04

## 2021-10-04 ENCOUNTER — HOSPITAL ENCOUNTER (OUTPATIENT)
Dept: CARDIOLOGY | Facility: HOSPITAL | Age: 71
Discharge: HOME | End: 2021-10-04
Admitting: NURSE PRACTITIONER

## 2021-10-04 VITALS
BODY MASS INDEX: 38.5 KG/M2 | WEIGHT: 300 LBS | OXYGEN SATURATION: 96 % | SYSTOLIC BLOOD PRESSURE: 160 MMHG | DIASTOLIC BLOOD PRESSURE: 90 MMHG | HEART RATE: 79 BPM | HEIGHT: 74 IN

## 2021-10-04 DIAGNOSIS — I25.10 CORONARY ARTERY DISEASE INVOLVING NATIVE CORONARY ARTERY OF NATIVE HEART WITHOUT ANGINA PECTORIS: ICD-10-CM

## 2021-10-04 DIAGNOSIS — I51.81 STRESS-INDUCED CARDIOMYOPATHY: ICD-10-CM

## 2021-10-04 PROCEDURE — 93306 TTE W/DOPPLER COMPLETE: CPT

## 2021-10-04 PROCEDURE — 93306 TTE W/DOPPLER COMPLETE: CPT | Performed by: INTERNAL MEDICINE

## 2021-10-04 PROCEDURE — 25010000002 PERFLUTREN (DEFINITY) 8.476 MG IN SODIUM CHLORIDE (PF) 0.9 % 10 ML INJECTION: Performed by: NURSE PRACTITIONER

## 2021-10-04 RX ADMIN — PERFLUTREN 1.5 ML: 6.52 INJECTION, SUSPENSION INTRAVENOUS at 14:12

## 2021-10-05 LAB
AORTIC ARCH: 2.4 CM
AORTIC DIMENSIONLESS INDEX: 0.5 (DI)
ASCENDING AORTA: 3.3 CM
BH CV ECHO MEAS - ACS: 2.2 CM
BH CV ECHO MEAS - AO MAX PG (FULL): 19.5 MMHG
BH CV ECHO MEAS - AO MAX PG: 23.6 MMHG
BH CV ECHO MEAS - AO MEAN PG (FULL): 8 MMHG
BH CV ECHO MEAS - AO MEAN PG: 10 MMHG
BH CV ECHO MEAS - AO ROOT AREA (BSA CORRECTED): 1.4
BH CV ECHO MEAS - AO ROOT AREA: 9.6 CM^2
BH CV ECHO MEAS - AO ROOT DIAM: 3.5 CM
BH CV ECHO MEAS - AO V2 MAX: 243 CM/SEC
BH CV ECHO MEAS - AO V2 MEAN: 148 CM/SEC
BH CV ECHO MEAS - AO V2 VTI: 47.9 CM
BH CV ECHO MEAS - ASC AORTA: 3.3 CM
BH CV ECHO MEAS - AVA(I,A): 1.5 CM^2
BH CV ECHO MEAS - AVA(I,D): 1.5 CM^2
BH CV ECHO MEAS - AVA(V,A): 1.3 CM^2
BH CV ECHO MEAS - AVA(V,D): 1.3 CM^2
BH CV ECHO MEAS - BSA(HAYCOCK): 2.7 M^2
BH CV ECHO MEAS - BSA: 2.6 M^2
BH CV ECHO MEAS - BZI_BMI: 38.5 KILOGRAMS/M^2
BH CV ECHO MEAS - BZI_METRIC_HEIGHT: 188 CM
BH CV ECHO MEAS - BZI_METRIC_WEIGHT: 136.1 KG
BH CV ECHO MEAS - EDV(MOD-SP2): 145 ML
BH CV ECHO MEAS - EDV(MOD-SP4): 200 ML
BH CV ECHO MEAS - EDV(TEICH): 153.7 ML
BH CV ECHO MEAS - EF(CUBED): 66.2 %
BH CV ECHO MEAS - EF(MOD-BP): 63 %
BH CV ECHO MEAS - EF(MOD-SP2): 60.7 %
BH CV ECHO MEAS - EF(MOD-SP4): 65 %
BH CV ECHO MEAS - EF(TEICH): 57.1 %
BH CV ECHO MEAS - ESV(MOD-SP2): 57 ML
BH CV ECHO MEAS - ESV(MOD-SP4): 70 ML
BH CV ECHO MEAS - ESV(TEICH): 65.9 ML
BH CV ECHO MEAS - FS: 30.4 %
BH CV ECHO MEAS - IVS/LVPW: 1
BH CV ECHO MEAS - IVSD: 1.2 CM
BH CV ECHO MEAS - LAT PEAK E' VEL: 11.1 CM/SEC
BH CV ECHO MEAS - LV DIASTOLIC VOL/BSA (35-75): 77.5 ML/M^2
BH CV ECHO MEAS - LV MASS(C)D: 280.5 GRAMS
BH CV ECHO MEAS - LV MASS(C)DI: 108.6 GRAMS/M^2
BH CV ECHO MEAS - LV MAX PG: 4.2 MMHG
BH CV ECHO MEAS - LV MEAN PG: 2 MMHG
BH CV ECHO MEAS - LV SYSTOLIC VOL/BSA (12-30): 27.1 ML/M^2
BH CV ECHO MEAS - LV V1 MAX: 102 CM/SEC
BH CV ECHO MEAS - LV V1 MEAN: 74.7 CM/SEC
BH CV ECHO MEAS - LV V1 VTI: 23.5 CM
BH CV ECHO MEAS - LVIDD: 5.6 CM
BH CV ECHO MEAS - LVIDS: 3.9 CM
BH CV ECHO MEAS - LVLD AP2: 8.3 CM
BH CV ECHO MEAS - LVLD AP4: 9.5 CM
BH CV ECHO MEAS - LVLS AP2: 6.4 CM
BH CV ECHO MEAS - LVLS AP4: 8.3 CM
BH CV ECHO MEAS - LVOT AREA (M): 3.1 CM^2
BH CV ECHO MEAS - LVOT AREA: 3.1 CM^2
BH CV ECHO MEAS - LVOT DIAM: 2 CM
BH CV ECHO MEAS - LVPWD: 1.2 CM
BH CV ECHO MEAS - MED PEAK E' VEL: 6.2 CM/SEC
BH CV ECHO MEAS - MR MAX PG: 65.9 MMHG
BH CV ECHO MEAS - MR MAX VEL: 406 CM/SEC
BH CV ECHO MEAS - MV A DUR: 0.14 SEC
BH CV ECHO MEAS - MV A MAX VEL: 113 CM/SEC
BH CV ECHO MEAS - MV DEC SLOPE: 419 CM/SEC^2
BH CV ECHO MEAS - MV DEC TIME: 0.25 SEC
BH CV ECHO MEAS - MV E MAX VEL: 140 CM/SEC
BH CV ECHO MEAS - MV E/A: 1.2
BH CV ECHO MEAS - MV MAX PG: 9.2 MMHG
BH CV ECHO MEAS - MV MEAN PG: 4 MMHG
BH CV ECHO MEAS - MV P1/2T MAX VEL: 151 CM/SEC
BH CV ECHO MEAS - MV P1/2T: 105.6 MSEC
BH CV ECHO MEAS - MV V2 MAX: 152 CM/SEC
BH CV ECHO MEAS - MV V2 MEAN: 96.2 CM/SEC
BH CV ECHO MEAS - MV V2 VTI: 52.8 CM
BH CV ECHO MEAS - MVA P1/2T LCG: 1.5 CM^2
BH CV ECHO MEAS - MVA(P1/2T): 2.1 CM^2
BH CV ECHO MEAS - MVA(VTI): 1.4 CM^2
BH CV ECHO MEAS - PA ACC TIME: 0.14 SEC
BH CV ECHO MEAS - PA MAX PG (FULL): 2 MMHG
BH CV ECHO MEAS - PA MAX PG: 3.9 MMHG
BH CV ECHO MEAS - PA PR(ACCEL): 17.4 MMHG
BH CV ECHO MEAS - PA V2 MAX: 99.1 CM/SEC
BH CV ECHO MEAS - PULM A REVS DUR: 0.14 SEC
BH CV ECHO MEAS - PULM A REVS VEL: 26.1 CM/SEC
BH CV ECHO MEAS - PULM DIAS VEL: 73 CM/SEC
BH CV ECHO MEAS - PULM S/D: 1.1
BH CV ECHO MEAS - PULM SYS VEL: 80.4 CM/SEC
BH CV ECHO MEAS - PVA(V,A): 2.7 CM^2
BH CV ECHO MEAS - PVA(V,D): 2.7 CM^2
BH CV ECHO MEAS - QP/QS: 0.68
BH CV ECHO MEAS - RAP SYSTOLE: 8 MMHG
BH CV ECHO MEAS - RV MAX PG: 1.9 MMHG
BH CV ECHO MEAS - RV MEAN PG: 1 MMHG
BH CV ECHO MEAS - RV V1 MAX: 69.8 CM/SEC
BH CV ECHO MEAS - RV V1 MEAN: 47.5 CM/SEC
BH CV ECHO MEAS - RV V1 VTI: 13.3 CM
BH CV ECHO MEAS - RVOT AREA: 3.8 CM^2
BH CV ECHO MEAS - RVOT DIAM: 2.2 CM
BH CV ECHO MEAS - RVSP: 47.7 MMHG
BH CV ECHO MEAS - SI(AO): 178.5 ML/M^2
BH CV ECHO MEAS - SI(CUBED): 45 ML/M^2
BH CV ECHO MEAS - SI(LVOT): 28.6 ML/M^2
BH CV ECHO MEAS - SI(MOD-SP2): 34.1 ML/M^2
BH CV ECHO MEAS - SI(MOD-SP4): 50.4 ML/M^2
BH CV ECHO MEAS - SI(TEICH): 34 ML/M^2
BH CV ECHO MEAS - SUP REN AO DIAM: 2.1 CM
BH CV ECHO MEAS - SV(AO): 460.9 ML
BH CV ECHO MEAS - SV(CUBED): 116.3 ML
BH CV ECHO MEAS - SV(LVOT): 73.8 ML
BH CV ECHO MEAS - SV(MOD-SP2): 88 ML
BH CV ECHO MEAS - SV(MOD-SP4): 130 ML
BH CV ECHO MEAS - SV(RVOT): 50.6 ML
BH CV ECHO MEAS - SV(TEICH): 87.8 ML
BH CV ECHO MEAS - TAPSE (>1.6): 2.6 CM
BH CV ECHO MEAS - TR MAX VEL: 315 CM/SEC
BH CV ECHO MEASUREMENTS AVERAGE E/E' RATIO: 16.18
BH CV XLRA - RV BASE: 3.5 CM
BH CV XLRA - RV LENGTH: 7.8 CM
BH CV XLRA - RV MID: 2.8 CM
BH CV XLRA - TDI S': 17.2 CM/SEC
LEFT ATRIUM VOLUME INDEX: 43 ML/M2
MAXIMAL PREDICTED HEART RATE: 150 BPM
SINUS: 3 CM
STJ: 2.9 CM
STRESS TARGET HR: 128 BPM

## 2021-10-15 RX ORDER — INSULIN LISPRO 100 [IU]/ML
INJECTION, SOLUTION INTRAVENOUS; SUBCUTANEOUS
Qty: 90 ML | Refills: 3 | OUTPATIENT
Start: 2021-10-15

## 2021-11-04 ENCOUNTER — OFFICE VISIT (OUTPATIENT)
Dept: ENDOCRINOLOGY | Age: 71
End: 2021-11-04

## 2021-11-04 VITALS
DIASTOLIC BLOOD PRESSURE: 62 MMHG | HEIGHT: 74 IN | WEIGHT: 305.4 LBS | BODY MASS INDEX: 39.19 KG/M2 | SYSTOLIC BLOOD PRESSURE: 138 MMHG

## 2021-11-04 DIAGNOSIS — Z79.4 TYPE 2 DIABETES MELLITUS WITH MICROALBUMINURIA, WITH LONG-TERM CURRENT USE OF INSULIN (HCC): Primary | ICD-10-CM

## 2021-11-04 DIAGNOSIS — E55.9 VITAMIN D DEFICIENCY: ICD-10-CM

## 2021-11-04 DIAGNOSIS — R80.9 TYPE 2 DIABETES MELLITUS WITH MICROALBUMINURIA, WITH LONG-TERM CURRENT USE OF INSULIN (HCC): Primary | ICD-10-CM

## 2021-11-04 DIAGNOSIS — E11.29 TYPE 2 DIABETES MELLITUS WITH MICROALBUMINURIA, WITH LONG-TERM CURRENT USE OF INSULIN (HCC): Primary | ICD-10-CM

## 2021-11-04 DIAGNOSIS — E78.2 MIXED HYPERLIPIDEMIA: ICD-10-CM

## 2021-11-04 DIAGNOSIS — Z79.4 LONG-TERM INSULIN USE (HCC): ICD-10-CM

## 2021-11-04 PROCEDURE — 99214 OFFICE O/P EST MOD 30 MIN: CPT | Performed by: INTERNAL MEDICINE

## 2021-11-04 NOTE — PROGRESS NOTES
"Chief Complaint  Chief Complaint   Patient presents with   • Diabetes   FOLLOW UP/ TYPE 2 DM      Subjective          History of Present Illness    Hugh R McBurney 70 y.o. presents with Type 2 dm as a  F/u patient.     Type 2 dm - Diagnosed in 1996.   Today in clinic pt reports being on lantus 46 units Q am and 42 units at bed time, humalog 16 units tid ac with ssi as well.  FBG - 100 - 150  Pre meals - 150 - 180   Checks BG - 3 - 4 times  X day  Sensor - x  Dm retinopathy - x,Last eye exam - March 2021  Dm nephropathy -   Dm neuropathy -yes ,Dm neuropathy meds - n/a  CAD - yes  CVA -x  Episodes of hypoglycemia - x  Pt is physically active. weight has been stable.   Pt tries to follow DM diet for most part.         Reviewed primary care physician's/consulting physician documentation and lab results         I have reviewed the patient's allergies, medicines, past medical hx, family hx and social hx in detail.    Objective   Vital Signs:   /62   Ht 188 cm (74\")   Wt (!) 139 kg (305 lb 6.4 oz)   BMI 39.21 kg/m²   Physical Exam   General appearance - no distress  Eyes- anicteric sclera  Ear nose and throat-external ears and nose normal.    Respiratory-normal chest on inspection.  No respiratory distress noted.  Skin-no rashes.  Neuro-alert and oriented x3          Result Review :   The following data was reviewed by: Adarsh Love MD on 11/04/2021:  Hospital Outpatient Visit on 10/04/2021   Component Date Value Ref Range Status   • BSA 10/04/2021 2.6  m^2 Final   • IVSd 10/04/2021 1.2  cm Final   • LVIDd 10/04/2021 5.6  cm Final   • LVIDs 10/04/2021 3.9  cm Final   • LVPWd 10/04/2021 1.2  cm Final   • IVS/LVPW 10/04/2021 1.0   Final   • FS 10/04/2021 30.4  % Final   • EDV(Teich) 10/04/2021 153.7  ml Final   • ESV(Teich) 10/04/2021 65.9  ml Final   • EF(Teich) 10/04/2021 57.1  % Final   • EF(cubed) 10/04/2021 66.2  % Final   • LV mass(C)d 10/04/2021 280.5  grams Final   • LV mass(C)dI 10/04/2021 108.6  " grams/m^2 Final   • SV(Teich) 10/04/2021 87.8  ml Final   • SI(Teich) 10/04/2021 34.0  ml/m^2 Final   • SV(cubed) 10/04/2021 116.3  ml Final   • SI(cubed) 10/04/2021 45.0  ml/m^2 Final   • Ao root diam 10/04/2021 3.5  cm Final   • Ao root area 10/04/2021 9.6  cm^2 Final   • ACS 10/04/2021 2.2  cm Final   • asc Aorta Diam 10/04/2021 3.3  cm Final   • LVOT diam 10/04/2021 2.0  cm Final   • LVOT area 10/04/2021 3.1  cm^2 Final   • LVOT area(traced) 10/04/2021 3.1  cm^2 Final   • RVOT diam 10/04/2021 2.2  cm Final   • RVOT area 10/04/2021 3.8  cm^2 Final   • LVLd ap4 10/04/2021 9.5  cm Final   • EDV(MOD-sp4) 10/04/2021 200.0  ml Final   • LVLs ap4 10/04/2021 8.3  cm Final   • ESV(MOD-sp4) 10/04/2021 70.0  ml Final   • EF(MOD-sp4) 10/04/2021 65.0  % Final   • LVLd ap2 10/04/2021 8.3  cm Final   • EDV(MOD-sp2) 10/04/2021 145.0  ml Final   • LVLs ap2 10/04/2021 6.4  cm Final   • ESV(MOD-sp2) 10/04/2021 57.0  ml Final   • EF(MOD-sp2) 10/04/2021 60.7  % Final   • SV(MOD-sp4) 10/04/2021 130.0  ml Final   • SI(MOD-sp4) 10/04/2021 50.4  ml/m^2 Final   • SV(MOD-sp2) 10/04/2021 88.0  ml Final   • SI(MOD-sp2) 10/04/2021 34.1  ml/m^2 Final   • Ao root area (BSA corrected) 10/04/2021 1.4   Final   • LV Umana Vol (BSA corrected) 10/04/2021 77.5  ml/m^2 Final   • LV Sys Vol (BSA corrected) 10/04/2021 27.1  ml/m^2 Final   • TAPSE (>1.6) 10/04/2021 2.6  cm Final   • MV A dur 10/04/2021 0.14  sec Final   • MV E max darwin 10/04/2021 140.0  cm/sec Final   • MV A max darwin 10/04/2021 113.0  cm/sec Final   • MV E/A 10/04/2021 1.2   Final   • MV V2 max 10/04/2021 152.0  cm/sec Final   • MV max PG 10/04/2021 9.2  mmHg Final   • MV V2 mean 10/04/2021 96.2  cm/sec Final   • MV mean PG 10/04/2021 4.0  mmHg Final   • MV V2 VTI 10/04/2021 52.8  cm Final   • MVA(VTI) 10/04/2021 1.4  cm^2 Final   • MV P1/2t max darwin 10/04/2021 151.0  cm/sec Final   • MV P1/2t 10/04/2021 105.6  msec Final   • MVA(P1/2t) 10/04/2021 2.1  cm^2 Final   • MV dec slope 10/04/2021  419.0  cm/sec^2 Final   • MV dec time 10/04/2021 0.25  sec Final   • Ao pk miguel angel 10/04/2021 243.0  cm/sec Final   • Ao max PG 10/04/2021 23.6  mmHg Final   • Ao max PG (full) 10/04/2021 19.5  mmHg Final   • Ao V2 mean 10/04/2021 148.0  cm/sec Final   • Ao mean PG 10/04/2021 10.0  mmHg Final   • Ao mean PG (full) 10/04/2021 8.0  mmHg Final   • Ao V2 VTI 10/04/2021 47.9  cm Final   • IVELISSE(I,A) 10/04/2021 1.5  cm^2 Final   • IVELISSE(I,D) 10/04/2021 1.5  cm^2 Final   • IVELISSE(V,A) 10/04/2021 1.3  cm^2 Final   • IVELISSE(V,D) 10/04/2021 1.3  cm^2 Final   • LV V1 max PG 10/04/2021 4.2  mmHg Final   • LV V1 mean PG 10/04/2021 2.0  mmHg Final   • LV V1 max 10/04/2021 102.0  cm/sec Final   • LV V1 mean 10/04/2021 74.7  cm/sec Final   • LV V1 VTI 10/04/2021 23.5  cm Final   • MR max miguel angel 10/04/2021 406.0  cm/sec Final   • MR max PG 10/04/2021 65.9  mmHg Final   • SV(Ao) 10/04/2021 460.9  ml Final   • SI(Ao) 10/04/2021 178.5  ml/m^2 Final   • SV(LVOT) 10/04/2021 73.8  ml Final   • SV(RVOT) 10/04/2021 50.6  ml Final   • SI(LVOT) 10/04/2021 28.6  ml/m^2 Final   • PA V2 max 10/04/2021 99.1  cm/sec Final   • PA max PG 10/04/2021 3.9  mmHg Final   • PA max PG (full) 10/04/2021 2.0  mmHg Final   • BH CV ECHO NICO - PVA(V,A) 10/04/2021 2.7  cm^2 Final   • BH CV ECHO NICO - PVA(V,D) 10/04/2021 2.7  cm^2 Final   • PA acc time 10/04/2021 0.14  sec Final   • RV V1 max PG 10/04/2021 1.9  mmHg Final   • RV V1 mean PG 10/04/2021 1.0  mmHg Final   • RV V1 max 10/04/2021 69.8  cm/sec Final   • RV V1 mean 10/04/2021 47.5  cm/sec Final   • RV V1 VTI 10/04/2021 13.3  cm Final   • TR max miguel angel 10/04/2021 315.0  cm/sec Final   • RVSP(TR) 10/04/2021 47.7  mmHg Final   • RAP systole 10/04/2021 8.0  mmHg Final   • PA pr(Accel) 10/04/2021 17.4  mmHg Final   • Pulm Sys Miguel Angel 10/04/2021 80.4  cm/sec Final   • Pulm Umana Miguel Angel 10/04/2021 73.0  cm/sec Final   • Pulm S/D 10/04/2021 1.1   Final   • Qp/Qs 10/04/2021 0.68   Final   • Pulm A Revs Dur 10/04/2021 0.14  sec Final   •  Pulm A Revs Miguel Angel 10/04/2021 26.1  cm/sec Final   • MVA P1/2T LCG 10/04/2021 1.5  cm^2 Final   • RV Base 10/04/2021 3.5  cm Final   • RV Length 10/04/2021 7.8  cm Final   • RV Mid 10/04/2021 2.8  cm Final   • Lat Peak E' Miguel Angel 10/04/2021 11.1  cm/sec Final   • Med Peak E' Miguel Angel 10/04/2021 6.2  cm/sec Final   • RV S' 10/04/2021 17.2  cm/sec Final   • BH CV ECHO NICO - BZI_BMI 10/04/2021 38.5  kilograms/m^2 Final   •  CV ECHO NICO - BSA(HAYCOCK) 10/04/2021 2.7  m^2 Final   •  CV ECHO NICO - BZI_METRIC_WEIGHT 10/04/2021 136.1  kg Final   •  CV ECHO NICO - BZI_METRIC_HEIGHT 10/04/2021 188.0  cm Final   • Avg E/e' ratio 10/04/2021 16.18   Final   • Sinus 10/04/2021 3.0  cm Final   • STJ 10/04/2021 2.9  cm Final   • Ascending aorta 10/04/2021 3.3  cm Final   • Aortic arch 10/04/2021 2.4  cm Final   • Dimensionless Index 10/04/2021 0.50  (DI) Final   • LA Volume Index 10/04/2021 43.0  mL/m2 Final   • EF(MOD-bp) 10/04/2021 63  % Final   • Abdo Ao Diam 10/04/2021 2.1  cm Final   • Target HR (85%) 10/04/2021 128  bpm Final   • Max. Pred. HR (100%) 10/04/2021 150  bpm Final     Data reviewed: Cardiologist documentation       Results Review:    I reviewed the patient's new clinical results.     Assessment and Plan    Problem List Items Addressed This Visit        Other    Hyperlipidemia    Relevant Orders    Basic Metabolic Panel    Hemoglobin A1c    Lipid Panel    TSH    T4, Free    Vitamin D 25 Hydroxy      Other Visit Diagnoses     Type 2 diabetes mellitus with microalbuminuria, with long-term current use of insulin (HCC)    -  Primary    Relevant Orders    Basic Metabolic Panel    Hemoglobin A1c    Lipid Panel    TSH    T4, Free    Vitamin D 25 Hydroxy    Long-term insulin use (HCC)        Relevant Orders    Basic Metabolic Panel    Hemoglobin A1c    Lipid Panel    TSH    T4, Free    Vitamin D 25 Hydroxy    Vitamin D deficiency        Relevant Orders    Basic Metabolic Panel    Hemoglobin A1c    Lipid Panel    TSH    T4,  "Free    Vitamin D 25 Hydroxy        Type 2 diabetes mellitus-uncontrolled with hyperglycemia  HbA1c is not at goal  Continue the above insulin regimen  Add Jardiance 25 mg oral daily.    Hyperlipidemia  Continue Lipitor 40 mg oral daily    Diabetic neuropathy  Continue Lyrica 150 mg 2 times a day     interpreted the blood work-up/imaging results performed by the primary care/consulting physician -    Refills sent to pharmacy    Follow Up     Patient was given instructions and counseling regarding her condition or for health maintenance advice. Please see specific information pulled into the AVS if appropriate.       Thank you for asking me to see your patient, Hugh R McBurney in consultation.         Adarsh Love MD  11/04/21      EMR Dragon / transcription disclaimer:     \"Dictated utilizing Dragon dictation\".         "

## 2021-12-23 RX ORDER — INSULIN LISPRO 100 [IU]/ML
22 INJECTION, SOLUTION INTRAVENOUS; SUBCUTANEOUS 4 TIMES DAILY
Qty: 90 ML | Refills: 3 | Status: SHIPPED | OUTPATIENT
Start: 2021-12-23 | End: 2023-02-27 | Stop reason: SDUPTHER

## 2022-01-25 ENCOUNTER — LAB (OUTPATIENT)
Dept: ENDOCRINOLOGY | Age: 72
End: 2022-01-25

## 2022-01-25 DIAGNOSIS — E11.29 TYPE 2 DIABETES MELLITUS WITH MICROALBUMINURIA, WITH LONG-TERM CURRENT USE OF INSULIN: ICD-10-CM

## 2022-01-25 DIAGNOSIS — E78.2 MIXED HYPERLIPIDEMIA: ICD-10-CM

## 2022-01-25 DIAGNOSIS — E55.9 VITAMIN D DEFICIENCY: ICD-10-CM

## 2022-01-25 DIAGNOSIS — R80.9 TYPE 2 DIABETES MELLITUS WITH MICROALBUMINURIA, WITH LONG-TERM CURRENT USE OF INSULIN: ICD-10-CM

## 2022-01-25 DIAGNOSIS — Z79.4 LONG-TERM INSULIN USE: ICD-10-CM

## 2022-01-25 DIAGNOSIS — Z79.4 TYPE 2 DIABETES MELLITUS WITH MICROALBUMINURIA, WITH LONG-TERM CURRENT USE OF INSULIN: ICD-10-CM

## 2022-01-26 LAB
25(OH)D3+25(OH)D2 SERPL-MCNC: 22.4 NG/ML (ref 30–100)
BUN SERPL-MCNC: 27 MG/DL (ref 8–27)
BUN/CREAT SERPL: 16 (ref 10–24)
CALCIUM SERPL-MCNC: 8.7 MG/DL (ref 8.6–10.2)
CHLORIDE SERPL-SCNC: 106 MMOL/L (ref 96–106)
CHOLEST SERPL-MCNC: 153 MG/DL (ref 100–199)
CO2 SERPL-SCNC: 23 MMOL/L (ref 20–29)
CREAT SERPL-MCNC: 1.66 MG/DL (ref 0.76–1.27)
GLUCOSE SERPL-MCNC: 156 MG/DL (ref 65–99)
HBA1C MFR BLD: 7.6 % (ref 4.8–5.6)
HDLC SERPL-MCNC: 46 MG/DL
IMP & REVIEW OF LAB RESULTS: NORMAL
LDLC SERPL CALC-MCNC: 83 MG/DL (ref 0–99)
POTASSIUM SERPL-SCNC: 5 MMOL/L (ref 3.5–5.2)
REPORT: NORMAL
SODIUM SERPL-SCNC: 139 MMOL/L (ref 134–144)
T4 FREE SERPL-MCNC: 0.96 NG/DL (ref 0.82–1.77)
TRIGL SERPL-MCNC: 138 MG/DL (ref 0–149)
TSH SERPL-ACNC: 3.45 UIU/ML (ref 0.45–4.5)
VLDLC SERPL CALC-MCNC: 24 MG/DL (ref 5–40)

## 2022-02-02 ENCOUNTER — DOCUMENTATION (OUTPATIENT)
Dept: ENDOCRINOLOGY | Age: 72
End: 2022-02-02

## 2022-02-02 NOTE — PROGRESS NOTES
Benefits Investigation Summary    Prescription: Renewal     Dispensing pharmacy: [facility]    Copay amount: JARDIANCE $42    Pharmacy Payor/Plan: KAREY PART D  BIN: [id number]  PCN: [id number]  GROUP: [id number]    Prior Auth and Med Assistance notes:

## 2022-02-09 ENCOUNTER — OFFICE VISIT (OUTPATIENT)
Dept: ENDOCRINOLOGY | Age: 72
End: 2022-02-09

## 2022-02-09 VITALS
SYSTOLIC BLOOD PRESSURE: 133 MMHG | BODY MASS INDEX: 38.96 KG/M2 | OXYGEN SATURATION: 96 % | HEIGHT: 74 IN | WEIGHT: 303.6 LBS | DIASTOLIC BLOOD PRESSURE: 74 MMHG | HEART RATE: 63 BPM

## 2022-02-09 DIAGNOSIS — E78.2 MIXED HYPERLIPIDEMIA: ICD-10-CM

## 2022-02-09 DIAGNOSIS — R80.9 TYPE 2 DIABETES MELLITUS WITH MICROALBUMINURIA, WITH LONG-TERM CURRENT USE OF INSULIN: Primary | ICD-10-CM

## 2022-02-09 DIAGNOSIS — E11.29 TYPE 2 DIABETES MELLITUS WITH MICROALBUMINURIA, WITH LONG-TERM CURRENT USE OF INSULIN: Primary | ICD-10-CM

## 2022-02-09 DIAGNOSIS — Z79.4 TYPE 2 DIABETES MELLITUS WITH MICROALBUMINURIA, WITH LONG-TERM CURRENT USE OF INSULIN: Primary | ICD-10-CM

## 2022-02-09 PROCEDURE — 99214 OFFICE O/P EST MOD 30 MIN: CPT | Performed by: NURSE PRACTITIONER

## 2022-02-09 NOTE — PROGRESS NOTES
"Chief Complaint  Diabetes Mellitus (follow up )    Subjective          Hugh R McBurney presents to [facility] ENDOCRINOLOGY  History of Present Illness     I have reviewed PMH, allergies and medications UTD at this visit     Type 2 dm    Diagnosed in 1996.   Today in clinic pt reports being on lantus 46 units Q am and 42 units at bed time, humalog 16 units tid ac with ssi as well  jardiance was too expensive   FBG - 140s  Pre meals - 150-250  Checks BG - 3 - 4 times  X day  Dm retinopathy - x, Last eye exam - 2/2021, will be arrange f/u soon  Dm nephropathy - yes, sees renal Dr Rivera   Dm neuropathy -yes ,Dm neuropathy meds - no  CAD - yes  CVA -x  Episodes of hypoglycemia - lowest 66  Pt is physically active to the best of this ability with knee pain. weight has been stable.   Pt tries to follow DM diet for most part.      Objective   Vital Signs:   /74   Pulse 63   Ht 188 cm (74\")   Wt (!) 138 kg (303 lb 9.6 oz)   SpO2 96%   BMI 38.98 kg/m²       Physical Exam  Vitals reviewed.   Constitutional:       General: He is not in acute distress.  HENT:      Head: Normocephalic and atraumatic.   Cardiovascular:      Rate and Rhythm: Normal rate and regular rhythm.   Pulmonary:      Effort: Pulmonary effort is normal. No respiratory distress.   Musculoskeletal:         General: No signs of injury. Normal range of motion.      Cervical back: Normal range of motion and neck supple.   Skin:     General: Skin is warm and dry.   Neurological:      Mental Status: He is alert and oriented to person, place, and time. Mental status is at baseline.   Psychiatric:         Mood and Affect: Mood normal.         Behavior: Behavior normal.         Thought Content: Thought content normal.         Judgment: Judgment normal.            Result Review :   The following data was reviewed by: ABBY Alvarenga on 02/09/2022:  Common labs    Common Labsle 6/1/21 6/1/21 10/21/21 10/21/21 10/21/21 10/21/21 1/25/22 " 1/25/22 1/25/22    1016 1016 0931 0931 0931 0931 1001 1001 1001   Glucose   142 (A)      156 (A)   BUN   23      27   Creatinine   1.55 (A)      1.66 (A)   eGFR Non  Am   45 (A)      41 (A)   eGFR  Am   52 (A)      47 (A)   Sodium   141      139   Potassium   4.6      5.0   Chloride   106      106   Calcium   8.6      8.7   Total Cholesterol 122    132  153     Triglycerides 103    96  138     HDL Cholesterol 46    42  46     LDL Cholesterol  57    72  83     Hemoglobin A1C  7.20 (A)    7.5 (A)  7.6 (A)    Microalbumin, Urine    3,368.1        (A) Abnormal value       Comments are available for some flowsheets but are not being displayed.                     Assessment and Plan    Diagnoses and all orders for this visit:    1. Type 2 diabetes mellitus with microalbuminuria, with long-term current use of insulin (HCC) (Primary)  -     Hemoglobin A1c; Future  -     Basic Metabolic Panel; Future  -     Lipid Panel; Future    2. Mixed hyperlipidemia    Other orders  -     Insulin Glargine (LANTUS SOLOSTAR) 100 UNIT/ML injection pen; INJECT up to 50 UNITS IN THE AM AND 50 UNITS IN THE PM  Dispense: 75 mL; Refill: 1        Follow Up   No follow-ups on file.     Lantus: INJECT up to 50 UNITS IN THE AM AND 50 UNITS IN THE PM  Humalog: up to 22u TIDAC  a1c slightly above goal but thinks now that the holidays are over and the weather is warming up   Continue statin   He is working on trying to lose weight with decreasing portion sizes     Patient was given instructions and counseling regarding his condition or for health maintenance advice. Please see specific information pulled into the AVS if appropriate.     ABBY Alvarenga

## 2022-05-23 ENCOUNTER — OFFICE VISIT (OUTPATIENT)
Dept: CARDIOLOGY | Facility: CLINIC | Age: 72
End: 2022-05-23

## 2022-05-23 VITALS
BODY MASS INDEX: 39.4 KG/M2 | DIASTOLIC BLOOD PRESSURE: 70 MMHG | WEIGHT: 307 LBS | HEART RATE: 75 BPM | SYSTOLIC BLOOD PRESSURE: 138 MMHG | HEIGHT: 74 IN

## 2022-05-23 DIAGNOSIS — I51.81 STRESS-INDUCED CARDIOMYOPATHY: ICD-10-CM

## 2022-05-23 DIAGNOSIS — I25.10 CORONARY ARTERY DISEASE INVOLVING NATIVE CORONARY ARTERY OF NATIVE HEART WITHOUT ANGINA PECTORIS: ICD-10-CM

## 2022-05-23 DIAGNOSIS — E78.2 MIXED HYPERLIPIDEMIA: ICD-10-CM

## 2022-05-23 DIAGNOSIS — I10 ESSENTIAL HYPERTENSION: Primary | ICD-10-CM

## 2022-05-23 PROCEDURE — 93000 ELECTROCARDIOGRAM COMPLETE: CPT | Performed by: INTERNAL MEDICINE

## 2022-05-23 PROCEDURE — 99214 OFFICE O/P EST MOD 30 MIN: CPT | Performed by: INTERNAL MEDICINE

## 2022-05-23 RX ORDER — LISINOPRIL 30 MG/1
1 TABLET ORAL DAILY
COMMUNITY
Start: 2022-05-12

## 2022-05-23 NOTE — PROGRESS NOTES
Hugh R McBurney  1950  Date of Office Visit: 05/23/22  Encounter Provider: Tiburcio Moreno MD  Place of Service: [facility] CARDIOLOGY      CHIEF COMPLAINT:  Coronary artery disease  Stress-induced cardiomyopathy  Essential hypertension      HISTORY OF PRESENT ILLNESS:  72 y/o patient with a documented h/o HTN, HLD, DM, GERD, PRISCILLA-no CPAP, right TKA with joint infection and coronary artery disease.. In June 2017 he was seen because he had a  Vancomyocin reaction with chest pain, n/v and diaphoresis and a non-ST elevation myocardial infarction when here for knee surgery. A cardiac cath was done which showed a normal left main, 20% proximal LAD, 50% mid LAD, 90% proximal first diagonal, diffuse 50% mid circumflex, and a  70-80% proximal RPL 2 stenosis. An echo showed an EF of 42%, possibly from stress induced cardiomyopathy. He was sent home on an ace, beta blocker and nitrate to wait for 2-4 weeks before attempting surgery again.   At that time he had a repeat transthoracic echocardiogram showing his ejection fraction and wall motion had normalized.  Since that time has been doing very well.  He denies any chest pain or dyspnea on exertion.  He is tolerating his current medical regimen without any difficulties.  His LDL has increased along with his weight.  He has not been active as of late and he states that it has negatively affected his knee pain as well.      Review of Systems   Constitutional: Negative for fever and malaise/fatigue.   HENT: Negative for nosebleeds and sore throat.    Eyes: Negative for blurred vision and double vision.   Cardiovascular: Negative for chest pain, claudication, palpitations and syncope.   Respiratory: Negative for cough, shortness of breath and snoring.    Endocrine: Negative for cold intolerance, heat intolerance and polydipsia.   Skin: Negative for itching, poor wound healing and rash.   Musculoskeletal: Negative for joint pain, joint  swelling, muscle weakness and myalgias.   Gastrointestinal: Negative for abdominal pain, melena, nausea and vomiting.   Neurological: Negative for light-headedness, loss of balance, seizures, vertigo and weakness.   Psychiatric/Behavioral: Negative for altered mental status and depression.          Past Medical History:   Diagnosis Date   • Arthritis    • Chronic pain     BILATERAL KNEES AND BACK   • Contusion of knee    • Coronary artery disease    • Depression    • Diabetes mellitus     type 2 with atherosclerosis of arteries of extremities   • Diabetic ulcer of toe     left foot associated with type 2 diabetes mellitus, with necrosis of bone   • GERD (gastroesophageal reflux disease)    • Great toe pain     with cellulitis and gangrene   • History of kidney stones    • Hyperlipidemia    • Hypertension    • Hyponatremia    • Kidney stone    • Knee pain     hx of previous prosthetic joint infection   • MSSA (methicillin susceptible Staphylococcus aureus) infection     RIGHT KNEE   • NSTEMI (non-ST elevated myocardial infarction) 06/14/2017   • Sleep apnea     DOES NOT USE CPAP       The following portions of the patient's history were reviewed and updated as appropriate: Social history , Family history and Surgical history     Current Outpatient Medications on File Prior to Visit   Medication Sig Dispense Refill   • Accu-Chek Guide test strip TEST BLOOD SUGAR 3-4 TIMES A  each 2   • atorvastatin (LIPITOR) 40 MG tablet TAKE 1 TABLET BY MOUTH EVERY EVENING. 30 tablet 3   • empagliflozin (Jardiance) 25 MG tablet tablet Take 1 tablet by mouth Daily. 90 tablet 3   • HumaLOG KwikPen 100 UNIT/ML solution pen-injector Inject 22 Units under the skin into the appropriate area as directed 4 (Four) Times a Day. 90 mL 3   • Insulin Glargine (LANTUS SOLOSTAR) 100 UNIT/ML injection pen INJECT up to 50 UNITS IN THE AM AND 50 UNITS IN THE PM 75 mL 1   • Insulin Lispro (HUMALOG KWIKPEN) 100 UNIT/ML solution pen-injector  INJECT 16 UNITS WITH EACH MEAL AS DIRECTED, MAXIMUM  UNITS DAILY AS DIRECTED BY PRESCRIBER 90 mL 3   • Insulin Pen Needle (BD Pen Needle Mireille U/F) 32G X 4 MM misc TO INJECT 4 TIMES DAILY 100 each 3   • isosorbide mononitrate (IMDUR) 30 MG 24 hr tablet isosorbide mononitrate ER 30 mg tablet,extended release 24 hr     • Lancets (ACCU-CHEK MULTICLIX) lancets Use to test blood sugar 5 times daily 500 each 3   • lisinopril (PRINIVIL,ZESTRIL) 20 MG tablet Take 30 mg by mouth Daily.     • metoprolol tartrate (LOPRESSOR) 25 MG tablet Take 1 tablet by mouth Every 12 (Twelve) Hours. 60 tablet 11   • Multiple Vitamin (MULTI VITAMIN DAILY PO) Take 1 tablet by mouth Daily. HOLD PRIOR TO SURGERY     • omeprazole (priLOSEC) 40 MG capsule Take 40 mg by mouth 2 (Two) Times a Day.     • omeprazole OTC (PriLOSEC OTC) 20 MG EC tablet Take 20 mg by mouth.     • oxyCODONE (ROXICODONE) 10 MG tablet oxycodone 10 mg tablet   TAKE 1 TABLET BY MOUTH EVERY 6 HOURS AS NEEDED FOR PAIN     • oxyCODONE-acetaminophen (PERCOCET)  MG per tablet Take 1 tablet by mouth Every 6 (Six) Hours As Needed.     • pregabalin (LYRICA) 150 MG capsule 2 (Two) Times a Day.     • sertraline (ZOLOFT) 100 MG tablet Take 100 mg by mouth Daily.       No current facility-administered medications on file prior to visit.            Physical Exam  Constitutional:       Appearance: He is well-developed.   HENT:      Head: Normocephalic and atraumatic.   Eyes:      General: No scleral icterus.     Conjunctiva/sclera: Conjunctivae normal.   Neck:      Thyroid: No thyromegaly.      Vascular: Normal carotid pulses. No carotid bruit, hepatojugular reflux or JVD.      Trachea: No tracheal deviation.   Cardiovascular:      Rate and Rhythm: Normal rate and regular rhythm.      Pulses:           Carotid pulses are 2+ on the right side and 2+ on the left side.       Radial pulses are 2+ on the right side and 2+ on the left side.        Femoral pulses are 2+ on the right  side and 2+ on the left side.       Dorsalis pedis pulses are 2+ on the right side and 2+ on the left side.        Posterior tibial pulses are 2+ on the right side and 2+ on the left side.      Heart sounds: No murmur heard.    No friction rub. No gallop.   Pulmonary:      Effort: No respiratory distress.      Breath sounds: Normal breath sounds. No decreased breath sounds, wheezing, rhonchi or rales.   Chest:      Chest wall: No tenderness.   Abdominal:      General: Bowel sounds are normal. There is no distension.      Palpations: Abdomen is soft.      Tenderness: There is no abdominal tenderness. There is no rebound.   Musculoskeletal:         General: No deformity.      Cervical back: Normal range of motion and neck supple.   Skin:     Findings: No erythema or rash.   Neurological:      Mental Status: He is alert and oriented to person, place, and time.      Sensory: No sensory deficit.   Psychiatric:         Behavior: Behavior normal.         ECG 12 Lead    Date/Time: 5/23/2022 3:55 PM  Performed by: Tiburcio Moreno MD  Authorized by: Tiburcio Moreno MD   Comparison: compared with previous ECG from 9/13/2021  Rhythm: sinus rhythm  Rate: normal  QRS axis: normal    Clinical impression: non-specific ECG  Comments: Nonspecific repolarization abnormality diffuse leads.  Unchanged from prior.           7/5/18  · Left ventricular systolic function is low normal. Calculated EF = 52%. Estimated EF was in agreement with the calculated EF. Normal left ventricular cavity size noted. Left ventricular wall thickness is consistent with mild-to-moderate concentric hypertrophy. Left ventricular diastolic dysfunction is noted (grade I) consistent with impaired relaxation. Regional wall motion cannot be assessed due to limited endocardial visualization; consider a contrast study.     6/14/17  Conclusions:   1. Left main: Mild calcification.  No stenosis  2. LAD: Diffuse 20% proximal stenosis.  Diffuse 50% stenosis  mid segment.  Discrete 90% proximal first diagonal stenosis with JANINE 2 flow.  Small caliber  3. LCX: Diffuse 50% mid vessel stenosis  4. RCA: Discrete 70-80% proximal RPL 2 stenosis.  Small caliber  5.  Moderately reduced left ventricular systolic function.  Ejection fraction 40%.  Mid to distal anterior wall, apical severe hypokinesis.  Moderate distal inferior wall hypokinesis.  More consistent with stress-induced cardiomyopathy than flow limiting coronary artery disease in that territory  Recommendations: Continue ACE inhibitor and beta blocker.  Continue aspirin and statin.  Low-dose isosorbide mononitrate.      DISCUSSION/SUMMARY  71-year-old gentleman with a medical history as documented above including hypertension, hyperlipidemia, diabetes mellitus, a right total knee arthroplasty with prior joint infection and revision who had a non-ST elevation myocardial infarction and was found to have a stress induced cardiomyopathy with an ejection fraction of 40%.  Since that time we have documented that his ejection fraction has normalized.  He states that he feels very well and has no symptoms other than his orthopedic issues.  His lipid panel is not as well-controlled as it has been prior and his weight is up.     1. Stress induced cardiomyopathy.  Ventricular ejection fraction and wall motion has normalized.   -Continue lisinopril and metoprolol at current dose.  He is currently tolerating lisinopril therapy as well which was started previously by endocrinology secondary to his proteinuria.  -Continue metoprolol tartrate therapy at current dose.  Resting heart rate is in the 70s.    2. Hyperlipidemia; continue atorvastatin at current dose.  Lipid panel has been well controlled previously, however has increased as of late.    3. Coronary artery disease; no indication for intervention.  Small caliber disease.  Continue aspirin and statin.  Patient denies angina.    4. Morbid obesity: Dietary modification and weight  loss have been recommended.  He needs to increase his activity as well.

## 2022-06-03 ENCOUNTER — LAB (OUTPATIENT)
Dept: ENDOCRINOLOGY | Age: 72
End: 2022-06-03

## 2022-06-03 DIAGNOSIS — Z79.4 TYPE 2 DIABETES MELLITUS WITH MICROALBUMINURIA, WITH LONG-TERM CURRENT USE OF INSULIN: ICD-10-CM

## 2022-06-03 DIAGNOSIS — E11.29 TYPE 2 DIABETES MELLITUS WITH MICROALBUMINURIA, WITH LONG-TERM CURRENT USE OF INSULIN: ICD-10-CM

## 2022-06-03 DIAGNOSIS — R80.9 TYPE 2 DIABETES MELLITUS WITH MICROALBUMINURIA, WITH LONG-TERM CURRENT USE OF INSULIN: ICD-10-CM

## 2022-06-04 LAB
BUN SERPL-MCNC: 20 MG/DL (ref 8–27)
BUN/CREAT SERPL: 11 (ref 10–24)
CALCIUM SERPL-MCNC: 8.7 MG/DL (ref 8.6–10.2)
CHLORIDE SERPL-SCNC: 106 MMOL/L (ref 96–106)
CHOLEST SERPL-MCNC: 136 MG/DL (ref 100–199)
CO2 SERPL-SCNC: 20 MMOL/L (ref 20–29)
CREAT SERPL-MCNC: 1.85 MG/DL (ref 0.76–1.27)
EGFRCR SERPLBLD CKD-EPI 2021: 38 ML/MIN/1.73
GLUCOSE SERPL-MCNC: 141 MG/DL (ref 65–99)
HBA1C MFR BLD: 6.8 % (ref 4.8–5.6)
HDLC SERPL-MCNC: 39 MG/DL
IMP & REVIEW OF LAB RESULTS: NORMAL
LDLC SERPL CALC-MCNC: 69 MG/DL (ref 0–99)
POTASSIUM SERPL-SCNC: 4.8 MMOL/L (ref 3.5–5.2)
REPORT: NORMAL
SODIUM SERPL-SCNC: 139 MMOL/L (ref 134–144)
TRIGL SERPL-MCNC: 165 MG/DL (ref 0–149)
VLDLC SERPL CALC-MCNC: 28 MG/DL (ref 5–40)

## 2022-06-23 ENCOUNTER — OFFICE VISIT (OUTPATIENT)
Dept: ENDOCRINOLOGY | Age: 72
End: 2022-06-23

## 2022-06-23 VITALS
HEART RATE: 64 BPM | BODY MASS INDEX: 39.06 KG/M2 | OXYGEN SATURATION: 96 % | DIASTOLIC BLOOD PRESSURE: 75 MMHG | SYSTOLIC BLOOD PRESSURE: 125 MMHG | WEIGHT: 304.4 LBS | TEMPERATURE: 96.9 F | HEIGHT: 74 IN

## 2022-06-23 DIAGNOSIS — Z79.4 TYPE 2 DIABETES MELLITUS WITH MICROALBUMINURIA, WITH LONG-TERM CURRENT USE OF INSULIN: Primary | ICD-10-CM

## 2022-06-23 DIAGNOSIS — R80.9 TYPE 2 DIABETES MELLITUS WITH MICROALBUMINURIA, WITH LONG-TERM CURRENT USE OF INSULIN: Primary | ICD-10-CM

## 2022-06-23 DIAGNOSIS — E78.2 MIXED HYPERLIPIDEMIA: ICD-10-CM

## 2022-06-23 DIAGNOSIS — E11.29 TYPE 2 DIABETES MELLITUS WITH MICROALBUMINURIA, WITH LONG-TERM CURRENT USE OF INSULIN: Primary | ICD-10-CM

## 2022-06-23 PROCEDURE — 99214 OFFICE O/P EST MOD 30 MIN: CPT | Performed by: NURSE PRACTITIONER

## 2022-06-23 RX ORDER — PHENTERMINE HYDROCHLORIDE 30 MG/1
30 CAPSULE ORAL EVERY MORNING
COMMUNITY
End: 2023-03-28

## 2022-06-23 NOTE — PROGRESS NOTES
"Chief Complaint  Diabetes (Type2: )    Subjective        Hugh R McBurney presents to [facility] ENDOCRINOLOGY  History of Present Illness     Lab Results   Component Value Date    HGBA1C 6.8 (H) 06/03/2022       Type 2 dm    Diagnosed in 1996.   Today in clinic pt reports being on lantus 50 units Q am and 50 units at bed time, humalog 16-22 units tid ac with ssi as well  jardiance was too expensive   GLP-1 too expensie  Checks BG - 3 - 4 times/ day <150  Dm retinopathy - x, Last eye exam - 3/2022  Dm nephropathy - yes, sees renal Dr Rivera   Dm neuropathy -yes  CAD - yes  CVA -x  Episodes of hypoglycemia - denies   Pt is physically active to the best of this ability with knee pain  Trying to lose weight   On statin and ace-i        Objective   Vital Signs:  /75   Pulse 64   Temp 96.9 °F (36.1 °C) (Temporal)   Ht 188 cm (74\")   Wt (!) 138 kg (304 lb 6.4 oz)   SpO2 96%   BMI 39.08 kg/m²   Estimated body mass index is 39.08 kg/m² as calculated from the following:    Height as of this encounter: 188 cm (74\").    Weight as of this encounter: 138 kg (304 lb 6.4 oz).          Physical Exam  Vitals reviewed.   Constitutional:       General: He is not in acute distress.  HENT:      Head: Normocephalic and atraumatic.   Cardiovascular:      Rate and Rhythm: Normal rate and regular rhythm.   Pulmonary:      Effort: Pulmonary effort is normal. No respiratory distress.   Musculoskeletal:         General: No signs of injury. Normal range of motion.      Cervical back: Normal range of motion and neck supple.   Skin:     General: Skin is warm and dry.   Neurological:      Mental Status: He is alert and oriented to person, place, and time. Mental status is at baseline.   Psychiatric:         Mood and Affect: Mood normal.         Behavior: Behavior normal.         Thought Content: Thought content normal.         Judgment: Judgment normal.        Result Review :  The following data was reviewed by: " ABBY Alvarenga on 06/23/2022:  Common labs    Common Labsle 10/21/21 10/21/21 10/21/21 10/21/21 1/25/22 1/25/22 1/25/22 6/3/22 6/3/22 6/3/22    0931 0931 0931 0931 1001 1001 1001 1102 1102 1102   Glucose 142 (A)      156 (A)  141 (A)    BUN 23      27  20    Creatinine 1.55 (A)      1.66 (A)  1.85 (A)    eGFR Non  Am 45 (A)      41 (A)      eGFR  Am 52 (A)      47 (A)      Sodium 141      139  139    Potassium 4.6      5.0  4.8    Chloride 106      106  106    Calcium 8.6      8.7  8.7    Total Cholesterol   132  153   136     Triglycerides   96  138   165 (A)     HDL Cholesterol   42  46   39 (A)     LDL Cholesterol    72  83   69     Hemoglobin A1C    7.5 (A)  7.6 (A)    6.8 (A)   Microalbumin, Urine  3,368.1           (A) Abnormal value       Comments are available for some flowsheets but are not being displayed.                     Assessment and Plan   Diagnoses and all orders for this visit:    1. Type 2 diabetes mellitus with microalbuminuria, with long-term current use of insulin (HCC) (Primary)  -     Hemoglobin A1c; Future  -     Comprehensive Metabolic Panel; Future  -     Lipid Panel; Future  -     Microalbumin / Creatinine Urine Ratio - Urine, Clean Catch; Future  -     TSH; Future  -     T4, Free; Future    2. Mixed hyperlipidemia  -     Hemoglobin A1c; Future  -     Comprehensive Metabolic Panel; Future  -     Lipid Panel; Future  -     Microalbumin / Creatinine Urine Ratio - Urine, Clean Catch; Future  -     TSH; Future  -     T4, Free; Future    Other orders  -     Insulin Glargine (LANTUS SOLOSTAR) 100 UNIT/ML injection pen; INJECT up to 50 UNITS IN THE AM AND 50 UNITS IN THE PM  Dispense: 100 mL; Refill: 1             Follow Up   No follow-ups on file.     a1c improving, no additional changes at this time  LDL at goal, continue statin   On ace-I, continue renal f/u  Not open to dietician counseling at this time     Patient was given instructions and counseling regarding his  condition or for health maintenance advice. Please see specific information pulled into the AVS if appropriate.     ABBY Alvarenga

## 2022-08-24 ENCOUNTER — TRANSCRIBE ORDERS (OUTPATIENT)
Dept: ADMINISTRATIVE | Facility: HOSPITAL | Age: 72
End: 2022-08-24

## 2022-08-24 ENCOUNTER — HOSPITAL ENCOUNTER (OUTPATIENT)
Dept: GENERAL RADIOLOGY | Facility: HOSPITAL | Age: 72
Discharge: HOME | End: 2022-08-24

## 2022-08-24 ENCOUNTER — LAB REQUISITION (OUTPATIENT)
Dept: LAB | Facility: HOSPITAL | Age: 72
End: 2022-08-24

## 2022-08-24 ENCOUNTER — APPOINTMENT (OUTPATIENT)
Dept: WOUND CARE | Facility: HOSPITAL | Age: 72
End: 2022-08-24

## 2022-08-24 ENCOUNTER — HOSPITAL ENCOUNTER (OUTPATIENT)
Dept: ULTRASOUND IMAGING | Facility: HOSPITAL | Age: 72
Discharge: HOME | End: 2022-08-24

## 2022-08-24 DIAGNOSIS — E11.622 DIABETES MELLITUS WITH SKIN ULCER: Primary | ICD-10-CM

## 2022-08-24 DIAGNOSIS — E11.622 DIABETES MELLITUS WITH SKIN ULCER: ICD-10-CM

## 2022-08-24 DIAGNOSIS — L98.499 DIABETES MELLITUS WITH SKIN ULCER: ICD-10-CM

## 2022-08-24 DIAGNOSIS — N18.9 CHRONIC KIDNEY DISEASE, UNSPECIFIED CKD STAGE: ICD-10-CM

## 2022-08-24 DIAGNOSIS — E11.622 TYPE 2 DIABETES MELLITUS WITH OTHER SKIN ULCER (CODE): ICD-10-CM

## 2022-08-24 DIAGNOSIS — N28.9 KIDNEY DISEASE: Primary | ICD-10-CM

## 2022-08-24 DIAGNOSIS — L98.499 DIABETES MELLITUS WITH SKIN ULCER: Primary | ICD-10-CM

## 2022-08-24 DIAGNOSIS — I80.201 DEEP VEIN THROMBOPHLEBITIS OF RIGHT LEG: Primary | ICD-10-CM

## 2022-08-24 DIAGNOSIS — I80.201 DEEP VEIN THROMBOPHLEBITIS OF RIGHT LEG: ICD-10-CM

## 2022-08-24 PROCEDURE — 73590 X-RAY EXAM OF LOWER LEG: CPT

## 2022-08-24 PROCEDURE — 87205 SMEAR GRAM STAIN: CPT | Performed by: NURSE PRACTITIONER

## 2022-08-24 PROCEDURE — 97602 WOUND(S) CARE NON-SELECTIVE: CPT

## 2022-08-24 PROCEDURE — G0463 HOSPITAL OUTPT CLINIC VISIT: HCPCS

## 2022-08-24 PROCEDURE — 93971 EXTREMITY STUDY: CPT

## 2022-08-24 PROCEDURE — 73560 X-RAY EXAM OF KNEE 1 OR 2: CPT

## 2022-08-24 PROCEDURE — 87070 CULTURE OTHR SPECIMN AEROBIC: CPT | Performed by: NURSE PRACTITIONER

## 2022-08-27 LAB
BACTERIA SPEC AEROBE CULT: NORMAL
GRAM STN SPEC: NORMAL
GRAM STN SPEC: NORMAL

## 2022-08-30 ENCOUNTER — HOSPITAL ENCOUNTER (OUTPATIENT)
Dept: ULTRASOUND IMAGING | Facility: HOSPITAL | Age: 72
Discharge: HOME | End: 2022-08-30
Admitting: NURSE PRACTITIONER

## 2022-08-30 DIAGNOSIS — N28.9 KIDNEY DISEASE: ICD-10-CM

## 2022-08-30 PROCEDURE — 93923 UPR/LXTR ART STDY 3+ LVLS: CPT

## 2022-08-31 ENCOUNTER — APPOINTMENT (OUTPATIENT)
Dept: WOUND CARE | Facility: HOSPITAL | Age: 72
End: 2022-08-31

## 2022-08-31 PROCEDURE — G0463 HOSPITAL OUTPT CLINIC VISIT: HCPCS

## 2022-09-07 ENCOUNTER — APPOINTMENT (OUTPATIENT)
Dept: WOUND CARE | Facility: HOSPITAL | Age: 72
End: 2022-09-07

## 2022-09-14 ENCOUNTER — APPOINTMENT (OUTPATIENT)
Dept: WOUND CARE | Facility: HOSPITAL | Age: 72
End: 2022-09-14

## 2022-09-14 PROCEDURE — G0463 HOSPITAL OUTPT CLINIC VISIT: HCPCS

## 2022-10-03 ENCOUNTER — LAB (OUTPATIENT)
Dept: ENDOCRINOLOGY | Age: 72
End: 2022-10-03

## 2022-10-03 DIAGNOSIS — E11.29 TYPE 2 DIABETES MELLITUS WITH MICROALBUMINURIA, WITH LONG-TERM CURRENT USE OF INSULIN: ICD-10-CM

## 2022-10-03 DIAGNOSIS — E78.2 MIXED HYPERLIPIDEMIA: ICD-10-CM

## 2022-10-03 DIAGNOSIS — Z79.4 TYPE 2 DIABETES MELLITUS WITH MICROALBUMINURIA, WITH LONG-TERM CURRENT USE OF INSULIN: ICD-10-CM

## 2022-10-03 DIAGNOSIS — R80.9 TYPE 2 DIABETES MELLITUS WITH MICROALBUMINURIA, WITH LONG-TERM CURRENT USE OF INSULIN: ICD-10-CM

## 2022-10-04 LAB
ALBUMIN SERPL-MCNC: 3.4 G/DL (ref 3.5–5.2)
ALBUMIN/CREAT UR: 8370 MG/G CREAT (ref 0–29)
ALBUMIN/GLOB SERPL: 1.5 G/DL
ALP SERPL-CCNC: 128 U/L (ref 39–117)
ALT SERPL-CCNC: 18 U/L (ref 1–41)
AST SERPL-CCNC: 26 U/L (ref 1–40)
BILIRUB SERPL-MCNC: 0.4 MG/DL (ref 0–1.2)
BUN SERPL-MCNC: 22 MG/DL (ref 8–23)
BUN/CREAT SERPL: 13.1 (ref 7–25)
CALCIUM SERPL-MCNC: 8.3 MG/DL (ref 8.6–10.5)
CHLORIDE SERPL-SCNC: 107 MMOL/L (ref 98–107)
CHOLEST SERPL-MCNC: 134 MG/DL (ref 0–200)
CO2 SERPL-SCNC: 26.7 MMOL/L (ref 22–29)
CREAT SERPL-MCNC: 1.68 MG/DL (ref 0.76–1.27)
CREAT UR-MCNC: 53 MG/DL
EGFRCR SERPLBLD CKD-EPI 2021: 43.2 ML/MIN/1.73
GLOBULIN SER CALC-MCNC: 2.3 GM/DL
GLUCOSE SERPL-MCNC: 156 MG/DL (ref 65–99)
HBA1C MFR BLD: 7.1 % (ref 4.8–5.6)
HDLC SERPL-MCNC: 44 MG/DL (ref 40–60)
IMP & REVIEW OF LAB RESULTS: NORMAL
LDLC SERPL CALC-MCNC: 68 MG/DL (ref 0–100)
MICROALBUMIN UR-MCNC: 4436.2 UG/ML
POTASSIUM SERPL-SCNC: 4.5 MMOL/L (ref 3.5–5.2)
PROT SERPL-MCNC: 5.7 G/DL (ref 6–8.5)
SODIUM SERPL-SCNC: 140 MMOL/L (ref 136–145)
T4 FREE SERPL-MCNC: 0.9 NG/DL (ref 0.93–1.7)
TRIGL SERPL-MCNC: 125 MG/DL (ref 0–150)
TSH SERPL DL<=0.005 MIU/L-ACNC: 1.92 UIU/ML (ref 0.27–4.2)
VLDLC SERPL CALC-MCNC: 22 MG/DL (ref 5–40)

## 2022-10-17 ENCOUNTER — OFFICE VISIT (OUTPATIENT)
Dept: ENDOCRINOLOGY | Age: 72
End: 2022-10-17

## 2022-10-17 VITALS
HEART RATE: 62 BPM | WEIGHT: 308.4 LBS | HEIGHT: 74 IN | SYSTOLIC BLOOD PRESSURE: 120 MMHG | DIASTOLIC BLOOD PRESSURE: 70 MMHG | TEMPERATURE: 96.9 F | BODY MASS INDEX: 39.58 KG/M2 | OXYGEN SATURATION: 98 %

## 2022-10-17 DIAGNOSIS — E11.29 TYPE 2 DIABETES MELLITUS WITH MICROALBUMINURIA, WITH LONG-TERM CURRENT USE OF INSULIN: Primary | ICD-10-CM

## 2022-10-17 DIAGNOSIS — Z79.4 LONG-TERM INSULIN USE: ICD-10-CM

## 2022-10-17 DIAGNOSIS — E78.2 MIXED HYPERLIPIDEMIA: ICD-10-CM

## 2022-10-17 DIAGNOSIS — R80.9 TYPE 2 DIABETES MELLITUS WITH MICROALBUMINURIA, WITH LONG-TERM CURRENT USE OF INSULIN: Primary | ICD-10-CM

## 2022-10-17 DIAGNOSIS — Z79.4 TYPE 2 DIABETES MELLITUS WITH MICROALBUMINURIA, WITH LONG-TERM CURRENT USE OF INSULIN: Primary | ICD-10-CM

## 2022-10-17 PROCEDURE — 99214 OFFICE O/P EST MOD 30 MIN: CPT | Performed by: INTERNAL MEDICINE

## 2022-10-17 NOTE — PROGRESS NOTES
"Chief Complaint  Chief Complaint   Patient presents with   • Diabetes     Type2: Patient has meter with him today, he has no hx of retinopathy with a mild case of neuropathy in his feet he also has a hx of heart disease and kidney disease but no hx of stroke        Subjective          History of Present Illness    Hugh R McBurney 71 y.o. presents with Type 2 dm as a F/u patient.     Type 2 dm - Diagnosed about 10 + years ago.   Today in clinic pt reports being on Lantus 50 units bid, Humalog 16 - 22 units tid ac.   Not on jardiance and GLP 1 analogues due to cost  Avg bg -   Checks BG -  4 - 5 times/day  Sensor - x  Dm retinopathy -x ,Last eye exam - 3/2022  Dm nephropathy - yes  Dm neuropathy - yes,Dm neuropathy meds -   CAD - yes  CVA -x  Episodes of hypoglycemia - [place].   Pt is physically active. weight has been stable.   Pt tries to follow DM diet for most part.   On Ace inb.      Reviewed primary care physician's/consulting physician documentation and lab results         I have reviewed the patient's allergies, medicines, past medical hx, family hx and social hx in detail.    Objective   Vital Signs:   /70   Pulse 62   Temp 96.9 °F (36.1 °C) (Temporal)   Ht 188 cm (74\")   Wt (!) 140 kg (308 lb 6.4 oz)   SpO2 98%   BMI 39.60 kg/m²   Physical Exam  General appearance - no distress  Eyes- anicteric sclera  Ear nose and throat-external ears and nose normal.    Respiratory-normal chest on inspection.  No respiratory distress noted.  Skin-no rashes.  Neuro-alert and oriented x3             Result Review :   The following data was reviewed by: Adarsh Love MD on 10/17/2022:  Lab on 10/03/2022   Component Date Value Ref Range Status   • Free T4 10/03/2022 0.90 (L)  0.93 - 1.70 ng/dL Final    Results may be falsely increased if patient taking Biotin.   • TSH 10/03/2022 1.920  0.270 - 4.200 uIU/mL Final   • Creatinine, Urine 10/03/2022 53.0  Not Estab. mg/dL Final   • " Microalbumin, Urine 10/03/2022 4,436.2  Not Estab. ug/mL Final    Comment: Results confirmed on  dilution.     • Microalbumin/Creatinine Ratio 10/03/2022 8,370 (H)  0 - 29 mg/g creat Final    Comment:                        Normal:                0 -  29                         Moderately increased: 30 - 300                         Severely increased:       >300     • Total Cholesterol 10/03/2022 134  0 - 200 mg/dL Final    Comment: Cholesterol Reference Ranges  (U.S. Department of Health and Human Services ATP III  Classifications)  Desirable          <200 mg/dL  Borderline High    200-239 mg/dL  High Risk          >240 mg/dL  Triglyceride Reference Ranges  (U.S. Department of Health and Human Services ATP III  Classifications)  Normal           <150 mg/dL  Borderline High  150-199 mg/dL  High             200-499 mg/dL  Very High        >500 mg/dL  HDL Reference Ranges  (U.S. Department of Health and Human Services ATP III  Classifications)  Low     <40 mg/dl (major risk factor for CHD)  High    >60 mg/dl ('negative' risk factor for CHD)  LDL Reference Ranges  (U.S. Department of Health and Human Services ATP III  Classifications)  Optimal          <100 mg/dL  Near Optimal     100-129 mg/dL  Borderline High  130-159 mg/dL  High             160-189 mg/dL  Very High        >189 mg/dL     • Triglycerides 10/03/2022 125  0 - 150 mg/dL Final   • HDL Cholesterol 10/03/2022 44  40 - 60 mg/dL Final   • VLDL Cholesterol [place] 10/03/2022 22  5 - 40 mg/dL Final   • LDL Chol Calc (NIH) 10/03/2022 68  0 - 100 mg/dL Final   • Glucose 10/03/2022 156 (H)  65 - 99 mg/dL Final   • BUN 10/03/2022 22  8 - 23 mg/dL Final   • Creatinine 10/03/2022 1.68 (H)  0.76 - 1.27 mg/dL Final   • EGFR Result 10/03/2022 43.2 (L)  >60.0 mL/min/1.73 Final    Comment: National Kidney Foundation and American Society of  Nephrology (ASN) Task Force recommended calculation based  on the Chronic Kidney Disease Epidemiology Collaboration  (CKD-EPI) equation  refit without adjustment for race.  The GFR formula is only valid for adults with stable renal  function between ages 18 and 70.     • BUN/Creatinine Ratio 10/03/2022 13.1  7.0 - 25.0 Final   • Sodium 10/03/2022 140  136 - 145 mmol/L Final   • Potassium 10/03/2022 4.5  3.5 - 5.2 mmol/L Final   • Chloride 10/03/2022 107  98 - 107 mmol/L Final   • Total CO2 10/03/2022 26.7  22.0 - 29.0 mmol/L Final   • Calcium 10/03/2022 8.3 (L)  8.6 - 10.5 mg/dL Final   • Total Protein 10/03/2022 5.7 (L)  6.0 - 8.5 g/dL Final   • Albumin 10/03/2022 3.40 (L)  3.50 - 5.20 g/dL Final   • Globulin 10/03/2022 2.3  gm/dL Final   • A/G Ratio 10/03/2022 1.5  g/dL Final   • Total Bilirubin 10/03/2022 0.4  0.0 - 1.2 mg/dL Final   • Alkaline Phosphatase 10/03/2022 128 (H)  39 - 117 U/L Final   • AST (SGOT) 10/03/2022 26  1 - 40 U/L Final   • ALT (SGPT) 10/03/2022 18  1 - 41 U/L Final   • Hemoglobin A1C 10/03/2022 7.10 (H)  4.80 - 5.60 % Final    Comment: Hemoglobin A1C Ranges:  Increased Risk for Diabetes  5.7% to 6.4%  Diabetes                     >= 6.5%  Diabetic Goal                < 7.0%     • Interpretation 10/03/2022 Note   Final    Supplemental report is available.     Data reviewed: pcp and endocrine notes       Results Review:    I reviewed the patient's new clinical results.     Assessment and Plan    Problem List Items Addressed This Visit        Other    Hyperlipidemia   Other Visit Diagnoses     Type 2 diabetes mellitus with microalbuminuria, with long-term current use of insulin (HCC)    -  Primary    Relevant Orders    TSH    T4, Free    Basic Metabolic Panel    Hemoglobin A1c    Lipid Panel    Long-term insulin use (HCC)            Type 2 diabetes mellitus-uncontrolled with hyperglycemia  Continue the above insulin regimen  Patient is interested in CGM, will find out from the pharmacist as to which one of the CGM's are covered by his insurance.    Hyperlipidemia  Continue Lipitor.  Dose has been recently increased by his  "cardiologist    Interpreted the blood work-up/imaging results performed by the primary care/consulting physician -    Refills sent to pharmacy    Follow Up     Patient was given instructions and counseling regarding her condition or for health maintenance advice. Please see specific information pulled into the AVS if appropriate.       Thank you for asking me to see your patient, Hugh R McBurney in consultation.         Adarsh Love MD  10/17/22      [facility] Dragon / transcription disclaimer:     \"Dictated utilizing Dragon dictation\".         "

## 2022-10-19 NOTE — PROGRESS NOTES
INFORMATION SENT TO [facility] FOR PATIENT TO GET A CGM  PATIENT WAS NOTIFIED ON THE DME

## 2022-10-27 ENCOUNTER — TELEPHONE (OUTPATIENT)
Dept: ENDOCRINOLOGY | Age: 72
End: 2022-10-27

## 2022-10-27 NOTE — TELEPHONE ENCOUNTER
10/27 pt called in stating he has not heard anything reg the CGM thru [facility] / call and marti Archuleta [phone] he is going to have the  call me back she was on the phone

## 2022-11-11 ENCOUNTER — HOSPITAL ENCOUNTER (EMERGENCY)
Facility: HOSPITAL | Age: 72
Discharge: HOME | End: 2022-11-11
Attending: EMERGENCY MEDICINE | Admitting: EMERGENCY MEDICINE

## 2022-11-11 ENCOUNTER — APPOINTMENT (OUTPATIENT)
Dept: GENERAL RADIOLOGY | Facility: HOSPITAL | Age: 72
End: 2022-11-11

## 2022-11-11 VITALS
OXYGEN SATURATION: 94 % | BODY MASS INDEX: 37.6 KG/M2 | WEIGHT: 293 LBS | HEART RATE: 69 BPM | HEIGHT: 74 IN | RESPIRATION RATE: 17 BRPM | TEMPERATURE: 98.4 F | SYSTOLIC BLOOD PRESSURE: 150 MMHG | DIASTOLIC BLOOD PRESSURE: 92 MMHG
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
## 2022-11-11 DIAGNOSIS — S20.219A CONTUSION OF RIB, UNSPECIFIED LATERALITY, INITIAL ENCOUNTER: ICD-10-CM

## 2022-11-11 LAB — GLUCOSE BLDC GLUCOMTR-MCNC: 201 MG/DL (ref 70–130)

## 2022-11-11 PROCEDURE — 71111 X-RAY EXAM RIBS/CHEST4/> VWS: CPT

## 2022-11-11 PROCEDURE — 99283 EMERGENCY DEPT VISIT LOW MDM: CPT

## 2022-11-11 PROCEDURE — 82962 GLUCOSE BLOOD TEST: CPT

## 2022-11-11 RX ORDER — OXYCODONE HYDROCHLORIDE AND ACETAMINOPHEN 5; 325 MG/1; MG/1
2 TABLET ORAL ONCE
Status: COMPLETED | OUTPATIENT
Start: 2022-11-11 | End: 2022-11-11

## 2022-11-11 RX ADMIN — OXYCODONE HYDROCHLORIDE AND ACETAMINOPHEN 2 TABLET: 5; 325 TABLET ORAL at 19:43

## 2022-11-12 NOTE — ED PROVIDER NOTES
Subjective   History of Present Illness  History of Present Illness    Chief complaint: Fall with pain    Location: Lower portion of the chest    Quality/Severity: Moderate, sharp    Timing/Onset/Duration: Started Sunday    Modifying Factors: Hurts to move    Associated Symptoms: No loss of consciousness.  No neck or back pain.  No shortness of breath.  No abdominal pain.  No numbness, tingling, weakness, change in bladder or bowel function.  No fever or chills.  No cough    Narrative: This 71-year-old white male, not on blood thinners, tripped and fell on Sunday.  He presents complaining of lower rib pain bilaterally.    PCP:Guille Nieves MD      Review of Systems   Constitutional: Negative for chills and fever.   HENT: Negative for nosebleeds and rhinorrhea.    Respiratory: Negative for cough and shortness of breath.    Cardiovascular: Positive for chest pain.   Gastrointestinal: Negative for abdominal pain, nausea and vomiting.   Genitourinary: Negative for dysuria.   Musculoskeletal: Negative for back pain.   Neurological: Negative for weakness, numbness and headaches.   Psychiatric/Behavioral: Negative for confusion.        Medication List      CONTINUE taking these medications    accu-chek multiclix lancets  Use to test blood sugar 5 times daily     Insulin Pen Needle 32G X 4 MM misc  Commonly known as: BD Pen Needle Mireille U/F  TO INJECT 4 TIMES DAILY        ASK your doctor about these medications    Accu-Chek Guide test strip  Generic drug: glucose blood  TEST BLOOD SUGAR 3-4 TIMES A DAY     atorvastatin 40 MG tablet  Commonly known as: LIPITOR  TAKE 1 TABLET BY MOUTH EVERY EVENING.     HumaLOG KwikPen 100 UNIT/ML solution pen-injector  Generic drug: Insulin Lispro (1 Unit Dial)  Inject 22 Units under the skin into the appropriate area as directed 4 (Four) Times a Day.     Insulin Glargine 100 UNIT/ML injection pen  Commonly known as: LANTUS SOLOSTAR  INJECT up to 50 UNITS IN THE AM AND 50 UNITS IN THE  PM     isosorbide mononitrate 30 MG 24 hr tablet  Commonly known as: IMDUR     lisinopril 30 MG tablet  Commonly known as: PRINIVIL,ZESTRIL     metoprolol tartrate 25 MG tablet  Commonly known as: LOPRESSOR  Take 1 tablet by mouth Every 12 (Twelve) Hours.     multivitamin tablet tablet  Commonly known as: THERAGRAN     omeprazole 40 MG capsule  Commonly known as: priLOSEC     oxyCODONE-acetaminophen  MG per tablet  Commonly known as: PERCOCET     phentermine 30 MG capsule     pregabalin 150 MG capsule  Commonly known as: LYRICA     sertraline 100 MG tablet  Commonly known as: ZOLOFT            Past Medical History:   Diagnosis Date   • Arthritis    • Chronic pain     BILATERAL KNEES AND BACK   • Contusion of knee    • Coronary artery disease    • Depression    • Diabetes mellitus (HCC)     type 2 with atherosclerosis of arteries of extremities   • Diabetic ulcer of toe (HCC)     left foot associated with type 2 diabetes mellitus, with necrosis of bone   • GERD (gastroesophageal reflux disease)    • Great toe pain     with cellulitis and gangrene   • History of kidney stones    • Hyperlipidemia    • Hypertension    • Hyponatremia    • Kidney stone    • Knee pain     hx of previous prosthetic joint infection   • Liver disease     liver mass   • MSSA (methicillin susceptible Staphylococcus aureus) infection     RIGHT KNEE   • NSTEMI (non-ST elevated myocardial infarction) ([facility]) 06/14/2017   • Sleep apnea     DOES NOT USE CPAP       Allergies   Allergen Reactions   • Vancomycin Other (See Comments)     Red Man syndrome, slow rate and premedicate with benadryl       Past Surgical History:   Procedure Laterality Date   • APPENDECTOMY     • CARDIAC CATHETERIZATION N/A 6/14/2017    Procedure: Left Heart Cath;  Surgeon: Tiburcio Moreno MD;  Location: [facility] INVASIVE LOCATION;  Service:    • COLONOSCOPY     • JOINT REPLACEMENT      BILATERAL KNEES    • LUMBAR FUSION     • WI REVISE KNEE JOINT REPLACE,1 PART  Right 9/5/2017    Procedure: REMOVAL OF ANTIBIOTIC CEMENT SPACER; RIGHT TOTAL KNEE ARTHROPLASTY REVISION;  Surgeon: Kiko Marie MD;  Location: [facility] OR;  Service: Orthopedics   • ROTATOR CUFF REPAIR Right    • TOTAL KNEE  PROSTHESIS REMOVAL W/ SPACER INSERTION Right 2/14/2017    Procedure: INCISION AND DRAINAGE RIGHT KNEE, POLY CHANGE;  Surgeon: Kiko Marie MD;  Location: [facility] OR;  Service:    • TOTAL KNEE  PROSTHESIS REMOVAL W/ SPACER INSERTION Right 7/7/2017    Procedure: REMOVAL RIGHT TOTAL KNEE ARTHROPLASTY AND ANITBIOTIC SPACER;  Surgeon: Kiko Marie MD;  Location: [facility] OR;  Service:    • TRANS METATARSAL AMPUTATION Left 2/14/2017    Procedure: EXCISIONAL DEBRIDEMENT LT. FIRST TOE DIABETIC ULCER, DRAINAGE ABSCESS FIRST TOE, FIRST TOE AMPUTATION;  Surgeon: Osmin Brand MD;  Location: [facility] OR;  Service:        Family History   Problem Relation Age of Onset   • Heart disease Mother    • Heart disease Father    • Diabetes Sister    • Dementia Maternal Grandmother    • No Known Problems Maternal Grandfather    • Heart disease Paternal Grandmother    • Heart attack Paternal Grandfather    • Heart disease Paternal Grandfather    • Heart disease Son    • Heart attack Son    • No Known Problems Sister    • Malig Hyperthermia Neg Hx        Social History     Socioeconomic History   • Marital status:    Tobacco Use   • Smoking status: Former     Packs/day: 3.00     Years: 20.00     Pack years: 60.00     Types: Cigarettes   • Smokeless tobacco: Former   • Tobacco comments:     quit 35 years ago. 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    Substance and Sexual Activity   • Alcohol use: No   • Drug use: No   • Sexual activity: Defer           Objective   Physical Exam  Vitals (The temperature is 98.4 °F, pulse 69, respirations 17, /96, room air pulse ox 94%) and nursing note reviewed.   Constitutional:       Appearance: Normal appearance.   HENT:      Head:  Normocephalic and atraumatic.      Nose: No rhinorrhea.      Mouth/Throat:      Mouth: Mucous membranes are moist.   Neck:      Comments: There is no tenderness, deformity, or bony step-offs upon palpation the cervical, thoracic, lumbar sacrococcygeal spine  Cardiovascular:      Rate and Rhythm: Normal rate and regular rhythm.      Pulses: Normal pulses.      Heart sounds: Normal heart sounds. No murmur heard.    No friction rub. No gallop.   Pulmonary:      Effort: Pulmonary effort is normal.      Breath sounds: Normal breath sounds.   Chest:      Chest wall: Tenderness (The posterior inferior thoracic cage bilaterally starting in the front radiating around to the back.  No crepitus or deformity.) present.   Abdominal:      General: Abdomen is flat. Bowel sounds are normal. There is no distension.      Tenderness: There is no abdominal tenderness. There is no right CVA tenderness, left CVA tenderness, guarding or rebound.      Hernia: No hernia is present.   Neurological:      General: No focal deficit present.      Mental Status: He is alert and oriented to person, place, and time.      Sensory: Sensory deficit present.      Motor: No weakness.   Psychiatric:         Mood and Affect: Mood normal.         Procedures           ED Course      20:28 EST, 11/11/22:  The patient's diagnosis of rib contusion was discussed with him.  The patient should cough and deep breathe 2-3 times an hour while he is awake for 2 weeks.  The patient should follow-up with Dr. Guille Nieves in 1 week.  The patient has pain medication that he takes at home.  The patient should return to the emergency department if there is fever, chills, shortness of breath, worsening pain, worse in any way at all.  All the patient question were answered he will be discharged in good condition.                                     MDM    Final diagnoses:   None       ED Disposition  ED Disposition     None          No follow-up provider specified.        Medication List      No changes were made to your prescriptions during this visit.          Jaxon Harvey MD  11/11/22 [id number]

## 2022-11-12 NOTE — DISCHARGE INSTRUCTIONS
Take your pain medication as needed as prescribed.  Cough and deep breathe 2-3 times an hour while you are awake for 2 weeks.  You may use pillow to press up against her chest when you are coughing and deep breathing.  Follow-up with Dr. Guille Nieves in 1 week.  Return to the emergency department if there is worsening pain, fever, chills, shortness of breath, worse in any way at all.

## 2022-11-13 ENCOUNTER — APPOINTMENT (OUTPATIENT)
Dept: CT IMAGING | Facility: HOSPITAL | Age: 72
End: 2022-11-13

## 2022-11-13 ENCOUNTER — HOSPITAL ENCOUNTER (EMERGENCY)
Facility: HOSPITAL | Age: 72
Discharge: HOME | End: 2022-11-13
Attending: EMERGENCY MEDICINE | Admitting: EMERGENCY MEDICINE

## 2022-11-13 VITALS
DIASTOLIC BLOOD PRESSURE: 87 MMHG | WEIGHT: 295 LBS | BODY MASS INDEX: 37.86 KG/M2 | TEMPERATURE: 98.5 F | SYSTOLIC BLOOD PRESSURE: 175 MMHG | RESPIRATION RATE: 18 BRPM | HEART RATE: 74 BPM | HEIGHT: 74 IN | OXYGEN SATURATION: 97 %

## 2022-11-13 DIAGNOSIS — K59.00 CONSTIPATION, UNSPECIFIED CONSTIPATION TYPE: ICD-10-CM

## 2022-11-13 DIAGNOSIS — R16.0 LIVER MASS: Primary | ICD-10-CM

## 2022-11-13 DIAGNOSIS — S22.43XA CLOSED FRACTURE OF MULTIPLE RIBS OF BOTH SIDES, INITIAL ENCOUNTER: ICD-10-CM

## 2022-11-13 LAB
ALBUMIN SERPL-MCNC: 3.3 G/DL (ref 3.5–5.2)
ALBUMIN/GLOB SERPL: 1 G/DL
ALP SERPL-CCNC: 151 U/L (ref 39–117)
ALT SERPL W P-5'-P-CCNC: 11 U/L (ref 1–41)
ANION GAP SERPL CALCULATED.3IONS-SCNC: 11.1 MMOL/L (ref 5–15)
AST SERPL-CCNC: 15 U/L (ref 1–40)
BACTERIA UR QL AUTO: ABNORMAL
BASOPHILS # BLD AUTO: 0.07 10*3/MM3 (ref 0–0.2)
BASOPHILS NFR BLD AUTO: 0.6 % (ref 0–1.5)
BILIRUB SERPL-MCNC: 0.3 MG/DL (ref 0–1.2)
BILIRUB UR QL STRIP: NEGATIVE
BUN SERPL-MCNC: 32 MG/DL (ref 8–23)
BUN/CREAT SERPL: 16.1 (ref 7–25)
CALCIUM SPEC-SCNC: 8.7 MG/DL (ref 8.6–10.5)
CHLORIDE SERPL-SCNC: 107 MMOL/L (ref 98–107)
CLARITY UR: CLEAR
CO2 SERPL-SCNC: 20.9 MMOL/L (ref 22–29)
COLOR UR: YELLOW
CREAT SERPL-MCNC: 1.99 MG/DL (ref 0.76–1.27)
DEPRECATED RDW RBC AUTO: 46.3 FL (ref 37–54)
EGFRCR SERPLBLD CKD-EPI 2021: 35.2 ML/MIN/1.73
EOSINOPHIL # BLD AUTO: 0.07 10*3/MM3 (ref 0–0.4)
EOSINOPHIL NFR BLD AUTO: 0.6 % (ref 0.3–6.2)
ERYTHROCYTE [DISTWIDTH] IN BLOOD BY AUTOMATED COUNT: 13 % (ref 12.3–15.4)
GLOBULIN UR ELPH-MCNC: 3.4 GM/DL
GLUCOSE SERPL-MCNC: 171 MG/DL (ref 65–99)
GLUCOSE UR STRIP-MCNC: ABNORMAL MG/DL
HCT VFR BLD AUTO: 37 % (ref 37.5–51)
HGB BLD-MCNC: 12.6 G/DL (ref 13–17.7)
HGB UR QL STRIP.AUTO: ABNORMAL
HOLD SPECIMEN: NORMAL
HYALINE CASTS UR QL AUTO: ABNORMAL /LPF
IMM GRANULOCYTES # BLD AUTO: 0.04 10*3/MM3 (ref 0–0.05)
IMM GRANULOCYTES NFR BLD AUTO: 0.3 % (ref 0–0.5)
KETONES UR QL STRIP: NEGATIVE
LEUKOCYTE ESTERASE UR QL STRIP.AUTO: NEGATIVE
LIPASE SERPL-CCNC: 14 U/L (ref 13–60)
LYMPHOCYTES # BLD AUTO: 1.07 10*3/MM3 (ref 0.7–3.1)
LYMPHOCYTES NFR BLD AUTO: 8.7 % (ref 19.6–45.3)
MCH RBC QN AUTO: 33.4 PG (ref 26.6–33)
MCHC RBC AUTO-ENTMCNC: 34.1 G/DL (ref 31.5–35.7)
MCV RBC AUTO: 98.1 FL (ref 79–97)
MONOCYTES # BLD AUTO: 0.6 10*3/MM3 (ref 0.1–0.9)
MONOCYTES NFR BLD AUTO: 4.9 % (ref 5–12)
NEUTROPHILS NFR BLD AUTO: 10.49 10*3/MM3 (ref 1.7–7)
NEUTROPHILS NFR BLD AUTO: 84.9 % (ref 42.7–76)
NITRITE UR QL STRIP: NEGATIVE
NRBC BLD AUTO-RTO: 0 /100 WBC (ref 0–0.2)
PH UR STRIP.AUTO: 6.5 [PH] (ref 4.5–8)
PLATELET # BLD AUTO: 177 10*3/MM3 (ref 140–450)
PMV BLD AUTO: 11.3 FL (ref 6–12)
POTASSIUM SERPL-SCNC: 4.5 MMOL/L (ref 3.5–5.2)
PROT SERPL-MCNC: 6.7 G/DL (ref 6–8.5)
PROT UR QL STRIP: ABNORMAL
RBC # BLD AUTO: 3.77 10*6/MM3 (ref 4.14–5.8)
RBC # UR STRIP: ABNORMAL /HPF
REF LAB TEST METHOD: ABNORMAL
SODIUM SERPL-SCNC: 139 MMOL/L (ref 136–145)
SP GR UR STRIP: 1.02 (ref 1–1.03)
SQUAMOUS #/AREA URNS HPF: ABNORMAL /HPF
UROBILINOGEN UR QL STRIP: ABNORMAL
WBC # UR STRIP: ABNORMAL /HPF
WBC NRBC COR # BLD: 12.34 10*3/MM3 (ref 3.4–10.8)
WHOLE BLOOD HOLD COAG: NORMAL
WHOLE BLOOD HOLD SPECIMEN: NORMAL

## 2022-11-13 PROCEDURE — 74176 CT ABD & PELVIS W/O CONTRAST: CPT

## 2022-11-13 PROCEDURE — 85025 COMPLETE CBC W/AUTO DIFF WBC: CPT | Performed by: EMERGENCY MEDICINE

## 2022-11-13 PROCEDURE — 72131 CT LUMBAR SPINE W/O DYE: CPT

## 2022-11-13 PROCEDURE — 80053 COMPREHEN METABOLIC PANEL: CPT | Performed by: EMERGENCY MEDICINE

## 2022-11-13 PROCEDURE — 83690 ASSAY OF LIPASE: CPT | Performed by: EMERGENCY MEDICINE

## 2022-11-13 PROCEDURE — 71250 CT THORAX DX C-: CPT

## 2022-11-13 PROCEDURE — 72128 CT CHEST SPINE W/O DYE: CPT

## 2022-11-13 PROCEDURE — 81001 URINALYSIS AUTO W/SCOPE: CPT | Performed by: EMERGENCY MEDICINE

## 2022-11-13 PROCEDURE — 99283 EMERGENCY DEPT VISIT LOW MDM: CPT

## 2022-11-13 RX ORDER — MAGNESIUM CARB/ALUMINUM HYDROX 105-160MG
300 TABLET,CHEWABLE ORAL ONCE
Status: COMPLETED | OUTPATIENT
Start: 2022-11-13 | End: 2022-11-13

## 2022-11-13 RX ORDER — SODIUM CHLORIDE 0.9 % (FLUSH) 0.9 %
10 SYRINGE (ML) INJECTION AS NEEDED
Status: DISCONTINUED | OUTPATIENT
Start: 2022-11-13 | End: 2022-11-14 | Stop reason: HOSPADM

## 2022-11-13 RX ADMIN — MAGNESIUM CITRATE 296 ML: 1.75 LIQUID ORAL at 22:41

## 2022-11-14 NOTE — ED PROVIDER NOTES
"Subjective   History of Present Illness  History of Present Illness    Chief complaint: Constipation and difficulty urinating and back pain    Location: Low back    Quality/Severity: Moderate, sharp    Timing/Onset/Duration: Patient has a history of chronic back pain, patient complains of rib pain around the lower ribs bilaterally since a fall Sunday.    Modifying Factors: Hurts to move, feels better to remain still    Associated Symptoms: No headache.  No fever chills or cough.  No sore throat earache or nasal congestion.  No chest pain or shortness of breath.  Patient complains of some mild suprapubic abdominal pain.  No diarrhea or burning when he urinates    Narrative: This 71-year-old white male presents complaining of difficulty urinating patient stated about an hour before he arrived here he urinated approximately 100 cc patient states that he thinks he last had a bowel movement on Wednesday or Thursday.  He states he has \"really bad hemorrhoids\" and it makes it even worse when he tries to have a bowel movement.    PCP:Guille Nieves MD  Review of Systems   Constitutional: Negative for chills and fever.   HENT: Negative for congestion, ear pain and sore throat.    Respiratory: Negative for cough and shortness of breath.    Cardiovascular: Negative for chest pain.   Gastrointestinal: Positive for abdominal pain (Suprapubic). Negative for diarrhea, nausea and vomiting.   Genitourinary: Positive for difficulty urinating. Negative for dysuria.   Musculoskeletal: Positive for back pain.   Neurological: Negative for weakness, numbness and headaches.        Medication List      CONTINUE taking these medications    accu-chek multiclix lancets  Use to test blood sugar 5 times daily     Insulin Pen Needle 32G X 4 MM misc  Commonly known as: BD Pen Needle Mireille U/F  TO INJECT 4 TIMES DAILY        ASK your doctor about these medications    Accu-Chek Guide test strip  Generic drug: glucose blood  TEST BLOOD SUGAR 3-4 " TIMES A DAY     atorvastatin 40 MG tablet  Commonly known as: LIPITOR  TAKE 1 TABLET BY MOUTH EVERY EVENING.     HumaLOG KwikPen 100 UNIT/ML solution pen-injector  Generic drug: Insulin Lispro (1 Unit Dial)  Inject 22 Units under the skin into the appropriate area as directed 4 (Four) Times a Day.     Insulin Glargine 100 UNIT/ML injection pen  Commonly known as: LANTUS SOLOSTAR  INJECT up to 50 UNITS IN THE AM AND 50 UNITS IN THE PM     isosorbide mononitrate 30 MG 24 hr tablet  Commonly known as: IMDUR     lisinopril 30 MG tablet  Commonly known as: PRINIVIL,ZESTRIL     metoprolol tartrate 25 MG tablet  Commonly known as: LOPRESSOR  Take 1 tablet by mouth Every 12 (Twelve) Hours.     multivitamin tablet tablet  Commonly known as: THERAGRAN     omeprazole 40 MG capsule  Commonly known as: priLOSEC     oxyCODONE-acetaminophen  MG per tablet  Commonly known as: PERCOCET     phentermine 30 MG capsule     pregabalin 150 MG capsule  Commonly known as: LYRICA     sertraline 100 MG tablet  Commonly known as: ZOLOFT            Past Medical History:   Diagnosis Date   • Arthritis    • Chronic pain     BILATERAL KNEES AND BACK   • Contusion of knee    • Coronary artery disease    • Depression    • Diabetes mellitus (HCC)     type 2 with atherosclerosis of arteries of extremities   • Diabetic ulcer of toe (HCC)     left foot associated with type 2 diabetes mellitus, with necrosis of bone   • GERD (gastroesophageal reflux disease)    • Great toe pain     with cellulitis and gangrene   • History of kidney stones    • Hyperlipidemia    • Hypertension    • Hyponatremia    • Kidney stone    • Knee pain     hx of previous prosthetic joint infection   • Liver disease     liver mass   • MSSA (methicillin susceptible Staphylococcus aureus) infection     RIGHT KNEE   • NSTEMI (non-ST elevated myocardial infarction) ([facility]) 06/14/2017   • Sleep apnea     DOES NOT USE CPAP       Allergies   Allergen Reactions   • Vancomycin Other  (See Comments)     Red Man syndrome, slow rate and premedicate with benadryl       Past Surgical History:   Procedure Laterality Date   • APPENDECTOMY     • CARDIAC CATHETERIZATION N/A 6/14/2017    Procedure: Left Heart Cath;  Surgeon: Tiburcio Moreno MD;  Location: [facility] INVASIVE LOCATION;  Service:    • COLONOSCOPY     • JOINT REPLACEMENT      BILATERAL KNEES    • LUMBAR FUSION     • AR REVISE KNEE JOINT REPLACE,1 PART Right 9/5/2017    Procedure: REMOVAL OF ANTIBIOTIC CEMENT SPACER; RIGHT TOTAL KNEE ARTHROPLASTY REVISION;  Surgeon: Kiko Marie MD;  Location: [facility];  Service: Orthopedics   • ROTATOR CUFF REPAIR Right    • TOTAL KNEE  PROSTHESIS REMOVAL W/ SPACER INSERTION Right 2/14/2017    Procedure: INCISION AND DRAINAGE RIGHT KNEE, POLY CHANGE;  Surgeon: Kiko Marie MD;  Location: [facility];  Service:    • TOTAL KNEE  PROSTHESIS REMOVAL W/ SPACER INSERTION Right 7/7/2017    Procedure: REMOVAL RIGHT TOTAL KNEE ARTHROPLASTY AND ANITBIOTIC SPACER;  Surgeon: Kiko Marie MD;  Location: [facility];  Service:    • TRANS METATARSAL AMPUTATION Left 2/14/2017    Procedure: EXCISIONAL DEBRIDEMENT LT. FIRST TOE DIABETIC ULCER, DRAINAGE ABSCESS FIRST TOE, FIRST TOE AMPUTATION;  Surgeon: Osimn Brand MD;  Location: [facility];  Service:        Family History   Problem Relation Age of Onset   • Heart disease Mother    • Heart disease Father    • Diabetes Sister    • Dementia Maternal Grandmother    • No Known Problems Maternal Grandfather    • Heart disease Paternal Grandmother    • Heart attack Paternal Grandfather    • Heart disease Paternal Grandfather    • Heart disease Son    • Heart attack Son    • No Known Problems Sister    • Malig Hyperthermia Neg Hx        Social History     Socioeconomic History   • Marital status:    Tobacco Use   • Smoking status: Former     Packs/day: 3.00     Years: 20.00     Pack years: 60.00     Types:  Cigarettes   • Smokeless tobacco: Former   • Tobacco comments:     quit 35 years ago. 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    Substance and Sexual Activity   • Alcohol use: No   • Drug use: No   • Sexual activity: Defer           Objective   Physical Exam  Vitals (The temperature is 90.5 °F, pulse 74, respirations 18, /87, room air pulse ox 97%) and nursing note reviewed.   Constitutional:       Appearance: Normal appearance.   HENT:      Head: Normocephalic and atraumatic.      Mouth/Throat:      Mouth: Mucous membranes are moist.   Neck:      Comments: There is no tenderness, deformity, or bony step-offs upon palpation the cervical, thoracic, lumbar sacrococcygeal spine  Genitourinary:     Comments: Rectal exam: There is a large amount of stool noted in the vault.  It is heme-negative, normal tone, no masses  Musculoskeletal:         General: No tenderness. Normal range of motion.      Cervical back: Normal range of motion and neck supple.   Skin:     General: Skin is warm and dry.      Findings: No rash.   Neurological:      General: No focal deficit present.      Mental Status: He is alert and oriented to person, place, and time.      Cranial Nerves: No cranial nerve deficit.      Sensory: No sensory deficit.      Motor: No weakness.      Coordination: Coordination normal.         Procedures           ED Course  ED Course as of 11/13/22 2103   Sun Nov 13, 2022 2101 The lipase is 14.     The glucose is 171.  The BUN is 32 and creatinine is 1.99.  The CO2 is 20.9.  The albumin is 3.3.  The alk phos is 151.  The GFR is 35 to [RC]   2101 The urine microscopic shows no red blood cells, 0-2 white blood cells, leukocyte negative, nitrite negative [RC]   2101 The white blood cell count is 12.3.  The hemoglobin is 12.6.  The hematocrit is 37.  The platelet count 277,000.  Neutrophil percent is 85%.  The CBC is otherwise unremarkable. [RC]      ED Course User Index  [RC] Jaxon Harvey MD      20:58 EST, 11/13/22:  An  In-N-Out cath was placed by the nurse and 600 cc of urine was returned.     22:22 EST, 11/13/22:  The patient's diagnosis of constipation and urinary retention was discussed with him.  There is a possibility that the stool is causing urinary retention.  The nurse inadvertently did in and out cath.  Therefore the risk versus the benefit of sending the patient home with a Soto with leg bag versus treating his constipation and seeing if that takes care of his urinary retention was discussed with him.  He has elected to take the mag citrate.  He will follow-up with his primary care provider for the constipation.  He has pain medication at home.  He should take this and cough and deep breathe 2-3 times an hour while awake for 2 weeks.  The patient was notified that he had multiple rib fractures.  A discussion of the finding of liver neoplasm on the CAT scan was discussed with him.  The patient states that he has a history of liver cancer and he gets this followed up with CAT scans every 6 months and is aware of this lesion.  The patient should follow-up with his liver specialist, call in the morning.  The patient should return to the emergency department if there is pain not controlled with the pain medication, worsening constipation, worse in any way at all.  The patient should take Colace as needed as directed for constipation.  All the patient questions were answered he will be discharged in good condition                                MDM    Final diagnoses:   Liver mass   Constipation, unspecified constipation type   Closed fracture of multiple ribs of both sides, initial encounter       ED Disposition  ED Disposition     None          No follow-up provider specified.       Medication List      No changes were made to your prescriptions during this visit.          Jaxon Harvey MD  11/13/22 2331       Jaxon Harvey MD  11/13/22 [id number]

## 2022-11-14 NOTE — DISCHARGE INSTRUCTIONS
The patient should take Colace as needed as directed for constipation.  The patient should take his pain medication as needed as directed for pain.  He should cough and deep breathe 2-3 times an hour while awake for 2 weeks.  The patient should follow-up with Dr. Guille Nieves this week.  The patient should return to the emergency department if there is further urinary retention, worsening pain, worse in any way at all.  The patient should call his liver specialist to advise them of the finding on CAT scan and further follow-up as needed.

## 2023-02-27 RX ORDER — INSULIN LISPRO 100 [IU]/ML
22 INJECTION, SOLUTION INTRAVENOUS; SUBCUTANEOUS 4 TIMES DAILY
Qty: 90 ML | Refills: 0 | Status: SHIPPED | OUTPATIENT
Start: 2023-02-27

## 2023-03-06 ENCOUNTER — TELEPHONE (OUTPATIENT)
Dept: ENDOCRINOLOGY | Age: 73
End: 2023-03-06

## 2023-03-06 NOTE — TELEPHONE ENCOUNTER
PT CALLED STATING HE WAS CALLED FROM [facility] ABOUT HIS SENSORS STATING THEY ARE WAITING ON INFORMATION FROM THE DOCTOR'S OFFICE. I SEE NO INFORMATION IN THE MEDIA TAB. IF WE CAN GET HIS ORDER FOR THE SENSORS OVER TO [facility] FOR THE PT PLEASE.      HUGH MCBURNEY  [phone]

## 2023-03-28 ENCOUNTER — OFFICE VISIT (OUTPATIENT)
Dept: ENDOCRINOLOGY | Age: 73
End: 2023-03-28
Payer: MEDICARE

## 2023-03-28 VITALS
HEIGHT: 74 IN | BODY MASS INDEX: 39.17 KG/M2 | OXYGEN SATURATION: 95 % | SYSTOLIC BLOOD PRESSURE: 120 MMHG | WEIGHT: 305.2 LBS | HEART RATE: 65 BPM | DIASTOLIC BLOOD PRESSURE: 80 MMHG | TEMPERATURE: 96 F

## 2023-03-28 DIAGNOSIS — E11.42 DIABETIC PERIPHERAL NEUROPATHY ASSOCIATED WITH TYPE 2 DIABETES MELLITUS: ICD-10-CM

## 2023-03-28 DIAGNOSIS — R80.9 TYPE 2 DIABETES MELLITUS WITH MICROALBUMINURIA, WITH LONG-TERM CURRENT USE OF INSULIN: Primary | ICD-10-CM

## 2023-03-28 DIAGNOSIS — E11.29 TYPE 2 DIABETES MELLITUS WITH MICROALBUMINURIA, WITH LONG-TERM CURRENT USE OF INSULIN: Primary | ICD-10-CM

## 2023-03-28 DIAGNOSIS — Z79.4 TYPE 2 DIABETES MELLITUS WITH MICROALBUMINURIA, WITH LONG-TERM CURRENT USE OF INSULIN: Primary | ICD-10-CM

## 2023-03-28 DIAGNOSIS — N18.32 STAGE 3B CHRONIC KIDNEY DISEASE: ICD-10-CM

## 2023-03-28 DIAGNOSIS — E78.2 MIXED HYPERLIPIDEMIA: ICD-10-CM

## 2023-03-28 NOTE — PROGRESS NOTES
"Chief Complaint  Diabetes (Type2:Patient uses a merly, he has no hx of retinopathy with a mild case of neuropathy in his feet and hands )    Subjective        Hugh R McBurney presents to [facility] ENDOCRINOLOGY  History of Present Illness     Lab Results   Component Value Date    HGBA1C 6.60 (H) 03/14/2023     Cgm review  high 16%  Target range 79%  Low 5%  GMI 6.6%    Type 2 dm    Diagnosed about 10 + years ago.   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    Weight as of this encounter: 138 kg (305 lb 3.2 oz).             Physical Exam  Vitals reviewed.   Constitutional:       General: He is not in acute distress.  HENT:      Head: Normocephalic and atraumatic.   Cardiovascular:      Rate and Rhythm: Normal rate.   Pulmonary:      Effort: Pulmonary effort is normal. No respiratory distress.   Musculoskeletal:         General: No signs of injury. Normal range of motion.      Cervical back: Normal range of motion and neck supple.   Skin:     General: " Skin is warm and dry.   Neurological:      Mental Status: He is alert and oriented to person, place, and time. Mental status is at baseline.   Psychiatric:         Mood and Affect: Mood normal.         Behavior: Behavior normal.         Thought Content: Thought content normal.         Judgment: Judgment normal.        Result Review :  The following data was reviewed by: ABBY Alvarenga on 03/28/2023:  Common labs    Common Labs 10/3/22 10/3/22 10/3/22 10/3/22 11/13/22 11/13/22 3/14/23 3/14/23 3/14/23 3/14/23    1025 1025 1025 1025 2018 2018 0940 0940 0940 0940   Glucose   156 (A)   171 (A)   113 (A)    BUN   22   32 (A)   20    Creatinine   1.68 (A)   1.99 (A)   2.23 (A)    Sodium   140   139   142    Potassium   4.5   4.5   4.6    Chloride   107   107   107    Calcium   8.3 (A)   8.7   8.8    Total Protein   5.7 (A)      5.8 (A)    Albumin   3.40 (A)   3.30 (A)   3.3 (A)    Total Bilirubin   0.4   0.3   0.3    Alkaline Phosphatase   128 (A)   151 (A)   127 (A)    AST (SGOT)   26   15   12    ALT (SGPT)   18   11   13    WBC     12.34 (A)        Hemoglobin     12.6 (A)        Hematocrit     37.0 (A)        Platelets     177        Total Cholesterol  134      139     Triglycerides  125      95     HDL Cholesterol  44      48     LDL Cholesterol   68      73     Hemoglobin A1C    7.10 (A)   6.60 (A)      Microalbumin, Urine 4,436.2         5,543.0   (A) Abnormal value       Comments are available for some flowsheets but are not being displayed.                        Assessment and Plan   Diagnoses and all orders for this visit:    1. Type 2 diabetes mellitus with microalbuminuria, with long-term current use of insulin (HCC) (Primary)  -     Hemoglobin A1c; Future  -     Basic Metabolic Panel; Future    2. Mixed hyperlipidemia  -     Hemoglobin A1c; Future  -     Basic Metabolic Panel; Future    3. Stage 3b chronic kidney disease (HCC)    4. Diabetic peripheral neuropathy associated with type 2 diabetes mellitus  ([facility])             Follow Up   Return in about 4 months (around 7/28/2023).     Recommended f/u with renal given well controlled a1c, assumed BP control with todays check and patients report and worsening kidney function- verbalizes understanding   Cgm reviewed, a1c at goal    LDL at goal, continue statin   Continued hypoglycemia prevention and adjustment of insulin to decrease risk of hypoglycemia    Patient was given instructions and counseling regarding his condition or for health maintenance advice. Please see specific information pulled into the AVS if appropriate.     Minnie Dsouza APRN

## 2023-04-13 ENCOUNTER — TELEPHONE (OUTPATIENT)
Dept: ENDOCRINOLOGY | Age: 73
End: 2023-04-13
Payer: MEDICARE

## 2023-04-13 NOTE — TELEPHONE ENCOUNTER
Need completed form for [facility] for Martha 2 faxed to [phone].    Any questions call [phone].  Need form to expedite the pending order.

## 2023-05-24 ENCOUNTER — OFFICE VISIT (OUTPATIENT)
Dept: CARDIOLOGY | Facility: CLINIC | Age: 73
End: 2023-05-24
Payer: MEDICARE

## 2023-05-24 VITALS
HEART RATE: 70 BPM | SYSTOLIC BLOOD PRESSURE: 128 MMHG | BODY MASS INDEX: 38.5 KG/M2 | DIASTOLIC BLOOD PRESSURE: 74 MMHG | HEIGHT: 74 IN | OXYGEN SATURATION: 97 % | WEIGHT: 300 LBS

## 2023-05-24 DIAGNOSIS — E78.2 MIXED HYPERLIPIDEMIA: ICD-10-CM

## 2023-05-24 DIAGNOSIS — R80.9 TYPE 2 DIABETES MELLITUS WITH PROTEINURIA: ICD-10-CM

## 2023-05-24 DIAGNOSIS — E11.29 TYPE 2 DIABETES MELLITUS WITH PROTEINURIA: ICD-10-CM

## 2023-05-24 DIAGNOSIS — I51.81 STRESS-INDUCED CARDIOMYOPATHY: Primary | ICD-10-CM

## 2023-05-24 DIAGNOSIS — I25.10 CORONARY ARTERY DISEASE INVOLVING NATIVE CORONARY ARTERY OF NATIVE HEART WITHOUT ANGINA PECTORIS: ICD-10-CM

## 2023-05-24 DIAGNOSIS — I10 PRIMARY HYPERTENSION: ICD-10-CM

## 2023-05-24 RX ORDER — TORSEMIDE 20 MG/1
TABLET ORAL
COMMUNITY
Start: 2023-05-08

## 2023-05-24 RX ORDER — ATORVASTATIN CALCIUM 80 MG/1
TABLET, FILM COATED ORAL
COMMUNITY
Start: 2023-03-21

## 2023-05-24 RX ORDER — PREGABALIN 150 MG/1
1 CAPSULE ORAL
COMMUNITY

## 2023-05-24 NOTE — PROGRESS NOTES
Date of Office Visit: 2023  Encounter Provider: ABBY Lindquist  Place of Service: [facility] CARDIOLOGY  Patient Name: Hugh R McBurney  :1950    Chief Complaint   Patient presents with   • Follow-up   • Coronary Artery Disease   :     HPI: Hugh R McBurney is a 72 y.o. male who is a patient of  Dr. Moreno and new to me today.  He has a history of hypertension, hyperlipidemia, diabetes, GERD, sleep apnea not on CPAP, right TKA with joint infection and coronary disease.In 2017 he was seen because he had a  Vancomyocin reaction with chest pain, n/v and diaphoresis and a non-ST elevation myocardial infarction when here for knee surgery. A cardiac cath was done which showed a normal left main, 20% proximal LAD, 50% mid LAD, 90% proximal first diagonal, diffuse 50% mid circumflex, and a  70-80% proximal RPL 2 stenosis. An echo showed an EF of 42%, possibly from stress induced cardiomyopathy. He was sent home on an ace, beta blocker and nitrate to wait for 2-4 weeks before attempting surgery again.   At that time he had a repeat transthoracic echocardiogram showing his ejection fraction and wall motion had normalized.    He was last in the office a year ago.  He had gained a little bit of weight and his LDL was up but he was asymptomatic.  He comes in today he has not had any symptoms.  No shortness of breath or chest discomfort.  His kidney function is gotten a little worse he has been following up with a nephrologist now who has him on torsemide.  He still has swelling in his right lower extremity but that is where his knee infection was and he has reduced range of motion.  Initially blood pressure was elevated at recheck was within normal range.  Previous testing and notes have been reviewed by me.   Past Medical History:   Diagnosis Date   • Arthritis    • Chronic pain     BILATERAL KNEES AND BACK   • Contusion of knee    • Coronary artery disease    •  Depression    • Diabetes mellitus     type 2 with atherosclerosis of arteries of extremities   • Diabetic ulcer of toe     left foot associated with type 2 diabetes mellitus, with necrosis of bone   • GERD (gastroesophageal reflux disease)    • Great toe pain     with cellulitis and gangrene   • History of kidney stones    • Hyperlipidemia    • Hypertension    • Hyponatremia    • Kidney stone    • Knee pain     hx of previous prosthetic joint infection   • Liver disease     liver mass   • MSSA (methicillin susceptible Staphylococcus aureus) infection     RIGHT KNEE   • NSTEMI (non-ST elevated myocardial infarction) 06/14/2017   • Sleep apnea     DOES NOT USE CPAP       Past Surgical History:   Procedure Laterality Date   • APPENDECTOMY     • CARDIAC CATHETERIZATION N/A 6/14/2017    Procedure: Left Heart Cath;  Surgeon: Tiburcio Moreno MD;  Location: [facility] CATH INVASIVE LOCATION;  Service:    • COLONOSCOPY     • JOINT REPLACEMENT      BILATERAL KNEES    • LUMBAR FUSION     • MO REVJ TOTAL KNEE ARTHRP W/WO ALGRFT 1 COMPONENT Right 9/5/2017    Procedure: REMOVAL OF ANTIBIOTIC CEMENT SPACER; RIGHT TOTAL KNEE ARTHROPLASTY REVISION;  Surgeon: Kiko Marie MD;  Location: [facility] OR;  Service: Orthopedics   • ROTATOR CUFF REPAIR Right    • TOTAL KNEE  PROSTHESIS REMOVAL W/ SPACER INSERTION Right 2/14/2017    Procedure: INCISION AND DRAINAGE RIGHT KNEE, POLY CHANGE;  Surgeon: Kiko Marie MD;  Location: [facility] OR;  Service:    • TOTAL KNEE  PROSTHESIS REMOVAL W/ SPACER INSERTION Right 7/7/2017    Procedure: REMOVAL RIGHT TOTAL KNEE ARTHROPLASTY AND ANITBIOTIC SPACER;  Surgeon: Kiko Marie MD;  Location: [facility] OR;  Service:    • TRANS METATARSAL AMPUTATION Left 2/14/2017    Procedure: EXCISIONAL DEBRIDEMENT LT. FIRST TOE DIABETIC ULCER, DRAINAGE ABSCESS FIRST TOE, FIRST TOE AMPUTATION;  Surgeon: Osmin Brand MD;  Location: [facility] OR;  Service:        Social  History     Socioeconomic History   • Marital status:    Tobacco Use   • Smoking status: Former     Packs/day: 3.00     Years: 20.00     Pack years: 60.00     Types: Cigarettes     Passive exposure: Never   • Smokeless tobacco: Former   • Tobacco comments:     quit 35 years ago. 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    Substance and Sexual Activity   • Alcohol use: No   • Drug use: No   • Sexual activity: Defer       Family History   Problem Relation Age of Onset   • Heart disease Mother    • Heart disease Father    • Diabetes Sister    • Dementia Maternal Grandmother    • No Known Problems Maternal Grandfather    • Heart disease Paternal Grandmother    • Heart attack Paternal Grandfather    • Heart disease Paternal Grandfather    • Heart disease Son    • Heart attack Son    • No Known Problems Sister    • Malig Hyperthermia Neg Hx        Review of Systems   Constitutional: Negative for diaphoresis and malaise/fatigue.   Cardiovascular: Positive for leg swelling. Negative for chest pain, claudication, dyspnea on exertion, irregular heartbeat, near-syncope, orthopnea, palpitations, paroxysmal nocturnal dyspnea and syncope.   Respiratory: Negative for cough, shortness of breath and sleep disturbances due to breathing.    Musculoskeletal: Positive for joint pain and joint swelling. Negative for falls.   Neurological: Negative for dizziness and weakness.   Psychiatric/Behavioral: Negative for altered mental status and substance abuse.       Allergies   Allergen Reactions   • Vancomycin Other (See Comments)     Red Man syndrome, slow rate and premedicate with benadryl         Current Outpatient Medications:   •  atorvastatin (LIPITOR) 40 MG tablet, TAKE 1 TABLET BY MOUTH EVERY EVENING., Disp: 30 tablet, Rfl: 3  •  atorvastatin (LIPITOR) 80 MG tablet, , Disp: , Rfl:   •  HumaLOG KwikPen 100 UNIT/ML solution pen-injector, Inject 22 Units under the skin into the appropriate area as directed 4 (Four) Times a Day., Disp: 90 mL,  "Rfl: 0  •  Insulin Glargine (LANTUS SOLOSTAR) 100 UNIT/ML injection pen, INJECT up to 50 UNITS IN THE AM AND 50 UNITS IN THE PM, Disp: 100 mL, Rfl: 0  •  Insulin Pen Needle (BD Pen Needle Mireille U/F) 32G X 4 MM misc, TO INJECT 4 TIMES DAILY, Disp: 100 each, Rfl: 3  •  isosorbide mononitrate (IMDUR) 30 MG 24 hr tablet, isosorbide mononitrate ER 30 mg tablet,extended release 24 hr, Disp: , Rfl:   •  lisinopril (PRINIVIL,ZESTRIL) 30 MG tablet, Take 1 tablet by mouth Daily., Disp: , Rfl:   •  metoprolol tartrate (LOPRESSOR) 25 MG tablet, Take 1 tablet by mouth Every 12 (Twelve) Hours., Disp: 60 tablet, Rfl: 11  •  Multiple Vitamin (MULTI VITAMIN DAILY PO), Take 1 tablet by mouth Daily. HOLD PRIOR TO SURGERY, Disp: , Rfl:   •  omeprazole (priLOSEC) 40 MG capsule, Take 1 capsule by mouth 2 (Two) Times a Day., Disp: , Rfl:   •  oxyCODONE-acetaminophen (PERCOCET)  MG per tablet, Take 1 tablet by mouth Every 6 (Six) Hours As Needed., Disp: , Rfl:   •  pregabalin (LYRICA) 150 MG capsule, 2 (Two) Times a Day., Disp: , Rfl:   •  pregabalin (LYRICA) 150 MG capsule, Take 1 capsule by mouth., Disp: , Rfl:   •  sertraline (ZOLOFT) 100 MG tablet, Take 1 tablet by mouth Daily., Disp: , Rfl:   •  torsemide (DEMADEX) 20 MG tablet, , Disp: , Rfl:       Objective:     Vitals:    05/24/23 1426   BP: 152/78   Pulse: 70   SpO2: 97%   Weight: 136 kg (300 lb)   Height: 188 cm (74\")     Body mass index is 38.52 kg/m².    PHYSICAL EXAM:    Constitutional:       General: Not in acute distress.     Appearance: Normal appearance. Well-developed.   Eyes:      Pupils: Pupils are equal, round, and reactive to light.   HENT:      Head: Normocephalic.   Neck:      Vascular: No carotid bruit or JVD.   Pulmonary:      Effort: Pulmonary effort is normal. No tachypnea.      Breath sounds: Normal breath sounds. No wheezing. No rales.   Cardiovascular:      Normal rate. Regular rhythm.      No gallop.   Pulses:     Intact distal pulses.   Edema:     " Pretibial: 1+ edema of the right pretibial area.     Ankle: 1+ edema of the right ankle.  Abdominal:      General: Bowel sounds are normal.      Palpations: Abdomen is soft.      Tenderness: There is no abdominal tenderness.   Musculoskeletal: Normal range of motion.      Cervical back: Normal range of motion and neck supple. No edema. Skin:     General: Skin is warm and dry.   Neurological:      Mental Status: Alert and oriented to person, place, and time.           ECG 12 Lead    Date/Time: 5/24/2023 3:07 PM  Performed by: Kaliey Olvera APRN  Authorized by: Kailey Olvera APRN   Comparison: compared with previous ECG from 5/23/2022  Similar to previous ECG  Rhythm: sinus rhythm  Rate: normal  Conduction: non-specific intraventricular conduction delay  QRS axis: normal  Other findings: non-specific ST-T wave changes    Clinical impression: non-specific ECG              Assessment:       Diagnosis Plan   1. Stress-induced cardiomyopathy     On ACE inhibitor and beta-blocker no Aldactone due to renal function.  Consideration of Jardiance but has a history of pancreatitis.   2. Primary hypertension     Controlled on current regimen   3. Mixed hyperlipidemia     Improved and at goal on current statin therapy   4. Coronary artery disease involving native coronary artery of native heart without angina pectoris     No angina symptoms.   5. Type 2 diabetes mellitus with proteinuria     Now with chronic kidney disease follows with nephrology     No orders of the defined types were placed in this encounter.         Plan:   I am not can to make any changes today he can follow-up in 6 months talked about need for weight loss and dietary modifications.         Your medication list          Accurate as of May 24, 2023  2:37 PM. If you have any questions, ask your nurse or doctor.            CONTINUE taking these medications      Instructions Last Dose Given Next Dose Due   atorvastatin 40 MG tablet  Commonly known as:  LIPITOR      TAKE 1 TABLET BY MOUTH EVERY EVENING.       atorvastatin 80 MG tablet  Commonly known as: LIPITOR           HumaLOG KwikPen 100 UNIT/ML solution pen-injector  Generic drug: Insulin Lispro (1 Unit Dial)      Inject 22 Units under the skin into the appropriate area as directed 4 (Four) Times a Day.       Insulin Glargine 100 UNIT/ML injection pen  Commonly known as: LANTUS SOLOSTAR      INJECT up to 50 UNITS IN THE AM AND 50 UNITS IN THE PM       Insulin Pen Needle 32G X 4 MM misc  Commonly known as: BD Pen Needle Mireille U/F      TO INJECT 4 TIMES DAILY       isosorbide mononitrate 30 MG 24 hr tablet  Commonly known as: IMDUR      isosorbide mononitrate ER 30 mg tablet,extended release 24 hr       lisinopril 30 MG tablet  Commonly known as: PRINIVIL,ZESTRIL      Take 1 tablet by mouth Daily.       metoprolol tartrate 25 MG tablet  Commonly known as: LOPRESSOR      Take 1 tablet by mouth Every 12 (Twelve) Hours.       multivitamin tablet tablet  Commonly known as: THERAGRAN      Take 1 tablet by mouth Daily. HOLD PRIOR TO SURGERY       omeprazole 40 MG capsule  Commonly known as: priLOSEC      Take 1 capsule by mouth 2 (Two) Times a Day.       oxyCODONE-acetaminophen  MG per tablet  Commonly known as: PERCOCET      Take 1 tablet by mouth Every 6 (Six) Hours As Needed.       pregabalin 150 MG capsule  Commonly known as: LYRICA      2 (Two) Times a Day.       pregabalin 150 MG capsule  Commonly known as: LYRICA      Take 1 capsule by mouth.       sertraline 100 MG tablet  Commonly known as: ZOLOFT      Take 1 tablet by mouth Daily.       torsemide 20 MG tablet  Commonly known as: DEMADEX                    As always, it has been a pleasure to participate in your patient's care.      Sincerely,     Kailey MORA

## 2023-06-16 DIAGNOSIS — Z79.4 TYPE 2 DIABETES MELLITUS WITH MICROALBUMINURIA, WITH LONG-TERM CURRENT USE OF INSULIN: Primary | ICD-10-CM

## 2023-06-16 DIAGNOSIS — R80.9 TYPE 2 DIABETES MELLITUS WITH MICROALBUMINURIA, WITH LONG-TERM CURRENT USE OF INSULIN: Primary | ICD-10-CM

## 2023-06-16 DIAGNOSIS — E11.29 TYPE 2 DIABETES MELLITUS WITH MICROALBUMINURIA, WITH LONG-TERM CURRENT USE OF INSULIN: Primary | ICD-10-CM

## 2023-06-19 RX ORDER — INSULIN LISPRO 100 [IU]/ML
16 INJECTION, SOLUTION INTRAVENOUS; SUBCUTANEOUS
Qty: 90 ML | Refills: 2 | Status: SHIPPED | OUTPATIENT
Start: 2023-06-19

## 2023-06-19 RX ORDER — INSULIN GLARGINE 100 [IU]/ML
50 INJECTION, SOLUTION SUBCUTANEOUS 2 TIMES DAILY
Qty: 90 ML | Refills: 3 | Status: SHIPPED | OUTPATIENT
Start: 2023-06-19

## 2023-06-19 NOTE — TELEPHONE ENCOUNTER
Rx Refill Note  Requested Prescriptions     Pending Prescriptions Disp Refills    Insulin Glargine (Lantus SoloStar) 100 UNIT/ML injection pen [Pharmacy Med Name: LANTUS SOLOSTAR PEN 3ML 5'S 100U/ML] 90 mL 3     Sig: Inject 50 Units under the skin into the appropriate area as directed 2 (Two) Times a Day. INJECT UP TO 50 UNITS IN THE MORNING AND 50 UNITS IN THE EVENING  Indications: Type 2 Diabetes    HumaLOG KwikPen 100 UNIT/ML solution pen-injector [Pharmacy Med Name: HUMALOG KWIKPEN U100 3ML 5'S 100U/ML] 90 mL 2     Sig: Inject 16 Units under the skin into the appropriate area as directed 3 (Three) Times a Day With Meals. Indications: Type 2 Diabetes      Last office visit with prescribing clinician: 3/28/2023   Last telemedicine visit with prescribing clinician: Visit date not found   Next office visit with prescribing clinician: 7/26/2023                         Would you like a call back once the refill request has been completed: [] Yes [] No    If the office needs to give you a call back, can they leave a voicemail: [] Yes [] No    Irma Martin MA  06/19/23, 11:28 EDT

## 2023-07-26 ENCOUNTER — OFFICE VISIT (OUTPATIENT)
Dept: ENDOCRINOLOGY | Age: 73
End: 2023-07-26
Payer: MEDICARE

## 2023-07-26 VITALS
WEIGHT: 295.6 LBS | HEART RATE: 61 BPM | OXYGEN SATURATION: 97 % | HEIGHT: 74 IN | SYSTOLIC BLOOD PRESSURE: 122 MMHG | BODY MASS INDEX: 37.94 KG/M2 | TEMPERATURE: 95.2 F | DIASTOLIC BLOOD PRESSURE: 64 MMHG

## 2023-07-26 DIAGNOSIS — E11.29 TYPE 2 DIABETES MELLITUS WITH MICROALBUMINURIA, WITH LONG-TERM CURRENT USE OF INSULIN: Primary | ICD-10-CM

## 2023-07-26 DIAGNOSIS — Z79.4 TYPE 2 DIABETES MELLITUS WITH MICROALBUMINURIA, WITH LONG-TERM CURRENT USE OF INSULIN: Primary | ICD-10-CM

## 2023-07-26 DIAGNOSIS — N18.32 STAGE 3B CHRONIC KIDNEY DISEASE: ICD-10-CM

## 2023-07-26 DIAGNOSIS — E78.2 MIXED HYPERLIPIDEMIA: ICD-10-CM

## 2023-07-26 DIAGNOSIS — R80.9 TYPE 2 DIABETES MELLITUS WITH MICROALBUMINURIA, WITH LONG-TERM CURRENT USE OF INSULIN: Primary | ICD-10-CM

## 2023-07-26 DIAGNOSIS — E11.42 DIABETIC PERIPHERAL NEUROPATHY ASSOCIATED WITH TYPE 2 DIABETES MELLITUS: ICD-10-CM

## 2023-07-26 NOTE — PROGRESS NOTES
"Chief Complaint  Diabetes (Testing bs 3-4 x day /last dm eye exam mar 2023)    Subjective        Hugh R McBurney presents to [facility] ENDOCRINOLOGY  History of Present Illness    R/t GFR: stated his nephrologist called him today and told him top d/c the lisinopril and the isabel pill x 3 days and keep an eye on his BP/and he is scheduled with them for repeat labs next Wednesday     Type 2 dm    Diagnosed about 10 + years ago.   Today in clinic pt reports being on Lantus 50 units bid, Humalog 16u+ss with meals  Sensor - merly  Average glucose 151  GMI 6.9%  Variability 34.4%  Low 2%  Target range 70%  High 25%  Last eye exam - 3/2023  Dm nephropathy - yes, now following routinely with renal, worsening- GFR 18    Dm neuropathy - yes, on lyrica one tablet daily from PCP    CAD - yes  Episodes of hypoglycemia - improved on CGM  On statin- he is unsure of the dose 40 vs 80    Objective   Vital Signs:  /64   Pulse 61   Temp 95.2 °F (35.1 °C) (Temporal)   Ht 188 cm (74.02\")   Wt 134 kg (295 lb 9.6 oz)   SpO2 97%   BMI 37.94 kg/m²   Estimated body mass index is 37.94 kg/m² as calculated from the following:    Height as of this encounter: 188 cm (74.02\").    Weight as of this encounter: 134 kg (295 lb 9.6 oz).             Physical Exam  Vitals reviewed.   Constitutional:       General: He is not in acute distress.  HENT:      Head: Normocephalic and atraumatic.   Cardiovascular:      Rate and Rhythm: Normal rate.   Pulmonary:      Effort: Pulmonary effort is normal. No respiratory distress.   Musculoskeletal:         General: No signs of injury. Normal range of motion.      Cervical back: Normal range of motion and neck supple.   Skin:     General: Skin is warm and dry.   Neurological:      Mental Status: He is alert and oriented to person, place, and time. Mental status is at baseline.      Motor: Weakness present.      Comments: cane   Psychiatric:         Mood and Affect: Mood normal.       "   Behavior: Behavior normal.         Thought Content: Thought content normal.         Judgment: Judgment normal.      Result Review :  The following data was reviewed by: ABBY Alvarenga on 07/26/2023:  Common labs          11/13/2022    20:18 3/14/2023    09:40 7/12/2023    10:35   Common Labs   Glucose 171  113  110    BUN 32  20  52    Creatinine 1.99  2.23  3.39    Sodium 139  142  140    Potassium 4.5  4.6  5.0    Chloride 107  107  104    Calcium 8.7  8.8  8.7    Total Protein  5.8     Albumin 3.30  3.3     Total Bilirubin 0.3  0.3     Alkaline Phosphatase 151  127     AST (SGOT) 15  12     ALT (SGPT) 11  13     WBC 12.34      Hemoglobin 12.6      Hematocrit 37.0      Platelets 177      Total Cholesterol  139     Triglycerides  95     HDL Cholesterol  48     LDL Cholesterol   73     Hemoglobin A1C  6.60  7.60    Microalbumin, Urine  5,543.0                    Assessment and Plan   Diagnoses and all orders for this visit:    1. Type 2 diabetes mellitus with microalbuminuria, with long-term current use of insulin (Primary)    2. Stage 3b chronic kidney disease  -     Basic Metabolic Panel    3. Mixed hyperlipidemia    4. Diabetic peripheral neuropathy associated with type 2 diabetes mellitus             Follow Up   Return in about 4 months (around 11/26/2023).    Cgm reviewed, although A1c is up, glycemic control stabilizing on cgm- no additional diabetic regimen changes made today    Repeat BMP, he has not had it repeated     Patient was given instructions and counseling regarding his condition or for health maintenance advice. Please see specific information pulled into the AVS if appropriate.     ABBY Alvarenga

## 2023-07-27 LAB
BUN SERPL-MCNC: 29 MG/DL (ref 8–23)
BUN/CREAT SERPL: 12.8 (ref 7–25)
CALCIUM SERPL-MCNC: 8.4 MG/DL (ref 8.6–10.5)
CHLORIDE SERPL-SCNC: 108 MMOL/L (ref 98–107)
CO2 SERPL-SCNC: 23.3 MMOL/L (ref 22–29)
CREAT SERPL-MCNC: 2.26 MG/DL (ref 0.76–1.27)
EGFRCR SERPLBLD CKD-EPI 2021: 30.1 ML/MIN/1.73
GLUCOSE SERPL-MCNC: 131 MG/DL (ref 65–99)
POTASSIUM SERPL-SCNC: 5.1 MMOL/L (ref 3.5–5.2)
SODIUM SERPL-SCNC: 142 MMOL/L (ref 136–145)

## 2023-10-30 ENCOUNTER — APPOINTMENT (OUTPATIENT)
Dept: GENERAL RADIOLOGY | Facility: HOSPITAL | Age: 73
End: 2023-10-30
Payer: MEDICARE

## 2023-10-30 ENCOUNTER — APPOINTMENT (OUTPATIENT)
Dept: CT IMAGING | Facility: HOSPITAL | Age: 73
End: 2023-10-30
Payer: MEDICARE

## 2023-10-30 ENCOUNTER — HOSPITAL ENCOUNTER (EMERGENCY)
Facility: HOSPITAL | Age: 73
Discharge: HOME | End: 2023-10-30
Attending: EMERGENCY MEDICINE | Admitting: EMERGENCY MEDICINE
Payer: MEDICARE

## 2023-10-30 VITALS
HEART RATE: 60 BPM | BODY MASS INDEX: 38.24 KG/M2 | DIASTOLIC BLOOD PRESSURE: 84 MMHG | TEMPERATURE: 98.2 F | OXYGEN SATURATION: 95 % | RESPIRATION RATE: 18 BRPM | SYSTOLIC BLOOD PRESSURE: 155 MMHG | WEIGHT: 298 LBS | HEIGHT: 74 IN

## 2023-10-30 DIAGNOSIS — S80.01XA CONTUSION OF RIGHT KNEE, INITIAL ENCOUNTER: ICD-10-CM

## 2023-10-30 DIAGNOSIS — S22.079A CLOSED FRACTURE OF NINTH THORACIC VERTEBRA, UNSPECIFIED FRACTURE MORPHOLOGY, INITIAL ENCOUNTER: Primary | ICD-10-CM

## 2023-10-30 DIAGNOSIS — S30.0XXA LUMBAR CONTUSION, INITIAL ENCOUNTER: ICD-10-CM

## 2023-10-30 PROCEDURE — 72131 CT LUMBAR SPINE W/O DYE: CPT

## 2023-10-30 PROCEDURE — 70450 CT HEAD/BRAIN W/O DYE: CPT

## 2023-10-30 PROCEDURE — 72128 CT CHEST SPINE W/O DYE: CPT

## 2023-10-30 PROCEDURE — 72125 CT NECK SPINE W/O DYE: CPT

## 2023-10-30 PROCEDURE — 73562 X-RAY EXAM OF KNEE 3: CPT

## 2023-10-30 PROCEDURE — 99284 EMERGENCY DEPT VISIT MOD MDM: CPT

## 2023-10-30 RX ORDER — NAPROXEN 250 MG/1
500 TABLET ORAL ONCE
Status: COMPLETED | OUTPATIENT
Start: 2023-10-30 | End: 2023-10-30

## 2023-10-30 RX ORDER — METHOCARBAMOL 750 MG/1
750 TABLET, FILM COATED ORAL 3 TIMES DAILY PRN
Qty: 30 TABLET | Refills: 0 | Status: SHIPPED | OUTPATIENT
Start: 2023-10-30

## 2023-10-30 RX ORDER — METHOCARBAMOL 500 MG/1
750 TABLET, FILM COATED ORAL ONCE
Status: COMPLETED | OUTPATIENT
Start: 2023-10-30 | End: 2023-10-30

## 2023-10-30 RX ORDER — NAPROXEN 500 MG/1
500 TABLET ORAL 2 TIMES DAILY PRN
Qty: 20 TABLET | Refills: 0 | Status: SHIPPED | OUTPATIENT
Start: 2023-10-30

## 2023-10-30 RX ADMIN — NAPROXEN 500 MG: 250 TABLET ORAL at 18:31

## 2023-10-30 RX ADMIN — METHOCARBAMOL 750 MG: 500 TABLET ORAL at 18:30

## 2023-10-30 NOTE — ED PROVIDER NOTES
EMERGENCY DEPARTMENT ENCOUNTER      Room Number: 11/11    History is provided by the patient, no translation services needed    HPI:    Chief complaint: Injuries from a fall    Location: Thoracic spine, lumbar spine, right knee    Quality/Severity:  Patient describes the pain as a moderately severe soreness.    Timing/Duration: 5 days    Modifying Factors: Movement makes the pain worse.    Associated Symptoms: None    Narrative: Pt is a 72 y.o. male who presents complaining of injuries from a fall that occurred approximately 5 days ago. He advised that he had driven his tractor down a hill. He had attempted to get off of the tractor when he right knee locked up. He states that he has had multiple total knee replacements on his right knee in the past and he always has difficulties with his knee. He states that his knee locked up, causing him to fall backwards off of the step on his tractor. He states that he struck the ground with his mid-back and lower back. He denies hitting his head during the incident. He does not believe he had any loss of consciousness, but states that he is not completely sure. He denies any neck pain, headaches, weakness, dizziness, or vision changes. He has not had any chest pain or shortness of breath. He denies abdominal pain, nausea, vomiting, or diarrhea. States he came to the ED today as the pain has continued.       PMD: Guille Nieves MD    REVIEW OF SYSTEMS  Review of Systems   Constitutional:  Negative for fever.   Eyes:  Negative for photophobia and visual disturbance.   Respiratory:  Negative for cough and shortness of breath.    Cardiovascular:  Negative for chest pain and palpitations.   Gastrointestinal:  Negative for abdominal pain, diarrhea, nausea and vomiting.   Musculoskeletal:  Positive for back pain and gait problem (Due to right knee pain and back pain). Negative for neck pain and neck stiffness.   Skin:  Negative for color change, pallor, rash and wound.    Neurological:  Negative for dizziness, seizures, syncope, facial asymmetry, speech difficulty, weakness, light-headedness, numbness and headaches.   Psychiatric/Behavioral:  Negative for confusion. The patient is not nervous/anxious.          PAST MEDICAL HISTORY  Active Ambulatory Problems     Diagnosis Date Noted    Hypertension 02/14/2017    Hyperlipidemia 02/14/2017    Chronic pain 02/14/2017    Knee pain, hx of previous prosthetic joint infection 02/14/2017    Great toe pain, with cellulitis and gangrene 02/14/2017    Diabetic ulcer of 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, with necrosis of bone 02/14/2017    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 02/14/2017    Recent MSSA (methicillin susceptible Staphylococcus aureus) septicemia 02/15/2017    Infection of prosthetic knee joint 02/16/2017    Hyperglycemia 02/25/2017    Hyponatremia 02/26/2017    Contusion of knee 11/29/2014    History of knee surgery 01/30/2013    CAD (coronary artery disease) 07/08/2017    Type 2 diabetes mellitus with proteinuria 07/08/2017    Hyponatremia 07/09/2017    Anemia, posthemorrhagic, acute 07/11/2017    Anemia, posthemorrhagic, acute 09/06/2017    Vitamin D insufficiency 09/06/2017    Stress-induced cardiomyopathy 08/07/2019     Resolved Ambulatory Problems     Diagnosis Date Noted    Cellulitis of left foot 02/14/2017    Osteoarthritis, knee 06/13/2017    NSTEMI (non-ST elevated myocardial infarction) 06/14/2017    Infection of prosthetic right knee joint 07/07/2017    Osteoarthritis, knee 09/05/2017    Hyponatremia 09/07/2017     Past Medical History:   Diagnosis Date    Arthritis     Coronary artery disease     Depression     Diabetes mellitus     Diabetic ulcer of toe     GERD (gastroesophageal reflux disease)     History of kidney stones     Kidney stone     Liver disease     MSSA (methicillin susceptible Staphylococcus aureus) infection     Sleep apnea        PAST SURGICAL  HISTORY  Past Surgical History:   Procedure Laterality Date    APPENDECTOMY      CARDIAC CATHETERIZATION N/A 6/14/2017    Procedure: Left Heart Cath;  Surgeon: Tiburcio Moreno MD;  Location: [facility] INVASIVE LOCATION;  Service:     COLONOSCOPY      JOINT REPLACEMENT      BILATERAL KNEES     LUMBAR FUSION      MT REVJ TOTAL KNEE ARTHRP W/WO ALGRFT 1 COMPONENT Right 9/5/2017    Procedure: REMOVAL OF ANTIBIOTIC CEMENT SPACER; RIGHT TOTAL KNEE ARTHROPLASTY REVISION;  Surgeon: Kiko Marie MD;  Location: [facility] OR;  Service: Orthopedics    ROTATOR CUFF REPAIR Right     TOTAL KNEE  PROSTHESIS REMOVAL W/ SPACER INSERTION Right 2/14/2017    Procedure: INCISION AND DRAINAGE RIGHT KNEE, POLY CHANGE;  Surgeon: Kiko Marie MD;  Location: [facility] OR;  Service:     TOTAL KNEE  PROSTHESIS REMOVAL W/ SPACER INSERTION Right 7/7/2017    Procedure: REMOVAL RIGHT TOTAL KNEE ARTHROPLASTY AND ANITBIOTIC SPACER;  Surgeon: Kiko Marie MD;  Location: [facility] OR;  Service:     TRANS METATARSAL AMPUTATION Left 2/14/2017    Procedure: EXCISIONAL DEBRIDEMENT LT. FIRST TOE DIABETIC ULCER, DRAINAGE ABSCESS FIRST TOE, FIRST TOE AMPUTATION;  Surgeon: Osmin Brand MD;  Location: [facility];  Service:        FAMILY HISTORY  Family History   Problem Relation Age of Onset    Heart disease Mother     Heart disease Father     Diabetes Sister     Dementia Maternal Grandmother     No Known Problems Maternal Grandfather     Heart disease Paternal Grandmother     Heart attack Paternal Grandfather     Heart disease Paternal Grandfather     Heart disease Son     Heart attack Son     No Known Problems Sister     Malig Hyperthermia Neg Hx        SOCIAL HISTORY  Social History     Socioeconomic History    Marital status:    Tobacco Use    Smoking status: Former     Packs/day: 3.00     Years: 20.00     Additional pack years: 0.00     Total pack years: 60.00     Types: Cigarettes      Passive exposure: Never    Smokeless tobacco: Former    Tobacco comments:     quit 35 years ago. 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    Substance and Sexual Activity    Alcohol use: No    Drug use: No    Sexual activity: Defer       ALLERGIES  Vancomycin    No current facility-administered medications for this encounter.    Current Outpatient Medications:     atorvastatin (LIPITOR) 40 MG tablet, TAKE 1 TABLET BY MOUTH EVERY EVENING., Disp: 30 tablet, Rfl: 3    atorvastatin (LIPITOR) 80 MG tablet, , Disp: , Rfl:     HumaLOG KwikPen 100 UNIT/ML solution pen-injector, Inject 16 Units under the skin into the appropriate area as directed 3 (Three) Times a Day With Meals. Indications: Type 2 Diabetes, Disp: 90 mL, Rfl: 2    Insulin Glargine (Lantus SoloStar) 100 UNIT/ML injection pen, Inject 50 Units under the skin into the appropriate area as directed 2 (Two) Times a Day. INJECT UP TO 50 UNITS IN THE MORNING AND 50 UNITS IN THE EVENING  Indications: Type 2 Diabetes, Disp: 90 mL, Rfl: 3    Insulin Pen Needle (BD Pen Needle Mireille U/F) 32G X 4 MM misc, TO INJECT 4 TIMES DAILY, Disp: 100 each, Rfl: 3    isosorbide mononitrate (IMDUR) 30 MG 24 hr tablet, isosorbide mononitrate ER 30 mg tablet,extended release 24 hr, Disp: , Rfl:     lisinopril (PRINIVIL,ZESTRIL) 30 MG tablet, Take 1 tablet by mouth Daily. (Patient not taking: Reported on 7/26/2023), Disp: , Rfl:     methocarbamol (ROBAXIN) 750 MG tablet, Take 1 tablet by mouth 3 (Three) Times a Day As Needed for Muscle Spasms., Disp: 30 tablet, Rfl: 0    metoprolol tartrate (LOPRESSOR) 25 MG tablet, Take 1 tablet by mouth Every 12 (Twelve) Hours., Disp: 60 tablet, Rfl: 11    Multiple Vitamin (MULTI VITAMIN DAILY PO), Take 1 tablet by mouth Daily. HOLD PRIOR TO SURGERY, Disp: , Rfl:     naproxen (EC NAPROSYN) 500 MG EC tablet, Take 1 tablet by mouth 2 (Two) Times a Day As Needed for Mild Pain., Disp: 20 tablet, Rfl: 0    omeprazole (priLOSEC) 40 MG capsule, Take 1 capsule by mouth 2 (Two)  Times a Day., Disp: , Rfl:     oxyCODONE-acetaminophen (PERCOCET)  MG per tablet, Take 1 tablet by mouth Every 6 (Six) Hours As Needed., Disp: , Rfl:     pregabalin (LYRICA) 150 MG capsule, Take 1 capsule by mouth., Disp: , Rfl:     sertraline (ZOLOFT) 100 MG tablet, Take 1 tablet by mouth Daily., Disp: , Rfl:     torsemide (DEMADEX) 20 MG tablet, , Disp: , Rfl:     PHYSICAL EXAM  ED Triage Vitals [10/30/23 1527]   Temp Heart Rate Resp BP SpO2   98.2 °F (36.8 °C) 60 18 155/80 95 %      Temp src Heart Rate Source Patient Position BP Location FiO2 (%)   Oral Monitor Lying Right arm --       Physical Exam  Vitals and nursing note reviewed.   Constitutional:       General: He is not in acute distress.     Appearance: Normal appearance. He is not ill-appearing, toxic-appearing or diaphoretic.   HENT:      Head: Normocephalic and atraumatic.      Nose: Nose normal. No congestion or rhinorrhea.      Mouth/Throat:      Mouth: Mucous membranes are moist.      Pharynx: Oropharynx is clear.   Eyes:      General: No scleral icterus.        Right eye: No discharge.         Left eye: No discharge.      Extraocular Movements: Extraocular movements intact.      Conjunctiva/sclera: Conjunctivae normal.      Pupils: Pupils are equal, round, and reactive to light.   Cardiovascular:      Rate and Rhythm: Normal rate and regular rhythm.      Pulses: Normal pulses.      Heart sounds: Normal heart sounds.      No friction rub.   Pulmonary:      Effort: Pulmonary effort is normal. No respiratory distress.      Breath sounds: Normal breath sounds. No stridor. No wheezing, rhonchi or rales.   Chest:      Chest wall: No tenderness.   Abdominal:      General: Bowel sounds are normal. There is no distension.      Palpations: Abdomen is soft. There is no mass.      Tenderness: There is no abdominal tenderness. There is no guarding or rebound.   Musculoskeletal:         General: Tenderness (Thoracic and lumbar spine) present. No swelling,  deformity or signs of injury. Normal range of motion.      Cervical back: Normal range of motion and neck supple. No rigidity or tenderness.      Right lower leg: No edema.      Left lower leg: No edema.   Skin:     General: Skin is warm and dry.      Coloration: Skin is not jaundiced or pale.      Findings: No bruising, erythema, lesion or rash.   Neurological:      Mental Status: He is alert and oriented to person, place, and time.      Motor: No weakness.      Coordination: Coordination normal.   Psychiatric:         Mood and Affect: Mood and affect normal.           LAB RESULTS  Lab Results (last 24 hours)       ** No results found for the last 24 hours. **              RADIOLOGY  CT Lumbar Spine Without Contrast    Result Date: 10/30/2023  CT lumbar spine without contrast  INDICATION: Fall  COMPARISON: 11/13/2022  TECHNIQUE: Multiple-row detector helical CT examination of the lumbar spine without IV contrast. Axial, sagittal, and coronal reconstructed images. Radiation dose reduction techniques were utilized, which may include automated exposure control and/or exposure modulation based on body size.  FINDINGS:  There is no evidence of acute lumbar spine fracture. Chronic compression deformities of T11 and T12 vertebral bodies. Surgical changes of the posterior elements of the lumbosacral junction with unchanged partially visualized leads. Scattered endplate osteophyte formations with multilevel facet arthropathy. Partially calcified broad-based disc bulge at L3-4 with at least mild canal narrowing, unchanged. Paraspinal soft tissues demonstrate atherosclerotic calcifications of the aorta and branch vessels.       No evidence of acute lumbar spine abnormality. Chronic findings as above.    This report was finalized on 10/30/2023 7:18 PM by Qasim Mancuso MD.      CT Thoracic Spine Without Contrast    Result Date: 10/30/2023  CT thoracic spine without contrast  INDICATION: Fall  COMPARISON: 11/13/2022  TECHNIQUE:  Multiple-row detector helical CT examination of the thoracic spine without IV contrast. Axial, sagittal, and coronal reconstructed images. Radiation dose reduction techniques were utilized, which may include automated exposure control and/or exposure modulation based on body size.  FINDINGS:  There is an acute transverse fracture extending through the T9 vertebral body as seen on coronal image 22 and sagittal image 20. There is no definite evidence of extension into the posterior elements. No evidence of retropulsed fracture fragment or significant malalignment. No evidence of additional acute fracture, although evaluation is somewhat limited given degree of osteopenia and degenerative change. There are chronic compression deformities of the T11 and T12 vertebral bodies.  Findings of diffuse idiopathic skeletal hyperostosis with superimposed spondylosis including prominent anterior endplate osteophyte formations and multilevel facet arthropathy.  Paraspinal soft tissues demonstrate a trace right pleural effusion and partially visualized cardiomegaly. No focal airspace consolidation. Bilateral renal parenchymal atrophy. Atherosclerotic calcifications of the aorta and branch vessels.        Acute transverse fracture through the T9 vertebral body. No evidence of extension to the posterior elements or retropulsed fracture fragment. No evidence of additional fracture, with limited evaluation given degree of osteopenia and degenerative changes.  Chronic T11 and T12 vertebral body compression deformities.    This report was finalized on 10/30/2023 7:15 PM by Qasim Mancuso MD.      CT Cervical Spine Without Contrast    Result Date: 10/30/2023  CT cervical spine without contrast  INDICATION: Fall  COMPARISON: None  TECHNIQUE: Multiple-row detector helical CT examination of the cervical spine without IV contrast. Axial, sagittal, and coronal reconstructed images. Radiation dose reduction techniques were utilized, which may  include automated exposure control and/or exposure modulation based on body size.  FINDINGS:  There is no evidence of fracture. Normal alignment. Scattered endplate osteophyte formations. Facet and uncovertebral joint arthropathy. No evidence of severe canal narrowing. Severe right C5-6 neural foraminal narrowing. No acute abnormality of the surrounding soft tissues.       No acute abnormality of the cervical spine.    This report was finalized on 10/30/2023 5:48 PM by Qasim Mancuso MD.      CT Head Without Contrast    Result Date: 10/30/2023  CT head without contrast  INDICATION: Fall  COMPARISON: None  TECHNIQUE: Thinly collimated spiral CT images of the brain without contrast with axial, coronal and sagittal reformations. Radiation dose reduction techniques were utilized, which may include automated exposure control and/or exposure modulation based on body size.  FINDINGS:  There is no evidence of intracranial hemorrhage, loss of distinction between gray and white matter, mass, significant mass effect, or abnormal extra-axial collection. There are scattered foci of periventricular and subcortical hypodensity. While nonspecific, these are likely related to small vessel ischemic disease (leukoaraiosis). The basal cisterns are patent. There is global parenchymal volume loss.  There is mild prominence of the ventricular system which is likely secondary to central volume loss.  The density of the dural venous sinuses is normal.  The skull base and calvarium are normal.  Near complete opacification of the right maxillary sinus with adjacent irregularity of the medial maxillary wall. There is scattered additional mucosal thickening of the sphenoid and posterior ethmoid air cells. The visualized orbits are unremarkable.       No acute intracranial abnormality.  Chronic sequela of probable small vessel ischemic disease and global parenchymal volume loss.  Chronic right maxillary sinus disease  This report was finalized on  10/30/2023 5:45 PM by Qasim Mancuso MD.      XR Knee 3 View Right    Result Date: 10/30/2023  RIGHT KNEE RADIOGRAPHS     HISTORY: Fall 5 days ago. Comparison 8/24/2022  TECHNIQUE: Frontal, sunrise, lateral views of the right knee were performed.  FINDINGS: Longstem right total knee arthroplasty. There is unchanged periprosthetic lucency about the distal femoral and tibial tray components. No evidence of new hardware complication. Unchanged mechanical remodeling of the patella as well as hypertrophic bone formation about the joint. Fords screws noted within the tibial tuberosity. Atherosclerotic calcifications. No definite joint effusion or acute soft tissue abnormality.      No acute abnormality of the right knee. Chronic findings as above, not significantly changed from 8/24/2022 radiographs  This report was finalized on 10/30/2023 5:20 PM by Qasim Mancuso MD.       I ordered the above radiologic testing and reviewed the results    PROCEDURES  Procedures      PROGRESS AND CONSULTS  ED Course as of 10/30/23 1928   Mon Oct 30, 2023   1553 The patient appears well. He is in no acute distress. Vital signs are stable and within normal limits. He is neurovascularly intact. Pulses are 2+. He does have tenderness to palpation of his thoracic and lumbar spine on exam. Imaging ordered to evaluate further.  [AH]   1848 Delay of care directly related to the delay in radiology at this time. This was explained in depth to the patient and wife. [AH]   1925 Results discussed in depth with the patient. He expressed understanding. Follow up instructions given. Return to the ED instructions given. Patient advises that he does not need pain medication, as he has Percocet at home. [AH]      ED Course User Index  [AH] Beata Denney PA-C           MEDICAL DECISION MAKING    MDM       My differential diagnosis for back pain includes but is not limited to:  Musculoskeletal strain, contusion, retroperitoneal hematoma, disc protrusion,  vertebral fracture, transverse process fracture, rib fracture, facet syndrome, sacroiliac joint strain, sciatica, renal injury, splenic injury, pancreatic injury, osteoarthritis, lumbar spondylosis, spinal stenosis, ankylosing spondylitis, sacroiliac joint inflammation, pancreatitis, perforated peptic ulcer, diverticulitis, endometriosis, chronic PID, epidural abscess, osteomyelitis, retroperitoneal abscess, pyelonephritis, pneumonia, subphrenic abscess, tuberculosis, neurofibroma, meningioma, multiple myeloma, lymphoma, metastatic cancer, primary cancer, AAA, aortic dissection, spinal ischemia, referred pain, ureterolithiasis   DIAGNOSIS  Final diagnoses:   Closed fracture of ninth thoracic vertebra, unspecified fracture morphology, initial encounter   Lumbar contusion, initial encounter   Contusion of right knee, initial encounter       Latest Documented Vital Signs:  As of 19:28 EDT  BP- 155/80 HR- 60 Temp- 98.2 °F (36.8 °C) (Oral) O2 sat- 95%    DISPOSITION  Pt discharged    Discussed pertinent findings with the patient/family.  Patient/Family voiced understanding of need to follow-up for recheck and further testing as needed.  Return to the Emergency Department warnings were given.         Medication List        New Prescriptions      methocarbamol 750 MG tablet  Commonly known as: ROBAXIN  Take 1 tablet by mouth 3 (Three) Times a Day As Needed for Muscle Spasms.     naproxen 500 MG EC tablet  Commonly known as: EC NAPROSYN  Take 1 tablet by mouth 2 (Two) Times a Day As Needed for Mild Pain.               Where to Get Your Medications        These medications were sent to [place], [place] - [address] [facility] - [phone]  - [phone]   [address] [facility] [address]      Phone: [phone]   methocarbamol 750 MG tablet  naproxen 500 MG EC tablet              Follow-up Information       Guille Nieves MD. Call today.    Specialty: Family Medicine  Why: to schedule follow up  Contact  information:  [address] [place] [address]  [phone]               Go to  [facility] EMERGENCY DEPARTMENT.    Specialty: Emergency Medicine  Why: If symptoms worsen  Contact information:  [address]  [place] [address] [phone]             [facility]. Call today.    Why: to schedule follow up  Contact information:  [address] [address] [phone]                             Dictated utilizing Dragon dictation     Beata Denney PA-C  10/30/23 1928

## 2023-10-31 NOTE — ED NOTES
Call placed again to [facility] on call service. Said she was sending out message to dispatch. Waiting for return call.

## 2023-11-14 ENCOUNTER — TRANSCRIBE ORDERS (OUTPATIENT)
Dept: ADMINISTRATIVE | Facility: HOSPITAL | Age: 73
End: 2023-11-14
Payer: MEDICARE

## 2023-11-14 ENCOUNTER — TELEPHONE (OUTPATIENT)
Dept: ENDOCRINOLOGY | Age: 73
End: 2023-11-14
Payer: MEDICARE

## 2023-11-14 ENCOUNTER — TRANSCRIBE ORDERS (OUTPATIENT)
Dept: BONE DENSITY | Facility: HOSPITAL | Age: 73
End: 2023-11-14
Payer: MEDICARE

## 2023-11-14 DIAGNOSIS — M81.0 SENILE OSTEOPOROSIS: Primary | ICD-10-CM

## 2023-11-14 DIAGNOSIS — M85.80 OTHER SPECIFIED DISORDERS OF BONE DENSITY AND STRUCTURE, UNSPECIFIED SITE: Primary | ICD-10-CM

## 2023-11-14 DIAGNOSIS — E11.29 TYPE 2 DIABETES MELLITUS WITH MICROALBUMINURIA, WITH LONG-TERM CURRENT USE OF INSULIN: Primary | ICD-10-CM

## 2023-11-14 DIAGNOSIS — R80.9 TYPE 2 DIABETES MELLITUS WITH MICROALBUMINURIA, WITH LONG-TERM CURRENT USE OF INSULIN: Primary | ICD-10-CM

## 2023-11-14 DIAGNOSIS — Z79.4 TYPE 2 DIABETES MELLITUS WITH MICROALBUMINURIA, WITH LONG-TERM CURRENT USE OF INSULIN: Primary | ICD-10-CM

## 2023-11-14 NOTE — TELEPHONE ENCOUNTER
Hub staff attempted to follow warm transfer process and was unsuccessful       Caller: McBurney, Hugh R    Relationship to patient: Self    Best call back number: [phone]    Patient is needing: PT WANTING TO GET LAB ORDERS SENT TO [facility] AND WOULD LIKE TO CANCEL THE LABS THAT ARE SCHEDULED, AND WOULD LIKE THE ORDERS SENT TO HIS ADDRESS AS WELL

## 2023-11-20 ENCOUNTER — APPOINTMENT (OUTPATIENT)
Dept: BONE DENSITY | Facility: HOSPITAL | Age: 73
End: 2023-11-20
Payer: MEDICARE

## 2023-11-20 ENCOUNTER — LAB (OUTPATIENT)
Dept: LAB | Facility: HOSPITAL | Age: 73
End: 2023-11-20
Payer: MEDICARE

## 2023-11-20 DIAGNOSIS — M81.0 SENILE OSTEOPOROSIS: ICD-10-CM

## 2023-11-20 DIAGNOSIS — E11.29 TYPE 2 DIABETES MELLITUS WITH MICROALBUMINURIA, WITH LONG-TERM CURRENT USE OF INSULIN: ICD-10-CM

## 2023-11-20 DIAGNOSIS — R80.9 TYPE 2 DIABETES MELLITUS WITH MICROALBUMINURIA, WITH LONG-TERM CURRENT USE OF INSULIN: ICD-10-CM

## 2023-11-20 DIAGNOSIS — Z79.4 TYPE 2 DIABETES MELLITUS WITH MICROALBUMINURIA, WITH LONG-TERM CURRENT USE OF INSULIN: ICD-10-CM

## 2023-11-20 LAB
ALBUMIN SERPL-MCNC: 3.9 G/DL (ref 3.5–5.2)
ALBUMIN/GLOB SERPL: 1.3 G/DL
ALP SERPL-CCNC: 169 U/L (ref 39–117)
ALT SERPL W P-5'-P-CCNC: 12 U/L (ref 1–41)
ANION GAP SERPL CALCULATED.3IONS-SCNC: 13 MMOL/L (ref 5–15)
AST SERPL-CCNC: 17 U/L (ref 1–40)
BILIRUB SERPL-MCNC: 0.3 MG/DL (ref 0–1.2)
BUN SERPL-MCNC: 40 MG/DL (ref 8–23)
BUN/CREAT SERPL: 14.7 (ref 7–25)
CALCIUM SPEC-SCNC: 9 MG/DL (ref 8.6–10.5)
CHLORIDE SERPL-SCNC: 103 MMOL/L (ref 98–107)
CHOLEST SERPL-MCNC: 122 MG/DL (ref 0–200)
CO2 SERPL-SCNC: 23 MMOL/L (ref 22–29)
CREAT SERPL-MCNC: 2.72 MG/DL (ref 0.76–1.27)
EGFRCR SERPLBLD CKD-EPI 2021: 24.1 ML/MIN/1.73
GLOBULIN UR ELPH-MCNC: 2.9 GM/DL
GLUCOSE SERPL-MCNC: 162 MG/DL (ref 65–99)
HBA1C MFR BLD: 7.4 % (ref 4.8–5.6)
HDLC SERPL-MCNC: 34 MG/DL (ref 40–60)
LDLC SERPL CALC-MCNC: 61 MG/DL (ref 0–100)
LDLC/HDLC SERPL: 1.68 {RATIO}
POTASSIUM SERPL-SCNC: 4.4 MMOL/L (ref 3.5–5.2)
PROT SERPL-MCNC: 6.8 G/DL (ref 6–8.5)
SODIUM SERPL-SCNC: 139 MMOL/L (ref 136–145)
TRIGL SERPL-MCNC: 155 MG/DL (ref 0–150)
VLDLC SERPL-MCNC: 27 MG/DL (ref 5–40)

## 2023-11-20 PROCEDURE — 77080 DXA BONE DENSITY AXIAL: CPT

## 2023-11-20 PROCEDURE — 80053 COMPREHEN METABOLIC PANEL: CPT

## 2023-11-20 PROCEDURE — 80061 LIPID PANEL: CPT

## 2023-11-20 PROCEDURE — 36415 COLL VENOUS BLD VENIPUNCTURE: CPT

## 2023-11-20 PROCEDURE — 83036 HEMOGLOBIN GLYCOSYLATED A1C: CPT

## 2023-11-20 PROCEDURE — 84100 ASSAY OF PHOSPHORUS: CPT | Performed by: NURSE PRACTITIONER

## 2023-11-27 ENCOUNTER — OFFICE VISIT (OUTPATIENT)
Dept: ENDOCRINOLOGY | Age: 73
End: 2023-11-27
Payer: MEDICARE

## 2023-11-27 VITALS
SYSTOLIC BLOOD PRESSURE: 120 MMHG | BODY MASS INDEX: 37.08 KG/M2 | WEIGHT: 288.8 LBS | TEMPERATURE: 96.8 F | DIASTOLIC BLOOD PRESSURE: 60 MMHG | OXYGEN SATURATION: 98 % | HEART RATE: 72 BPM

## 2023-11-27 DIAGNOSIS — N18.32 STAGE 3B CHRONIC KIDNEY DISEASE: ICD-10-CM

## 2023-11-27 DIAGNOSIS — E78.2 MIXED HYPERLIPIDEMIA: ICD-10-CM

## 2023-11-27 DIAGNOSIS — E11.42 DIABETIC PERIPHERAL NEUROPATHY ASSOCIATED WITH TYPE 2 DIABETES MELLITUS: ICD-10-CM

## 2023-11-27 DIAGNOSIS — R80.9 TYPE 2 DIABETES MELLITUS WITH MICROALBUMINURIA, WITH LONG-TERM CURRENT USE OF INSULIN: Primary | ICD-10-CM

## 2023-11-27 DIAGNOSIS — E11.29 TYPE 2 DIABETES MELLITUS WITH MICROALBUMINURIA, WITH LONG-TERM CURRENT USE OF INSULIN: Primary | ICD-10-CM

## 2023-11-27 DIAGNOSIS — Z79.4 TYPE 2 DIABETES MELLITUS WITH MICROALBUMINURIA, WITH LONG-TERM CURRENT USE OF INSULIN: Primary | ICD-10-CM

## 2023-11-27 DIAGNOSIS — Z86.79 HISTORY OF CAD (CORONARY ARTERY DISEASE): ICD-10-CM

## 2023-11-27 PROCEDURE — 3051F HG A1C>EQUAL 7.0%<8.0%: CPT | Performed by: NURSE PRACTITIONER

## 2023-11-27 PROCEDURE — 3074F SYST BP LT 130 MM HG: CPT | Performed by: NURSE PRACTITIONER

## 2023-11-27 PROCEDURE — 95251 CONT GLUC MNTR ANALYSIS I&R: CPT | Performed by: NURSE PRACTITIONER

## 2023-11-27 PROCEDURE — 99214 OFFICE O/P EST MOD 30 MIN: CPT | Performed by: NURSE PRACTITIONER

## 2023-11-27 PROCEDURE — 3078F DIAST BP <80 MM HG: CPT | Performed by: NURSE PRACTITIONER

## 2023-11-27 RX ORDER — INSULIN LISPRO 100 [IU]/ML
INJECTION, SOLUTION INTRAVENOUS; SUBCUTANEOUS
Qty: 90 ML | Refills: 2 | Status: SHIPPED | OUTPATIENT
Start: 2023-11-27

## 2023-11-27 NOTE — PROGRESS NOTES
"Chief Complaint  Diabetes (Martha attached Need refill for humalog and [facility]us [facility].)    Subjective        Hugh R McBurney presents to [facility] END[facility]  History of Present Illness    Patient fell off a tractor and is in a back brace    Type 2 dm    Diagnosed about 10 + years ago.   Today in clinic pt reports being on Lantus 50 units bid, Humalog 16u+ss with meals  Sliding scale: 3 units for 50 above 150.   Last eye exam - 3/2023  (+) nephropathy; following routinely with renal   (+) neuropathy; on lyrica from PCP, podiatry trims his toenails      (+) CAD  Episodes of hypoglycemia - very, very rare   On statin    Sensor - martha  Average glucose 156  GMI 7%  Variability 33%  Low 1%  Target range 73%  High 26%     Objective   Vital Signs:  /60   Pulse 72   Temp 96.8 °F (36 °C) (Temporal)   Wt 131 kg (288 lb 12.8 oz)   SpO2 98%   BMI 37.08 kg/m²   Estimated body mass index is 37.08 kg/m² as calculated from the following:    Height as of 10/30/23: 188 cm (74\").    Weight as of this encounter: 131 kg (288 lb 12.8 oz).             Physical Exam  Vitals reviewed.   Constitutional:       General: He is not in acute distress.  HENT:      Head: Normocephalic and atraumatic.   Cardiovascular:      Rate and Rhythm: Normal rate and regular rhythm.   Pulmonary:      Effort: Pulmonary effort is normal. No respiratory distress.   Musculoskeletal:         General: Signs of injury present. Normal range of motion.      Cervical back: Normal range of motion and neck supple.      Comments: Back brace in place   Skin:     General: Skin is warm and dry.   Neurological:      Mental Status: He is alert and oriented to person, place, and time. Mental status is at baseline.   Psychiatric:         Mood and Affect: Mood normal.         Behavior: Behavior normal.         Thought Content: Thought content normal.         Judgment: Judgment normal.        Result Review :  The following data was " reviewed by: ABBY Alvarenga on 11/27/2023:  Common labs          7/12/2023    10:35 7/26/2023    12:14 11/20/2023    11:55   Common Labs   Glucose 110  131  162    BUN 52  29  40    Creatinine 3.39  2.26  2.72    Sodium 140  142  139    Potassium 5.0  5.1  4.4    Chloride 104  108  103    Calcium 8.7  8.4  9.0    Albumin   3.9    Total Bilirubin   0.3    Alkaline Phosphatase   169    AST (SGOT)   17    ALT (SGPT)   12    Total Cholesterol   122    Triglycerides   155    HDL Cholesterol   34    LDL Cholesterol    61    Hemoglobin A1C 7.60   7.40                   Assessment and Plan   Diagnoses and all orders for this visit:    1. Type 2 diabetes mellitus with microalbuminuria, with long-term current use of insulin (Primary)  -     HumaLOG KwikPen 100 UNIT/ML solution pen-injector; Inject 16 Units under the skin into the appropriate area as directed Daily With Breakfast AND 16 Units Daily Before Lunch AND 20 Units Daily With Dinner. Adds sliding scale based on BS reading. Max daily dose 75u daily  Indications: Type 2 Diabetes  Dispense: 90 mL; Refill: 2    2. Stage 3b chronic kidney disease    3. Diabetic peripheral neuropathy associated with type 2 diabetes mellitus    4. History of CAD (coronary artery disease)    5. Mixed hyperlipidemia             Follow Up   Return in about 4 months (around 3/27/2024).    A1c improving, cgm reviewed   Continue Lantus 50 units bid  Humalog 16u with breakfast and lunch and increase to 20u at dinner +ss with meals  Labs sent to renal- discussed with patient as well    Continue statin     Patient was given instructions and counseling regarding his condition or for health maintenance advice. Please see specific information pulled into the AVS if appropriate.     ABBY Alvarenga

## 2023-11-30 ENCOUNTER — TRANSCRIBE ORDERS (OUTPATIENT)
Dept: ADMINISTRATIVE | Facility: HOSPITAL | Age: 73
End: 2023-11-30
Payer: MEDICARE

## 2023-11-30 DIAGNOSIS — N18.30 STAGE 3 CHRONIC KIDNEY DISEASE, UNSPECIFIED WHETHER STAGE 3A OR 3B CKD: Primary | ICD-10-CM

## 2023-12-01 ENCOUNTER — LAB (OUTPATIENT)
Dept: LAB | Facility: HOSPITAL | Age: 73
End: 2023-12-01
Payer: MEDICARE

## 2023-12-01 DIAGNOSIS — N18.30 STAGE 3 CHRONIC KIDNEY DISEASE, UNSPECIFIED WHETHER STAGE 3A OR 3B CKD: ICD-10-CM

## 2023-12-01 LAB
ALBUMIN SERPL-MCNC: 3.8 G/DL (ref 3.5–5.2)
ANION GAP SERPL CALCULATED.3IONS-SCNC: 11 MMOL/L (ref 5–15)
BACTERIA UR QL AUTO: ABNORMAL /HPF
BASOPHILS # BLD AUTO: 0.06 10*3/MM3 (ref 0–0.2)
BASOPHILS NFR BLD AUTO: 0.6 % (ref 0–1.5)
BILIRUB UR QL STRIP: NEGATIVE
BUN SERPL-MCNC: 42 MG/DL (ref 8–23)
BUN/CREAT SERPL: 14.8 (ref 7–25)
CALCIUM SPEC-SCNC: 9 MG/DL (ref 8.6–10.5)
CHLORIDE SERPL-SCNC: 106 MMOL/L (ref 98–107)
CLARITY UR: CLEAR
CO2 SERPL-SCNC: 22 MMOL/L (ref 22–29)
COLOR UR: YELLOW
CREAT SERPL-MCNC: 2.83 MG/DL (ref 0.76–1.27)
DEPRECATED RDW RBC AUTO: 43.6 FL (ref 37–54)
EGFRCR SERPLBLD CKD-EPI 2021: 22.9 ML/MIN/1.73
EOSINOPHIL # BLD AUTO: 0.45 10*3/MM3 (ref 0–0.4)
EOSINOPHIL NFR BLD AUTO: 4.8 % (ref 0.3–6.2)
ERYTHROCYTE [DISTWIDTH] IN BLOOD BY AUTOMATED COUNT: 12.3 % (ref 12.3–15.4)
GLUCOSE SERPL-MCNC: 139 MG/DL (ref 65–99)
GLUCOSE UR STRIP-MCNC: ABNORMAL MG/DL
HCT VFR BLD AUTO: 34.5 % (ref 37.5–51)
HGB BLD-MCNC: 11.7 G/DL (ref 13–17.7)
HGB UR QL STRIP.AUTO: NEGATIVE
HYALINE CASTS UR QL AUTO: ABNORMAL /LPF
IMM GRANULOCYTES # BLD AUTO: 0.02 10*3/MM3 (ref 0–0.05)
IMM GRANULOCYTES NFR BLD AUTO: 0.2 % (ref 0–0.5)
KETONES UR QL STRIP: NEGATIVE
LEUKOCYTE ESTERASE UR QL STRIP.AUTO: NEGATIVE
LYMPHOCYTES # BLD AUTO: 1.54 10*3/MM3 (ref 0.7–3.1)
LYMPHOCYTES NFR BLD AUTO: 16.6 % (ref 19.6–45.3)
MCH RBC QN AUTO: 32.8 PG (ref 26.6–33)
MCHC RBC AUTO-ENTMCNC: 33.9 G/DL (ref 31.5–35.7)
MCV RBC AUTO: 96.6 FL (ref 79–97)
MONOCYTES # BLD AUTO: 0.53 10*3/MM3 (ref 0.1–0.9)
MONOCYTES NFR BLD AUTO: 5.7 % (ref 5–12)
NEUTROPHILS NFR BLD AUTO: 6.68 10*3/MM3 (ref 1.7–7)
NEUTROPHILS NFR BLD AUTO: 72.1 % (ref 42.7–76)
NITRITE UR QL STRIP: NEGATIVE
NRBC BLD AUTO-RTO: 0 /100 WBC (ref 0–0.2)
PH UR STRIP.AUTO: 5.5 [PH] (ref 5–8)
PHOSPHATE SERPL-MCNC: 3.8 MG/DL (ref 2.5–4.5)
PLATELET # BLD AUTO: 199 10*3/MM3 (ref 140–450)
PMV BLD AUTO: 11.5 FL (ref 6–12)
POTASSIUM SERPL-SCNC: 4.6 MMOL/L (ref 3.5–5.2)
PROT UR QL STRIP: ABNORMAL
RBC # BLD AUTO: 3.57 10*6/MM3 (ref 4.14–5.8)
RBC # UR STRIP: ABNORMAL /HPF
REF LAB TEST METHOD: ABNORMAL
SODIUM SERPL-SCNC: 139 MMOL/L (ref 136–145)
SP GR UR STRIP: 1.02 (ref 1–1.03)
SQUAMOUS #/AREA URNS HPF: ABNORMAL /HPF
UROBILINOGEN UR QL STRIP: ABNORMAL
WBC # UR STRIP: ABNORMAL /HPF
WBC NRBC COR # BLD AUTO: 9.28 10*3/MM3 (ref 3.4–10.8)

## 2023-12-01 PROCEDURE — 85025 COMPLETE CBC W/AUTO DIFF WBC: CPT

## 2023-12-01 PROCEDURE — 84156 ASSAY OF PROTEIN URINE: CPT

## 2023-12-01 PROCEDURE — 36415 COLL VENOUS BLD VENIPUNCTURE: CPT

## 2023-12-01 PROCEDURE — 81001 URINALYSIS AUTO W/SCOPE: CPT

## 2023-12-01 PROCEDURE — 80069 RENAL FUNCTION PANEL: CPT

## 2023-12-01 PROCEDURE — 82570 ASSAY OF URINE CREATININE: CPT

## 2023-12-02 LAB
CREAT UR-MCNC: 140.5 MG/DL
PROT ?TM UR-MCNC: 2031 MG/DL
PROT/CREAT UR: ABNORMAL MG/G CREA (ref 0–200)

## 2023-12-08 ENCOUNTER — LAB (OUTPATIENT)
Dept: LAB | Facility: HOSPITAL | Age: 73
End: 2023-12-08
Payer: MEDICARE

## 2023-12-08 ENCOUNTER — TRANSCRIBE ORDERS (OUTPATIENT)
Dept: ADMINISTRATIVE | Facility: HOSPITAL | Age: 73
End: 2023-12-08
Payer: MEDICARE

## 2023-12-08 DIAGNOSIS — S22.070D: ICD-10-CM

## 2023-12-08 DIAGNOSIS — M81.0 SENILE OSTEOPOROSIS: Primary | ICD-10-CM

## 2023-12-08 DIAGNOSIS — M81.0 SENILE OSTEOPOROSIS: ICD-10-CM

## 2023-12-08 LAB
25(OH)D3 SERPL-MCNC: 25.6 NG/ML (ref 30–100)
ALBUMIN SERPL-MCNC: 3.8 G/DL (ref 3.5–5.2)
ALBUMIN/GLOB SERPL: 1.2 G/DL
ALP SERPL-CCNC: 159 U/L (ref 39–117)
ALT SERPL W P-5'-P-CCNC: 11 U/L (ref 1–41)
ANION GAP SERPL CALCULATED.3IONS-SCNC: 12.9 MMOL/L (ref 5–15)
AST SERPL-CCNC: 14 U/L (ref 1–40)
BILIRUB SERPL-MCNC: 0.2 MG/DL (ref 0–1.2)
BUN SERPL-MCNC: 36 MG/DL (ref 8–23)
BUN/CREAT SERPL: 13.6 (ref 7–25)
CALCIUM SPEC-SCNC: 8.7 MG/DL (ref 8.6–10.5)
CALCIUM SPEC-SCNC: 8.9 MG/DL (ref 8.6–10.5)
CHLORIDE SERPL-SCNC: 104 MMOL/L (ref 98–107)
CO2 SERPL-SCNC: 20.1 MMOL/L (ref 22–29)
CREAT SERPL-MCNC: 2.65 MG/DL (ref 0.76–1.27)
EGFRCR SERPLBLD CKD-EPI 2021: 24.8 ML/MIN/1.73
GLOBULIN UR ELPH-MCNC: 3.2 GM/DL
GLUCOSE SERPL-MCNC: 117 MG/DL (ref 65–99)
POTASSIUM SERPL-SCNC: 4.2 MMOL/L (ref 3.5–5.2)
PROT SERPL-MCNC: 7 G/DL (ref 6–8.5)
PTH-INTACT SERPL-MCNC: 126 PG/ML (ref 15–65)
SODIUM SERPL-SCNC: 137 MMOL/L (ref 136–145)
TSH SERPL DL<=0.05 MIU/L-ACNC: 1.87 UIU/ML (ref 0.27–4.2)

## 2023-12-08 PROCEDURE — 82306 VITAMIN D 25 HYDROXY: CPT

## 2023-12-08 PROCEDURE — 86364 TISS TRNSGLTMNASE EA IG CLAS: CPT

## 2023-12-08 PROCEDURE — 84403 ASSAY OF TOTAL TESTOSTERONE: CPT

## 2023-12-08 PROCEDURE — 84402 ASSAY OF FREE TESTOSTERONE: CPT

## 2023-12-08 PROCEDURE — 86258 DGP ANTIBODY EACH IG CLASS: CPT

## 2023-12-08 PROCEDURE — 84443 ASSAY THYROID STIM HORMONE: CPT

## 2023-12-08 PROCEDURE — 82784 ASSAY IGA/IGD/IGG/IGM EACH: CPT

## 2023-12-08 PROCEDURE — 84165 PROTEIN E-PHORESIS SERUM: CPT

## 2023-12-08 PROCEDURE — 36415 COLL VENOUS BLD VENIPUNCTURE: CPT

## 2023-12-08 PROCEDURE — 83970 ASSAY OF PARATHORMONE: CPT

## 2023-12-08 PROCEDURE — 86231 EMA EACH IG CLASS: CPT

## 2023-12-08 PROCEDURE — 80053 COMPREHEN METABOLIC PANEL: CPT

## 2023-12-11 LAB
ALBUMIN SERPL ELPH-MCNC: 3.1 G/DL (ref 2.9–4.4)
ALBUMIN/GLOB SERPL: 0.9 {RATIO} (ref 0.7–1.7)
ALPHA1 GLOB SERPL ELPH-MCNC: 0.3 G/DL (ref 0–0.4)
ALPHA2 GLOB SERPL ELPH-MCNC: 0.9 G/DL (ref 0.4–1)
B-GLOBULIN SERPL ELPH-MCNC: 1.2 G/DL (ref 0.7–1.3)
ENDOMYSIUM IGA SER QL: NEGATIVE
GAMMA GLOB SERPL ELPH-MCNC: 0.9 G/DL (ref 0.4–1.8)
GLIADIN PEPTIDE IGA SER-ACNC: 13 UNITS (ref 0–19)
GLIADIN PEPTIDE IGG SER-ACNC: 5 UNITS (ref 0–19)
GLOBULIN SER CALC-MCNC: 3.3 G/DL (ref 2.2–3.9)
IGA SERPL-MCNC: 406 MG/DL (ref 61–437)
LABORATORY COMMENT REPORT: NORMAL
M PROTEIN SERPL ELPH-MCNC: NORMAL G/DL
PROT SERPL-MCNC: 6.4 G/DL (ref 6–8.5)
TTG IGA SER-ACNC: <2 U/ML (ref 0–3)
TTG IGG SER-ACNC: 3 U/ML (ref 0–5)

## 2023-12-13 LAB
TESTOST FREE SERPL-MCNC: 3.2 PG/ML (ref 6.6–18.1)
TESTOST SERPL-MCNC: 210 NG/DL (ref 264–916)

## 2024-02-14 DIAGNOSIS — Z79.4 TYPE 2 DIABETES MELLITUS WITH MICROALBUMINURIA, WITH LONG-TERM CURRENT USE OF INSULIN: Primary | ICD-10-CM

## 2024-02-14 DIAGNOSIS — E11.29 TYPE 2 DIABETES MELLITUS WITH MICROALBUMINURIA, WITH LONG-TERM CURRENT USE OF INSULIN: Primary | ICD-10-CM

## 2024-02-14 DIAGNOSIS — R80.9 TYPE 2 DIABETES MELLITUS WITH MICROALBUMINURIA, WITH LONG-TERM CURRENT USE OF INSULIN: Primary | ICD-10-CM

## 2024-03-05 ENCOUNTER — TELEPHONE (OUTPATIENT)
Dept: ENDOCRINOLOGY | Age: 74
End: 2024-03-05

## 2024-03-05 DIAGNOSIS — E11.29 TYPE 2 DIABETES MELLITUS WITH MICROALBUMINURIA, WITH LONG-TERM CURRENT USE OF INSULIN: Primary | ICD-10-CM

## 2024-03-05 DIAGNOSIS — R80.9 TYPE 2 DIABETES MELLITUS WITH MICROALBUMINURIA, WITH LONG-TERM CURRENT USE OF INSULIN: Primary | ICD-10-CM

## 2024-03-05 DIAGNOSIS — Z79.4 TYPE 2 DIABETES MELLITUS WITH MICROALBUMINURIA, WITH LONG-TERM CURRENT USE OF INSULIN: Primary | ICD-10-CM

## 2024-03-05 NOTE — TELEPHONE ENCOUNTER
Caller: McBurney, Hugh R    Relationship to patient: Self    Best call back number: [phone]    Patient is needing: PATIENT NEEDING LAB ORDERS SENT TO HIM

## 2024-04-18 ENCOUNTER — TRANSCRIBE ORDERS (OUTPATIENT)
Dept: ADMINISTRATIVE | Facility: HOSPITAL | Age: 74
End: 2024-04-18
Payer: MEDICARE

## 2024-04-18 ENCOUNTER — LAB (OUTPATIENT)
Dept: LAB | Facility: HOSPITAL | Age: 74
End: 2024-04-18
Payer: MEDICARE

## 2024-04-18 DIAGNOSIS — E11.29 TYPE II DIABETES MELLITUS WITH RENAL MANIFESTATIONS NOT AT GOAL: Primary | ICD-10-CM

## 2024-04-18 DIAGNOSIS — D75.1 ERYTHROCYTOSIS DUE TO HEPATOMA: ICD-10-CM

## 2024-04-18 DIAGNOSIS — G89.29 CHRONIC IDIOPATHIC ANAL PAIN: ICD-10-CM

## 2024-04-18 DIAGNOSIS — E66.09 EXOGENOUS OBESITY: ICD-10-CM

## 2024-04-18 DIAGNOSIS — I25.10 DISEASE OF CARDIOVASCULAR SYSTEM: ICD-10-CM

## 2024-04-18 DIAGNOSIS — I10 ESSENTIAL HYPERTENSION, MALIGNANT: ICD-10-CM

## 2024-04-18 DIAGNOSIS — E11.22 TYPE 2 DIABETES MELLITUS WITH ESRD (END-STAGE RENAL DISEASE): ICD-10-CM

## 2024-04-18 DIAGNOSIS — R80.9 PROTEINURIA, UNSPECIFIED TYPE: ICD-10-CM

## 2024-04-18 DIAGNOSIS — E55.9 AVITAMINOSIS D: ICD-10-CM

## 2024-04-18 DIAGNOSIS — N18.6 TYPE 2 DIABETES MELLITUS WITH ESRD (END-STAGE RENAL DISEASE): ICD-10-CM

## 2024-04-18 DIAGNOSIS — N18.30 STAGE 3 CHRONIC KIDNEY DISEASE, UNSPECIFIED WHETHER STAGE 3A OR 3B CKD: ICD-10-CM

## 2024-04-18 DIAGNOSIS — G47.33 OBSTRUCTIVE SLEEP APNEA (ADULT) (PEDIATRIC): ICD-10-CM

## 2024-04-18 DIAGNOSIS — K62.89 CHRONIC IDIOPATHIC ANAL PAIN: ICD-10-CM

## 2024-04-18 DIAGNOSIS — O12.10 GESTATIONAL PROTEINURIA, ANTEPARTUM: ICD-10-CM

## 2024-04-18 DIAGNOSIS — C22.0 ERYTHROCYTOSIS DUE TO HEPATOMA: ICD-10-CM

## 2024-04-18 DIAGNOSIS — E78.5 HYPERLIPIDEMIA, UNSPECIFIED HYPERLIPIDEMIA TYPE: ICD-10-CM

## 2024-04-18 DIAGNOSIS — E11.29 TYPE II DIABETES MELLITUS WITH RENAL MANIFESTATIONS NOT AT GOAL: ICD-10-CM

## 2024-04-18 DIAGNOSIS — K21.9 CHALASIA OF LOWER ESOPHAGEAL SPHINCTER: ICD-10-CM

## 2024-04-18 DIAGNOSIS — Z79.4 ENCOUNTER FOR LONG-TERM (CURRENT) USE OF INSULIN: ICD-10-CM

## 2024-04-18 DIAGNOSIS — N18.30 STAGE 3 CHRONIC KIDNEY DISEASE, UNSPECIFIED WHETHER STAGE 3A OR 3B CKD: Primary | ICD-10-CM

## 2024-04-18 LAB
ALBUMIN SERPL-MCNC: 3.9 G/DL (ref 3.5–5.2)
ANION GAP SERPL CALCULATED.3IONS-SCNC: 11 MMOL/L (ref 5–15)
BACTERIA UR QL AUTO: NORMAL /HPF
BILIRUB UR QL STRIP: NEGATIVE
BUN SERPL-MCNC: 43 MG/DL (ref 8–23)
BUN/CREAT SERPL: 13.3 (ref 7–25)
CALCIUM SPEC-SCNC: 8.7 MG/DL (ref 8.6–10.5)
CHLORIDE SERPL-SCNC: 108 MMOL/L (ref 98–107)
CLARITY UR: CLEAR
CO2 SERPL-SCNC: 22 MMOL/L (ref 22–29)
COLOR UR: YELLOW
CREAT SERPL-MCNC: 3.23 MG/DL (ref 0.76–1.27)
CREAT UR-MCNC: 68.4 MG/DL
EGFRCR SERPLBLD CKD-EPI 2021: 19.5 ML/MIN/1.73
GLUCOSE SERPL-MCNC: 140 MG/DL (ref 65–99)
GLUCOSE UR STRIP-MCNC: ABNORMAL MG/DL
HBA1C MFR BLD: 7.3 % (ref 4.8–5.6)
HGB UR QL STRIP.AUTO: ABNORMAL
HYALINE CASTS UR QL AUTO: NORMAL /LPF
KETONES UR QL STRIP: NEGATIVE
LEUKOCYTE ESTERASE UR QL STRIP.AUTO: NEGATIVE
NITRITE UR QL STRIP: NEGATIVE
PH UR STRIP.AUTO: 6.5 [PH] (ref 5–8)
PHOSPHATE SERPL-MCNC: 4.3 MG/DL (ref 2.5–4.5)
POTASSIUM SERPL-SCNC: 5 MMOL/L (ref 3.5–5.2)
PROT ?TM UR-MCNC: 465.1 MG/DL
PROT UR QL STRIP: ABNORMAL
PROT/CREAT UR: 6799.7 MG/G CREA (ref 0–200)
RBC # UR STRIP: NORMAL /HPF
REF LAB TEST METHOD: NORMAL
SODIUM SERPL-SCNC: 141 MMOL/L (ref 136–145)
SP GR UR STRIP: 1.01 (ref 1–1.03)
SQUAMOUS #/AREA URNS HPF: NORMAL /HPF
UROBILINOGEN UR QL STRIP: ABNORMAL
WBC # UR STRIP: NORMAL /HPF

## 2024-04-18 PROCEDURE — 83036 HEMOGLOBIN GLYCOSYLATED A1C: CPT

## 2024-04-18 PROCEDURE — 80069 RENAL FUNCTION PANEL: CPT

## 2024-04-18 PROCEDURE — 81001 URINALYSIS AUTO W/SCOPE: CPT

## 2024-04-18 PROCEDURE — 36415 COLL VENOUS BLD VENIPUNCTURE: CPT

## 2024-04-18 PROCEDURE — 82570 ASSAY OF URINE CREATININE: CPT

## 2024-04-18 PROCEDURE — 84156 ASSAY OF PROTEIN URINE: CPT

## 2024-04-24 ENCOUNTER — OFFICE VISIT (OUTPATIENT)
Dept: GASTROENTEROLOGY | Facility: CLINIC | Age: 74
End: 2024-04-24
Payer: MEDICARE

## 2024-04-24 VITALS — HEIGHT: 74 IN | WEIGHT: 296.2 LBS | BODY MASS INDEX: 38.01 KG/M2

## 2024-04-24 DIAGNOSIS — R07.89 NON-CARDIAC CHEST PAIN: ICD-10-CM

## 2024-04-24 DIAGNOSIS — Z12.11 ENCOUNTER FOR SCREENING FOR MALIGNANT NEOPLASM OF COLON: Primary | ICD-10-CM

## 2024-04-24 DIAGNOSIS — R13.19 ESOPHAGEAL DYSPHAGIA: ICD-10-CM

## 2024-04-24 PROCEDURE — 99214 OFFICE O/P EST MOD 30 MIN: CPT

## 2024-04-24 PROCEDURE — 1160F RVW MEDS BY RX/DR IN RCRD: CPT

## 2024-04-24 PROCEDURE — 1159F MED LIST DOCD IN RCRD: CPT

## 2024-04-24 RX ORDER — ASPIRIN 325 MG
325 TABLET ORAL DAILY
Status: ON HOLD | COMMUNITY
End: 2024-04-29

## 2024-04-24 RX ORDER — SUCRALFATE 1 G/1
1 TABLET ORAL 4 TIMES DAILY
Qty: 120 TABLET | Refills: 3 | Status: SHIPPED | OUTPATIENT
Start: 2024-04-24

## 2024-04-24 RX ORDER — VIT C/B6/B5/MAGNESIUM/HERB 173 50-5-6-5MG
1 CAPSULE ORAL DAILY
COMMUNITY

## 2024-04-24 RX ORDER — LISINOPRIL 5 MG/1
5 TABLET ORAL DAILY
COMMUNITY

## 2024-04-24 RX ORDER — LISINOPRIL 30 MG/1
1 TABLET ORAL DAILY
COMMUNITY
End: 2024-04-24

## 2024-04-24 NOTE — PROGRESS NOTES
PATIENT INFORMATION  Hugh R McBurney       - 1950    CHIEF COMPLAINT  Chief Complaint   Patient presents with    Difficulty Swallowing    Constipation    Nausea       HISTORY OF PRESENT ILLNESS    Here today for evaluation of dysphagia    Dysphagia-substernal pain daily, worse when out and breathing cold weather, not necessarily when eating. Last night lasted a long time and could not sleep, took tums with some relief after 3-4. More dyspepsia lately, often when eating breakfast will get nauseous and break out in sweat. Meats difficult to swallow, slow to pass. Some capsules will hang. Daily ASA, no other NSAIDs. Omeprazole twice a day for a long time before breakfast and dinner. Has had some odynophagia. No recent steroid, has dentures.    Taking percocet 3-4 a day.  Sometimes takes laxative if has not had BM for 2 days. Bowels moving QOD to daily. Hard stools are regular. Has had a little bleeding with straining. Fiber helped some.    Last A1c 7.5, dx in 19 HCC on liver bx. S/p lap microwave ablation 3/17/2021, did not require chemo. Last scans negative for recurrence. Next MRI in a month. LFTs normal.    Upper endoscopy years ago for HB, upper and lower. Polyps, but otherwise normal.     Difficulty Swallowing  Associated symptoms include chest pain, fatigue and nausea. Pertinent negatives include no abdominal pain or vomiting.   Constipation  Associated symptoms include back pain (Chronic) and nausea. Pertinent negatives include no abdominal pain or vomiting.   Nausea  Associated symptoms include chest pain, fatigue and nausea. Pertinent negatives include no abdominal pain or vomiting.       REVIEWED PERTINENT RESULTS/ LABS  Lab Results   Component Value Date    CASEREPORT  2021     Surgical Pathology Report                         Case: [id number]                                  Authorizing Provider:  Guille Nieves MD      Collected:           2021 10:10 AM           Ordering Location:     [facility]  Received:            02/12/2021 11:09 AM                                 CT                                                                           Pathologist:           Oscar Nguyễn MD                                                         Specimen:    Liver, liver bx                                                                            FINALDX  02/12/2021     1. Liver, Needle, Biopsy:   A. Hepatocellular carcinoma, Grade 2.    nico/jse        Lab Results   Component Value Date    HGB 11.7 (L) 12/01/2023    MCV 96.6 12/01/2023     12/01/2023    ALT 11 12/08/2023    AST 14 12/08/2023    HGBA1C 7.30 (H) 04/18/2024    INR 1.1 02/12/2021    TRIG 155 (H) 11/20/2023      No results found.    REVIEW OF SYSTEMS  Review of Systems   Constitutional:  Positive for fatigue.   HENT:  Positive for trouble swallowing.    Eyes: Negative.    Respiratory:  Positive for chest tightness.    Cardiovascular:  Positive for chest pain.   Gastrointestinal:  Positive for constipation and nausea. Negative for abdominal pain and vomiting.   Endocrine: Negative.    Genitourinary: Negative.    Musculoskeletal:  Positive for back pain (Chronic).   Skin: Negative.    Allergic/Immunologic: Negative.    Neurological: Negative.    Hematological:  Bruises/bleeds easily.   Psychiatric/Behavioral: Negative.           ACTIVE PROBLEMS  Patient Active Problem List    Diagnosis     Stress-induced cardiomyopathy [I51.81]     Anemia, posthemorrhagic, acute [D62]     Vitamin D insufficiency [E55.9]     Anemia, posthemorrhagic, acute [D62]     Hyponatremia [E87.1]     CAD (coronary artery disease) [I25.10]     Type 2 diabetes mellitus with proteinuria [E11.29, R80.9]     Hyponatremia [E87.1]     Hyperglycemia [R73.9]     Infection of prosthetic knee joint [T84.59XA, Z96.659]     Recent MSSA (methicillin susceptible Staphylococcus aureus) septicemia [A41.01]     Hypertension [I10]      Hyperlipidemia [E78.5]     Chronic pain [G89.29]     Knee pain, hx of previous prosthetic joint infection [M25.569]     Great toe pain, with cellulitis and gangrene [M79.676]     Diabetic ulcer of 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, with necrosis of bone [E11.621, L97.524]     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 [E11.51, I70.209]     Contusion of knee [S80.00XA]     History of knee surgery [Z98.890]          PAST MEDICAL HISTORY  Past Medical History:   Diagnosis Date    Arthritis     Chronic pain     BILATERAL KNEES AND BACK    Contusion of knee     Coronary artery disease     Depression     Diabetes mellitus     type 2 with atherosclerosis of arteries of extremities    Diabetic ulcer of toe     left foot associated with type 2 diabetes mellitus, with necrosis of bone    GERD (gastroesophageal reflux disease)     Great toe pain     with cellulitis and gangrene    History of kidney stones     Hyperlipidemia     Hypertension     Hyponatremia     Kidney stone     Knee pain     hx of previous prosthetic joint infection    Liver disease     liver mass    MSSA (methicillin susceptible Staphylococcus aureus) infection     RIGHT KNEE    NSTEMI (non-ST elevated myocardial infarction) 06/14/2017    Sleep apnea     DOES NOT USE CPAP         SURGICAL HISTORY  Past Surgical History:   Procedure Laterality Date    APPENDECTOMY      CARDIAC CATHETERIZATION N/A 6/14/2017    Procedure: Left Heart Cath;  Surgeon: Tiburcio Moreno MD;  Location: [facility] INVASIVE LOCATION;  Service:     COLONOSCOPY      JOINT REPLACEMENT      BILATERAL KNEES     LUMBAR FUSION      AZ REVJ TOTAL KNEE ARTHRP W/WO ALGRFT 1 COMPONENT Right 9/5/2017    Procedure: REMOVAL OF ANTIBIOTIC CEMENT SPACER; RIGHT TOTAL KNEE ARTHROPLASTY REVISION;  Surgeon: Kiko Marie MD;  Location: [facility] OR;  Service: Orthopedics    ROTATOR CUFF REPAIR Right     TOTAL KNEE  PROSTHESIS REMOVAL W/ SPACER  INSERTION Right 2/14/2017    Procedure: INCISION AND DRAINAGE RIGHT KNEE, POLY CHANGE;  Surgeon: Kiko Marie MD;  Location: [facility] OR;  Service:     TOTAL KNEE  PROSTHESIS REMOVAL W/ SPACER INSERTION Right 7/7/2017    Procedure: REMOVAL RIGHT TOTAL KNEE ARTHROPLASTY AND ANITBIOTIC SPACER;  Surgeon: Kiko Marie MD;  Location: [facility] OR;  Service:     TRANS METATARSAL AMPUTATION Left 2/14/2017    Procedure: EXCISIONAL DEBRIDEMENT LT. FIRST TOE DIABETIC ULCER, DRAINAGE ABSCESS FIRST TOE, FIRST TOE AMPUTATION;  Surgeon: Osmin Brand MD;  Location: [facility] OR;  Service:          FAMILY HISTORY  Family History   Problem Relation Age of Onset    Heart disease Mother     Heart disease Father     Diabetes Sister     Dementia Maternal Grandmother     No Known Problems Maternal Grandfather     Heart disease Paternal Grandmother     Heart attack Paternal Grandfather     Heart disease Paternal Grandfather     Heart disease Son     Heart attack Son     No Known Problems Sister     Malig Hyperthermia Neg Hx          SOCIAL HISTORY  Social History     Occupational History    Not on file   Tobacco Use    Smoking status: Former     Current packs/day: 3.00     Average packs/day: 3.0 packs/day for 20.0 years (60.0 ttl pk-yrs)     Types: Cigarettes     Passive exposure: Never    Smokeless tobacco: Former    Tobacco comments:     quit 35 years ago. 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    Vaping Use    Vaping status: Never Used   Substance and Sexual Activity    Alcohol use: No    Drug use: No    Sexual activity: Defer         CURRENT MEDICATIONS    Current Outpatient Medications:     aspirin 325 MG tablet, Take 1 tablet by mouth Daily., Disp: , Rfl:     atorvastatin (LIPITOR) 80 MG tablet, , Disp: , Rfl:     Continuous Blood Gluc Sensor (FreeStyle Martha 2 Sensor) misc, Use 1 each Every 14 (Fourteen) Days., Disp: 6 each, Rfl: 0    HumaLOG KwikPen 100 UNIT/ML solution pen-injector, Inject 16 Units under the  "skin into the appropriate area as directed Daily With Breakfast AND 16 Units Daily Before Lunch AND 20 Units Daily With Dinner. Adds sliding scale based on BS reading. Max daily dose 75u daily  Indications: Type 2 Diabetes, Disp: 90 mL, Rfl: 2    Insulin Glargine (Lantus SoloStar) 100 UNIT/ML injection pen, Inject 50 Units under the skin into the appropriate area as directed 2 (Two) Times a Day. INJECT UP TO 50 UNITS IN THE MORNING AND 50 UNITS IN THE EVENING  Indications: Type 2 Diabetes, Disp: 90 mL, Rfl: 3    Insulin Pen Needle (BD Pen Needle Mireille U/F) 32G X 4 MM misc, TO INJECT 4 TIMES DAILY, Disp: 100 each, Rfl: 3    isosorbide mononitrate (IMDUR) 30 MG 24 hr tablet, isosorbide mononitrate ER 30 mg tablet,extended release 24 hr, Disp: , Rfl:     lisinopril (PRINIVIL,ZESTRIL) 5 MG tablet, Take 1 tablet by mouth Daily., Disp: , Rfl:     metoprolol tartrate (LOPRESSOR) 25 MG tablet, Take 1 tablet by mouth Every 12 (Twelve) Hours., Disp: 60 tablet, Rfl: 11    Multiple Vitamin (MULTI VITAMIN DAILY PO), Take 1 tablet by mouth Daily. HOLD PRIOR TO SURGERY, Disp: , Rfl:     omeprazole (priLOSEC) 40 MG capsule, Take 1 capsule by mouth 2 (Two) Times a Day., Disp: , Rfl:     oxyCODONE-acetaminophen (PERCOCET)  MG per tablet, Take 1 tablet by mouth Every 6 (Six) Hours As Needed., Disp: , Rfl:     pregabalin (LYRICA) 150 MG capsule, Take 1 capsule by mouth., Disp: , Rfl:     Psyllium (DAILY FIBER PO), Take 1 tablet by mouth Daily., Disp: , Rfl:     sertraline (ZOLOFT) 100 MG tablet, Take 1 tablet by mouth Daily., Disp: , Rfl:     torsemide (DEMADEX) 20 MG tablet, , Disp: , Rfl:     Turmeric 500 MG capsule, Take 1 capsule by mouth Daily., Disp: , Rfl:     sucralfate (Carafate) 1 g tablet, Take 1 tablet by mouth 4 (Four) Times a Day., Disp: 120 tablet, Rfl: 3    ALLERGIES  Vancomycin    VITALS  Vitals:    04/24/24 1408   Weight: 134 kg (296 lb 3.2 oz)   Height: 188 cm (74.02\")       PHYSICAL " "EXAM  Debilities/Disabilities Identified: None  Emotional Behavior: Appropriate  Wt Readings from Last 3 Encounters:   04/24/24 134 kg (296 lb 3.2 oz)   11/27/23 131 kg (288 lb 12.8 oz)   10/30/23 135 kg (298 lb)     Ht Readings from Last 1 Encounters:   04/24/24 188 cm (74.02\")     Body mass index is 38.01 kg/m².  Physical Exam  Constitutional:       General: He is not in acute distress.     Appearance: Normal appearance. He is not ill-appearing.   HENT:      Head: Normocephalic and atraumatic.      Mouth/Throat:      Mouth: Mucous membranes are moist.      Pharynx: No posterior oropharyngeal erythema.   Eyes:      General: No scleral icterus.  Cardiovascular:      Rate and Rhythm: Normal rate and regular rhythm.      Heart sounds: Normal heart sounds.   Pulmonary:      Effort: Pulmonary effort is normal.      Breath sounds: Normal breath sounds.   Abdominal:      General: Abdomen is flat. Bowel sounds are normal. There is no distension.      Palpations: Abdomen is soft. There is no mass.      Tenderness: There is no abdominal tenderness. There is no guarding or rebound. Negative signs include Cardona's sign.      Hernia: No hernia is present.   Musculoskeletal:      Cervical back: Neck supple.   Skin:     General: Skin is warm.      Capillary Refill: Capillary refill takes less than 2 seconds.   Neurological:      General: No focal deficit present.      Mental Status: He is alert and oriented to person, place, and time.   Psychiatric:         Mood and Affect: Mood normal.         Behavior: Behavior normal.         Thought Content: Thought content normal.         Judgment: Judgment normal.         CLINICAL DATA REVIEWED   reviewed previous lab results and integrated with today's visit, reviewed notes from other physicians and/or last GI encounter, reviewed previous endoscopy results and available photos, reviewed surgical pathology results from previous biopsies    ASSESSMENT  Diagnoses and all orders for this " visit:    Encounter for screening for malignant neoplasm of colon  -     Case Request; Standing  -     Case Request    Esophageal dysphagia  -     Case Request; Standing  -     Case Request    Non-cardiac chest pain  -     sucralfate (Carafate) 1 g tablet; Take 1 tablet by mouth 4 (Four) Times a Day.    Other orders  -     aspirin 325 MG tablet; Take 1 tablet by mouth Daily.  -     Discontinue: lisinopril (PRINIVIL,ZESTRIL) 30 MG tablet; Take 1 tablet by mouth Daily.  -     lisinopril (PRINIVIL,ZESTRIL) 5 MG tablet; Take 1 tablet by mouth Daily.  -     Psyllium (DAILY FIBER PO); Take 1 tablet by mouth Daily.  -     Turmeric 500 MG capsule; Take 1 capsule by mouth Daily.  -     Follow Anesthesia Guidelines / Protocol; Future  -     Obtain Informed Consent; Standing          PLAN    EGD and Colon ASAP with 2 day prep  Call Cardiology to review sx  Reviewed when to proceed to ER  Small frequent meals  Miralax every night, may need rx lax    Return in about 3 months (around 7/24/2024).    I have discussed the above plan with the patient.  They verbalize understanding and are in agreement with the plan.  They have been advised to contact the office for any questions, concerns, or changes related to their health.

## 2024-04-24 NOTE — H&P (VIEW-ONLY)
PATIENT INFORMATION  Hugh R McBurney       - 1950    CHIEF COMPLAINT  Chief Complaint   Patient presents with    Difficulty Swallowing    Constipation    Nausea       HISTORY OF PRESENT ILLNESS    Here today for evaluation of dysphagia    Dysphagia-substernal pain daily, worse when out and breathing cold weather, not necessarily when eating. Last night lasted a long time and could not sleep, took tums with some relief after 3-4. More dyspepsia lately, often when eating breakfast will get nauseous and break out in sweat. Meats difficult to swallow, slow to pass. Some capsules will hang. Daily ASA, no other NSAIDs. Omeprazole twice a day for a long time before breakfast and dinner. Has had some odynophagia. No recent steroid, has dentures.    Taking percocet 3-4 a day.  Sometimes takes laxative if has not had BM for 2 days. Bowels moving QOD to daily. Hard stools are regular. Has had a little bleeding with straining. Fiber helped some.    Last A1c 7.5, dx in 19 HCC on liver bx. S/p lap microwave ablation 3/17/2021, did not require chemo. Last scans negative for recurrence. Next MRI in a month. LFTs normal.    Upper endoscopy years ago for HB, upper and lower. Polyps, but otherwise normal.     Difficulty Swallowing  Associated symptoms include chest pain, fatigue and nausea. Pertinent negatives include no abdominal pain or vomiting.   Constipation  Associated symptoms include back pain (Chronic) and nausea. Pertinent negatives include no abdominal pain or vomiting.   Nausea  Associated symptoms include chest pain, fatigue and nausea. Pertinent negatives include no abdominal pain or vomiting.       REVIEWED PERTINENT RESULTS/ LABS  Lab Results   Component Value Date    CASEREPORT  2021     Surgical Pathology Report                         Case: [id number]                                  Authorizing Provider:  Guille Nieves MD      Collected:           2021 10:10 AM           Ordering Location:     [facility]  Received:            02/12/2021 11:09 AM                                 CT                                                                           Pathologist:           Oscar Nguyễn MD                                                         Specimen:    Liver, liver bx                                                                            FINALDX  02/12/2021     1. Liver, Needle, Biopsy:   A. Hepatocellular carcinoma, Grade 2.    nico/jse        Lab Results   Component Value Date    HGB 11.7 (L) 12/01/2023    MCV 96.6 12/01/2023     12/01/2023    ALT 11 12/08/2023    AST 14 12/08/2023    HGBA1C 7.30 (H) 04/18/2024    INR 1.1 02/12/2021    TRIG 155 (H) 11/20/2023      No results found.    REVIEW OF SYSTEMS  Review of Systems   Constitutional:  Positive for fatigue.   HENT:  Positive for trouble swallowing.    Eyes: Negative.    Respiratory:  Positive for chest tightness.    Cardiovascular:  Positive for chest pain.   Gastrointestinal:  Positive for constipation and nausea. Negative for abdominal pain and vomiting.   Endocrine: Negative.    Genitourinary: Negative.    Musculoskeletal:  Positive for back pain (Chronic).   Skin: Negative.    Allergic/Immunologic: Negative.    Neurological: Negative.    Hematological:  Bruises/bleeds easily.   Psychiatric/Behavioral: Negative.           ACTIVE PROBLEMS  Patient Active Problem List    Diagnosis     Stress-induced cardiomyopathy [I51.81]     Anemia, posthemorrhagic, acute [D62]     Vitamin D insufficiency [E55.9]     Anemia, posthemorrhagic, acute [D62]     Hyponatremia [E87.1]     CAD (coronary artery disease) [I25.10]     Type 2 diabetes mellitus with proteinuria [E11.29, R80.9]     Hyponatremia [E87.1]     Hyperglycemia [R73.9]     Infection of prosthetic knee joint [T84.59XA, Z96.659]     Recent MSSA (methicillin susceptible Staphylococcus aureus) septicemia [A41.01]     Hypertension [I10]      Hyperlipidemia [E78.5]     Chronic pain [G89.29]     Knee pain, hx of previous prosthetic joint infection [M25.569]     Great toe pain, with cellulitis and gangrene [M79.676]     Diabetic ulcer of 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, with necrosis of bone [E11.621, L97.524]     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 [E11.51, I70.209]     Contusion of knee [S80.00XA]     History of knee surgery [Z98.890]          PAST MEDICAL HISTORY  Past Medical History:   Diagnosis Date    Arthritis     Chronic pain     BILATERAL KNEES AND BACK    Contusion of knee     Coronary artery disease     Depression     Diabetes mellitus     type 2 with atherosclerosis of arteries of extremities    Diabetic ulcer of toe     left foot associated with type 2 diabetes mellitus, with necrosis of bone    GERD (gastroesophageal reflux disease)     Great toe pain     with cellulitis and gangrene    History of kidney stones     Hyperlipidemia     Hypertension     Hyponatremia     Kidney stone     Knee pain     hx of previous prosthetic joint infection    Liver disease     liver mass    MSSA (methicillin susceptible Staphylococcus aureus) infection     RIGHT KNEE    NSTEMI (non-ST elevated myocardial infarction) 06/14/2017    Sleep apnea     DOES NOT USE CPAP         SURGICAL HISTORY  Past Surgical History:   Procedure Laterality Date    APPENDECTOMY      CARDIAC CATHETERIZATION N/A 6/14/2017    Procedure: Left Heart Cath;  Surgeon: Tiburcio Moreno MD;  Location: [facility] INVASIVE LOCATION;  Service:     COLONOSCOPY      JOINT REPLACEMENT      BILATERAL KNEES     LUMBAR FUSION      UT REVJ TOTAL KNEE ARTHRP W/WO ALGRFT 1 COMPONENT Right 9/5/2017    Procedure: REMOVAL OF ANTIBIOTIC CEMENT SPACER; RIGHT TOTAL KNEE ARTHROPLASTY REVISION;  Surgeon: Kiko Marie MD;  Location: [facility] OR;  Service: Orthopedics    ROTATOR CUFF REPAIR Right     TOTAL KNEE  PROSTHESIS REMOVAL W/ SPACER  INSERTION Right 2/14/2017    Procedure: INCISION AND DRAINAGE RIGHT KNEE, POLY CHANGE;  Surgeon: Kiko Marie MD;  Location: [facility] OR;  Service:     TOTAL KNEE  PROSTHESIS REMOVAL W/ SPACER INSERTION Right 7/7/2017    Procedure: REMOVAL RIGHT TOTAL KNEE ARTHROPLASTY AND ANITBIOTIC SPACER;  Surgeon: Kiko Marie MD;  Location: [facility] OR;  Service:     TRANS METATARSAL AMPUTATION Left 2/14/2017    Procedure: EXCISIONAL DEBRIDEMENT LT. FIRST TOE DIABETIC ULCER, DRAINAGE ABSCESS FIRST TOE, FIRST TOE AMPUTATION;  Surgeon: Osmin Brand MD;  Location: [facility] OR;  Service:          FAMILY HISTORY  Family History   Problem Relation Age of Onset    Heart disease Mother     Heart disease Father     Diabetes Sister     Dementia Maternal Grandmother     No Known Problems Maternal Grandfather     Heart disease Paternal Grandmother     Heart attack Paternal Grandfather     Heart disease Paternal Grandfather     Heart disease Son     Heart attack Son     No Known Problems Sister     Malig Hyperthermia Neg Hx          SOCIAL HISTORY  Social History     Occupational History    Not on file   Tobacco Use    Smoking status: Former     Current packs/day: 3.00     Average packs/day: 3.0 packs/day for 20.0 years (60.0 ttl pk-yrs)     Types: Cigarettes     Passive exposure: Never    Smokeless tobacco: Former    Tobacco comments:     quit 35 years ago. 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    Vaping Use    Vaping status: Never Used   Substance and Sexual Activity    Alcohol use: No    Drug use: No    Sexual activity: Defer         CURRENT MEDICATIONS    Current Outpatient Medications:     aspirin 325 MG tablet, Take 1 tablet by mouth Daily., Disp: , Rfl:     atorvastatin (LIPITOR) 80 MG tablet, , Disp: , Rfl:     Continuous Blood Gluc Sensor (FreeStyle Martha 2 Sensor) misc, Use 1 each Every 14 (Fourteen) Days., Disp: 6 each, Rfl: 0    HumaLOG KwikPen 100 UNIT/ML solution pen-injector, Inject 16 Units under the  "skin into the appropriate area as directed Daily With Breakfast AND 16 Units Daily Before Lunch AND 20 Units Daily With Dinner. Adds sliding scale based on BS reading. Max daily dose 75u daily  Indications: Type 2 Diabetes, Disp: 90 mL, Rfl: 2    Insulin Glargine (Lantus SoloStar) 100 UNIT/ML injection pen, Inject 50 Units under the skin into the appropriate area as directed 2 (Two) Times a Day. INJECT UP TO 50 UNITS IN THE MORNING AND 50 UNITS IN THE EVENING  Indications: Type 2 Diabetes, Disp: 90 mL, Rfl: 3    Insulin Pen Needle (BD Pen Needle Mireille U/F) 32G X 4 MM misc, TO INJECT 4 TIMES DAILY, Disp: 100 each, Rfl: 3    isosorbide mononitrate (IMDUR) 30 MG 24 hr tablet, isosorbide mononitrate ER 30 mg tablet,extended release 24 hr, Disp: , Rfl:     lisinopril (PRINIVIL,ZESTRIL) 5 MG tablet, Take 1 tablet by mouth Daily., Disp: , Rfl:     metoprolol tartrate (LOPRESSOR) 25 MG tablet, Take 1 tablet by mouth Every 12 (Twelve) Hours., Disp: 60 tablet, Rfl: 11    Multiple Vitamin (MULTI VITAMIN DAILY PO), Take 1 tablet by mouth Daily. HOLD PRIOR TO SURGERY, Disp: , Rfl:     omeprazole (priLOSEC) 40 MG capsule, Take 1 capsule by mouth 2 (Two) Times a Day., Disp: , Rfl:     oxyCODONE-acetaminophen (PERCOCET)  MG per tablet, Take 1 tablet by mouth Every 6 (Six) Hours As Needed., Disp: , Rfl:     pregabalin (LYRICA) 150 MG capsule, Take 1 capsule by mouth., Disp: , Rfl:     Psyllium (DAILY FIBER PO), Take 1 tablet by mouth Daily., Disp: , Rfl:     sertraline (ZOLOFT) 100 MG tablet, Take 1 tablet by mouth Daily., Disp: , Rfl:     torsemide (DEMADEX) 20 MG tablet, , Disp: , Rfl:     Turmeric 500 MG capsule, Take 1 capsule by mouth Daily., Disp: , Rfl:     sucralfate (Carafate) 1 g tablet, Take 1 tablet by mouth 4 (Four) Times a Day., Disp: 120 tablet, Rfl: 3    ALLERGIES  Vancomycin    VITALS  Vitals:    04/24/24 1408   Weight: 134 kg (296 lb 3.2 oz)   Height: 188 cm (74.02\")       PHYSICAL " "EXAM  Debilities/Disabilities Identified: None  Emotional Behavior: Appropriate  Wt Readings from Last 3 Encounters:   04/24/24 134 kg (296 lb 3.2 oz)   11/27/23 131 kg (288 lb 12.8 oz)   10/30/23 135 kg (298 lb)     Ht Readings from Last 1 Encounters:   04/24/24 188 cm (74.02\")     Body mass index is 38.01 kg/m².  Physical Exam  Constitutional:       General: He is not in acute distress.     Appearance: Normal appearance. He is not ill-appearing.   HENT:      Head: Normocephalic and atraumatic.      Mouth/Throat:      Mouth: Mucous membranes are moist.      Pharynx: No posterior oropharyngeal erythema.   Eyes:      General: No scleral icterus.  Cardiovascular:      Rate and Rhythm: Normal rate and regular rhythm.      Heart sounds: Normal heart sounds.   Pulmonary:      Effort: Pulmonary effort is normal.      Breath sounds: Normal breath sounds.   Abdominal:      General: Abdomen is flat. Bowel sounds are normal. There is no distension.      Palpations: Abdomen is soft. There is no mass.      Tenderness: There is no abdominal tenderness. There is no guarding or rebound. Negative signs include Cardona's sign.      Hernia: No hernia is present.   Musculoskeletal:      Cervical back: Neck supple.   Skin:     General: Skin is warm.      Capillary Refill: Capillary refill takes less than 2 seconds.   Neurological:      General: No focal deficit present.      Mental Status: He is alert and oriented to person, place, and time.   Psychiatric:         Mood and Affect: Mood normal.         Behavior: Behavior normal.         Thought Content: Thought content normal.         Judgment: Judgment normal.         CLINICAL DATA REVIEWED   reviewed previous lab results and integrated with today's visit, reviewed notes from other physicians and/or last GI encounter, reviewed previous endoscopy results and available photos, reviewed surgical pathology results from previous biopsies    ASSESSMENT  Diagnoses and all orders for this " visit:    Encounter for screening for malignant neoplasm of colon  -     Case Request; Standing  -     Case Request    Esophageal dysphagia  -     Case Request; Standing  -     Case Request    Non-cardiac chest pain  -     sucralfate (Carafate) 1 g tablet; Take 1 tablet by mouth 4 (Four) Times a Day.    Other orders  -     aspirin 325 MG tablet; Take 1 tablet by mouth Daily.  -     Discontinue: lisinopril (PRINIVIL,ZESTRIL) 30 MG tablet; Take 1 tablet by mouth Daily.  -     lisinopril (PRINIVIL,ZESTRIL) 5 MG tablet; Take 1 tablet by mouth Daily.  -     Psyllium (DAILY FIBER PO); Take 1 tablet by mouth Daily.  -     Turmeric 500 MG capsule; Take 1 capsule by mouth Daily.  -     Follow Anesthesia Guidelines / Protocol; Future  -     Obtain Informed Consent; Standing          PLAN    EGD and Colon ASAP with 2 day prep  Call Cardiology to review sx  Reviewed when to proceed to ER  Small frequent meals  Miralax every night, may need rx lax    Return in about 3 months (around 7/24/2024).    I have discussed the above plan with the patient.  They verbalize understanding and are in agreement with the plan.  They have been advised to contact the office for any questions, concerns, or changes related to their health.

## 2024-04-25 ENCOUNTER — OFFICE VISIT (OUTPATIENT)
Dept: ENDOCRINOLOGY | Age: 74
End: 2024-04-25
Payer: MEDICARE

## 2024-04-25 VITALS
HEART RATE: 69 BPM | SYSTOLIC BLOOD PRESSURE: 150 MMHG | DIASTOLIC BLOOD PRESSURE: 66 MMHG | OXYGEN SATURATION: 96 % | HEIGHT: 74 IN | BODY MASS INDEX: 37.29 KG/M2 | TEMPERATURE: 97.8 F | WEIGHT: 290.6 LBS

## 2024-04-25 DIAGNOSIS — E78.2 MIXED HYPERLIPIDEMIA: ICD-10-CM

## 2024-04-25 DIAGNOSIS — E11.42 DIABETIC PERIPHERAL NEUROPATHY ASSOCIATED WITH TYPE 2 DIABETES MELLITUS: ICD-10-CM

## 2024-04-25 DIAGNOSIS — N18.32 STAGE 3B CHRONIC KIDNEY DISEASE: ICD-10-CM

## 2024-04-25 DIAGNOSIS — E11.65 TYPE 2 DIABETES MELLITUS WITH HYPERGLYCEMIA, WITH LONG-TERM CURRENT USE OF INSULIN: Primary | ICD-10-CM

## 2024-04-25 DIAGNOSIS — Z79.4 TYPE 2 DIABETES MELLITUS WITH HYPERGLYCEMIA, WITH LONG-TERM CURRENT USE OF INSULIN: Primary | ICD-10-CM

## 2024-04-25 DIAGNOSIS — Z86.79 HISTORY OF CAD (CORONARY ARTERY DISEASE): ICD-10-CM

## 2024-04-25 NOTE — PATIENT INSTRUCTIONS
On the morning of your clear liquid diet (Saturday and Sunday)  Decrease lantus to 30u BID   Check bs every 4hours and based on BS readings, give the following amount of fast acting insulin   <150:0  151-200: 3 units  201-250: 5 units  > 250: 8 units     Only take lantus Monday morning if BS > 150 (30 units)

## 2024-04-25 NOTE — PROGRESS NOTES
"Chief Complaint  Diabetes    Subjective        Hugh R McBurney presents to [facility] ENDOCRINOLOGY  History of Present Illness    Have upper and lower gi scope next Monday r/t swallowing changes      Type 2 dm ~ 10 years   DM regimen: Lantus 50 units bid, Humalog 16u-20u+ss with meals  Sliding scale: 3 units for 50 above 150   Last eye exam - 3/2024  (+) nephropathy; following routinely with renal, on ace-I    (+) neuropathy; on lyrica from PCP, podiatry trims his toenails     (+) CAD, on statin   Episodes of hypoglycemia - very, very rare      Reports his BP is higher today r/t the long walk to the office, normal runs <130/90     Objective   Vital Signs:  /66 (BP Location: Left arm, Patient Position: Sitting)   Pulse 69   Temp 97.8 °F (36.6 °C) (Oral)   Ht 188 cm (74.02\")   Wt 132 kg (290 lb 9.6 oz)   SpO2 96%   BMI 37.29 kg/m²   Estimated body mass index is 37.29 kg/m² as calculated from the following:    Height as of this encounter: 188 cm (74.02\").    Weight as of this encounter: 132 kg (290 lb 9.6 oz).             Physical Exam  Vitals reviewed.   Constitutional:       General: He is not in acute distress.  HENT:      Head: Normocephalic and atraumatic.   Cardiovascular:      Rate and Rhythm: Normal rate.   Pulmonary:      Effort: Pulmonary effort is normal. No respiratory distress.   Musculoskeletal:         General: No signs of injury. Normal range of motion.      Cervical back: Normal range of motion and neck supple.   Skin:     General: Skin is warm and dry.   Neurological:      Mental Status: He is alert and oriented to person, place, and time. Mental status is at baseline.   Psychiatric:         Mood and Affect: Mood normal.         Behavior: Behavior normal.         Thought Content: Thought content normal.         Judgment: Judgment normal.        Result Review :    The following data was reviewed by: ABBY Alvarenga on 04/25/2024:  Common labs          12/1/2023    " 13:32 12/8/2023    13:58 4/18/2024    10:44   Common Labs   Glucose 139  117  140    BUN 42  36  43    Creatinine 2.83  2.65  3.23    Sodium 139  137  141    Potassium 4.6  4.2  5.0    Chloride 106  104  108    Calcium 9.0  8.9     8.7  8.7    Total Protein  6.4     Albumin 3.8  3.8     3.1  3.9    Total Bilirubin  0.2     Alkaline Phosphatase  159     AST (SGOT)  14     ALT (SGPT)  11     WBC 9.28      Hemoglobin 11.7      Hematocrit 34.5      Platelets 199      Hemoglobin A1C   7.30                   Assessment and Plan     Diagnoses and all orders for this visit:    1. Type 2 diabetes mellitus with hyperglycemia, with long-term current use of insulin (Primary)    2. Stage 3b chronic kidney disease    3. Diabetic peripheral neuropathy associated with type 2 diabetes mellitus    4. History of CAD (coronary artery disease)    5. Mixed hyperlipidemia             Follow Up     Return in about 4 months (around 8/25/2024).    A1c favorable, continue insulin as above  Gave insulin instructions on AVS to use while on prep for scopes   Continue ace and statin     Patient was given instructions and counseling regarding his condition or for health maintenance advice. Please see specific information pulled into the AVS if appropriate.       ABBY Alvarenga

## 2024-04-26 ENCOUNTER — ANESTHESIA EVENT (OUTPATIENT)
Dept: PERIOP | Facility: HOSPITAL | Age: 74
End: 2024-04-26
Payer: MEDICARE

## 2024-04-29 ENCOUNTER — ANESTHESIA (OUTPATIENT)
Dept: PERIOP | Facility: HOSPITAL | Age: 74
End: 2024-04-29
Payer: MEDICARE

## 2024-04-29 ENCOUNTER — HOSPITAL ENCOUNTER (OUTPATIENT)
Facility: HOSPITAL | Age: 74
Setting detail: HOSPITAL OUTPATIENT SURGERY
Discharge: HOME | End: 2024-04-29
Attending: INTERNAL MEDICINE | Admitting: INTERNAL MEDICINE
Payer: MEDICARE

## 2024-04-29 VITALS
HEART RATE: 64 BPM | HEIGHT: 74 IN | RESPIRATION RATE: 12 BRPM | BODY MASS INDEX: 36.19 KG/M2 | DIASTOLIC BLOOD PRESSURE: 77 MMHG | TEMPERATURE: 97.6 F | OXYGEN SATURATION: 94 % | WEIGHT: 282 LBS | SYSTOLIC BLOOD PRESSURE: 153 MMHG

## 2024-04-29 DIAGNOSIS — Z12.11 ENCOUNTER FOR SCREENING FOR MALIGNANT NEOPLASM OF COLON: ICD-10-CM

## 2024-04-29 DIAGNOSIS — R13.19 ESOPHAGEAL DYSPHAGIA: ICD-10-CM

## 2024-04-29 LAB — GLUCOSE BLDC GLUCOMTR-MCNC: 122 MG/DL (ref 70–130)

## 2024-04-29 PROCEDURE — 45385 COLONOSCOPY W/LESION REMOVAL: CPT | Performed by: INTERNAL MEDICINE

## 2024-04-29 PROCEDURE — 88342 IMHCHEM/IMCYTCHM 1ST ANTB: CPT | Performed by: INTERNAL MEDICINE

## 2024-04-29 PROCEDURE — 25810000003 LACTATED RINGERS PER 1000 ML: Performed by: NURSE ANESTHETIST, CERTIFIED REGISTERED

## 2024-04-29 PROCEDURE — 82948 REAGENT STRIP/BLOOD GLUCOSE: CPT

## 2024-04-29 PROCEDURE — 45380 COLONOSCOPY AND BIOPSY: CPT | Performed by: INTERNAL MEDICINE

## 2024-04-29 PROCEDURE — C1726 CATH, BAL DIL, NON-VASCULAR: HCPCS | Performed by: INTERNAL MEDICINE

## 2024-04-29 PROCEDURE — 43239 EGD BIOPSY SINGLE/MULTIPLE: CPT | Performed by: INTERNAL MEDICINE

## 2024-04-29 PROCEDURE — 88305 TISSUE EXAM BY PATHOLOGIST: CPT | Performed by: INTERNAL MEDICINE

## 2024-04-29 PROCEDURE — 88341 IMHCHEM/IMCYTCHM EA ADD ANTB: CPT | Performed by: INTERNAL MEDICINE

## 2024-04-29 PROCEDURE — 25010000002 PROPOFOL 200 MG/20ML EMULSION: Performed by: NURSE ANESTHETIST, CERTIFIED REGISTERED

## 2024-04-29 RX ORDER — LIDOCAINE HYDROCHLORIDE 20 MG/ML
INJECTION, SOLUTION INFILTRATION; PERINEURAL AS NEEDED
Status: DISCONTINUED | OUTPATIENT
Start: 2024-04-29 | End: 2024-04-29 | Stop reason: SURG

## 2024-04-29 RX ORDER — SODIUM CHLORIDE 9 MG/ML
40 INJECTION, SOLUTION INTRAVENOUS AS NEEDED
Status: DISCONTINUED | OUTPATIENT
Start: 2024-04-29 | End: 2024-04-29 | Stop reason: HOSPADM

## 2024-04-29 RX ORDER — PROPOFOL 10 MG/ML
INJECTION, EMULSION INTRAVENOUS AS NEEDED
Status: DISCONTINUED | OUTPATIENT
Start: 2024-04-29 | End: 2024-04-29 | Stop reason: SURG

## 2024-04-29 RX ORDER — SODIUM CHLORIDE, SODIUM LACTATE, POTASSIUM CHLORIDE, CALCIUM CHLORIDE 600; 310; 30; 20 MG/100ML; MG/100ML; MG/100ML; MG/100ML
100 INJECTION, SOLUTION INTRAVENOUS ONCE
Status: DISCONTINUED | OUTPATIENT
Start: 2024-04-29 | End: 2024-04-29 | Stop reason: HOSPADM

## 2024-04-29 RX ORDER — ONDANSETRON 2 MG/ML
4 INJECTION INTRAMUSCULAR; INTRAVENOUS ONCE AS NEEDED
Status: DISCONTINUED | OUTPATIENT
Start: 2024-04-29 | End: 2024-04-29 | Stop reason: HOSPADM

## 2024-04-29 RX ORDER — SODIUM CHLORIDE 0.9 % (FLUSH) 0.9 %
10 SYRINGE (ML) INJECTION EVERY 12 HOURS SCHEDULED
Status: DISCONTINUED | OUTPATIENT
Start: 2024-04-29 | End: 2024-04-29 | Stop reason: HOSPADM

## 2024-04-29 RX ORDER — SODIUM CHLORIDE, SODIUM LACTATE, POTASSIUM CHLORIDE, CALCIUM CHLORIDE 600; 310; 30; 20 MG/100ML; MG/100ML; MG/100ML; MG/100ML
9 INJECTION, SOLUTION INTRAVENOUS CONTINUOUS PRN
Status: DISCONTINUED | OUTPATIENT
Start: 2024-04-29 | End: 2024-04-29 | Stop reason: HOSPADM

## 2024-04-29 RX ORDER — SODIUM CHLORIDE 0.9 % (FLUSH) 0.9 %
10 SYRINGE (ML) INJECTION AS NEEDED
Status: DISCONTINUED | OUTPATIENT
Start: 2024-04-29 | End: 2024-04-29 | Stop reason: HOSPADM

## 2024-04-29 RX ADMIN — PROPOFOL 50 MG: 10 INJECTION, EMULSION INTRAVENOUS at 11:53

## 2024-04-29 RX ADMIN — PROPOFOL 50 MG: 10 INJECTION, EMULSION INTRAVENOUS at 11:41

## 2024-04-29 RX ADMIN — PROPOFOL 25 MG: 10 INJECTION, EMULSION INTRAVENOUS at 11:38

## 2024-04-29 RX ADMIN — SODIUM CHLORIDE, POTASSIUM CHLORIDE, SODIUM LACTATE AND CALCIUM CHLORIDE 9 ML/HR: 600; 310; 30; 20 INJECTION, SOLUTION INTRAVENOUS at 11:01

## 2024-04-29 RX ADMIN — PROPOFOL 75 MG: 10 INJECTION, EMULSION INTRAVENOUS at 11:36

## 2024-04-29 RX ADMIN — PROPOFOL 50 MG: 10 INJECTION, EMULSION INTRAVENOUS at 12:25

## 2024-04-29 RX ADMIN — PROPOFOL 50 MG: 10 INJECTION, EMULSION INTRAVENOUS at 12:01

## 2024-04-29 RX ADMIN — PROPOFOL 50 MG: 10 INJECTION, EMULSION INTRAVENOUS at 11:57

## 2024-04-29 RX ADMIN — PROPOFOL 50 MG: 10 INJECTION, EMULSION INTRAVENOUS at 11:47

## 2024-04-29 RX ADMIN — LIDOCAINE HYDROCHLORIDE 100 MG: 20 INJECTION, SOLUTION INFILTRATION; PERINEURAL at 11:30

## 2024-04-29 RX ADMIN — PROPOFOL 50 MG: 10 INJECTION, EMULSION INTRAVENOUS at 11:43

## 2024-04-29 RX ADMIN — PROPOFOL 50 MG: 10 INJECTION, EMULSION INTRAVENOUS at 12:11

## 2024-04-29 RX ADMIN — PROPOFOL 25 MG: 10 INJECTION, EMULSION INTRAVENOUS at 12:05

## 2024-04-29 RX ADMIN — PROPOFOL 50 MG: 10 INJECTION, EMULSION INTRAVENOUS at 11:50

## 2024-04-29 RX ADMIN — PROPOFOL 25 MG: 10 INJECTION, EMULSION INTRAVENOUS at 12:18

## 2024-04-29 NOTE — INTERVAL H&P NOTE
"Vital Signs  /74 (BP Location: Left arm, Patient Position: Lying)   Pulse 70   Temp 98.2 °F (36.8 °C) (Oral)   Resp 9   Ht 188 cm (74\")   Wt 128 kg (282 lb)   SpO2 94%   BMI 36.21 kg/m²     H&P reviewed. The patient was examined and there are no changes to the H&P.        "

## 2024-04-29 NOTE — BRIEF OP NOTE
ESOPHAGOGASTRODUODENOSCOPY WITH BIOPSY, COLONOSCOPY WITH POLYPECTOMY  Progress Note    Hugh R McBurney  4/29/2024    Pre-op Diagnosis:   Encounter for screening for malignant neoplasm of colon [Z12.11]  Esophageal dysphagia [R13.19]       Post-Op Diagnosis Codes:     * Encounter for screening for malignant neoplasm of colon [Z12.11]     * Esophageal dysphagia [R13.19]     * Erosive gastritis [K29.60]     * Diverticulosis [K57.90]     * Colon polyp [K63.5]    Procedure/CPT® Codes:        Procedure(s):  ESOPHAGOGASTRODUODENOSCOPY WITH BIOPSY  COLONOSCOPY WITH POLYPECTOMY              Surgeon(s):  Kam Cooley MD    Anesthesia: Monitored Anesthesia Care    Staff:   Circulator: July Billy RN; Debi Slater RN  Scrub Person: Vesna Salguero         Estimated Blood Loss: none    Urine Voided: * No values recorded between 4/29/2024 11:24 AM and 4/29/2024 12:35 PM *    Specimens:                Specimens       ID Source Type Tests Collected By Collected At Frozen?    A Gastric, Antrum Tissue TISSUE PATHOLOGY EXAM   Kam Cooley MD 4/29/24 1150     Description: Biopsies    B Esophagus, Distal Tissue TISSUE PATHOLOGY EXAM   Kam Cooley MD 4/29/24 1150     Description: Biopsies    C Large Intestine, Transverse Colon Polyp TISSUE PATHOLOGY EXAM   Kam Cooley MD 4/29/24 1159 No    Description: transverse polyp x3    D Large Intestine, Cecum Polyp TISSUE PATHOLOGY EXAM   Kam Cooley MD 4/29/24 1205 No    Description: cecal polyp x2    E Large Intestine, Right / Ascending Colon Polyp TISSUE PATHOLOGY EXAM   Kam Cooley MD 4/29/24 1207 No    Description: ascending polyp x5    F Large Intestine, Left / Descending Colon Polyp TISSUE PATHOLOGY EXAM   Kam Cooley MD 4/29/24 1220 No    Description: splenic flexure polyp x2    G Large Intestine, Sigmoid Colon Polyp TISSUE PATHOLOGY EXAM   Kam Cooley  MD 4/29/24 1224 No    Description: sigmoid polyp x3                  Drains: * No LDAs found *    Findings: Normal Duodenum  Erosive Moderate gastritis-Biopsy  Reflux Esophagitis-Biopsy  No Stricture    Colon to Cecum Good prep  Sigmoid Diverticulosis  Polyps-15-Hot Snare x 2, Cold Snare x 2, Biopsy        Complications: none          Kam Cooley MD     Date: 4/29/2024  Time: 12:39 EDT

## 2024-04-29 NOTE — ANESTHESIA POSTPROCEDURE EVALUATION
Patient: Hugh R McBurney    Procedure Summary       Date: 04/29/24 Room / Location: [facility] ENDOSCOPY 1 /  [facility] OR    Anesthesia Start: 1125 Anesthesia Stop: 1234    Procedures:       ESOPHAGOGASTRODUODENOSCOPY WITH BIOPSY (Esophagus)      COLONOSCOPY WITH POLYPECTOMY Diagnosis:       Encounter for screening for malignant neoplasm of colon      Esophageal dysphagia      Erosive gastritis      Diverticulosis      Colon polyp      (Encounter for screening for malignant neoplasm of colon [Z12.11])      (Esophageal dysphagia [R13.19])    Surgeons: Kam Cooley MD Provider: Monae Downing CRNA    Anesthesia Type: MAC ASA Status: 3            Anesthesia Type: MAC    Vitals  Vitals Value Taken Time   /68 04/29/24 1300   Temp 97.6 °F (36.4 °C) 04/29/24 1237   Pulse 65 04/29/24 1304   Resp 12 04/29/24 1237   SpO2 95 % 04/29/24 1304   Vitals shown include unfiled device data.        Post Anesthesia Care and Evaluation    Patient location during evaluation: PHASE II  Patient participation: complete - patient participated  Level of consciousness: awake  Pain score: 0  Pain management: adequate    Airway patency: patent  Anesthetic complications: No anesthetic complications  PONV Status: none  Cardiovascular status: acceptable  Respiratory status: acceptable  Hydration status: acceptable

## 2024-04-30 LAB
LAB AP CASE REPORT: NORMAL
PATH REPORT.ADDENDUM SPEC: NORMAL
PATH REPORT.FINAL DX SPEC: NORMAL
PATH REPORT.GROSS SPEC: NORMAL

## 2024-05-10 ENCOUNTER — TRANSCRIBE ORDERS (OUTPATIENT)
Dept: ADMINISTRATIVE | Facility: HOSPITAL | Age: 74
End: 2024-05-10
Payer: MEDICARE

## 2024-05-10 ENCOUNTER — LAB (OUTPATIENT)
Dept: LAB | Facility: HOSPITAL | Age: 74
End: 2024-05-10
Payer: MEDICARE

## 2024-05-10 DIAGNOSIS — E66.09 EXOGENOUS OBESITY: ICD-10-CM

## 2024-05-10 DIAGNOSIS — C22.0 CARCINOMA OF LIVER: ICD-10-CM

## 2024-05-10 DIAGNOSIS — K21.9 CHALASIA OF LOWER ESOPHAGEAL SPHINCTER: ICD-10-CM

## 2024-05-10 DIAGNOSIS — N18.30 STAGE 3 CHRONIC KIDNEY DISEASE, UNSPECIFIED WHETHER STAGE 3A OR 3B CKD: Primary | ICD-10-CM

## 2024-05-10 DIAGNOSIS — I10 ESSENTIAL HYPERTENSION, MALIGNANT: ICD-10-CM

## 2024-05-10 DIAGNOSIS — E55.9 AVITAMINOSIS D: ICD-10-CM

## 2024-05-10 DIAGNOSIS — G47.33 OBSTRUCTIVE SLEEP APNEA (ADULT) (PEDIATRIC): ICD-10-CM

## 2024-05-10 DIAGNOSIS — G89.29 OTHER CHRONIC PAIN: ICD-10-CM

## 2024-05-10 DIAGNOSIS — E11.22 TYPE 2 DIABETES MELLITUS WITH DIABETIC CHRONIC KIDNEY DISEASE, UNSPECIFIED CKD STAGE, UNSPECIFIED WHETHER LONG TERM INSULIN USE: ICD-10-CM

## 2024-05-10 DIAGNOSIS — I25.10 DISEASE OF CARDIOVASCULAR SYSTEM: ICD-10-CM

## 2024-05-10 DIAGNOSIS — E78.5 HYPERLIPIDEMIA, UNSPECIFIED HYPERLIPIDEMIA TYPE: ICD-10-CM

## 2024-05-10 DIAGNOSIS — N18.30 STAGE 3 CHRONIC KIDNEY DISEASE, UNSPECIFIED WHETHER STAGE 3A OR 3B CKD: ICD-10-CM

## 2024-05-10 DIAGNOSIS — R80.9 PROTEINURIA, UNSPECIFIED TYPE: ICD-10-CM

## 2024-05-10 LAB
25(OH)D3 SERPL-MCNC: 24.5 NG/ML (ref 30–100)
ALBUMIN SERPL-MCNC: 3.6 G/DL (ref 3.5–5.2)
ANION GAP SERPL CALCULATED.3IONS-SCNC: 11 MMOL/L (ref 5–15)
BACTERIA UR QL AUTO: NORMAL /HPF
BASOPHILS # BLD AUTO: 0.06 10*3/MM3 (ref 0–0.2)
BASOPHILS NFR BLD AUTO: 0.7 % (ref 0–1.5)
BILIRUB UR QL STRIP: NEGATIVE
BUN SERPL-MCNC: 34 MG/DL (ref 8–23)
BUN/CREAT SERPL: 11 (ref 7–25)
CALCIUM SPEC-SCNC: 8.3 MG/DL (ref 8.6–10.5)
CHLORIDE SERPL-SCNC: 109 MMOL/L (ref 98–107)
CLARITY UR: CLEAR
CO2 SERPL-SCNC: 21 MMOL/L (ref 22–29)
COLOR UR: YELLOW
CREAT SERPL-MCNC: 3.08 MG/DL (ref 0.76–1.27)
CREAT UR-MCNC: 34.4 MG/DL
DEPRECATED RDW RBC AUTO: 42.2 FL (ref 37–54)
EGFRCR SERPLBLD CKD-EPI 2021: 20.6 ML/MIN/1.73
EOSINOPHIL # BLD AUTO: 0.48 10*3/MM3 (ref 0–0.4)
EOSINOPHIL NFR BLD AUTO: 5.2 % (ref 0.3–6.2)
ERYTHROCYTE [DISTWIDTH] IN BLOOD BY AUTOMATED COUNT: 12 % (ref 12.3–15.4)
FERRITIN SERPL-MCNC: 38 NG/ML (ref 30–400)
GLUCOSE SERPL-MCNC: 122 MG/DL (ref 65–99)
GLUCOSE UR STRIP-MCNC: NEGATIVE MG/DL
HCT VFR BLD AUTO: 31.1 % (ref 37.5–51)
HGB BLD-MCNC: 10.7 G/DL (ref 13–17.7)
HGB UR QL STRIP.AUTO: NEGATIVE
HYALINE CASTS UR QL AUTO: NORMAL /LPF
IMM GRANULOCYTES # BLD AUTO: 0.01 10*3/MM3 (ref 0–0.05)
IMM GRANULOCYTES NFR BLD AUTO: 0.1 % (ref 0–0.5)
IRON 24H UR-MRATE: 77 MCG/DL (ref 59–158)
IRON SATN MFR SERPL: 22 % (ref 20–50)
KETONES UR QL STRIP: NEGATIVE
LEUKOCYTE ESTERASE UR QL STRIP.AUTO: NEGATIVE
LYMPHOCYTES # BLD AUTO: 1.37 10*3/MM3 (ref 0.7–3.1)
LYMPHOCYTES NFR BLD AUTO: 15 % (ref 19.6–45.3)
MCH RBC QN AUTO: 33.3 PG (ref 26.6–33)
MCHC RBC AUTO-ENTMCNC: 34.4 G/DL (ref 31.5–35.7)
MCV RBC AUTO: 96.9 FL (ref 79–97)
MONOCYTES # BLD AUTO: 0.58 10*3/MM3 (ref 0.1–0.9)
MONOCYTES NFR BLD AUTO: 6.3 % (ref 5–12)
NEUTROPHILS NFR BLD AUTO: 6.65 10*3/MM3 (ref 1.7–7)
NEUTROPHILS NFR BLD AUTO: 72.7 % (ref 42.7–76)
NITRITE UR QL STRIP: NEGATIVE
NRBC BLD AUTO-RTO: 0 /100 WBC (ref 0–0.2)
PH UR STRIP.AUTO: 6 [PH] (ref 5–8)
PHOSPHATE SERPL-MCNC: 3.5 MG/DL (ref 2.5–4.5)
PLATELET # BLD AUTO: 157 10*3/MM3 (ref 140–450)
PMV BLD AUTO: 11.8 FL (ref 6–12)
POTASSIUM SERPL-SCNC: 5 MMOL/L (ref 3.5–5.2)
PROT ?TM UR-MCNC: 225.9 MG/DL
PROT UR QL STRIP: ABNORMAL
PROT/CREAT UR: 6566.9 MG/G CREA (ref 0–200)
RBC # BLD AUTO: 3.21 10*6/MM3 (ref 4.14–5.8)
RBC # UR STRIP: NORMAL /HPF
REF LAB TEST METHOD: NORMAL
SODIUM SERPL-SCNC: 141 MMOL/L (ref 136–145)
SP GR UR STRIP: 1.01 (ref 1–1.03)
SQUAMOUS #/AREA URNS HPF: NORMAL /HPF
TIBC SERPL-MCNC: 346 MCG/DL (ref 298–536)
TRANSFERRIN SERPL-MCNC: 232 MG/DL (ref 200–360)
UROBILINOGEN UR QL STRIP: ABNORMAL
WBC # UR STRIP: NORMAL /HPF
WBC NRBC COR # BLD AUTO: 9.15 10*3/MM3 (ref 3.4–10.8)

## 2024-05-10 PROCEDURE — 84466 ASSAY OF TRANSFERRIN: CPT

## 2024-05-10 PROCEDURE — 36415 COLL VENOUS BLD VENIPUNCTURE: CPT

## 2024-05-10 PROCEDURE — 82306 VITAMIN D 25 HYDROXY: CPT

## 2024-05-10 PROCEDURE — 81001 URINALYSIS AUTO W/SCOPE: CPT

## 2024-05-10 PROCEDURE — 80069 RENAL FUNCTION PANEL: CPT

## 2024-05-10 PROCEDURE — 82728 ASSAY OF FERRITIN: CPT

## 2024-05-10 PROCEDURE — 85025 COMPLETE CBC W/AUTO DIFF WBC: CPT

## 2024-05-10 PROCEDURE — 83540 ASSAY OF IRON: CPT

## 2024-05-10 PROCEDURE — 84156 ASSAY OF PROTEIN URINE: CPT

## 2024-05-10 PROCEDURE — 82570 ASSAY OF URINE CREATININE: CPT

## 2024-05-29 DIAGNOSIS — Z79.4 TYPE 2 DIABETES MELLITUS WITH MICROALBUMINURIA, WITH LONG-TERM CURRENT USE OF INSULIN: ICD-10-CM

## 2024-05-29 DIAGNOSIS — E11.29 TYPE 2 DIABETES MELLITUS WITH MICROALBUMINURIA, WITH LONG-TERM CURRENT USE OF INSULIN: ICD-10-CM

## 2024-05-29 DIAGNOSIS — R80.9 TYPE 2 DIABETES MELLITUS WITH MICROALBUMINURIA, WITH LONG-TERM CURRENT USE OF INSULIN: ICD-10-CM

## 2024-05-29 RX ORDER — INSULIN GLARGINE 100 [IU]/ML
INJECTION, SOLUTION SUBCUTANEOUS
Qty: 90 ML | Refills: 0 | Status: SHIPPED | OUTPATIENT
Start: 2024-05-29

## 2024-05-29 NOTE — TELEPHONE ENCOUNTER
Rx Refill Note  Requested Prescriptions     Pending Prescriptions Disp Refills    Lantus SoloStar 100 UNIT/ML injection pen [Pharmacy Med Name: LANTUS SOLOSTAR PEN 3ML 5'S 100U/ML] 90 mL 3     Sig: INJECT UP TO 50 UNITS UNDER THE SKIN INTO THE APPROPRIATE AREA AS DIRECTED IN THE MORNING AND 50 UNITS IN THE EVENING FOR TYPE 2 DIABETES      Last office visit with prescribing clinician: 4/25/2024   Last telemedicine visit with prescribing clinician: Visit date not found   Next office visit with prescribing clinician: 8/26/2024                         Would you like a call back once the refill request has been completed: [] Yes [] No    If the office needs to give you a call back, can they leave a voicemail: [] Yes [] No    Berta Schwartz MA  05/29/24, 08:25 EDT

## 2024-06-10 ENCOUNTER — OFFICE VISIT (OUTPATIENT)
Dept: GASTROENTEROLOGY | Facility: CLINIC | Age: 74
End: 2024-06-10
Payer: MEDICARE

## 2024-06-10 VITALS
HEIGHT: 74 IN | BODY MASS INDEX: 36.6 KG/M2 | WEIGHT: 285.2 LBS | SYSTOLIC BLOOD PRESSURE: 110 MMHG | DIASTOLIC BLOOD PRESSURE: 68 MMHG

## 2024-06-10 DIAGNOSIS — K59.03 THERAPEUTIC OPIOID-INDUCED CONSTIPATION (OIC): ICD-10-CM

## 2024-06-10 DIAGNOSIS — R07.89 NON-CARDIAC CHEST PAIN: ICD-10-CM

## 2024-06-10 DIAGNOSIS — K22.70 BARRETT'S ESOPHAGUS WITHOUT DYSPLASIA: Primary | ICD-10-CM

## 2024-06-10 DIAGNOSIS — K29.60 EROSIVE GASTRITIS: ICD-10-CM

## 2024-06-10 DIAGNOSIS — T40.2X5A THERAPEUTIC OPIOID-INDUCED CONSTIPATION (OIC): ICD-10-CM

## 2024-06-10 PROCEDURE — 3074F SYST BP LT 130 MM HG: CPT

## 2024-06-10 PROCEDURE — 99214 OFFICE O/P EST MOD 30 MIN: CPT

## 2024-06-10 PROCEDURE — 3078F DIAST BP <80 MM HG: CPT

## 2024-06-10 PROCEDURE — 1159F MED LIST DOCD IN RCRD: CPT

## 2024-06-10 PROCEDURE — 1160F RVW MEDS BY RX/DR IN RCRD: CPT

## 2024-06-10 RX ORDER — SUCRALFATE 1 G/1
1 TABLET ORAL 4 TIMES DAILY
Qty: 120 TABLET | Refills: 3 | Status: SHIPPED | OUTPATIENT
Start: 2024-06-10

## 2024-06-10 RX ORDER — VONOPRAZAN FUMARATE 26.72 MG/1
20 TABLET ORAL DAILY
Qty: 30 TABLET | Refills: 1 | Status: SHIPPED | OUTPATIENT
Start: 2024-06-10

## 2024-06-10 RX ORDER — DOXYCYCLINE HYCLATE 100 MG/1
100 CAPSULE ORAL 2 TIMES DAILY
COMMUNITY
Start: 2024-06-06

## 2024-06-10 RX ORDER — VONOPRAZAN FUMARATE 26.72 MG/1
20 TABLET ORAL DAILY
Qty: 30 TABLET | Refills: 1 | COMMUNITY
Start: 2024-06-10 | End: 2024-06-10 | Stop reason: SDUPTHER

## 2024-06-10 NOTE — PROGRESS NOTES
PATIENT INFORMATION  Hugh R McBurney       - 1950    CHIEF COMPLAINT  Chief Complaint   Patient presents with    Difficulty Swallowing    Heartburn    gastritis    Acosta's esophagus       HISTORY OF PRESENT ILLNESS    Here today for EGD and Colon follow-up    2024 EGD and colon for dysphagia positive for erosive gastritis, reflux esophagitis with barretts, negative for stricture, HP, dysplasia. Seems to be NSAID related injury. Colon with 15/15 adenomas, recall double in 3 yrs. Reviewed path and images with patient.    GERD: BID PPI long before EGD. Reviewed small frequent meals and to take sucralfate QID. TODAY: Still having chest pain, especially when breathing heavy. Watching what he is eating and avoiding eating later or it will wake him up at night. Has to chew food better. Ran out of sucralfate and difficulty swallowing tab. Was having difficulty getting in 4 times a day.    OIC: Per LOV: Taking percocet 3-4 a day.  Sometimes takes laxative if has not had BM for 2 days. Bowels moving QOD to daily. Hard stools are regular. Has had a little bleeding with straining. Fiber helped some. Reviewed adding miralax at night. TODAY: Bowels are decent. Has a bout of constipation where things are difficult to get out. Still taking fiber capules. Stools are hard every other day.      REVIEWED PERTINENT RESULTS/ LABS  Lab Results   Component Value Date    CASEREPORT  2024     Surgical Pathology Report                         Case: [id number]                                  Authorizing Provider:  Kam Cooley        Collected:           2024 11:50 AM                                 MD Roel                                                                   Ordering Location:     [facility]   Received:            2024 12:52 PM                                 OR                                                                           Pathologist:           Bishop  Sheryl OLIVO MD                                                          Specimens:   1) - Gastric, Antrum, Biopsies                                                                      2) - Esophagus, Distal, Biopsies                                                                    3) - Large Intestine, Transverse Colon, transverse polyp x3                                         4) - Large Intestine, Cecum, cecal polyp x2                                                         5) - Large Intestine, Right / Ascending Colon, ascending polyp x5                                   6) - Large Intestine, Left / Descending Colon, splenic flexure polyp x2                             7) - Large Intestine, Sigmoid Colon, sigmoid polyp x3                                      FINALDX  04/29/2024     1. Stomach, antrum, biopsy:   A. Acute erosive gastritis.   B. H. pylori and CMV immunostains are pending with those results to follow as an addendum.    2. Esophagus, distal, biopsy:   A. Squamocolumnar mucosa with intestinal metaplasia, consistent with Acosta's esophagus.   B. Negative for dysplasia.     3. Transverse colon, polyps, polypectomies:   A. Tubular adenomas.    4. Cecum, polyps, polypectomies:   A. Tubular adenomas.    5. Ascending colon, polyps, polypectomies:   A. Tubular adenomas.    6. Colon, splenic flexure polyps, polypectomies:   A. Tubular adenomas.    7. Sigmoid colon, polyps, polypectomies:   A. Tubulovillous adenoma and tubular adenomas.       Lab Results   Component Value Date    HGB 10.7 (L) 05/10/2024    MCV 96.9 05/10/2024     05/10/2024    ALT 11 12/08/2023    AST 14 12/08/2023    HGBA1C 7.30 (H) 04/18/2024    INR 1.1 02/12/2021    TRIG 155 (H) 11/20/2023    FERRITIN 38.00 05/10/2024    IRON 77 05/10/2024    TIBC 346 05/10/2024      Imaging Scanned Result    Result Date: 5/14/2024  Narrative: This result has an attachment that is not available. Ordered by an unspecified provider.     REVIEW OF  SYSTEMS  Review of Systems   Constitutional:  Negative for activity change, chills, fever and unexpected weight change.   HENT:  Positive for trouble swallowing. Negative for congestion.    Eyes:  Negative for visual disturbance.   Respiratory:  Positive for choking. Negative for shortness of breath.    Cardiovascular:  Negative for chest pain and palpitations.   Gastrointestinal:  Positive for constipation. Negative for abdominal pain and blood in stool.   Endocrine: Negative for cold intolerance and heat intolerance.   Genitourinary:  Negative for hematuria.   Musculoskeletal:  Negative for gait problem.   Skin:  Negative for color change.   Allergic/Immunologic: Negative for immunocompromised state.   Neurological:  Negative for weakness and light-headedness.   Hematological:  Negative for adenopathy.   Psychiatric/Behavioral:  Negative for sleep disturbance. The patient is not nervous/anxious.          ACTIVE PROBLEMS  Patient Active Problem List    Diagnosis     Encounter for screening for malignant neoplasm of colon [Z12.11]     Esophageal dysphagia [R13.19]     Stress-induced cardiomyopathy [I51.81]     Anemia, posthemorrhagic, acute [D62]     Vitamin D insufficiency [E55.9]     Anemia, posthemorrhagic, acute [D62]     Hyponatremia [E87.1]     CAD (coronary artery disease) [I25.10]     Type 2 diabetes mellitus with proteinuria [E11.29, R80.9]     Hyponatremia [E87.1]     Hyperglycemia [R73.9]     Infection of prosthetic knee joint [T84.59XA, Z96.659]     Recent MSSA (methicillin susceptible Staphylococcus aureus) septicemia [A41.01]     Hypertension [I10]     Hyperlipidemia [E78.5]     Chronic pain [G89.29]     Knee pain, hx of previous prosthetic joint infection [M25.569]     Great toe pain, with cellulitis and gangrene [M79.676]     Diabetic ulcer of 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, with necrosis of bone [E11.621, L97.524]     Diabetes type 2 with atherosclerosis of arteries of  extremities [E11.51, I70.209]     Contusion of knee [S80.00XA]     History of knee surgery [Z98.890]          PAST MEDICAL HISTORY  Past Medical History:   Diagnosis Date    Arthritis     Chronic pain     BILATERAL KNEES AND BACK    Contusion of knee     Coronary artery disease     Depression     Diabetes mellitus     type 2 with atherosclerosis of arteries of extremities    Diabetic ulcer of toe     left foot associated with type 2 diabetes mellitus, with necrosis of bone    GERD (gastroesophageal reflux disease)     Great toe pain     with cellulitis and gangrene    History of kidney stones     Hyperlipidemia     Hypertension     Hyponatremia     Kidney stone     Knee pain     hx of previous prosthetic joint infection    Liver disease     liver mass    MSSA (methicillin susceptible Staphylococcus aureus) infection     RIGHT KNEE    NSTEMI (non-ST elevated myocardial infarction) 06/14/2017    Sleep apnea     DOES NOT USE CPAP         SURGICAL HISTORY  Past Surgical History:   Procedure Laterality Date    APPENDECTOMY      CARDIAC CATHETERIZATION N/A 6/14/2017    Procedure: Left Heart Cath;  Surgeon: Tiburcio Moreno MD;  Location:  [facility];  Service:     COLONOSCOPY      COLONOSCOPY W/ POLYPECTOMY N/A 4/29/2024    Procedure: COLONOSCOPY WITH POLYPECTOMY;  Surgeon: Kam Cooley MD;  Location: [facility];  Service: Gastroenterology;  Laterality: N/A;  diverticulosis; transverse polyp x3 (forecep); cecal polyp x2 (forceps); ascending polyp x3 (forceps); ascending polyp x2 (cold snare); splenic flexure polyp x2 (hot snare); sigmoid polyp x1 (cold snare); sigmoid polyp x1 (hot snare); sigmoid x1 (fo    ENDOSCOPY N/A 4/29/2024    Procedure: ESOPHAGOGASTRODUODENOSCOPY WITH BIOPSY;  Surgeon: Kam Cooley MD;  Location: [facility];  Service: Gastroenterology;  Laterality: N/A;  Reflux esophagitis; Erosive gastritis; Dilatation- no stricture; Biopsies- gastric, distal  esophagus    JOINT REPLACEMENT      BILATERAL KNEES     LUMBAR FUSION      IL REVJ TOTAL KNEE ARTHRP W/WO ALGRFT 1 COMPONENT Right 9/5/2017    Procedure: REMOVAL OF ANTIBIOTIC CEMENT SPACER; RIGHT TOTAL KNEE ARTHROPLASTY REVISION;  Surgeon: Kiko Marie MD;  Location: [facility];  Service: Orthopedics    ROTATOR CUFF REPAIR Right     TOTAL KNEE  PROSTHESIS REMOVAL W/ SPACER INSERTION Right 2/14/2017    Procedure: INCISION AND DRAINAGE RIGHT KNEE, POLY CHANGE;  Surgeon: Kiko Marie MD;  Location: [facility] OR;  Service:     TOTAL KNEE  PROSTHESIS REMOVAL W/ SPACER INSERTION Right 7/7/2017    Procedure: REMOVAL RIGHT TOTAL KNEE ARTHROPLASTY AND ANITBIOTIC SPACER;  Surgeon: Kiko Marie MD;  Location: [facility];  Service:     TRANS METATARSAL AMPUTATION Left 2/14/2017    Procedure: EXCISIONAL DEBRIDEMENT LT. FIRST TOE DIABETIC ULCER, DRAINAGE ABSCESS FIRST TOE, FIRST TOE AMPUTATION;  Surgeon: Osmin Brand MD;  Location: [facility];  Service:          FAMILY HISTORY  Family History   Problem Relation Age of Onset    Heart disease Mother     Heart disease Father     Diabetes Sister     Dementia Maternal Grandmother     No Known Problems Maternal Grandfather     Heart disease Paternal Grandmother     Heart attack Paternal Grandfather     Heart disease Paternal Grandfather     Heart disease Son     Heart attack Son     No Known Problems Sister     Malig Hyperthermia Neg Hx          SOCIAL HISTORY  Social History     Occupational History    Not on file   Tobacco Use    Smoking status: Former     Current packs/day: 3.00     Average packs/day: 3.0 packs/day for 20.0 years (60.0 ttl pk-yrs)     Types: Cigarettes     Passive exposure: Never    Smokeless tobacco: Former    Tobacco comments:     quit 35 years ago. 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    Vaping Use    Vaping status: Never Used   Substance and Sexual Activity    Alcohol use: No    Drug use: No    Sexual activity: Defer          CURRENT MEDICATIONS    Current Outpatient Medications:     atorvastatin (LIPITOR) 80 MG tablet, Take 1 tablet by mouth Daily., Disp: , Rfl:     Continuous Blood Gluc Sensor (FreeStyle Martha 2 Sensor) misc, Use 1 each Every 14 (Fourteen) Days., Disp: 6 each, Rfl: 0    doxycycline (VIBRAMYCIN) 100 MG capsule, Take 1 capsule by mouth 2 (Two) Times a Day., Disp: , Rfl:     HumaLOG KwikPen 100 UNIT/ML solution pen-injector, Inject 16 Units under the skin into the appropriate area as directed Daily With Breakfast AND 16 Units Daily Before Lunch AND 20 Units Daily With Dinner. Adds sliding scale based on BS reading. Max daily dose 75u daily  Indications: Type 2 Diabetes, Disp: 90 mL, Rfl: 2    Insulin Glargine (Lantus SoloStar) 100 UNIT/ML injection pen, INJECT UP TO 50 UNITS UNDER THE SKIN INTO THE APPROPRIATE AREA AS DIRECTED IN THE MORNING AND 50 UNITS IN THE EVENING FOR TYPE 2 DIABETES, Disp: 90 mL, Rfl: 0    Insulin Pen Needle (BD Pen Needle Mireille U/F) 32G X 4 MM misc, TO INJECT 4 TIMES DAILY, Disp: 100 each, Rfl: 3    isosorbide mononitrate (IMDUR) 30 MG 24 hr tablet, isosorbide mononitrate ER 30 mg tablet,extended release 24 hr, Disp: , Rfl:     lisinopril (PRINIVIL,ZESTRIL) 5 MG tablet, Take 1 tablet by mouth Daily., Disp: , Rfl:     metoprolol tartrate (LOPRESSOR) 25 MG tablet, Take 1 tablet by mouth Every 12 (Twelve) Hours., Disp: 60 tablet, Rfl: 11    Multiple Vitamin (MULTI VITAMIN DAILY PO), Take 1 tablet by mouth Daily. HOLD PRIOR TO SURGERY, Disp: , Rfl:     oxyCODONE-acetaminophen (PERCOCET)  MG per tablet, Take 1 tablet by mouth Every 6 (Six) Hours As Needed., Disp: , Rfl:     pregabalin (LYRICA) 150 MG capsule, Take 1 capsule by mouth., Disp: , Rfl:     sertraline (ZOLOFT) 100 MG tablet, Take 1 tablet by mouth Daily., Disp: , Rfl:     sucralfate (Carafate) 1 g tablet, Take 1 tablet by mouth 4 (Four) Times a Day., Disp: 120 tablet, Rfl: 3    torsemide (DEMADEX) 20 MG tablet, Take 1 tablet by  "mouth Daily., Disp: , Rfl:     Turmeric 500 MG capsule, Take 1 capsule by mouth Daily., Disp: , Rfl:     Vonoprazan Fumarate (Voquezna) 20 MG tablet, Take 1 tablet by mouth Daily., Disp: 30 tablet, Rfl: 1    Psyllium (DAILY FIBER PO), Take 1 tablet by mouth Daily. (Patient not taking: Reported on 6/10/2024), Disp: , Rfl:     ALLERGIES  Vancomycin    VITALS  Vitals:    06/10/24 1351   BP: 110/68   BP Location: Left arm   Patient Position: Sitting   Cuff Size: Large Adult   Weight: 129 kg (285 lb 3.2 oz)   Height: 188 cm (74\")       PHYSICAL EXAM  Debilities/Disabilities Identified: None  Emotional Behavior: Appropriate  Wt Readings from Last 3 Encounters:   06/10/24 129 kg (285 lb 3.2 oz)   04/29/24 128 kg (282 lb)   04/25/24 132 kg (290 lb 9.6 oz)     Ht Readings from Last 1 Encounters:   06/10/24 188 cm (74\")     Body mass index is 36.62 kg/m².  Physical Exam  Constitutional:       General: He is not in acute distress.     Appearance: Normal appearance. He is not ill-appearing.   HENT:      Head: Normocephalic and atraumatic.      Mouth/Throat:      Mouth: Mucous membranes are moist.      Pharynx: No posterior oropharyngeal erythema.   Eyes:      General: No scleral icterus.  Cardiovascular:      Rate and Rhythm: Normal rate and regular rhythm.      Heart sounds: Normal heart sounds.   Pulmonary:      Effort: Pulmonary effort is normal.      Breath sounds: Normal breath sounds.   Abdominal:      General: Abdomen is flat. Bowel sounds are normal. There is no distension.      Palpations: Abdomen is soft. There is no mass.      Tenderness: There is abdominal tenderness in the periumbilical area and left upper quadrant. There is no guarding or rebound. Negative signs include Cardona's sign.      Hernia: No hernia is present.   Musculoskeletal:      Cervical back: Neck supple.   Skin:     General: Skin is warm.      Capillary Refill: Capillary refill takes less than 2 seconds.   Neurological:      General: No focal " deficit present.      Mental Status: He is alert and oriented to person, place, and time.   Psychiatric:         Mood and Affect: Mood normal.         Behavior: Behavior normal.         Thought Content: Thought content normal.         Judgment: Judgment normal.       CLINICAL DATA REVIEWED   reviewed previous lab results and integrated with today's visit, reviewed notes from other physicians and/or last GI encounter, reviewed previous endoscopy results and available photos, reviewed surgical pathology results from previous biopsies      ASSESSMENT  Diagnoses and all orders for this visit:    Acosta's esophagus without dysplasia    Non-cardiac chest pain  -     sucralfate (Carafate) 1 g tablet; Take 1 tablet by mouth 4 (Four) Times a Day.    Erosive gastritis    Therapeutic opioid-induced constipation (OIC)    Other orders  -     doxycycline (VIBRAMYCIN) 100 MG capsule; Take 1 capsule by mouth 2 (Two) Times a Day.  -     Discontinue: Vonoprazan Fumarate (Voquezna) 20 MG tablet; Take 1 tablet by mouth Daily.  -     Discontinue: Vonoprazan Fumarate (Voquezna) 20 MG tablet; Take 1 tablet by mouth Daily.  -     Vonoprazan Fumarate (Voquezna) 20 MG tablet; Take 1 tablet by mouth Daily.          PLAN    Stop PPI, start voquezna, resume sucralfate to coat stomach  Miralax daily, if no improvement would opt for movantik or equivalent  Suspect med induced GP contributing to erosive gastritis, reviewed small frequent meals and avoiding eating close to bed    Return in about 3 months (around 9/10/2024).    I have discussed the above plan with the patient.  They verbalize understanding and are in agreement with the plan.  They have been advised to contact the office for any questions, concerns, or changes related to their health.

## 2024-06-11 ENCOUNTER — APPOINTMENT (OUTPATIENT)
Dept: WOUND CARE | Facility: HOSPITAL | Age: 74
End: 2024-06-11
Payer: MEDICARE

## 2024-06-11 ENCOUNTER — TELEPHONE (OUTPATIENT)
Dept: GASTROENTEROLOGY | Facility: CLINIC | Age: 74
End: 2024-06-11
Payer: MEDICARE

## 2024-06-11 PROCEDURE — G0463 HOSPITAL OUTPT CLINIC VISIT: HCPCS

## 2024-06-11 NOTE — TELEPHONE ENCOUNTER
Approvedtoday  CaseId:[id number];Status:Approved;Review Type:Prior Auth;Coverage Start Date:05/12/2024;Coverage End Date:06/11/2025;

## 2024-06-11 NOTE — TELEPHONE ENCOUNTER
PA sent through [facility] for voquezna  PMH:   Carafate  Omeprazole  Pantoprazole

## 2024-06-18 ENCOUNTER — TRANSCRIBE ORDERS (OUTPATIENT)
Dept: ULTRASOUND IMAGING | Facility: HOSPITAL | Age: 74
End: 2024-06-18
Payer: MEDICARE

## 2024-06-18 ENCOUNTER — APPOINTMENT (OUTPATIENT)
Dept: WOUND CARE | Facility: HOSPITAL | Age: 74
End: 2024-06-18
Payer: MEDICARE

## 2024-06-18 DIAGNOSIS — I73.9 PERIPHERAL VASCULAR DISEASE, UNSPECIFIED: Primary | ICD-10-CM

## 2024-06-27 ENCOUNTER — HOSPITAL ENCOUNTER (OUTPATIENT)
Dept: ULTRASOUND IMAGING | Facility: HOSPITAL | Age: 74
Discharge: HOME | End: 2024-06-27
Admitting: NURSE PRACTITIONER
Payer: MEDICARE

## 2024-06-27 DIAGNOSIS — I73.9 PERIPHERAL VASCULAR DISEASE, UNSPECIFIED: ICD-10-CM

## 2024-06-27 PROCEDURE — 93923 UPR/LXTR ART STDY 3+ LVLS: CPT

## 2024-07-02 ENCOUNTER — APPOINTMENT (OUTPATIENT)
Dept: WOUND CARE | Facility: HOSPITAL | Age: 74
End: 2024-07-02
Payer: MEDICARE

## 2024-07-03 ENCOUNTER — TRANSCRIBE ORDERS (OUTPATIENT)
Age: 74
End: 2024-07-03
Payer: MEDICARE

## 2024-07-03 DIAGNOSIS — S91.109D OPEN TOE WOUND, SUBSEQUENT ENCOUNTER: Primary | ICD-10-CM

## 2024-07-03 DIAGNOSIS — E11.40 TYPE 2 DIABETES MELLITUS WITH DIABETIC NEUROPATHY, UNSPECIFIED WHETHER LONG TERM INSULIN USE: ICD-10-CM

## 2024-07-09 ENCOUNTER — APPOINTMENT (OUTPATIENT)
Dept: WOUND CARE | Facility: HOSPITAL | Age: 74
End: 2024-07-09
Payer: MEDICARE

## 2024-07-12 NOTE — PROGRESS NOTES
Chief Complaint  Lower Extremity Issue (DIABETIC FOOT ULCERS)    Subjective        Hugh R McBurney presents to [facility]ULAR [facility]ERY  History of Present Illness   the patient is an 84-year-old gentleman with type 2 diabetes, hypertension, past smoker, history of hepatocellular carcinoma, chronic kidney disease stage IV, who developed a blister on the left second toe from friction from his foot wear.  He was evaluated by his primary care physician and felt this area was infected.  He was treated with oral antibiotics.  He then developed a traumatic wound to the left shin while baling hay.  He was treated for this in the wound care center.  He had noninvasive arterial testing performed on June 27, 2024 and had noncompressible vessels at the ankle but biphasic waveforms.  Toe pressures were 114 on the right great toe and 206 of the left great toe.  This study was personally and independently reviewed by me.  Last hemoglobin A1c on April 18, 2024 was 7.3.  BUN and creatinine were 34 and 3.08 on May 10, 2024.    Past History:  Medical History: has a past medical history of Arthritis, Chronic pain, Contusion of knee, Coronary artery disease, Depression, Diabetes mellitus, Diabetic ulcer of toe, GERD (gastroesophageal reflux disease), Great toe pain, History of kidney stones, Hyperlipidemia, Hypertension, Hyponatremia, Kidney stone, Knee pain, Liver disease, MSSA (methicillin susceptible Staphylococcus aureus) infection, NSTEMI (non-ST elevated myocardial infarction) (06/14/2017), and Sleep apnea.   Surgical History: has a past surgical history that includes Appendectomy; Colonoscopy; Joint replacement; Foot amputation through metatarsal (Left, 2/14/2017); Total knee  prosthesis removal w/ spacer insertion (Right, 2/14/2017); Cardiac catheterization (N/A, 6/14/2017); Lumbar fusion; Total knee  prosthesis removal w/ spacer insertion (Right, 7/7/2017); pr revj total knee arthrp w/wo algrft 1 component  "(Right, 9/5/2017); Rotator cuff repair (Right); Esophagogastroduodenoscopy (N/A, 4/29/2024); and Colonoscopy w/ polypectomy (N/A, 4/29/2024).   Family History: family history includes Dementia in his maternal grandmother; Diabetes in his sister; Heart attack in his paternal grandfather and son; Heart disease in his father, mother, paternal grandfather, paternal grandmother, and son; No Known Problems in his maternal grandfather and sister.   Social History: reports that he has quit smoking. His smoking use included cigarettes. He has a 60 pack-year smoking history. He has never been exposed to tobacco smoke. He has quit using smokeless tobacco. He reports that he does not drink alcohol and does not use drugs.    (Not in a hospital admission)     Allergies: Vancomycin   Objective   Vital Signs:  /85   Pulse 62   Ht 188 cm (74\")   Wt 129 kg (285 lb)   BMI 36.59 kg/m²   Estimated body mass index is 36.59 kg/m² as calculated from the following:    Height as of this encounter: 188 cm (74\").    Weight as of this encounter: 129 kg (285 lb).         Hugh R McBurney  reports that he has quit smoking. His smoking use included cigarettes. He has a 60 pack-year smoking history. He has never been exposed to tobacco smoke. He has quit using smokeless tobacco.          Physical Exam palpable posterior tibial pulses bilaterally with triphasic Doppler signal in the posterior tibial arteries.  Biphasic signals in the dorsalis pedis arteries.  Charcot foot deformity on the right.  Very small wound on the distal aspect of the left second toe with good granulation tissue.  No exposed bone no signs of infection.  Small denuded area over the pretibial region on the left, good granulation tissue.  Both of these are about 7 mm x 4 mm in size.  Result Review :    The following data was reviewed by: Matteo Canchola Jr., MD on 07/17/2024: Noninvasive arterial testing from June 27, 2024 as well as hemoglobin A1c and BUN and " creatinine from April and May 2024, findings as above.             Assessment and Plan     Diagnoses and all orders for this visit:    1. Diabetic ulcer of toe of left foot associated with diabetes mellitus due to underlying condition, with fat layer exposed (Primary)    2. 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    3. Coronary artery disease involving native coronary artery of native heart without angina pectoris    4. Hepatocellular carcinoma in adult    The patient has satisfactory perfusion of both feet with digital pressures being normal.  His wounds are healing very nicely.  I do not believe any additional medication or intervention is necessary just continuation of local wound care.  This was discussed in detail with him and he is in agreement with the plan.  He agrees to be seen again and about 6 weeks to reevaluate.  If there is no improvement at that time may consider Pletal 50 mg twice a day.         Follow Up     Return in about 6 weeks (around 8/28/2024).  Patient was given instructions and counseling regarding his condition or for health maintenance advice. Please see specific information pulled into the AVS if appropriate.

## 2024-07-16 ENCOUNTER — APPOINTMENT (OUTPATIENT)
Dept: WOUND CARE | Facility: HOSPITAL | Age: 74
End: 2024-07-16
Payer: MEDICARE

## 2024-07-17 ENCOUNTER — OFFICE VISIT (OUTPATIENT)
Age: 74
End: 2024-07-17
Payer: MEDICARE

## 2024-07-17 VITALS
DIASTOLIC BLOOD PRESSURE: 85 MMHG | WEIGHT: 285 LBS | SYSTOLIC BLOOD PRESSURE: 154 MMHG | BODY MASS INDEX: 36.57 KG/M2 | HEART RATE: 62 BPM | HEIGHT: 74 IN

## 2024-07-17 DIAGNOSIS — L97.522 DIABETIC ULCER OF TOE OF LEFT FOOT ASSOCIATED WITH DIABETES MELLITUS DUE TO UNDERLYING CONDITION, WITH FAT LAYER EXPOSED: Primary | ICD-10-CM

## 2024-07-17 DIAGNOSIS — E08.621 DIABETIC ULCER OF TOE OF LEFT FOOT ASSOCIATED WITH DIABETES MELLITUS DUE TO UNDERLYING CONDITION, WITH FAT LAYER EXPOSED: Primary | ICD-10-CM

## 2024-07-17 DIAGNOSIS — E11.51 DIABETES TYPE 2 WITH ATHEROSCLEROSIS OF ARTERIES OF EXTREMITIES: Chronic | ICD-10-CM

## 2024-07-17 DIAGNOSIS — I70.209 DIABETES TYPE 2 WITH ATHEROSCLEROSIS OF ARTERIES OF EXTREMITIES: Chronic | ICD-10-CM

## 2024-07-17 DIAGNOSIS — I25.10 CORONARY ARTERY DISEASE INVOLVING NATIVE CORONARY ARTERY OF NATIVE HEART WITHOUT ANGINA PECTORIS: ICD-10-CM

## 2024-07-17 DIAGNOSIS — C22.0 HEPATOCELLULAR CARCINOMA IN ADULT: ICD-10-CM

## 2024-07-17 RX ORDER — LISINOPRIL 10 MG/1
1 TABLET ORAL DAILY
COMMUNITY

## 2024-07-23 ENCOUNTER — APPOINTMENT (OUTPATIENT)
Dept: WOUND CARE | Facility: HOSPITAL | Age: 74
End: 2024-07-23
Payer: MEDICARE

## 2024-08-05 ENCOUNTER — TELEPHONE (OUTPATIENT)
Dept: CARDIOLOGY | Facility: CLINIC | Age: 74
End: 2024-08-05
Payer: MEDICARE

## 2024-08-05 NOTE — TELEPHONE ENCOUNTER
Pt called the office because he's been experiencing CP at night after a day of exerting himself.  This has been going on for some time, as he was worked up by GI with an EGD.  Chart review shows pt has erosive gastritis due to probably medication induced gastroparesis.  Pt is concerned that it might be related to his heart since he's been treated for the gastritis and this isn't improving.    I added him on for an appt this Wednesday with Elayne Cristobal.    Thank you,    Lizeth SOLOMON RN  Triage [facility]  08/05/24 15:26 EDT

## 2024-08-06 ENCOUNTER — LAB REQUISITION (OUTPATIENT)
Dept: LAB | Facility: HOSPITAL | Age: 74
End: 2024-08-06
Payer: MEDICARE

## 2024-08-06 ENCOUNTER — APPOINTMENT (OUTPATIENT)
Dept: WOUND CARE | Facility: HOSPITAL | Age: 74
End: 2024-08-06
Payer: MEDICARE

## 2024-08-06 DIAGNOSIS — L97.522 NON-PRESSURE CHRONIC ULCER OF OTHER PART OF LEFT FOOT WITH FAT LAYER EXPOSED: ICD-10-CM

## 2024-08-06 PROCEDURE — 87205 SMEAR GRAM STAIN: CPT | Performed by: NURSE PRACTITIONER

## 2024-08-06 PROCEDURE — 87077 CULTURE AEROBIC IDENTIFY: CPT | Performed by: NURSE PRACTITIONER

## 2024-08-06 PROCEDURE — 87070 CULTURE OTHR SPECIMN AEROBIC: CPT | Performed by: NURSE PRACTITIONER

## 2024-08-06 PROCEDURE — 87186 SC STD MICRODIL/AGAR DIL: CPT | Performed by: NURSE PRACTITIONER

## 2024-08-06 NOTE — H&P (VIEW-ONLY)
Date of Office Visit: 2024  Encounter Provider: ABBY Shahid  Place of Service: [facility] CARDIOLOGY  Patient Name: Hugh R McBurney  :1950    Chief complaint  Chest pain    History of Present Illness  Patient is a 73 y.o. year old male  patient of Dr. Moreno. Past medical history includes hypertension, hyperlipidemia, diabetes, GERD, sleep apnea not on CPAP, right TKA with joint infection and coronary disease.In 2017 he was seen because he had a  Vancomyocin reaction with chest pain, n/v and diaphoresis and a non-ST elevation myocardial infarction when here for knee surgery. A cardiac cath was done which showed a normal left main, 20% proximal LAD, 50% mid LAD, 90% proximal first diagonal, diffuse 50% mid circumflex, and a  70-80% proximal RPL 2 stenosis. An echo showed an EF of 42%, possibly from stress induced cardiomyopathy. He was sent home on an ace, beta blocker and nitrate to wait for 2-4 weeks before attempting surgery again. At that time he had a repeat transthoracic echocardiogram showing his ejection fraction and wall motion had normalized.    Interval history  Patient presents today for follow-up of chest pain that occurs in the evening after he has been active all day.  He has been worked up from a GI standpoint with a EGD that showed erosive gastritis from possible medication induced gastroparesis. He has been treated with sucralfate and PPI was recently changed to Voquenza.  He was also given doxycycline twice daily as well.  He continues to have significant constipation and is on MiraLAX daily however there has been discussion of starting Movantik.  His pain does not occur when he is active and it has been noted by GI that he had been out of Carafate for several days and was also having difficulty taking this 4 times per day.  Today he states he has only been taking Carafate about twice per day and has not started MiraLAX as directed.  He  currently denies palpitations, edema, dizziness, fatigue, syncope or presyncope.  Blood pressure today is controlled on current regimen of lisinopril 10 mg daily, Lopressor 25 mg twice daily, and isosorbide 30 mg daily.  He is also on torsemide 60 mg daily.  He does have chronic kidney disease and proteinuria and follows with nephrology.  It has been recommended that he undergo a renal biopsy but he has deferred this thus far.    Past Medical History:   Diagnosis Date    Arthritis     Chronic pain     BILATERAL KNEES AND BACK    Contusion of knee     Coronary artery disease     Depression     Diabetes mellitus     type 2 with atherosclerosis of arteries of extremities    Diabetic ulcer of toe     left foot associated with type 2 diabetes mellitus, with necrosis of bone    GERD (gastroesophageal reflux disease)     Great toe pain     with cellulitis and gangrene    History of kidney stones     Hyperlipidemia     Hypertension     Hyponatremia     Kidney stone     Knee pain     hx of previous prosthetic joint infection    Liver disease     liver mass    MSSA (methicillin susceptible Staphylococcus aureus) infection     RIGHT KNEE    NSTEMI (non-ST elevated myocardial infarction) 06/14/2017    Sleep apnea     DOES NOT USE CPAP     Past Surgical History:   Procedure Laterality Date    APPENDECTOMY      CARDIAC CATHETERIZATION N/A 6/14/2017    Procedure: Left Heart Cath;  Surgeon: Tiburcio Moreno MD;  Location: [facility] CATH INVASIVE LOCATION;  Service:     COLONOSCOPY      COLONOSCOPY W/ POLYPECTOMY N/A 4/29/2024    Procedure: COLONOSCOPY WITH POLYPECTOMY;  Surgeon: Kam Cooley MD;  Location: [facility] OR;  Service: Gastroenterology;  Laterality: N/A;  diverticulosis; transverse polyp x3 (forecep); cecal polyp x2 (forceps); ascending polyp x3 (forceps); ascending polyp x2 (cold snare); splenic flexure polyp x2 (hot snare); sigmoid polyp x1 (cold snare); sigmoid polyp x1 (hot snare); sigmoid x1 (fo     ENDOSCOPY N/A 4/29/2024    Procedure: ESOPHAGOGASTRODUODENOSCOPY WITH BIOPSY;  Surgeon: Kam Cooley MD;  Location: [facility];  Service: Gastroenterology;  Laterality: N/A;  Reflux esophagitis; Erosive gastritis; Dilatation- no stricture; Biopsies- gastric, distal esophagus    JOINT REPLACEMENT      BILATERAL KNEES     LUMBAR FUSION      DE REVJ TOTAL KNEE ARTHRP W/WO ALGRFT 1 COMPONENT Right 9/5/2017    Procedure: REMOVAL OF ANTIBIOTIC CEMENT SPACER; RIGHT TOTAL KNEE ARTHROPLASTY REVISION;  Surgeon: Kiko Marie MD;  Location: [facility];  Service: Orthopedics    ROTATOR CUFF REPAIR Right     TOTAL KNEE  PROSTHESIS REMOVAL W/ SPACER INSERTION Right 2/14/2017    Procedure: INCISION AND DRAINAGE RIGHT KNEE, POLY CHANGE;  Surgeon: Kiko Marie MD;  Location: [facility];  Service:     TOTAL KNEE  PROSTHESIS REMOVAL W/ SPACER INSERTION Right 7/7/2017    Procedure: REMOVAL RIGHT TOTAL KNEE ARTHROPLASTY AND ANITBIOTIC SPACER;  Surgeon: Kiko Marie MD;  Location: [facility];  Service:     TRANS METATARSAL AMPUTATION Left 2/14/2017    Procedure: EXCISIONAL DEBRIDEMENT LT. FIRST TOE DIABETIC ULCER, DRAINAGE ABSCESS FIRST TOE, FIRST TOE AMPUTATION;  Surgeon: Osmin rBand MD;  Location: [facility];  Service:      Outpatient Medications Prior to Visit   Medication Sig Dispense Refill    atorvastatin (LIPITOR) 80 MG tablet Take 1 tablet by mouth Daily.      Continuous Blood Gluc Sensor (FreeStyle Martha 2 Sensor) misc Use 1 each Every 14 (Fourteen) Days. 6 each 0    doxycycline (VIBRAMYCIN) 100 MG capsule Take 1 capsule by mouth 2 (Two) Times a Day.      HumaLOG KwikPen 100 UNIT/ML solution pen-injector Inject 16 Units under the skin into the appropriate area as directed Daily With Breakfast AND 16 Units Daily Before Lunch AND 20 Units Daily With Dinner. Adds sliding scale based on BS reading. Max daily dose 75u daily  Indications: Type 2 Diabetes 90 mL 2     Insulin Glargine (Lantus SoloStar) 100 UNIT/ML injection pen INJECT UP TO 50 UNITS UNDER THE SKIN INTO THE APPROPRIATE AREA AS DIRECTED IN THE MORNING AND 50 UNITS IN THE EVENING FOR TYPE 2 DIABETES 90 mL 0    Insulin Pen Needle (BD Pen Needle Mireille U/F) 32G X 4 MM misc TO INJECT 4 TIMES DAILY 100 each 3    isosorbide mononitrate (IMDUR) 30 MG 24 hr tablet isosorbide mononitrate ER 30 mg tablet,extended release 24 hr      lisinopril (PRINIVIL,ZESTRIL) 10 MG tablet Take 1 tablet by mouth Daily.      metoprolol tartrate (LOPRESSOR) 25 MG tablet Take 1 tablet by mouth Every 12 (Twelve) Hours. 60 tablet 11    Multiple Vitamin (MULTI VITAMIN DAILY PO) Take 1 tablet by mouth Daily. HOLD PRIOR TO SURGERY      oxyCODONE-acetaminophen (PERCOCET)  MG per tablet Take 1 tablet by mouth Every 6 (Six) Hours As Needed.      pregabalin (LYRICA) 150 MG capsule Take 1 capsule by mouth.      Psyllium (DAILY FIBER PO) Take 1 tablet by mouth Daily.      sertraline (ZOLOFT) 100 MG tablet Take 1 tablet by mouth Daily.      sucralfate (Carafate) 1 g tablet Take 1 tablet by mouth 4 (Four) Times a Day. 120 tablet 3    Torsemide 60 MG tablet Take 1 tablet by mouth Daily.      Turmeric 500 MG capsule Take 1 capsule by mouth Daily.      Vonoprazan Fumarate (Voquezna) 20 MG tablet Take 1 tablet by mouth Daily. 30 tablet 1     No facility-administered medications prior to visit.       Allergies as of 08/07/2024 - Reviewed 08/07/2024   Allergen Reaction Noted    Vancomycin Other (See Comments) 06/13/2017     Social History     Socioeconomic History    Marital status:    Tobacco Use    Smoking status: Former     Current packs/day: 3.00     Average packs/day: 3.0 packs/day for 20.0 years (60.0 ttl pk-yrs)     Types: Cigarettes     Passive exposure: Never    Smokeless tobacco: Former    Tobacco comments:     quit 35 years ago. 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    Vaping Use    Vaping status: Never Used   Substance and Sexual Activity    Alcohol  "use: No    Drug use: No    Sexual activity: Defer     Family History   Problem Relation Age of Onset    Heart disease Mother     Heart disease Father     Diabetes Sister     Dementia Maternal Grandmother     No Known Problems Maternal Grandfather     Heart disease Paternal Grandmother     Heart attack Paternal Grandfather     Heart disease Paternal Grandfather     Heart disease Son     Heart attack Son     No Known Problems Sister     Malig Hyperthermia Neg Hx      Review of Systems   Constitutional: Negative for malaise/fatigue.   Cardiovascular:  Negative for chest pain, claudication, dyspnea on exertion, leg swelling, near-syncope, orthopnea, palpitations, paroxysmal nocturnal dyspnea and syncope.   Respiratory:  Negative for shortness of breath.    Neurological:  Negative for brief paralysis, dizziness, headaches and light-headedness.   All other systems reviewed and are negative.       Objective:     Vitals:    08/07/24 1322   BP: 130/72   BP Location: Left arm   Patient Position: Sitting   Cuff Size: Adult   Pulse: 69   SpO2: 98%   Weight: (!) 137 kg (300 lb 15.4 oz)   Height: 188 cm (74\")     Body mass index is 38.64 kg/m².    Vitals reviewed.   Constitutional:       General: Not in acute distress.     Appearance: Well-developed and not in distress. Not diaphoretic.      Comments: Walks with a cane with unsteady gait   HENT:      Head: Normocephalic.   Pulmonary:      Effort: Pulmonary effort is normal. No respiratory distress.      Breath sounds: Normal breath sounds. No wheezing. No rhonchi. No rales.   Cardiovascular:      Normal rate. Regular rhythm.      Murmurs: There is no murmur.   Pulses:     Radial: 2+ bilaterally.  Edema:     Peripheral edema absent.   Skin:     General: Skin is warm and dry. There is no cyanosis.      Findings: No rash.   Neurological:      Mental Status: Alert and oriented to person, place, and time.   Psychiatric:         Behavior: Behavior normal. Behavior is cooperative.       " "  Thought Content: Thought content normal.         Judgment: Judgment normal.       Lab Review:     Lab Results   Component Value Date     05/10/2024     04/18/2024    K 5.0 05/10/2024    K 5.0 04/18/2024     (H) 05/10/2024     (H) 04/18/2024    CO2 21.0 (L) 05/10/2024    CO2 22.0 04/18/2024    BUN 34 (H) 05/10/2024    BUN 43 (H) 04/18/2024    CREATININE 3.08 (H) 05/10/2024    CREATININE 3.23 (H) 04/18/2024    EGFRIFNONA 41 (L) 01/25/2022    EGFRIFNONA 45 (L) 10/21/2021    EGFRIFAFRI 47 (L) 01/25/2022    EGFRIFAFRI 52 (L) 10/21/2021    GLUCOSE 122 (H) 05/10/2024    GLUCOSE 140 (H) 04/18/2024    CALCIUM 8.3 (L) 05/10/2024    CALCIUM 8.7 04/18/2024    PROTENTOTREF 6.4 12/08/2023    PROTENTOTREF 5.8 (L) 03/14/2023    ALBUMIN 3.6 05/10/2024    ALBUMIN 3.9 04/18/2024    BILITOT 0.2 12/08/2023    BILITOT 0.3 11/20/2023    AST 14 12/08/2023    AST 17 11/20/2023    ALT 11 12/08/2023    ALT 12 11/20/2023     Lab Results   Component Value Date    WBC 9.15 05/10/2024    WBC 9.28 12/01/2023    HGB 10.7 (L) 05/10/2024    HGB 11.7 (L) 12/01/2023    HCT 31.1 (L) 05/10/2024    HCT 34.5 (L) 12/01/2023    MCV 96.9 05/10/2024    MCV 96.6 12/01/2023     05/10/2024     12/01/2023     No results found for: \"PROBNP\", \"BNP\"  Lab Results   Component Value Date    CKTOTAL 50 06/13/2017    CKMB 2.08 06/13/2017    TROPONINT 0.971 (C) 06/14/2017     Lab Results   Component Value Date    TSH 1.870 12/08/2023    TSH 2.850 03/14/2023      Lipid Panel          11/20/2023    11:55   Lipid Panel   Total Cholesterol 122    Triglycerides 155    HDL Cholesterol 34    VLDL Cholesterol 27    LDL Cholesterol  61    LDL/HDL Ratio 1.68           ECG 12 Lead    Date/Time: 8/7/2024 3:03 PM  Performed by: Elayne Cristobal APRN    Authorized by: Elayne Cristobal APRN  Comparison: compared with previous ECG   Similar to previous ECG  Rhythm: sinus rhythm  Rate: normal  BPM: 69  Conduction: left bundle branch block  QRS " axis: normal  Comments: Similar to prior        Assessment:       Diagnosis Plan   1. Coronary artery disease involving native coronary artery of native heart without angina pectoris  Adult Transthoracic Echo Complete w/ Color, Spectral and Contrast if Necessary Per Protocol    Stress Test With Myocardial Perfusion One Day      2. Precordial chest pain  Adult Transthoracic Echo Complete w/ Color, Spectral and Contrast if Necessary Per Protocol    Stress Test With Myocardial Perfusion One Day      3. Primary hypertension        4. Mixed hyperlipidemia        5. Stress-induced cardiomyopathy        6. 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 CKD (chronic kidney disease) stage 4, GFR 15-29 ml/min        8. Erosive gastritis          Plan:       1.  Stress-induced cardiomyopathy.  On ACE inhibitor, beta-blocker, nitrate.  There has been consideration in the past for Jardiance but this has been avoided due to history of pancreatitis.  He is not on spironolactone due to renal function.  Recent chest pain associated with dyspnea.  Will get repeat echocardiogram and Lexiscan Cardiolite stress test.  Discussed that he may need repeat cardiac cath.  He did have nonobstructive coronary disease on cardiac cath in 2021.  2.  Primary hypertension controlled on current regimen.  He has not been checking this at home.  Current device is many years old and is likely not working appropriately.  He will obtain new device and start checking blood pressures at home at least 1 hour after blood pressure medications.  3.  Mixed hyperlipidemia on high-dose statin.  4.  Coronary artery disease nonobstructive by cardiac cath in 2021 however he has new symptoms.  Of note, he is not on antiplatelet therapy.  Will assess as above  5.  Type 2 diabetes  6.  Chronic kidney disease now following with nephrology  7.  Erosive gastritis and Acosta's esophagus.  Likely due to medication induced gastroparesis.   Encouraged him to be compliant with daily MiraLAX and 4 times daily Carafate.      Time Spent: I spent 40 minutes caring for Gui on this date of service. This time includes time spent by me in the following activities: preparing for the visit, reviewing tests, performing a medically appropriate examination and/or evaluations, counseling and educating the patient/family/caregiver, ordering medications, tests, or procedures, documenting information in the medical record, and independently interpreting results and communicating that information with the patient/family/caregiver.   I spent 1 minutes on the separately reported service of ECG. This time is not included in the time used to support the E/M service also reported today.        Your medication list            Accurate as of August 7, 2024  3:09 PM. If you have any questions, ask your nurse or doctor.                CONTINUE taking these medications        Instructions Last Dose Given Next Dose Due   atorvastatin 80 MG tablet  Commonly known as: LIPITOR      Take 1 tablet by mouth Daily.       DAILY FIBER PO      Take 1 tablet by mouth Daily.       doxycycline 100 MG capsule  Commonly known as: VIBRAMYCIN      Take 1 capsule by mouth 2 (Two) Times a Day.       FreeStyle Martha 2 Sensor misc      Use 1 each Every 14 (Fourteen) Days.       HumaLOG KwikPen 100 UNIT/ML solution pen-injector  Generic drug: Insulin Lispro (1 Unit Dial)      Inject 16 Units under the skin into the appropriate area as directed Daily With Breakfast AND 16 Units Daily Before Lunch AND 20 Units Daily With Dinner. Adds sliding scale based on BS reading. Max daily dose 75u daily  Indications: Type 2 Diabetes       Insulin Pen Needle 32G X 4 MM misc  Commonly known as: BD Pen Needle Mireille U/F      TO INJECT 4 TIMES DAILY       isosorbide mononitrate 30 MG 24 hr tablet  Commonly known as: IMDUR      isosorbide mononitrate ER 30 mg tablet,extended release 24 hr       Lantus SoloStar 100 UNIT/ML  injection pen  Generic drug: Insulin Glargine      INJECT UP TO 50 UNITS UNDER THE SKIN INTO THE APPROPRIATE AREA AS DIRECTED IN THE MORNING AND 50 UNITS IN THE EVENING FOR TYPE 2 DIABETES       lisinopril 10 MG tablet  Commonly known as: PRINIVIL,ZESTRIL      Take 1 tablet by mouth Daily.       metoprolol tartrate 25 MG tablet  Commonly known as: LOPRESSOR      Take 1 tablet by mouth Every 12 (Twelve) Hours.       multivitamin tablet tablet  Commonly known as: THERAGRAN      Take 1 tablet by mouth Daily. HOLD PRIOR TO SURGERY       oxyCODONE-acetaminophen  MG per tablet  Commonly known as: PERCOCET      Take 1 tablet by mouth Every 6 (Six) Hours As Needed.       pregabalin 150 MG capsule  Commonly known as: LYRICA      Take 1 capsule by mouth.       sertraline 100 MG tablet  Commonly known as: ZOLOFT      Take 1 tablet by mouth Daily.       sucralfate 1 g tablet  Commonly known as: Carafate      Take 1 tablet by mouth 4 (Four) Times a Day.       Torsemide 60 MG tablet      Take 1 tablet by mouth Daily.       Turmeric 500 MG capsule      Take 1 capsule by mouth Daily.       Voquezna 20 MG tablet  Generic drug: Vonoprazan Fumarate      Take 1 tablet by mouth Daily.                Patient is no longer taking -.  I corrected the med list to reflect this.  I did not stop these medications.    Return in about 6 months (around 2/7/2025) for with Dr. Moreno.      Dictated utilizing Dragon dictation

## 2024-08-06 NOTE — PROGRESS NOTES
Date of Office Visit: 2024  Encounter Provider: ABBY Shahid  Place of Service: [facility] CARDIOLOGY  Patient Name: Hugh R McBurney  :1950    Chief complaint  Chest pain    History of Present Illness  Patient is a 73 y.o. year old male  patient of Dr. Alejandra. Past medical history includes hypertension, hyperlipidemia, diabetes, GERD, sleep apnea not on CPAP, right TKA with joint infection and coronary disease.In 2017 he was seen because he had a  Vancomyocin reaction with chest pain, n/v and diaphoresis and a non-ST elevation myocardial infarction when here for knee surgery. A cardiac cath was done which showed a normal left main, 20% proximal LAD, 50% mid LAD, 90% proximal first diagonal, diffuse 50% mid circumflex, and a  70-80% proximal RPL 2 stenosis. An echo showed an EF of 42%, possibly from stress induced cardiomyopathy. He was sent home on an ace, beta blocker and nitrate to wait for 2-4 weeks before attempting surgery again. At that time he had a repeat transthoracic echocardiogram showing his ejection fraction and wall motion had normalized.    Interval history  Patient presents today for follow-up of chest pain that occurs in the evening after he has been active all day.  He has been worked up from a GI standpoint with a EGD that showed erosive gastritis from possible medication induced gastroparesis. He has been treated with sucralfate and PPI was recently changed to Voquenza.  He was also given doxycycline twice daily as well.  He continues to have significant constipation and is on MiraLAX daily however there has been discussion of starting Movantik.  His pain does not occur when he is active and it has been noted by GI that he had been out of Carafate for several days and was also having difficulty taking this 4 times per day.  Today he states he has only been taking Carafate about twice per day and has not started MiraLAX as directed.  He  currently denies palpitations, edema, dizziness, fatigue, syncope or presyncope.  Blood pressure today is controlled on current regimen of lisinopril 10 mg daily, Lopressor 25 mg twice daily, and isosorbide 30 mg daily.  He is also on torsemide 60 mg daily.  He does have chronic kidney disease and proteinuria and follows with nephrology.  It has been recommended that he undergo a renal biopsy but he has deferred this thus far.    Past Medical History:   Diagnosis Date    Arthritis     Chronic pain     BILATERAL KNEES AND BACK    Contusion of knee     Coronary artery disease     Depression     Diabetes mellitus     type 2 with atherosclerosis of arteries of extremities    Diabetic ulcer of toe     left foot associated with type 2 diabetes mellitus, with necrosis of bone    GERD (gastroesophageal reflux disease)     Great toe pain     with cellulitis and gangrene    History of kidney stones     Hyperlipidemia     Hypertension     Hyponatremia     Kidney stone     Knee pain     hx of previous prosthetic joint infection    Liver disease     liver mass    MSSA (methicillin susceptible Staphylococcus aureus) infection     RIGHT KNEE    NSTEMI (non-ST elevated myocardial infarction) 06/14/2017    Sleep apnea     DOES NOT USE CPAP     Past Surgical History:   Procedure Laterality Date    APPENDECTOMY      CARDIAC CATHETERIZATION N/A 6/14/2017    Procedure: Left Heart Cath;  Surgeon: Tiburcio Moreno MD;  Location: [facility] CATH INVASIVE LOCATION;  Service:     COLONOSCOPY      COLONOSCOPY W/ POLYPECTOMY N/A 4/29/2024    Procedure: COLONOSCOPY WITH POLYPECTOMY;  Surgeon: Kam Cooley MD;  Location: [facility] OR;  Service: Gastroenterology;  Laterality: N/A;  diverticulosis; transverse polyp x3 (forecep); cecal polyp x2 (forceps); ascending polyp x3 (forceps); ascending polyp x2 (cold snare); splenic flexure polyp x2 (hot snare); sigmoid polyp x1 (cold snare); sigmoid polyp x1 (hot snare); sigmoid x1 (fo     ENDOSCOPY N/A 4/29/2024    Procedure: ESOPHAGOGASTRODUODENOSCOPY WITH BIOPSY;  Surgeon: Kam Cooley MD;  Location: [facility];  Service: Gastroenterology;  Laterality: N/A;  Reflux esophagitis; Erosive gastritis; Dilatation- no stricture; Biopsies- gastric, distal esophagus    JOINT REPLACEMENT      BILATERAL KNEES     LUMBAR FUSION      MT REVJ TOTAL KNEE ARTHRP W/WO ALGRFT 1 COMPONENT Right 9/5/2017    Procedure: REMOVAL OF ANTIBIOTIC CEMENT SPACER; RIGHT TOTAL KNEE ARTHROPLASTY REVISION;  Surgeon: Kiko Marie MD;  Location: [facility];  Service: Orthopedics    ROTATOR CUFF REPAIR Right     TOTAL KNEE  PROSTHESIS REMOVAL W/ SPACER INSERTION Right 2/14/2017    Procedure: INCISION AND DRAINAGE RIGHT KNEE, POLY CHANGE;  Surgeon: Kiko Marie MD;  Location: [facility];  Service:     TOTAL KNEE  PROSTHESIS REMOVAL W/ SPACER INSERTION Right 7/7/2017    Procedure: REMOVAL RIGHT TOTAL KNEE ARTHROPLASTY AND ANITBIOTIC SPACER;  Surgeon: Kiko Marie MD;  Location: [facility];  Service:     TRANS METATARSAL AMPUTATION Left 2/14/2017    Procedure: EXCISIONAL DEBRIDEMENT LT. FIRST TOE DIABETIC ULCER, DRAINAGE ABSCESS FIRST TOE, FIRST TOE AMPUTATION;  Surgeon: Osmin Brand MD;  Location: [facility];  Service:      Outpatient Medications Prior to Visit   Medication Sig Dispense Refill    atorvastatin (LIPITOR) 80 MG tablet Take 1 tablet by mouth Daily.      Continuous Blood Gluc Sensor (FreeStyle Martha 2 Sensor) misc Use 1 each Every 14 (Fourteen) Days. 6 each 0    doxycycline (VIBRAMYCIN) 100 MG capsule Take 1 capsule by mouth 2 (Two) Times a Day.      HumaLOG KwikPen 100 UNIT/ML solution pen-injector Inject 16 Units under the skin into the appropriate area as directed Daily With Breakfast AND 16 Units Daily Before Lunch AND 20 Units Daily With Dinner. Adds sliding scale based on BS reading. Max daily dose 75u daily  Indications: Type 2 Diabetes 90 mL 2     Insulin Glargine (Lantus SoloStar) 100 UNIT/ML injection pen INJECT UP TO 50 UNITS UNDER THE SKIN INTO THE APPROPRIATE AREA AS DIRECTED IN THE MORNING AND 50 UNITS IN THE EVENING FOR TYPE 2 DIABETES 90 mL 0    Insulin Pen Needle (BD Pen Needle Mireille U/F) 32G X 4 MM misc TO INJECT 4 TIMES DAILY 100 each 3    isosorbide mononitrate (IMDUR) 30 MG 24 hr tablet isosorbide mononitrate ER 30 mg tablet,extended release 24 hr      lisinopril (PRINIVIL,ZESTRIL) 10 MG tablet Take 1 tablet by mouth Daily.      metoprolol tartrate (LOPRESSOR) 25 MG tablet Take 1 tablet by mouth Every 12 (Twelve) Hours. 60 tablet 11    Multiple Vitamin (MULTI VITAMIN DAILY PO) Take 1 tablet by mouth Daily. HOLD PRIOR TO SURGERY      oxyCODONE-acetaminophen (PERCOCET)  MG per tablet Take 1 tablet by mouth Every 6 (Six) Hours As Needed.      pregabalin (LYRICA) 150 MG capsule Take 1 capsule by mouth.      Psyllium (DAILY FIBER PO) Take 1 tablet by mouth Daily.      sertraline (ZOLOFT) 100 MG tablet Take 1 tablet by mouth Daily.      sucralfate (Carafate) 1 g tablet Take 1 tablet by mouth 4 (Four) Times a Day. 120 tablet 3    Torsemide 60 MG tablet Take 1 tablet by mouth Daily.      Turmeric 500 MG capsule Take 1 capsule by mouth Daily.      Vonoprazan Fumarate (Voquezna) 20 MG tablet Take 1 tablet by mouth Daily. 30 tablet 1     No facility-administered medications prior to visit.       Allergies as of 08/07/2024 - Reviewed 08/07/2024   Allergen Reaction Noted    Vancomycin Other (See Comments) 06/13/2017     Social History     Socioeconomic History    Marital status:    Tobacco Use    Smoking status: Former     Current packs/day: 3.00     Average packs/day: 3.0 packs/day for 20.0 years (60.0 ttl pk-yrs)     Types: Cigarettes     Passive exposure: Never    Smokeless tobacco: Former    Tobacco comments:     quit 35 years ago. 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    Vaping Use    Vaping status: Never Used   Substance and Sexual Activity    Alcohol  "use: No    Drug use: No    Sexual activity: Defer     Family History   Problem Relation Age of Onset    Heart disease Mother     Heart disease Father     Diabetes Sister     Dementia Maternal Grandmother     No Known Problems Maternal Grandfather     Heart disease Paternal Grandmother     Heart attack Paternal Grandfather     Heart disease Paternal Grandfather     Heart disease Son     Heart attack Son     No Known Problems Sister     Malig Hyperthermia Neg Hx      Review of Systems   Constitutional: Negative for malaise/fatigue.   Cardiovascular:  Negative for chest pain, claudication, dyspnea on exertion, leg swelling, near-syncope, orthopnea, palpitations, paroxysmal nocturnal dyspnea and syncope.   Respiratory:  Negative for shortness of breath.    Neurological:  Negative for brief paralysis, dizziness, headaches and light-headedness.   All other systems reviewed and are negative.       Objective:     Vitals:    08/07/24 1322   BP: 130/72   BP Location: Left arm   Patient Position: Sitting   Cuff Size: Adult   Pulse: 69   SpO2: 98%   Weight: (!) 137 kg (300 lb 15.4 oz)   Height: 188 cm (74\")     Body mass index is 38.64 kg/m².    Vitals reviewed.   Constitutional:       General: Not in acute distress.     Appearance: Well-developed and not in distress. Not diaphoretic.      Comments: Walks with a cane with unsteady gait   HENT:      Head: Normocephalic.   Pulmonary:      Effort: Pulmonary effort is normal. No respiratory distress.      Breath sounds: Normal breath sounds. No wheezing. No rhonchi. No rales.   Cardiovascular:      Normal rate. Regular rhythm.      Murmurs: There is no murmur.   Pulses:     Radial: 2+ bilaterally.  Edema:     Peripheral edema absent.   Skin:     General: Skin is warm and dry. There is no cyanosis.      Findings: No rash.   Neurological:      Mental Status: Alert and oriented to person, place, and time.   Psychiatric:         Behavior: Behavior normal. Behavior is cooperative.       " "  Thought Content: Thought content normal.         Judgment: Judgment normal.       Lab Review:     Lab Results   Component Value Date     05/10/2024     04/18/2024    K 5.0 05/10/2024    K 5.0 04/18/2024     (H) 05/10/2024     (H) 04/18/2024    CO2 21.0 (L) 05/10/2024    CO2 22.0 04/18/2024    BUN 34 (H) 05/10/2024    BUN 43 (H) 04/18/2024    CREATININE 3.08 (H) 05/10/2024    CREATININE 3.23 (H) 04/18/2024    EGFRIFNONA 41 (L) 01/25/2022    EGFRIFNONA 45 (L) 10/21/2021    EGFRIFAFRI 47 (L) 01/25/2022    EGFRIFAFRI 52 (L) 10/21/2021    GLUCOSE 122 (H) 05/10/2024    GLUCOSE 140 (H) 04/18/2024    CALCIUM 8.3 (L) 05/10/2024    CALCIUM 8.7 04/18/2024    PROTENTOTREF 6.4 12/08/2023    PROTENTOTREF 5.8 (L) 03/14/2023    ALBUMIN 3.6 05/10/2024    ALBUMIN 3.9 04/18/2024    BILITOT 0.2 12/08/2023    BILITOT 0.3 11/20/2023    AST 14 12/08/2023    AST 17 11/20/2023    ALT 11 12/08/2023    ALT 12 11/20/2023     Lab Results   Component Value Date    WBC 9.15 05/10/2024    WBC 9.28 12/01/2023    HGB 10.7 (L) 05/10/2024    HGB 11.7 (L) 12/01/2023    HCT 31.1 (L) 05/10/2024    HCT 34.5 (L) 12/01/2023    MCV 96.9 05/10/2024    MCV 96.6 12/01/2023     05/10/2024     12/01/2023     No results found for: \"PROBNP\", \"BNP\"  Lab Results   Component Value Date    CKTOTAL 50 06/13/2017    CKMB 2.08 06/13/2017    TROPONINT 0.971 (C) 06/14/2017     Lab Results   Component Value Date    TSH 1.870 12/08/2023    TSH 2.850 03/14/2023      Lipid Panel          11/20/2023    11:55   Lipid Panel   Total Cholesterol 122    Triglycerides 155    HDL Cholesterol 34    VLDL Cholesterol 27    LDL Cholesterol  61    LDL/HDL Ratio 1.68           ECG 12 Lead    Date/Time: 8/7/2024 3:03 PM  Performed by: Elayne Cristobal APRN    Authorized by: Elayne Cristobal APRN  Comparison: compared with previous ECG   Similar to previous ECG  Rhythm: sinus rhythm  Rate: normal  BPM: 69  Conduction: left bundle branch block  QRS " axis: normal  Comments: Similar to prior        Assessment:       Diagnosis Plan   1. Coronary artery disease involving native coronary artery of native heart without angina pectoris  Adult Transthoracic Echo Complete w/ Color, Spectral and Contrast if Necessary Per Protocol    Stress Test With Myocardial Perfusion One Day      2. Precordial chest pain  Adult Transthoracic Echo Complete w/ Color, Spectral and Contrast if Necessary Per Protocol    Stress Test With Myocardial Perfusion One Day      3. Primary hypertension        4. Mixed hyperlipidemia        5. Stress-induced cardiomyopathy        6. 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 CKD (chronic kidney disease) stage 4, GFR 15-29 ml/min        8. Erosive gastritis          Plan:       1.  Stress-induced cardiomyopathy.  On ACE inhibitor, beta-blocker, nitrate.  There has been consideration in the past for Jardiance but this has been avoided due to history of pancreatitis.  He is not on spironolactone due to renal function.  Recent chest pain associated with dyspnea.  Will get repeat echocardiogram and Lexiscan Cardiolite stress test.  Discussed that he may need repeat cardiac cath.  He did have nonobstructive coronary disease on cardiac cath in 2021.  2.  Primary hypertension controlled on current regimen.  He has not been checking this at home.  Current device is many years old and is likely not working appropriately.  He will obtain new device and start checking blood pressures at home at least 1 hour after blood pressure medications.  3.  Mixed hyperlipidemia on high-dose statin.  4.  Coronary artery disease nonobstructive by cardiac cath in 2021 however he has new symptoms.  Of note, he is not on antiplatelet therapy.  Will assess as above  5.  Type 2 diabetes  6.  Chronic kidney disease now following with nephrology  7.  Erosive gastritis and Acosta's esophagus.  Likely due to medication induced gastroparesis.   Encouraged him to be compliant with daily MiraLAX and 4 times daily Carafate.      Time Spent: I spent 40 minutes caring for Gui on this date of service. This time includes time spent by me in the following activities: preparing for the visit, reviewing tests, performing a medically appropriate examination and/or evaluations, counseling and educating the patient/family/caregiver, ordering medications, tests, or procedures, documenting information in the medical record, and independently interpreting results and communicating that information with the patient/family/caregiver.   I spent 1 minutes on the separately reported service of ECG. This time is not included in the time used to support the E/M service also reported today.        Your medication list            Accurate as of August 7, 2024  3:09 PM. If you have any questions, ask your nurse or doctor.                CONTINUE taking these medications        Instructions Last Dose Given Next Dose Due   atorvastatin 80 MG tablet  Commonly known as: LIPITOR      Take 1 tablet by mouth Daily.       DAILY FIBER PO      Take 1 tablet by mouth Daily.       doxycycline 100 MG capsule  Commonly known as: VIBRAMYCIN      Take 1 capsule by mouth 2 (Two) Times a Day.       FreeStyle Martha 2 Sensor misc      Use 1 each Every 14 (Fourteen) Days.       HumaLOG KwikPen 100 UNIT/ML solution pen-injector  Generic drug: Insulin Lispro (1 Unit Dial)      Inject 16 Units under the skin into the appropriate area as directed Daily With Breakfast AND 16 Units Daily Before Lunch AND 20 Units Daily With Dinner. Adds sliding scale based on BS reading. Max daily dose 75u daily  Indications: Type 2 Diabetes       Insulin Pen Needle 32G X 4 MM misc  Commonly known as: BD Pen Needle Mireille U/F      TO INJECT 4 TIMES DAILY       isosorbide mononitrate 30 MG 24 hr tablet  Commonly known as: IMDUR      isosorbide mononitrate ER 30 mg tablet,extended release 24 hr       Lantus SoloStar 100 UNIT/ML  injection pen  Generic drug: Insulin Glargine      INJECT UP TO 50 UNITS UNDER THE SKIN INTO THE APPROPRIATE AREA AS DIRECTED IN THE MORNING AND 50 UNITS IN THE EVENING FOR TYPE 2 DIABETES       lisinopril 10 MG tablet  Commonly known as: PRINIVIL,ZESTRIL      Take 1 tablet by mouth Daily.       metoprolol tartrate 25 MG tablet  Commonly known as: LOPRESSOR      Take 1 tablet by mouth Every 12 (Twelve) Hours.       multivitamin tablet tablet  Commonly known as: THERAGRAN      Take 1 tablet by mouth Daily. HOLD PRIOR TO SURGERY       oxyCODONE-acetaminophen  MG per tablet  Commonly known as: PERCOCET      Take 1 tablet by mouth Every 6 (Six) Hours As Needed.       pregabalin 150 MG capsule  Commonly known as: LYRICA      Take 1 capsule by mouth.       sertraline 100 MG tablet  Commonly known as: ZOLOFT      Take 1 tablet by mouth Daily.       sucralfate 1 g tablet  Commonly known as: Carafate      Take 1 tablet by mouth 4 (Four) Times a Day.       Torsemide 60 MG tablet      Take 1 tablet by mouth Daily.       Turmeric 500 MG capsule      Take 1 capsule by mouth Daily.       Voquezna 20 MG tablet  Generic drug: Vonoprazan Fumarate      Take 1 tablet by mouth Daily.                Patient is no longer taking -.  I corrected the med list to reflect this.  I did not stop these medications.    Return in about 6 months (around 2/7/2025) for with Dr. Moreno.      Dictated utilizing Dragon dictation

## 2024-08-07 ENCOUNTER — OFFICE VISIT (OUTPATIENT)
Dept: CARDIOLOGY | Facility: CLINIC | Age: 74
End: 2024-08-07
Payer: MEDICARE

## 2024-08-07 VITALS
OXYGEN SATURATION: 98 % | SYSTOLIC BLOOD PRESSURE: 130 MMHG | BODY MASS INDEX: 38.62 KG/M2 | HEART RATE: 69 BPM | WEIGHT: 300.96 LBS | DIASTOLIC BLOOD PRESSURE: 72 MMHG | HEIGHT: 74 IN

## 2024-08-07 DIAGNOSIS — E11.51 DIABETES TYPE 2 WITH ATHEROSCLEROSIS OF ARTERIES OF EXTREMITIES: Chronic | ICD-10-CM

## 2024-08-07 DIAGNOSIS — K29.60 EROSIVE GASTRITIS: ICD-10-CM

## 2024-08-07 DIAGNOSIS — I10 PRIMARY HYPERTENSION: ICD-10-CM

## 2024-08-07 DIAGNOSIS — I25.10 CORONARY ARTERY DISEASE INVOLVING NATIVE CORONARY ARTERY OF NATIVE HEART WITHOUT ANGINA PECTORIS: Primary | ICD-10-CM

## 2024-08-07 DIAGNOSIS — E78.2 MIXED HYPERLIPIDEMIA: ICD-10-CM

## 2024-08-07 DIAGNOSIS — I70.209 DIABETES TYPE 2 WITH ATHEROSCLEROSIS OF ARTERIES OF EXTREMITIES: Chronic | ICD-10-CM

## 2024-08-07 DIAGNOSIS — R07.2 PRECORDIAL CHEST PAIN: ICD-10-CM

## 2024-08-07 DIAGNOSIS — I51.81 STRESS-INDUCED CARDIOMYOPATHY: ICD-10-CM

## 2024-08-07 DIAGNOSIS — N18.4 CKD (CHRONIC KIDNEY DISEASE) STAGE 4, GFR 15-29 ML/MIN: ICD-10-CM

## 2024-08-10 LAB
BACTERIA SPEC AEROBE CULT: ABNORMAL
GRAM STN SPEC: ABNORMAL
GRAM STN SPEC: ABNORMAL

## 2024-08-13 ENCOUNTER — APPOINTMENT (OUTPATIENT)
Dept: WOUND CARE | Facility: HOSPITAL | Age: 74
End: 2024-08-13
Payer: MEDICARE

## 2024-08-20 ENCOUNTER — LAB (OUTPATIENT)
Dept: LAB | Facility: HOSPITAL | Age: 74
End: 2024-08-20
Payer: MEDICARE

## 2024-08-20 ENCOUNTER — APPOINTMENT (OUTPATIENT)
Dept: WOUND CARE | Facility: HOSPITAL | Age: 74
End: 2024-08-20
Payer: MEDICARE

## 2024-08-20 ENCOUNTER — TRANSCRIBE ORDERS (OUTPATIENT)
Dept: ADMINISTRATIVE | Facility: HOSPITAL | Age: 74
End: 2024-08-20
Payer: MEDICARE

## 2024-08-20 DIAGNOSIS — N18.4 CHRONIC RENAL DISEASE, STAGE IV: Primary | ICD-10-CM

## 2024-08-20 DIAGNOSIS — N18.4 CHRONIC RENAL DISEASE, STAGE IV: ICD-10-CM

## 2024-08-20 LAB
ALBUMIN SERPL-MCNC: 3.9 G/DL (ref 3.5–5.2)
ANION GAP SERPL CALCULATED.3IONS-SCNC: 11 MMOL/L (ref 5–15)
BACTERIA UR QL AUTO: NORMAL /HPF
BILIRUB UR QL STRIP: NEGATIVE
BUN SERPL-MCNC: 51 MG/DL (ref 8–23)
BUN/CREAT SERPL: 14.8 (ref 7–25)
CALCIUM SPEC-SCNC: 8.6 MG/DL (ref 8.6–10.5)
CHLORIDE SERPL-SCNC: 103 MMOL/L (ref 98–107)
CLARITY UR: CLEAR
CO2 SERPL-SCNC: 25 MMOL/L (ref 22–29)
COLOR UR: YELLOW
CREAT SERPL-MCNC: 3.45 MG/DL (ref 0.76–1.27)
CREAT UR-MCNC: 62.7 MG/DL
DEPRECATED RDW RBC AUTO: 42.9 FL (ref 37–54)
EGFRCR SERPLBLD CKD-EPI 2021: 18 ML/MIN/1.73
ERYTHROCYTE [DISTWIDTH] IN BLOOD BY AUTOMATED COUNT: 12 % (ref 12.3–15.4)
FERRITIN SERPL-MCNC: 40.9 NG/ML (ref 30–400)
GLUCOSE SERPL-MCNC: 163 MG/DL (ref 65–99)
GLUCOSE UR STRIP-MCNC: ABNORMAL MG/DL
HCT VFR BLD AUTO: 30.7 % (ref 37.5–51)
HGB BLD-MCNC: 9.9 G/DL (ref 13–17.7)
HGB UR QL STRIP.AUTO: NEGATIVE
HYALINE CASTS UR QL AUTO: NORMAL /LPF
IRON 24H UR-MRATE: 42 MCG/DL (ref 59–158)
IRON SATN MFR SERPL: 11 % (ref 20–50)
KETONES UR QL STRIP: NEGATIVE
LEUKOCYTE ESTERASE UR QL STRIP.AUTO: NEGATIVE
MCH RBC QN AUTO: 31.9 PG (ref 26.6–33)
MCHC RBC AUTO-ENTMCNC: 32.2 G/DL (ref 31.5–35.7)
MCV RBC AUTO: 99 FL (ref 79–97)
NITRITE UR QL STRIP: NEGATIVE
PH UR STRIP.AUTO: 6.5 [PH] (ref 5–8)
PHOSPHATE SERPL-MCNC: 4.8 MG/DL (ref 2.5–4.5)
PLATELET # BLD AUTO: 152 10*3/MM3 (ref 140–450)
PMV BLD AUTO: 12.2 FL (ref 6–12)
POTASSIUM SERPL-SCNC: 4.6 MMOL/L (ref 3.5–5.2)
PROT ?TM UR-MCNC: 356.9 MG/DL
PROT UR QL STRIP: ABNORMAL
PROT/CREAT UR: 5692.2 MG/G CREA (ref 0–200)
RBC # BLD AUTO: 3.1 10*6/MM3 (ref 4.14–5.8)
RBC # UR STRIP: NORMAL /HPF
REF LAB TEST METHOD: NORMAL
SODIUM SERPL-SCNC: 139 MMOL/L (ref 136–145)
SP GR UR STRIP: 1.01 (ref 1–1.03)
SQUAMOUS #/AREA URNS HPF: NORMAL /HPF
TIBC SERPL-MCNC: 383 MCG/DL (ref 298–536)
TRANSFERRIN SERPL-MCNC: 257 MG/DL (ref 200–360)
UROBILINOGEN UR QL STRIP: ABNORMAL
WBC # UR STRIP: NORMAL /HPF
WBC NRBC COR # BLD AUTO: 7.75 10*3/MM3 (ref 3.4–10.8)

## 2024-08-20 PROCEDURE — 83036 HEMOGLOBIN GLYCOSYLATED A1C: CPT | Performed by: NURSE PRACTITIONER

## 2024-08-20 PROCEDURE — 81001 URINALYSIS AUTO W/SCOPE: CPT

## 2024-08-20 PROCEDURE — 84156 ASSAY OF PROTEIN URINE: CPT

## 2024-08-20 PROCEDURE — 36415 COLL VENOUS BLD VENIPUNCTURE: CPT

## 2024-08-20 PROCEDURE — 82728 ASSAY OF FERRITIN: CPT

## 2024-08-20 PROCEDURE — 83540 ASSAY OF IRON: CPT

## 2024-08-20 PROCEDURE — 82570 ASSAY OF URINE CREATININE: CPT

## 2024-08-20 PROCEDURE — 80069 RENAL FUNCTION PANEL: CPT

## 2024-08-20 PROCEDURE — 85027 COMPLETE CBC AUTOMATED: CPT

## 2024-08-20 PROCEDURE — 84466 ASSAY OF TRANSFERRIN: CPT

## 2024-08-26 ENCOUNTER — TELEPHONE (OUTPATIENT)
Dept: CARDIOLOGY | Facility: CLINIC | Age: 74
End: 2024-08-26
Payer: MEDICARE

## 2024-08-26 ENCOUNTER — OFFICE VISIT (OUTPATIENT)
Dept: ENDOCRINOLOGY | Age: 74
End: 2024-08-26
Payer: MEDICARE

## 2024-08-26 VITALS
HEIGHT: 74 IN | WEIGHT: 295 LBS | DIASTOLIC BLOOD PRESSURE: 70 MMHG | BODY MASS INDEX: 37.86 KG/M2 | SYSTOLIC BLOOD PRESSURE: 132 MMHG | OXYGEN SATURATION: 94 % | TEMPERATURE: 97.8 F | HEART RATE: 68 BPM

## 2024-08-26 DIAGNOSIS — Z79.4 TYPE 2 DIABETES MELLITUS WITH MICROALBUMINURIA, WITH LONG-TERM CURRENT USE OF INSULIN: Primary | ICD-10-CM

## 2024-08-26 DIAGNOSIS — E11.29 TYPE 2 DIABETES MELLITUS WITH MICROALBUMINURIA, WITH LONG-TERM CURRENT USE OF INSULIN: Primary | ICD-10-CM

## 2024-08-26 DIAGNOSIS — E78.2 MIXED HYPERLIPIDEMIA: ICD-10-CM

## 2024-08-26 DIAGNOSIS — Z86.79 HISTORY OF CAD (CORONARY ARTERY DISEASE): ICD-10-CM

## 2024-08-26 DIAGNOSIS — E11.42 DIABETIC PERIPHERAL NEUROPATHY ASSOCIATED WITH TYPE 2 DIABETES MELLITUS: ICD-10-CM

## 2024-08-26 DIAGNOSIS — N18.32 STAGE 3B CHRONIC KIDNEY DISEASE: ICD-10-CM

## 2024-08-26 DIAGNOSIS — Z79.4 TYPE 2 DIABETES MELLITUS WITH HYPERGLYCEMIA, WITH LONG-TERM CURRENT USE OF INSULIN: ICD-10-CM

## 2024-08-26 DIAGNOSIS — R80.9 TYPE 2 DIABETES MELLITUS WITH MICROALBUMINURIA, WITH LONG-TERM CURRENT USE OF INSULIN: Primary | ICD-10-CM

## 2024-08-26 DIAGNOSIS — E11.65 TYPE 2 DIABETES MELLITUS WITH HYPERGLYCEMIA, WITH LONG-TERM CURRENT USE OF INSULIN: ICD-10-CM

## 2024-08-26 PROCEDURE — 99214 OFFICE O/P EST MOD 30 MIN: CPT | Performed by: NURSE PRACTITIONER

## 2024-08-26 PROCEDURE — 3051F HG A1C>EQUAL 7.0%<8.0%: CPT | Performed by: NURSE PRACTITIONER

## 2024-08-26 PROCEDURE — 3075F SYST BP GE 130 - 139MM HG: CPT | Performed by: NURSE PRACTITIONER

## 2024-08-26 PROCEDURE — 3078F DIAST BP <80 MM HG: CPT | Performed by: NURSE PRACTITIONER

## 2024-08-26 PROCEDURE — 95251 CONT GLUC MNTR ANALYSIS I&R: CPT | Performed by: NURSE PRACTITIONER

## 2024-08-26 RX ORDER — INSULIN GLARGINE 100 [IU]/ML
INJECTION, SOLUTION SUBCUTANEOUS
Qty: 90 ML | Refills: 0 | Status: SHIPPED | OUTPATIENT
Start: 2024-08-26 | End: 2024-08-26

## 2024-08-26 RX ORDER — INSULIN GLARGINE 100 [IU]/ML
INJECTION, SOLUTION SUBCUTANEOUS
Qty: 90 ML | Refills: 0 | Status: SHIPPED | OUTPATIENT
Start: 2024-08-26

## 2024-08-26 NOTE — PROGRESS NOTES
"Chief Complaint  Diabetes    Subjective        Hugh R McBurney presents to [facility] ENDOCRINOLOGY  History of Present Illness    upper and lower gi scope: ? Acid reflex   Going for echo and stress test tomorrow      Type 2 dm ~ 10 years   DM regimen: Lantus 50 units bid, Humalog 16u-20u+ss with meals  Sliding scale: 3 units for 50 above 150   Last eye exam - 3/2024  Known complications: nephropathy; following routinely with renal- on lisinopril- next appt 9/9, neuropathy; on lyrica from PCP, podiatry trims his toenails, CAD- on atorvastatin   Currently in wound care for a blister on his toe   \" I don't eat properly\" ie: timing     Cgm review 8/13/24-8/26/24, 87% active data  78% time in range  4% low  18% high  Avg glu 141  GMI 6.7%      Objective   Vital Signs:  /70   Pulse 68   Temp 97.8 °F (36.6 °C) (Oral)   Ht 188 cm (74.02\")   Wt 134 kg (295 lb)   SpO2 94%   BMI 37.86 kg/m²   Estimated body mass index is 37.86 kg/m² as calculated from the following:    Height as of this encounter: 188 cm (74.02\").    Weight as of this encounter: 134 kg (295 lb).          Physical Exam  Vitals reviewed.   Constitutional:       General: He is not in acute distress.  HENT:      Head: Normocephalic and atraumatic.   Cardiovascular:      Rate and Rhythm: Normal rate.   Pulmonary:      Effort: Pulmonary effort is normal. No respiratory distress.   Musculoskeletal:         General: No signs of injury. Normal range of motion.      Cervical back: Normal range of motion and neck supple.   Skin:     General: Skin is warm and dry.   Neurological:      Mental Status: He is alert and oriented to person, place, and time. Mental status is at baseline.   Psychiatric:         Mood and Affect: Mood normal.         Behavior: Behavior normal.         Thought Content: Thought content normal.         Judgment: Judgment normal.        Result Review :  The following data was reviewed by: ABBY Alvarenga on " 08/26/2024:  Common labs          4/18/2024    10:44 5/10/2024    13:43 8/20/2024    10:37   Common Labs   Glucose 140  122  163    BUN 43  34  51    Creatinine 3.23  3.08  3.45    Sodium 141  141  139    Potassium 5.0  5.0  4.6    Chloride 108  109  103    Calcium 8.7  8.3  8.6    Albumin 3.9  3.6  3.9    WBC  9.15  7.75    Hemoglobin  10.7  9.9    Hematocrit  31.1  30.7    Platelets  157  152    Hemoglobin A1C 7.30   7.10                Assessment and Plan   Diagnoses and all orders for this visit:    1. Type 2 diabetes mellitus with microalbuminuria, with long-term current use of insulin (Primary)  -     Discontinue: Insulin Glargine (Lantus SoloStar) 100 UNIT/ML injection pen; 50u QAM and 45u QPM  Dispense: 90 mL; Refill: 0  -     Insulin Glargine (Lantus SoloStar) 100 UNIT/ML injection pen; 50u QAM and 45u QPM  Dispense: 90 mL; Refill: 0    2. Type 2 diabetes mellitus with hyperglycemia, with long-term current use of insulin    3. Stage 3b chronic kidney disease    4. Diabetic peripheral neuropathy associated with type 2 diabetes mellitus    5. History of CAD (coronary artery disease)    6. Mixed hyperlipidemia             Follow Up   Return in about 4 months (around 12/26/2024).    Cgm reviewed, overall favorable  A1c at goal  Change lantus to 45u at night to prevent hypoglycemia, fasting   Continue statin and ace-I  Reviewed hypoglycemia treatment and early intervention  No additional changes made at this time     Patient was given instructions and counseling regarding his condition or for health maintenance advice. Please see specific information pulled into the AVS if appropriate.       Minnie Dsouza, APRN

## 2024-08-27 ENCOUNTER — HOSPITAL ENCOUNTER (OUTPATIENT)
Dept: CARDIOLOGY | Facility: HOSPITAL | Age: 74
Discharge: HOME | End: 2024-08-27
Payer: MEDICARE

## 2024-08-27 ENCOUNTER — HOSPITAL ENCOUNTER (OUTPATIENT)
Dept: CARDIOLOGY | Facility: HOSPITAL | Age: 74
Discharge: HOME | End: 2024-08-27
Admitting: NURSE PRACTITIONER
Payer: MEDICARE

## 2024-08-27 ENCOUNTER — APPOINTMENT (OUTPATIENT)
Dept: WOUND CARE | Facility: HOSPITAL | Age: 74
End: 2024-08-27
Payer: MEDICARE

## 2024-08-27 VITALS
BODY MASS INDEX: 37.86 KG/M2 | DIASTOLIC BLOOD PRESSURE: 78 MMHG | WEIGHT: 295 LBS | SYSTOLIC BLOOD PRESSURE: 170 MMHG | HEIGHT: 74 IN

## 2024-08-27 VITALS — WEIGHT: 295.42 LBS | BODY MASS INDEX: 37.91 KG/M2 | HEIGHT: 74 IN

## 2024-08-27 DIAGNOSIS — I25.10 CORONARY ARTERY DISEASE INVOLVING NATIVE CORONARY ARTERY OF NATIVE HEART WITHOUT ANGINA PECTORIS: ICD-10-CM

## 2024-08-27 DIAGNOSIS — R07.2 PRECORDIAL CHEST PAIN: ICD-10-CM

## 2024-08-27 LAB
AORTIC ARCH: 3.1 CM
AORTIC DIMENSIONLESS INDEX: 0.4 (DI)
BH CV ECHO LEFT VENTRICLE GLOBAL LONGITUDINAL STRAIN: -7.8 %
BH CV ECHO MEAS - ACS: 1.56 CM
BH CV ECHO MEAS - AO MAX PG: 20.2 MMHG
BH CV ECHO MEAS - AO MEAN PG: 8.9 MMHG
BH CV ECHO MEAS - AO ROOT DIAM: 3.7 CM
BH CV ECHO MEAS - AO V2 MAX: 224.7 CM/SEC
BH CV ECHO MEAS - AO V2 VTI: 50.9 CM
BH CV ECHO MEAS - AVA(I,D): 1.24 CM2
BH CV ECHO MEAS - EDV(CUBED): 227 ML
BH CV ECHO MEAS - EDV(MOD-SP2): 221 ML
BH CV ECHO MEAS - EDV(MOD-SP4): 233 ML
BH CV ECHO MEAS - EF(MOD-BP): 44.9 %
BH CV ECHO MEAS - EF(MOD-SP2): 46.2 %
BH CV ECHO MEAS - EF(MOD-SP4): 46.4 %
BH CV ECHO MEAS - ESV(CUBED): 61.2 ML
BH CV ECHO MEAS - ESV(MOD-SP2): 119 ML
BH CV ECHO MEAS - ESV(MOD-SP4): 125 ML
BH CV ECHO MEAS - FS: 35.4 %
BH CV ECHO MEAS - IVS/LVPW: 1.08 CM
BH CV ECHO MEAS - IVSD: 1.3 CM
BH CV ECHO MEAS - LAT PEAK E' VEL: 11.3 CM/SEC
BH CV ECHO MEAS - LV DIASTOLIC VOL/BSA (35-75): 90.9 CM2
BH CV ECHO MEAS - LV MASS(C)D: 341 GRAMS
BH CV ECHO MEAS - LV MAX PG: 2.6 MMHG
BH CV ECHO MEAS - LV MEAN PG: 1 MMHG
BH CV ECHO MEAS - LV SYSTOLIC VOL/BSA (12-30): 48.8 CM2
BH CV ECHO MEAS - LV V1 MAX: 80.4 CM/SEC
BH CV ECHO MEAS - LV V1 VTI: 18.9 CM
BH CV ECHO MEAS - LVIDD: 6.1 CM
BH CV ECHO MEAS - LVIDS: 3.9 CM
BH CV ECHO MEAS - LVOT AREA: 3.3 CM2
BH CV ECHO MEAS - LVOT DIAM: 2.06 CM
BH CV ECHO MEAS - LVPWD: 1.2 CM
BH CV ECHO MEAS - MED PEAK E' VEL: 3.5 CM/SEC
BH CV ECHO MEAS - MR MAX PG: 113.7 MMHG
BH CV ECHO MEAS - MR MAX VEL: 533.1 CM/SEC
BH CV ECHO MEAS - MV A DUR: 0.12 SEC
BH CV ECHO MEAS - MV A MAX VEL: 48.5 CM/SEC
BH CV ECHO MEAS - MV DEC SLOPE: 846 CM/SEC2
BH CV ECHO MEAS - MV DEC TIME: 0.2 SEC
BH CV ECHO MEAS - MV E MAX VEL: 89 CM/SEC
BH CV ECHO MEAS - MV E/A: 1.84
BH CV ECHO MEAS - MV MAX PG: 16.9 MMHG
BH CV ECHO MEAS - MV MEAN PG: 4.8 MMHG
BH CV ECHO MEAS - MV P1/2T: 76.6 MSEC
BH CV ECHO MEAS - MV V2 VTI: 52 CM
BH CV ECHO MEAS - MVA(P1/2T): 2.9 CM2
BH CV ECHO MEAS - MVA(VTI): 1.21 CM2
BH CV ECHO MEAS - PA ACC TIME: 0.16 SEC
BH CV ECHO MEAS - PA V2 MAX: 111.3 CM/SEC
BH CV ECHO MEAS - PULM A REVS DUR: 0.11 SEC
BH CV ECHO MEAS - PULM A REVS VEL: 30.5 CM/SEC
BH CV ECHO MEAS - PULM DIAS VEL: 78.3 CM/SEC
BH CV ECHO MEAS - PULM S/D: 0.7
BH CV ECHO MEAS - PULM SYS VEL: 55 CM/SEC
BH CV ECHO MEAS - QP/QS: 1.41
BH CV ECHO MEAS - RAP SYSTOLE: 15 MMHG
BH CV ECHO MEAS - RV MAX PG: 2.6 MMHG
BH CV ECHO MEAS - RV V1 MAX: 81 CM/SEC
BH CV ECHO MEAS - RV V1 VTI: 18.1 CM
BH CV ECHO MEAS - RVOT DIAM: 2.5 CM
BH CV ECHO MEAS - RVSP: 74 MMHG
BH CV ECHO MEAS - SV(LVOT): 62.9 ML
BH CV ECHO MEAS - SV(MOD-SP2): 102 ML
BH CV ECHO MEAS - SV(MOD-SP4): 108 ML
BH CV ECHO MEAS - SV(RVOT): 88.6 ML
BH CV ECHO MEAS - SVI(LVOT): 24.5 ML/M2
BH CV ECHO MEAS - SVI(MOD-SP2): 39.8 ML/M2
BH CV ECHO MEAS - SVI(MOD-SP4): 42.1 ML/M2
BH CV ECHO MEAS - TAPSE (>1.6): 3.2 CM
BH CV ECHO MEAS - TR MAX PG: 58.5 MMHG
BH CV ECHO MEAS - TR MAX VEL: 382.6 CM/SEC
BH CV ECHO MEASUREMENTS AVERAGE E/E' RATIO: 12.03
BH CV NUCLEAR PRIOR STUDY: 2
BH CV REST NUCLEAR ISOTOPE DOSE: 11.3 MCI
BH CV STRESS BP STAGE 1: NORMAL
BH CV STRESS COMMENTS STAGE 1: NORMAL
BH CV STRESS DOSE REGADENOSON STAGE 1: 0.4
BH CV STRESS DURATION MIN STAGE 1: 0
BH CV STRESS DURATION SEC STAGE 1: 10
BH CV STRESS HR STAGE 1: 75
BH CV STRESS NUCLEAR ISOTOPE DOSE: 35.9 MCI
BH CV STRESS PROTOCOL 1: NORMAL
BH CV STRESS RECOVERY BP: NORMAL MMHG
BH CV STRESS RECOVERY HR: 71 BPM
BH CV STRESS STAGE 1: 1
BH CV XLRA - RV BASE: 3.4 CM
BH CV XLRA - RV LENGTH: 9.2 CM
BH CV XLRA - RV MID: 2.7 CM
BH CV XLRA - TDI S': 14 CM/SEC
LEFT ATRIUM VOLUME INDEX: 73.8 ML/M2
LV EF NUC BP: 38 %
MAXIMAL PREDICTED HEART RATE: 147 BPM
PERCENT MAX PREDICTED HR: 51.02 %
SINUS: 3.3 CM
STJ: 2.5 CM
STRESS BASELINE BP: NORMAL MMHG
STRESS BASELINE HR: 67 BPM
STRESS PERCENT HR: 60 %
STRESS POST EXERCISE DUR SEC: 10 SEC
STRESS POST PEAK BP: NORMAL MMHG
STRESS POST PEAK HR: 75 BPM
STRESS TARGET HR: 125 BPM

## 2024-08-27 PROCEDURE — 25510000001 PERFLUTREN 6.52 MG/ML SUSPENSION 2 ML VIAL: Performed by: NURSE PRACTITIONER

## 2024-08-27 PROCEDURE — 93356 MYOCRD STRAIN IMG SPCKL TRCK: CPT

## 2024-08-27 PROCEDURE — 78452 HT MUSCLE IMAGE SPECT MULT: CPT

## 2024-08-27 PROCEDURE — 93017 CV STRESS TEST TRACING ONLY: CPT

## 2024-08-27 PROCEDURE — 25010000002 REGADENOSON 0.4 MG/5ML SOLUTION: Performed by: NURSE PRACTITIONER

## 2024-08-27 PROCEDURE — A9502 TC99M TETROFOSMIN: HCPCS | Performed by: NURSE PRACTITIONER

## 2024-08-27 PROCEDURE — 0 TECHNETIUM TETROFOSMIN KIT: Performed by: NURSE PRACTITIONER

## 2024-08-27 PROCEDURE — 93306 TTE W/DOPPLER COMPLETE: CPT

## 2024-08-27 RX ORDER — REGADENOSON 0.08 MG/ML
0.4 INJECTION, SOLUTION INTRAVENOUS
Status: COMPLETED | OUTPATIENT
Start: 2024-08-27 | End: 2024-08-27

## 2024-08-27 RX ADMIN — TETROFOSMIN 1 DOSE: 1.38 INJECTION, POWDER, LYOPHILIZED, FOR SOLUTION INTRAVENOUS at 10:15

## 2024-08-27 RX ADMIN — REGADENOSON 0.4 MG: 0.08 INJECTION, SOLUTION INTRAVENOUS at 10:15

## 2024-08-27 RX ADMIN — PERFLUTREN 2 ML: 6.52 INJECTION, SUSPENSION INTRAVENOUS at 09:04

## 2024-08-27 RX ADMIN — TETROFOSMIN 1 DOSE: 1.38 INJECTION, POWDER, LYOPHILIZED, FOR SOLUTION INTRAVENOUS at 09:05

## 2024-08-28 ENCOUNTER — TELEPHONE (OUTPATIENT)
Dept: CARDIOLOGY | Facility: CLINIC | Age: 74
End: 2024-08-28
Payer: MEDICARE

## 2024-08-28 DIAGNOSIS — R07.2 PRECORDIAL CHEST PAIN: Primary | ICD-10-CM

## 2024-08-28 DIAGNOSIS — I25.10 CORONARY ARTERY DISEASE INVOLVING NATIVE CORONARY ARTERY OF NATIVE HEART WITHOUT ANGINA PECTORIS: ICD-10-CM

## 2024-08-28 DIAGNOSIS — R94.39 ABNORMAL NUCLEAR STRESS TEST: ICD-10-CM

## 2024-08-28 DIAGNOSIS — N18.4 CKD (CHRONIC KIDNEY DISEASE) STAGE 4, GFR 15-29 ML/MIN: ICD-10-CM

## 2024-08-28 NOTE — TELEPHONE ENCOUNTER
Spoke with patient about results of echo and stress test.  Echo with significant wall motion abnormalities and depressed EF to 44%.  Prior echo EF was 63% in 2021.  RVSP also significantly elevated.  Stress test did show significant area of infarct with mild lindsay-infarct ischemia.  Stress EF was 38%.  With his ongoing chest discomfort and dyspnea, would recommend cardiac cath to assess this further.  Due to his significant CKD I discussed this with his nephrologist, Dr. Rivera who recommended holding lisinopril and torsemide the day prior, day of, and day after cardiac cath.  He also recommended IV fluids pre and post cardiac cath.  He will need BMP 3 days after cardiac cath to assess renal function and electrolytes at that time. I have explained the procedure to the patient and he verbalizes understanding. The risks of the procedure were explained to the patient including bleeding, hypotension, allergy/reaction to contrast dye, vascular site problems, kidney problems and 1 in a 1000 risk of heart attack, death, or stroke with procedure. Patient would like to proceed with procedure.      I entered the orders for cardiac cath as well as fluid prep.  I explained pre and post cath instructions with patient today.  Please see if this can be scheduled with Dr. Moreno as he is the patient's primary cardiologist adjust and also performed his prior cardiac cath in 2017.

## 2024-08-29 PROBLEM — N18.4 CKD (CHRONIC KIDNEY DISEASE) STAGE 4, GFR 15-29 ML/MIN: Status: ACTIVE | Noted: 2024-08-28

## 2024-08-29 PROBLEM — R94.39 ABNORMAL NUCLEAR STRESS TEST: Status: ACTIVE | Noted: 2024-08-28

## 2024-08-29 PROBLEM — R07.2 PRECORDIAL CHEST PAIN: Status: ACTIVE | Noted: 2024-08-28

## 2024-08-29 RX ORDER — SODIUM CHLORIDE 9 MG/ML
75 INJECTION, SOLUTION INTRAVENOUS ONCE
Status: SHIPPED | OUTPATIENT
Start: 2024-09-05

## 2024-08-30 ENCOUNTER — LAB (OUTPATIENT)
Dept: LAB | Facility: HOSPITAL | Age: 74
End: 2024-08-30
Payer: MEDICARE

## 2024-08-30 DIAGNOSIS — I25.10 CORONARY ARTERY DISEASE INVOLVING NATIVE CORONARY ARTERY OF NATIVE HEART WITHOUT ANGINA PECTORIS: ICD-10-CM

## 2024-08-30 DIAGNOSIS — N18.4 CKD (CHRONIC KIDNEY DISEASE) STAGE 4, GFR 15-29 ML/MIN: ICD-10-CM

## 2024-08-30 DIAGNOSIS — R07.2 PRECORDIAL CHEST PAIN: ICD-10-CM

## 2024-08-30 DIAGNOSIS — R94.39 ABNORMAL NUCLEAR STRESS TEST: ICD-10-CM

## 2024-09-03 ENCOUNTER — TELEPHONE (OUTPATIENT)
Dept: CARDIOLOGY | Facility: CLINIC | Age: 74
End: 2024-09-03
Payer: MEDICARE

## 2024-09-03 NOTE — TELEPHONE ENCOUNTER
Caller: McBurney, Hugh R    Relationship: Self    Best call back number: [phone]     What is the best time to reach you: ANYTIME    Who are you requesting to speak with (clinical staff, provider,  specific staff member): CLINICAL    Do you know the name of the person who called: NA    What was the call regarding:  PT STATES HE HAS NOT HEARD FROM OFFICE AND STATES HE IS FRUSTRATED AS HIS PROCEDURE ON FRIDAY AND HE STATES THE ORDERS FOR LABS WERE PUT IN TO BE DONE 3 DAYS AFTER PROCEDURE INSTEAD OF BEFORE - HE IS ASKING WHAT HE NEEDS TO DO AND WHEN HE CAN COME IN TO GET LABS - PT ASKING FOR CALL BACK ASAP

## 2024-09-03 NOTE — TELEPHONE ENCOUNTER
Caller: McBurney, Hugh R    Relationship: Self    Best call back number: [phone]    What orders are you requesting (i.e. lab or imaging): LABS    In what timeframe would the patient need to come in: ASAP    Where will you receive your lab/imaging services: [facility]     Additional notes: PATIENT STATED HE NEEDS LABS PRIOR TO HEART CATH, BUT THE ORDERS FROM DR SCHWAB STATED THREE DAYS AFTER THE CATH. PLEASE ADVISE. DOES PATIENT NEED LABS PRIOR TO OR AFTER THE CATH? IF PRIOR TO CATH PATIENT NEEDS NEW ORDERS.

## 2024-09-04 ENCOUNTER — TRANSCRIBE ORDERS (OUTPATIENT)
Dept: CARDIOLOGY | Facility: CLINIC | Age: 74
End: 2024-09-04
Payer: MEDICARE

## 2024-09-04 ENCOUNTER — TELEPHONE (OUTPATIENT)
Dept: CARDIOLOGY | Facility: CLINIC | Age: 74
End: 2024-09-04
Payer: MEDICARE

## 2024-09-04 ENCOUNTER — LAB (OUTPATIENT)
Dept: LAB | Facility: HOSPITAL | Age: 74
End: 2024-09-04
Payer: MEDICARE

## 2024-09-04 DIAGNOSIS — Z13.6 SCREENING FOR ISCHEMIC HEART DISEASE: ICD-10-CM

## 2024-09-04 DIAGNOSIS — Z13.6 SCREENING FOR ISCHEMIC HEART DISEASE: Primary | ICD-10-CM

## 2024-09-04 LAB
ANION GAP SERPL CALCULATED.3IONS-SCNC: 12.7 MMOL/L (ref 5–15)
BUN SERPL-MCNC: 54 MG/DL (ref 8–23)
BUN/CREAT SERPL: 14.9 (ref 7–25)
CALCIUM SPEC-SCNC: 9 MG/DL (ref 8.6–10.5)
CHLORIDE SERPL-SCNC: 104 MMOL/L (ref 98–107)
CO2 SERPL-SCNC: 22.3 MMOL/L (ref 22–29)
CREAT SERPL-MCNC: 3.62 MG/DL (ref 0.76–1.27)
EGFRCR SERPLBLD CKD-EPI 2021: 17 ML/MIN/1.73
GLUCOSE SERPL-MCNC: 196 MG/DL (ref 65–99)
POTASSIUM SERPL-SCNC: 4.8 MMOL/L (ref 3.5–5.2)
SODIUM SERPL-SCNC: 139 MMOL/L (ref 136–145)

## 2024-09-04 PROCEDURE — 80048 BASIC METABOLIC PNL TOTAL CA: CPT

## 2024-09-04 PROCEDURE — 36415 COLL VENOUS BLD VENIPUNCTURE: CPT

## 2024-09-04 NOTE — TELEPHONE ENCOUNTER
He had labs prior to his procedure on 8/20/2024.  He does not need preprocedure labs.  He needs a BMP 3 days after his procedure, which is scheduled for tomorrow, 9/5/2024.  The order for the BMP postprocedure is already in the system.  I am not understanding what he is asking.

## 2024-09-04 NOTE — TELEPHONE ENCOUNTER
I called spoke with pt gave him instructions on labs. I let him know to call back if he had any other questions or concerns.  Johnny HOPKINS

## 2024-09-04 NOTE — TELEPHONE ENCOUNTER
Please let him know that creatinine is stable and electrolytes are normal. Continue with plans for cath with IVF pre and post. He is also to be holding torsemide, and lisinopril today, tomorrow and Friday to help protect his kidneys as discussed prior to cath orders being placed and also discussed with Dr. Rivera (his nephrologist) prior to consideration of cardiac cath.

## 2024-09-05 ENCOUNTER — HOSPITAL ENCOUNTER (OUTPATIENT)
Facility: HOSPITAL | Age: 74
Setting detail: OBSERVATION
Discharge: HOME | End: 2024-09-06
Attending: INTERNAL MEDICINE | Admitting: INTERNAL MEDICINE
Payer: MEDICARE

## 2024-09-05 DIAGNOSIS — R94.39 ABNORMAL NUCLEAR STRESS TEST: ICD-10-CM

## 2024-09-05 DIAGNOSIS — N18.4 CKD (CHRONIC KIDNEY DISEASE) STAGE 4, GFR 15-29 ML/MIN: ICD-10-CM

## 2024-09-05 DIAGNOSIS — I25.10 CORONARY ARTERY DISEASE INVOLVING NATIVE CORONARY ARTERY OF NATIVE HEART WITHOUT ANGINA PECTORIS: ICD-10-CM

## 2024-09-05 DIAGNOSIS — R07.2 PRECORDIAL CHEST PAIN: ICD-10-CM

## 2024-09-05 LAB
GLUCOSE BLDC GLUCOMTR-MCNC: 116 MG/DL (ref 70–130)
GLUCOSE BLDC GLUCOMTR-MCNC: 223 MG/DL (ref 70–130)
GLUCOSE BLDC GLUCOMTR-MCNC: 76 MG/DL (ref 70–130)
GLUCOSE BLDC GLUCOMTR-MCNC: 91 MG/DL (ref 70–130)

## 2024-09-05 PROCEDURE — A9270 NON-COVERED ITEM OR SERVICE: HCPCS | Performed by: INTERNAL MEDICINE

## 2024-09-05 PROCEDURE — 63710000001 ACETAMINOPHEN 325 MG TABLET: Performed by: INTERNAL MEDICINE

## 2024-09-05 PROCEDURE — 63710000001 ATORVASTATIN 80 MG TABLET: Performed by: INTERNAL MEDICINE

## 2024-09-05 PROCEDURE — C1769 GUIDE WIRE: HCPCS | Performed by: INTERNAL MEDICINE

## 2024-09-05 PROCEDURE — 63710000001 SERTRALINE 100 MG TABLET: Performed by: INTERNAL MEDICINE

## 2024-09-05 PROCEDURE — C1894 INTRO/SHEATH, NON-LASER: HCPCS | Performed by: INTERNAL MEDICINE

## 2024-09-05 PROCEDURE — 82948 REAGENT STRIP/BLOOD GLUCOSE: CPT

## 2024-09-05 PROCEDURE — 93571 IV DOP VEL&/PRESS C FLO 1ST: CPT | Performed by: INTERNAL MEDICINE

## 2024-09-05 PROCEDURE — 63710000001 SUCRALFATE 1 G TABLET: Performed by: INTERNAL MEDICINE

## 2024-09-05 PROCEDURE — 63710000001 OXYCODONE-ACETAMINOPHEN 10-325 MG TABLET: Performed by: INTERNAL MEDICINE

## 2024-09-05 PROCEDURE — 93458 L HRT ARTERY/VENTRICLE ANGIO: CPT | Performed by: INTERNAL MEDICINE

## 2024-09-05 PROCEDURE — 63710000001 MULTIVITAMIN TABLET: Performed by: INTERNAL MEDICINE

## 2024-09-05 PROCEDURE — C1874 STENT, COATED/COV W/DEL SYS: HCPCS | Performed by: INTERNAL MEDICINE

## 2024-09-05 PROCEDURE — 25010000002 HEPARIN (PORCINE) PER 1000 UNITS: Performed by: INTERNAL MEDICINE

## 2024-09-05 PROCEDURE — C9600 PERC DRUG-EL COR STENT SING: HCPCS | Performed by: INTERNAL MEDICINE

## 2024-09-05 PROCEDURE — C1887 CATHETER, GUIDING: HCPCS | Performed by: INTERNAL MEDICINE

## 2024-09-05 PROCEDURE — G0378 HOSPITAL OBSERVATION PER HR: HCPCS

## 2024-09-05 PROCEDURE — 63710000001 INSULIN GLARGINE PER 5 UNITS: Performed by: INTERNAL MEDICINE

## 2024-09-05 PROCEDURE — 93005 ELECTROCARDIOGRAM TRACING: CPT | Performed by: INTERNAL MEDICINE

## 2024-09-05 PROCEDURE — 25510000001 IOPAMIDOL PER 1 ML: Performed by: INTERNAL MEDICINE

## 2024-09-05 PROCEDURE — 63710000001 ISOSORBIDE MONONITRATE 30 MG TABLET SUSTAINED-RELEASE 24 HOUR: Performed by: INTERNAL MEDICINE

## 2024-09-05 PROCEDURE — 93010 ELECTROCARDIOGRAM REPORT: CPT | Performed by: INTERNAL MEDICINE

## 2024-09-05 PROCEDURE — 93799 UNLISTED CV SVC/PROCEDURE: CPT | Performed by: INTERNAL MEDICINE

## 2024-09-05 PROCEDURE — C1725 CATH, TRANSLUMIN NON-LASER: HCPCS | Performed by: INTERNAL MEDICINE

## 2024-09-05 PROCEDURE — C1753 CATH, INTRAVAS ULTRASOUND: HCPCS | Performed by: INTERNAL MEDICINE

## 2024-09-05 PROCEDURE — 92928 PRQ TCAT PLMT NTRAC ST 1 LES: CPT | Performed by: INTERNAL MEDICINE

## 2024-09-05 PROCEDURE — 92978 ENDOLUMINL IVUS OCT C 1ST: CPT | Performed by: INTERNAL MEDICINE

## 2024-09-05 PROCEDURE — 63710000001 PREGABALIN 75 MG CAPSULE: Performed by: INTERNAL MEDICINE

## 2024-09-05 PROCEDURE — 63710000001 METOPROLOL TARTRATE 25 MG TABLET: Performed by: INTERNAL MEDICINE

## 2024-09-05 PROCEDURE — 25010000002 MIDAZOLAM PER 1 MG: Performed by: INTERNAL MEDICINE

## 2024-09-05 PROCEDURE — 25010000002 FENTANYL CITRATE (PF) 50 MCG/ML SOLUTION: Performed by: INTERNAL MEDICINE

## 2024-09-05 PROCEDURE — 63710000001 INSULIN LISPRO (HUMAN) PER 5 UNITS: Performed by: INTERNAL MEDICINE

## 2024-09-05 PROCEDURE — 63710000001 LISINOPRIL 10 MG TABLET: Performed by: INTERNAL MEDICINE

## 2024-09-05 PROCEDURE — 25810000003 SODIUM CHLORIDE 0.9 % SOLUTION: Performed by: INTERNAL MEDICINE

## 2024-09-05 DEVICE — XIENCE SKYPOINT™ EVEROLIMUS ELUTING CORONARY STENT SYSTEM 3.00 MM X 18 MM / RAPID-EXCHANGE
Type: IMPLANTABLE DEVICE | Site: CORONARY | Status: FUNCTIONAL
Brand: XIENCE SKYPOINT™

## 2024-09-05 DEVICE — XIENCE SKYPOINT™ EVEROLIMUS ELUTING CORONARY STENT SYSTEM 2.50 MM X 38 MM / RAPID-EXCHANGE
Type: IMPLANTABLE DEVICE | Site: CORONARY | Status: FUNCTIONAL
Brand: XIENCE SKYPOINT™

## 2024-09-05 RX ORDER — PREGABALIN 75 MG/1
75 CAPSULE ORAL DAILY
Status: DISCONTINUED | OUTPATIENT
Start: 2024-09-05 | End: 2024-09-06 | Stop reason: HOSPADM

## 2024-09-05 RX ORDER — SODIUM CHLORIDE 0.9 % (FLUSH) 0.9 %
10 SYRINGE (ML) INJECTION EVERY 12 HOURS SCHEDULED
Status: DISCONTINUED | OUTPATIENT
Start: 2024-09-05 | End: 2024-09-05 | Stop reason: HOSPADM

## 2024-09-05 RX ORDER — ASPIRIN 81 MG/1
81 TABLET ORAL DAILY
Status: DISCONTINUED | OUTPATIENT
Start: 2024-09-05 | End: 2024-09-06 | Stop reason: HOSPADM

## 2024-09-05 RX ORDER — SERTRALINE HYDROCHLORIDE 100 MG/1
100 TABLET, FILM COATED ORAL DAILY
Status: DISCONTINUED | OUTPATIENT
Start: 2024-09-05 | End: 2024-09-06 | Stop reason: HOSPADM

## 2024-09-05 RX ORDER — DIPHENOXYLATE HYDROCHLORIDE AND ATROPINE SULFATE 2.5; .025 MG/1; MG/1
1 TABLET ORAL DAILY
Status: DISCONTINUED | OUTPATIENT
Start: 2024-09-05 | End: 2024-09-06 | Stop reason: HOSPADM

## 2024-09-05 RX ORDER — ATORVASTATIN CALCIUM 80 MG/1
80 TABLET, FILM COATED ORAL NIGHTLY
Status: DISCONTINUED | OUTPATIENT
Start: 2024-09-05 | End: 2024-09-06 | Stop reason: HOSPADM

## 2024-09-05 RX ORDER — HYDROCODONE BITARTRATE AND ACETAMINOPHEN 5; 325 MG/1; MG/1
1 TABLET ORAL EVERY 4 HOURS PRN
Status: DISCONTINUED | OUTPATIENT
Start: 2024-09-05 | End: 2024-09-06 | Stop reason: HOSPADM

## 2024-09-05 RX ORDER — MIDAZOLAM HYDROCHLORIDE 1 MG/ML
INJECTION INTRAMUSCULAR; INTRAVENOUS
Status: DISCONTINUED | OUTPATIENT
Start: 2024-09-05 | End: 2024-09-05 | Stop reason: HOSPADM

## 2024-09-05 RX ORDER — FENTANYL CITRATE 50 UG/ML
INJECTION, SOLUTION INTRAMUSCULAR; INTRAVENOUS
Status: DISCONTINUED | OUTPATIENT
Start: 2024-09-05 | End: 2024-09-05 | Stop reason: HOSPADM

## 2024-09-05 RX ORDER — ACETAMINOPHEN 325 MG/1
650 TABLET ORAL EVERY 4 HOURS PRN
Status: DISCONTINUED | OUTPATIENT
Start: 2024-09-05 | End: 2024-09-06 | Stop reason: HOSPADM

## 2024-09-05 RX ORDER — ONDANSETRON 4 MG/1
4 TABLET, ORALLY DISINTEGRATING ORAL EVERY 6 HOURS PRN
Status: DISCONTINUED | OUTPATIENT
Start: 2024-09-05 | End: 2024-09-06 | Stop reason: HOSPADM

## 2024-09-05 RX ORDER — LISINOPRIL 10 MG/1
10 TABLET ORAL DAILY
Status: DISCONTINUED | OUTPATIENT
Start: 2024-09-05 | End: 2024-09-06 | Stop reason: HOSPADM

## 2024-09-05 RX ORDER — SODIUM CHLORIDE 9 MG/ML
75 INJECTION, SOLUTION INTRAVENOUS CONTINUOUS
Status: DISCONTINUED | OUTPATIENT
Start: 2024-09-05 | End: 2024-09-05 | Stop reason: SDUPTHER

## 2024-09-05 RX ORDER — ISOSORBIDE MONONITRATE 30 MG/1
30 TABLET, EXTENDED RELEASE ORAL
Status: DISCONTINUED | OUTPATIENT
Start: 2024-09-05 | End: 2024-09-06 | Stop reason: HOSPADM

## 2024-09-05 RX ORDER — INSULIN LISPRO 100 [IU]/ML
16 INJECTION, SOLUTION INTRAVENOUS; SUBCUTANEOUS
Status: DISCONTINUED | OUTPATIENT
Start: 2024-09-05 | End: 2024-09-06 | Stop reason: HOSPADM

## 2024-09-05 RX ORDER — NICOTINE 21 MG/24HR
1 PATCH, TRANSDERMAL 24 HOURS TRANSDERMAL EVERY 24 HOURS
Status: DISCONTINUED | OUTPATIENT
Start: 2024-09-05 | End: 2024-09-06 | Stop reason: HOSPADM

## 2024-09-05 RX ORDER — SODIUM CHLORIDE 0.9 % (FLUSH) 0.9 %
10 SYRINGE (ML) INJECTION AS NEEDED
Status: DISCONTINUED | OUTPATIENT
Start: 2024-09-05 | End: 2024-09-05 | Stop reason: HOSPADM

## 2024-09-05 RX ORDER — ASPIRIN 325 MG
TABLET ORAL
Status: DISCONTINUED | OUTPATIENT
Start: 2024-09-05 | End: 2024-09-05 | Stop reason: HOSPADM

## 2024-09-05 RX ORDER — SODIUM CHLORIDE 9 MG/ML
75 INJECTION, SOLUTION INTRAVENOUS ONCE
Status: DISCONTINUED | OUTPATIENT
Start: 2024-09-05 | End: 2024-09-06 | Stop reason: HOSPADM

## 2024-09-05 RX ORDER — SUCRALFATE 1 G/1
1 TABLET ORAL 4 TIMES DAILY
Status: DISCONTINUED | OUTPATIENT
Start: 2024-09-05 | End: 2024-09-06 | Stop reason: HOSPADM

## 2024-09-05 RX ORDER — ONDANSETRON 2 MG/ML
4 INJECTION INTRAMUSCULAR; INTRAVENOUS EVERY 6 HOURS PRN
Status: DISCONTINUED | OUTPATIENT
Start: 2024-09-05 | End: 2024-09-06 | Stop reason: HOSPADM

## 2024-09-05 RX ORDER — CLOPIDOGREL BISULFATE 75 MG/1
75 TABLET ORAL DAILY
Status: DISCONTINUED | OUTPATIENT
Start: 2024-09-06 | End: 2024-09-06 | Stop reason: HOSPADM

## 2024-09-05 RX ORDER — LIDOCAINE HYDROCHLORIDE 20 MG/ML
INJECTION, SOLUTION INFILTRATION; PERINEURAL
Status: DISCONTINUED | OUTPATIENT
Start: 2024-09-05 | End: 2024-09-05 | Stop reason: HOSPADM

## 2024-09-05 RX ORDER — NITROGLYCERIN 0.4 MG/1
0.4 TABLET SUBLINGUAL
Status: DISCONTINUED | OUTPATIENT
Start: 2024-09-05 | End: 2024-09-06 | Stop reason: HOSPADM

## 2024-09-05 RX ORDER — METOPROLOL TARTRATE 25 MG/1
25 TABLET, FILM COATED ORAL EVERY 12 HOURS SCHEDULED
Status: DISCONTINUED | OUTPATIENT
Start: 2024-09-05 | End: 2024-09-06 | Stop reason: HOSPADM

## 2024-09-05 RX ORDER — PANTOPRAZOLE SODIUM 40 MG/1
40 TABLET, DELAYED RELEASE ORAL
Status: DISCONTINUED | OUTPATIENT
Start: 2024-09-06 | End: 2024-09-06 | Stop reason: HOSPADM

## 2024-09-05 RX ORDER — OXYCODONE AND ACETAMINOPHEN 10; 325 MG/1; MG/1
1 TABLET ORAL EVERY 6 HOURS PRN
Status: DISCONTINUED | OUTPATIENT
Start: 2024-09-05 | End: 2024-09-06 | Stop reason: HOSPADM

## 2024-09-05 RX ORDER — CLOPIDOGREL BISULFATE 75 MG/1
TABLET ORAL
Status: DISCONTINUED | OUTPATIENT
Start: 2024-09-05 | End: 2024-09-05 | Stop reason: HOSPADM

## 2024-09-05 RX ORDER — INSULIN LISPRO 100 [IU]/ML
2-9 INJECTION, SOLUTION INTRAVENOUS; SUBCUTANEOUS
Status: DISCONTINUED | OUTPATIENT
Start: 2024-09-05 | End: 2024-09-06 | Stop reason: HOSPADM

## 2024-09-05 RX ORDER — DEXTROSE MONOHYDRATE 25 G/50ML
25 INJECTION, SOLUTION INTRAVENOUS
Status: DISCONTINUED | OUTPATIENT
Start: 2024-09-05 | End: 2024-09-06 | Stop reason: HOSPADM

## 2024-09-05 RX ORDER — HEPARIN SODIUM 1000 [USP'U]/ML
INJECTION, SOLUTION INTRAVENOUS; SUBCUTANEOUS
Status: DISCONTINUED | OUTPATIENT
Start: 2024-09-05 | End: 2024-09-05 | Stop reason: HOSPADM

## 2024-09-05 RX ORDER — IOPAMIDOL 755 MG/ML
INJECTION, SOLUTION INTRAVASCULAR
Status: DISCONTINUED | OUTPATIENT
Start: 2024-09-05 | End: 2024-09-05 | Stop reason: HOSPADM

## 2024-09-05 RX ORDER — NICOTINE POLACRILEX 4 MG
15 LOZENGE BUCCAL
Status: DISCONTINUED | OUTPATIENT
Start: 2024-09-05 | End: 2024-09-06 | Stop reason: HOSPADM

## 2024-09-05 RX ORDER — OMEPRAZOLE 40 MG/1
40 CAPSULE, DELAYED RELEASE ORAL 2 TIMES DAILY
COMMUNITY

## 2024-09-05 RX ORDER — VERAPAMIL HYDROCHLORIDE 2.5 MG/ML
INJECTION, SOLUTION INTRAVENOUS
Status: DISCONTINUED | OUTPATIENT
Start: 2024-09-05 | End: 2024-09-05 | Stop reason: HOSPADM

## 2024-09-05 RX ORDER — IBUPROFEN 600 MG/1
1 TABLET ORAL
Status: DISCONTINUED | OUTPATIENT
Start: 2024-09-05 | End: 2024-09-06 | Stop reason: HOSPADM

## 2024-09-05 RX ADMIN — ACETAMINOPHEN 325MG 650 MG: 325 TABLET ORAL at 14:00

## 2024-09-05 RX ADMIN — LISINOPRIL 10 MG: 10 TABLET ORAL at 15:04

## 2024-09-05 RX ADMIN — INSULIN LISPRO 4 UNITS: 100 INJECTION, SOLUTION INTRAVENOUS; SUBCUTANEOUS at 17:18

## 2024-09-05 RX ADMIN — INSULIN LISPRO 16 UNITS: 100 INJECTION, SOLUTION INTRAVENOUS; SUBCUTANEOUS at 17:18

## 2024-09-05 RX ADMIN — Medication 1 TABLET: at 15:04

## 2024-09-05 RX ADMIN — SODIUM CHLORIDE 75 ML/HR: 9 INJECTION, SOLUTION INTRAVENOUS at 08:24

## 2024-09-05 RX ADMIN — ATORVASTATIN CALCIUM 80 MG: 80 TABLET, FILM COATED ORAL at 20:51

## 2024-09-05 RX ADMIN — SODIUM CHLORIDE 75 ML/HR: 9 INJECTION, SOLUTION INTRAVENOUS at 13:44

## 2024-09-05 RX ADMIN — SERTRALINE 100 MG: 100 TABLET, FILM COATED ORAL at 15:04

## 2024-09-05 RX ADMIN — ISOSORBIDE MONONITRATE 30 MG: 30 TABLET, EXTENDED RELEASE ORAL at 15:04

## 2024-09-05 RX ADMIN — PREGABALIN 75 MG: 75 CAPSULE ORAL at 14:00

## 2024-09-05 RX ADMIN — SUCRALFATE 1 G: 1 TABLET ORAL at 20:51

## 2024-09-05 RX ADMIN — SUCRALFATE 1 G: 1 TABLET ORAL at 15:04

## 2024-09-05 RX ADMIN — INSULIN GLARGINE 45 UNITS: 100 INJECTION, SOLUTION SUBCUTANEOUS at 20:50

## 2024-09-05 RX ADMIN — OXYCODONE AND ACETAMINOPHEN 1 TABLET: 325; 10 TABLET ORAL at 20:53

## 2024-09-05 RX ADMIN — METOPROLOL TARTRATE 25 MG: 25 TABLET, FILM COATED ORAL at 14:00

## 2024-09-05 NOTE — Clinical Note
First balloon inflation max pressure = 20 megan. First balloon inflation duration = 5 seconds. Second inflation of balloon - Max pressure = 20 megan. 2nd Inflation of balloon - Duration = 6 seconds. 2nd inflation was done at 13:01 EDT. Third inflation of balloon - Max pressure = 20 megan. 3rd Inflation of balloon - Duration = 6 seconds. 3rd inflation was done at 13:01 EDT.

## 2024-09-05 NOTE — TELEPHONE ENCOUNTER
Left voicemail for Hugh R McBurney requesting callback.    HUB: please transfer to Triage if patient returns call    Thank you,  Minnie ROSA RN  Triage Nurse [facility]  09/05/24   08:42 EDT

## 2024-09-05 NOTE — Clinical Note
First balloon inflation max pressure = 22 megan. First balloon inflation duration = 8 seconds. Second inflation of balloon - Max pressure = 20 megan. 2nd Inflation of balloon - Duration = 10 seconds. 2nd inflation was done at 12:52 EDT.

## 2024-09-05 NOTE — Clinical Note
First balloon inflation max pressure = 20 megan. First balloon inflation duration = 8 seconds. Second inflation of balloon - Max pressure = 20 megan. 2nd Inflation of balloon - Duration = 8 seconds. 2nd inflation was done at 12:54 EDT.

## 2024-09-05 NOTE — INTERVAL H&P NOTE
Continued chest pain despite medical therapy referred back for left heart cath.  H&P reviewed. The patient was examined and there are no changes to the H&P. I have explained the risks and benefits of the procedure to the patient.  The patient understands and agrees to proceed

## 2024-09-05 NOTE — TELEPHONE ENCOUNTER
Hugh R McBurney returned call.  Reviewed results and recommendations with patient and he verbalized understanding or results and recommendations.  Patient stated he is currently in cath lab now.    Thank you,  Minnie ROSA RN  Triage Nurse [facility]  09/05/24 08:53 EDT

## 2024-09-05 NOTE — PLAN OF CARE
Goal Outcome Evaluation:  Plan of Care Reviewed With: patient, spouse        Progress: improving  Outcome Evaluation: S/p left heart cath. 1 stent to LAD. Radial site c/d/s. NSR on monitor, room air, last /69. Minimal c/o pain in right extremity, home insulin and lantus restarted. Hopefully home tomorrrow.

## 2024-09-05 NOTE — Clinical Note
Hemostasis started on the right radial artery. R-Band was used in achieving hemostasis. Radial compression device applied to vessel. Hemostasis achieved successfully. Closure device additional comment: 15cc air

## 2024-09-05 NOTE — Clinical Note
First balloon inflation max pressure = 14 megan. First balloon inflation duration = 6 seconds. Second inflation of balloon - Max pressure = 20 megan. 2nd Inflation of balloon - Duration = 5 seconds. 2nd inflation was done at 12:39 EDT. Third inflation of balloon - Max pressure = 20 megan. 3rd Inflation of balloon - Duration = 6 seconds. 3rd inflation was done at 12:40 EDT.

## 2024-09-06 VITALS
HEART RATE: 60 BPM | SYSTOLIC BLOOD PRESSURE: 127 MMHG | DIASTOLIC BLOOD PRESSURE: 67 MMHG | HEIGHT: 74 IN | TEMPERATURE: 97.7 F | WEIGHT: 295 LBS | RESPIRATION RATE: 16 BRPM | BODY MASS INDEX: 37.86 KG/M2 | OXYGEN SATURATION: 98 %

## 2024-09-06 LAB
ANION GAP SERPL CALCULATED.3IONS-SCNC: 9.4 MMOL/L (ref 5–15)
BUN SERPL-MCNC: 64 MG/DL (ref 8–23)
BUN/CREAT SERPL: 18.3 (ref 7–25)
CALCIUM SPEC-SCNC: 8.3 MG/DL (ref 8.6–10.5)
CHLORIDE SERPL-SCNC: 106 MMOL/L (ref 98–107)
CHOLEST SERPL-MCNC: 85 MG/DL (ref 0–200)
CO2 SERPL-SCNC: 21.6 MMOL/L (ref 22–29)
CREAT SERPL-MCNC: 3.49 MG/DL (ref 0.76–1.27)
DEPRECATED RDW RBC AUTO: 44.4 FL (ref 37–54)
EGFRCR SERPLBLD CKD-EPI 2021: 17.7 ML/MIN/1.73
ERYTHROCYTE [DISTWIDTH] IN BLOOD BY AUTOMATED COUNT: 12.4 % (ref 12.3–15.4)
GLUCOSE BLDC GLUCOMTR-MCNC: 90 MG/DL (ref 70–130)
GLUCOSE SERPL-MCNC: 79 MG/DL (ref 65–99)
HBA1C MFR BLD: 7.2 % (ref 4.8–5.6)
HCT VFR BLD AUTO: 27.6 % (ref 37.5–51)
HDLC SERPL-MCNC: 29 MG/DL (ref 40–60)
HGB BLD-MCNC: 8.9 G/DL (ref 13–17.7)
LDLC SERPL CALC-MCNC: 40 MG/DL (ref 0–100)
LDLC/HDLC SERPL: 1.4 {RATIO}
MCH RBC QN AUTO: 31.8 PG (ref 26.6–33)
MCHC RBC AUTO-ENTMCNC: 32.2 G/DL (ref 31.5–35.7)
MCV RBC AUTO: 98.6 FL (ref 79–97)
PLATELET # BLD AUTO: 136 10*3/MM3 (ref 140–450)
PMV BLD AUTO: 12 FL (ref 6–12)
POTASSIUM SERPL-SCNC: 5.2 MMOL/L (ref 3.5–5.2)
RBC # BLD AUTO: 2.8 10*6/MM3 (ref 4.14–5.8)
SODIUM SERPL-SCNC: 137 MMOL/L (ref 136–145)
TRIGL SERPL-MCNC: 77 MG/DL (ref 0–150)
VLDLC SERPL-MCNC: 16 MG/DL (ref 5–40)
WBC NRBC COR # BLD AUTO: 8.24 10*3/MM3 (ref 3.4–10.8)

## 2024-09-06 PROCEDURE — 80061 LIPID PANEL: CPT | Performed by: INTERNAL MEDICINE

## 2024-09-06 PROCEDURE — A9270 NON-COVERED ITEM OR SERVICE: HCPCS | Performed by: INTERNAL MEDICINE

## 2024-09-06 PROCEDURE — 63710000001 SUCRALFATE 1 G TABLET: Performed by: INTERNAL MEDICINE

## 2024-09-06 PROCEDURE — 63710000001 ASPIRIN 81 MG TABLET DELAYED-RELEASE: Performed by: INTERNAL MEDICINE

## 2024-09-06 PROCEDURE — 63710000001 OXYCODONE-ACETAMINOPHEN 10-325 MG TABLET: Performed by: INTERNAL MEDICINE

## 2024-09-06 PROCEDURE — 63710000001 METOPROLOL TARTRATE 25 MG TABLET: Performed by: INTERNAL MEDICINE

## 2024-09-06 PROCEDURE — G0378 HOSPITAL OBSERVATION PER HR: HCPCS

## 2024-09-06 PROCEDURE — 63710000001 CLOPIDOGREL 75 MG TABLET: Performed by: INTERNAL MEDICINE

## 2024-09-06 PROCEDURE — 85027 COMPLETE CBC AUTOMATED: CPT | Performed by: INTERNAL MEDICINE

## 2024-09-06 PROCEDURE — 93010 ELECTROCARDIOGRAM REPORT: CPT | Performed by: INTERNAL MEDICINE

## 2024-09-06 PROCEDURE — 63710000001 PANTOPRAZOLE 40 MG TABLET DELAYED-RELEASE: Performed by: INTERNAL MEDICINE

## 2024-09-06 PROCEDURE — 63710000001 ISOSORBIDE MONONITRATE 30 MG TABLET SUSTAINED-RELEASE 24 HOUR: Performed by: INTERNAL MEDICINE

## 2024-09-06 PROCEDURE — 63710000001 INSULIN GLARGINE PER 5 UNITS: Performed by: INTERNAL MEDICINE

## 2024-09-06 PROCEDURE — 93005 ELECTROCARDIOGRAM TRACING: CPT | Performed by: INTERNAL MEDICINE

## 2024-09-06 PROCEDURE — 82948 REAGENT STRIP/BLOOD GLUCOSE: CPT

## 2024-09-06 PROCEDURE — 63710000001 MULTIVITAMIN TABLET: Performed by: INTERNAL MEDICINE

## 2024-09-06 PROCEDURE — 99238 HOSP IP/OBS DSCHRG MGMT 30/<: CPT | Performed by: INTERNAL MEDICINE

## 2024-09-06 PROCEDURE — 63710000001 PREGABALIN 75 MG CAPSULE: Performed by: INTERNAL MEDICINE

## 2024-09-06 PROCEDURE — 63710000001 LISINOPRIL 10 MG TABLET: Performed by: INTERNAL MEDICINE

## 2024-09-06 PROCEDURE — 63710000001 SERTRALINE 100 MG TABLET: Performed by: INTERNAL MEDICINE

## 2024-09-06 PROCEDURE — 80048 BASIC METABOLIC PNL TOTAL CA: CPT | Performed by: INTERNAL MEDICINE

## 2024-09-06 PROCEDURE — 63710000001 INSULIN LISPRO (HUMAN) PER 5 UNITS: Performed by: INTERNAL MEDICINE

## 2024-09-06 PROCEDURE — 83036 HEMOGLOBIN GLYCOSYLATED A1C: CPT | Performed by: INTERNAL MEDICINE

## 2024-09-06 RX ORDER — CLOPIDOGREL BISULFATE 75 MG/1
75 TABLET ORAL DAILY
Qty: 90 TABLET | Refills: 3 | Status: SHIPPED | OUTPATIENT
Start: 2024-09-07

## 2024-09-06 RX ORDER — ASPIRIN 81 MG/1
81 TABLET ORAL DAILY
Start: 2024-09-07

## 2024-09-06 RX ADMIN — SUCRALFATE 1 G: 1 TABLET ORAL at 08:30

## 2024-09-06 RX ADMIN — CLOPIDOGREL BISULFATE 75 MG: 75 TABLET, FILM COATED ORAL at 08:30

## 2024-09-06 RX ADMIN — OXYCODONE AND ACETAMINOPHEN 1 TABLET: 325; 10 TABLET ORAL at 05:46

## 2024-09-06 RX ADMIN — METOPROLOL TARTRATE 25 MG: 25 TABLET, FILM COATED ORAL at 00:04

## 2024-09-06 RX ADMIN — ASPIRIN 81 MG: 81 TABLET, COATED ORAL at 08:30

## 2024-09-06 RX ADMIN — PANTOPRAZOLE SODIUM 40 MG: 40 TABLET, DELAYED RELEASE ORAL at 05:46

## 2024-09-06 RX ADMIN — INSULIN GLARGINE 40 UNITS: 100 INJECTION, SOLUTION SUBCUTANEOUS at 08:33

## 2024-09-06 RX ADMIN — ISOSORBIDE MONONITRATE 30 MG: 30 TABLET, EXTENDED RELEASE ORAL at 08:30

## 2024-09-06 RX ADMIN — Medication 1 TABLET: at 08:30

## 2024-09-06 RX ADMIN — METOPROLOL TARTRATE 25 MG: 25 TABLET, FILM COATED ORAL at 08:31

## 2024-09-06 RX ADMIN — LISINOPRIL 10 MG: 10 TABLET ORAL at 08:30

## 2024-09-06 RX ADMIN — PREGABALIN 75 MG: 75 CAPSULE ORAL at 08:30

## 2024-09-06 RX ADMIN — SERTRALINE 100 MG: 100 TABLET, FILM COATED ORAL at 08:30

## 2024-09-06 RX ADMIN — INSULIN LISPRO 10 UNITS: 100 INJECTION, SOLUTION INTRAVENOUS; SUBCUTANEOUS at 08:32

## 2024-09-06 NOTE — PROGRESS NOTES
[facility] Clinical [facility]acy [facility]: National Cardiology Data Registry (NCDR) Medication Review    Hugh R McBurney was counseled over medications for medical management of NSTEMI prior to discharge. Pharmacy to review discharge medications to make sure appropriate medications have been prescribed.     Patient has been discharged on the following:  P2Y12 Inhibitor: clopidogrel 75 mg   Aspirin: Y, 81mg  High Intensity Statin: atorvastatin 80 mg   Beta-blocker: metoprolol 25 mg BID  LVEF >40: EF 44.9% from 8/29/2024 - on lisinopril 10 mg     These medications meet the requirements for NCDR discharge medication for chest pain and MI.     Lolis Vaca, Pharmacy Intern  Clinical Pharmacist

## 2024-09-06 NOTE — DISCHARGE SUMMARY
Hugh R McBurney  [id number]    Date of Admit: 9/5/2024  Date of Discharge:  9/6/2024    Discharge Diagnosis:  Active Hospital Problems    Diagnosis  POA    **CAD (coronary artery disease) [I25.10]  Yes    Precordial chest pain [R07.2]  Unknown    CKD (chronic kidney disease) stage 4, GFR 15-29 ml/min [N18.4]  Unknown    Abnormal nuclear stress test [R94.39]  Unknown      Resolved Hospital Problems   No resolved problems to display.       Hospital Course: He was sent electively for left heart cath we found he had critical disease in his LAD underwent successful angioplasty drug-eluting stenting optimized with IVUS.  The indeterminate lesion on his RCA that was interrogated with RFR and felt to be not hemodynamically significant.  He has significant renal insufficiency his creatinine runs about 3.76 and his post creatinine today was 3.4.  We are going to let him go home he will follow-up with Elayne Cristobal in a week and then see Dr. Moreno in a month    Procedures Performed  Procedure(s):  Coronary angiography  Left heart cath  Resting Full Cycle Ratio  Percutaneous Coronary Intervention  Stent AILIN coronary  Intravascular Ultrasound       Consults       No orders found for last 30 day(s).            Discharge Medications     Your medication list        START taking these medications        Instructions Last Dose Given Next Dose Due   aspirin 81 MG EC tablet  Start taking on: September 7, 2024      Take 1 tablet by mouth Daily.       clopidogrel 75 MG tablet  Commonly known as: PLAVIX  Start taking on: September 7, 2024      Take 1 tablet by mouth Daily.              CONTINUE taking these medications        Instructions Last Dose Given Next Dose Due   atorvastatin 80 MG tablet  Commonly known as: LIPITOR      Take 1 tablet by mouth Daily.       DAILY FIBER PO      Take 1 tablet by mouth Daily.       FreeStyle Martha 2 Sensor misc      Use 1 each Every 14 (Fourteen) Days.       HumaLOG KwikPen 100 UNIT/ML solution  pen-injector  Generic drug: Insulin Lispro (1 Unit Dial)      Inject 16 Units under the skin into the appropriate area as directed Daily With Breakfast AND 16 Units Daily Before Lunch AND 20 Units Daily With Dinner. Adds sliding scale based on BS reading. Max daily dose 75u daily  Indications: Type 2 Diabetes       Insulin Pen Needle 32G X 4 MM misc  Commonly known as: BD Pen Needle Mireille U/F      TO INJECT 4 TIMES DAILY       isosorbide mononitrate 30 MG 24 hr tablet  Commonly known as: IMDUR      isosorbide mononitrate ER 30 mg tablet,extended release 24 hr       Lantus SoloStar 100 UNIT/ML injection pen  Generic drug: Insulin Glargine      50u QAM and 45u QPM       lisinopril 10 MG tablet  Commonly known as: PRINIVIL,ZESTRIL      Take 1 tablet by mouth Daily.       metoprolol tartrate 25 MG tablet  Commonly known as: LOPRESSOR      Take 1 tablet by mouth Every 12 (Twelve) Hours.       multivitamin tablet tablet  Commonly known as: THERAGRAN      Take 1 tablet by mouth Daily. HOLD PRIOR TO SURGERY       omeprazole 40 MG capsule  Commonly known as: priLOSEC      Take 1 capsule by mouth 2 (Two) Times a Day.       oxyCODONE-acetaminophen  MG per tablet  Commonly known as: PERCOCET      Take 1 tablet by mouth Every 6 (Six) Hours As Needed.       pregabalin 150 MG capsule  Commonly known as: LYRICA      Take 1 capsule by mouth.       sertraline 100 MG tablet  Commonly known as: ZOLOFT      Take 1 tablet by mouth Daily.       sucralfate 1 g tablet  Commonly known as: Carafate      Take 1 tablet by mouth 4 (Four) Times a Day.       Torsemide 60 MG tablet      Take 1 tablet by mouth Daily.       Turmeric 500 MG capsule      Take 1 capsule by mouth Daily.                 Where to Get Your Medications        These medications were sent to [facility] [place]  [place], [place] - [address] - [phone]  - [phone] FX  [address] [place] [address]      Phone: [phone]   clopidogrel 75 MG tablet        Information about where to get these medications is not yet available    Ask your nurse or doctor about these medications  aspirin 81 MG EC tablet         Discharge Diet:     Activity at Discharge:     Discharge disposition: home    Condition on Discharge: stable    Follow-up Appointments  Future Appointments   Date Time Provider Department [place]   9/10/2024 11:00 AM Minnie Rodriguez APRN [facility]   9/10/2024 12:30 PM ROOM 6 [facility] WOUND CARE [facility]   12/26/2024  1:00 PM Minnie Dsouza APRN [facility] EN  [place]   3/31/2025  3:20 PM Tiburcio Moreno MD [facility] CD [id number] None     Additional Instructions for the Follow-ups that You Need to Schedule       Ambulatory Referral to Cardiac Rehab   As directed      Discharge Follow-up with Specified Provider: Derik Cristobal in 1 week and Dr. Moreno in 1 month   As directed      To: Derik Cristobal in 1 week and Dr. Moreno in 1 month                Test Results Pending at Discharge       Baldomero Wheeler MD  09/06/24  10:22 EDT

## 2024-09-08 LAB
QT INTERVAL: 493 MS
QT INTERVAL: 493 MS
QTC INTERVAL: 455 MS
QTC INTERVAL: 529 MS

## 2024-09-10 ENCOUNTER — TRANSCRIBE ORDERS (OUTPATIENT)
Dept: ADMINISTRATIVE | Facility: HOSPITAL | Age: 74
End: 2024-09-10
Payer: MEDICARE

## 2024-09-10 ENCOUNTER — OFFICE VISIT (OUTPATIENT)
Dept: GASTROENTEROLOGY | Facility: CLINIC | Age: 74
End: 2024-09-10
Payer: MEDICARE

## 2024-09-10 ENCOUNTER — LAB (OUTPATIENT)
Dept: LAB | Facility: HOSPITAL | Age: 74
End: 2024-09-10
Payer: MEDICARE

## 2024-09-10 ENCOUNTER — APPOINTMENT (OUTPATIENT)
Dept: WOUND CARE | Facility: HOSPITAL | Age: 74
End: 2024-09-10
Payer: MEDICARE

## 2024-09-10 VITALS
DIASTOLIC BLOOD PRESSURE: 58 MMHG | SYSTOLIC BLOOD PRESSURE: 110 MMHG | BODY MASS INDEX: 36.96 KG/M2 | HEIGHT: 74 IN | WEIGHT: 288 LBS

## 2024-09-10 DIAGNOSIS — N18.4 CHRONIC KIDNEY DISEASE, STAGE IV (SEVERE): Primary | ICD-10-CM

## 2024-09-10 DIAGNOSIS — G47.33 OBSTRUCTIVE SLEEP APNEA (ADULT) (PEDIATRIC): ICD-10-CM

## 2024-09-10 DIAGNOSIS — E78.2 MIXED HYPERLIPIDEMIA: ICD-10-CM

## 2024-09-10 DIAGNOSIS — K22.70 BARRETT'S ESOPHAGUS WITHOUT DYSPLASIA: ICD-10-CM

## 2024-09-10 DIAGNOSIS — G89.29 OTHER CHRONIC PAIN: ICD-10-CM

## 2024-09-10 DIAGNOSIS — R80.1 PERSISTENT PROTEINURIA: ICD-10-CM

## 2024-09-10 DIAGNOSIS — I10 ESSENTIAL HYPERTENSION, MALIGNANT: ICD-10-CM

## 2024-09-10 DIAGNOSIS — K59.03 THERAPEUTIC OPIOID-INDUCED CONSTIPATION (OIC): ICD-10-CM

## 2024-09-10 DIAGNOSIS — N18.4 CHRONIC KIDNEY DISEASE, STAGE IV (SEVERE): ICD-10-CM

## 2024-09-10 DIAGNOSIS — E66.09 EXOGENOUS OBESITY: ICD-10-CM

## 2024-09-10 DIAGNOSIS — K21.9 CHALASIA OF LOWER ESOPHAGEAL SPHINCTER: ICD-10-CM

## 2024-09-10 DIAGNOSIS — E11.22 TYPE 2 DIABETES MELLITUS WITH DIABETIC CHRONIC KIDNEY DISEASE, UNSPECIFIED CKD STAGE, UNSPECIFIED WHETHER LONG TERM INSULIN USE: ICD-10-CM

## 2024-09-10 DIAGNOSIS — C22.0 CARCINOMA OF LIVER: ICD-10-CM

## 2024-09-10 DIAGNOSIS — E55.9 AVITAMINOSIS D: ICD-10-CM

## 2024-09-10 DIAGNOSIS — Z86.010 PERSONAL HISTORY OF COLONIC POLYPS: ICD-10-CM

## 2024-09-10 DIAGNOSIS — I25.10 DISEASE OF CARDIOVASCULAR SYSTEM: ICD-10-CM

## 2024-09-10 DIAGNOSIS — K21.00 GASTROESOPHAGEAL REFLUX DISEASE WITH ESOPHAGITIS WITHOUT HEMORRHAGE: Primary | ICD-10-CM

## 2024-09-10 DIAGNOSIS — T40.2X5A THERAPEUTIC OPIOID-INDUCED CONSTIPATION (OIC): ICD-10-CM

## 2024-09-10 LAB
25(OH)D3 SERPL-MCNC: 37.5 NG/ML (ref 30–100)
ALBUMIN SERPL-MCNC: 3.4 G/DL (ref 3.5–5.2)
ANION GAP SERPL CALCULATED.3IONS-SCNC: 9.8 MMOL/L (ref 5–15)
BASOPHILS # BLD AUTO: 0.06 10*3/MM3 (ref 0–0.2)
BASOPHILS NFR BLD AUTO: 0.7 % (ref 0–1.5)
BUN SERPL-MCNC: 46 MG/DL (ref 8–23)
BUN/CREAT SERPL: 14.5 (ref 7–25)
CALCIUM SPEC-SCNC: 9.3 MG/DL (ref 8.6–10.5)
CHLORIDE SERPL-SCNC: 108 MMOL/L (ref 98–107)
CO2 SERPL-SCNC: 22.2 MMOL/L (ref 22–29)
CREAT SERPL-MCNC: 3.18 MG/DL (ref 0.76–1.27)
DEPRECATED RDW RBC AUTO: 42.5 FL (ref 37–54)
EGFRCR SERPLBLD CKD-EPI 2021: 19.8 ML/MIN/1.73
EOSINOPHIL # BLD AUTO: 0.4 10*3/MM3 (ref 0–0.4)
EOSINOPHIL NFR BLD AUTO: 4.4 % (ref 0.3–6.2)
ERYTHROCYTE [DISTWIDTH] IN BLOOD BY AUTOMATED COUNT: 12.1 % (ref 12.3–15.4)
FERRITIN SERPL-MCNC: 37.4 NG/ML (ref 30–400)
GLUCOSE SERPL-MCNC: 64 MG/DL (ref 65–99)
HCT VFR BLD AUTO: 31 % (ref 37.5–51)
HGB BLD-MCNC: 10.2 G/DL (ref 13–17.7)
IMM GRANULOCYTES # BLD AUTO: 0.02 10*3/MM3 (ref 0–0.05)
IMM GRANULOCYTES NFR BLD AUTO: 0.2 % (ref 0–0.5)
IRON 24H UR-MRATE: 56 MCG/DL (ref 59–158)
IRON SATN MFR SERPL: 14 % (ref 20–50)
LYMPHOCYTES # BLD AUTO: 1.38 10*3/MM3 (ref 0.7–3.1)
LYMPHOCYTES NFR BLD AUTO: 15.2 % (ref 19.6–45.3)
MCH RBC QN AUTO: 32.1 PG (ref 26.6–33)
MCHC RBC AUTO-ENTMCNC: 32.9 G/DL (ref 31.5–35.7)
MCV RBC AUTO: 97.5 FL (ref 79–97)
MONOCYTES # BLD AUTO: 0.72 10*3/MM3 (ref 0.1–0.9)
MONOCYTES NFR BLD AUTO: 7.9 % (ref 5–12)
NEUTROPHILS NFR BLD AUTO: 6.49 10*3/MM3 (ref 1.7–7)
NEUTROPHILS NFR BLD AUTO: 71.6 % (ref 42.7–76)
NRBC BLD AUTO-RTO: 0 /100 WBC (ref 0–0.2)
PHOSPHATE SERPL-MCNC: 4.2 MG/DL (ref 2.5–4.5)
PLATELET # BLD AUTO: 176 10*3/MM3 (ref 140–450)
PMV BLD AUTO: 12.2 FL (ref 6–12)
POTASSIUM SERPL-SCNC: 5.1 MMOL/L (ref 3.5–5.2)
RBC # BLD AUTO: 3.18 10*6/MM3 (ref 4.14–5.8)
SODIUM SERPL-SCNC: 140 MMOL/L (ref 136–145)
TIBC SERPL-MCNC: 393 MCG/DL (ref 298–536)
TRANSFERRIN SERPL-MCNC: 264 MG/DL (ref 200–360)
WBC NRBC COR # BLD AUTO: 9.07 10*3/MM3 (ref 3.4–10.8)

## 2024-09-10 PROCEDURE — 82728 ASSAY OF FERRITIN: CPT

## 2024-09-10 PROCEDURE — 83540 ASSAY OF IRON: CPT

## 2024-09-10 PROCEDURE — 36415 COLL VENOUS BLD VENIPUNCTURE: CPT

## 2024-09-10 PROCEDURE — 82306 VITAMIN D 25 HYDROXY: CPT

## 2024-09-10 PROCEDURE — 85025 COMPLETE CBC W/AUTO DIFF WBC: CPT

## 2024-09-10 PROCEDURE — 80069 RENAL FUNCTION PANEL: CPT

## 2024-09-10 PROCEDURE — 84466 ASSAY OF TRANSFERRIN: CPT

## 2024-09-10 NOTE — PROGRESS NOTES
PATIENT INFORMATION  Hugh R McBurney       - 1950    CHIEF COMPLAINT  Chief Complaint   Patient presents with    Acosta's Esophagus     Constipation    Gastritis        HISTORY OF PRESENT ILLNESS    Here today for EGD and Colon follow-up    2 stents last week, was having chest pains.     GERD: BID PPI. Less HB and indigestion, has improved diet some. Sucralfate a few times a day. No indigestion lately. No dysphagia or odynophagia. Will move sucralfate to PRN and see how he does.     OIC: Per LOV: Taking percocet 3-4 a day. TODAY: Fiber gummies help, moving every day or 2. If skips a day bowels are hard. No bleeding. Recommend adding daily miralax.    2024 EGD and colon for dysphagia positive for erosive gastritis, reflux esophagitis with barretts, negative for stricture, HP, dysplasia. Seems to be NSAID related injury. Colon with 15/15 adenomas, recall double in 3 yrs.     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 S/p lap microwave ablation 3/17/2021, did not require chemo. Last scans negative for recurrence. Next MRI in a month. LFTs normal.    Constipation  Pertinent negatives include no abdominal pain, diarrhea, nausea or vomiting.       REVIEWED PERTINENT RESULTS/ LABS  Lab Results   Component Value Date    CASEREPORT  2024     Surgical Pathology Report                         Case: [id number]                                  Authorizing Provider:  Kam Cooley        Collected:           2024 11:50 AM                                 MD Roel                                                                   Ordering Location:     [facility]   Received:            2024 12:52 PM                                 OR                                                                           Pathologist:           Sheryl Person MD                                                          Specimens:   1) - Gastric, Antrum, Biopsies                                                                       2) - Esophagus, Distal, Biopsies                                                                    3) - Large Intestine, Transverse Colon, transverse polyp x3                                         4) - Large Intestine, Cecum, cecal polyp x2                                                         5) - Large Intestine, Right / Ascending Colon, ascending polyp x5                                   6) - Large Intestine, Left / Descending Colon, splenic flexure polyp x2                             7) - Large Intestine, Sigmoid Colon, sigmoid polyp x3                                      FINALDX  04/29/2024     1. Stomach, antrum, biopsy:   A. Acute erosive gastritis.   B. H. pylori and CMV immunostains are pending with those results to follow as an addendum.    2. Esophagus, distal, biopsy:   A. Squamocolumnar mucosa with intestinal metaplasia, consistent with Acosta's esophagus.   B. Negative for dysplasia.     3. Transverse colon, polyps, polypectomies:   A. Tubular adenomas.    4. Cecum, polyps, polypectomies:   A. Tubular adenomas.    5. Ascending colon, polyps, polypectomies:   A. Tubular adenomas.    6. Colon, splenic flexure polyps, polypectomies:   A. Tubular adenomas.    7. Sigmoid colon, polyps, polypectomies:   A. Tubulovillous adenoma and tubular adenomas.       Lab Results   Component Value Date    HGB 8.9 (L) 09/06/2024    MCV 98.6 (H) 09/06/2024     (L) 09/06/2024    ALT 11 12/08/2023    AST 14 12/08/2023    HGBA1C 7.20 (H) 09/06/2024    INR 1.1 02/12/2021    TRIG 77 09/06/2024    FERRITIN 40.90 08/20/2024    IRON 42 (L) 08/20/2024    TIBC 383 08/20/2024      Cardiac Catheterization/Vascular Study, Intravascular Ultrasound    Result Date: 9/5/2024  Narrative: CARDIAC CATHETERIZATION REPORT Procedure:Left heart catheterization, RFR of the RCA, angioplasty and drug eluting stenting, IVUS DATE OF PROCEDURE: 09/05/24 PROCEDURE PERFORMED BY: Baldomero Wheeler MD, [facility]  INDICATION FOR PROCEDURE: The gentleman with diabetes lot of angina despite medical therapy has been referred for left heart cath he has advanced renal failure.  He has been admitted early and hydrated prior to cath.  He will not have an LV gram DESCRIPTION OF PROCEDURE: After consent was obtained, access was gained in his right radial artery using a micropuncture technique. A 5-Yoruba short sheath was placed without difficulty.  Intraarterial cocktail was given.  Selective injection of the left and right coronary arteries were performed using a JL-3.5 and JR-4 diagnostic catheter.  We did measure the pressure in the left ventricle with JR4 diagnostic catheter.  Patient tolerated the procedure well without early complication and EBL was minimal.  At the conclusion, the sheath was removed and hemostasis was obtained. The patient went to the prep holding area in stable condition. FINDINGS: LEFT VENTRICULOGRAPHY: The LV pressure was 140/22.   There was no gradient across the aortic valve on pullback.  Dense calcium in his own coronaries CORONARY ANGIOGRAPHY: Left main: Short and normal Left anterior descending: Densely calcified proximally I believe there is a stent there also and then in the mid LAD is subtotally occluded with faint JANINE I flow distally the first diagonal looks like it is a 90% origin lesion and it Ramus intermedius:Not present Circumflex: Normal approximately some early 30 to 40% mid circumflex disease beyond a large disease-free OM1 RCA: Is a dominant vessel.  30% proximal disease there is a 50 to 60% mid lesion regularities distally 30% disease in the mid PHU no significant disease in the PDA Interventional Note: I exchanged a 6 Fr sheath in the R radial artery over a wire.  A total of 20,000 units of heparin was given and the ACT was therapeutic.  Clopidogrel was given in a loading dose of 600 mg.  A 6 Yoruba JR 4 guiding catheter was passed up and intubated the right coronary artery.  RFR was  performed on the mid RCA and it was 0.91 which is not considered hemodynamically significant. At that point we had used about 50 cc of contrast and I elected to move forward with intervention on the LAD.  Exchanged guides and brought a 6 Voda left 3.5 guiding catheter up intubated the left main coronary artery brought a BMW wire down to the distal LAD then we inflated a 2.5 x 20 mm trek balloon in the mid LAD at 12 megan twice following that I attempted to bring a stent down but it would not go so then I exchanged the stent out and brought a 2.5 x 20 mm NC trek down after placing a guide liner.  And then dilated it at 20 megan in the mid LAD.  Following that I was able to get a 2.5 x 38 mm Xience Luz drug-eluting stent deployed at 16 megan there was 1 area of the stent that was not fully expanded was a little bit dog boned in the proximal part of the mid stent.  I brought a 3.0 x 18 mm Xience Luz drug-eluting stent up and deployed that at 16 megan just proximal to the first stent and then we postdilated the proximal part of the first stent with a 3.0 x 20 mm NC trek balloon at 20 megan and then at 22 megan and a 22 the stent fully expanded.  We then inflated to 20 megan and the more proximal stent that we put in and following that I brought a 2.5 x 20 mm NC trek balloon down inflated throughout the entire area of stent at 20 megan.  We then did IVUS on the stents and there was no evidence of dissection proximally or distally I think the left main is fine on IVUS and however I elected to then based on the IVUS pictures upsized the stent with a 3.0 x 20 mm NC trek balloon at 20 megan throughout and then we stopped this case a total of 120 cc of contrast was used there was 0% residual and JANINE-3 flow guides wires were removed TR band was applied after the sheath was removed SUMMARY: Recent severe angina with a critical lesion in the mid LAD successfully treated with angioplasty drug-eluting stenting and optimized with IVUS EBL:    Minimal Specimens: None PCI Segment:  Mid LAD Pre-stenosis:  90 Post-stenosis  0 Lesion Type  C JANINE Flow Pre  1 JANINE Flow Post 3 Dissection  none RECOMMENDATIONS: Will get admitted him overnight and watch his renal function I have notified his nephrologist and we are also going to optimize his medical therapy he is okay in the morning he probably could go home Baldomero Wheeler MD 09/05/24 13:27 EDT     Stress Test With Myocardial Perfusion One Day    Result Date: 8/27/2024  Narrative:   Myocardial perfusion imaging indicates a with only minimal lindsay-infarct ischemia involving apical septal wall.  Large fixed defect involving several walls present.Left ventricular ejection fraction is moderately reduced (Calculated EF = 38%).Abnormal LV wall motion consistent with global hypokinesis.   Findings consistent with an equivocal ECG stress test.   There is no prior study available for comparison.     Adult Transthoracic Echo Complete w/ Color, Spectral and Contrast if Necessary Per Protocol    Result Date: 8/27/2024  Narrative:   Left ventricular systolic function is mildly decreased. Calculated left ventricular EF = 44.9%   The following left ventricular wall segments are hypokinetic: apical anterior, apical lateral and apical inferior. The following left ventricular wall segments are akinetic: apical septal and apex.   Left ventricular diastolic function is consistent with (grade III w/high LAP) reversible restrictive pattern.  Elevated left atrial filling pressure present.  Diastolic function assessment may be inaccurate because of presence of mitral valve stenosis and mitral calcification.   The left atrial cavity is severely dilated.   Significantly calcified aortic valve leaflets. Mild to moderate aortic valve stenosis is present. Aortic valve area is 1.2 cm2.Peak velocity of the flow distal to the aortic valve is 224.7 cm/s. Aortic valve maximum pressure gradient is 20 mmHg. Aortic valve mean pressure gradient is 9  mmHg. Aortic valve dimensionless index is 0.4 .   There is severe mitral annular calcification and mild bileaflet mitral valve thickening and calcification.  Mild mitral valve regurgitation is present.  Mild mitral valve stenosis is present. The mitral valve mean gradient is 5 mmHg.   Calculated right ventricular systolic pressure from tricuspid regurgitation is 74 mmHg.Severe pulmonary hypertension is present.      REVIEW OF SYSTEMS  Review of Systems   Constitutional: Negative.    HENT: Negative.     Eyes: Negative.    Respiratory: Negative.     Cardiovascular: Negative.    Gastrointestinal:  Positive for constipation. Negative for abdominal pain, diarrhea, nausea and vomiting.        OIC, Acosta's, Gastritis    Endocrine: Negative.    Musculoskeletal: Negative.    Skin: Negative.    Allergic/Immunologic: Negative.    Neurological: Negative.    Hematological:  Bruises/bleeds easily.   Psychiatric/Behavioral: Negative.           ACTIVE PROBLEMS  Patient Active Problem List    Diagnosis     Therapeutic opioid-induced constipation (OIC) [K59.03, T40.2X5A]     Acosta's esophagus without dysplasia [K22.70]     Gastroesophageal reflux disease with esophagitis without hemorrhage [K21.00]     Precordial chest pain [R07.2]     CKD (chronic kidney disease) stage 4, GFR 15-29 ml/min [N18.4]     Abnormal nuclear stress test [R94.39]     Encounter for screening for malignant neoplasm of colon [Z12.11]     Esophageal dysphagia [R13.19]     Stress-induced cardiomyopathy [I51.81]     Anemia, posthemorrhagic, acute [D62]     Vitamin D insufficiency [E55.9]     Anemia, posthemorrhagic, acute [D62]     Hyponatremia [E87.1]     CAD (coronary artery disease) [I25.10]     Type 2 diabetes mellitus with proteinuria [E11.29, R80.9]     Hyponatremia [E87.1]     Hyperglycemia [R73.9]     Infection of prosthetic knee joint [T84.59XA, Z96.659]     Recent MSSA (methicillin susceptible Staphylococcus aureus) septicemia [A41.01]      Hypertension [I10]     Hyperlipidemia [E78.5]     Chronic pain [G89.29]     Knee pain, hx of previous prosthetic joint infection [M25.569]     Great toe pain, with cellulitis and gangrene [M79.676]     Diabetic ulcer of 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, with necrosis of bone [E11.621, L97.524]     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 [E11.51, I70.209]     Contusion of knee [S80.00XA]     History of knee surgery [Z98.890]          PAST MEDICAL HISTORY  Past Medical History:   Diagnosis Date    Arthritis     Chronic pain     BILATERAL KNEES AND BACK    Contusion of knee     Coronary artery disease     Depression     Diabetes mellitus     type 2 with atherosclerosis of arteries of extremities    Diabetic ulcer of toe     left foot associated with type 2 diabetes mellitus, with necrosis of bone    GERD (gastroesophageal reflux disease)     Great toe pain     with cellulitis and gangrene    History of kidney stones     Hyperlipidemia     Hypertension     Hyponatremia     Kidney stone     Knee pain     hx of previous prosthetic joint infection    Liver disease     liver mass    MSSA (methicillin susceptible Staphylococcus aureus) infection     RIGHT KNEE    NSTEMI (non-ST elevated myocardial infarction) 06/14/2017    Sleep apnea     DOES NOT USE CPAP         SURGICAL HISTORY  Past Surgical History:   Procedure Laterality Date    APPENDECTOMY      CARDIAC CATHETERIZATION N/A 6/14/2017    Procedure: Left Heart Cath;  Surgeon: Tiburcio Moreno MD;  Location:  [facility]AS[facility];  Service:     CARDIAC CATHETERIZATION N/A 9/5/2024    Procedure: Coronary angiography;  Surgeon: Baldomero Wheeler MD;  Location:  [facility];  Service: Cardiovascular;  Laterality: N/A;  Patient to hold lisinopril and torsemide the day prior, day of, and day after cardiac cath.  Patient will also need 4 hours of IV fluid prep (normal saline at 75 cc/h) pre and post  cardiac cath.    CARDIAC CATHETERIZATION N/A 9/5/2024    Procedure: Left heart cath;  Surgeon: Baldomero Wheeler MD;  Location: [facility] CATH [facility]VASIVE LOC[facility];  Service: Cardiovascular;  Laterality: N/A;    CARDIAC CATHETERIZATION N/A 9/5/2024    Procedure: Resting Full Cycle Ratio;  Surgeon: Baldomero Wheeler MD;  Location:  [facility]VASIVE LOC[facility];  Service: Cardiovascular;  Laterality: N/A;    CARDIAC CATHETERIZATION N/A 9/5/2024    Procedure: Percutaneous Coronary Intervention;  Surgeon: Baldomero Wheeler MD;  Location:  [facility]VASIVE LOC[facility];  Service: Cardiovascular;  Laterality: N/A;    CARDIAC CATHETERIZATION N/A 9/5/2024    Procedure: Stent AILIN coronary;  Surgeon: Baldomero Wheeler MD;  [facility]: [facility];  Service: Cardiovascular;  Laterality: N/A;    COLONOSCOPY      COLONOSCOPY W/ POLYPECTOMY N/A 4/29/2024    Procedure: COLONOSCOPY WITH POLYPECTOMY;  Surgeon: Kam Cooley MD;  Location: [facility] OR;  Service: Gastroenterology;  Laterality: N/A;  diverticulosis; transverse polyp x3 (forecep); cecal polyp x2 (forceps); ascending polyp x3 (forceps); ascending polyp x2 (cold snare); splenic flexure polyp x2 (hot snare); sigmoid polyp x1 (cold snare); sigmoid polyp x1 (hot snare); sigmoid x1 (fo    ENDOSCOPY N/A 4/29/2024    Procedure: ESOPHAGOGASTRODUODENOSCOPY WITH BIOPSY;  Surgeon: Kam Cooley MD;  Location: [facility] OR;  Service: Gastroenterology;  Laterality: N/A;  Reflux esophagitis; Erosive gastritis; Dilatation- no stricture; Biopsies- gastric, distal esophagus    INTERVENTIONAL RADIOLOGY PROCEDURE N/A 9/5/2024    Procedure: Intravascular Ultrasound;  Surgeon: Baldomero Wheeler MD;  Location:  [facility]AS[facility] LOC[facility];  Service: Cardiovascular;  Laterality: N/A;    JOINT REPLACEMENT      BILATERAL KNEES     LUMBAR FUSION      MO REVJ TOTAL KNEE ARTHRP W/WO ALGRFT 1 COMPONENT Right 9/5/2017    Procedure: REMOVAL OF ANTIBIOTIC CEMENT SPACER;  RIGHT TOTAL KNEE ARTHROPLASTY REVISION;  Surgeon: Kiko Marie MD;  Location: [facility] OR;  Service: Orthopedics    ROTATOR CUFF REPAIR Right     TOTAL KNEE  PROSTHESIS REMOVAL W/ SPACER INSERTION Right 2/14/2017    Procedure: INCISION AND DRAINAGE RIGHT KNEE, POLY CHANGE;  Surgeon: Kiko Marie MD;  Location: [facility] OR;  Service:     TOTAL KNEE  PROSTHESIS REMOVAL W/ SPACER INSERTION Right 7/7/2017    Procedure: REMOVAL RIGHT TOTAL KNEE ARTHROPLASTY AND ANITBIOTIC SPACER;  Surgeon: Kiko Marie MD;  Location: [facility] OR;  Service:     TRANS METATARSAL AMPUTATION Left 2/14/2017    Procedure: EXCISIONAL DEBRIDEMENT LT. FIRST TOE DIABETIC ULCER, DRAINAGE ABSCESS FIRST TOE, FIRST TOE AMPUTATION;  Surgeon: Osmin Brand MD;  Location: [facility] OR;  Service:          FAMILY HISTORY  Family History   Problem Relation Age of Onset    Heart disease Mother     Heart disease Father     Diabetes Sister     Dementia Maternal Grandmother     No Known Problems Maternal Grandfather     Heart disease Paternal Grandmother     Heart attack Paternal Grandfather     Heart disease Paternal Grandfather     Heart disease Son     Heart attack Son     No Known Problems Sister     Malig Hyperthermia Neg Hx          SOCIAL HISTORY  Social History     Occupational History    Not on file   Tobacco Use    Smoking status: Former     Current packs/day: 3.00     Average packs/day: 3.0 packs/day for 20.0 years (60.0 ttl pk-yrs)     Types: Cigarettes     Passive exposure: Never    Smokeless tobacco: Former    Tobacco comments:     quit 35 years ago. 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    Vaping Use    Vaping status: Never Used   Substance and Sexual Activity    Alcohol use: No    Drug use: No    Sexual activity: Defer         CURRENT MEDICATIONS    Current Outpatient Medications:     aspirin 81 MG EC tablet, Take 1 tablet by mouth Daily., Disp: , Rfl:     atorvastatin (LIPITOR) 80 MG tablet, Take 1 tablet by  mouth Daily., Disp: , Rfl:     clopidogrel (PLAVIX) 75 MG tablet, Take 1 tablet by mouth Daily., Disp: 90 tablet, Rfl: 3    Continuous Blood Gluc Sensor (FreeStyle Martha 2 Sensor) misc, Use 1 each Every 14 (Fourteen) Days., Disp: 6 each, Rfl: 0    HumaLOG KwikPen 100 UNIT/ML solution pen-injector, Inject 16 Units under the skin into the appropriate area as directed Daily With Breakfast AND 16 Units Daily Before Lunch AND 20 Units Daily With Dinner. Adds sliding scale based on BS reading. Max daily dose 75u daily  Indications: Type 2 Diabetes, Disp: 90 mL, Rfl: 2    Insulin Glargine (Lantus SoloStar) 100 UNIT/ML injection pen, 50u QAM and 45u QPM, Disp: 90 mL, Rfl: 0    Insulin Pen Needle (BD Pen Needle Mireille U/F) 32G X 4 MM misc, TO INJECT 4 TIMES DAILY, Disp: 100 each, Rfl: 3    isosorbide mononitrate (IMDUR) 30 MG 24 hr tablet, isosorbide mononitrate ER 30 mg tablet,extended release 24 hr, Disp: , Rfl:     lisinopril (PRINIVIL,ZESTRIL) 10 MG tablet, Take 1 tablet by mouth Daily., Disp: , Rfl:     metoprolol tartrate (LOPRESSOR) 25 MG tablet, Take 1 tablet by mouth Every 12 (Twelve) Hours., Disp: 60 tablet, Rfl: 11    Multiple Vitamin (MULTI VITAMIN DAILY PO), Take 1 tablet by mouth Daily. HOLD PRIOR TO SURGERY, Disp: , Rfl:     omeprazole (priLOSEC) 40 MG capsule, Take 1 capsule by mouth 2 (Two) Times a Day., Disp: , Rfl:     oxyCODONE-acetaminophen (PERCOCET)  MG per tablet, Take 1 tablet by mouth Every 6 (Six) Hours As Needed., Disp: , Rfl:     pregabalin (LYRICA) 150 MG capsule, Take 1 capsule by mouth., Disp: , Rfl:     Psyllium (DAILY FIBER PO), Take 1 tablet by mouth Daily., Disp: , Rfl:     sertraline (ZOLOFT) 100 MG tablet, Take 1 tablet by mouth Daily., Disp: , Rfl:     sucralfate (Carafate) 1 g tablet, Take 1 tablet by mouth 4 (Four) Times a Day. (Patient taking differently: Take 1 tablet by mouth 2 (Two) Times a Day.), Disp: 120 tablet, Rfl: 3    Torsemide 60 MG tablet, Take 1 tablet by mouth  "Daily., Disp: , Rfl:     Turmeric 500 MG capsule, Take 1 capsule by mouth Daily., Disp: , Rfl:     Current Facility-Administered Medications:     sodium chloride 0.9 % infusion, 75 mL/hr, Intravenous, Once, Elayne Cristobal, APRDONNA    ALLERGIES  Vancomycin    VITALS  Vitals:    09/10/24 1049   BP: 110/58   BP Location: Left arm   Patient Position: Sitting   Cuff Size: Large Adult   Weight: 131 kg (288 lb)   Height: 188 cm (74.02\")       PHYSICAL EXAM  Debilities/Disabilities Identified: None  Emotional Behavior: Appropriate  Wt Readings from Last 3 Encounters:   09/10/24 131 kg (288 lb)   09/05/24 134 kg (295 lb)   08/27/24 134 kg (295 lb)     Ht Readings from Last 1 Encounters:   09/10/24 188 cm (74.02\")     Body mass index is 36.96 kg/m².  Physical Exam  Constitutional:       General: He is not in acute distress.     Appearance: Normal appearance. He is not ill-appearing.   HENT:      Head: Normocephalic and atraumatic.      Mouth/Throat:      Mouth: Mucous membranes are moist.      Pharynx: No posterior oropharyngeal erythema.   Eyes:      General: No scleral icterus.  Cardiovascular:      Rate and Rhythm: Normal rate and regular rhythm.      Heart sounds: Normal heart sounds.   Pulmonary:      Effort: Pulmonary effort is normal.      Breath sounds: Normal breath sounds.   Abdominal:      General: Abdomen is flat. Bowel sounds are normal. There is no distension.      Palpations: Abdomen is soft. There is no mass.      Tenderness: There is no abdominal tenderness. There is no guarding or rebound. Negative signs include Cardona's sign.      Hernia: No hernia is present.   Musculoskeletal:      Cervical back: Neck supple.   Skin:     General: Skin is warm.      Capillary Refill: Capillary refill takes less than 2 seconds.   Neurological:      General: No focal deficit present.      Mental Status: He is alert and oriented to person, place, and time.   Psychiatric:         Mood and Affect: Mood normal.         " Behavior: Behavior normal.         Thought Content: Thought content normal.         Judgment: Judgment normal.         CLINICAL DATA REVIEWED   reviewed previous lab results and integrated with today's visit, reviewed notes from other physicians and/or last GI encounter, reviewed previous endoscopy results and available photos, reviewed surgical pathology results from previous biopsies      ASSESSMENT  Diagnoses and all orders for this visit:    Gastroesophageal reflux disease with esophagitis without hemorrhage    Acosta's esophagus without dysplasia    Therapeutic opioid-induced constipation (OIC)    Personal history of colonic polyps          PLAN    OIC: Continue fiber, plenty of fluids, daily miralax  GERD: Continue BID PPI, use sucralfate PRN    Return in about 6 months (around 3/10/2025).    I have discussed the above plan with the patient.  They verbalize understanding and are in agreement with the plan.  They have been advised to contact the office for any questions, concerns, or changes related to their health.

## 2024-09-19 ENCOUNTER — OFFICE VISIT (OUTPATIENT)
Dept: CARDIOLOGY | Facility: CLINIC | Age: 74
End: 2024-09-19
Payer: MEDICARE

## 2024-09-19 VITALS
SYSTOLIC BLOOD PRESSURE: 112 MMHG | OXYGEN SATURATION: 93 % | BODY MASS INDEX: 36.96 KG/M2 | WEIGHT: 288 LBS | DIASTOLIC BLOOD PRESSURE: 76 MMHG | HEART RATE: 67 BPM | HEIGHT: 74 IN

## 2024-09-19 DIAGNOSIS — N18.4 CKD (CHRONIC KIDNEY DISEASE) STAGE 4, GFR 15-29 ML/MIN: ICD-10-CM

## 2024-09-19 DIAGNOSIS — I70.209 DIABETES TYPE 2 WITH ATHEROSCLEROSIS OF ARTERIES OF EXTREMITIES: ICD-10-CM

## 2024-09-19 DIAGNOSIS — I51.81 STRESS-INDUCED CARDIOMYOPATHY: ICD-10-CM

## 2024-09-19 DIAGNOSIS — E78.2 MIXED HYPERLIPIDEMIA: ICD-10-CM

## 2024-09-19 DIAGNOSIS — K29.60 EROSIVE GASTRITIS: ICD-10-CM

## 2024-09-19 DIAGNOSIS — I25.10 CORONARY ARTERY DISEASE INVOLVING NATIVE CORONARY ARTERY OF NATIVE HEART WITHOUT ANGINA PECTORIS: Primary | ICD-10-CM

## 2024-09-19 DIAGNOSIS — Z95.5 STATUS POST CORONARY ARTERY STENT PLACEMENT: ICD-10-CM

## 2024-09-19 DIAGNOSIS — I10 PRIMARY HYPERTENSION: ICD-10-CM

## 2024-09-19 DIAGNOSIS — E11.51 DIABETES TYPE 2 WITH ATHEROSCLEROSIS OF ARTERIES OF EXTREMITIES: ICD-10-CM

## 2024-09-24 ENCOUNTER — HOSPITAL ENCOUNTER (OUTPATIENT)
Dept: INFUSION THERAPY | Facility: HOSPITAL | Age: 74
Discharge: HOME | End: 2024-09-24
Payer: MEDICARE

## 2024-09-24 ENCOUNTER — TELEPHONE (OUTPATIENT)
Dept: INFUSION THERAPY | Facility: HOSPITAL | Age: 74
End: 2024-09-24
Payer: MEDICARE

## 2024-09-24 ENCOUNTER — APPOINTMENT (OUTPATIENT)
Dept: WOUND CARE | Facility: HOSPITAL | Age: 74
End: 2024-09-24
Payer: MEDICARE

## 2024-09-24 VITALS
DIASTOLIC BLOOD PRESSURE: 68 MMHG | OXYGEN SATURATION: 95 % | SYSTOLIC BLOOD PRESSURE: 154 MMHG | HEART RATE: 58 BPM | RESPIRATION RATE: 15 BRPM | TEMPERATURE: 97.1 F

## 2024-09-24 DIAGNOSIS — N18.4 ANEMIA IN STAGE 4 CHRONIC KIDNEY DISEASE: Primary | ICD-10-CM

## 2024-09-24 DIAGNOSIS — D63.1 ANEMIA IN STAGE 4 CHRONIC KIDNEY DISEASE: Primary | ICD-10-CM

## 2024-09-24 PROCEDURE — 96374 THER/PROPH/DIAG INJ IV PUSH: CPT

## 2024-09-24 PROCEDURE — G0463 HOSPITAL OUTPT CLINIC VISIT: HCPCS

## 2024-09-24 PROCEDURE — 96375 TX/PRO/DX INJ NEW DRUG ADDON: CPT

## 2024-09-24 PROCEDURE — 96365 THER/PROPH/DIAG IV INF INIT: CPT

## 2024-09-24 PROCEDURE — 25010000002 FERUMOXYTOL 510 MG/17ML SOLUTION 17 ML VIAL

## 2024-09-24 PROCEDURE — 25010000002 DIPHENHYDRAMINE PER 50 MG

## 2024-09-24 PROCEDURE — 25010000002 METHYLPREDNISOLONE PER 40 MG

## 2024-09-24 RX ORDER — FAMOTIDINE 10 MG/ML
20 INJECTION, SOLUTION INTRAVENOUS ONCE
Status: COMPLETED | OUTPATIENT
Start: 2024-09-24 | End: 2024-09-24

## 2024-09-24 RX ORDER — DIPHENHYDRAMINE HYDROCHLORIDE 50 MG/ML
25 INJECTION INTRAMUSCULAR; INTRAVENOUS ONCE
Status: COMPLETED | OUTPATIENT
Start: 2024-09-24 | End: 2024-09-24

## 2024-09-24 RX ORDER — METHYLPREDNISOLONE SODIUM SUCCINATE 40 MG/ML
40 INJECTION, POWDER, LYOPHILIZED, FOR SOLUTION INTRAMUSCULAR; INTRAVENOUS ONCE
Status: COMPLETED | OUTPATIENT
Start: 2024-09-24 | End: 2024-09-24

## 2024-09-24 RX ADMIN — FERUMOXYTOL 510 MG: 510 INJECTION INTRAVENOUS at 11:01

## 2024-09-24 RX ADMIN — DIPHENHYDRAMINE HYDROCHLORIDE 25 MG: 50 INJECTION, SOLUTION INTRAMUSCULAR; INTRAVENOUS at 12:02

## 2024-09-24 RX ADMIN — METHYLPREDNISOLONE SODIUM SUCCINATE 40 MG: 40 INJECTION, POWDER, LYOPHILIZED, FOR SOLUTION INTRAMUSCULAR; INTRAVENOUS at 12:04

## 2024-09-24 RX ADMIN — FAMOTIDINE 20 MG: 10 INJECTION, SOLUTION INTRAVENOUS at 11:58

## 2024-10-01 ENCOUNTER — HOSPITAL ENCOUNTER (OUTPATIENT)
Dept: INFUSION THERAPY | Facility: HOSPITAL | Age: 74
Discharge: HOME | End: 2024-10-01
Payer: MEDICARE

## 2024-10-01 VITALS
HEART RATE: 50 BPM | OXYGEN SATURATION: 96 % | SYSTOLIC BLOOD PRESSURE: 121 MMHG | TEMPERATURE: 97.4 F | DIASTOLIC BLOOD PRESSURE: 64 MMHG | RESPIRATION RATE: 16 BRPM

## 2024-10-01 DIAGNOSIS — D63.1 ANEMIA IN STAGE 4 CHRONIC KIDNEY DISEASE: Primary | ICD-10-CM

## 2024-10-01 DIAGNOSIS — N18.4 ANEMIA IN STAGE 4 CHRONIC KIDNEY DISEASE: Primary | ICD-10-CM

## 2024-10-01 PROCEDURE — 96374 THER/PROPH/DIAG INJ IV PUSH: CPT

## 2024-10-01 PROCEDURE — 96375 TX/PRO/DX INJ NEW DRUG ADDON: CPT

## 2024-10-01 PROCEDURE — 25010000002 FERUMOXYTOL 510 MG/17ML SOLUTION 17 ML VIAL

## 2024-10-01 PROCEDURE — 25010000002 DIPHENHYDRAMINE PER 50 MG

## 2024-10-01 RX ORDER — SODIUM CHLORIDE 9 MG/ML
20 INJECTION, SOLUTION INTRAVENOUS ONCE
OUTPATIENT
Start: 2024-10-01

## 2024-10-01 RX ORDER — SODIUM CHLORIDE 9 MG/ML
20 INJECTION, SOLUTION INTRAVENOUS ONCE
OUTPATIENT
Start: 2024-10-08

## 2024-10-01 RX ORDER — DIPHENHYDRAMINE HYDROCHLORIDE 50 MG/ML
25 INJECTION INTRAMUSCULAR; INTRAVENOUS ONCE
Status: COMPLETED | OUTPATIENT
Start: 2024-10-01 | End: 2024-10-01

## 2024-10-01 RX ADMIN — DIPHENHYDRAMINE HYDROCHLORIDE 25 MG: 50 INJECTION, SOLUTION INTRAMUSCULAR; INTRAVENOUS at 10:11

## 2024-10-01 RX ADMIN — FERUMOXYTOL 510 MG: 510 INJECTION INTRAVENOUS at 10:32

## 2024-10-01 NOTE — NURSING NOTE
PT ARRIVED TO [facility] FOR APPT. VSS, NO COMPLAINTS AT THIS TIME. MEDICAITON ADMINISTERED PER MD ORDER. PT TOLERATED WELL. VSS POST INFUSION. PT STAYED IN [facility] FOR 30 MINUTES POST INFUSION TO MONITOR FOR REACTIONS. PT DISCHARGED FROM [facility] AT 12:10 PM IN STABLE CONDITION, WITHOUT COMPLAINTS.

## 2024-10-24 ENCOUNTER — OFFICE VISIT (OUTPATIENT)
Age: 74
End: 2024-10-24
Payer: MEDICARE

## 2024-10-24 VITALS
WEIGHT: 286 LBS | BODY MASS INDEX: 36.7 KG/M2 | SYSTOLIC BLOOD PRESSURE: 110 MMHG | DIASTOLIC BLOOD PRESSURE: 64 MMHG | HEART RATE: 59 BPM | HEIGHT: 74 IN

## 2024-10-24 DIAGNOSIS — I25.10 CORONARY ARTERY DISEASE INVOLVING NATIVE CORONARY ARTERY OF NATIVE HEART WITHOUT ANGINA PECTORIS: Primary | ICD-10-CM

## 2024-10-24 DIAGNOSIS — I10 PRIMARY HYPERTENSION: ICD-10-CM

## 2024-10-24 DIAGNOSIS — Z95.5 STATUS POST CORONARY ARTERY STENT PLACEMENT: ICD-10-CM

## 2024-10-24 RX ORDER — CLOPIDOGREL BISULFATE 75 MG/1
75 TABLET ORAL DAILY
Qty: 90 TABLET | Refills: 3 | Status: SHIPPED | OUTPATIENT
Start: 2024-10-24

## 2024-10-24 RX ORDER — METOPROLOL SUCCINATE 50 MG/1
50 TABLET, EXTENDED RELEASE ORAL DAILY
Qty: 90 TABLET | Refills: 3 | Status: SHIPPED | OUTPATIENT
Start: 2024-10-24

## 2024-10-24 NOTE — PROGRESS NOTES
Hugh R McBurney  1950  Date of Office Visit: 10/24/24  Encounter Provider: Tiburcio Moreno MD  Place of Service: [facility]      CHIEF COMPLAINT:  Ischemic cardiomyopathy  Coronary disease with recent PCI to the LAD    HISTORY OF PRESENT ILLNESS:  73 y.o. year old male with a medical history of hypertension, hyperlipidemia, diabetes, GERD, sleep apnea not on CPAP, right TKA with joint infection and coronary disease.In June 2017 he was seen because he had a  Vancomyocin reaction with chest pain, n/v and diaphoresis and a non-ST elevation myocardial infarction when here for knee surgery. A cardiac cath was done which showed a normal left main, 20% proximal LAD, 50% mid LAD, 90% proximal first diagonal, diffuse 50% mid circumflex, and a  70-80% proximal RPL 2 stenosis. An echo showed an EF of 42%, possibly from stress induced cardiomyopathy. He was sent home on an ace, beta blocker and nitrate to wait for 2-4 weeks before attempting surgery again. At that time he had a repeat transthoracic echocardiogram showing his ejection fraction and wall motion had normalized.     At last visit, he was complaining of chest pain at night and after activity. Echocardiogram showed LVEF 44.9% with significant wall motion abnormalities.  Perfusion stress test showed lindsay-infarct ischemia involving the apical septal wall with LVEF 38%.  Subsequent cardiac cath showed densely calcified proximal LAD, subtotally occluded mid LAD with 90% occlusion of the first diagonal.  Circumflex 30 to 40% mid stenosis with a normal OM1.  There was diffuse moderate stenosis of the RCA.    He underwent PCI of the LAD with overlapping 2.5 x 38 mm and 3.0 x 18 mm Xience SkyPoint drug-eluting stents, postdilated to high-pressure with noncompliant trek balloons.  RFR of the RCA was normal.  He is back today in follow-up.  He recently was seen by Elayne Cristobal and has been doing well.  He denies any  chest pain or dyspnea on exertion.  He is tolerating his current medical therapy well.  This includes aspirin, Plavix, lisinopril, metoprolol tartrate, and atorvastatin therapy.      Review of Systems   Constitutional: Negative for fever and malaise/fatigue.   HENT:  Negative for nosebleeds and sore throat.    Eyes:  Negative for blurred vision and double vision.   Cardiovascular:  Negative for chest pain, claudication, palpitations and syncope.   Respiratory:  Negative for cough, shortness of breath and snoring.    Endocrine: Negative for cold intolerance, heat intolerance and polydipsia.   Skin:  Negative for itching, poor wound healing and rash.   Musculoskeletal:  Negative for joint pain, joint swelling, muscle weakness and myalgias.   Gastrointestinal:  Negative for abdominal pain, melena, nausea and vomiting.   Neurological:  Negative for light-headedness, loss of balance, seizures, vertigo and weakness.   Psychiatric/Behavioral:  Negative for altered mental status and depression.        Past Medical History:   Diagnosis Date    Arthritis     Chronic pain     BILATERAL KNEES AND BACK    Contusion of knee     Coronary artery disease     Depression     Diabetes mellitus     type 2 with atherosclerosis of arteries of extremities    Diabetic ulcer of toe     left foot associated with type 2 diabetes mellitus, with necrosis of bone    GERD (gastroesophageal reflux disease)     Great toe pain     with cellulitis and gangrene    History of kidney stones     Hyperlipidemia     Hypertension     Hyponatremia     Kidney stone     Knee pain     hx of previous prosthetic joint infection    Liver disease     liver mass    MSSA (methicillin susceptible Staphylococcus aureus) infection     RIGHT KNEE    NSTEMI (non-ST elevated myocardial infarction) 06/14/2017    Sleep apnea     DOES NOT USE CPAP       The following portions of the patient's history were reviewed and updated as appropriate: Social history , Family history,  and Surgical history     Current Outpatient Medications on File Prior to Visit   Medication Sig Dispense Refill    aspirin 81 MG EC tablet Take 1 tablet by mouth Daily.      atorvastatin (LIPITOR) 80 MG tablet Take 1 tablet by mouth Daily.      clopidogrel (PLAVIX) 75 MG tablet Take 1 tablet by mouth Daily. 90 tablet 3    Continuous Blood Gluc Sensor (FreeStyle Martha 2 Sensor) misc Use 1 each Every 14 (Fourteen) Days. 6 each 0    HumaLOG KwikPen 100 UNIT/ML solution pen-injector Inject 16 Units under the skin into the appropriate area as directed Daily With Breakfast AND 16 Units Daily Before Lunch AND 20 Units Daily With Dinner. Adds sliding scale based on BS reading. Max daily dose 75u daily  Indications: Type 2 Diabetes 90 mL 2    Insulin Glargine (Lantus SoloStar) 100 UNIT/ML injection pen 50u QAM and 45u QPM 90 mL 0    Insulin Pen Needle (BD Pen Needle Mireille U/F) 32G X 4 MM misc TO INJECT 4 TIMES DAILY 100 each 3    lisinopril (PRINIVIL,ZESTRIL) 10 MG tablet Take 1 tablet by mouth Daily.      Multiple Vitamin (MULTI VITAMIN DAILY PO) Take 1 tablet by mouth Daily. HOLD PRIOR TO SURGERY      omeprazole (priLOSEC) 40 MG capsule Take 1 capsule by mouth 2 (Two) Times a Day.      oxyCODONE-acetaminophen (PERCOCET)  MG per tablet Take 1 tablet by mouth Every 6 (Six) Hours As Needed.      pregabalin (LYRICA) 150 MG capsule Take 1 capsule by mouth.      Psyllium (DAILY FIBER PO) Take 1 tablet by mouth Daily.      sertraline (ZOLOFT) 100 MG tablet Take 1 tablet by mouth Daily.      Torsemide 60 MG tablet Take 1 tablet by mouth Daily.      Turmeric 500 MG capsule Take 1 capsule by mouth Daily.      [DISCONTINUED] metoprolol tartrate (LOPRESSOR) 25 MG tablet Take 1 tablet by mouth Every 12 (Twelve) Hours. 60 tablet 11    [DISCONTINUED] isosorbide mononitrate (IMDUR) 30 MG 24 hr tablet isosorbide mononitrate ER 30 mg tablet,extended release 24 hr      [DISCONTINUED] sucralfate (Carafate) 1 g tablet Take 1 tablet by  "mouth 4 (Four) Times a Day. (Patient taking differently: Take 1 tablet by mouth 2 (Two) Times a Day.) 120 tablet 3     Current Facility-Administered Medications on File Prior to Visit   Medication Dose Route Frequency Provider Last Rate Last Admin    sodium chloride 0.9 % infusion  75 mL/hr Intravenous Once Elayne CristobalABBY           Allergies   Allergen Reactions    Vancomycin Other (See Comments)     Red Man syndrome, slow rate and premedicate with benadryl       Vitals:    10/24/24 1504   BP: 110/64   Pulse: 59   Weight: 130 kg (286 lb)   Height: 188 cm (74\")     Body mass index is 36.72 kg/m².   Constitutional:       Appearance: Well-developed.   Eyes:      General: No scleral icterus.     Conjunctiva/sclera: Conjunctivae normal.   HENT:      Head: Normocephalic and atraumatic.   Neck:      Thyroid: No thyromegaly.      Vascular: Normal carotid pulses. No carotid bruit, hepatojugular reflux or JVD.      Trachea: No tracheal deviation.   Pulmonary:      Effort: No respiratory distress.      Breath sounds: Normal breath sounds. No decreased breath sounds. No wheezing. No rhonchi. No rales.   Chest:      Chest wall: Not tender to palpatation.   Cardiovascular:      Normal rate. Regular rhythm.      No gallop.    Pulses:     Carotid: 2+ bilaterally.     Radial: 2+ bilaterally.     Femoral: 2+ bilaterally.     Dorsalis pedis: 2+ bilaterally.     Posterior tibial: 2+ bilaterally.  Edema:     Peripheral edema absent.   Abdominal:      General: Bowel sounds are normal. There is no distension.      Palpations: Abdomen is soft.      Tenderness: There is no abdominal tenderness.   Musculoskeletal:         General: No deformity.      Cervical back: Normal range of motion and neck supple. Skin:     Findings: No erythema or rash.      Comments: Knee surgical scars.    Neurological:      Mental Status: Alert and oriented to person, place, and time.      Sensory: No sensory deficit.   Psychiatric:         Behavior: " Behavior normal.           Lab Results   Component Value Date    WBC 9.07 09/10/2024    HGB 10.2 (L) 09/10/2024    HCT 31.0 (L) 09/10/2024    MCV 97.5 (H) 09/10/2024     09/10/2024       Lab Results   Component Value Date    GLUCOSE 64 (L) 09/10/2024    BUN 46 (H) 09/10/2024    CREATININE 3.18 (H) 09/10/2024     09/10/2024    K 5.1 09/10/2024     (H) 09/10/2024    CALCIUM 9.3 09/10/2024    PROTEINTOT 7.0 12/08/2023    ALBUMIN 3.4 (L) 09/10/2024    ALT 11 12/08/2023    AST 14 12/08/2023    ALKPHOS 159 (H) 12/08/2023    BILITOT 0.2 12/08/2023    GLOB 3.2 12/08/2023    AGRATIO 1.2 12/08/2023    BCR 14.5 09/10/2024    ANIONGAP 9.8 09/10/2024    EGFR 19.8 (L) 09/10/2024       Lab Results   Component Value Date    GLUCOSE 64 (L) 09/10/2024    CALCIUM 9.3 09/10/2024     09/10/2024    K 5.1 09/10/2024    CO2 22.2 09/10/2024     (H) 09/10/2024    BUN 46 (H) 09/10/2024    CREATININE 3.18 (H) 09/10/2024    EGFRRESULT 30.1 (L) 07/26/2023    EGFR 19.8 (L) 09/10/2024    BCR 14.5 09/10/2024    ANIONGAP 9.8 09/10/2024       Lab Results   Component Value Date    CHOL 85 09/06/2024    CHLPL 139 03/14/2023    TRIG 77 09/06/2024    HDL 29 (L) 09/06/2024    LDL 40 09/06/2024         ECG 12 Lead    Date/Time: 10/24/2024 3:24 PM  Performed by: Tiburcio Moreno MD    Authorized by: Tiburcio Moreno MD  Comparison: compared with previous ECG from 9/19/2024  Comparison to previous ECG: Anterior t abnormalities have improved.   Rhythm: sinus rhythm  Rate: normal  QRS axis: normal    Clinical impression: non-specific ECG  Comments: Nonspecific t abnormalities lateral leads.              9/5/24  CORONARY ANGIOGRAPHY:  Left main: Short and normal  Left anterior descending: Densely calcified proximally I believe there is a stent there also and then in the mid LAD is subtotally occluded with faint JANINE I flow distally the first diagonal looks like it is a 90% origin lesion and it  Ramus intermedius:Not  present  Circumflex: Normal approximately some early 30 to 40% mid circumflex disease beyond a large disease-free OM1  RCA: Is a dominant vessel.  30% proximal disease there is a 50 to 60% mid lesion regularities distally 30% disease in the mid PHU no significant disease in the PDA     RFR of the RCA 0.91.    2.5 x 38 mm Xience Luz drug-eluting stent, 3.0 x 18 mm Xience Luz drug-eluting stent mid LAD    Results for orders placed during the hospital encounter of 08/27/24    Adult Transthoracic Echo Complete w/ Color, Spectral and Contrast if Necessary Per Protocol    Interpretation Summary    Left ventricular systolic function is mildly decreased. Calculated left ventricular EF = 44.9%    The following left ventricular wall segments are hypokinetic: apical anterior, apical lateral and apical inferior. The following left ventricular wall segments are akinetic: apical septal and apex.    Left ventricular diastolic function is consistent with (grade III w/high LAP) reversible restrictive pattern.  Elevated left atrial filling pressure present.  Diastolic function assessment may be inaccurate because of presence of mitral valve stenosis and mitral calcification.    The left atrial cavity is severely dilated.    Significantly calcified aortic valve leaflets. Mild to moderate aortic valve stenosis is present. Aortic valve area is 1.2 cm2.Peak velocity of the flow distal to the aortic valve is 224.7 cm/s. Aortic valve maximum pressure gradient is 20 mmHg. Aortic valve mean pressure gradient is 9 mmHg. Aortic valve dimensionless index is 0.4 .    There is severe mitral annular calcification and mild bileaflet mitral valve thickening and calcification.  Mild mitral valve regurgitation is present.  Mild mitral valve stenosis is present. The mitral valve mean gradient is 5 mmHg.    Calculated right ventricular systolic pressure from tricuspid regurgitation is 74 mmHg.Severe pulmonary hypertension is  present.      DISCUSSION/SUMMARY  73-year-old male with medical history of stress-induced cardiomyopathy, coronary artery disease with moderate RCA stenosis, negative RFR of the RCA, LAD stenosis with recent PCI to the LAD in September with 2 overlapping drug-eluting stents, ischemic cardiomyopathy with last ejection fraction of 45%, mild to moderate aortic valve stenosis with mean gradient of 20 mmHg, pulmonary hypertension, who presents to me in follow-up. He is doing well.     1.  Coronary artery disease: PCI to the mid LAD with 2 overlapping drug-eluting stents 9/2024. No angina  - Continue aspirin and Plavix for at least 1 year.  - Continue atorvastatin at 80 mg p.o. nightly.  Last LDL 40 in December 2024  - Continue metoprolol, however in the setting of cardiomyopathy I will change this to metoprolol succinate at 50 mg daily.  - Continue lisinopril 10 mg p.o. daily. Last Cr 3.18    2.  Ischemic cardiomyopathy  -Continue GDMT.  I will move to Metoprolol Succinate 50mg daily  -No change in Lisinopril or Torsemide  -I would not recommend SGLT2 initiation with the degree of CKD

## 2024-11-20 ENCOUNTER — TREATMENT (OUTPATIENT)
Dept: CARDIAC REHAB | Facility: HOSPITAL | Age: 74
End: 2024-11-20
Payer: MEDICARE

## 2024-11-20 DIAGNOSIS — Z95.5 S/P DRUG ELUTING CORONARY STENT PLACEMENT: Primary | ICD-10-CM

## 2024-11-20 PROCEDURE — 93797 PHYS/QHP OP CAR RHAB WO ECG: CPT

## 2024-11-20 PROCEDURE — 93798 PHYS/QHP OP CAR RHAB W/ECG: CPT

## 2024-11-22 ENCOUNTER — TREATMENT (OUTPATIENT)
Dept: CARDIAC REHAB | Facility: HOSPITAL | Age: 74
End: 2024-11-22
Payer: MEDICARE

## 2024-11-22 DIAGNOSIS — Z95.5 S/P DRUG ELUTING CORONARY STENT PLACEMENT: Primary | ICD-10-CM

## 2024-11-22 LAB — GLUCOSE BLDC GLUCOMTR-MCNC: 96 MG/DL (ref 70–130)

## 2024-11-22 PROCEDURE — 93798 PHYS/QHP OP CAR RHAB W/ECG: CPT

## 2024-11-22 PROCEDURE — 82948 REAGENT STRIP/BLOOD GLUCOSE: CPT

## 2024-11-25 ENCOUNTER — APPOINTMENT (OUTPATIENT)
Dept: CARDIAC REHAB | Facility: HOSPITAL | Age: 74
End: 2024-11-25
Payer: MEDICARE

## 2024-11-27 ENCOUNTER — APPOINTMENT (OUTPATIENT)
Dept: CARDIAC REHAB | Facility: HOSPITAL | Age: 74
End: 2024-11-27
Payer: MEDICARE

## 2024-11-29 ENCOUNTER — APPOINTMENT (OUTPATIENT)
Dept: CARDIAC REHAB | Facility: HOSPITAL | Age: 74
End: 2024-11-29
Payer: MEDICARE

## 2024-12-02 ENCOUNTER — TREATMENT (OUTPATIENT)
Dept: CARDIAC REHAB | Facility: HOSPITAL | Age: 74
End: 2024-12-02
Payer: MEDICARE

## 2024-12-02 DIAGNOSIS — E11.29 TYPE 2 DIABETES MELLITUS WITH MICROALBUMINURIA, WITH LONG-TERM CURRENT USE OF INSULIN: ICD-10-CM

## 2024-12-02 DIAGNOSIS — Z95.5 S/P DRUG ELUTING CORONARY STENT PLACEMENT: Primary | ICD-10-CM

## 2024-12-02 DIAGNOSIS — R80.9 TYPE 2 DIABETES MELLITUS WITH MICROALBUMINURIA, WITH LONG-TERM CURRENT USE OF INSULIN: ICD-10-CM

## 2024-12-02 DIAGNOSIS — Z79.4 TYPE 2 DIABETES MELLITUS WITH MICROALBUMINURIA, WITH LONG-TERM CURRENT USE OF INSULIN: ICD-10-CM

## 2024-12-02 PROCEDURE — 93798 PHYS/QHP OP CAR RHAB W/ECG: CPT

## 2024-12-02 RX ORDER — INSULIN GLARGINE 100 [IU]/ML
INJECTION, SOLUTION SUBCUTANEOUS
Qty: 90 ML | Refills: 0 | Status: SHIPPED | OUTPATIENT
Start: 2024-12-02

## 2024-12-02 NOTE — TELEPHONE ENCOUNTER
Rx Refill Note  Requested Prescriptions     Pending Prescriptions Disp Refills    Insulin Glargine (Lantus SoloStar) 100 UNIT/ML injection pen [Pharmacy Med Name: LANTUS SOLOSTAR PEN 3ML 5'S 100U/ML] 90 mL 2     Sig: INJECT 50 UNITS EVERY MORNING AND 45 UNITS EVERY EVENING      Last office visit with prescribing clinician: 8/26/2024   Last telemedicine visit with prescribing clinician: Visit date not found   Next office visit with prescribing clinician: 12/26/2024                         Would you like a call back once the refill request has been completed: [] Yes [] No    If the office needs to give you a call back, can they leave a voicemail: [] Yes [] No    Laura Woodruff MA  12/02/24, 08:40 EST

## 2024-12-04 ENCOUNTER — APPOINTMENT (OUTPATIENT)
Dept: CARDIAC REHAB | Facility: HOSPITAL | Age: 74
End: 2024-12-04
Payer: MEDICARE

## 2024-12-06 ENCOUNTER — TREATMENT (OUTPATIENT)
Dept: CARDIAC REHAB | Facility: HOSPITAL | Age: 74
End: 2024-12-06
Payer: MEDICARE

## 2024-12-06 DIAGNOSIS — Z95.5 S/P DRUG ELUTING CORONARY STENT PLACEMENT: Primary | ICD-10-CM

## 2024-12-06 PROCEDURE — 93798 PHYS/QHP OP CAR RHAB W/ECG: CPT

## 2024-12-09 ENCOUNTER — TREATMENT (OUTPATIENT)
Dept: CARDIAC REHAB | Facility: HOSPITAL | Age: 74
End: 2024-12-09
Payer: MEDICARE

## 2024-12-09 DIAGNOSIS — Z95.5 S/P DRUG ELUTING CORONARY STENT PLACEMENT: Primary | ICD-10-CM

## 2024-12-09 PROCEDURE — 93798 PHYS/QHP OP CAR RHAB W/ECG: CPT

## 2024-12-10 ENCOUNTER — APPOINTMENT (OUTPATIENT)
Dept: WOUND CARE | Facility: HOSPITAL | Age: 74
End: 2024-12-10
Payer: MEDICARE

## 2024-12-10 PROCEDURE — G0463 HOSPITAL OUTPT CLINIC VISIT: HCPCS

## 2024-12-11 ENCOUNTER — TREATMENT (OUTPATIENT)
Dept: CARDIAC REHAB | Facility: HOSPITAL | Age: 74
End: 2024-12-11
Payer: MEDICARE

## 2024-12-11 DIAGNOSIS — Z95.5 S/P DRUG ELUTING CORONARY STENT PLACEMENT: Primary | ICD-10-CM

## 2024-12-11 PROCEDURE — 93798 PHYS/QHP OP CAR RHAB W/ECG: CPT

## 2024-12-13 ENCOUNTER — TREATMENT (OUTPATIENT)
Dept: CARDIAC REHAB | Facility: HOSPITAL | Age: 74
End: 2024-12-13
Payer: MEDICARE

## 2024-12-13 DIAGNOSIS — Z95.5 S/P DRUG ELUTING CORONARY STENT PLACEMENT: Primary | ICD-10-CM

## 2024-12-13 PROCEDURE — 93798 PHYS/QHP OP CAR RHAB W/ECG: CPT

## 2024-12-16 ENCOUNTER — TREATMENT (OUTPATIENT)
Dept: CARDIAC REHAB | Facility: HOSPITAL | Age: 74
End: 2024-12-16
Payer: MEDICARE

## 2024-12-16 DIAGNOSIS — Z95.5 S/P DRUG ELUTING CORONARY STENT PLACEMENT: Primary | ICD-10-CM

## 2024-12-16 PROCEDURE — 93798 PHYS/QHP OP CAR RHAB W/ECG: CPT

## 2024-12-17 ENCOUNTER — APPOINTMENT (OUTPATIENT)
Dept: WOUND CARE | Facility: HOSPITAL | Age: 74
End: 2024-12-17
Payer: MEDICARE

## 2024-12-18 ENCOUNTER — APPOINTMENT (OUTPATIENT)
Dept: CARDIAC REHAB | Facility: HOSPITAL | Age: 74
End: 2024-12-18
Payer: MEDICARE

## 2024-12-20 ENCOUNTER — TREATMENT (OUTPATIENT)
Dept: CARDIAC REHAB | Facility: HOSPITAL | Age: 74
End: 2024-12-20
Payer: MEDICARE

## 2024-12-20 DIAGNOSIS — Z95.5 S/P DRUG ELUTING CORONARY STENT PLACEMENT: Primary | ICD-10-CM

## 2024-12-20 PROCEDURE — 93798 PHYS/QHP OP CAR RHAB W/ECG: CPT

## 2024-12-23 ENCOUNTER — APPOINTMENT (OUTPATIENT)
Dept: CARDIAC REHAB | Facility: HOSPITAL | Age: 74
End: 2024-12-23
Payer: MEDICARE

## 2024-12-24 ENCOUNTER — APPOINTMENT (OUTPATIENT)
Dept: WOUND CARE | Facility: HOSPITAL | Age: 74
End: 2024-12-24
Payer: MEDICARE

## 2024-12-26 ENCOUNTER — OFFICE VISIT (OUTPATIENT)
Dept: ENDOCRINOLOGY | Age: 74
End: 2024-12-26
Payer: MEDICARE

## 2024-12-26 VITALS
BODY MASS INDEX: 37.13 KG/M2 | HEART RATE: 67 BPM | OXYGEN SATURATION: 97 % | WEIGHT: 289.2 LBS | DIASTOLIC BLOOD PRESSURE: 60 MMHG | TEMPERATURE: 98.6 F | SYSTOLIC BLOOD PRESSURE: 120 MMHG

## 2024-12-26 DIAGNOSIS — E78.2 MIXED HYPERLIPIDEMIA: ICD-10-CM

## 2024-12-26 DIAGNOSIS — Z86.79 HISTORY OF CAD (CORONARY ARTERY DISEASE): ICD-10-CM

## 2024-12-26 DIAGNOSIS — Z79.4 TYPE 2 DIABETES MELLITUS WITH HYPERGLYCEMIA, WITH LONG-TERM CURRENT USE OF INSULIN: Primary | ICD-10-CM

## 2024-12-26 DIAGNOSIS — E11.42 DIABETIC PERIPHERAL NEUROPATHY ASSOCIATED WITH TYPE 2 DIABETES MELLITUS: ICD-10-CM

## 2024-12-26 DIAGNOSIS — N18.32 STAGE 3B CHRONIC KIDNEY DISEASE: ICD-10-CM

## 2024-12-26 DIAGNOSIS — E11.65 TYPE 2 DIABETES MELLITUS WITH HYPERGLYCEMIA, WITH LONG-TERM CURRENT USE OF INSULIN: Primary | ICD-10-CM

## 2024-12-26 RX ORDER — TORSEMIDE 20 MG/1
60 TABLET ORAL DAILY
COMMUNITY
Start: 2024-11-14

## 2024-12-26 NOTE — PROGRESS NOTES
"Chief Complaint  Diabetes (Martha report attached )    Subjective        Hugh R McBurney presents to [facility]  History of Present Illness    Type 2 dm ~ 10 years   DM regimen: Lantus 35-45 units bid, Humalog 16u+ss with meals  Sliding scale: 3 units for 50 above 150   Last eye exam: 3/2024  Known complications: nephropathy; following routinely with renal- on lisinopril 10mg QD, neuropathy; on lyrica 150mg QD from PCP, podiatry trims his toenails, CAD- on atorvastatin 80mg QD  Currently in cardiac rehab ( going well) and wound care for his 2nd toe on left foot, left great toe amputation   Breakfast: doughnut or honey bun     Cgm review 12/13/24-12/26/24  85% time in range  2% low  13% high  Avg glu 138  GMI 6.6%    Objective   Vital Signs:  /60   Pulse 67   Temp 98.6 °F (37 °C) (Oral)   Wt 131 kg (289 lb 3.2 oz)   SpO2 97%   BMI 37.13 kg/m²   Estimated body mass index is 37.13 kg/m² as calculated from the following:    Height as of 10/24/24: 188 cm (74\").    Weight as of this encounter: 131 kg (289 lb 3.2 oz).          Physical Exam  Vitals reviewed.   Constitutional:       General: He is not in acute distress.  HENT:      Head: Normocephalic and atraumatic.   Cardiovascular:      Rate and Rhythm: Normal rate.   Pulmonary:      Effort: Pulmonary effort is normal. No respiratory distress.   Musculoskeletal:         General: No signs of injury. Normal range of motion.      Cervical back: Normal range of motion and neck supple.   Skin:     General: Skin is warm and dry.   Neurological:      Mental Status: He is alert and oriented to person, place, and time. Mental status is at baseline.      Motor: Weakness present.      Gait: Gait abnormal.      Comments: Cane    Psychiatric:         Mood and Affect: Mood normal.         Behavior: Behavior normal.         Thought Content: Thought content normal.         Judgment: Judgment normal.        Result Review :  The following data was " reviewed by: ABBY Alvarenga on 12/26/2024:  Common labs          9/4/2024    11:40 9/6/2024    04:16 9/10/2024    11:41   Common Labs   Glucose 196  79  64    BUN 54  64  46    Creatinine 3.62  3.49  3.18    Sodium 139  137  140    Potassium 4.8  5.2  5.1    Chloride 104  106  108    Calcium 9.0  8.3  9.3    Albumin   3.4    WBC  8.24  9.07    Hemoglobin  8.9  10.2    Hematocrit  27.6  31.0    Platelets  136  176    Total Cholesterol  85     Triglycerides  77     HDL Cholesterol  29     LDL Cholesterol   40     Hemoglobin A1C  7.20                 Assessment and Plan   Diagnoses and all orders for this visit:    1. Type 2 diabetes mellitus with hyperglycemia, with long-term current use of insulin (Primary)  -     Hemoglobin A1c  -     Basic Metabolic Panel    2. Stage 3b chronic kidney disease    3. Diabetic peripheral neuropathy associated with type 2 diabetes mellitus    4. History of CAD (coronary artery disease)    5. Mixed hyperlipidemia             Follow Up   Return in about 4 months (around 4/26/2025).    Cgm reviewed, at goal   Labs today  Reviewed insulin dosing, safety and hypoglycemia prevention   Continue statin and ace-I     Patient was given instructions and counseling regarding his condition or for health maintenance advice. Please see specific information pulled into the AVS if appropriate.     ABBY Alvarenga

## 2024-12-27 ENCOUNTER — APPOINTMENT (OUTPATIENT)
Dept: CARDIAC REHAB | Facility: HOSPITAL | Age: 74
End: 2024-12-27
Payer: MEDICARE

## 2024-12-27 LAB
BUN SERPL-MCNC: 69 MG/DL (ref 8–23)
BUN/CREAT SERPL: 18.4 (ref 7–25)
CALCIUM SERPL-MCNC: 8.8 MG/DL (ref 8.6–10.5)
CHLORIDE SERPL-SCNC: 108 MMOL/L (ref 98–107)
CO2 SERPL-SCNC: 25.6 MMOL/L (ref 22–29)
CREAT SERPL-MCNC: 3.76 MG/DL (ref 0.76–1.27)
EGFRCR SERPLBLD CKD-EPI 2021: 16.1 ML/MIN/1.73
GLUCOSE SERPL-MCNC: 115 MG/DL (ref 65–99)
HBA1C MFR BLD: 6.7 % (ref 4.8–5.6)
POTASSIUM SERPL-SCNC: 5.3 MMOL/L (ref 3.5–5.2)
SODIUM SERPL-SCNC: 142 MMOL/L (ref 136–145)

## 2024-12-30 ENCOUNTER — TREATMENT (OUTPATIENT)
Dept: CARDIAC REHAB | Facility: HOSPITAL | Age: 74
End: 2024-12-30
Payer: MEDICARE

## 2024-12-30 DIAGNOSIS — Z95.5 S/P DRUG ELUTING CORONARY STENT PLACEMENT: Primary | ICD-10-CM

## 2024-12-30 PROCEDURE — 93798 PHYS/QHP OP CAR RHAB W/ECG: CPT

## 2024-12-31 ENCOUNTER — APPOINTMENT (OUTPATIENT)
Dept: WOUND CARE | Facility: HOSPITAL | Age: 74
End: 2024-12-31
Payer: MEDICARE

## 2025-01-03 ENCOUNTER — TRANSCRIBE ORDERS (OUTPATIENT)
Dept: ADMINISTRATIVE | Facility: HOSPITAL | Age: 75
End: 2025-01-03
Payer: MEDICARE

## 2025-01-03 ENCOUNTER — HOSPITAL ENCOUNTER (OUTPATIENT)
Dept: GENERAL RADIOLOGY | Facility: HOSPITAL | Age: 75
Discharge: HOME | End: 2025-01-03
Payer: MEDICARE

## 2025-01-03 ENCOUNTER — TREATMENT (OUTPATIENT)
Dept: CARDIAC REHAB | Facility: HOSPITAL | Age: 75
End: 2025-01-03
Payer: MEDICARE

## 2025-01-03 DIAGNOSIS — Z95.5 S/P DRUG ELUTING CORONARY STENT PLACEMENT: Primary | ICD-10-CM

## 2025-01-03 DIAGNOSIS — L97.522 ULCER OF LEFT FOOT, WITH FAT LAYER EXPOSED: ICD-10-CM

## 2025-01-03 DIAGNOSIS — L97.522 ULCER OF LEFT FOOT, WITH FAT LAYER EXPOSED: Primary | ICD-10-CM

## 2025-01-03 PROCEDURE — 73660 X-RAY EXAM OF TOE(S): CPT

## 2025-01-03 PROCEDURE — 93798 PHYS/QHP OP CAR RHAB W/ECG: CPT

## 2025-01-08 ENCOUNTER — APPOINTMENT (OUTPATIENT)
Dept: CARDIAC REHAB | Facility: HOSPITAL | Age: 75
End: 2025-01-08
Payer: MEDICARE

## 2025-01-10 ENCOUNTER — APPOINTMENT (OUTPATIENT)
Dept: CARDIAC REHAB | Facility: HOSPITAL | Age: 75
End: 2025-01-10
Payer: MEDICARE

## 2025-01-13 ENCOUNTER — TREATMENT (OUTPATIENT)
Dept: CARDIAC REHAB | Facility: HOSPITAL | Age: 75
End: 2025-01-13
Payer: MEDICARE

## 2025-01-13 DIAGNOSIS — Z95.5 S/P DRUG ELUTING CORONARY STENT PLACEMENT: Primary | ICD-10-CM

## 2025-01-13 PROCEDURE — 93798 PHYS/QHP OP CAR RHAB W/ECG: CPT

## 2025-01-14 ENCOUNTER — APPOINTMENT (OUTPATIENT)
Dept: WOUND CARE | Facility: HOSPITAL | Age: 75
End: 2025-01-14
Payer: MEDICARE

## 2025-01-14 ENCOUNTER — LAB REQUISITION (OUTPATIENT)
Dept: LAB | Facility: HOSPITAL | Age: 75
End: 2025-01-14
Payer: MEDICARE

## 2025-01-14 DIAGNOSIS — L97.522 NON-PRESSURE CHRONIC ULCER OF OTHER PART OF LEFT FOOT WITH FAT LAYER EXPOSED: ICD-10-CM

## 2025-01-14 PROCEDURE — 87205 SMEAR GRAM STAIN: CPT | Performed by: NURSE PRACTITIONER

## 2025-01-14 PROCEDURE — 87070 CULTURE OTHR SPECIMN AEROBIC: CPT | Performed by: NURSE PRACTITIONER

## 2025-01-14 PROCEDURE — 87176 TISSUE HOMOGENIZATION CULTR: CPT | Performed by: NURSE PRACTITIONER

## 2025-01-15 ENCOUNTER — APPOINTMENT (OUTPATIENT)
Dept: GENERAL RADIOLOGY | Facility: HOSPITAL | Age: 75
End: 2025-01-15
Payer: MEDICARE

## 2025-01-15 ENCOUNTER — LAB (OUTPATIENT)
Dept: LAB | Facility: HOSPITAL | Age: 75
End: 2025-01-15
Payer: MEDICARE

## 2025-01-15 ENCOUNTER — TRANSCRIBE ORDERS (OUTPATIENT)
Dept: ADMINISTRATIVE | Facility: HOSPITAL | Age: 75
End: 2025-01-15
Payer: MEDICARE

## 2025-01-15 ENCOUNTER — TREATMENT (OUTPATIENT)
Dept: CARDIAC REHAB | Facility: HOSPITAL | Age: 75
End: 2025-01-15
Payer: MEDICARE

## 2025-01-15 ENCOUNTER — HOSPITAL ENCOUNTER (INPATIENT)
Facility: HOSPITAL | Age: 75
LOS: 1 days | Discharge: HOME | End: 2025-01-17
Attending: EMERGENCY MEDICINE | Admitting: INTERNAL MEDICINE
Payer: MEDICARE

## 2025-01-15 DIAGNOSIS — I21.4 NSTEMI (NON-ST ELEVATED MYOCARDIAL INFARCTION): Primary | ICD-10-CM

## 2025-01-15 DIAGNOSIS — E11.621 TYPE 2 DIABETES MELLITUS WITH FOOT ULCER (CODE): Primary | ICD-10-CM

## 2025-01-15 DIAGNOSIS — E11.621 TYPE 2 DIABETES MELLITUS WITH FOOT ULCER (CODE): ICD-10-CM

## 2025-01-15 DIAGNOSIS — R07.9 CHEST PAIN, UNSPECIFIED TYPE: ICD-10-CM

## 2025-01-15 DIAGNOSIS — Z95.5 S/P DRUG ELUTING CORONARY STENT PLACEMENT: Primary | ICD-10-CM

## 2025-01-15 LAB
ALBUMIN SERPL-MCNC: 3.4 G/DL (ref 3.5–5.2)
ALBUMIN/GLOB SERPL: 1.2 G/DL
ALP SERPL-CCNC: 115 U/L (ref 39–117)
ALT SERPL W P-5'-P-CCNC: 10 U/L (ref 1–41)
ANION GAP SERPL CALCULATED.3IONS-SCNC: 11.3 MMOL/L (ref 5–15)
APTT PPP: 27.5 SECONDS (ref 24.3–38.1)
AST SERPL-CCNC: 17 U/L (ref 1–40)
BASOPHILS # BLD AUTO: 0.05 10*3/MM3 (ref 0–0.2)
BASOPHILS NFR BLD AUTO: 0.6 % (ref 0–1.5)
BILIRUB SERPL-MCNC: 0.3 MG/DL (ref 0–1.2)
BUN SERPL-MCNC: 39 MG/DL (ref 8–23)
BUN/CREAT SERPL: 13.4 (ref 7–25)
CALCIUM SPEC-SCNC: 8.2 MG/DL (ref 8.6–10.5)
CHLORIDE SERPL-SCNC: 109 MMOL/L (ref 98–107)
CO2 SERPL-SCNC: 20.7 MMOL/L (ref 22–29)
CREAT SERPL-MCNC: 2.9 MG/DL (ref 0.76–1.27)
CRP SERPL-MCNC: 0.4 MG/DL (ref 0–0.5)
DEPRECATED RDW RBC AUTO: 49.9 FL (ref 37–54)
EGFRCR SERPLBLD CKD-EPI 2021: 22 ML/MIN/1.73
EOSINOPHIL # BLD AUTO: 0.42 10*3/MM3 (ref 0–0.4)
EOSINOPHIL NFR BLD AUTO: 5 % (ref 0.3–6.2)
ERYTHROCYTE [DISTWIDTH] IN BLOOD BY AUTOMATED COUNT: 13.2 % (ref 12.3–15.4)
ERYTHROCYTE [SEDIMENTATION RATE] IN BLOOD: 31 MM/HR (ref 0–20)
GEN 5 1HR TROPONIN T REFLEX: 207 NG/L
GLOBULIN UR ELPH-MCNC: 2.8 GM/DL
GLUCOSE BLDC GLUCOMTR-MCNC: 156 MG/DL (ref 70–130)
GLUCOSE BLDC GLUCOMTR-MCNC: 243 MG/DL (ref 70–130)
GLUCOSE SERPL-MCNC: 117 MG/DL (ref 65–99)
HBA1C MFR BLD: 6.68 % (ref 4.8–5.6)
HCT VFR BLD AUTO: 29.7 % (ref 37.5–51)
HGB BLD-MCNC: 9.7 G/DL (ref 13–17.7)
HOLD SPECIMEN: NORMAL
HOLD SPECIMEN: NORMAL
IMM GRANULOCYTES # BLD AUTO: 0.02 10*3/MM3 (ref 0–0.05)
IMM GRANULOCYTES NFR BLD AUTO: 0.2 % (ref 0–0.5)
INR PPP: 1 (ref 0.9–1.1)
LYMPHOCYTES # BLD AUTO: 0.87 10*3/MM3 (ref 0.7–3.1)
LYMPHOCYTES NFR BLD AUTO: 10.5 % (ref 19.6–45.3)
MCH RBC QN AUTO: 33.9 PG (ref 26.6–33)
MCHC RBC AUTO-ENTMCNC: 32.7 G/DL (ref 31.5–35.7)
MCV RBC AUTO: 103.8 FL (ref 79–97)
MONOCYTES # BLD AUTO: 0.52 10*3/MM3 (ref 0.1–0.9)
MONOCYTES NFR BLD AUTO: 6.3 % (ref 5–12)
NEUTROPHILS NFR BLD AUTO: 6.44 10*3/MM3 (ref 1.7–7)
NEUTROPHILS NFR BLD AUTO: 77.4 % (ref 42.7–76)
NRBC BLD AUTO-RTO: 0 /100 WBC (ref 0–0.2)
PLATELET # BLD AUTO: 133 10*3/MM3 (ref 140–450)
PMV BLD AUTO: 12 FL (ref 6–12)
POTASSIUM SERPL-SCNC: 4.9 MMOL/L (ref 3.5–5.2)
PREALB SERPL-MCNC: 16.7 MG/DL (ref 20–40)
PROT SERPL-MCNC: 6.2 G/DL (ref 6–8.5)
PROTHROMBIN TIME: 13.6 SECONDS (ref 12.1–15)
QT INTERVAL: 481 MS
QTC INTERVAL: 531 MS
RBC # BLD AUTO: 2.86 10*6/MM3 (ref 4.14–5.8)
SODIUM SERPL-SCNC: 141 MMOL/L (ref 136–145)
TROPONIN T % DELTA: 0
TROPONIN T NUMERIC DELTA: 1 NG/L
TROPONIN T SERPL HS-MCNC: 206 NG/L
WBC NRBC COR # BLD AUTO: 8.32 10*3/MM3 (ref 3.4–10.8)
WHOLE BLOOD HOLD COAG: NORMAL
WHOLE BLOOD HOLD SPECIMEN: NORMAL

## 2025-01-15 PROCEDURE — 85025 COMPLETE CBC W/AUTO DIFF WBC: CPT | Performed by: EMERGENCY MEDICINE

## 2025-01-15 PROCEDURE — 93010 ELECTROCARDIOGRAM REPORT: CPT | Performed by: INTERNAL MEDICINE

## 2025-01-15 PROCEDURE — 63710000001 INSULIN GLARGINE PER 5 UNITS: Performed by: HOSPITALIST

## 2025-01-15 PROCEDURE — 80053 COMPREHEN METABOLIC PANEL: CPT | Performed by: EMERGENCY MEDICINE

## 2025-01-15 PROCEDURE — 84484 ASSAY OF TROPONIN QUANT: CPT | Performed by: EMERGENCY MEDICINE

## 2025-01-15 PROCEDURE — 85730 THROMBOPLASTIN TIME PARTIAL: CPT | Performed by: EMERGENCY MEDICINE

## 2025-01-15 PROCEDURE — G0378 HOSPITAL OBSERVATION PER HR: HCPCS

## 2025-01-15 PROCEDURE — 94799 UNLISTED PULMONARY SVC/PX: CPT

## 2025-01-15 PROCEDURE — 99285 EMERGENCY DEPT VISIT HI MDM: CPT | Performed by: EMERGENCY MEDICINE

## 2025-01-15 PROCEDURE — 83036 HEMOGLOBIN GLYCOSYLATED A1C: CPT | Performed by: HOSPITALIST

## 2025-01-15 PROCEDURE — 86140 C-REACTIVE PROTEIN: CPT

## 2025-01-15 PROCEDURE — 71045 X-RAY EXAM CHEST 1 VIEW: CPT

## 2025-01-15 PROCEDURE — 36415 COLL VENOUS BLD VENIPUNCTURE: CPT | Performed by: EMERGENCY MEDICINE

## 2025-01-15 PROCEDURE — 25010000002 ENOXAPARIN PER 10 MG: Performed by: HOSPITALIST

## 2025-01-15 PROCEDURE — 93798 PHYS/QHP OP CAR RHAB W/ECG: CPT

## 2025-01-15 PROCEDURE — 82948 REAGENT STRIP/BLOOD GLUCOSE: CPT

## 2025-01-15 PROCEDURE — 85610 PROTHROMBIN TIME: CPT | Performed by: EMERGENCY MEDICINE

## 2025-01-15 PROCEDURE — 25010000002 HEPARIN (PORCINE) 25000-0.45 UT/250ML-% SOLUTION: Performed by: EMERGENCY MEDICINE

## 2025-01-15 PROCEDURE — 99222 1ST HOSP IP/OBS MODERATE 55: CPT | Performed by: HOSPITALIST

## 2025-01-15 PROCEDURE — 84134 ASSAY OF PREALBUMIN: CPT

## 2025-01-15 PROCEDURE — 63710000001 INSULIN LISPRO (HUMAN) PER 5 UNITS: Performed by: HOSPITALIST

## 2025-01-15 PROCEDURE — 85652 RBC SED RATE AUTOMATED: CPT

## 2025-01-15 PROCEDURE — 25010000002 HEPARIN (PORCINE) PER 1000 UNITS: Performed by: EMERGENCY MEDICINE

## 2025-01-15 PROCEDURE — 93005 ELECTROCARDIOGRAM TRACING: CPT | Performed by: EMERGENCY MEDICINE

## 2025-01-15 RX ORDER — OXYCODONE AND ACETAMINOPHEN 10; 325 MG/1; MG/1
1 TABLET ORAL EVERY 6 HOURS PRN
Status: DISCONTINUED | OUTPATIENT
Start: 2025-01-15 | End: 2025-01-17 | Stop reason: HOSPADM

## 2025-01-15 RX ORDER — ASPIRIN 81 MG/1
81 TABLET ORAL DAILY
Status: DISCONTINUED | OUTPATIENT
Start: 2025-01-16 | End: 2025-01-17 | Stop reason: HOSPADM

## 2025-01-15 RX ORDER — PANTOPRAZOLE SODIUM 40 MG/1
40 TABLET, DELAYED RELEASE ORAL
Status: DISCONTINUED | OUTPATIENT
Start: 2025-01-16 | End: 2025-01-15

## 2025-01-15 RX ORDER — HEPARIN SODIUM 5000 [USP'U]/ML
5000 INJECTION, SOLUTION INTRAVENOUS; SUBCUTANEOUS ONCE
Status: COMPLETED | OUTPATIENT
Start: 2025-01-15 | End: 2025-01-15

## 2025-01-15 RX ORDER — CLOPIDOGREL BISULFATE 75 MG/1
75 TABLET ORAL DAILY
Status: DISCONTINUED | OUTPATIENT
Start: 2025-01-16 | End: 2025-01-17 | Stop reason: HOSPADM

## 2025-01-15 RX ORDER — METOPROLOL SUCCINATE 50 MG/1
50 TABLET, EXTENDED RELEASE ORAL EVERY EVENING
Status: DISCONTINUED | OUTPATIENT
Start: 2025-01-15 | End: 2025-01-17 | Stop reason: HOSPADM

## 2025-01-15 RX ORDER — TORSEMIDE 20 MG/1
60 TABLET ORAL DAILY
Status: DISCONTINUED | OUTPATIENT
Start: 2025-01-16 | End: 2025-01-17 | Stop reason: HOSPADM

## 2025-01-15 RX ORDER — SODIUM CHLORIDE 0.9 % (FLUSH) 0.9 %
10 SYRINGE (ML) INJECTION AS NEEDED
Status: DISCONTINUED | OUTPATIENT
Start: 2025-01-15 | End: 2025-01-17 | Stop reason: HOSPADM

## 2025-01-15 RX ORDER — NITROGLYCERIN 0.4 MG/1
0.4 TABLET SUBLINGUAL
Status: DISCONTINUED | OUTPATIENT
Start: 2025-01-15 | End: 2025-01-17 | Stop reason: HOSPADM

## 2025-01-15 RX ORDER — ATORVASTATIN CALCIUM 40 MG/1
80 TABLET, FILM COATED ORAL NIGHTLY
Status: DISCONTINUED | OUTPATIENT
Start: 2025-01-15 | End: 2025-01-17 | Stop reason: HOSPADM

## 2025-01-15 RX ORDER — LISINOPRIL 10 MG/1
10 TABLET ORAL DAILY
Status: DISCONTINUED | OUTPATIENT
Start: 2025-01-16 | End: 2025-01-17 | Stop reason: HOSPADM

## 2025-01-15 RX ORDER — INSULIN LISPRO 100 [IU]/ML
1-200 INJECTION, SOLUTION INTRAVENOUS; SUBCUTANEOUS
Status: DISCONTINUED | OUTPATIENT
Start: 2025-01-15 | End: 2025-01-17 | Stop reason: HOSPADM

## 2025-01-15 RX ORDER — PANTOPRAZOLE SODIUM 40 MG/1
40 TABLET, DELAYED RELEASE ORAL
Status: DISCONTINUED | OUTPATIENT
Start: 2025-01-15 | End: 2025-01-17 | Stop reason: HOSPADM

## 2025-01-15 RX ORDER — NICOTINE POLACRILEX 4 MG
15 LOZENGE BUCCAL
Status: DISCONTINUED | OUTPATIENT
Start: 2025-01-15 | End: 2025-01-17 | Stop reason: HOSPADM

## 2025-01-15 RX ORDER — IBUPROFEN 600 MG/1
1 TABLET ORAL
Status: DISCONTINUED | OUTPATIENT
Start: 2025-01-15 | End: 2025-01-17 | Stop reason: HOSPADM

## 2025-01-15 RX ORDER — HEPARIN SODIUM 10000 [USP'U]/100ML
7.63 INJECTION, SOLUTION INTRAVENOUS
Status: DISCONTINUED | OUTPATIENT
Start: 2025-01-15 | End: 2025-01-15

## 2025-01-15 RX ORDER — HEPARIN SODIUM 5000 [USP'U]/ML
5000 INJECTION, SOLUTION INTRAVENOUS; SUBCUTANEOUS AS NEEDED
Status: DISCONTINUED | OUTPATIENT
Start: 2025-01-15 | End: 2025-01-15

## 2025-01-15 RX ORDER — INSULIN LISPRO 100 [IU]/ML
1-200 INJECTION, SOLUTION INTRAVENOUS; SUBCUTANEOUS AS NEEDED
Status: DISCONTINUED | OUTPATIENT
Start: 2025-01-15 | End: 2025-01-17 | Stop reason: HOSPADM

## 2025-01-15 RX ORDER — ENOXAPARIN SODIUM 150 MG/ML
1 INJECTION SUBCUTANEOUS EVERY 24 HOURS
Status: DISCONTINUED | OUTPATIENT
Start: 2025-01-15 | End: 2025-01-16

## 2025-01-15 RX ORDER — ASPIRIN 81 MG/1
324 TABLET, CHEWABLE ORAL ONCE
Status: COMPLETED | OUTPATIENT
Start: 2025-01-15 | End: 2025-01-15

## 2025-01-15 RX ORDER — PREGABALIN 75 MG/1
150 CAPSULE ORAL NIGHTLY
Status: DISCONTINUED | OUTPATIENT
Start: 2025-01-15 | End: 2025-01-17 | Stop reason: HOSPADM

## 2025-01-15 RX ORDER — HEPARIN SODIUM 5000 [USP'U]/ML
30 INJECTION, SOLUTION INTRAVENOUS; SUBCUTANEOUS AS NEEDED
Status: DISCONTINUED | OUTPATIENT
Start: 2025-01-15 | End: 2025-01-15

## 2025-01-15 RX ORDER — DEXTROSE MONOHYDRATE 25 G/50ML
10-50 INJECTION, SOLUTION INTRAVENOUS
Status: DISCONTINUED | OUTPATIENT
Start: 2025-01-15 | End: 2025-01-17 | Stop reason: HOSPADM

## 2025-01-15 RX ADMIN — INSULIN GLARGINE 34 UNITS: 100 INJECTION, SOLUTION SUBCUTANEOUS at 22:18

## 2025-01-15 RX ADMIN — PANTOPRAZOLE SODIUM 40 MG: 40 TABLET, DELAYED RELEASE ORAL at 22:38

## 2025-01-15 RX ADMIN — INSULIN LISPRO 12 UNITS: 100 INJECTION, SOLUTION INTRAVENOUS; SUBCUTANEOUS at 17:50

## 2025-01-15 RX ADMIN — INSULIN LISPRO 2 UNITS: 100 INJECTION, SOLUTION INTRAVENOUS; SUBCUTANEOUS at 22:19

## 2025-01-15 RX ADMIN — ATORVASTATIN CALCIUM 80 MG: 40 TABLET, FILM COATED ORAL at 22:21

## 2025-01-15 RX ADMIN — HEPARIN SODIUM 5000 UNITS: 5000 INJECTION INTRAVENOUS; SUBCUTANEOUS at 14:58

## 2025-01-15 RX ADMIN — HEPARIN SODIUM 7.63 UNITS/KG/HR: 10000 INJECTION, SOLUTION INTRAVENOUS at 14:59

## 2025-01-15 RX ADMIN — OXYCODONE AND ACETAMINOPHEN 1 TABLET: 10; 325 TABLET ORAL at 19:44

## 2025-01-15 RX ADMIN — ENOXAPARIN SODIUM 135 MG: 150 INJECTION SUBCUTANEOUS at 22:20

## 2025-01-15 RX ADMIN — METOPROLOL SUCCINATE 50 MG: 50 TABLET, EXTENDED RELEASE ORAL at 19:44

## 2025-01-15 RX ADMIN — ASPIRIN 243 MG: 81 TABLET, CHEWABLE ORAL at 13:48

## 2025-01-15 RX ADMIN — PREGABALIN 150 MG: 75 CAPSULE ORAL at 22:21

## 2025-01-15 NOTE — ED NOTES
Nursing report ED to floor  Hugh R McBurney  74 y.o.  male    HPI :   Chief Complaint   Patient presents with    Chest Pain     Was half way through a workout in cardiac rehab and started having chest pain. EKG in cardiac rehab was normal per staff. Patient reports that chest pain has resolved since resting in the bed.       Admitting doctor:   Rich Franz MD    Admitting diagnosis:   The primary encounter diagnosis was NSTEMI (non-ST elevated myocardial infarction). A diagnosis of Chest pain, unspecified type was also pertinent to this visit.    Code status:   Current Code Status       Date Active Code Status Order ID Comments User Context       Prior            Allergies:   Vancomycin    Isolation:   No active isolations    Intake and Output  No intake or output data in the 24 hours ending 01/15/25 1503    Weight:       01/15/25  1134   Weight: 131 kg (288 lb 12.8 oz)       Most recent vitals:   Vitals:    01/15/25 1230 01/15/25 1300 01/15/25 1330 01/15/25 1345   BP: 138/74 153/70 159/63    Pulse: 60 57 59 59   Resp:       Temp:       SpO2: 92% 93% 94% 96%   Weight:       Height:           Active LDAs/IV Access:   Lines, Drains & Airways       Active LDAs       Name Placement date Placement time Site Days    Peripheral IV 01/15/25 1222 Left Antecubital 01/15/25  1222  Antecubital  less than 1                    Labs (abnormal labs have a star):   Labs Reviewed   COMPREHENSIVE METABOLIC PANEL - Abnormal; Notable for the following components:       Result Value    Glucose 117 (*)     BUN 39 (*)     Creatinine 2.90 (*)     Chloride 109 (*)     CO2 20.7 (*)     Calcium 8.2 (*)     Albumin 3.4 (*)     eGFR 22.0 (*)     All other components within normal limits    Narrative:     GFR Categories in Chronic Kidney Disease (CKD)      GFR Category          GFR (mL/min/1.73)    Interpretation  G1                     90 or greater         Normal or high (1)  G2                      60-89                Mild decrease  (1)  G3a                   45-59                Mild to moderate decrease  G3b                   30-44                Moderate to severe decrease  G4                    15-29                Severe decrease  G5                    14 or less           Kidney failure          (1)In the absence of evidence of kidney disease, neither GFR category G1 or G2 fulfill the criteria for CKD.    eGFR calculation 2021 CKD-EPI creatinine equation, which does not include race as a factor   TROPONIN - Abnormal; Notable for the following components:    HS Troponin T 206 (*)     All other components within normal limits    Narrative:     High Sensitive Troponin T Reference Range:  <14.0 ng/L- Negative Female for AMI  <22.0 ng/L- Negative Male for AMI  >=14 - Abnormal Female indicating possible myocardial injury.  >=22 - Abnormal Male indicating possible myocardial injury.   Clinicians would have to utilize clinical acumen, EKG, Troponin, and serial changes to determine if it is an Acute Myocardial Infarction or myocardial injury due to an underlying chronic condition.        CBC WITH AUTO DIFFERENTIAL - Abnormal; Notable for the following components:    RBC 2.86 (*)     Hemoglobin 9.7 (*)     Hematocrit 29.7 (*)     .8 (*)     MCH 33.9 (*)     Platelets 133 (*)     Neutrophil % 77.4 (*)     Lymphocyte % 10.5 (*)     Eosinophils, Absolute 0.42 (*)     All other components within normal limits   HIGH SENSITIVITIY TROPONIN T 1HR - Abnormal; Notable for the following components:    HS Troponin T 207 (*)     All other components within normal limits    Narrative:     High Sensitive Troponin T Reference Range:  <14.0 ng/L- Negative Female for AMI  <22.0 ng/L- Negative Male for AMI  >=14 - Abnormal Female indicating possible myocardial injury.  >=22 - Abnormal Male indicating possible myocardial injury.   Clinicians would have to utilize clinical acumen, EKG, Troponin, and serial changes to determine if it is an Acute Myocardial  Infarction or myocardial injury due to an underlying chronic condition.        PROTIME-INR - Normal    Narrative:     Therapeutic Ranges for INR: 2.0-3.0 (PT 20-30)                              2.5-3.5 (PT 25-34)   APTT - Normal    Narrative:     PTT = The equivalent PTT values for the therapeutic range of heparin levels at 0.1 to 0.7 U/ml are 53 to 110 seconds.     RAINBOW DRAW    Narrative:     The following orders were created for panel order [place] Draw.  Procedure                               Abnormality         Status                     ---------                               -----------         ------                     Green Top (Gel)[[id number]]                                  Final result               Lavender Top[[id number]]                                     Final result               Gold Top - SST[[id number]]                                   Final result               Light Blue Top[[id number]]                                   Final result                 Please view results for these tests on the individual orders.   CBC AND DIFFERENTIAL    Narrative:     The following orders were created for panel order CBC & Differential.  Procedure                               Abnormality         Status                     ---------                               -----------         ------                     CBC Auto Differential[[id number]]        Abnormal            Final result                 Please view results for these tests on the individual orders.   GREEN TOP   LAVENDER TOP   GOLD TOP - SST   LIGHT BLUE TOP       Results       Procedure Component Value Ref Range Date/Time    High Sensitivity Troponin T 1Hr [[id number]]  (Abnormal) Collected: 01/15/25 1322    Order Status: Completed Specimen: Blood Updated: 01/15/25 1349     HS Troponin T 207 <22 ng/L      Troponin T Numeric Delta 1 ng/L      Troponin T % Delta 0 Abnormal if >/= 20%     Narrative:      High Sensitive Troponin T Reference Range:  <14.0  ng/L- Negative Female for AMI  <22.0 ng/L- Negative Male for AMI  >=14 - Abnormal Female indicating possible myocardial injury.  >=22 - Abnormal Male indicating possible myocardial injury.   Clinicians would have to utilize clinical acumen, EKG, Troponin, and serial changes to determine if it is an Acute Myocardial Infarction or myocardial injury due to an underlying chronic condition.         Protime-INR [[id number]]  (Normal) Collected: 01/15/25 1222    Order Status: Completed Specimen: Blood Updated: 01/15/25 1333     Protime 13.6 12.1 - 15.0 Seconds      INR 1.00 0.90 - 1.10     Narrative:      Therapeutic Ranges for INR: 2.0-3.0 (PT 20-30)                              2.5-3.5 (PT 25-34)    aPTT [[id number]]  (Normal) Collected: 01/15/25 1222    Order Status: Completed Specimen: Blood Updated: 01/15/25 1333     PTT 27.5 24.3 - 38.1 seconds     Narrative:      PTT = The equivalent PTT values for the therapeutic range of heparin levels at 0.1 to 0.7 U/ml are 53 to 110 seconds.      High Sensitivity Troponin T [[id number]]  (Abnormal) Collected: 01/15/25 1222    Order Status: Completed Specimen: Blood Updated: 01/15/25 1302     HS Troponin T 206 <22 ng/L     Narrative:      High Sensitive Troponin T Reference Range:  <14.0 ng/L- Negative Female for AMI  <22.0 ng/L- Negative Male for AMI  >=14 - Abnormal Female indicating possible myocardial injury.  >=22 - Abnormal Male indicating possible myocardial injury.   Clinicians would have to utilize clinical acumen, EKG, Troponin, and serial changes to determine if it is an Acute Myocardial Infarction or myocardial injury due to an underlying chronic condition.         Comprehensive Metabolic Panel [[id number]]  (Abnormal) Collected: 01/15/25 1222    Order Status: Completed Specimen: Blood Updated: 01/15/25 1256     Glucose 117 65 - 99 mg/dL      BUN 39 8 - 23 mg/dL      Creatinine 2.90 0.76 - 1.27 mg/dL      Sodium 141 136 - 145 mmol/L      Potassium 4.9 3.5 - 5.2 mmol/L       Chloride 109 98 - 107 mmol/L      CO2 20.7 22.0 - 29.0 mmol/L      Calcium 8.2 8.6 - 10.5 mg/dL      Total Protein 6.2 6.0 - 8.5 g/dL      Albumin 3.4 3.5 - 5.2 g/dL      ALT (SGPT) 10 1 - 41 U/L      AST (SGOT) 17 1 - 40 U/L      Alkaline Phosphatase 115 39 - 117 U/L      Total Bilirubin 0.3 0.0 - 1.2 mg/dL      Globulin 2.8 gm/dL      A/G Ratio 1.2 g/dL      BUN/Creatinine Ratio 13.4 7.0 - 25.0      Anion Gap 11.3 5.0 - 15.0 mmol/L      eGFR 22.0 >60.0 mL/min/1.73     Narrative:      GFR Categories in Chronic Kidney Disease (CKD)      GFR Category          GFR (mL/min/1.73)    Interpretation  G1                     90 or greater         Normal or high (1)  G2                      60-89                Mild decrease (1)  G3a                   45-59                Mild to moderate decrease  G3b                   30-44                Moderate to severe decrease  G4                    15-29                Severe decrease  G5                    14 or less           Kidney failure          (1)In the absence of evidence of kidney disease, neither GFR category G1 or G2 fulfill the criteria for CKD.    eGFR calculation 2021 CKD-EPI creatinine equation, which does not include race as a factor    CBC Auto Differential [[id number]]  (Abnormal) Collected: 01/15/25 1222    Order Status: Completed Specimen: Blood Updated: 01/15/25 1235     WBC 8.32 3.40 - 10.80 10*3/mm3      RBC 2.86 4.14 - 5.80 10*6/mm3      Hemoglobin 9.7 13.0 - 17.7 g/dL      Hematocrit 29.7 37.5 - 51.0 %      .8 79.0 - 97.0 fL      MCH 33.9 26.6 - 33.0 pg      MCHC 32.7 31.5 - 35.7 g/dL      RDW 13.2 12.3 - 15.4 %      RDW-SD 49.9 37.0 - 54.0 fl      MPV 12.0 6.0 - 12.0 fL      Platelets 133 140 - 450 10*3/mm3      Neutrophil % 77.4 42.7 - 76.0 %      Lymphocyte % 10.5 19.6 - 45.3 %      Monocyte % 6.3 5.0 - 12.0 %      Eosinophil % 5.0 0.3 - 6.2 %      Basophil % 0.6 0.0 - 1.5 %      Immature Grans % 0.2 0.0 - 0.5 %      Neutrophils, Absolute 6.44  1.70 - 7.00 10*3/mm3      Lymphocytes, Absolute 0.87 0.70 - 3.10 10*3/mm3      Monocytes, Absolute 0.52 0.10 - 0.90 10*3/mm3      Eosinophils, Absolute 0.42 0.00 - 0.40 10*3/mm3      Basophils, Absolute 0.05 0.00 - 0.20 10*3/mm3      Immature Grans, Absolute 0.02 0.00 - 0.05 10*3/mm3      nRBC 0.0 0.0 - 0.2 /100 WBC     [place] Draw [[id number]] Collected: 01/15/25 1222    Order Status: Completed Specimen: Blood Updated: 01/15/25 1230    Narrative:      The following orders were created for panel order [place] Draw.  Procedure                               Abnormality         Status                     ---------                               -----------         ------                     Green Top (Gel)[[id number]]                                  Final result               Lavender Top[[id number]]                                     Final result               Gold Top - SST[[id number]]                                   Final result               Light Blue Top[[id number]]                                   Final result                 Please view results for these tests on the individual orders.             EKG:   ECG 12 Lead Chest Pain   Preliminary Result   HEART RATE=73  bpm   RR Mlwujfyv=808  ms   MO Gkoyrkmf=948  ms   P Horizontal Axis=  deg   P Front Axis=34  deg   QRSD Interval=97  ms   QT Zwmfssbe=832  ms   TDyX=501  ms   QRS Axis=17  deg   T Wave Axis=79  deg   - ABNORMAL ECG -   Sinus rhythm   Atrial premature complexes   Borderline ST depression, anterolateral leads   Prolonged QT interval   Date and Time of Study:2025-01-15 11:32:41          Meds given in ED:   Medications   sodium chloride 0.9 % flush 10 mL (has no administration in time range)   heparin 91921 units/250 mL (100 units/mL) in 0.45 % NaCl infusion (7.63 Units/kg/hr × 131 kg Intravenous New Bag 1/15/25 5439)   heparin (porcine) 5000 UNIT/ML injection 5,000 Units (has no administration in time range)   heparin (porcine) 5000 UNIT/ML injection  4,000 Units (has no administration in time range)   aspirin chewable tablet 324 mg (243 mg Oral Given 1/15/25 1348)   heparin (porcine) 5000 UNIT/ML injection 5,000 Units (5,000 Units Intravenous Given 1/15/25 1458)       Imaging results:  XR Chest 1 View    Result Date: 1/15/2025  Impression: 1.Cardiomegaly. No acute radiographic abnormality is identified. Electronically Signed: Dhaval Tse MD  1/15/2025 2:26 PM EST  Workstation ID: [id number]     Ambulatory status:   - stretcher    Social issues:   Social History     Socioeconomic History    Marital status:    Tobacco Use    Smoking status: Former     Current packs/day: 3.00     Average packs/day: 3.0 packs/day for 20.0 years (60.0 ttl pk-yrs)     Types: Cigarettes     Passive exposure: Never    Smokeless tobacco: Former    Tobacco comments:     quit 35 years ago. 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    Vaping Use    Vaping status: Never Used   Substance and Sexual Activity    Alcohol use: No    Drug use: No    Sexual activity: Defer       NIH Stroke Scale:         Umu Palomares, RN01/15/25 15:03 EST

## 2025-01-15 NOTE — PLAN OF CARE
Goal Outcome Evaluation:  Plan of Care Reviewed With: patient        Progress: improving  Outcome Evaluation: Patient here for chest pain, cardiology consulted. Telemetry NSR today. Glucomander started with supper. Patient has wound on left shin & left toe that is changed by wife daily. Anticipate home with spouse at discharge.
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
## 2025-01-15 NOTE — ED PROVIDER NOTES
Subjective   History of Present Illness  Patient presents complaining of chest pain.  Patient was in cardiac rehab and started having chest pain.  They stopped it and sent the patient to the ED for evaluation.  Patient says been getting some exertional chest pain on and off for the last 2 to 3 weeks with has been out working.  Patient was evaluated few months ago and had stent placement.  Patient is otherwise been at baseline health since the admission.  Patient denies any associated symptoms including no sweating, nausea, vomiting, or near syncope with the chest pain.  Currently the chest pain is resolved.  Nothing seem to make it better or worse at the time.  No therapy taken prior to arrival.      Review of Systems   All other systems reviewed and are negative.      Past Medical History:   Diagnosis Date    Arthritis     Chronic pain     BILATERAL KNEES AND BACK    Contusion of knee     Coronary artery disease     Depression     Diabetes mellitus     type 2 with atherosclerosis of arteries of extremities    Diabetic ulcer of toe     left foot associated with type 2 diabetes mellitus, with necrosis of bone    GERD (gastroesophageal reflux disease)     Great toe pain     with cellulitis and gangrene    History of kidney stones     Hyperlipidemia     Hypertension     Hyponatremia     Kidney stone     Knee pain     hx of previous prosthetic joint infection    Liver disease     liver mass    MSSA (methicillin susceptible Staphylococcus aureus) infection     RIGHT KNEE    NSTEMI (non-ST elevated myocardial infarction) 06/14/2017    Sleep apnea     DOES NOT USE CPAP       Allergies   Allergen Reactions    Vancomycin Other (See Comments)     Red Man syndrome, slow rate and premedicate with benadryl       Past Surgical History:   Procedure Laterality Date    APPENDECTOMY      CARDIAC CATHETERIZATION N/A 6/14/2017    Procedure: Left Heart Cath;  Surgeon: Tiburcio Moreno MD;  Location: [facility] CATH INVASIVE LOCATION;   Service:     CARDIAC CATHETERIZATION N/A 9/5/2024    Procedure: Coronary angiography;  Surgeon: Baldomero Wheeler MD;  Location: [facility] CATH INVASIVE LOCATION;  Service: Cardiovascular;  Laterality: N/A;  Patient to hold lisinopril and torsemide the day prior, day of, and day after cardiac cath.  Patient will also need 4 hours of IV fluid prep (normal saline at 75 cc/h) pre and post cardiac cath.    CARDIAC CATHETERIZATION N/A 9/5/2024    Procedure: Left heart cath;  Surgeon: Baldomero Wheeler MD;  Location: [facility] CATH INVASIVE LOCATION;  Service: Cardiovascular;  Laterality: N/A;    CARDIAC CATHETERIZATION N/A 9/5/2024    Procedure: Resting Full Cycle Ratio;  Surgeon: Baldomero Wheeler MD;  Location: [facility] CATH INVASIVE LOCATION;  Service: Cardiovascular;  Laterality: N/A;    CARDIAC CATHETERIZATION N/A 9/5/2024    Procedure: Percutaneous Coronary Intervention;  Surgeon: Baldomero Wheeler MD;  Location: [facility] CATH INVASIVE LOCATION;  Service: Cardiovascular;  Laterality: N/A;    CARDIAC CATHETERIZATION N/A 9/5/2024    Procedure: Stent AILIN coronary;  Surgeon: Baldomero Wheeler MD;  Location: [facility] CATH INVASIVE LOCATION;  Service: Cardiovascular;  Laterality: N/A;    COLONOSCOPY      COLONOSCOPY W/ POLYPECTOMY N/A 4/29/2024    Procedure: COLONOSCOPY WITH POLYPECTOMY;  Surgeon: Kam Cooley MD;  Location: [facility];  Service: Gastroenterology;  Laterality: N/A;  diverticulosis; transverse polyp x3 (forecep); cecal polyp x2 (forceps); ascending polyp x3 (forceps); ascending polyp x2 (cold snare); splenic flexure polyp x2 (hot snare); sigmoid polyp x1 (cold snare); sigmoid polyp x1 (hot snare); sigmoid x1 (fo    ENDOSCOPY N/A 4/29/2024    Procedure: ESOPHAGOGASTRODUODENOSCOPY WITH BIOPSY;  Surgeon: Kam Cooley MD;  Location: [facility] OR;  Service: Gastroenterology;  Laterality: N/A;  Reflux esophagitis; Erosive gastritis; Dilatation- no stricture; Biopsies- gastric, distal esophagus     INTERVENTIONAL RADIOLOGY PROCEDURE N/A 9/5/2024    Procedure: Intravascular Ultrasound;  Surgeon: Baldomero Wheeler MD;  Location: [facility] INVASIVE LOCATION;  Service: Cardiovascular;  Laterality: N/A;    JOINT REPLACEMENT      BILATERAL KNEES     LUMBAR FUSION      IL REVJ TOTAL KNEE ARTHRP W/WO ALGRFT 1 COMPONENT Right 9/5/2017    Procedure: REMOVAL OF ANTIBIOTIC CEMENT SPACER; RIGHT TOTAL KNEE ARTHROPLASTY REVISION;  Surgeon: Kiko Marie MD;  Location: [facility] OR;  Service: Orthopedics    ROTATOR CUFF REPAIR Right     TOTAL KNEE  PROSTHESIS REMOVAL W/ SPACER INSERTION Right 2/14/2017    Procedure: INCISION AND DRAINAGE RIGHT KNEE, POLY CHANGE;  Surgeon: Kiko Marie MD;  Location: [facility] OR;  Service:     TOTAL KNEE  PROSTHESIS REMOVAL W/ SPACER INSERTION Right 7/7/2017    Procedure: REMOVAL RIGHT TOTAL KNEE ARTHROPLASTY AND ANITBIOTIC SPACER;  Surgeon: Kiko Marie MD;  Location: [facility] OR;  Service:     TRANS METATARSAL AMPUTATION Left 2/14/2017    Procedure: EXCISIONAL DEBRIDEMENT LT. FIRST TOE DIABETIC ULCER, DRAINAGE ABSCESS FIRST TOE, FIRST TOE AMPUTATION;  Surgeon: Osmin Brand MD;  Location: [facility] OR;  Service:        Family History   Problem Relation Age of Onset    Heart disease Mother     Heart disease Father     Diabetes Sister     Dementia Maternal Grandmother     No Known Problems Maternal Grandfather     Heart disease Paternal Grandmother     Heart attack Paternal Grandfather     Heart disease Paternal Grandfather     Heart disease Son     Heart attack Son     No Known Problems Sister     Malig Hyperthermia Neg Hx        Social History     Socioeconomic History    Marital status:    Tobacco Use    Smoking status: Former     Current packs/day: 3.00     Average packs/day: 3.0 packs/day for 20.0 years (60.0 ttl pk-yrs)     Types: Cigarettes     Passive exposure: Never    Smokeless tobacco: Former    Tobacco comments:     quit 35  years ago. 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    Vaping Use    Vaping status: Never Used   Substance and Sexual Activity    Alcohol use: No    Drug use: No    Sexual activity: Defer           Objective   Physical Exam  Vitals and nursing note reviewed.   Constitutional:       Comments: Patient sitting in bed comfortably, talkative, friendly, no signs of distress.  Cooperative with exam.   HENT:      Head: Normocephalic.   Neck:      Vascular: No JVD.   Cardiovascular:      Rate and Rhythm: Normal rate and regular rhythm.      Pulses:           Radial pulses are 2+ on the right side and 2+ on the left side.        Dorsalis pedis pulses are 1+ on the right side and 1+ on the left side.        Posterior tibial pulses are 1+ on the right side and 1+ on the left side.      Heart sounds: S1 normal and S2 normal.   Pulmonary:      Effort: Pulmonary effort is normal.      Breath sounds: Normal breath sounds.   Chest:      Chest wall: No tenderness or crepitus.   Musculoskeletal:      Cervical back: Neck supple.      Right lower le+ Pitting Edema present.      Left lower le+ Pitting Edema present.   Skin:     General: Skin is warm and dry.      Capillary Refill: Capillary refill takes 2 to 3 seconds.   Neurological:      Mental Status: He is alert and oriented to person, place, and time.   Psychiatric:         Mood and Affect: Mood normal.         Procedures           ED Course  ED Course as of 01/15/25 1507   Wed Tererll 15, 2025   1132 EKG-rate of 73, sinus rhythm, normal axis, borderline ST depression in leads V3 and V4.  When compared to EKG from 10/24/2024 it is generally unchanged except the polarity in lead III is negative and the depressions in V3 and V4 were not present previously. [AW]   1443 Discussed patient with Dr. Moreno of cardiology and he felt patient need to stay.  Advised starting heparin and patient would likely need perfusion scan additional evaluation.  He did not feel patient needed to be transferred and  due to patient's kidney function currently he did not want patient to go straight to Cath Lab.  Hospitalist has been paged. [AW]      ED Course User Index  [AW] Garrett Franco MD                  HEART Score: 7   Shared Decision Making  I discussed the findings with the patient/patient representative who is in agreement with the treatment plan and the final disposition.  Risks and benefits of discharge and/or observation/admission were discussed: Yes                                      Medical Decision Making  Ddx ACS, NSTEMI, pleurisy, hiatal hernia, costochondritis, pneumothorax    XR Chest 1 View    Result Date: 1/15/2025  Impression: 1.Cardiomegaly. No acute radiographic abnormality is identified. Electronically Signed: Dhaval Tse MD  1/15/2025 2:26 PM EST  Workstation ID: [id number]    Labs Reviewed  COMPREHENSIVE METABOLIC PANEL - Abnormal; Notable for the following components:     Glucose                       117 (*)                BUN                           39 (*)                 Creatinine                    2.90 (*)               Chloride                      109 (*)                CO2                           20.7 (*)               Calcium                       8.2 (*)                Albumin                       3.4 (*)                eGFR                          22.0 (*)            All other components within normal limits         Narrative: GFR Categories in Chronic Kidney Disease (CKD)                                      GFR Category          GFR (mL/min/1.73)    Interpretation                  G1                     90 or greater         Normal or high (1)                  G2                      60-89                Mild decrease (1)                  G3a                   45-59                Mild to moderate decrease                  G3b                   30-44                Moderate to severe decrease                  G4                    15-29                Severe decrease                   G5                    14 or less           Kidney failure                                          (1)In the absence of evidence of kidney disease, neither GFR category G1 or G2 fulfill the criteria for CKD.                                    eGFR calculation 2021 CKD-EPI creatinine equation, which does not include race as a factor  TROPONIN - Abnormal; Notable for the following components:     HS Troponin T                 206 (*)             All other components within normal limits         Narrative: High Sensitive Troponin T Reference Range:                  <14.0 ng/L- Negative Female for AMI                  <22.0 ng/L- Negative Male for AMI                  >=14 - Abnormal Female indicating possible myocardial injury.                  >=22 - Abnormal Male indicating possible myocardial injury.                   Clinicians would have to utilize clinical acumen, EKG, Troponin, and serial changes to determine if it is an Acute Myocardial Infarction or myocardial injury due to an underlying chronic condition.                                       CBC WITH AUTO DIFFERENTIAL - Abnormal; Notable for the following components:     RBC                           2.86 (*)               Hemoglobin                    9.7 (*)                Hematocrit                    29.7 (*)               MCV                           103.8 (*)               MCH                           33.9 (*)               Platelets                     133 (*)                Neutrophil %                  77.4 (*)               Lymphocyte %                  10.5 (*)               Eosinophils, Absolute         0.42 (*)            All other components within normal limits  HIGH SENSITIVITIY TROPONIN T 1HR - Abnormal; Notable for the following components:     HS Troponin T                 207 (*)             All other components within normal limits         Narrative: High Sensitive Troponin T Reference Range:                  <14.0 ng/L-  Negative Female for AMI                  <22.0 ng/L- Negative Male for AMI                  >=14 - Abnormal Female indicating possible myocardial injury.                  >=22 - Abnormal Male indicating possible myocardial injury.                   Clinicians would have to utilize clinical acumen, EKG, Troponin, and serial changes to determine if it is an Acute Myocardial Infarction or myocardial injury due to an underlying chronic condition.                                       PROTIME-INR - Normal         Narrative: Therapeutic Ranges for INR: 2.0-3.0 (PT 20-30)                                              2.5-3.5 (PT 25-34)  APTT - Normal         Narrative: PTT = The equivalent PTT values for the therapeutic range of heparin levels at 0.1 to 0.7 U/ml are 53 to 110 seconds.                    RAINBOW DRAW         Narrative: The following orders were created for panel order [place] Draw.                  Procedure                               Abnormality         Status                                     ---------                               -----------         ------                                     Green Top (Gel)[[id number]]                                  Final result                               Lavender Top[[id number]]                                     Final result                               Gold Top - SST[[id number]]                                   Final result                               Light Blue Top[[id number]]                                   Final result                                                 Please view results for these tests on the individual orders.  CBC AND DIFFERENTIAL         Narrative: The following orders were created for panel order CBC & Differential.                  Procedure                               Abnormality         Status                                     ---------                               -----------         ------                                      CBC Auto Differential[[id number]]        Abnormal            Final result                                                 Please view results for these tests on the individual orders.  GREEN TOP  LAVENDER TOP  GOLD TOP - SST  LIGHT BLUE TOP      Problems Addressed:  Chest pain, unspecified type: complicated acute illness or injury  NSTEMI (non-ST elevated myocardial infarction): complicated acute illness or injury    Amount and/or Complexity of Data Reviewed  Labs: ordered.  Radiology: ordered.  ECG/medicine tests: ordered.    Risk  OTC drugs.  Prescription drug management.  Decision regarding hospitalization.        Final diagnoses:   NSTEMI (non-ST elevated myocardial infarction)   Chest pain, unspecified type       ED Disposition  ED Disposition       ED Disposition   Decision to Admit    Condition   --    Comment   Level of Care: Med/Surg [1]   Diagnosis: NSTEMI (non-ST elevated myocardial infarction) [180715]   Admitting Physician: VERONICA GA [[id number]]   Attending Physician: VERONICA GA [[id number]]                 No follow-up provider specified.       Medication List      No changes were made to your prescriptions during this visit.            Garrett Franco MD  01/15/25 8948

## 2025-01-16 ENCOUNTER — APPOINTMENT (OUTPATIENT)
Dept: CARDIOLOGY | Facility: HOSPITAL | Age: 75
End: 2025-01-16
Payer: MEDICARE

## 2025-01-16 LAB
ALBUMIN SERPL-MCNC: 3 G/DL (ref 3.5–5.2)
ANION GAP SERPL CALCULATED.3IONS-SCNC: 12 MMOL/L (ref 5–15)
AV MEAN PRESS GRAD SYS DOP V1V2: 8 MMHG
AV VMAX SYS DOP: 193 CM/SEC
BH CV ECHO MEAS - AO MAX PG: 14.9 MMHG
BH CV ECHO MEAS - AO V2 VTI: 47 CM
BH CV ECHO MEAS - EDV(CUBED): 227 ML
BH CV ECHO MEAS - EDV(MOD-SP2): 303 ML
BH CV ECHO MEAS - EDV(MOD-SP4): 256 ML
BH CV ECHO MEAS - EF(MOD-SP2): 42.2 %
BH CV ECHO MEAS - EF(MOD-SP4): 48.4 %
BH CV ECHO MEAS - ESV(CUBED): 100.6 ML
BH CV ECHO MEAS - ESV(MOD-SP2): 175 ML
BH CV ECHO MEAS - ESV(MOD-SP4): 132 ML
BH CV ECHO MEAS - FS: 23.7 %
BH CV ECHO MEAS - IVS/LVPW: 1 CM
BH CV ECHO MEAS - IVSD: 1.3 CM
BH CV ECHO MEAS - LAT PEAK E' VEL: 8.5 CM/SEC
BH CV ECHO MEAS - LV DIASTOLIC VOL/BSA (35-75): 99.5 CM2
BH CV ECHO MEAS - LV MASS(C)D: 359.6 GRAMS
BH CV ECHO MEAS - LV SYSTOLIC VOL/BSA (12-30): 51.3 CM2
BH CV ECHO MEAS - LVIDD: 6.1 CM
BH CV ECHO MEAS - LVIDS: 4.7 CM
BH CV ECHO MEAS - LVPWD: 1.3 CM
BH CV ECHO MEAS - MED PEAK E' VEL: 7.8 CM/SEC
BH CV ECHO MEAS - MV A MAX VEL: 86.2 CM/SEC
BH CV ECHO MEAS - MV DEC TIME: 0.29 SEC
BH CV ECHO MEAS - MV E MAX VEL: 135 CM/SEC
BH CV ECHO MEAS - MV E/A: 1.57
BH CV ECHO MEAS - RAP SYSTOLE: 15 MMHG
BH CV ECHO MEAS - RVSP: 54.8 MMHG
BH CV ECHO MEAS - SV(MOD-SP2): 128 ML
BH CV ECHO MEAS - SV(MOD-SP4): 124 ML
BH CV ECHO MEAS - SVI(MOD-SP2): 49.7 ML/M2
BH CV ECHO MEAS - SVI(MOD-SP4): 48.2 ML/M2
BH CV ECHO MEAS - TR MAX PG: 39.8 MMHG
BH CV ECHO MEAS - TR MAX VEL: 315.5 CM/SEC
BH CV ECHO MEASUREMENTS AVERAGE E/E' RATIO: 16.56
BUN SERPL-MCNC: 40 MG/DL (ref 8–23)
BUN/CREAT SERPL: 13 (ref 7–25)
CALCIUM SPEC-SCNC: 7.9 MG/DL (ref 8.6–10.5)
CHLORIDE SERPL-SCNC: 109 MMOL/L (ref 98–107)
CO2 SERPL-SCNC: 19 MMOL/L (ref 22–29)
CREAT SERPL-MCNC: 3.08 MG/DL (ref 0.76–1.27)
EGFRCR SERPLBLD CKD-EPI 2021: 20.5 ML/MIN/1.73
GLUCOSE BLDC GLUCOMTR-MCNC: 145 MG/DL (ref 70–130)
GLUCOSE SERPL-MCNC: 65 MG/DL (ref 65–99)
LV EF BIPLANE MOD: 46.9 %
NT-PROBNP SERPL-MCNC: 9946 PG/ML (ref 0–900)
PHOSPHATE SERPL-MCNC: 4.1 MG/DL (ref 2.5–4.5)
POTASSIUM SERPL-SCNC: 5 MMOL/L (ref 3.5–5.2)
SODIUM SERPL-SCNC: 140 MMOL/L (ref 136–145)
TROPONIN T SERPL HS-MCNC: 148 NG/L

## 2025-01-16 PROCEDURE — 93325 DOPPLER ECHO COLOR FLOW MAPG: CPT | Performed by: INTERNAL MEDICINE

## 2025-01-16 PROCEDURE — 94799 UNLISTED PULMONARY SVC/PX: CPT

## 2025-01-16 PROCEDURE — 80069 RENAL FUNCTION PANEL: CPT | Performed by: HOSPITALIST

## 2025-01-16 PROCEDURE — 63710000001 INSULIN GLARGINE PER 5 UNITS: Performed by: HOSPITALIST

## 2025-01-16 PROCEDURE — 99232 SBSQ HOSP IP/OBS MODERATE 35: CPT | Performed by: INTERNAL MEDICINE

## 2025-01-16 PROCEDURE — 93321 DOPPLER ECHO F-UP/LMTD STD: CPT | Performed by: INTERNAL MEDICINE

## 2025-01-16 PROCEDURE — 93308 TTE F-UP OR LMTD: CPT | Performed by: INTERNAL MEDICINE

## 2025-01-16 PROCEDURE — 99222 1ST HOSP IP/OBS MODERATE 55: CPT | Performed by: STUDENT IN AN ORGANIZED HEALTH CARE EDUCATION/TRAINING PROGRAM

## 2025-01-16 PROCEDURE — 83880 ASSAY OF NATRIURETIC PEPTIDE: CPT | Performed by: STUDENT IN AN ORGANIZED HEALTH CARE EDUCATION/TRAINING PROGRAM

## 2025-01-16 PROCEDURE — 84484 ASSAY OF TROPONIN QUANT: CPT | Performed by: HOSPITALIST

## 2025-01-16 PROCEDURE — 93325 DOPPLER ECHO COLOR FLOW MAPG: CPT

## 2025-01-16 PROCEDURE — 82948 REAGENT STRIP/BLOOD GLUCOSE: CPT

## 2025-01-16 PROCEDURE — 25510000001 PERFLUTREN 6.52 MG/ML SUSPENSION 2 ML VIAL: Performed by: HOSPITALIST

## 2025-01-16 PROCEDURE — 93308 TTE F-UP OR LMTD: CPT

## 2025-01-16 PROCEDURE — 93321 DOPPLER ECHO F-UP/LMTD STD: CPT

## 2025-01-16 PROCEDURE — 63710000001 INSULIN LISPRO (HUMAN) PER 5 UNITS: Performed by: HOSPITALIST

## 2025-01-16 RX ORDER — ISOSORBIDE MONONITRATE 30 MG/1
30 TABLET, EXTENDED RELEASE ORAL
Status: DISCONTINUED | OUTPATIENT
Start: 2025-01-16 | End: 2025-01-16

## 2025-01-16 RX ORDER — ISOSORBIDE MONONITRATE 60 MG/1
60 TABLET, EXTENDED RELEASE ORAL
Status: DISCONTINUED | OUTPATIENT
Start: 2025-01-17 | End: 2025-01-17 | Stop reason: HOSPADM

## 2025-01-16 RX ORDER — AMLODIPINE BESYLATE 5 MG/1
5 TABLET ORAL
Status: DISCONTINUED | OUTPATIENT
Start: 2025-01-16 | End: 2025-01-17

## 2025-01-16 RX ADMIN — METOPROLOL SUCCINATE 50 MG: 50 TABLET, EXTENDED RELEASE ORAL at 17:21

## 2025-01-16 RX ADMIN — PANTOPRAZOLE SODIUM 40 MG: 40 TABLET, DELAYED RELEASE ORAL at 17:21

## 2025-01-16 RX ADMIN — LISINOPRIL 10 MG: 10 TABLET ORAL at 09:01

## 2025-01-16 RX ADMIN — INSULIN LISPRO 11 UNITS: 100 INJECTION, SOLUTION INTRAVENOUS; SUBCUTANEOUS at 13:02

## 2025-01-16 RX ADMIN — INSULIN GLARGINE 24 UNITS: 100 INJECTION, SOLUTION SUBCUTANEOUS at 22:07

## 2025-01-16 RX ADMIN — PREGABALIN 150 MG: 75 CAPSULE ORAL at 22:06

## 2025-01-16 RX ADMIN — CLOPIDOGREL BISULFATE 75 MG: 75 TABLET ORAL at 09:02

## 2025-01-16 RX ADMIN — INSULIN LISPRO 9 UNITS: 100 INJECTION, SOLUTION INTRAVENOUS; SUBCUTANEOUS at 17:24

## 2025-01-16 RX ADMIN — SERTRALINE HYDROCHLORIDE 100 MG: 50 TABLET ORAL at 09:02

## 2025-01-16 RX ADMIN — AMLODIPINE BESYLATE 5 MG: 5 TABLET ORAL at 13:02

## 2025-01-16 RX ADMIN — SODIUM CHLORIDE 2 ML: 9 INJECTION INTRAMUSCULAR; INTRAVENOUS; SUBCUTANEOUS at 10:55

## 2025-01-16 RX ADMIN — OXYCODONE AND ACETAMINOPHEN 1 TABLET: 10; 325 TABLET ORAL at 22:07

## 2025-01-16 RX ADMIN — ATORVASTATIN CALCIUM 80 MG: 40 TABLET, FILM COATED ORAL at 22:06

## 2025-01-16 RX ADMIN — TORSEMIDE 60 MG: 20 TABLET ORAL at 09:01

## 2025-01-16 RX ADMIN — ISOSORBIDE MONONITRATE 30 MG: 30 TABLET, EXTENDED RELEASE ORAL at 09:02

## 2025-01-16 RX ADMIN — ASPIRIN 81 MG: 81 TABLET, COATED ORAL at 09:01

## 2025-01-16 RX ADMIN — PANTOPRAZOLE SODIUM 40 MG: 40 TABLET, DELAYED RELEASE ORAL at 09:02

## 2025-01-16 RX ADMIN — DEXTROSE MONOHYDRATE 12 ML: 25 INJECTION, SOLUTION INTRAVENOUS at 07:47

## 2025-01-16 RX ADMIN — OXYCODONE AND ACETAMINOPHEN 1 TABLET: 10; 325 TABLET ORAL at 09:02

## 2025-01-16 NOTE — CASE MANAGEMENT/SOCIAL WORK
Continued Stay Note  JANETT Avalos     Patient Name: Hugh R McBurney  MRN: [id number]  Today's Date: 1/16/2025    Admit Date: 1/15/2025    Plan: Plan home with s/o   Discharge Plan       Row Name 01/16/25 1325       Plan    Plan Plan home with s/o    Patient/Family in Agreement with Plan yes    Plan Comments Spoke with patient at bedside, face sheet verified. Patient lives in a home with his s/o. He is independent of ADLs including driving. He uses a cane prn and has cpap. He has used HH in the past, but does not recall the agency.  He has been to [facility] previously. He has a living will. He sees Dr Nieves as PCP. He uses [facility] and denies issues obtaining medication. He will use [facility] for this admission. He plans to return home with his s/o to assist as needed. CM # placed on white board, will continue to follow.                   Discharge Codes    No documentation.                       Juan Carlos Zhu RN

## 2025-01-16 NOTE — PLAN OF CARE
Goal Outcome Evaluation:  Plan of Care Reviewed With: patient        Progress: improving  Outcome Evaluation: VSS, tele: SR/SB, no c/o chest pressure, possible stress test this morning, NPO, gluccomander.

## 2025-01-16 NOTE — PROGRESS NOTES
"Hospital Medicine Team    LOS 0 days      Patient Care Team:  Guille Nieves MD as PCP - General  Guille Nieves MD as PCP - Family Medicine  Tiburcio Moreno MD as Consulting Physician (Cardiology)  Yusuf Rivera MD as Consulting Physician (Nephrology)      Subjective       Chief Complaint:  follow up chest pain    Subjective    No further chest pain noted today. No shortnes of breath     Objective       Vital Signs  Temp:  [97.7 °F (36.5 °C)-98.1 °F (36.7 °C)] 97.7 °F (36.5 °C)  Heart Rate:  [51-63] 57  Resp:  [18-20] 20  BP: (135-180)/(56-83) 154/56  Oxygen Therapy  SpO2: 94 %  Pulse Oximetry Type: Intermittent  Device (Oxygen Therapy): room air  Flowsheet Rows      Flowsheet Row First Filed Value   Admission Height 188 cm (74\") Documented at 01/15/2025 1134   Admission Weight 131 kg (288 lb 12.8 oz) Documented at 01/15/2025 1134                Physical Exam:  Physical Exam  Vitals reviewed.   Cardiovascular:      Rate and Rhythm: Normal rate.   Pulmonary:      Effort: No respiratory distress.   Abdominal:      General: There is no distension.   Skin:     General: Skin is warm.   Neurological:      Mental Status: He is alert and oriented to person, place, and time.           Results Review:    I reviewed the patient's new clinical results.    [x]  Laboratory  []  Microbiology  [x]  Radiology  [x]  EKG/Telemetry   []  Cardiology/Vascular   []  Pathology  []  Old records  []  Other:      X-rays, labs reviewed personally by physician.    Medication Review:   I have reviewed the patient's current medication list    Scheduled Meds  amLODIPine, 5 mg, Oral, Q24H  aspirin, 81 mg, Oral, Daily  atorvastatin, 80 mg, Oral, Nightly  clopidogrel, 75 mg, Oral, Daily  insulin glargine, 1-200 Units, Subcutaneous, Nightly - Glucommander  insulin lispro, 1-200 Units, Subcutaneous, 4x Daily With Meals & Nightly  [START ON 1/17/2025] isosorbide mononitrate, 60 mg, Oral, Q24H  lisinopril, 10 mg, Oral, " Daily  metoprolol succinate XL, 50 mg, Oral, Q PM  pantoprazole, 40 mg, Oral, BID AC  pregabalin, 150 mg, Oral, Nightly  sertraline, 100 mg, Oral, Daily  torsemide, 60 mg, Oral, Daily        Meds Infusions       Meds PRN    dextrose    dextrose    glucagon (human recombinant)    insulin lispro    nitroglycerin    oxyCODONE-acetaminophen    sodium chloride        Assessment / Plan       Active Hospital Problems:  Active Hospital Problems    Diagnosis  POA    NSTEMI (non-ST elevated myocardial infarction) [I21.4]  Yes      Resolved Hospital Problems   No resolved problems to display.       Coronary artery disease status post PCI to the LAD 9/2024.  Residual RCA and diagonal disease.     NSTEMI  Troponin down trended  Per cardiology note he is high risk and difficult PCI with residual disease and they discussed with his primary interventionalists to agree with medical management.  ISMN and amlodipine added  Continue other medical management, DAPT  Cardiology following    Chronic HFrEF d/t ICM:  Continue goal-directed medical therapy. As per cardiology note limited echo with similar EF to prior  Severe pulmonary hypertension: As noted on echocardiogram.  He has obstructive sleep apnea but apparently does not wear device.  Diabetes mellitus type 2, in obese: A1c at goal at 6.68%.  continue renally dosed Glucomander.  CKD 4: Creatinine remains about his baseline.  Will continue to monitor.        DVT ppx-subq heparin      Plan for disposition:per cards note if stable poss d/c tomorrow     Electronically signed by Cuate Norris DO, 01/16/25, 13:50 EST.    Note disclaimer: At [facility], we believe that sharing information builds trust and better relationships. You are receiving this note because you recently visited [facility]. It is possible you will see health information before a provider has talked with you about it. This kind of information can be easy to misunderstand. To help you fully understand what it  means for your health, we urge you to discuss this note with your provider.

## 2025-01-16 NOTE — NURSING NOTE
Patient glucose was 90 when morning meds given after dextrose push. Patient educated to alert staff if his monitor reads low again.

## 2025-01-16 NOTE — CONSULTS
Date of Consultation: 25    Referral Provider: No ref. provider found     Reason for Consultation:    Encounter Provider: Giuseppe Coronel MD    Group of Service: [facility]     Patient Name: Hugh R McBurney    :1950    Chief complaint:  chest pain     History of Present Illness:  Hugh R McBurney is a 74 year old pt of Dr. Moreno with a history coronary artery disease status post recent PCI to the LAD 2024, depression, diabetes mellitus, GERD, hyperlipidemia, hypertension, right TKA with joint infection, CKD with a baseline creatinine around 3 and sleep apnea      In 2017,  he was seen because he had a  Vancomyocin reaction with chest pain, n/v and diaphoresis and a non-ST elevation myocardial infarction when here for knee surgery. A cardiac cath was done which showed a normal left main, 20% proximal LAD, 50% mid LAD, 90% proximal first diagonal, diffuse 50% mid circumflex, and a  70-80% proximal RPL 2 stenosis. An echo showed an EF of 42%, possibly from stress induced cardiomyopathy. He was sent home on an ace, beta blocker and nitrate to wait for 2-4 weeks before attempting surgery again. At that time he had a repeat transthoracic echocardiogram showing his ejection fraction and wall motion had normalized.         In , Pt presented with  chest pain at night and after activity. Echocardiogram showed LVEF 44.9% with significant wall motion abnormalities.  Perfusion stress test showed lindsay-infarct ischemia involving the apical septal wall with LVEF 38%.  Subsequent cardiac cath showed densely calcified proximal LAD, subtotally occluded mid LAD with 90% occlusion of the first diagonal.  Circumflex 30 to 40% mid stenosis with a normal OM1.  There was diffuse moderate stenosis of the RCA. He underwent PCI of the LAD with overlapping 2.5 x 38 mm and 3.0 x 18 mm Xience SkyPoint drug-eluting stents, postdilated to high-pressure with noncompliant trek balloons.  RFR of the RCA was  normal.            Pt was last seen on 10/24/24 in the office by Dr. Moreno for follow up.  He denied chest pain or dyspnea on exertion. He was tolerating his current medical therapy which included Plavix, lisinopril, metoprolol tartrate and atorvastatin.  In the setting of cardiomyopathy his metoprolol was changed to metoprolol succinate at 50 mg daily.  No other changes were made.       Patient presented to [facility] on 1/15/2025 with complaints of chest pain.  Patient was at cardiac rehab and began to have chest pain was sent to the ER for further evaluation.  Patient reported he has been getting exertional chest pain on and off for about the last 2 to 3 weeks.  He denied any associated symptoms, no sweating no nausea no vomiting or near syncope.  In ER, troponin T 206/207, chloride 109, BUN 39, creatinine 2.9, hemoglobin 9.7, sed rate 31,EKG showed SR rate of 73, ST depressions that are stable from prior.      I have been asked to see for chest pain.         CATH 9/5/24    SUMMARY: Recent severe angina with a critical lesion in the mid LAD successfully treated with angioplasty drug-eluting stenting and optimized with IVUS     EBL:                Minimal  Specimens:None     PCI Segment:              Mid LAD  Pre-stenosis:               90  Post-stenosis              0  Lesion Type                C  JANINE Flow Pre              1  JANINE Flow Post3  Dissection                   none        RECOMMENDATIONS: Will get admitted him overnight and watch his renal function I have notified his nephrologist and we are also going to optimize his medical therapy he is okay in the morning he probably could go home         Stress test 8/27/24      Myocardial perfusion imaging indicates a with only minimal lindsay-infarct ischemia involving apical septal wall.  Large fixed defect involving several walls present.Left ventricular ejection fraction is moderately reduced (Calculated EF = 38%).Abnormal LV wall motion consistent with  global hypokinesis.    Findings consistent with an equivocal ECG stress test.    There is no prior study available for comparison.      ECHO 8/27/24    Left ventricular systolic function is mildly decreased. Calculated left ventricular EF = 44.9%    The following left ventricular wall segments are hypokinetic: apical anterior, apical lateral and apical inferior. The following left ventricular wall segments are akinetic: apical septal and apex.    Left ventricular diastolic function is consistent with (grade III w/high LAP) reversible restrictive pattern.  Elevated left atrial filling pressure present.  Diastolic function assessment may be inaccurate because of presence of mitral valve stenosis and mitral calcification.    The left atrial cavity is severely dilated.    Significantly calcified aortic valve leaflets. Mild to moderate aortic valve stenosis is present. Aortic valve area is 1.2 cm2.Peak velocity of the flow distal to the aortic valve is 224.7 cm/s. Aortic valve maximum pressure gradient is 20 mmHg. Aortic valve mean pressure gradient is 9 mmHg. Aortic valve dimensionless index is 0.4 .    There is severe mitral annular calcification and mild bileaflet mitral valve thickening and calcification.  Mild mitral valve regurgitation is present.  Mild mitral valve stenosis is present. The mitral valve mean gradient is 5 mmHg.    Calculated right ventricular systolic pressure from tricuspid regurgitation is 74 mmHg.Severe pulmonary hypertension is present.  Past Medical History:   Diagnosis Date    Arthritis     Chronic pain     BILATERAL KNEES AND BACK    Contusion of knee     Coronary artery disease     Depression     Diabetes mellitus     type 2 with atherosclerosis of arteries of extremities    Diabetic ulcer of toe     left foot associated with type 2 diabetes mellitus, with necrosis of bone    GERD (gastroesophageal reflux disease)     Great toe pain     with cellulitis and gangrene    History of kidney  stones     Hyperlipidemia     Hypertension     Hyponatremia     Kidney stone     Knee pain     hx of previous prosthetic joint infection    Liver disease     liver mass    MSSA (methicillin susceptible Staphylococcus aureus) infection     RIGHT KNEE    NSTEMI (non-ST elevated myocardial infarction) 06/14/2017    Sleep apnea     DOES NOT USE CPAP         Past Surgical History:   Procedure Laterality Date    APPENDECTOMY      CARDIAC CATHETERIZATION N/A 6/14/2017    Procedure: Left Heart Cath;  Surgeon: Tiburcio Moreno MD;  Location: [facility] CATH INVASIVE LOCATION;  Service:     CARDIAC CATHETERIZATION N/A 9/5/2024    Procedure: Coronary angiography;  Surgeon: Baldomero Wheeler MD;  [facility];  Service: Cardiovascular;  Laterality: N/A;  Patient to hold lisinopril and torsemide the day prior, day of, and day after cardiac cath.  Patient will also need 4 hours of IV fluid prep (normal saline at 75 cc/h) pre and post cardiac cath.    CARDIAC CATHETERIZATION N/A 9/5/2024    Procedure: Left heart cath;  Surgeon: Baldomero Wheeler MD;  Location: [facility] INVASIVE LOCATION;  Service: Cardiovascular;  Laterality: N/A;    CARDIAC CATHETERIZATION N/A 9/5/2024    Procedure: Resting Full Cycle Ratio;  Surgeon: Baldomero Wheeler MD;  Location: [facility] CATH INVASIVE LOCATION;  Service: Cardiovascular;  Laterality: N/A;    CARDIAC CATHETERIZATION N/A 9/5/2024    Procedure: Percutaneous Coronary Intervention;  Surgeon: Baldomero Wheeler MD;  Location: [facility] INVASIVE LOCATION;  Service: Cardiovascular;  Laterality: N/A;    CARDIAC CATHETERIZATION N/A 9/5/2024    Procedure: Stent AILIN coronary;  Surgeon: Baldomero Wheeler MD;  Location: [facility] CATH INVASIVE LOCATION;  Service: Cardiovascular;  Laterality: N/A;    COLONOSCOPY      COLONOSCOPY W/ POLYPECTOMY N/A 4/29/2024    Procedure: COLONOSCOPY WITH POLYPECTOMY;  Surgeon: Kam Cooley MD;  Location: [facility];  Service:  Gastroenterology;  Laterality: N/A;  diverticulosis; transverse polyp x3 (forecep); cecal polyp x2 (forceps); ascending polyp x3 (forceps); ascending polyp x2 (cold snare); splenic flexure polyp x2 (hot snare); sigmoid polyp x1 (cold snare); sigmoid polyp x1 (hot snare); sigmoid x1 (fo    ENDOSCOPY N/A 4/29/2024    Procedure: ESOPHAGOGASTRODUODENOSCOPY WITH BIOPSY;  Surgeon: Kam Cooley MD;  Location: [facility] OR;  Service: Gastroenterology;  Laterality: N/A;  Reflux esophagitis; Erosive gastritis; Dilatation- no stricture; Biopsies- gastric, distal esophagus    INTERVENTIONAL RADIOLOGY PROCEDURE N/A 9/5/2024    Procedure: Intravascular Ultrasound;  Surgeon: Baldomero Wheeler MD;  Location: [facility] INVASIVE LOCATION;  Service: Cardiovascular;  Laterality: N/A;    JOINT REPLACEMENT      BILATERAL KNEES     LUMBAR FUSION      NE REVJ TOTAL KNEE ARTHRP W/WO ALGRFT 1 COMPONENT Right 9/5/2017    Procedure: REMOVAL OF ANTIBIOTIC CEMENT SPACER; RIGHT TOTAL KNEE ARTHROPLASTY REVISION;  Surgeon: Kiko Marie MD;  Location: [facility] OR;  Service: Orthopedics    ROTATOR CUFF REPAIR Right     TOTAL KNEE  PROSTHESIS REMOVAL W/ SPACER INSERTION Right 2/14/2017    Procedure: INCISION AND DRAINAGE RIGHT KNEE, POLY CHANGE;  Surgeon: Kiko Marie MD;  Location: [facility] OR;  Service:     TOTAL KNEE  PROSTHESIS REMOVAL W/ SPACER INSERTION Right 7/7/2017    Procedure: REMOVAL RIGHT TOTAL KNEE ARTHROPLASTY AND ANITBIOTIC SPACER;  Surgeon: Kiko Marie MD;  Location: [facility] OR;  Service:     TRANS METATARSAL AMPUTATION Left 2/14/2017    Procedure: EXCISIONAL DEBRIDEMENT LT. FIRST TOE DIABETIC ULCER, DRAINAGE ABSCESS FIRST TOE, FIRST TOE AMPUTATION;  Surgeon: Osmin Brand MD;  Location: [facility] OR;  Service:          Allergies   Allergen Reactions    Vancomycin Other (See Comments)     Red Man syndrome, slow rate and premedicate with benadryl         No current  facility-administered medications on file prior to encounter.     Current Outpatient Medications on File Prior to Encounter   Medication Sig Dispense Refill    aspirin 81 MG EC tablet Take 1 tablet by mouth Daily.      atorvastatin (LIPITOR) 80 MG tablet Take 1 tablet by mouth Every Night.      clopidogrel (PLAVIX) 75 MG tablet Take 1 tablet by mouth Daily. 90 tablet 3    HumaLOG KwikPen 100 UNIT/ML solution pen-injector Inject 16 Units under the skin into the appropriate area as directed Daily With Breakfast AND 16 Units Daily Before Lunch AND 20 Units Daily With Dinner. Adds sliding scale based on BS reading. Max daily dose 75u daily  Indications: Type 2 Diabetes 90 mL 2    Insulin Glargine (Lantus SoloStar) 100 UNIT/ML injection pen INJECT 50 UNITS EVERY MORNING AND 45 UNITS EVERY EVENING (Patient taking differently: Inject 35 Units under the skin into the appropriate area as directed 2 (Two) Times a Day.) 90 mL 0    lisinopril (PRINIVIL,ZESTRIL) 10 MG tablet Take 1 tablet by mouth Daily.      metoprolol succinate XL (TOPROL-XL) 50 MG 24 hr tablet Take 1 tablet by mouth Daily. (Patient taking differently: Take 1 tablet by mouth Every Evening.) 90 tablet 3    Multiple Vitamin (MULTI VITAMIN DAILY PO) Take 1 tablet by mouth Daily. HOLD PRIOR TO SURGERY      omeprazole (priLOSEC) 40 MG capsule Take 1 capsule by mouth 2 (Two) Times a Day.      oxyCODONE-acetaminophen (PERCOCET)  MG per tablet Take 1 tablet by mouth Every 6 (Six) Hours As Needed for Severe Pain.      pregabalin (LYRICA) 150 MG capsule Take 1 capsule by mouth Every Night.      Psyllium (DAILY FIBER PO) Take 1 tablet by mouth Daily.      sertraline (ZOLOFT) 100 MG tablet Take 1 tablet by mouth Daily.      Torsemide 60 MG tablet Take 1 tablet by mouth Daily.      Turmeric 500 MG capsule Take 1 capsule by mouth Daily.           Social History     Socioeconomic History    Marital status:    Tobacco Use    Smoking status: Former     Current  "packs/day: 3.00     Average packs/day: 3.0 packs/day for 20.0 years (60.0 ttl pk-yrs)     Types: Cigarettes     Passive exposure: Never    Smokeless tobacco: Former    Tobacco comments:     quit 35 years ago. 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    Vaping Use    Vaping status: Never Used   Substance and Sexual Activity    Alcohol use: No    Drug use: No    Sexual activity: Defer         Family History   Problem Relation Age of Onset    Heart disease Mother     Heart disease Father     Diabetes Sister     Dementia Maternal Grandmother     No Known Problems Maternal Grandfather     Heart disease Paternal Grandmother     Heart attack Paternal Grandfather     Heart disease Paternal Grandfather     Heart disease Son     Heart attack Son     No Known Problems Sister     Malbrian Hyperthermia Neg Hx         Objective:     Vitals:    01/15/25 2026 01/15/25 2300 01/16/25 0410 01/16/25 0700   BP: 142/62 168/78 135/59 173/79   BP Location: Right arm Left arm Left arm Left arm   Patient Position: Lying Lying Lying Lying   Pulse: 63 54 51    Resp: 20 18 20 20   Temp: 97.9 °F (36.6 °C) 97.7 °F (36.5 °C) 97.9 °F (36.6 °C) 97.7 °F (36.5 °C)   TempSrc: Temporal Temporal Temporal Oral   SpO2: 97% 95% 96% 97%   Weight:       Height:         Body mass index is 38.35 kg/m².  Flowsheet Rows      Flowsheet Row First Filed Value   Admission Height 188 cm (74\") Documented at 01/15/2025 1134   Admission Weight 131 kg (288 lb 12.8 oz) Documented at 01/15/2025 1134             General:    No acute distress, alert and oriented x4, pleasant                   Head:    Normocephalic, atraumatic.   Eyes:          Conjunctivae and sclerae normal, no icterus, PERRLA   Throat:   No oral lesions, no thrush, oral mucosa moist.    Neck:   Supple, trachea midline.   Lungs:     Clear to auscultation bilaterally     Heart:    Regular rhythm and normal rate.  No murmurs, gallops, or rubs noted.   Abdomen:     Soft, non-tender, non-distended, positive bowel sounds.  "   Extremities:   No clubbing, cyanosis, or edema.     Pulses:   Pulses palpable and equal bilaterally.    Skin:   No bleeding or rash.   Neuro:   Non-focal.  Moves all extremities well.    Psychiatric:   Normal mood and affect.                 Lab Review:                Results from last 7 days   Lab Units 01/16/25  0403   SODIUM mmol/L 140   POTASSIUM mmol/L 5.0   CHLORIDE mmol/L 109*   CO2 mmol/L 19.0*   BUN mg/dL 40*   CREATININE mg/dL 3.08*   GLUCOSE mg/dL 65   CALCIUM mg/dL 7.9*     Results from last 7 days   Lab Units 01/16/25  0403 01/15/25  1322 01/15/25  1222   HSTROP T ng/L 148* 207* 206*     Results from last 7 days   Lab Units 01/15/25  1222   WBC 10*3/mm3 8.32   HEMOGLOBIN g/dL 9.7*   HEMATOCRIT % 29.7*   PLATELETS 10*3/mm3 133*     Results from last 7 days   Lab Units 01/15/25  1222   INR  1.00   APTT seconds 27.5                                           EKG (reviewed by me personally):      Assessment:   1.  Coronary artery disease status post PCI to the LAD 9/2024.  Residual RCA and diagonal disease.    2.  NSTEMI  3.  Heart failure with mildly reduced ejection fraction EF most recently 40 to 45%  4.  CKD, baseline creatinine around 3  5.  Type 2 diabetes, A1c 6.8  6.  Hypertension  7.  Hyperlipidemia    Plan:   74-year-old with history as detailed above presenting with exertional chest pain from cardiac rehab.  Symptoms have been ongoing and predictable with exertion for a few weeks.  Left heart cath as detailed above 9/24.  Does have residual obstructive disease but feel this will be difficult for PCI, and at high risk due to underlying CKD.  I do not think a stress test is much utility as his most recent 1 a few months back showed mostly scar.  He is only on metoprolol as an antianginal and I will add ISMN today.  Limited echo EF and wall motion abnormalities as prior.  I discussed this case with his primary interventionalists Dr. Moreno who agreed with medical management, up titration of  antianginals and outpatient follow-up.  He also needs better blood pressure control.  Additions as below.  We will follow and reevaluate in the morning.  If stable can discharge at that time.    -Continue dual antiplatelet therapy, statin  -Continue metoprolol (up titration limited by resting heart rate), add ISMN and amlodipine   -diet ordered    Thank you for the consult. We will continue to follow

## 2025-01-16 NOTE — SIGNIFICANT NOTE
01/16/25 0520   Clinician Notification   Reason for Communication (S)  Critical lab value  (blood sugar lab result 65 patient rechecked with own monitor on his arm. blood sugar level was 80. patient was not symptomatic and did not want treatment at this time. troponin level 148. troponin level trending down. MD already aware.)   Clinician Name Shonda REYNOLDS   Clinician Role Hospitalist   Method of Communication Call   Response No new orders  (continue to monitor)   Notification Time 0520

## 2025-01-16 NOTE — PLAN OF CARE
Goal Outcome Evaluation:           Progress: improving  Outcome Evaluation: echo completed, cardiology consult completed. blood sugar monitored and treated as needed. lovenox on hold. percocet as needed

## 2025-01-17 ENCOUNTER — READMISSION MANAGEMENT (OUTPATIENT)
Dept: CALL CENTER | Facility: HOSPITAL | Age: 75
End: 2025-01-17
Payer: MEDICARE

## 2025-01-17 ENCOUNTER — APPOINTMENT (OUTPATIENT)
Dept: CARDIAC REHAB | Facility: HOSPITAL | Age: 75
End: 2025-01-17
Payer: MEDICARE

## 2025-01-17 VITALS
DIASTOLIC BLOOD PRESSURE: 88 MMHG | RESPIRATION RATE: 17 BRPM | TEMPERATURE: 97.8 F | OXYGEN SATURATION: 96 % | HEART RATE: 60 BPM | BODY MASS INDEX: 38.2 KG/M2 | WEIGHT: 297.62 LBS | HEIGHT: 74 IN | SYSTOLIC BLOOD PRESSURE: 164 MMHG

## 2025-01-17 PROBLEM — I21.4 NSTEMI (NON-ST ELEVATED MYOCARDIAL INFARCTION): Status: RESOLVED | Noted: 2025-01-15 | Resolved: 2025-01-17

## 2025-01-17 LAB
ANION GAP SERPL CALCULATED.3IONS-SCNC: 9.8 MMOL/L (ref 5–15)
BUN SERPL-MCNC: 41 MG/DL (ref 8–23)
BUN/CREAT SERPL: 12.8 (ref 7–25)
CALCIUM SPEC-SCNC: 8 MG/DL (ref 8.6–10.5)
CHLORIDE SERPL-SCNC: 104 MMOL/L (ref 98–107)
CO2 SERPL-SCNC: 22.2 MMOL/L (ref 22–29)
CREAT SERPL-MCNC: 3.21 MG/DL (ref 0.76–1.27)
EGFRCR SERPLBLD CKD-EPI 2021: 19.5 ML/MIN/1.73
GLUCOSE BLDC GLUCOMTR-MCNC: 98 MG/DL (ref 70–130)
GLUCOSE SERPL-MCNC: 194 MG/DL (ref 65–99)
POTASSIUM SERPL-SCNC: 4.7 MMOL/L (ref 3.5–5.2)
SODIUM SERPL-SCNC: 136 MMOL/L (ref 136–145)

## 2025-01-17 PROCEDURE — 80048 BASIC METABOLIC PNL TOTAL CA: CPT | Performed by: INTERNAL MEDICINE

## 2025-01-17 PROCEDURE — 82948 REAGENT STRIP/BLOOD GLUCOSE: CPT

## 2025-01-17 PROCEDURE — 63710000001 INSULIN LISPRO (HUMAN) PER 5 UNITS: Performed by: HOSPITALIST

## 2025-01-17 PROCEDURE — 99239 HOSP IP/OBS DSCHRG MGMT >30: CPT | Performed by: INTERNAL MEDICINE

## 2025-01-17 PROCEDURE — 99232 SBSQ HOSP IP/OBS MODERATE 35: CPT | Performed by: INTERNAL MEDICINE

## 2025-01-17 RX ORDER — ISOSORBIDE MONONITRATE 60 MG/1
60 TABLET, EXTENDED RELEASE ORAL
Qty: 30 TABLET | Refills: 0 | Status: SHIPPED | OUTPATIENT
Start: 2025-01-18 | End: 2025-01-23 | Stop reason: SDUPTHER

## 2025-01-17 RX ORDER — AMLODIPINE BESYLATE 5 MG/1
7.5 TABLET ORAL NIGHTLY
Qty: 45 TABLET | Refills: 0 | Status: SHIPPED | OUTPATIENT
Start: 2025-01-17 | End: 2025-01-23 | Stop reason: SDUPTHER

## 2025-01-17 RX ORDER — TORSEMIDE 20 MG/1
60 TABLET ORAL DAILY
Qty: 90 TABLET | Refills: 0 | Status: SHIPPED | OUTPATIENT
Start: 2025-01-17 | End: 2025-02-16

## 2025-01-17 RX ORDER — AMLODIPINE BESYLATE 5 MG/1
7.5 TABLET ORAL NIGHTLY
Qty: 45 TABLET | Refills: 0 | Status: SHIPPED | OUTPATIENT
Start: 2025-01-17 | End: 2025-01-17

## 2025-01-17 RX ORDER — ISOSORBIDE MONONITRATE 60 MG/1
60 TABLET, EXTENDED RELEASE ORAL
Qty: 30 TABLET | Refills: 0 | Status: SHIPPED | OUTPATIENT
Start: 2025-01-18 | End: 2025-01-17

## 2025-01-17 RX ORDER — TORSEMIDE 20 MG/1
60 TABLET ORAL DAILY
Qty: 90 TABLET | Refills: 0 | Status: SHIPPED | OUTPATIENT
Start: 2025-01-17 | End: 2025-01-17

## 2025-01-17 RX ADMIN — ISOSORBIDE MONONITRATE 60 MG: 60 TABLET, EXTENDED RELEASE ORAL at 09:02

## 2025-01-17 RX ADMIN — INSULIN LISPRO 9 UNITS: 100 INJECTION, SOLUTION INTRAVENOUS; SUBCUTANEOUS at 09:10

## 2025-01-17 RX ADMIN — TORSEMIDE 60 MG: 20 TABLET ORAL at 09:02

## 2025-01-17 RX ADMIN — PANTOPRAZOLE SODIUM 40 MG: 40 TABLET, DELAYED RELEASE ORAL at 06:43

## 2025-01-17 RX ADMIN — LISINOPRIL 10 MG: 10 TABLET ORAL at 09:02

## 2025-01-17 RX ADMIN — SERTRALINE HYDROCHLORIDE 100 MG: 50 TABLET ORAL at 09:02

## 2025-01-17 RX ADMIN — ASPIRIN 81 MG: 81 TABLET, COATED ORAL at 09:02

## 2025-01-17 RX ADMIN — OXYCODONE AND ACETAMINOPHEN 1 TABLET: 10; 325 TABLET ORAL at 06:43

## 2025-01-17 RX ADMIN — CLOPIDOGREL BISULFATE 75 MG: 75 TABLET ORAL at 09:02

## 2025-01-17 RX ADMIN — INSULIN LISPRO 10 UNITS: 100 INJECTION, SOLUTION INTRAVENOUS; SUBCUTANEOUS at 13:00

## 2025-01-17 NOTE — PLAN OF CARE
Goal Outcome Evaluation:  Plan of Care Reviewed With: patient        Progress: improving  Outcome Evaluation: VSS, Tele: SB, room air, no chest pain noted, gluccomander, possible discharge home today.

## 2025-01-17 NOTE — DISCHARGE INSTR - APPOINTMENTS
Patient has follow up  With Garland MORA with  Cardiology on Jan 23 @ 1 pm @ the [facility]. [phone]    Patient has follow up with Dr. Nieves on Jan 27 @ 2 pm  [phone]

## 2025-01-17 NOTE — PROGRESS NOTES
LOS: 1 day   Patient Care Team:  Guille Nieves MD as PCP - General  Guille Nieves MD as PCP - Family Medicine  Tiburcio Moreno MD as Consulting Physician (Cardiology)  Yusuf Rivera MD as Consulting Physician (Nephrology)    Chief Complaint:     F/u CAD    Interval History:     He has no chest pain.  His breathing is better.  He has no tachycardia, dizziness or syncope.    Echo 1/16/25  Interpretation Summary         Left ventricular systolic function is low normal. Calculated left ventricular EF = 46.9%    The left ventricular cavity is mild to moderately dilated.    Left ventricular wall thickness is consistent with mild concentric hypertrophy.    The following left ventricular wall segments are hypokinetic: apical lateral, apical inferior and apical septal. The following left ventricular wall segments are akinetic: apex.    Estimated right ventricular systolic pressure from tricuspid regurgitation is moderately elevated (45-55 mmHg). Calculated right ventricular systolic pressure from tricuspid regurgitation is 55 mmHg.    Mild to moderate tricuspid valve regurgitation is present.    Limited echocardiogram to assess left ventricular ejection fraction and wall motion    Objective   Vital Signs  Temp:  [97.1 °F (36.2 °C)-98.1 °F (36.7 °C)] 97.1 °F (36.2 °C)  Heart Rate:  [51-60] 54  Resp:  [16-20] 18  BP: (150-173)/(56-79) 150/66    Intake/Output Summary (Last 24 hours) at 1/17/2025 0719  Last data filed at 1/16/2025 1500  Gross per 24 hour   Intake --   Output 750 ml   Net -750 ml       Comfortable NAD  Neck supple, no JVD or thyromegaly appreciated  S1/S2 RRR, no m/r/g  Lungs CTA B, normal effort  Abdomen S/NT/ND (+) BS, no HSM appreciated  Extremities warm, no clubbing, cyanosis, or edema  No visible or palpable skin lesions  A/Ox4, mood and affect appropriate    Results Review:      Results from last 7 days   Lab Units 01/17/25  0414 01/16/25  0403 01/15/25  1222   SODIUM mmol/L  136 140 141   POTASSIUM mmol/L 4.7 5.0 4.9   CHLORIDE mmol/L 104 109* 109*   CO2 mmol/L 22.2 19.0* 20.7*   BUN mg/dL 41* 40* 39*   CREATININE mg/dL 3.21* 3.08* 2.90*   GLUCOSE mg/dL 194* 65 117*   CALCIUM mg/dL 8.0* 7.9* 8.2*     Results from last 7 days   Lab Units 01/16/25  0403 01/15/25  1322 01/15/25  1222   HSTROP T ng/L 148* 207* 206*     Results from last 7 days   Lab Units 01/15/25  1222   WBC 10*3/mm3 8.32   HEMOGLOBIN g/dL 9.7*   HEMATOCRIT % 29.7*   PLATELETS 10*3/mm3 133*     Results from last 7 days   Lab Units 01/15/25  1222   INR  1.00   APTT seconds 27.5                   I reviewed the patient's new clinical results.  I personally viewed and interpreted the patient's EKG/Telemetry data        Medication Review:   amLODIPine, 5 mg, Oral, Q24H  aspirin, 81 mg, Oral, Daily  atorvastatin, 80 mg, Oral, Nightly  clopidogrel, 75 mg, Oral, Daily  insulin glargine, 1-200 Units, Subcutaneous, Nightly - Glucommander  insulin lispro, 1-200 Units, Subcutaneous, 4x Daily With Meals & Nightly  isosorbide mononitrate, 60 mg, Oral, Q24H  lisinopril, 10 mg, Oral, Daily  metoprolol succinate XL, 50 mg, Oral, Q PM  pantoprazole, 40 mg, Oral, BID AC  pregabalin, 150 mg, Oral, Nightly  sertraline, 100 mg, Oral, Daily  torsemide, 60 mg, Oral, Daily             Assessment & Plan       NSTEMI (non-ST elevated myocardial infarction)    CAD - s/p AILIN of LAD x2 9/5/24 for angina.   NSTEMI, no new ECG changes.  Echo is unchanged.   HFmrEF  CKD - stage 4.   DM type II  Hypertension, blood pressure high  Hyperlipidemia, well treated with atorvastatin  Left toe osteomyelitis    Okay to go home today, increase amlodipine 7.5 mg nightly.  Continue torsemide and lisinopril.      Arrange to see a nurse practitioner at the main office next week in order to reassess antianginal therapy as well as advise when to restart cardiac rehabilitation.      I do have a call out to his PCP Dr. Nieves about plan of care.  Apparently he had run  out of his torsemide at home because he did not get his mail delivery of it-I have asked the hospital to provide him with torsemide before he leaves today in case he gets home and still not there.    Spoke with Dr. Norris about plan of care    Rosita Garcia MD  01/17/25  07:19 EST

## 2025-01-17 NOTE — DISCHARGE SUMMARY
[facility] Medicine Services  DISCHARGE SUMMARY        Date of Admission: 1/15/2025    Date of Discharge:  1/17/2025    Length of stay:  LOS: 1 day     Presenting Problem:   NSTEMI (non-ST elevated myocardial infarction) [I21.4]  Chest pain, unspecified type [R07.9]    Hospital Course     Active Diagnosis During Hospital Stay/Discharge Diagnoses/Course by Diagnoses:       Active Hospital Problems   No active problems to display.      Resolved Hospital Problems    Diagnosis Date Resolved POA    NSTEMI (non-ST elevated myocardial infarction) [I21.4] 01/17/2025 Yes     Coronary artery disease status post PCI to the LAD 9/2024.  Residual RCA and diagonal disease.     NSTEMI  Troponin down trended  Per cardiology note he is high risk and difficult PCI with residual disease and they discussed with his primary interventionalists to agree with medical management.  ISMN and amlodipine added at d/c   Continue other medical management, DAPT  Cardiology follow up 2 weeks      Chronic HFrEF d/t ICM:  Continue goal-directed medical therapy. As per cardiology note limited echo with similar EF to prior. Did sent in script for torsemide.   Severe pulmonary hypertension: As noted on echocardiogram.  He has obstructive sleep apnea but apparently does not wear device.  Diabetes mellitus type 2, in obese: A1c at goal at 6.68%.  continue renally dosed Glucomander.  CKD 4: Creatinine remains about his baseline.  Will continue to monitor.    Hospital Course  Patient is a 74 y.o. male presented with issues as noted above. Stable hospital course no further chest pain and will be d/c home with close outpt cardiology follow up and adjustment of medical management then based on Sx.         Procedures Performed:as noted          Consults:   Consults       Date and Time Order Name Status Description    1/15/2025  3:55 PM Inpatient Cardiology Consult Completed               Pertinent  and/or Most Recent Results       Pertinent Test  Results:     Results from last 7 days   Lab Units 01/15/25  1222   WBC 10*3/mm3 8.32   HEMOGLOBIN g/dL 9.7*   HEMATOCRIT % 29.7*   PLATELETS 10*3/mm3 133*     Results from last 7 days   Lab Units 01/17/25  0414 01/16/25  0403 01/15/25  1222   SODIUM mmol/L 136 140 141   POTASSIUM mmol/L 4.7 5.0 4.9   CHLORIDE mmol/L 104 109* 109*   CO2 mmol/L 22.2 19.0* 20.7*   BUN mg/dL 41* 40* 39*   CREATININE mg/dL 3.21* 3.08* 2.90*   CALCIUM mg/dL 8.0* 7.9* 8.2*   BILIRUBIN mg/dL  --   --  0.3   ALK PHOS U/L  --   --  115   ALT (SGPT) U/L  --   --  10   AST (SGOT) U/L  --   --  17   GLUCOSE mg/dL 194* 65 117*       Microbiology Results (last 10 days)       Procedure Component Value - Date/Time    Tissue / Bone Culture - Tissue, Toe, Left [[id number]] Collected: 01/14/25 1200    Lab Status: Preliminary result Specimen: Tissue from Toe, Left Updated: 01/17/25 0859     Tissue Culture No growth at 2 days     Gram Stain No WBCs seen      No organisms seen            Results for orders placed during the hospital encounter of 01/15/25    Adult Transthoracic Echo Limited W/ Cont if Necessary Per Protocol    Interpretation Summary    Left ventricular systolic function is low normal. Calculated left ventricular EF = 46.9%    The left ventricular cavity is mild to moderately dilated.    Left ventricular wall thickness is consistent with mild concentric hypertrophy.    The following left ventricular wall segments are hypokinetic: apical lateral, apical inferior and apical septal. The following left ventricular wall segments are akinetic: apex.    Estimated right ventricular systolic pressure from tricuspid regurgitation is moderately elevated (45-55 mmHg). Calculated right ventricular systolic pressure from tricuspid regurgitation is 55 mmHg.    Mild to moderate tricuspid valve regurgitation is present.    Limited echocardiogram to assess left ventricular ejection fraction and wall motion      Imaging Results (All)       Procedure Component  Value Units Date/Time    XR Chest 1 View [[id number]] Collected: 01/15/25 1424     Updated: 01/15/25 1429    Narrative:      XR CHEST 1 VW    Date of Exam: 1/15/2025 2:13 PM EST    Indication: Chest Pain Protocol  Chest Pain Protocol    Comparison: None available.    Findings:  The lungs appear adequately aerated without consolidation or mass. No pleural effusion or pneumothorax is identified. Cardiac silhouette is enlarged. Pulmonary vasculature appears unremarkable. No acute or suspicious osseous lesion is identified. Chronic   right-sided rib fracture deformities are noted.      Impression:      Impression:  1.Cardiomegaly. No acute radiographic abnormality is identified.      Electronically Signed: Dhaval Tse MD    1/15/2025 2:26 PM EST    Workstation ID: [id number]              Day of Discharge       Condition on Discharge:  stable     Vital Signs  Temp:  [97.1 °F (36.2 °C)-98.1 °F (36.7 °C)] 97.8 °F (36.6 °C)  Heart Rate:  [51-60] 60  Resp:  [16-20] 17  BP: (150-173)/(56-88) 164/88    Physical Exam:  Physical Exam  Vitals reviewed.   Cardiovascular:      Rate and Rhythm: Normal rate.   Pulmonary:      Effort: Pulmonary effort is normal. No respiratory distress.   Abdominal:      General: There is no distension.   Skin:     General: Skin is warm.   Neurological:      Mental Status: He is alert. Mental status is at baseline.         Emotional Behavior:       Mood and Affect:         Depression  none               Anxiety  none    Debilities:        Agitation  none      Discharge Disposition  Home or Self Care    Discharge Medications     Discharge Medications        New Medications        Instructions Start Date   amLODIPine 5 MG tablet  Commonly known as: NORVASC   7.5 mg, Oral, Nightly      isosorbide mononitrate 60 MG 24 hr tablet  Commonly known as: IMDUR   60 mg, Oral, Every 24 Hours Scheduled   Start Date: January 18, 2025            Changes to Medications        Instructions Start Date   Kinza Gamble  100 UNIT/ML injection pen  Generic drug: Insulin Glargine  What changed:   how much to take  how to take this  when to take this  additional instructions   INJECT 50 UNITS EVERY MORNING AND 45 UNITS EVERY EVENING      metoprolol succinate XL 50 MG 24 hr tablet  Commonly known as: TOPROL-XL  What changed: when to take this   50 mg, Oral, Daily      torsemide 20 MG tablet  Commonly known as: DEMADEX  What changed:   medication strength  how much to take   60 mg, Oral, Daily             Continue These Medications        Instructions Start Date   aspirin 81 MG EC tablet   81 mg, Oral, Daily      atorvastatin 80 MG tablet  Commonly known as: LIPITOR   80 mg, Nightly      clopidogrel 75 MG tablet  Commonly known as: PLAVIX   75 mg, Oral, Daily      DAILY FIBER PO   1 tablet, Daily      HumaLOG KwikPen 100 UNIT/ML solution pen-injector  Generic drug: Insulin Lispro (1 Unit Dial)   Inject 16 Units under the skin into the appropriate area as directed Daily With Breakfast AND 16 Units Daily Before Lunch AND 20 Units Daily With Dinner. Adds sliding scale based on BS reading. Max daily dose 75u daily  Indications: Type 2 Diabetes      lisinopril 10 MG tablet  Commonly known as: PRINIVIL,ZESTRIL   1 tablet, Daily      multivitamin tablet tablet  Commonly known as: THERAGRAN   1 tablet, Daily      omeprazole 40 MG capsule  Commonly known as: priLOSEC   40 mg, 2 Times Daily      oxyCODONE-acetaminophen  MG per tablet  Commonly known as: PERCOCET   1 tablet, Every 6 Hours PRN      pregabalin 150 MG capsule  Commonly known as: LYRICA   1 capsule, Nightly      sertraline 100 MG tablet  Commonly known as: ZOLOFT   100 mg, Daily      Turmeric 500 MG capsule   1 capsule, Daily               Discharge Diet:   Diet Instructions       Diet: Cardiac Diets; No Salt Packet; Thin (IDDSI 0)      Discharge Diet: Cardiac Diets    Cardiac Diet: No Salt Packet    Fluid Consistency: Thin (IDDSI 0)            Activity at Discharge:   Activity  Instructions       Activity as Tolerated              Follow-up Appointments  Future Appointments   Date Time Provider Department Center   1/17/2025 11:00 AM [facility] PHASE II - [facility] C[facility] REHAB [facility]   1/20/2025 11:00 AM [facility] PHASE II - [facility]   1/21/2025 10:15 AM ROOM 856 LAG WOUND CARE [facility]   1/22/2025 11:00 AM [facility] PHASE II - [facility] REHAB [facility]   1/23/2025  1:00 PM Garland Deluna APRN [facility] [id number] None   1/24/2025 11:00 AM [facility] PHASE II - [facility] CARD REHAB [facility]   1/27/2025 11:00 AM [facility] PHASE II - [facility] REHAB [facility]   1/29/2025 11:00 AM [facility] PHASE II - [facility] CARD REHAB [facility]   3/11/2025 10:30 AM Minnie Rodriguez APRN [facility] GE [facility] LAG   3/25/2025 10:15 AM ROOM 856 LAG WOUND CARE [facility]   4/28/2025  1:00 PM Minnie Dsouza APRN [facility] EN  [facility]   4/29/2025  1:30 PM Sherie Patel APRN [facility] CD L[facility]     Additional Instructions for the Follow-ups that You Need to Schedule       Discharge Follow-up with PCP   As directed       Currently Documented PCP:    Guille Nieves MD    PCP Phone Number:    [phone]     Follow Up Details: one week        Discharge Follow-up with Specified Provider: cardiology; 2 Weeks   As directed      To: cardiology   Follow Up: 2 Weeks                Test Results Pending at Discharge       Risk for Readmission (LACE) Score: 11 (1/17/2025  6:00 AM)        Time: Discharge 32 min with face-to-face history exam, writing of prescriptions, and documenting discharge data including care coordination with the nursing staff.      Electronically signed by Cuate Norris DO, 01/17/25, 09:58 EST.      Note disclaimer: At [facility], we believe that sharing information builds trust and better relationships. You are receiving this note because you recently visited [facility]. It is possible you will see health information before a provider has talked with you about it. This kind of  information can be easy to misunderstand. To help you fully understand what it means for your health, we urge you to discuss this note with your provider.

## 2025-01-17 NOTE — H&P
Name: Hugh R McBurney ADMIT: 2021   : 1950  PCP: Guille Nieves MD    MRN: [id number] LOS: 0 days   AGE/SEX: 70 y.o. male  ROOM: Room/bed info not found       Chief complaint   Patient is a 70 y.o. male presents with liver mass.     Past Surgical History:  Past Surgical History:   Procedure Laterality Date   • APPENDECTOMY     • CARDIAC CATHETERIZATION N/A 2017    Procedure: Left Heart Cath;  Surgeon: Tiburcio Moreno MD;  Location: [facility] INVASIVE LOCATION;  Service:    • COLONOSCOPY     • JOINT REPLACEMENT      BILATERAL KNEES    • LUMBAR FUSION     • NM REVISE KNEE JOINT REPLACE,1 PART Right 2017    Procedure: REMOVAL OF ANTIBIOTIC CEMENT SPACER; RIGHT TOTAL KNEE ARTHROPLASTY REVISION;  Surgeon: Kiko Marie MD;  Location: [facility];  Service: Orthopedics   • ROTATOR CUFF REPAIR Right    • TOTAL KNEE  PROSTHESIS REMOVAL W/ SPACER INSERTION Right 2017    Procedure: INCISION AND DRAINAGE RIGHT KNEE, POLY CHANGE;  Surgeon: Kiko Marie MD;  Location: [facility];  Service:    • TOTAL KNEE  PROSTHESIS REMOVAL W/ SPACER INSERTION Right 2017    Procedure: REMOVAL RIGHT TOTAL KNEE ARTHROPLASTY AND ANITBIOTIC SPACER;  Surgeon: Kiko Marie MD;  Location: [facility];  Service:    • TRANS METATARSAL AMPUTATION Left 2017    Procedure: EXCISIONAL DEBRIDEMENT LT. 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 male admitted with a medical diagnosis of S/P revision of total knee, left.  He presents with the following impairments/functional limitations: weakness, impaired endurance, impaired functional mobility, decreased lower extremity function, pain, decreased ROM, edema, orthopedic precautions .    Rehab Prognosis: Good; patient would benefit from acute skilled PT services to address these deficits and reach maximum level of function.    Recent Surgery: Procedure(s) (LRB):  IRRIGATION AND DEBRIDEMENT, LOWER EXTREMITY (Left)  REVISION, ARTHROPLASTY, KNEE - poly (Left) 3 Days Post-Op    Plan:     During this hospitalization, patient to be seen BID to address the identified rehab impairments via gait training, therapeutic activities, therapeutic exercises and progress toward the following goals:    Plan of Care Expires:  01/20/25    Subjective     Chief Complaint: lateral left knee pain with movement  Patient/Family Comments/goals: discharge home today  Pain/Comfort:  Pain Rating 1: 0/10  Pain Addressed 1: Pre-medicate for activity, Reposition, Distraction  Pain Rating Post-Intervention 1: 0/10      Objective:     Communicated with nsg prior to session.  Patient found up in chair with   upon PT entry to room.     General Precautions: Standard, fall, contact  Orthopedic Precautions: LLE weight bearing as tolerated  Braces: N/A  Respiratory Status: Room air     Functional Mobility:  Transfers:     Sit to Stand:  stand by assistance with rolling walker  Gait: stand by assistance with Rw, pt ambulated 400' with step through gait pattern. Pt demonstrated a faster pace than yesterday and decreased left knee flexion.    Treatment & Education:  Pt  (gastroesophageal reflux disease)    • Great toe pain     with cellulitis and gangrene   • History of kidney stones    • Hyperlipidemia    • Hypertension    • Hyponatremia    • Kidney stone    • Knee pain     hx of previous prosthetic joint infection   • Liver disease     liver mass   • MSSA (methicillin susceptible Staphylococcus aureus) infection     RIGHT KNEE   • NSTEMI (non-ST elevated myocardial infarction) (CMS/HCC) 06/14/2017   • Sleep apnea     DOES NOT USE CPAP       Home Medications:  (Not in a hospital admission)      Allergies:  Vancomycin    Family History:  Family History   Problem Relation Age of Onset   • Heart disease Mother    • Heart disease Father    • Diabetes Sister    • Dementia Maternal Grandmother    • No Known Problems Maternal Grandfather    • Heart disease Paternal Grandmother    • Heart attack Paternal Grandfather    • Heart disease Paternal Grandfather    • Heart disease Son    • Heart attack Son    • No Known Problems Sister    • Malig Hyperthermia Neg Hx        Social History:  Social History     Tobacco Use   • Smoking status: Former Smoker     Packs/day: 3.00     Years: 20.00     Pack years: 60.00   • Smokeless tobacco: Former User   • Tobacco comment: quit 35 years ago. 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    Substance Use Topics   • Alcohol use: No   • Drug use: No        Objective     Physical Exam:   Alert, oriented, r/r/r, ctab    Vital Signs  Temp:  [98.2 °F (36.8 °C)] 98.2 °F (36.8 °C)  Heart Rate:  [69] 69  Resp:  [13] 13  BP: (168)/(64) 168/64    Anticipated Surgical Procedure:  Ct liver mass biopsy with moderate sedation    The risks, benefits and alternatives of this procedure have been discussed with the patient or responsible party: Yes        Marko Barbosa MD  02/12/21  09:46 EST   educated on importance of out of bed mobility and frequent ambulation.  Pt educated on management of swelling through RICE method and ambulation.  Pt educated on fall prevention and safety at home.  Pt performed 10 sets of total knee HEPs with active assist.      (In degrees) AROM PROM   L knee flexion 55 62   L knee extension 0        Patient left up in chair with call button in reach and family present..    GOALS:   Multidisciplinary Problems       Physical Therapy Goals          Problem: Physical Therapy    Goal Priority Disciplines Outcome Interventions   Physical Therapy Goal     PT, PT/OT Progressing    Description: Pt will improve functional independence by performing:    Bed mobility: SBA  Sit to stand: SBA with rolling walker - MET  Bed to chair: SBA with Stand Step  with rolling walker   Car Transfer: SBA with rolling walker  Ambulation x 200'  feet with SBA and rolling walker MET  1 Step (Curb): Min A  and rolling walker MET  3 Steps: Min A  and B HR MET  left knee AROM flexion (in degrees): 90  left knee AROM extension (in degrees): 0   Independent with total knee HEP                          DME Justifications:   Zachery's mobility limitation cannot be sufficiently resolved by the use of a cane. His functional mobility deficit can be sufficiently resolved with the use of a Rolling Walker. Patient's mobility limitation significantly impairs their ability to participate in one of more activities of daily living.  The use of a RW will significantly improve the patient's ability to participate in MRADLS and the patient will use it on regular basis in the home.    Time Tracking:     PT Received On:    PT Start Time: 0921     PT Stop Time: 0951  PT Total Time (min): 30 min     Billable Minutes: Gait Training 15 min and Therapeutic Exercise 15 min    Treatment Type: Treatment  PT/PTA: PTA     Number of PTA visits since last PT visit: 1 01/17/2025

## 2025-01-17 NOTE — CASE MANAGEMENT/SOCIAL WORK
Case Management Discharge Note      Final Note: dc home         Selected Continued Care - Discharged on 1/17/2025 Admission date: 1/15/2025 - Discharge disposition: Home or Self Care      Destination    No services have been selected for the patient.                Durable Medical Equipment    No services have been selected for the patient.                Dialysis/Infusion    No services have been selected for the patient.                Home Medical Care    No services have been selected for the patient.                Therapy    No services have been selected for the patient.                Community Resources    No services have been selected for the patient.                Community & DME    No services have been selected for the patient.                         Final Discharge Disposition Code: 01 - home or self-care

## 2025-01-18 LAB
BACTERIA SPEC AEROBE CULT: NORMAL
GRAM STN SPEC: NORMAL
GRAM STN SPEC: NORMAL

## 2025-01-18 NOTE — OUTREACH NOTE
Prep Survey      Flowsheet Row Responses   [facility] facility patient discharged from? [place]   Is LACE score < 7 ? No   Eligibility Readm Mgmt   Discharge diagnosis NSTEMI- medical management   Does the patient have one of the following disease processes/diagnoses(primary or secondary)? Acute MI (STEMI,NSTEMI)   Does the patient have Home health ordered? No   Is there a DME ordered? No   Prep survey completed? Yes            Eleanor AVELAR - Registered Nurse

## 2025-01-20 ENCOUNTER — APPOINTMENT (OUTPATIENT)
Dept: CARDIAC REHAB | Facility: HOSPITAL | Age: 75
End: 2025-01-20
Payer: MEDICARE

## 2025-01-21 ENCOUNTER — APPOINTMENT (OUTPATIENT)
Dept: WOUND CARE | Facility: HOSPITAL | Age: 75
End: 2025-01-21
Payer: MEDICARE

## 2025-01-21 ENCOUNTER — LAB REQUISITION (OUTPATIENT)
Dept: LAB | Facility: HOSPITAL | Age: 75
End: 2025-01-21
Payer: MEDICARE

## 2025-01-21 DIAGNOSIS — L97.522 NON-PRESSURE CHRONIC ULCER OF OTHER PART OF LEFT FOOT WITH FAT LAYER EXPOSED: ICD-10-CM

## 2025-01-21 PROCEDURE — 87070 CULTURE OTHR SPECIMN AEROBIC: CPT | Performed by: NURSE PRACTITIONER

## 2025-01-21 PROCEDURE — 87205 SMEAR GRAM STAIN: CPT | Performed by: NURSE PRACTITIONER

## 2025-01-22 ENCOUNTER — APPOINTMENT (OUTPATIENT)
Dept: CARDIAC REHAB | Facility: HOSPITAL | Age: 75
End: 2025-01-22
Payer: MEDICARE

## 2025-01-22 NOTE — PROGRESS NOTES
[facility] Follow Up Office Note     Encounter Date:25  Patient:Hugh R McBurney  :1950  MRN:[id number]      Chief Complaint:   Chief Complaint   Patient presents with    Hospital Follow Up Visit         History of Presenting Illness:      Mr.Hugh R McBurney is a 74 y.o. gentleman that follows with Dr. Moreno and is new to me today.  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  Patient is here for scheduled follow-up.    At last visit patient was doing well.  No medication changes made.  Patient had underwent PCI to  and September.  He was on aspirin and Plavix to continue for 1 year.  LDL was well-controlled.  On goal-directed medical therapy for ischemic cardiomyopathy.  Dr. Moreno did not recommend starting SGLT2 with chronic kidney disease.    On 2025 patient presented to the emergency department with complaints of chest pain.  Patient was in cardiac rehab and began having chest pain.  He was sent to the ER for further evaluation.  Patient reported that he was having exertional chest pain on and off for past 2 to 3 weeks.  Denied any associated symptoms.  In the ER troponins were elevated but flat.  Elevated BUN and creatinine were also noted.  EKG demonstrated sinus rhythm rate of 73 with stable ST depression.  Patient was started on amlodipine and Imdur.    Today the patient reports that he has been doing well since he was discharged from the hospital.  He has not had any more episodes of chest pain following discharge.  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  He has not had any symptoms such as chest pain, shortness of breath, palpitations, peripheral edema, orthopnea, and PND.  It does sound like he is having some lightheaded and dizziness with big position changes.  He plans on returning to  cardiac rehab soon.    Review of Systems:  Review of Systems   Constitutional: Negative.   HENT: Negative.     Eyes: Negative.    Cardiovascular:  Negative for chest pain, dyspnea on exertion, irregular heartbeat, leg swelling, orthopnea, palpitations and syncope.   Respiratory:  Negative for cough, shortness of breath and snoring.    Hematologic/Lymphatic: Negative.    Skin: Negative.    Musculoskeletal: Negative.    Gastrointestinal: Negative.    Genitourinary: Negative.    Neurological:  Positive for dizziness. Negative for headaches and light-headedness.   Psychiatric/Behavioral: Negative.         Current Outpatient Medications on File Prior to Visit   Medication Sig Dispense Refill    aspirin 81 MG EC tablet Take 1 tablet by mouth Daily.      atorvastatin (LIPITOR) 80 MG tablet Take 1 tablet by mouth Every Night.      clopidogrel (PLAVIX) 75 MG tablet Take 1 tablet by mouth Daily. 90 tablet 3    HumaLOG KwikPen 100 UNIT/ML solution pen-injector Inject 16 Units under the skin into the appropriate area as directed Daily With Breakfast AND 16 Units Daily Before Lunch AND 20 Units Daily With Dinner. Adds sliding scale based on BS reading. Max daily dose 75u daily  Indications: Type 2 Diabetes 90 mL 2    Insulin Glargine (Lantus SoloStar) 100 UNIT/ML injection pen INJECT 50 UNITS EVERY MORNING AND 45 UNITS EVERY EVENING (Patient taking differently: Inject 35 Units under the skin into the appropriate area as directed 2 (Two) Times a Day.) 90 mL 0    lisinopril (PRINIVIL,ZESTRIL) 10 MG tablet Take 1 tablet by mouth Daily.      metoprolol succinate XL (TOPROL-XL) 50 MG 24 hr tablet Take 1 tablet by mouth Daily. (Patient taking differently: Take 1 tablet by mouth Every Evening.) 90 tablet 3    Multiple Vitamin (MULTI VITAMIN DAILY PO) Take 1 tablet by mouth Daily. HOLD PRIOR TO SURGERY      omeprazole (priLOSEC) 40 MG capsule Take 1 capsule by mouth 2 (Two) Times a Day.      oxyCODONE-acetaminophen (PERCOCET)  MG  per tablet Take 1 tablet by mouth Every 6 (Six) Hours As Needed for Severe Pain.      pregabalin (LYRICA) 150 MG capsule Take 1 capsule by mouth Every Night.      Psyllium (DAILY FIBER PO) Take 1 tablet by mouth Daily.      sertraline (ZOLOFT) 100 MG tablet Take 1 tablet by mouth Daily.      torsemide (DEMADEX) 20 MG tablet Take 3 tablets by mouth Daily for 30 days. 90 tablet 0    Turmeric 500 MG capsule Take 1 capsule by mouth Daily.      [DISCONTINUED] amLODIPine (NORVASC) 5 MG tablet Take 1.5 tablets by mouth Every Night for 30 days. 45 tablet 0    [DISCONTINUED] isosorbide mononitrate (IMDUR) 60 MG 24 hr tablet Take 1 tablet by mouth Daily for 30 days. 30 tablet 0     No current facility-administered medications on file prior to visit.       Allergies   Allergen Reactions    Vancomycin Other (See Comments)     Red Man syndrome, slow rate and premedicate with benadryl       Past Medical History:   Diagnosis Date    Arthritis     Chronic pain     BILATERAL KNEES AND BACK    Contusion of knee     Coronary artery disease     Depression     Diabetes mellitus     type 2 with atherosclerosis of arteries of extremities    Diabetic ulcer of toe     left foot associated with type 2 diabetes mellitus, with necrosis of bone    GERD (gastroesophageal reflux disease)     Great toe pain     with cellulitis and gangrene    History of kidney stones     Hyperlipidemia     Hypertension     Hyponatremia     Kidney stone     Knee pain     hx of previous prosthetic joint infection    Liver disease     liver mass    MSSA (methicillin susceptible Staphylococcus aureus) infection     RIGHT KNEE    NSTEMI (non-ST elevated myocardial infarction) 06/14/2017    Sleep apnea     DOES NOT USE CPAP       Past Surgical History:   Procedure Laterality Date    APPENDECTOMY      CARDIAC CATHETERIZATION N/A 6/14/2017    Procedure: Left Heart Cath;  Surgeon: Tiburcio Moreno MD;  Location: [facility] CATH INVASIVE LOCATION;  Service:     CARDIAC  CATHETERIZATION N/A 9/5/2024    Procedure: Coronary angiography;  Surgeon: Baldomero Wheeler MD;  Location:  [facility] CATH INVASIVE LOCATION;  Service: Cardiovascular;  Laterality: N/A;  Patient to hold lisinopril and torsemide the day prior, day of, and day after cardiac cath.  Patient will also need 4 hours of IV fluid prep (normal saline at 75 cc/h) pre and post cardiac cath.    CARDIAC CATHETERIZATION N/A 9/5/2024    Procedure: Left heart cath;  Surgeon: Baldomero Wheeler MD;  [facility];  Service: Cardiovascular;  Laterality: N/A;    CARDIAC CATHETERIZATION N/A 9/5/2024    Procedure: Resting Full Cycle Ratio;  Surgeon: Baldomero Wheeler MD;  [facility];  Service: Cardiovascular;  Laterality: N/A;    CARDIAC CATHETERIZATION N/A 9/5/2024    Procedure: Percutaneous Coronary Intervention;  Surgeon: Baldomero Wheeler MD;  [facility];  Service: Cardiovascular;  Laterality: N/A;    CARDIAC CATHETERIZATION N/A 9/5/2024    Procedure: Stent AILIN coronary;  Surgeon: Baldomero Wheeler MD;  [facility];  Service: Cardiovascular;  Laterality: N/A;    COLONOSCOPY      COLONOSCOPY W/ POLYPECTOMY N/A 4/29/2024    Procedure: COLONOSCOPY WITH POLYPECTOMY;  Surgeon: Kam Cooley MD;  Location: [facility] OR;  Service: Gastroenterology;  Laterality: N/A;  diverticulosis; transverse polyp x3 (forecep); cecal polyp x2 (forceps); ascending polyp x3 (forceps); ascending polyp x2 (cold snare); splenic flexure polyp x2 (hot snare); sigmoid polyp x1 (cold snare); sigmoid polyp x1 (hot snare); sigmoid x1 (fo    ENDOSCOPY N/A 4/29/2024    Procedure: ESOPHAGOGASTRODUODENOSCOPY WITH BIOPSY;  Surgeon: Kam Cooley MD;  Location: [facility] OR;  Service: Gastroenterology;  Laterality: N/A;  Reflux esophagitis; Erosive gastritis; Dilatation- no stricture; Biopsies- gastric, distal esophagus    INTERVENTIONAL RADIOLOGY  PROCEDURE N/A 9/5/2024    Procedure: Intravascular Ultrasound;  Surgeon: Baldomero Wheeler MD;  Location: [facility] INVASIVE LOCATION;  Service: Cardiovascular;  Laterality: N/A;    JOINT REPLACEMENT      BILATERAL KNEES     LUMBAR FUSION      SD REVJ TOTAL KNEE ARTHRP W/WO ALGRFT 1 COMPONENT Right 9/5/2017    Procedure: REMOVAL OF ANTIBIOTIC CEMENT SPACER; RIGHT TOTAL KNEE ARTHROPLASTY REVISION;  Surgeon: Kiko Marie MD;  Location: [facility] OR;  Service: Orthopedics    ROTATOR CUFF REPAIR Right     TOTAL KNEE  PROSTHESIS REMOVAL W/ SPACER INSERTION Right 2/14/2017    Procedure: INCISION AND DRAINAGE RIGHT KNEE, POLY CHANGE;  Surgeon: Kiko Marie MD;  Location: [facility] OR;  Service:     TOTAL KNEE  PROSTHESIS REMOVAL W/ SPACER INSERTION Right 7/7/2017    Procedure: REMOVAL RIGHT TOTAL KNEE ARTHROPLASTY AND ANITBIOTIC SPACER;  Surgeon: Kiko Marie MD;  Location: [facility] OR;  Service:     TRANS METATARSAL AMPUTATION Left 2/14/2017    Procedure: EXCISIONAL DEBRIDEMENT LT. FIRST TOE DIABETIC ULCER, DRAINAGE ABSCESS FIRST TOE, FIRST TOE AMPUTATION;  Surgeon: Osmin Brand MD;  Location: [facility] OR;  Service:        Social History     Socioeconomic History    Marital status:    Tobacco Use    Smoking status: Former     Current packs/day: 3.00     Average packs/day: 3.0 packs/day for 20.0 years (60.0 ttl pk-yrs)     Types: Cigarettes     Passive exposure: Never    Smokeless tobacco: Former    Tobacco comments:     quit 35 years ago. 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    Vaping Use    Vaping status: Never Used   Substance and Sexual Activity    Alcohol use: No    Drug use: No    Sexual activity: Defer       Family History   Problem Relation Age of Onset    Heart disease Mother     Heart disease Father     Diabetes Sister     Dementia Maternal Grandmother     No Known Problems Maternal Grandfather     Heart disease Paternal Grandmother     Heart attack Paternal  "Grandfather     Heart disease Paternal Grandfather     Heart disease Son     Heart attack Son     No Known Problems Sister     Saad Hyperthermia Neg Hx        The following portions of the patient's history were reviewed and updated as appropriate: allergies, current medications, past family history, past medical history, past social history, past surgical history and problem list.       Objective:       Vitals:    01/23/25 1300   BP: 110/64   Pulse: 65   SpO2: 96%   Weight: 133 kg (293 lb)   Height: 188 cm (74\")         Physical Exam  Vitals and nursing note reviewed.   Constitutional:       Appearance: Normal appearance. He is normal weight.   Eyes:      Extraocular Movements: Extraocular movements intact.      Pupils: Pupils are equal, round, and reactive to light.   Cardiovascular:      Rate and Rhythm: Normal rate and regular rhythm.      Chest Wall: PMI is not displaced. No thrill.      Pulses: Normal pulses.      Heart sounds: Normal heart sounds.   Pulmonary:      Effort: Pulmonary effort is normal.      Breath sounds: Normal breath sounds.   Musculoskeletal:      Cervical back: Normal range of motion.      Right lower leg: No edema.      Left lower leg: No edema.   Skin:     General: Skin is warm and dry.   Neurological:      General: No focal deficit present.      Mental Status: He is alert and oriented to person, place, and time. Mental status is at baseline.   Psychiatric:         Mood and Affect: Mood normal.         Behavior: Behavior normal.         Thought Content: Thought content normal.         Judgment: Judgment normal.               Lab Results   Component Value Date     01/17/2025     01/16/2025    K 4.7 01/17/2025    K 5.0 01/16/2025     01/17/2025     (H) 01/16/2025    CO2 22.2 01/17/2025    CO2 19.0 (L) 01/16/2025    BUN 41 (H) 01/17/2025    BUN 40 (H) 01/16/2025    CREATININE 3.21 (H) 01/17/2025    CREATININE 3.08 (H) 01/16/2025    EGFRIFNONA 41 (L) 01/25/2022    " EGFRIFNONA 45 (L) 10/21/2021    EGFRIFAFRI 47 (L) 01/25/2022    EGFRIFAFRI 52 (L) 10/21/2021    GLUCOSE 194 (H) 01/17/2025    GLUCOSE 65 01/16/2025    CALCIUM 8.0 (L) 01/17/2025    CALCIUM 7.9 (L) 01/16/2025    PROTENTOTREF 6.4 12/08/2023    PROTENTOTREF 5.8 (L) 03/14/2023    ALBUMIN 3.0 (L) 01/16/2025    ALBUMIN 3.4 (L) 01/15/2025    BILITOT 0.3 01/15/2025    BILITOT 0.2 12/08/2023    AST 17 01/15/2025    AST 14 12/08/2023    ALT 10 01/15/2025    ALT 11 12/08/2023     Lab Results   Component Value Date    WBC 8.32 01/15/2025    WBC 9.07 09/10/2024    HGB 9.7 (L) 01/15/2025    HGB 10.2 (L) 09/10/2024    HCT 29.7 (L) 01/15/2025    HCT 31.0 (L) 09/10/2024    .8 (H) 01/15/2025    MCV 97.5 (H) 09/10/2024     (L) 01/15/2025     09/10/2024     Lab Results   Component Value Date    CHOL 85 09/06/2024    CHOL 122 11/20/2023    TRIG 77 09/06/2024    TRIG 155 (H) 11/20/2023    HDL 29 (L) 09/06/2024    HDL 34 (L) 11/20/2023    LDL 40 09/06/2024    LDL 61 11/20/2023     Lab Results   Component Value Date    PROBNP 9,946.0 (H) 01/16/2025     Lab Results   Component Value Date    CKTOTAL 50 06/13/2017    CKMB 2.08 06/13/2017    TROPONINT 148 (C) 01/16/2025     Lab Results   Component Value Date    TSH 1.870 12/08/2023    TSH 2.850 03/14/2023           ECG 12 Lead    Date/Time: 1/23/2025 1:09 PM  Performed by: Garland Deluna APRN    Authorized by: Garland Deluna APRN  Comparison: compared with previous ECG from 1/16/2025  Similar to previous ECG  Rhythm: sinus rhythm  Rate: normal  BPM: 65  Conduction: conduction normal  ST Depression: II and III  QRS axis: normal  Other: no other findings             Assessment:         Diagnoses and all orders for this visit:    1. Coronary artery disease involving native coronary artery of native heart without angina pectoris (Primary)    2. Primary hypertension    3. Status post coronary artery stent placement    4. CKD (chronic kidney disease) stage 4, GFR 15-29  ml/min    5. 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 Mixed hyperlipidemia    Other orders  -     ECG 12 Lead  -     amLODIPine (NORVASC) 5 MG tablet; Take 1.5 tablets by mouth Every Night for 30 days.  Dispense: 90 tablet; Refill: 3  -     isosorbide mononitrate (IMDUR) 60 MG 24 hr tablet; Take 1 tablet by mouth Daily for 30 days.  Dispense: 90 tablet; Refill: 2            Plan:       Overall the patient seems stable from a cardiac standpoint.  Unfortunately he went to the ER a couple weeks ago with chest pain.  He was started on amlodipine and Imdur.  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  He plans on returning to cardiac rehab soon.  Heart rate and blood pressure both controlled at this time.  No medication changes necessary.      Coronary artery disease:  PCI to mid LAD with 2 overlapping drug-eluting stents 9/2024  Currently denies anginal symptoms  Continue aspirin and Plavix until at least September 2024  Continue metoprolol succinate 50 mg  Continue lisinopril 10 mg last creatinine 3.21 in January 2025  Continue atorvastatin 80 mg p.o. nightly.  Most recent LDL well-controlled  Continue Imdur    Ischemic cardiomyopathy:  Continue metoprolol succinate 50 mg twice daily  Continue amlodipine and 7.5 mg nightly  Continue lisinopril 10 mg daily  Will hold off starting SGLT2 due to chronic kidney disease.    Chronic kidney disease:  Follows with nephrology  Continue torsemide 60 mg    Essential hypertension:  Blood pressure well-controlled at this time.  No changes in medical therapy.      Follow-up  With Dr. Moreno in 1 month    ABBY Dowell  [facility]  01/23/25  14:03 EST

## 2025-01-23 ENCOUNTER — OFFICE VISIT (OUTPATIENT)
Age: 75
End: 2025-01-23
Payer: MEDICARE

## 2025-01-23 ENCOUNTER — READMISSION MANAGEMENT (OUTPATIENT)
Dept: CALL CENTER | Facility: HOSPITAL | Age: 75
End: 2025-01-23
Payer: MEDICARE

## 2025-01-23 VITALS
DIASTOLIC BLOOD PRESSURE: 64 MMHG | WEIGHT: 293 LBS | HEART RATE: 65 BPM | SYSTOLIC BLOOD PRESSURE: 110 MMHG | HEIGHT: 74 IN | BODY MASS INDEX: 37.6 KG/M2 | OXYGEN SATURATION: 96 %

## 2025-01-23 DIAGNOSIS — E78.2 MIXED HYPERLIPIDEMIA: ICD-10-CM

## 2025-01-23 DIAGNOSIS — N18.4 CKD (CHRONIC KIDNEY DISEASE) STAGE 4, GFR 15-29 ML/MIN: ICD-10-CM

## 2025-01-23 DIAGNOSIS — I70.209 DIABETES TYPE 2 WITH ATHEROSCLEROSIS OF ARTERIES OF EXTREMITIES: ICD-10-CM

## 2025-01-23 DIAGNOSIS — I10 PRIMARY HYPERTENSION: ICD-10-CM

## 2025-01-23 DIAGNOSIS — E11.51 DIABETES TYPE 2 WITH ATHEROSCLEROSIS OF ARTERIES OF EXTREMITIES: ICD-10-CM

## 2025-01-23 DIAGNOSIS — Z95.5 STATUS POST CORONARY ARTERY STENT PLACEMENT: ICD-10-CM

## 2025-01-23 DIAGNOSIS — I25.10 CORONARY ARTERY DISEASE INVOLVING NATIVE CORONARY ARTERY OF NATIVE HEART WITHOUT ANGINA PECTORIS: Primary | ICD-10-CM

## 2025-01-23 RX ORDER — AMLODIPINE BESYLATE 5 MG/1
7.5 TABLET ORAL NIGHTLY
Qty: 90 TABLET | Refills: 3 | Status: SHIPPED | OUTPATIENT
Start: 2025-01-23 | End: 2025-02-22

## 2025-01-23 RX ORDER — ISOSORBIDE MONONITRATE 60 MG/1
60 TABLET, EXTENDED RELEASE ORAL
Qty: 90 TABLET | Refills: 2 | Status: SHIPPED | OUTPATIENT
Start: 2025-01-23 | End: 2025-02-22

## 2025-01-23 NOTE — OUTREACH NOTE
AMI Week 1 Survey      Flowsheet Row Responses   [facility] patient discharged from? [place]   Does the patient have one of the following disease processes/diagnoses(primary or secondary)? Acute MI (STEMI,NSTEMI)   Week 1 attempt successful? No   Unsuccessful attempts Attempt 1            MK LING - Registered Nurse

## 2025-01-24 ENCOUNTER — APPOINTMENT (OUTPATIENT)
Dept: CARDIAC REHAB | Facility: HOSPITAL | Age: 75
End: 2025-01-24
Payer: MEDICARE

## 2025-01-24 LAB
BACTERIA SPEC AEROBE CULT: NORMAL
GRAM STN SPEC: NORMAL
GRAM STN SPEC: NORMAL

## 2025-01-27 ENCOUNTER — APPOINTMENT (OUTPATIENT)
Dept: CARDIAC REHAB | Facility: HOSPITAL | Age: 75
End: 2025-01-27
Payer: MEDICARE

## 2025-01-28 ENCOUNTER — APPOINTMENT (OUTPATIENT)
Dept: WOUND CARE | Facility: HOSPITAL | Age: 75
End: 2025-01-28
Payer: MEDICARE

## 2025-01-28 ENCOUNTER — READMISSION MANAGEMENT (OUTPATIENT)
Dept: CALL CENTER | Facility: HOSPITAL | Age: 75
End: 2025-01-28
Payer: MEDICARE

## 2025-01-28 NOTE — OUTREACH NOTE
AMI Week 1 Survey      Flowsheet Row Responses   [facility] patient discharged from? [place]   Does the patient have one of the following disease processes/diagnoses(primary or secondary)? Acute MI (STEMI,NSTEMI)   Week 1 attempt successful? Yes   Call start time 0912   Call end time 0915   Discharge diagnosis NSTEMI- medical management   Meds reviewed with patient/caregiver? Yes   Is the patient having any side effects they believe may be caused by any medication additions or changes? No   Does the patient have all prescriptions related to this admission filled (includes statins,anticoagulants,HTN meds,anti-arrhythmia meds) Yes   Is the patient taking all medications as directed (includes completed medication regime)? Yes   Does the patient have a primary care provider?  Yes   Has the patient kept scheduled appointments due by today? Yes   Psychosocial issues? No   Nursing interventions Nurse provided patient education   Is the patient/caregiver able to teach back signs and symptoms of when to call for help immediately: Sudden chest discomfort, Sudden discomfort in arms, back, neck or jaw, Sudden sweating or clammy skin, Shortness of breath at any time, Nausea or vomiting, Dizziness or lightheadedness, Irregular or rapid heart rate   Is the patient/caregiver able to teach back lifestyle changes to help prevent MIs Heart healthy diet, Regular exercise as approved by provider, Managing diabetes, Maintaining a healthy weight, Reducing stress, Limiting alcohol intake   Is the patient/caregiver able to teach back ways to prevent a second heart attack: Take medications, Follow up with MD   If the patient is a current smoker, are they able to teach back resources for cessation? Not a smoker   Is the patient/caregiver able to teach back the hierarchy of who to call/visit for symptoms/problems? PCP, Specialist, Home health nurse, Urgent Care, ED, 911 Yes   Additional teach back comments States he is doing well and  followed up with cardiology and PCP.  Cardiology did some med adjustments   Week 1 call completed? Yes   Revoked No further contact(revokes)-requires comment   Graduated/Revoked comments Denies questions or needs at this time.   Call end time 0915            Ellen ENGLAND - Licensed Nurse

## 2025-01-29 ENCOUNTER — TREATMENT (OUTPATIENT)
Dept: CARDIAC REHAB | Facility: HOSPITAL | Age: 75
End: 2025-01-29
Payer: MEDICARE

## 2025-01-29 DIAGNOSIS — Z95.5 S/P DRUG ELUTING CORONARY STENT PLACEMENT: Primary | ICD-10-CM

## 2025-01-29 PROCEDURE — 93798 PHYS/QHP OP CAR RHAB W/ECG: CPT

## 2025-01-31 ENCOUNTER — TREATMENT (OUTPATIENT)
Dept: CARDIAC REHAB | Facility: HOSPITAL | Age: 75
End: 2025-01-31
Payer: MEDICARE

## 2025-01-31 DIAGNOSIS — Z95.5 S/P DRUG ELUTING CORONARY STENT PLACEMENT: Primary | ICD-10-CM

## 2025-01-31 PROCEDURE — 93798 PHYS/QHP OP CAR RHAB W/ECG: CPT

## 2025-02-03 ENCOUNTER — TREATMENT (OUTPATIENT)
Dept: CARDIAC REHAB | Facility: HOSPITAL | Age: 75
End: 2025-02-03
Payer: MEDICARE

## 2025-02-03 DIAGNOSIS — Z95.5 S/P DRUG ELUTING CORONARY STENT PLACEMENT: Primary | ICD-10-CM

## 2025-02-03 PROCEDURE — 93798 PHYS/QHP OP CAR RHAB W/ECG: CPT

## 2025-02-04 ENCOUNTER — APPOINTMENT (OUTPATIENT)
Dept: WOUND CARE | Facility: HOSPITAL | Age: 75
End: 2025-02-04
Payer: MEDICARE

## 2025-02-04 ENCOUNTER — LAB REQUISITION (OUTPATIENT)
Dept: LAB | Facility: HOSPITAL | Age: 75
End: 2025-02-04
Payer: MEDICARE

## 2025-02-04 DIAGNOSIS — L03.032 CELLULITIS OF LEFT TOE: ICD-10-CM

## 2025-02-04 DIAGNOSIS — E11.621 TYPE 2 DIABETES MELLITUS WITH FOOT ULCER (CODE): ICD-10-CM

## 2025-02-04 DIAGNOSIS — L97.522 NON-PRESSURE CHRONIC ULCER OF OTHER PART OF LEFT FOOT WITH FAT LAYER EXPOSED: ICD-10-CM

## 2025-02-04 DIAGNOSIS — M86.172 OTHER ACUTE OSTEOMYELITIS, LEFT ANKLE AND FOOT: ICD-10-CM

## 2025-02-04 DIAGNOSIS — L84 CORNS AND CALLOSITIES: ICD-10-CM

## 2025-02-04 PROCEDURE — 87186 SC STD MICRODIL/AGAR DIL: CPT | Performed by: NURSE PRACTITIONER

## 2025-02-04 PROCEDURE — 87070 CULTURE OTHR SPECIMN AEROBIC: CPT | Performed by: NURSE PRACTITIONER

## 2025-02-04 PROCEDURE — 87205 SMEAR GRAM STAIN: CPT | Performed by: NURSE PRACTITIONER

## 2025-02-04 PROCEDURE — 87147 CULTURE TYPE IMMUNOLOGIC: CPT | Performed by: NURSE PRACTITIONER

## 2025-02-05 ENCOUNTER — TREATMENT (OUTPATIENT)
Dept: CARDIAC REHAB | Facility: HOSPITAL | Age: 75
End: 2025-02-05
Payer: MEDICARE

## 2025-02-05 DIAGNOSIS — Z95.5 S/P DRUG ELUTING CORONARY STENT PLACEMENT: Primary | ICD-10-CM

## 2025-02-05 PROCEDURE — 93798 PHYS/QHP OP CAR RHAB W/ECG: CPT

## 2025-02-07 ENCOUNTER — APPOINTMENT (OUTPATIENT)
Dept: CARDIAC REHAB | Facility: HOSPITAL | Age: 75
End: 2025-02-07
Payer: MEDICARE

## 2025-02-07 LAB
BACTERIA SPEC AEROBE CULT: ABNORMAL
GRAM STN SPEC: ABNORMAL
GRAM STN SPEC: ABNORMAL

## 2025-02-10 ENCOUNTER — TREATMENT (OUTPATIENT)
Dept: CARDIAC REHAB | Facility: HOSPITAL | Age: 75
End: 2025-02-10
Payer: MEDICARE

## 2025-02-10 DIAGNOSIS — Z95.5 S/P DRUG ELUTING CORONARY STENT PLACEMENT: Primary | ICD-10-CM

## 2025-02-10 PROCEDURE — 93798 PHYS/QHP OP CAR RHAB W/ECG: CPT

## 2025-02-11 ENCOUNTER — APPOINTMENT (OUTPATIENT)
Dept: WOUND CARE | Facility: HOSPITAL | Age: 75
End: 2025-02-11
Payer: MEDICARE

## 2025-02-12 ENCOUNTER — APPOINTMENT (OUTPATIENT)
Dept: CARDIAC REHAB | Facility: HOSPITAL | Age: 75
End: 2025-02-12
Payer: MEDICARE

## 2025-02-14 ENCOUNTER — APPOINTMENT (OUTPATIENT)
Dept: CARDIAC REHAB | Facility: HOSPITAL | Age: 75
End: 2025-02-14
Payer: MEDICARE

## 2025-02-17 ENCOUNTER — TREATMENT (OUTPATIENT)
Dept: CARDIAC REHAB | Facility: HOSPITAL | Age: 75
End: 2025-02-17
Payer: MEDICARE

## 2025-02-17 DIAGNOSIS — Z95.5 S/P DRUG ELUTING CORONARY STENT PLACEMENT: Primary | ICD-10-CM

## 2025-02-17 PROCEDURE — 93798 PHYS/QHP OP CAR RHAB W/ECG: CPT

## 2025-02-18 ENCOUNTER — APPOINTMENT (OUTPATIENT)
Dept: WOUND CARE | Facility: HOSPITAL | Age: 75
End: 2025-02-18
Payer: MEDICARE

## 2025-02-19 ENCOUNTER — TREATMENT (OUTPATIENT)
Dept: CARDIAC REHAB | Facility: HOSPITAL | Age: 75
End: 2025-02-19
Payer: MEDICARE

## 2025-02-19 DIAGNOSIS — Z95.5 S/P DRUG ELUTING CORONARY STENT PLACEMENT: Primary | ICD-10-CM

## 2025-02-19 PROCEDURE — 93798 PHYS/QHP OP CAR RHAB W/ECG: CPT

## 2025-02-21 ENCOUNTER — TREATMENT (OUTPATIENT)
Dept: CARDIAC REHAB | Facility: HOSPITAL | Age: 75
End: 2025-02-21
Payer: MEDICARE

## 2025-02-21 DIAGNOSIS — Z95.5 S/P DRUG ELUTING CORONARY STENT PLACEMENT: Primary | ICD-10-CM

## 2025-02-21 PROCEDURE — 93798 PHYS/QHP OP CAR RHAB W/ECG: CPT

## 2025-02-24 ENCOUNTER — OFFICE VISIT (OUTPATIENT)
Age: 75
End: 2025-02-24
Payer: MEDICARE

## 2025-02-24 ENCOUNTER — APPOINTMENT (OUTPATIENT)
Dept: CARDIAC REHAB | Facility: HOSPITAL | Age: 75
End: 2025-02-24
Payer: MEDICARE

## 2025-02-24 VITALS
SYSTOLIC BLOOD PRESSURE: 118 MMHG | WEIGHT: 278 LBS | DIASTOLIC BLOOD PRESSURE: 60 MMHG | HEART RATE: 66 BPM | BODY MASS INDEX: 35.68 KG/M2 | HEIGHT: 74 IN

## 2025-02-24 DIAGNOSIS — I10 PRIMARY HYPERTENSION: ICD-10-CM

## 2025-02-24 DIAGNOSIS — I25.10 CORONARY ARTERY DISEASE INVOLVING NATIVE CORONARY ARTERY OF NATIVE HEART WITHOUT ANGINA PECTORIS: Primary | ICD-10-CM

## 2025-02-24 DIAGNOSIS — E78.2 MIXED HYPERLIPIDEMIA: ICD-10-CM

## 2025-02-24 DIAGNOSIS — E11.51 DIABETES TYPE 2 WITH ATHEROSCLEROSIS OF ARTERIES OF EXTREMITIES: ICD-10-CM

## 2025-02-24 DIAGNOSIS — Z95.5 STATUS POST CORONARY ARTERY STENT PLACEMENT: ICD-10-CM

## 2025-02-24 DIAGNOSIS — I70.209 DIABETES TYPE 2 WITH ATHEROSCLEROSIS OF ARTERIES OF EXTREMITIES: ICD-10-CM

## 2025-02-24 PROCEDURE — 99214 OFFICE O/P EST MOD 30 MIN: CPT | Performed by: INTERNAL MEDICINE

## 2025-02-24 PROCEDURE — 3078F DIAST BP <80 MM HG: CPT | Performed by: INTERNAL MEDICINE

## 2025-02-24 PROCEDURE — 3074F SYST BP LT 130 MM HG: CPT | Performed by: INTERNAL MEDICINE

## 2025-02-24 NOTE — PROGRESS NOTES
[facility] Follow Up Office Note     Encounter Date:25  Patient:Hugh R McBurney  :1950  MRN:[id number]      Chief Complaint:   Coronary artery disease with prior PCI to the LAD  Essential hypertension  Hyperlipidemia      History of Presenting Illness:      Mr.Hugh R McBurney is a 74 y.o. 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  Patient is here for scheduled follow-up. Patient had underwent PCI to LAD in 2024.  He was on aspirin and Plavix to continue for 1 year.  LDL was well-controlled.  On goal-directed medical therapy for ischemic cardiomyopathy.      On 2025 patient presented to the emergency department with complaints of chest pain.  Patient was in cardiac rehab and began having chest pain.  He was sent to the ER for further evaluation.  Patient reported that he was having exertional chest pain on and off for past 2 to 3 weeks.  Denied any associated symptoms.  In the ER troponins were mildly elevated but flat.  Elevated BUN and creatinine were also noted.  EKG demonstrated sinus rhythm rate of 73 with stable ST depression.  Patient was started on amlodipine and Imdur.On his f/u with APRN he stated that he had been doing well since he was discharged from the hospital.  He has not had any more episodes of chest pain following discharge.  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      He has been doing well and working on the farm without recurrent chest pain.  He denies any orthopnea or PND.    Review of Systems:  Review of Systems   Constitutional: Negative. Negative for fever and malaise/fatigue.   HENT: Negative.  Negative for nosebleeds and sore throat.    Eyes: Negative.  Negative for blurred vision and double vision.   Cardiovascular:  Negative for chest pain, claudication, dyspnea  on exertion, irregular heartbeat, leg swelling, orthopnea, palpitations and syncope.   Respiratory:  Negative for cough, shortness of breath and snoring.    Endocrine: Negative for cold intolerance, heat intolerance and polydipsia.   Hematologic/Lymphatic: Negative.    Skin: Negative.  Negative for itching, poor wound healing and rash.   Musculoskeletal: Negative.  Negative for joint pain, joint swelling, muscle weakness and myalgias.   Gastrointestinal: Negative.  Negative for abdominal pain, melena, nausea and vomiting.   Genitourinary: Negative.    Neurological:  Positive for dizziness. Negative for headaches, light-headedness, loss of balance, seizures, vertigo and weakness.   Psychiatric/Behavioral: Negative.  Negative for altered mental status and depression.        Current Outpatient Medications on File Prior to Visit   Medication Sig Dispense Refill    aspirin 81 MG EC tablet Take 1 tablet by mouth Daily.      atorvastatin (LIPITOR) 80 MG tablet Take 1 tablet by mouth Every Night.      clopidogrel (PLAVIX) 75 MG tablet Take 1 tablet by mouth Daily. 90 tablet 3    HumaLOG KwikPen 100 UNIT/ML solution pen-injector Inject 16 Units under the skin into the appropriate area as directed Daily With Breakfast AND 16 Units Daily Before Lunch AND 20 Units Daily With Dinner. Adds sliding scale based on BS reading. Max daily dose 75u daily  Indications: Type 2 Diabetes 90 mL 2    Insulin Glargine (Lantus SoloStar) 100 UNIT/ML injection pen INJECT 50 UNITS EVERY MORNING AND 45 UNITS EVERY EVENING (Patient taking differently: Inject 35 Units under the skin into the appropriate area as directed 2 (Two) Times a Day.) 90 mL 0    isosorbide mononitrate (IMDUR) 60 MG 24 hr tablet Take 1 tablet by mouth Daily for 30 days. 90 tablet 2    lisinopril (PRINIVIL,ZESTRIL) 10 MG tablet Take 1 tablet by mouth Daily.      metoprolol succinate XL (TOPROL-XL) 50 MG 24 hr tablet Take 1 tablet by mouth Daily. (Patient taking differently:  Take 1 tablet by mouth Every Evening.) 90 tablet 3    Multiple Vitamin (MULTI VITAMIN DAILY PO) Take 1 tablet by mouth Daily. HOLD PRIOR TO SURGERY      omeprazole (priLOSEC) 40 MG capsule Take 1 capsule by mouth 2 (Two) Times a Day.      oxyCODONE-acetaminophen (PERCOCET)  MG per tablet Take 1 tablet by mouth Every 6 (Six) Hours As Needed for Severe Pain.      pregabalin (LYRICA) 150 MG capsule Take 1 capsule by mouth Every Night.      Psyllium (DAILY FIBER PO) Take 1 tablet by mouth Daily.      sertraline (ZOLOFT) 100 MG tablet Take 1 tablet by mouth Daily.      torsemide (DEMADEX) 20 MG tablet Take 3 tablets by mouth Daily for 30 days. 90 tablet 0    Turmeric 500 MG capsule Take 1 capsule by mouth Daily.       No current facility-administered medications on file prior to visit.       Allergies   Allergen Reactions    Vancomycin Other (See Comments)     Red Man syndrome, slow rate and premedicate with benadryl       Past Medical History:   Diagnosis Date    Arthritis     Chronic pain     BILATERAL KNEES AND BACK    Contusion of knee     Coronary artery disease     Depression     Diabetes mellitus     type 2 with atherosclerosis of arteries of extremities    Diabetic ulcer of toe     left foot associated with type 2 diabetes mellitus, with necrosis of bone    GERD (gastroesophageal reflux disease)     Great toe pain     with cellulitis and gangrene    History of kidney stones     Hyperlipidemia     Hypertension     Hyponatremia     Kidney stone     Knee pain     hx of previous prosthetic joint infection    Liver disease     liver mass    MSSA (methicillin susceptible Staphylococcus aureus) infection     RIGHT KNEE    NSTEMI (non-ST elevated myocardial infarction) 06/14/2017    Sleep apnea     DOES NOT USE CPAP       Past Surgical History:   Procedure Laterality Date    APPENDECTOMY      CARDIAC CATHETERIZATION N/A 6/14/2017    Procedure: Left Heart Cath;  Surgeon: Tiburcio Moreno MD;  Location: [facility]  CATH INVASIVE LOCATION;  Service:     CARDIAC CATHETERIZATION N/A 9/5/2024    Procedure: Coronary angiography;  Surgeon: Baldomero Wheeler MD;  Location: [facility] CATH INVASIVE LOCATION;  Service: Cardiovascular;  Laterality: N/A;  Patient to hold lisinopril and torsemide the day prior, day of, and day after cardiac cath.  Patient will also need 4 hours of IV fluid prep (normal saline at 75 cc/h) pre and post cardiac cath.    CARDIAC CATHETERIZATION N/A 9/5/2024    Procedure: Left heart cath;  Surgeon: Baldomero Wheeler MD;  Location: [facility] CATH INVASIVE LOCATION;  Service: Cardiovascular;  Laterality: N/A;    CARDIAC CATHETERIZATION N/A 9/5/2024    Procedure: Resting Full Cycle Ratio;  Surgeon: Baldomero Wheeler MD;  Location: [facility] CATH INVASIVE LOCATION;  Service: Cardiovascular;  Laterality: N/A;    CARDIAC CATHETERIZATION N/A 9/5/2024    Procedure: Percutaneous Coronary Intervention;  Surgeon: Baldomero Wheeler MD;  Location: [facility] CATH INVASIVE LOCATION;  Service: Cardiovascular;  Laterality: N/A;    CARDIAC CATHETERIZATION N/A 9/5/2024    Procedure: Stent AILIN coronary;  Surgeon: Baldomero Wheeler MD;  Location: [facility] CATH INVASIVE LOCATION;  Service: Cardiovascular;  Laterality: N/A;    COLONOSCOPY      COLONOSCOPY W/ POLYPECTOMY N/A 4/29/2024    Procedure: COLONOSCOPY WITH POLYPECTOMY;  Surgeon: Kam Cooley MD;  Location: [facility] OR;  Service: Gastroenterology;  Laterality: N/A;  diverticulosis; transverse polyp x3 (forecep); cecal polyp x2 (forceps); ascending polyp x3 (forceps); ascending polyp x2 (cold snare); splenic flexure polyp x2 (hot snare); sigmoid polyp x1 (cold snare); sigmoid polyp x1 (hot snare); sigmoid x1 (fo    ENDOSCOPY N/A 4/29/2024    Procedure: ESOPHAGOGASTRODUODENOSCOPY WITH BIOPSY;  Surgeon: Kam Cooley MD;  Location: [facility] OR;  Service: Gastroenterology;  Laterality: N/A;  Reflux esophagitis; Erosive gastritis; Dilatation- no stricture; Biopsies- gastric,  distal esophagus    INTERVENTIONAL RADIOLOGY PROCEDURE N/A 9/5/2024    Procedure: Intravascular Ultrasound;  Surgeon: Baldomero Wheeler MD;  Location: [facility] INVASIVE LOCATION;  Service: Cardiovascular;  Laterality: N/A;    JOINT REPLACEMENT      BILATERAL KNEES     LUMBAR FUSION      AL REVJ TOTAL KNEE ARTHRP W/WO ALGRFT 1 COMPONENT Right 9/5/2017    Procedure: REMOVAL OF ANTIBIOTIC CEMENT SPACER; RIGHT TOTAL KNEE ARTHROPLASTY REVISION;  Surgeon: Kiko Marie MD;  Location: [facility] OR;  Service: Orthopedics    ROTATOR CUFF REPAIR Right     TOTAL KNEE  PROSTHESIS REMOVAL W/ SPACER INSERTION Right 2/14/2017    Procedure: INCISION AND DRAINAGE RIGHT KNEE, POLY CHANGE;  Surgeon: Kiko Marie MD;  Location: [facility] OR;  Service:     TOTAL KNEE  PROSTHESIS REMOVAL W/ SPACER INSERTION Right 7/7/2017    Procedure: REMOVAL RIGHT TOTAL KNEE ARTHROPLASTY AND ANITBIOTIC SPACER;  Surgeon: Kiko Marie MD;  Location: [facility] OR;  Service:     TRANS METATARSAL AMPUTATION Left 2/14/2017    Procedure: EXCISIONAL DEBRIDEMENT LT. FIRST TOE DIABETIC ULCER, DRAINAGE ABSCESS FIRST TOE, FIRST TOE AMPUTATION;  Surgeon: Osmin Brand MD;  Location: [facility] OR;  Service:        Social History     Socioeconomic History    Marital status:    Tobacco Use    Smoking status: Former     Current packs/day: 3.00     Average packs/day: 3.0 packs/day for 20.0 years (60.0 ttl pk-yrs)     Types: Cigarettes     Passive exposure: Never    Smokeless tobacco: Former    Tobacco comments:     quit 35 years ago. 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    Vaping Use    Vaping status: Never Used   Substance and Sexual Activity    Alcohol use: No    Drug use: No    Sexual activity: Defer       Family History   Problem Relation Age of Onset    Heart disease Mother     Heart disease Father     Diabetes Sister     Dementia Maternal Grandmother     No Known Problems Maternal Grandfather     Heart disease Paternal  Grandmother     Heart attack Paternal Grandfather     Heart disease Paternal Grandfather     Heart disease Son     Heart attack Son     No Known Problems Sister     Saad Hyperthermia Neg Hx        The following portions of the patient's history were reviewed and updated as appropriate: allergies, current medications, past family history, past medical history, past social history, past surgical history and problem list.       Objective:       There were no vitals filed for this visit.        Physical Exam  Vitals and nursing note reviewed.   Constitutional:       Appearance: Normal appearance. He is normal weight.   Eyes:      Extraocular Movements: Extraocular movements intact.      Pupils: Pupils are equal, round, and reactive to light.   Cardiovascular:      Rate and Rhythm: Normal rate and regular rhythm.      Chest Wall: PMI is not displaced. No thrill.      Pulses: Normal pulses.      Heart sounds: Normal heart sounds.   Pulmonary:      Effort: Pulmonary effort is normal.      Breath sounds: Normal breath sounds.   Musculoskeletal:      Cervical back: Normal range of motion.      Right lower leg: No edema.      Left lower leg: No edema.   Skin:     General: Skin is warm and dry.   Neurological:      General: No focal deficit present.      Mental Status: He is alert and oriented to person, place, and time. Mental status is at baseline.   Psychiatric:         Mood and Affect: Mood normal.         Behavior: Behavior normal.         Thought Content: Thought content normal.         Judgment: Judgment normal.               Lab Results   Component Value Date     01/17/2025     01/16/2025    K 4.7 01/17/2025    K 5.0 01/16/2025     01/17/2025     (H) 01/16/2025    CO2 22.2 01/17/2025    CO2 19.0 (L) 01/16/2025    BUN 41 (H) 01/17/2025    BUN 40 (H) 01/16/2025    CREATININE 3.21 (H) 01/17/2025    CREATININE 3.08 (H) 01/16/2025    EGFRIFNONA 41 (L) 01/25/2022    EGFRIFNONA 45 (L) 10/21/2021     EGFRIFAFRI 47 (L) 01/25/2022    EGFRIFAFRI 52 (L) 10/21/2021    GLUCOSE 194 (H) 01/17/2025    GLUCOSE 65 01/16/2025    CALCIUM 8.0 (L) 01/17/2025    CALCIUM 7.9 (L) 01/16/2025    ALBUMIN 3.0 (L) 01/16/2025    ALBUMIN 3.4 (L) 01/15/2025    BILITOT 0.3 01/15/2025    BILITOT 0.2 12/08/2023    AST 17 01/15/2025    AST 14 12/08/2023    ALT 10 01/15/2025    ALT 11 12/08/2023     Lab Results   Component Value Date    WBC 8.32 01/15/2025    WBC 9.07 09/10/2024    HGB 9.7 (L) 01/15/2025    HGB 10.2 (L) 09/10/2024    HCT 29.7 (L) 01/15/2025    HCT 31.0 (L) 09/10/2024    .8 (H) 01/15/2025    MCV 97.5 (H) 09/10/2024     (L) 01/15/2025     09/10/2024     Lab Results   Component Value Date    CHOL 85 09/06/2024    CHOL 122 11/20/2023    TRIG 77 09/06/2024    TRIG 155 (H) 11/20/2023    HDL 29 (L) 09/06/2024    HDL 34 (L) 11/20/2023    LDL 40 09/06/2024    LDL 61 11/20/2023     Lab Results   Component Value Date    PROBNP 9,946.0 (H) 01/16/2025     Lab Results   Component Value Date    CKTOTAL 50 06/13/2017    CKMB 2.08 06/13/2017    TROPONINT 148 (C) 01/16/2025     Lab Results   Component Value Date    TSH 1.870 12/08/2023    TSH 2.850 03/14/2023         Procedures       Assessment:       Plan:       Coronary artery disease:  PCI to mid LAD with 2 overlapping drug-eluting stents 9/2024  Currently denies anginal symptoms  Continue aspirin and Plavix until at least September 2024  Continue metoprolol succinate 50 mg  Continue lisinopril 10 mg last creatinine 3.21. Nephrology is aware  Continue atorvastatin 80 mg p.o. nightly.  Most recent LDL well-controlled  Continue Imdur at current dose    Ischemic cardiomyopathy:  Continue metoprolol succinate 50 mg twice daily  Continue amlodipine and 7.5 mg nightly  Continue lisinopril 10 mg daily    Chronic kidney disease:  Follows with nephrology  Continue torsemide 60 mg    Essential hypertension:  Blood pressure well-controlled at this time.  No changes in medical  therapy.        02/24/25  08:47 EST

## 2025-02-26 ENCOUNTER — TREATMENT (OUTPATIENT)
Dept: CARDIAC REHAB | Facility: HOSPITAL | Age: 75
End: 2025-02-26
Payer: MEDICARE

## 2025-02-26 DIAGNOSIS — Z95.5 S/P DRUG ELUTING CORONARY STENT PLACEMENT: Primary | ICD-10-CM

## 2025-02-26 PROCEDURE — 93798 PHYS/QHP OP CAR RHAB W/ECG: CPT

## 2025-02-28 ENCOUNTER — TREATMENT (OUTPATIENT)
Dept: CARDIAC REHAB | Facility: HOSPITAL | Age: 75
End: 2025-02-28
Payer: MEDICARE

## 2025-02-28 DIAGNOSIS — Z95.5 S/P DRUG ELUTING CORONARY STENT PLACEMENT: Primary | ICD-10-CM

## 2025-02-28 PROCEDURE — 93798 PHYS/QHP OP CAR RHAB W/ECG: CPT

## 2025-03-03 ENCOUNTER — TREATMENT (OUTPATIENT)
Dept: CARDIAC REHAB | Facility: HOSPITAL | Age: 75
End: 2025-03-03
Payer: MEDICARE

## 2025-03-03 DIAGNOSIS — Z95.5 S/P DRUG ELUTING CORONARY STENT PLACEMENT: Primary | ICD-10-CM

## 2025-03-03 PROCEDURE — 93798 PHYS/QHP OP CAR RHAB W/ECG: CPT

## 2025-03-04 ENCOUNTER — APPOINTMENT (OUTPATIENT)
Dept: WOUND CARE | Facility: HOSPITAL | Age: 75
End: 2025-03-04
Payer: MEDICARE

## 2025-03-04 PROCEDURE — G0463 HOSPITAL OUTPT CLINIC VISIT: HCPCS

## 2025-03-05 ENCOUNTER — TREATMENT (OUTPATIENT)
Dept: CARDIAC REHAB | Facility: HOSPITAL | Age: 75
End: 2025-03-05
Payer: MEDICARE

## 2025-03-05 DIAGNOSIS — Z95.5 S/P DRUG ELUTING CORONARY STENT PLACEMENT: Primary | ICD-10-CM

## 2025-03-05 PROCEDURE — 93798 PHYS/QHP OP CAR RHAB W/ECG: CPT

## 2025-03-07 DIAGNOSIS — Z79.4 TYPE 2 DIABETES MELLITUS WITH MICROALBUMINURIA, WITH LONG-TERM CURRENT USE OF INSULIN: ICD-10-CM

## 2025-03-07 DIAGNOSIS — E11.29 TYPE 2 DIABETES MELLITUS WITH MICROALBUMINURIA, WITH LONG-TERM CURRENT USE OF INSULIN: ICD-10-CM

## 2025-03-07 DIAGNOSIS — R80.9 TYPE 2 DIABETES MELLITUS WITH MICROALBUMINURIA, WITH LONG-TERM CURRENT USE OF INSULIN: ICD-10-CM

## 2025-03-07 RX ORDER — INSULIN GLARGINE 100 [IU]/ML
INJECTION, SOLUTION SUBCUTANEOUS
Qty: 90 ML | Refills: 0 | Status: SHIPPED | OUTPATIENT
Start: 2025-03-07

## 2025-03-07 NOTE — TELEPHONE ENCOUNTER
Rx Refill Note  Requested Prescriptions     Pending Prescriptions Disp Refills    Lantus SoloStar 100 UNIT/ML injection pen [Pharmacy Med Name: LANTUS SOLOSTAR PEN 3ML 5'S 100U/ML] 90 mL 2     Sig: INJECT 50 UNITS EVERY MORNING AND 45 UNITS EVERY EVENING      Last office visit with prescribing clinician: 12/26/2024   Last telemedicine visit with prescribing clinician: Visit date not found   Next office visit with prescribing clinician: 4/28/2025                         Would you like a call back once the refill request has been completed: [] Yes [] No    If the office needs to give you a call back, can they leave a voicemail: [] Yes [] No    Ekaterina Woodruff  03/07/25, 07:53 EST

## 2025-03-11 ENCOUNTER — OFFICE VISIT (OUTPATIENT)
Dept: GASTROENTEROLOGY | Facility: CLINIC | Age: 75
End: 2025-03-11
Payer: MEDICARE

## 2025-03-11 VITALS
HEIGHT: 74 IN | WEIGHT: 286 LBS | BODY MASS INDEX: 36.7 KG/M2 | DIASTOLIC BLOOD PRESSURE: 60 MMHG | SYSTOLIC BLOOD PRESSURE: 110 MMHG

## 2025-03-11 DIAGNOSIS — K59.03 THERAPEUTIC OPIOID-INDUCED CONSTIPATION (OIC): ICD-10-CM

## 2025-03-11 DIAGNOSIS — K21.00 GASTROESOPHAGEAL REFLUX DISEASE WITH ESOPHAGITIS WITHOUT HEMORRHAGE: Primary | ICD-10-CM

## 2025-03-11 DIAGNOSIS — T40.2X5A THERAPEUTIC OPIOID-INDUCED CONSTIPATION (OIC): ICD-10-CM

## 2025-03-11 RX ORDER — AMLODIPINE BESYLATE 5 MG/1
5 TABLET ORAL DAILY
COMMUNITY
Start: 2025-01-25

## 2025-03-11 NOTE — PROGRESS NOTES
PATIENT INFORMATION  Hugh R McBurney       - 1950    CHIEF COMPLAINT  Chief Complaint   Patient presents with    Follow-up     GERD; Barretts; OIC       HISTORY OF PRESENT ILLNESS    Here today for GERD and OIC     Got out of cardiac rehab last week and feeling better and chest pains are gone.     GERD: BID PPI. No HB and indigestion, has improved diet some. Sucralfate PRN. No dysphagia or odynophagia.      OIC:Taking percocet 3-4 a day. Per LOV: Fiber gummies help, moving every day or 2. If skips a day bowels are hard. No bleeding. Recommend adding daily miralax. TODAY: Bowels are doing well, moving daily with miralax which has helped along with fiber gummies. No hard stools, bleeding or pain.     2024 EGD and colon for dysphagia positive for erosive gastritis, reflux esophagitis with barretts, negative for stricture, HP, dysplasia. Seems to be NSAID related injury. Colon with 15/15 adenomas, recall double in 3 yrs.      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 S/p lap microwave ablation 3/17/2021, did not require chemo. Last scans negative for recurrence. LFTs normal.        REVIEWED PERTINENT RESULTS/ LABS  Lab Results   Component Value Date    CASEREPORT  2024     Surgical Pathology Report                         Case: [id number]                                  Authorizing Provider:  Kam Cooley        Collected:           2024 11:50 AM                                 MD Roel                                                                   Ordering Location:     [facility]   Received:            2024 12:52 PM                                 OR                                                                           Pathologist:           Sheryl Person MD                                                          Specimens:   1) - Gastric, Antrum, Biopsies                                                                      2) - Esophagus, Distal, Biopsies                                                                     3) - Large Intestine, Transverse Colon, transverse polyp x3                                         4) - Large Intestine, Cecum, cecal polyp x2                                                         5) - Large Intestine, Right / Ascending Colon, ascending polyp x5                                   6) - Large Intestine, Left / Descending Colon, splenic flexure polyp x2                             7) - Large Intestine, Sigmoid Colon, sigmoid polyp x3                                      FINALDX  04/29/2024     1. Stomach, antrum, biopsy:   A. Acute erosive gastritis.   B. H. pylori and CMV immunostains are pending with those results to follow as an addendum.    2. Esophagus, distal, biopsy:   A. Squamocolumnar mucosa with intestinal metaplasia, consistent with Acosta's esophagus.   B. Negative for dysplasia.     3. Transverse colon, polyps, polypectomies:   A. Tubular adenomas.    4. Cecum, polyps, polypectomies:   A. Tubular adenomas.    5. Ascending colon, polyps, polypectomies:   A. Tubular adenomas.    6. Colon, splenic flexure polyps, polypectomies:   A. Tubular adenomas.    7. Sigmoid colon, polyps, polypectomies:   A. Tubulovillous adenoma and tubular adenomas.       Lab Results   Component Value Date    HGB 9.7 (L) 01/15/2025    .8 (H) 01/15/2025     (L) 01/15/2025    ALT 10 01/15/2025    AST 17 01/15/2025    HGBA1C 6.68 (H) 01/15/2025    INR 1.00 01/15/2025    TRIG 77 09/06/2024    FERRITIN 37.40 09/10/2024    IRON 56 (L) 09/10/2024    TIBC 393 09/10/2024      No results found.    REVIEW OF SYSTEMS  Review of Systems      ACTIVE PROBLEMS  Patient Active Problem List    Diagnosis     Status post coronary artery stent placement [Z95.5]     Therapeutic opioid-induced constipation (OIC) [K59.03, T40.2X5A]     Acosta's esophagus without dysplasia [K22.70]     Gastroesophageal reflux disease with esophagitis without hemorrhage [K21.00]      Precordial chest pain [R07.2]     Anemia in stage 4 chronic kidney disease [N18.4, D63.1]     Abnormal nuclear stress test [R94.39]     Encounter for screening for malignant neoplasm of colon [Z12.11]     Esophageal dysphagia [R13.19]     Stress-induced cardiomyopathy [I51.81]     Anemia, posthemorrhagic, acute [D62]     Vitamin D insufficiency [E55.9]     Anemia, posthemorrhagic, acute [D62]     Hyponatremia [E87.1]     CAD (coronary artery disease) [I25.10]     Type 2 diabetes mellitus with proteinuria [E11.29, R80.9]     Hyponatremia [E87.1]     Hyperglycemia [R73.9]     Infection of prosthetic knee joint [T84.59XA, Z96.659]     Recent MSSA (methicillin susceptible Staphylococcus aureus) septicemia [A41.01]     Hypertension [I10]     Hyperlipidemia [E78.5]     Chronic pain [G89.29]     Knee pain, hx of previous prosthetic joint infection [M25.569]     Great toe pain, with cellulitis and gangrene [M79.676]     Diabetic ulcer of 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, with necrosis of bone [E11.621, L97.524]     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 [E11.51, I70.209]     Contusion of knee [S80.00XA]     History of knee surgery [Z98.890]          PAST MEDICAL HISTORY  Past Medical History:   Diagnosis Date    Arthritis     Chronic pain     BILATERAL KNEES AND BACK    Contusion of knee     Coronary artery disease     Depression     Diabetes mellitus     type 2 with atherosclerosis of arteries of extremities    Diabetic ulcer of toe     left foot associated with type 2 diabetes mellitus, with necrosis of bone    GERD (gastroesophageal reflux disease)     Great toe pain     with cellulitis and gangrene    History of kidney stones     Hyperlipidemia     Hypertension     Hyponatremia     Kidney stone     Knee pain     hx of previous prosthetic joint infection    Liver disease     liver mass    MSSA (methicillin susceptible Staphylococcus aureus) infection     RIGHT KNEE     NSTEMI (non-ST elevated myocardial infarction) 06/14/2017    Sleep apnea     DOES NOT USE CPAP         SURGICAL HISTORY  Past Surgical History:   Procedure Laterality Date    APPENDECTOMY      CARDIAC CATHETERIZATION N/A 6/14/2017    Procedure: Left Heart Cath;  Surgeon: Tiburcio Moreno MD;  Location: [facility] INVASIVE LOCATION;  Service:     CARDIAC CATHETERIZATION N/A 9/5/2024    Procedure: Coronary angiography;  Surgeon: Baldomero Wheeler MD;  Location: [facility] INVASIVE LOCATION;  Service: Cardiovascular;  Laterality: N/A;  Patient to hold lisinopril and torsemide the day prior, day of, and day after cardiac cath.  Patient will also need 4 hours of IV fluid prep (normal saline at 75 cc/h) pre and post cardiac cath.    CARDIAC CATHETERIZATION N/A 9/5/2024    Procedure: Left heart cath;  Surgeon: Baldomero Wheeler MD;  Location: [facility] INVASIVE LOCATION;  Service: Cardiovascular;  Laterality: N/A;    CARDIAC CATHETERIZATION N/A 9/5/2024    Procedure: Resting Full Cycle Ratio;  Surgeon: Baldomero Wheeler MD;  Location: [facility] INVASIVE LOCATION;  Service: Cardiovascular;  Laterality: N/A;    CARDIAC CATHETERIZATION N/A 9/5/2024    Procedure: Percutaneous Coronary Intervention;  Surgeon: Baldomero Wheeler MD;  Location: [facility] INVASIVE LOCATION;  Service: Cardiovascular;  Laterality: N/A;    CARDIAC CATHETERIZATION N/A 9/5/2024    Procedure: Stent AILIN coronary;  Surgeon: Baldomero Wheeler MD;  Location: [facility] INVASIVE LOCATION;  Service: Cardiovascular;  Laterality: N/A;    COLONOSCOPY      COLONOSCOPY W/ POLYPECTOMY N/A 4/29/2024    Procedure: COLONOSCOPY WITH POLYPECTOMY;  Surgeon: Kam Cooley MD;  Location: [facility];  Service: Gastroenterology;  Laterality: N/A;  diverticulosis; transverse polyp x3 (forecep); cecal polyp x2 (forceps); ascending polyp x3 (forceps); ascending polyp x2 (cold snare); splenic flexure polyp x2 (hot snare); sigmoid polyp x1 (cold snare);  sigmoid polyp x1 (hot snare); sigmoid x1 (fo    ENDOSCOPY N/A 4/29/2024    Procedure: ESOPHAGOGASTRODUODENOSCOPY WITH BIOPSY;  Surgeon: Kam Cooley MD;  Location: [facility];  Service: Gastroenterology;  Laterality: N/A;  Reflux esophagitis; Erosive gastritis; Dilatation- no stricture; Biopsies- gastric, distal esophagus    INTERVENTIONAL RADIOLOGY PROCEDURE N/A 9/5/2024    Procedure: Intravascular Ultrasound;  Surgeon: Baldomero Wheeler MD;  Location: [facility] INVASIVE LOCATION;  Service: Cardiovascular;  Laterality: N/A;    JOINT REPLACEMENT      BILATERAL KNEES     LUMBAR FUSION      VA REVJ TOTAL KNEE ARTHRP W/WO ALGRFT 1 COMPONENT Right 9/5/2017    Procedure: REMOVAL OF ANTIBIOTIC CEMENT SPACER; RIGHT TOTAL KNEE ARTHROPLASTY REVISION;  Surgeon: Kiko Marie MD;  Location: [facility];  Service: Orthopedics    ROTATOR CUFF REPAIR Right     TOTAL KNEE  PROSTHESIS REMOVAL W/ SPACER INSERTION Right 2/14/2017    Procedure: INCISION AND DRAINAGE RIGHT KNEE, POLY CHANGE;  Surgeon: Kiko Marie MD;  Location: [facility];  Service:     TOTAL KNEE  PROSTHESIS REMOVAL W/ SPACER INSERTION Right 7/7/2017    Procedure: REMOVAL RIGHT TOTAL KNEE ARTHROPLASTY AND ANITBIOTIC SPACER;  Surgeon: Kiko Marie MD;  Location: [facility];  Service:     TRANS METATARSAL AMPUTATION Left 2/14/2017    Procedure: EXCISIONAL DEBRIDEMENT LT. FIRST TOE DIABETIC ULCER, DRAINAGE ABSCESS FIRST TOE, FIRST TOE AMPUTATION;  Surgeon: Osmin Brand MD;  Location: [facility] OR;  Service:          FAMILY HISTORY  Family History   Problem Relation Age of Onset    Heart disease Mother     Heart disease Father     Diabetes Sister     Dementia Maternal Grandmother     No Known Problems Maternal Grandfather     Heart disease Paternal Grandmother     Heart attack Paternal Grandfather     Heart disease Paternal Grandfather     Heart disease Son     Heart attack Son     No Known Problems  Sister     Saad Hyperthermia Neg Hx          SOCIAL HISTORY  Social History     Occupational History    Not on file   Tobacco Use    Smoking status: Former     Current packs/day: 3.00     Average packs/day: 3.0 packs/day for 20.0 years (60.0 ttl pk-yrs)     Types: Cigarettes     Passive exposure: Never    Smokeless tobacco: Former    Tobacco comments:     quit 35 years ago. 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    Vaping Use    Vaping status: Never Used   Substance and Sexual Activity    Alcohol use: No    Drug use: No    Sexual activity: Defer         CURRENT MEDICATIONS    Current Outpatient Medications:     amLODIPine (NORVASC) 5 MG tablet, Take 1 tablet by mouth Daily., Disp: , Rfl:     aspirin 81 MG EC tablet, Take 1 tablet by mouth Daily., Disp: , Rfl:     atorvastatin (LIPITOR) 80 MG tablet, Take 1 tablet by mouth Every Night., Disp: , Rfl:     clopidogrel (PLAVIX) 75 MG tablet, Take 1 tablet by mouth Daily., Disp: 90 tablet, Rfl: 3    HumaLOG KwikPen 100 UNIT/ML solution pen-injector, Inject 16 Units under the skin into the appropriate area as directed Daily With Breakfast AND 16 Units Daily Before Lunch AND 20 Units Daily With Dinner. Adds sliding scale based on BS reading. Max daily dose 75u daily  Indications: Type 2 Diabetes, Disp: 90 mL, Rfl: 2    Insulin Glargine (Lantus SoloStar) 100 UNIT/ML injection pen, INJECT 50 UNITS EVERY MORNING AND 45 UNITS EVERY EVENING, Disp: 90 mL, Rfl: 0    isosorbide mononitrate (IMDUR) 60 MG 24 hr tablet, Take 1 tablet by mouth Daily for 30 days., Disp: 90 tablet, Rfl: 2    lisinopril (PRINIVIL,ZESTRIL) 10 MG tablet, Take 1 tablet by mouth Daily., Disp: , Rfl:     metoprolol succinate XL (TOPROL-XL) 50 MG 24 hr tablet, Take 1 tablet by mouth Daily. (Patient taking differently: Take 1 tablet by mouth Every Evening.), Disp: 90 tablet, Rfl: 3    Multiple Vitamin (MULTI VITAMIN DAILY PO), Take 1 tablet by mouth Daily. HOLD PRIOR TO SURGERY, Disp: , Rfl:     omeprazole (priLOSEC) 40 MG  "capsule, Take 1 capsule by mouth 2 (Two) Times a Day., Disp: , Rfl:     oxyCODONE-acetaminophen (PERCOCET)  MG per tablet, Take 1 tablet by mouth Every 6 (Six) Hours As Needed for Severe Pain., Disp: , Rfl:     pregabalin (LYRICA) 150 MG capsule, Take 1 capsule by mouth Every Night., Disp: , Rfl:     Psyllium (DAILY FIBER PO), Take 1 tablet by mouth Daily., Disp: , Rfl:     sertraline (ZOLOFT) 100 MG tablet, Take 1 tablet by mouth Daily., Disp: , Rfl:     Turmeric 500 MG capsule, Take 1 capsule by mouth Daily., Disp: , Rfl:     torsemide (DEMADEX) 20 MG tablet, Take 3 tablets by mouth Daily for 30 days., Disp: 90 tablet, Rfl: 0    ALLERGIES  Vancomycin    VITALS  Vitals:    03/11/25 1024   BP: 110/60   BP Location: Left arm   Patient Position: Sitting   Cuff Size: Large Adult   Weight: 130 kg (286 lb)   Height: 188 cm (74.02\")       PHYSICAL EXAM  Debilities/Disabilities Identified: None  Emotional Behavior: Appropriate  Wt Readings from Last 3 Encounters:   03/11/25 130 kg (286 lb)   02/24/25 126 kg (278 lb)   01/23/25 133 kg (293 lb)     Ht Readings from Last 1 Encounters:   03/11/25 188 cm (74.02\")     Body mass index is 36.7 kg/m².  Physical Exam  Constitutional:       General: He is not in acute distress.     Appearance: Normal appearance. He is not ill-appearing.   HENT:      Head: Normocephalic and atraumatic.      Mouth/Throat:      Mouth: Mucous membranes are moist.      Pharynx: No posterior oropharyngeal erythema.   Eyes:      General: No scleral icterus.  Cardiovascular:      Rate and Rhythm: Normal rate and regular rhythm.      Heart sounds: Normal heart sounds.   Pulmonary:      Effort: Pulmonary effort is normal.      Breath sounds: Normal breath sounds.   Abdominal:      General: Abdomen is flat. Bowel sounds are normal. There is no distension.      Palpations: Abdomen is soft. There is no mass.      Tenderness: There is no abdominal tenderness. There is no guarding or rebound. Negative signs " include Cardona's sign.      Hernia: No hernia is present.   Musculoskeletal:      Cervical back: Neck supple.   Skin:     General: Skin is warm.      Capillary Refill: Capillary refill takes less than 2 seconds.   Neurological:      General: No focal deficit present.      Mental Status: He is alert and oriented to person, place, and time.   Psychiatric:         Mood and Affect: Mood normal.         Behavior: Behavior normal.         Thought Content: Thought content normal.         Judgment: Judgment normal.         CLINICAL DATA REVIEWED   reviewed previous lab results and integrated with today's visit, reviewed notes from other physicians and/or last GI encounter, reviewed previous endoscopy results and available photos, reviewed surgical pathology results from previous biopsies    ASSESSMENT  Diagnoses and all orders for this visit:    Gastroesophageal reflux disease with esophagitis without hemorrhage    Therapeutic opioid-induced constipation (OIC)    Other orders  -     amLODIPine (NORVASC) 5 MG tablet; Take 1 tablet by mouth Daily.          PLAN    GERD: Continue current management  OIC: Continue fiber and miralax  Call with any questions or concerns    Return in about 6 months (around 9/11/2025).    I have discussed the above plan with the patient.  They verbalize understanding and are in agreement with the plan.  They have been advised to contact the office for any questions, concerns, or changes related to their health.

## 2025-03-17 ENCOUNTER — TRANSCRIBE ORDERS (OUTPATIENT)
Dept: ADMINISTRATIVE | Facility: HOSPITAL | Age: 75
End: 2025-03-17
Payer: MEDICARE

## 2025-03-17 ENCOUNTER — LAB (OUTPATIENT)
Dept: LAB | Facility: HOSPITAL | Age: 75
End: 2025-03-17
Payer: MEDICARE

## 2025-03-17 DIAGNOSIS — G47.33 OBSTRUCTIVE SLEEP APNEA (ADULT) (PEDIATRIC): ICD-10-CM

## 2025-03-17 DIAGNOSIS — I25.10 DISEASE OF CARDIOVASCULAR SYSTEM: ICD-10-CM

## 2025-03-17 DIAGNOSIS — E11.22 TYPE 2 DIABETES MELLITUS WITH DIABETIC CHRONIC KIDNEY DISEASE, UNSPECIFIED CKD STAGE, UNSPECIFIED WHETHER LONG TERM INSULIN USE: ICD-10-CM

## 2025-03-17 DIAGNOSIS — I10 ESSENTIAL HYPERTENSION, MALIGNANT: ICD-10-CM

## 2025-03-17 DIAGNOSIS — E55.9 AVITAMINOSIS D: ICD-10-CM

## 2025-03-17 DIAGNOSIS — D63.1 ERYTHROPOIETIN DEFICIENCY ANEMIA: ICD-10-CM

## 2025-03-17 DIAGNOSIS — R80.1 PERSISTENT PROTEINURIA: ICD-10-CM

## 2025-03-17 DIAGNOSIS — C22.0 CARCINOMA OF LIVER: ICD-10-CM

## 2025-03-17 DIAGNOSIS — N18.4 CHRONIC KIDNEY DISEASE, STAGE IV (SEVERE): Primary | ICD-10-CM

## 2025-03-17 DIAGNOSIS — G89.29 CHRONIC INTRACTABLE PAIN: ICD-10-CM

## 2025-03-17 DIAGNOSIS — E78.2 MIXED HYPERLIPIDEMIA: ICD-10-CM

## 2025-03-17 DIAGNOSIS — K21.9 CHALASIA OF LOWER ESOPHAGEAL SPHINCTER: ICD-10-CM

## 2025-03-17 DIAGNOSIS — E66.09 EXOGENOUS OBESITY: ICD-10-CM

## 2025-03-17 DIAGNOSIS — N18.4 CHRONIC KIDNEY DISEASE, STAGE IV (SEVERE): ICD-10-CM

## 2025-03-17 LAB
25(OH)D3 SERPL-MCNC: 35.1 NG/ML (ref 30–100)
ALBUMIN SERPL-MCNC: 3.6 G/DL (ref 3.5–5.2)
ANION GAP SERPL CALCULATED.3IONS-SCNC: 11.4 MMOL/L (ref 5–15)
BACTERIA UR QL AUTO: NORMAL /HPF
BASOPHILS # BLD AUTO: 0.05 10*3/MM3 (ref 0–0.2)
BASOPHILS NFR BLD AUTO: 0.5 % (ref 0–1.5)
BILIRUB UR QL STRIP: NEGATIVE
BUN SERPL-MCNC: 47 MG/DL (ref 8–23)
BUN/CREAT SERPL: 13.3 (ref 7–25)
CALCIUM SPEC-SCNC: 8.2 MG/DL (ref 8.6–10.5)
CHLORIDE SERPL-SCNC: 103 MMOL/L (ref 98–107)
CLARITY UR: CLEAR
CO2 SERPL-SCNC: 23.6 MMOL/L (ref 22–29)
COLOR UR: YELLOW
CREAT SERPL-MCNC: 3.53 MG/DL (ref 0.76–1.27)
CREAT UR-MCNC: 34.2 MG/DL
DEPRECATED RDW RBC AUTO: 43.6 FL (ref 37–54)
EGFRCR SERPLBLD CKD-EPI 2021: 17.4 ML/MIN/1.73
EOSINOPHIL # BLD AUTO: 0.38 10*3/MM3 (ref 0–0.4)
EOSINOPHIL NFR BLD AUTO: 4 % (ref 0.3–6.2)
ERYTHROCYTE [DISTWIDTH] IN BLOOD BY AUTOMATED COUNT: 11.8 % (ref 12.3–15.4)
FERRITIN SERPL-MCNC: 128 NG/ML (ref 30–400)
GLUCOSE SERPL-MCNC: 155 MG/DL (ref 65–99)
GLUCOSE UR STRIP-MCNC: NEGATIVE MG/DL
HCT VFR BLD AUTO: 30.2 % (ref 37.5–51)
HGB BLD-MCNC: 10.1 G/DL (ref 13–17.7)
HGB UR QL STRIP.AUTO: NEGATIVE
HYALINE CASTS UR QL AUTO: NORMAL /LPF
IMM GRANULOCYTES # BLD AUTO: 0.04 10*3/MM3 (ref 0–0.05)
IMM GRANULOCYTES NFR BLD AUTO: 0.4 % (ref 0–0.5)
IRON 24H UR-MRATE: 66 MCG/DL (ref 59–158)
IRON SATN MFR SERPL: 24 % (ref 20–50)
KETONES UR QL STRIP: NEGATIVE
LEUKOCYTE ESTERASE UR QL STRIP.AUTO: NEGATIVE
LYMPHOCYTES # BLD AUTO: 0.99 10*3/MM3 (ref 0.7–3.1)
LYMPHOCYTES NFR BLD AUTO: 10.4 % (ref 19.6–45.3)
MCH RBC QN AUTO: 33.8 PG (ref 26.6–33)
MCHC RBC AUTO-ENTMCNC: 33.4 G/DL (ref 31.5–35.7)
MCV RBC AUTO: 101 FL (ref 79–97)
MONOCYTES # BLD AUTO: 0.51 10*3/MM3 (ref 0.1–0.9)
MONOCYTES NFR BLD AUTO: 5.4 % (ref 5–12)
NEUTROPHILS NFR BLD AUTO: 7.52 10*3/MM3 (ref 1.7–7)
NEUTROPHILS NFR BLD AUTO: 79.3 % (ref 42.7–76)
NITRITE UR QL STRIP: NEGATIVE
NRBC BLD AUTO-RTO: 0 /100 WBC (ref 0–0.2)
PH UR STRIP.AUTO: 6 [PH] (ref 5–8)
PHOSPHATE SERPL-MCNC: 3.8 MG/DL (ref 2.5–4.5)
PLATELET # BLD AUTO: 142 10*3/MM3 (ref 140–450)
PMV BLD AUTO: 12.9 FL (ref 6–12)
POTASSIUM SERPL-SCNC: 5 MMOL/L (ref 3.5–5.2)
PROT ?TM UR-MCNC: 122 MG/DL
PROT UR QL STRIP: ABNORMAL
PROT/CREAT UR: 3567.3 MG/G CREA (ref 0–200)
RBC # BLD AUTO: 2.99 10*6/MM3 (ref 4.14–5.8)
RBC # UR STRIP: NORMAL /HPF
REF LAB TEST METHOD: NORMAL
SODIUM SERPL-SCNC: 138 MMOL/L (ref 136–145)
SP GR UR STRIP: 1.01 (ref 1–1.03)
SQUAMOUS #/AREA URNS HPF: NORMAL /HPF
TIBC SERPL-MCNC: 270 MCG/DL (ref 298–536)
TRANSFERRIN SERPL-MCNC: 181 MG/DL (ref 200–360)
UROBILINOGEN UR QL STRIP: ABNORMAL
WBC # UR STRIP: NORMAL /HPF
WBC NRBC COR # BLD AUTO: 9.49 10*3/MM3 (ref 3.4–10.8)

## 2025-03-17 PROCEDURE — 81001 URINALYSIS AUTO W/SCOPE: CPT

## 2025-03-17 PROCEDURE — 85025 COMPLETE CBC W/AUTO DIFF WBC: CPT

## 2025-03-17 PROCEDURE — 84466 ASSAY OF TRANSFERRIN: CPT

## 2025-03-17 PROCEDURE — 36415 COLL VENOUS BLD VENIPUNCTURE: CPT

## 2025-03-17 PROCEDURE — 82570 ASSAY OF URINE CREATININE: CPT

## 2025-03-17 PROCEDURE — 82728 ASSAY OF FERRITIN: CPT

## 2025-03-17 PROCEDURE — 84156 ASSAY OF PROTEIN URINE: CPT

## 2025-03-17 PROCEDURE — 83540 ASSAY OF IRON: CPT

## 2025-03-17 PROCEDURE — 80069 RENAL FUNCTION PANEL: CPT

## 2025-03-17 PROCEDURE — 82306 VITAMIN D 25 HYDROXY: CPT

## 2025-03-31 ENCOUNTER — TELEPHONE (OUTPATIENT)
Age: 75
End: 2025-03-31
Payer: MEDICARE

## 2025-04-01 RX ORDER — AMLODIPINE BESYLATE 5 MG/1
5 TABLET ORAL DAILY
Qty: 90 TABLET | Refills: 1 | Status: SHIPPED | OUTPATIENT
Start: 2025-04-01

## 2025-04-15 RX ORDER — DIPHENHYDRAMINE HYDROCHLORIDE 50 MG/ML
25 INJECTION, SOLUTION INTRAMUSCULAR; INTRAVENOUS ONCE
OUTPATIENT
Start: 2025-04-15 | End: 2025-04-15

## 2025-04-15 RX ORDER — FAMOTIDINE 10 MG/ML
20 INJECTION, SOLUTION INTRAVENOUS ONCE
OUTPATIENT
Start: 2025-04-15 | End: 2025-04-15

## 2025-04-15 RX ORDER — METHYLPREDNISOLONE SODIUM SUCCINATE 125 MG/2ML
125 INJECTION, POWDER, LYOPHILIZED, FOR SOLUTION INTRAMUSCULAR; INTRAVENOUS ONCE
Start: 2025-04-15 | End: 2025-04-15

## 2025-04-24 ENCOUNTER — HOSPITAL ENCOUNTER (OUTPATIENT)
Dept: INFUSION THERAPY | Facility: HOSPITAL | Age: 75
Discharge: HOME | End: 2025-04-24
Payer: MEDICARE

## 2025-04-24 VITALS
DIASTOLIC BLOOD PRESSURE: 55 MMHG | SYSTOLIC BLOOD PRESSURE: 100 MMHG | HEART RATE: 60 BPM | OXYGEN SATURATION: 93 % | RESPIRATION RATE: 16 BRPM

## 2025-04-24 DIAGNOSIS — N18.4 ANEMIA IN STAGE 4 CHRONIC KIDNEY DISEASE: Primary | ICD-10-CM

## 2025-04-24 DIAGNOSIS — D63.1 ANEMIA IN STAGE 4 CHRONIC KIDNEY DISEASE: Primary | ICD-10-CM

## 2025-04-24 PROCEDURE — 25010000002 METHYLPREDNISOLONE PER 125 MG

## 2025-04-24 PROCEDURE — 25010000002 FERUMOXYTOL 510 MG/17ML SOLUTION

## 2025-04-24 PROCEDURE — 96374 THER/PROPH/DIAG INJ IV PUSH: CPT

## 2025-04-24 PROCEDURE — 96375 TX/PRO/DX INJ NEW DRUG ADDON: CPT

## 2025-04-24 PROCEDURE — 96365 THER/PROPH/DIAG IV INF INIT: CPT

## 2025-04-24 RX ORDER — FAMOTIDINE 10 MG/ML
20 INJECTION, SOLUTION INTRAVENOUS ONCE
OUTPATIENT
Start: 2025-04-24 | End: 2025-04-24

## 2025-04-24 RX ORDER — METHYLPREDNISOLONE SODIUM SUCCINATE 125 MG/2ML
125 INJECTION, POWDER, LYOPHILIZED, FOR SOLUTION INTRAMUSCULAR; INTRAVENOUS ONCE
Status: COMPLETED | OUTPATIENT
Start: 2025-04-24 | End: 2025-04-24

## 2025-04-24 RX ORDER — DIPHENHYDRAMINE HYDROCHLORIDE 50 MG/ML
25 INJECTION, SOLUTION INTRAMUSCULAR; INTRAVENOUS ONCE
OUTPATIENT
Start: 2025-04-24 | End: 2025-04-24

## 2025-04-24 RX ORDER — METHYLPREDNISOLONE SODIUM SUCCINATE 125 MG/2ML
125 INJECTION, POWDER, LYOPHILIZED, FOR SOLUTION INTRAMUSCULAR; INTRAVENOUS ONCE
Status: CANCELLED
Start: 2025-04-24 | End: 2025-04-24

## 2025-04-24 RX ADMIN — METHYLPREDNISOLONE SODIUM SUCCINATE 125 MG: 125 INJECTION, POWDER, LYOPHILIZED, FOR SOLUTION INTRAMUSCULAR; INTRAVENOUS at 14:49

## 2025-04-24 RX ADMIN — FERUMOXYTOL 510 MG: 510 INJECTION INTRAVENOUS at 14:54

## 2025-04-24 NOTE — PATIENT INSTRUCTIONS
"  Call [facility] at [phone] if you have any problems or concerns.    We know you have a Choice in healthcare and appreciate you using [facility].  Our purpose is to provide you \"Excellent Care\".  We hope that you will always choose us in the future and continue to recommend us to your family and friends.              "

## 2025-04-24 NOTE — NURSING NOTE
Pt arrived to [facility] for appt. VSS, no complaints at this time. Medication administered per MD order. Pt tolerated well. VSS post infusion. AVS printed out, copy given to pt.  Pt discharged from [facility] at 15:30 PM in stable condition, without complaints.

## 2025-04-28 ENCOUNTER — OFFICE VISIT (OUTPATIENT)
Dept: ENDOCRINOLOGY | Age: 75
End: 2025-04-28
Payer: MEDICARE

## 2025-04-28 VITALS
BODY MASS INDEX: 35.55 KG/M2 | OXYGEN SATURATION: 96 % | WEIGHT: 277 LBS | HEART RATE: 63 BPM | HEIGHT: 74 IN | DIASTOLIC BLOOD PRESSURE: 66 MMHG | SYSTOLIC BLOOD PRESSURE: 116 MMHG | TEMPERATURE: 97.8 F

## 2025-04-28 DIAGNOSIS — Z86.79 HISTORY OF CAD (CORONARY ARTERY DISEASE): ICD-10-CM

## 2025-04-28 DIAGNOSIS — Z79.4 TYPE 2 DIABETES MELLITUS WITH HYPERGLYCEMIA, WITH LONG-TERM CURRENT USE OF INSULIN: Primary | ICD-10-CM

## 2025-04-28 DIAGNOSIS — R80.9 TYPE 2 DIABETES MELLITUS WITH MICROALBUMINURIA, WITH LONG-TERM CURRENT USE OF INSULIN: ICD-10-CM

## 2025-04-28 DIAGNOSIS — E11.42 DIABETIC PERIPHERAL NEUROPATHY ASSOCIATED WITH TYPE 2 DIABETES MELLITUS: ICD-10-CM

## 2025-04-28 DIAGNOSIS — Z79.4 TYPE 2 DIABETES MELLITUS WITH MICROALBUMINURIA, WITH LONG-TERM CURRENT USE OF INSULIN: ICD-10-CM

## 2025-04-28 DIAGNOSIS — E11.65 TYPE 2 DIABETES MELLITUS WITH HYPERGLYCEMIA, WITH LONG-TERM CURRENT USE OF INSULIN: Primary | ICD-10-CM

## 2025-04-28 DIAGNOSIS — E11.29 TYPE 2 DIABETES MELLITUS WITH MICROALBUMINURIA, WITH LONG-TERM CURRENT USE OF INSULIN: ICD-10-CM

## 2025-04-28 DIAGNOSIS — E78.2 MIXED HYPERLIPIDEMIA: ICD-10-CM

## 2025-04-28 DIAGNOSIS — N18.32 STAGE 3B CHRONIC KIDNEY DISEASE: ICD-10-CM

## 2025-04-28 PROCEDURE — 99214 OFFICE O/P EST MOD 30 MIN: CPT | Performed by: NURSE PRACTITIONER

## 2025-04-28 PROCEDURE — 95251 CONT GLUC MNTR ANALYSIS I&R: CPT | Performed by: NURSE PRACTITIONER

## 2025-04-28 PROCEDURE — 3078F DIAST BP <80 MM HG: CPT | Performed by: NURSE PRACTITIONER

## 2025-04-28 PROCEDURE — 3044F HG A1C LEVEL LT 7.0%: CPT | Performed by: NURSE PRACTITIONER

## 2025-04-28 PROCEDURE — 3074F SYST BP LT 130 MM HG: CPT | Performed by: NURSE PRACTITIONER

## 2025-04-28 RX ORDER — INSULIN LISPRO 100 [IU]/ML
INJECTION, SOLUTION INTRAVENOUS; SUBCUTANEOUS
Qty: 90 ML | Refills: 0 | Status: SHIPPED | OUTPATIENT
Start: 2025-04-28

## 2025-04-28 RX ORDER — SYRING-NEEDL,DISP,INSUL,0.3 ML 30 GX5/16"
SYRINGE, EMPTY DISPOSABLE MISCELLANEOUS
Qty: 1 EACH | Refills: 0 | Status: SHIPPED | OUTPATIENT
Start: 2025-04-28

## 2025-04-28 RX ORDER — BLOOD-GLUCOSE METER
KIT MISCELLANEOUS
Qty: 1 EACH | Refills: 0 | Status: SHIPPED | OUTPATIENT
Start: 2025-04-28

## 2025-04-28 RX ORDER — AVOBENZONE, HOMOSALATE, OCTISALATE, OCTOCRYLENE 30; 40; 45; 26 MG/ML; MG/ML; MG/ML; MG/ML
CREAM TOPICAL
Qty: 100 EACH | Refills: 11 | Status: SHIPPED | OUTPATIENT
Start: 2025-04-28

## 2025-04-28 NOTE — PROGRESS NOTES
"Chief Complaint  Diabetes    Subjective        Hugh R McBurney presents to [facility] ENDOCRINOLOGY  History of Present Illness    Type 2 dm ~ 10 years   DM regimen: Lantus 40-45 units bid, Humalog 16u+ss with meals  Sliding scale: 3 units for 50 above 150   Last eye exam: 3/2024, going next week for follow up   Known complications: nephropathy; following routinely with renal: lisinopril 10mg QD, neuropathy; lyrica 150mg QD from PCP, podiatry trims his toenails, CAD: atorvastatin 80mg QD  Ended up having end of 2nd toe amputated , left great toe amputation        Cgm review 4/15/25-4/25/25  68% time in range  1% low  23% high  8% very high   Avg glu 162  GMI 7.2%    Objective   Vital Signs:  /66   Pulse 63   Temp 97.8 °F (36.6 °C) (Oral)   Ht 188 cm (74.02\")   Wt 126 kg (277 lb)   SpO2 96%   BMI 35.55 kg/m²   Estimated body mass index is 35.55 kg/m² as calculated from the following:    Height as of this encounter: 188 cm (74.02\").    Weight as of this encounter: 126 kg (277 lb).          Physical Exam  Vitals reviewed.   Constitutional:       General: He is not in acute distress.  HENT:      Head: Normocephalic and atraumatic.   Cardiovascular:      Rate and Rhythm: Normal rate.   Pulmonary:      Effort: Pulmonary effort is normal. No respiratory distress.   Musculoskeletal:         General: No signs of injury. Normal range of motion.      Cervical back: Normal range of motion and neck supple.   Skin:     General: Skin is warm and dry.   Neurological:      Mental Status: He is alert and oriented to person, place, and time. Mental status is at baseline.   Psychiatric:         Mood and Affect: Mood normal.         Behavior: Behavior normal.         Thought Content: Thought content normal.         Judgment: Judgment normal.        Result Review :  The following data was reviewed by: ABBY Alvarenga on 04/28/2025:  Common labs          1/16/2025    04:03 1/17/2025    04:14 3/17/2025    " 13:33   Common Labs   Glucose 65  194  155    BUN 40  41  47    Creatinine 3.08  3.21  3.53    Sodium 140  136  138    Potassium 5.0  4.7  5.0    Chloride 109  104  103    Calcium 7.9  8.0  8.2    Albumin 3.0   3.6    WBC   9.49    Hemoglobin   10.1    Hematocrit   30.2    Platelets   142                Assessment and Plan   Diagnoses and all orders for this visit:    1. Type 2 diabetes mellitus with hyperglycemia, with long-term current use of insulin (Primary)  -     glucose blood test strip; E11.65 check QD  Dispense: 100 each; Refill: 11  -     glucose monitor monitoring kit; E11.65  Dispense: 1 each; Refill: 0  -     Lancet Device misc; E11.65 check QD  Dispense: 1 each; Refill: 0  -     Lancets misc; E11.65 check QD  Dispense: 100 each; Refill: 11  -     HumaLOG KwikPen 100 UNIT/ML solution pen-injector; Inject 16 Units under the skin into the appropriate area as directed Daily With Breakfast AND 16 Units Daily Before Lunch AND 20 Units Daily With Dinner. Adds sliding scale based on BS reading. Max daily dose 75u daily  Indications: Type 2 Diabetes  Dispense: 90 mL; Refill: 0  -     Hemoglobin A1c    2. Stage 3b chronic kidney disease    3. Diabetic peripheral neuropathy associated with type 2 diabetes mellitus    4. History of CAD (coronary artery disease)    5. Mixed hyperlipidemia    6. Type 2 diabetes mellitus with microalbuminuria, with long-term current use of insulin             Follow Up   Return in about 4 months (around 8/28/2025).    Cgm reviewed, at goal   A1c check today  Reviewed insulin dosing, safety, hypoglycemia prevention and fingerstick glucose monitoring to safely treat hypoglycemia   Continue statin     Patient was given instructions and counseling regarding his condition or for health maintenance advice. Please see specific information pulled into the AVS if appropriate.       Minnie Dsouza, ABBY

## 2025-04-29 ENCOUNTER — RESULTS FOLLOW-UP (OUTPATIENT)
Dept: ENDOCRINOLOGY | Age: 75
End: 2025-04-29
Payer: MEDICARE

## 2025-04-29 LAB — HBA1C MFR BLD: 6.9 % (ref 4.8–5.6)

## 2025-08-06 ENCOUNTER — LAB (OUTPATIENT)
Dept: LAB | Facility: HOSPITAL | Age: 75
End: 2025-08-06
Payer: MEDICARE

## 2025-08-06 ENCOUNTER — TRANSCRIBE ORDERS (OUTPATIENT)
Dept: ADMINISTRATIVE | Facility: HOSPITAL | Age: 75
End: 2025-08-06
Payer: MEDICARE

## 2025-08-06 DIAGNOSIS — E11.22 TYPE 2 DIABETES MELLITUS WITH DIABETIC CHRONIC KIDNEY DISEASE, UNSPECIFIED CKD STAGE, UNSPECIFIED WHETHER LONG TERM INSULIN USE: ICD-10-CM

## 2025-08-06 DIAGNOSIS — E55.9 AVITAMINOSIS D: ICD-10-CM

## 2025-08-06 DIAGNOSIS — E78.2 MIXED HYPERLIPIDEMIA: ICD-10-CM

## 2025-08-06 DIAGNOSIS — R80.9 PROTEINURIA, UNSPECIFIED TYPE: ICD-10-CM

## 2025-08-06 DIAGNOSIS — K21.9 CHALASIA OF LOWER ESOPHAGEAL SPHINCTER: ICD-10-CM

## 2025-08-06 DIAGNOSIS — D75.1 ERYTHROCYTOSIS DUE TO HEPATOMA: ICD-10-CM

## 2025-08-06 DIAGNOSIS — I25.10 DISEASE OF CARDIOVASCULAR SYSTEM: ICD-10-CM

## 2025-08-06 DIAGNOSIS — G47.33 OBSTRUCTIVE SLEEP APNEA (ADULT) (PEDIATRIC): ICD-10-CM

## 2025-08-06 DIAGNOSIS — G89.29 OTHER CHRONIC PAIN: ICD-10-CM

## 2025-08-06 DIAGNOSIS — I10 ESSENTIAL HYPERTENSION, MALIGNANT: ICD-10-CM

## 2025-08-06 DIAGNOSIS — D63.1 ERYTHROPOIETIN DEFICIENCY ANEMIA: ICD-10-CM

## 2025-08-06 DIAGNOSIS — E66.09 EXOGENOUS OBESITY: ICD-10-CM

## 2025-08-06 DIAGNOSIS — C22.0 ERYTHROCYTOSIS DUE TO HEPATOMA: ICD-10-CM

## 2025-08-06 DIAGNOSIS — N18.4 CHRONIC KIDNEY DISEASE, STAGE IV (SEVERE): Primary | ICD-10-CM

## 2025-08-06 DIAGNOSIS — N18.4 CHRONIC KIDNEY DISEASE, STAGE IV (SEVERE): ICD-10-CM

## 2025-08-06 LAB
ALBUMIN SERPL-MCNC: 3.8 G/DL (ref 3.5–5.2)
ANION GAP SERPL CALCULATED.3IONS-SCNC: 14.9 MMOL/L (ref 5–15)
BACTERIA UR QL AUTO: NORMAL /HPF
BASOPHILS # BLD AUTO: 0.04 10*3/MM3 (ref 0–0.2)
BASOPHILS NFR BLD AUTO: 0.5 % (ref 0–1.5)
BILIRUB UR QL STRIP: NEGATIVE
BUN SERPL-MCNC: 44 MG/DL (ref 8–23)
BUN/CREAT SERPL: 12.9 (ref 7–25)
CALCIUM SPEC-SCNC: 8.5 MG/DL (ref 8.6–10.5)
CHLORIDE SERPL-SCNC: 102 MMOL/L (ref 98–107)
CLARITY UR: CLEAR
CO2 SERPL-SCNC: 22.1 MMOL/L (ref 22–29)
COLOR UR: YELLOW
CREAT SERPL-MCNC: 3.42 MG/DL (ref 0.76–1.27)
CREAT UR-MCNC: 37 MG/DL
DEPRECATED RDW RBC AUTO: 43.4 FL (ref 37–54)
EGFRCR SERPLBLD CKD-EPI 2021: 18.1 ML/MIN/1.73
EOSINOPHIL # BLD AUTO: 0.59 10*3/MM3 (ref 0–0.4)
EOSINOPHIL NFR BLD AUTO: 7.2 % (ref 0.3–6.2)
ERYTHROCYTE [DISTWIDTH] IN BLOOD BY AUTOMATED COUNT: 11.7 % (ref 12.3–15.4)
FERRITIN SERPL-MCNC: 216 NG/ML (ref 30–400)
GLUCOSE SERPL-MCNC: 202 MG/DL (ref 65–99)
GLUCOSE UR STRIP-MCNC: ABNORMAL MG/DL
HCT VFR BLD AUTO: 33.4 % (ref 37.5–51)
HGB BLD-MCNC: 11.3 G/DL (ref 13–17.7)
HGB UR QL STRIP.AUTO: NEGATIVE
HYALINE CASTS UR QL AUTO: NORMAL /LPF
IMM GRANULOCYTES # BLD AUTO: 0.02 10*3/MM3 (ref 0–0.05)
IMM GRANULOCYTES NFR BLD AUTO: 0.2 % (ref 0–0.5)
IRON 24H UR-MRATE: 91 MCG/DL (ref 59–158)
IRON SATN MFR SERPL: 31 % (ref 20–50)
KETONES UR QL STRIP: NEGATIVE
LEUKOCYTE ESTERASE UR QL STRIP.AUTO: NEGATIVE
LYMPHOCYTES # BLD AUTO: 1.2 10*3/MM3 (ref 0.7–3.1)
LYMPHOCYTES NFR BLD AUTO: 14.5 % (ref 19.6–45.3)
MCH RBC QN AUTO: 34.3 PG (ref 26.6–33)
MCHC RBC AUTO-ENTMCNC: 33.8 G/DL (ref 31.5–35.7)
MCV RBC AUTO: 101.5 FL (ref 79–97)
MONOCYTES # BLD AUTO: 0.46 10*3/MM3 (ref 0.1–0.9)
MONOCYTES NFR BLD AUTO: 5.6 % (ref 5–12)
NEUTROPHILS NFR BLD AUTO: 5.94 10*3/MM3 (ref 1.7–7)
NEUTROPHILS NFR BLD AUTO: 72 % (ref 42.7–76)
NITRITE UR QL STRIP: NEGATIVE
NRBC BLD AUTO-RTO: 0 /100 WBC (ref 0–0.2)
PH UR STRIP.AUTO: 6 [PH] (ref 5–8)
PHOSPHATE SERPL-MCNC: 3.9 MG/DL (ref 2.5–4.5)
PLATELET # BLD AUTO: 133 10*3/MM3 (ref 140–450)
PMV BLD AUTO: 12.1 FL (ref 6–12)
POTASSIUM SERPL-SCNC: 4.8 MMOL/L (ref 3.5–5.2)
PROT ?TM UR-MCNC: 171 MG/DL
PROT UR QL STRIP: ABNORMAL
PROT/CREAT UR: 4621.6 MG/G CREA (ref 0–200)
RBC # BLD AUTO: 3.29 10*6/MM3 (ref 4.14–5.8)
RBC # UR STRIP: NORMAL /HPF
REF LAB TEST METHOD: NORMAL
SODIUM SERPL-SCNC: 139 MMOL/L (ref 136–145)
SP GR UR STRIP: 1.01 (ref 1–1.03)
SQUAMOUS #/AREA URNS HPF: NORMAL /HPF
TIBC SERPL-MCNC: 298 MCG/DL (ref 298–536)
TRANSFERRIN SERPL-MCNC: 200 MG/DL (ref 200–360)
UROBILINOGEN UR QL STRIP: ABNORMAL
WBC # UR STRIP: NORMAL /HPF
WBC NRBC COR # BLD AUTO: 8.25 10*3/MM3 (ref 3.4–10.8)

## 2025-08-06 PROCEDURE — 83540 ASSAY OF IRON: CPT

## 2025-08-06 PROCEDURE — 84466 ASSAY OF TRANSFERRIN: CPT

## 2025-08-06 PROCEDURE — 82570 ASSAY OF URINE CREATININE: CPT

## 2025-08-06 PROCEDURE — 36415 COLL VENOUS BLD VENIPUNCTURE: CPT

## 2025-08-06 PROCEDURE — 84156 ASSAY OF PROTEIN URINE: CPT

## 2025-08-06 PROCEDURE — 85025 COMPLETE CBC W/AUTO DIFF WBC: CPT

## 2025-08-06 PROCEDURE — 80069 RENAL FUNCTION PANEL: CPT

## 2025-08-06 PROCEDURE — 82728 ASSAY OF FERRITIN: CPT

## 2025-08-06 PROCEDURE — 81001 URINALYSIS AUTO W/SCOPE: CPT

## 2025-08-26 ENCOUNTER — APPOINTMENT (OUTPATIENT)
Dept: WOUND CARE | Facility: HOSPITAL | Age: 75
End: 2025-08-26
Payer: MEDICARE

## 2025-08-26 PROCEDURE — G0463 HOSPITAL OUTPT CLINIC VISIT: HCPCS

## 2025-08-26 PROCEDURE — 97602 WOUND(S) CARE NON-SELECTIVE: CPT

## 2025-08-28 ENCOUNTER — TELEPHONE (OUTPATIENT)
Dept: ENDOCRINOLOGY | Age: 75
End: 2025-08-28
Payer: MEDICARE

## 2025-08-28 ENCOUNTER — OFFICE VISIT (OUTPATIENT)
Dept: ENDOCRINOLOGY | Age: 75
End: 2025-08-28
Payer: MEDICARE

## 2025-08-28 VITALS
BODY MASS INDEX: 35.52 KG/M2 | OXYGEN SATURATION: 95 % | DIASTOLIC BLOOD PRESSURE: 80 MMHG | WEIGHT: 276.8 LBS | HEIGHT: 74 IN | SYSTOLIC BLOOD PRESSURE: 130 MMHG | TEMPERATURE: 98 F | HEART RATE: 65 BPM

## 2025-08-28 DIAGNOSIS — N18.32 STAGE 3B CHRONIC KIDNEY DISEASE: ICD-10-CM

## 2025-08-28 DIAGNOSIS — E11.65 TYPE 2 DIABETES MELLITUS WITH HYPERGLYCEMIA, WITH LONG-TERM CURRENT USE OF INSULIN: Primary | ICD-10-CM

## 2025-08-28 DIAGNOSIS — Z79.4 TYPE 2 DIABETES MELLITUS WITH HYPERGLYCEMIA, WITH LONG-TERM CURRENT USE OF INSULIN: Primary | ICD-10-CM

## 2025-08-28 DIAGNOSIS — Z86.79 HISTORY OF CAD (CORONARY ARTERY DISEASE): ICD-10-CM

## 2025-08-28 DIAGNOSIS — E11.42 DIABETIC PERIPHERAL NEUROPATHY ASSOCIATED WITH TYPE 2 DIABETES MELLITUS: ICD-10-CM

## 2025-08-28 DIAGNOSIS — E78.2 MIXED HYPERLIPIDEMIA: ICD-10-CM

## 2025-08-28 RX ORDER — INSULIN LISPRO 100 [IU]/ML
INJECTION, SOLUTION INTRAVENOUS; SUBCUTANEOUS
Qty: 90 ML | Refills: 0 | Status: SHIPPED | OUTPATIENT
Start: 2025-08-28

## (undated) DEVICE — SYR CONTRL LUERLOK 10CC

## (undated) DEVICE — PRECISION THIN (9.0 X 0.38 X 25.0MM)

## (undated) DEVICE — 2108 SERIES SAGITTAL BLADE, NO OFFSET  (12.4 X 1.19 X 82.1MM)

## (undated) DEVICE — IMMOB KN 3PNL DLX CANVS 22IN BLU

## (undated) DEVICE — SKIN PREP TRAY W/CHG: Brand: MEDLINE INDUSTRIES, INC.

## (undated) DEVICE — PK ORTHO MINOR 40

## (undated) DEVICE — CONTN STRL 32OZ

## (undated) DEVICE — TREK CORONARY DILATATION CATHETER 2.50 MM X 20 MM / RAPID-EXCHANGE: Brand: TREK

## (undated) DEVICE — GLV SURG BIOGEL LTX PF 6 1/2

## (undated) DEVICE — PENCL E/S HNDSWCH ROCKR CB

## (undated) DEVICE — NC TREK NEO™ CORONARY DILATATION CATHETER 3.00 MM X 20 MM / RAPID-EXCHANGE: Brand: NC TREK NEO™

## (undated) DEVICE — NDL SPINE 22G 31/2IN BLK

## (undated) DEVICE — DRAPE,U/ SHT,SPLIT,PLAS,STERIL: Brand: MEDLINE

## (undated) DEVICE — SUT PDS 2 0 CT1 27IN CLR Z259H

## (undated) DEVICE — BNDG ELAS ELITE V/CLOSE 4IN 5YD LF STRL

## (undated) DEVICE — ANTIBACTERIAL UNDYED BRAIDED (POLYGLACTIN 910), SYNTHETIC ABSORBABLE SUTURE: Brand: COATED VICRYL

## (undated) DEVICE — BANDAGE,GAUZE,BULKEE II,4.5"X4.1YD,STRL: Brand: MEDLINE

## (undated) DEVICE — PROXIMATE RH ROTATING HEAD SKIN STAPLERS (35 WIDE) CONTAINS 35 STAINLESS STEEL STAPLES: Brand: PROXIMATE

## (undated) DEVICE — GLV SURG BIOGEL LTX PF 8

## (undated) DEVICE — UNDERCAST PADDING: Brand: DEROYAL

## (undated) DEVICE — THIN OSTEOTOME BLADE 8MM X 5 IN: Brand: RENOVATION

## (undated) DEVICE — 6F .070 JR 4 100CM: Brand: CORDIS

## (undated) DEVICE — GUIDE CATHETER: Brand: MACH1™

## (undated) DEVICE — GLV SURG TRIUMPH CLASSIC PF LTX 6.5 STRL

## (undated) DEVICE — PK KN TOTL 40

## (undated) DEVICE — SUT PDS 0 TP1 LP 60IN Z991G

## (undated) DEVICE — ENCORE® LATEX ORTHO SIZE 8, STERILE LATEX POWDER-FREE SURGICAL GLOVE: Brand: ENCORE

## (undated) DEVICE — DUAL CUT SAGITTAL BLADE

## (undated) DEVICE — DECANT BG O JET

## (undated) DEVICE — THIN OSTEOTOME BLADE 12MM X 3 IN: Brand: RENOVATION

## (undated) DEVICE — SOL ISO/ALC RUB 70PCT 4OZ

## (undated) DEVICE — ESOPHAGEAL/PYLORIC/COLONIC WIREGUIDED BALLOON DILATATION CATHETER: Brand: CRE WIREGUIDED

## (undated) DEVICE — SPNG GZ WOVN 4X4IN 12PLY 10/BX STRL

## (undated) DEVICE — FRCP BX RADJAW4 NDL 2.8 240CM LG OG BX40

## (undated) DEVICE — PAD,ABDOMINAL,8"X10",ST,LF: Brand: MEDLINE

## (undated) DEVICE — Device

## (undated) DEVICE — THIN OFFSET (13.0 X 0.38 X 39.0MM)

## (undated) DEVICE — TOWEL,OR,DSP,ST,BLUE,STD,4/PK,20PK/CS: Brand: MEDLINE

## (undated) DEVICE — BNDG ELAS ELITE V/CLOSE 6IN 5YD LF STRL

## (undated) DEVICE — SPNG LAP 18X18IN LF STRL PK/5

## (undated) DEVICE — CATH DIAG IMPULSE FR4 5F 100CM

## (undated) DEVICE — 3M™ IOBAN™ 2 ANTIMICROBIAL INCISE DRAPE 6640EZ: Brand: IOBAN™ 2

## (undated) DEVICE — GLIDESHEATH BASIC HYDROPHILIC COATED INTRODUCER SHEATH: Brand: GLIDESHEATH

## (undated) DEVICE — GUIDELINER CATHETERS ARE INTENDED TO BE USED IN CONJUNCTION WITH GUIDE CATHETERS TO ACCESS DISCRETE REGIONS OF THE CORONARY AND/OR PERIPHERAL VASCULATURE, AND TO FACILITATE PLACEMENT OF INTERVENTIONAL DEVICES.: Brand: GUIDELINER® V3 CATHETER

## (undated) DEVICE — KT MANIFLD CARDIAC

## (undated) DEVICE — PK CATH CARD 40

## (undated) DEVICE — DEV INFL BALN BIG60 W/GAUGE 60ML

## (undated) DEVICE — GLV SURG TRIUMPH CLASSIC PF LTX 8 STRL

## (undated) DEVICE — LINER SURG CANSTR SXN S/RIGD 1500CC

## (undated) DEVICE — DRSNG WND GZ CURAD OIL EMULSION 3X8IN LF STRL 1PK

## (undated) DEVICE — BNDG ELAS CO-FLEX SLF ADHR 6IN 5YD LF STRL

## (undated) DEVICE — SUT VIC 0 CT1 36IN J946H

## (undated) DEVICE — SNAR POLYP CAPTIFLX MICRO OVL 13MM 240CM

## (undated) DEVICE — CATH DIAG IMPULSE PIG 5F 100CM

## (undated) DEVICE — CATH DIAG IMPULSE FL3.5 5F 100CM

## (undated) DEVICE — DRSNG PAD ABD 8X10IN STRL

## (undated) DEVICE — DRAPE,REIN 53X77,STERILE: Brand: MEDLINE

## (undated) DEVICE — DGW .035 FC J3MM 260CM TEF: Brand: EMERALD

## (undated) DEVICE — CONTAINER,SPECIMEN,OR STERILE,4OZ: Brand: MEDLINE

## (undated) DEVICE — SPIRAL GIGLI SAW BLADE 20 IN

## (undated) DEVICE — THE SINGLE USE ETRAP – POLYP TRAP IS USED FOR SUCTION RETRIEVAL OF ENDOSCOPICALLY REMOVED POLYPS.: Brand: ETRAP

## (undated) DEVICE — SUT SILK 2/0 TIES 18IN A185H

## (undated) DEVICE — GLV SURG BIOGEL LTX PF 7 1/2

## (undated) DEVICE — SUT VIC 2/0 TIES 18IN J111T

## (undated) DEVICE — KT ORCA ORCAPOD DISP STRL

## (undated) DEVICE — ADAPT CLN BIOGUARD AIR/H2O DISP

## (undated) DEVICE — NC TREK NEO™ CORONARY DILATATION CATHETER 2.50 X 20 MM / RAPID-EXCHANGE: Brand: NC TREK NEO™

## (undated) DEVICE — KT DRN EVAC WND PVC PCH WTROC RND 10F400

## (undated) DEVICE — JACKT LAB F/R KNIT CUFF/COLR XLG BLU

## (undated) DEVICE — COAXIAL FEMORAL CANAL TIP

## (undated) DEVICE — OCCLUSIVE GAUZE STRIP,3% BISMUTH TRIBROMOPHENATE IN PETROLATUM BLEND: Brand: XEROFORM

## (undated) DEVICE — SUT VIC 3/0 TIES 18IN J110T

## (undated) DEVICE — SUT ETHIB 0 CT1 CR8 18IN CX21D

## (undated) DEVICE — GLV SURG SENSICARE PI MIC PF SZ8 LF STRL

## (undated) DEVICE — GW EMR FIX EXCHG J STD .035 3MM 260CM

## (undated) DEVICE — CORONARY IMAGING CATHETER: Brand: OPTICROSS 6

## (undated) DEVICE — GW PRESSUREWIRE X WIRELESS FFR 175CM

## (undated) DEVICE — SNAR POLYP CAPTIVATOR/COLD STFF RND 10MM 240CM

## (undated) DEVICE — GAUZE,SPONGE,FLUFF,6"X6.75",STRL,10/TRAY: Brand: MEDLINE

## (undated) DEVICE — SIGMA LCS HIGH PERFORMANCE INSTRUMENTS STERILE THREADED PINS: Brand: SIGMA LCS HIGH PERFORMANCE

## (undated) DEVICE — ENCORE® LATEX ORTHO SIZE 6.5, STERILE LATEX POWDER-FREE SURGICAL GLOVE: Brand: ENCORE

## (undated) DEVICE — ERBE NESSY®PLATE 170 SPLIT; 168CM²; CABLE 3M: Brand: ERBE

## (undated) DEVICE — THE BITE BLOCK MAXI, LATEX FREE STRAP IS USED TO PROTECT THE ENDOSCOPE INSERTION TUBE FROM BEING BITTEN BY THE PATIENT.

## (undated) DEVICE — CATH VENT MIV RADL PIG ST TIP 4F 110CM

## (undated) DEVICE — GW HITORQUE/BAL MID/WT J W/HCOAT .014 3X190CM

## (undated) DEVICE — DEV INDEFLATOR P/N 580289

## (undated) DEVICE — VIAL FORMALIN CAP 10P 40ML

## (undated) DEVICE — APPL CHLORAPREP W/TINT 26ML ORNG

## (undated) DEVICE — PATIENT RETURN ELECTRODE, SINGLE-USE, CONTACT QUALITY MONITORING, ADULT, WITH 9FT CORD, FOR PATIENTS WEIGING OVER 33LBS. (15KG): Brand: MEGADYNE

## (undated) DEVICE — BW-412T DISP COMBO CLEANING BRUSH: Brand: SINGLE USE COMBINATION CLEANING BRUSH

## (undated) DEVICE — SUT SILK 2/0 SH CR5 18IN C0125

## (undated) DEVICE — CULT AER/ANAEROB FASTIDIOUS BACT